# Patient Record
Sex: MALE | Race: WHITE | NOT HISPANIC OR LATINO | Employment: FULL TIME | URBAN - METROPOLITAN AREA
[De-identification: names, ages, dates, MRNs, and addresses within clinical notes are randomized per-mention and may not be internally consistent; named-entity substitution may affect disease eponyms.]

---

## 2017-03-28 ENCOUNTER — TRANSFERRED RECORDS (OUTPATIENT)
Dept: HEALTH INFORMATION MANAGEMENT | Facility: CLINIC | Age: 52
End: 2017-03-28

## 2017-07-25 ENCOUNTER — RECORDS - HEALTHEAST (OUTPATIENT)
Dept: LAB | Facility: CLINIC | Age: 52
End: 2017-07-25

## 2017-07-25 LAB
CHOLEST SERPL-MCNC: 131 MG/DL
FASTING STATUS PATIENT QL REPORTED: ABNORMAL
HDLC SERPL-MCNC: 31 MG/DL
LDLC SERPL CALC-MCNC: 53 MG/DL
TRIGL SERPL-MCNC: 233 MG/DL

## 2018-03-27 ENCOUNTER — RECORDS - HEALTHEAST (OUTPATIENT)
Dept: LAB | Facility: CLINIC | Age: 53
End: 2018-03-27

## 2018-03-27 ENCOUNTER — TRANSFERRED RECORDS (OUTPATIENT)
Dept: HEALTH INFORMATION MANAGEMENT | Facility: CLINIC | Age: 53
End: 2018-03-27

## 2018-03-27 LAB
ANION GAP SERPL CALCULATED.3IONS-SCNC: 13 MMOL/L (ref 5–18)
BUN SERPL-MCNC: 11 MG/DL (ref 8–22)
CALCIUM SERPL-MCNC: 9.4 MG/DL (ref 8.5–10.5)
CHLORIDE BLD-SCNC: 100 MMOL/L (ref 98–107)
CO2 SERPL-SCNC: 24 MMOL/L (ref 22–31)
CREAT SERPL-MCNC: 0.85 MG/DL (ref 0.7–1.3)
GFR SERPL CREATININE-BSD FRML MDRD: >60 ML/MIN/1.73M2
GLUCOSE BLD-MCNC: 309 MG/DL (ref 70–125)
POTASSIUM BLD-SCNC: 4.7 MMOL/L (ref 3.5–5)
SODIUM SERPL-SCNC: 137 MMOL/L (ref 136–145)

## 2018-07-17 ENCOUNTER — RECORDS - HEALTHEAST (OUTPATIENT)
Dept: LAB | Facility: CLINIC | Age: 53
End: 2018-07-17

## 2018-07-17 ENCOUNTER — AMBULATORY - HEALTHEAST (OUTPATIENT)
Dept: ADMINISTRATIVE | Facility: CLINIC | Age: 53
End: 2018-07-17

## 2018-07-17 DIAGNOSIS — E66.9 OBESITY: ICD-10-CM

## 2018-07-17 LAB
CHOLEST SERPL-MCNC: 153 MG/DL
FASTING STATUS PATIENT QL REPORTED: ABNORMAL
HDLC SERPL-MCNC: 39 MG/DL
LDLC SERPL CALC-MCNC: 85 MG/DL
TRIGL SERPL-MCNC: 146 MG/DL

## 2018-10-08 ENCOUNTER — TRANSFERRED RECORDS (OUTPATIENT)
Dept: HEALTH INFORMATION MANAGEMENT | Facility: CLINIC | Age: 53
End: 2018-10-08

## 2018-10-16 ENCOUNTER — TRANSFERRED RECORDS (OUTPATIENT)
Dept: HEALTH INFORMATION MANAGEMENT | Facility: CLINIC | Age: 53
End: 2018-10-16

## 2018-10-26 ENCOUNTER — TRANSFERRED RECORDS (OUTPATIENT)
Dept: HEALTH INFORMATION MANAGEMENT | Facility: CLINIC | Age: 53
End: 2018-10-26

## 2018-11-19 ENCOUNTER — TRANSFERRED RECORDS (OUTPATIENT)
Dept: HEALTH INFORMATION MANAGEMENT | Facility: CLINIC | Age: 53
End: 2018-11-19

## 2018-12-10 ENCOUNTER — TRANSFERRED RECORDS (OUTPATIENT)
Dept: HEALTH INFORMATION MANAGEMENT | Facility: CLINIC | Age: 53
End: 2018-12-10

## 2019-01-11 ENCOUNTER — TRANSFERRED RECORDS (OUTPATIENT)
Dept: HEALTH INFORMATION MANAGEMENT | Facility: CLINIC | Age: 54
End: 2019-01-11

## 2019-02-14 ENCOUNTER — TRANSFERRED RECORDS (OUTPATIENT)
Dept: HEALTH INFORMATION MANAGEMENT | Facility: CLINIC | Age: 54
End: 2019-02-14

## 2019-03-25 ENCOUNTER — TRANSFERRED RECORDS (OUTPATIENT)
Dept: HEALTH INFORMATION MANAGEMENT | Facility: CLINIC | Age: 54
End: 2019-03-25

## 2019-03-27 ENCOUNTER — TELEPHONE (OUTPATIENT)
Dept: OTOLARYNGOLOGY | Facility: CLINIC | Age: 54
End: 2019-03-27

## 2019-03-27 NOTE — TELEPHONE ENCOUNTER
[facility] Call [facility]    Phone Message    May a detailed message be left on voicemail: yes    Reason for Call: Other: pt calling to [facility] for oral/jaw cancer. Please call pt. for scheduling.  Call made to siddharth SENA But left a message.    Action Taken: Message routed to:  [facility] ([facility] ENT

## 2019-03-29 ENCOUNTER — TELEPHONE (OUTPATIENT)
Dept: OPHTHALMOLOGY | Facility: CLINIC | Age: 54
End: 2019-03-29

## 2019-03-29 DIAGNOSIS — K13.79 MASS OF ORAL CAVITY: Primary | ICD-10-CM

## 2019-03-29 NOTE — TELEPHONE ENCOUNTER
This referral is for a possible case of squamous cell carcinoma. Referral was faxed to the ENT clinic.

## 2019-04-01 ENCOUNTER — OFFICE VISIT (OUTPATIENT)
Dept: OTOLARYNGOLOGY | Facility: CLINIC | Age: 54
End: 2019-04-01
Payer: COMMERCIAL

## 2019-04-01 ENCOUNTER — PRE VISIT (OUTPATIENT)
Dept: OTOLARYNGOLOGY | Facility: CLINIC | Age: 54
End: 2019-04-01

## 2019-04-01 ENCOUNTER — HOSPITAL ENCOUNTER (OUTPATIENT)
Dept: CT IMAGING | Facility: CLINIC | Age: 54
Discharge: HOME | End: 2019-04-01
Attending: OTOLARYNGOLOGY | Admitting: OTOLARYNGOLOGY
Payer: COMMERCIAL

## 2019-04-01 VITALS — BODY MASS INDEX: 49.44 KG/M2 | HEIGHT: 67 IN | WEIGHT: 315 LBS

## 2019-04-01 DIAGNOSIS — K13.79 MASS OF ORAL CAVITY: ICD-10-CM

## 2019-04-01 DIAGNOSIS — R59.1 LYMPHADENOPATHY: ICD-10-CM

## 2019-04-01 DIAGNOSIS — K13.79 ORAL PAIN: Primary | ICD-10-CM

## 2019-04-01 PROCEDURE — 70491 CT SOFT TISSUE NECK W/DYE: CPT

## 2019-04-01 PROCEDURE — 25000128 H RX IP 250 OP 636: Performed by: STUDENT IN AN ORGANIZED HEALTH CARE EDUCATION/TRAINING PROGRAM

## 2019-04-01 RX ORDER — IOPAMIDOL 755 MG/ML
100 INJECTION, SOLUTION INTRAVASCULAR ONCE
Status: COMPLETED | OUTPATIENT
Start: 2019-04-01 | End: 2019-04-01

## 2019-04-01 RX ORDER — OXYCODONE HYDROCHLORIDE 5 MG/1
5 TABLET ORAL EVERY 6 HOURS PRN
Qty: 30 TABLET | Refills: 0 | Status: SHIPPED | OUTPATIENT
Start: 2019-04-01 | End: 2019-04-24

## 2019-04-01 RX ORDER — GLIPIZIDE 10 MG/1
20 TABLET, FILM COATED, EXTENDED RELEASE ORAL EVERY MORNING
Refills: 0 | Status: ON HOLD | COMMUNITY
Start: 2019-03-30 | End: 2019-05-16

## 2019-04-01 RX ORDER — ATORVASTATIN CALCIUM 20 MG/1
20 TABLET, FILM COATED ORAL EVERY MORNING
Refills: 11 | Status: ON HOLD | COMMUNITY
Start: 2019-03-30 | End: 2019-05-15

## 2019-04-01 RX ORDER — LISINOPRIL 10 MG/1
10 TABLET ORAL EVERY MORNING
Refills: 11 | Status: ON HOLD | COMMUNITY
Start: 2019-03-30 | End: 2019-05-15

## 2019-04-01 RX ADMIN — IOPAMIDOL 100 ML: 755 INJECTION, SOLUTION INTRAVENOUS at 10:41

## 2019-04-01 SDOH — HEALTH STABILITY: MENTAL HEALTH: HOW OFTEN DO YOU HAVE A DRINK CONTAINING ALCOHOL?: NEVER

## 2019-04-01 ASSESSMENT — PATIENT HEALTH QUESTIONNAIRE - PHQ9
SUM OF ALL RESPONSES TO PHQ QUESTIONS 1-9: 13
SUM OF ALL RESPONSES TO PHQ QUESTIONS 1-9: 13
10. IF YOU CHECKED OFF ANY PROBLEMS, HOW DIFFICULT HAVE THESE PROBLEMS MADE IT FOR YOU TO DO YOUR WORK, TAKE CARE OF THINGS AT HOME, OR GET ALONG WITH OTHER PEOPLE: NOT DIFFICULT AT ALL

## 2019-04-01 ASSESSMENT — PAIN SCALES - GENERAL: PAINLEVEL: EXTREME PAIN (8)

## 2019-04-01 ASSESSMENT — MIFFLIN-ST. JEOR: SCORE: 2245.6

## 2019-04-01 NOTE — PROGRESS NOTES
Otolaryngology Clinic Note  April 1, 2019    Dear Juwan Medellin MD  [facility]  [place] [address]    Thank you for referring Devonte Tucker to [facility] for evaluation of left oral lesion.    HPI: Devonte Tucker is a 53 year old male with PMH of DM2, HTN, who presented to otolaryngology clinic today for evaluation of left oral lesion. He initially developed while mucosal lesion at teeth #12 and #13, s/p several excisions in 2014 by oral surgeon Dr. Carbajal in 2014. 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 He had persistent pain at the area and was treated with multiple course of antibiotics and eventually teeth #12 removal due to mobility on 10/16/2018. He continue to have pain at the surgical site and was thought to have persistent infection vs osteomyelitis. He underwent incision and drainage, mucosa and bone biopsy of the same site on 12/10/18. The biopsy was reviewed both in [facility] oral pathology lab and [facility], both showed epithelial dysplasia, cannot exclude invasive SCC. He was then referred to [facility] for [facility]. Dr. Medellin recommended a left infrastructure maxillectomy with obturator reconstruction. He is here today for a second opinion. Currently he reports pain at the left anterior maxilla, centered at teeth #11, worse with eating, radiates to the left ear and left neck. He's been taking ibuprofen and tylenol frequently for his pain. He denies fever, chill, facial numbness, neck mass, hearing change, nasal congestion, sore throat, dysphagia, cough, SOB.    ROS: 12 point review of systems is negative unless noted in HPI.    Past Medical History:   Diagnosis Date     Diabetes (H)      Hypertension        No past surgical history on file.    Social History     Socioeconomic History     Marital status: Single     Spouse name: Not on file     Number of children: Not on file      Years of education: Not on file     Highest education level: Not on file   Occupational History     Not on file   Social Needs     Financial resource strain: Not on file     Food insecurity:     Worry: Not on file     Inability: Not on file     Transportation needs:     Medical: Not on file     Non-medical: Not on file   Tobacco Use     Smoking status: Former Smoker     Smokeless tobacco: Never Used   Substance and Sexual Activity     Alcohol use: Not on file     Drug use: Not on file     Sexual activity: Not on file   Lifestyle     Physical activity:     Days per week: Not on file     Minutes per session: Not on file     Stress: Not on file   Relationships     Social connections:     Talks on phone: Not on file     Gets together: Not on file     Attends Rastafarian service: Not on file     Active member of club or organization: Not on file     Attends meetings of clubs or organizations: Not on file     Relationship status: Not on file     Intimate partner violence:     Fear of current or ex partner: Not on file     Emotionally abused: Not on file     Physically abused: Not on file     Forced sexual activity: Not on file   Other Topics Concern     Not on file   Social History Narrative     Not on file       No family history on file.    Current Outpatient Medications   Medication Sig Dispense Refill     atorvastatin (LIPITOR) 20 MG tablet   11     glipiZIDE (GLUCOTROL XL) 10 MG 24 hr tablet   0     lisinopril (PRINIVIL/ZESTRIL) 10 MG tablet   11     metFORMIN (GLUCOPHAGE) 500 MG tablet Take 500 mg by mouth         No Known Allergies    PHYSICAL EXAM:  Constitutional: Sitting comfortably, no acute distress  Craniofacial: Normocephalic, atraumatic, normal facial features  Neurologic: Alert. Cranial nerves 2-12 intact  Eyes: PERLL, EOMI, no nystagmus  Ears: Auricles normal. External auditory canals patent without significant cerumen impaction. TMs intact.  Nose: External nose fairly symmetric. Anterior rhinoscopy reveals  normal mucosa and turbinates. No significant septum deviation. No mass, polyps or purulence.  Oral: Lips normal. Tongue, FOM and buccal mucosa normal. There is an area of leukoplakia at the left maxillary gingiva involving tooth #10 to #13, both at the buccal side and the lingual side, no obvious soft tissue mass. Tonsils 2+ bilaterally.   Neck: Supple. Normal laryngeal and tracheal landmarks. No palpable cervical LAD.  Skin: No lacerations, scars, or lesions  Pulmonary: Non-labored breathing in room air. No stridor. Voice strong    IMAGES:  CT neck  Impression:     Left maxillary alveolar lesion at the left canine level overall  measuring about 1.4 x 1.0 cm presumably due to underlying malignancy.     Left submandibular 1.7 cm lymph node is suspicious for metastatic  digna involvement.    ASSESSMENT AND PLAN  Devonte Tucker is a 53 year old male with PMH of DM2, HTN, who presented to otolaryngology clinic today for evaluation of lesion at the left anterior maxillary alveolus. The lesion has been treated since 2014 with multiple excisions and antibiotics. The most recent biopsy on 12/10/18 showed epithelial dysplasia, cannot rule out invasive SCC. His neck CT showed bony erosion at the level of teeth #11, as well as left submandibular lymph node. We recommend an ultrasound guided FNA of the left submandibular lymph node. If it turns to be malignancy, then we will recommend infrastructure maxillectomy and neck dissection. If the FNA turns to be negative for malignancy, we will proceed with re-biopsy of the lesion with possible dental extraction in the OR. We will refer him to dental clinic to get dental impression in case he needs an obturator in the future. We will contact him once the left neck FNA result is available    Silas Tran  Otolaryngology Resident - PGY5     CC  Juwan Medellin MD  [facility]  [address] [place] [id number]    Sumit LONDONO, saw this patient with the  resident/fellow and agree with the resident's findings and plan of care as documented in the resident's/fellow's note.

## 2019-04-01 NOTE — PATIENT INSTRUCTIONS
1. We will arrange an appointment with Dr. Villalobos as well as an ultrasound guided biopsy. We will call you with this information.   2. Please call the ENT clinic with any questions,concerns, new or worsening symptoms.    -Clinic number is [phone]   - Britt[facility]'s direct line (Dr. Atwood's nurse) [phone]

## 2019-04-01 NOTE — TELEPHONE ENCOUNTER
FUTURE VISIT INFORMATION      FUTURE VISIT INFORMATION:    Date: 4/1/19    Time: 11:45AM    Location: [facility] [place] [facility]  REFERRAL INFORMATION:    Referring provider:  Dr Juwan Medellin    Referring providers clinic:  [facility]    Reason for visit/diagnosis  possible SCC    RECORDS REQUESTED FROM:       Clinic name Comments Records Status Imaging Status   [facility] recs scanned in - hard copies with provider Scanned in [facility] oncology recs  Care Everywhere    Oral pathology [facility] 12/10/18 palate biopsy   *slides were also consulted at [facility], report in Epic are for the same slides  Report: scanned in EPIC                        4/1/19 10AM called [facility], slides were sent back to [facility] oral pathology. Sending a request to oral pathology with a consult form for slides to be dropped off with Surgical path - [facility]

## 2019-04-01 NOTE — LETTER
4/1/2019     RE: Devonte Tucker  [address] [place] [address]     Dear Colleague,    Thank you for referring your patient, Devonte Tucker, to the [facility] at [facility]. Please see a copy of my visit note below.  Otolaryngology Clinic Note  April 1, 2019    Dear Juwan Medellin MD  [facility]  [address] [place] [id number]    Thank you for referring Devonte Tucker to [facility] Otolaryngology [facility] for evaluation of left oral lesion.    HPI: Devonte Tucker is a 53 year old male with PMH of DM2, HTN, who presented to otolaryngology clinic today for evaluation of left oral lesion. He initially developed while mucosal lesion at teeth #12 and #13, s/p several excisions in 2014 by oral surgeon Dr. Carbajal in 2014. 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 He had persistent pain at the area and was treated with multiple course of antibiotics and eventually teeth #12 removal due to mobility on 10/16/2018. He continue to have pain at the surgical site and was thought to have persistent infection vs osteomyelitis. He underwent incision and drainage, mucosa and bone biopsy of the same site on 12/10/18. The biopsy was reviewed both in [facility] oral pathology lab and [facility], both showed epithelial dysplasia, cannot exclude invasive SCC. He was then referred to [facility]ell[facility] for [facility]. Dr. Medellin recommended a left infrastructure maxillectomy with obturator reconstruction. He is here today for a second opinion. Currently he reports pain at the left anterior maxilla, centered at teeth #11, worse with eating, radiates to the left ear and left neck. He's been taking ibuprofen and tylenol frequently for his pain. He denies fever, chill, facial numbness, neck mass, hearing change, nasal congestion, sore throat, dysphagia, cough, SOB.    ROS: 12 point review of systems is  negative unless noted in HPI.    Past Medical History:   Diagnosis Date     Diabetes (H)      Hypertension        No past surgical history on file.    Social History     Socioeconomic History     Marital status: Single     Spouse name: Not on file     Number of children: Not on file     Years of education: Not on file     Highest education level: Not on file   Occupational History     Not on file   Social Needs     Financial resource strain: Not on file     Food insecurity:     Worry: Not on file     Inability: Not on file     Transportation needs:     Medical: Not on file     Non-medical: Not on file   Tobacco Use     Smoking status: Former Smoker     Smokeless tobacco: Never Used   Substance and Sexual Activity     Alcohol use: Not on file     Drug use: Not on file     Sexual activity: Not on file   Lifestyle     Physical activity:     Days per week: Not on file     Minutes per session: Not on file     Stress: Not on file   Relationships     Social connections:     Talks on phone: Not on file     Gets together: Not on file     Attends Confucianism service: Not on file     Active member of club or organization: Not on file     Attends meetings of clubs or organizations: Not on file     Relationship status: Not on file     Intimate partner violence:     Fear of current or ex partner: Not on file     Emotionally abused: Not on file     Physically abused: Not on file     Forced sexual activity: Not on file   Other Topics Concern     Not on file   Social History Narrative     Not on file       No family history on file.    Current Outpatient Medications   Medication Sig Dispense Refill     atorvastatin (LIPITOR) 20 MG tablet   11     glipiZIDE (GLUCOTROL XL) 10 MG 24 hr tablet   0     lisinopril (PRINIVIL/ZESTRIL) 10 MG tablet   11     metFORMIN (GLUCOPHAGE) 500 MG tablet Take 500 mg by mouth         No Known Allergies    PHYSICAL EXAM:  Constitutional: Sitting comfortably, no acute distress  Craniofacial: Normocephalic,  atraumatic, normal facial features  Neurologic: Alert. Cranial nerves 2-12 intact  Eyes: PERLL, EOMI, no nystagmus  Ears: Auricles normal. External auditory canals patent without significant cerumen impaction. TMs intact.  Nose: External nose fairly symmetric. Anterior rhinoscopy reveals normal mucosa and turbinates. No significant septum deviation. No mass, polyps or purulence.  Oral: Lips normal. Tongue, FOM and buccal mucosa normal. There is an area of leukoplakia at the left maxillary gingiva involving tooth #10 to #13, both at the buccal side and the lingual side, no obvious soft tissue mass. Tonsils 2+ bilaterally.   Neck: Supple. Normal laryngeal and tracheal landmarks. No palpable cervical LAD.  Skin: No lacerations, scars, or lesions  Pulmonary: Non-labored breathing in room air. No stridor. Voice strong    IMAGES:  CT neck  Impression:     Left maxillary alveolar lesion at the left canine level overall  measuring about 1.4 x 1.0 cm presumably due to underlying malignancy.     Left submandibular 1.7 cm lymph node is suspicious for metastatic  digna involvement.    ASSESSMENT AND PLAN  Devonte Tucker is a 53 year old male with PMH of DM2, HTN, who presented to otolaryngology clinic today for evaluation of lesion at the left anterior maxillary alveolus. The lesion has been treated since 2014 with multiple excisions and antibiotics. The most recent biopsy on 12/10/18 showed epithelial dysplasia, cannot rule out invasive SCC. His neck CT showed bony erosion at the level of teeth #11, as well as left submandibular lymph node. We recommend an ultrasound guided FNA of the left submandibular lymph node. If it turns to be malignancy, then we will recommend infrastructure maxillectomy and neck dissection. If the FNA turns to be negative for malignancy, we will proceed with re-biopsy of the lesion with possible dental extraction in the OR. We will refer him to dental clinic to get dental impression in case he needs  an obturator in the future. We will contact him once the left neck FNA result is available    Silas Tran  Otolaryngology Resident - PGY5    Again, thank you for allowing me to participate in the care of your patient.      Sincerely,    Sumit Atwood MD    CC  Juwan Medellin MD  [facility]  [address] [place] [phone]

## 2019-04-01 NOTE — NURSING NOTE
Chief Complaint   Patient presents with     Consult     possible squamous cell carcinoma      Jeovanny Colon, EMT

## 2019-04-02 ENCOUNTER — DOCUMENTATION ONLY (OUTPATIENT)
Dept: RADIOLOGY | Facility: CLINIC | Age: 54
End: 2019-04-02

## 2019-04-02 ASSESSMENT — PATIENT HEALTH QUESTIONNAIRE - PHQ9: SUM OF ALL RESPONSES TO PHQ QUESTIONS 1-9: 13

## 2019-04-02 NOTE — PROGRESS NOTES
Patient to be placed on  Neuro Radiology schedule for a US  guided  biopsy of left level IB neck node     Discussed with Dr. Bassam Olsen IR [facility]  [phone] Call pager  [phone] pager

## 2019-04-03 ENCOUNTER — PATIENT OUTREACH (OUTPATIENT)
Dept: OTOLARYNGOLOGY | Facility: CLINIC | Age: 54
End: 2019-04-03

## 2019-04-03 LAB — COPATH REPORT: NORMAL

## 2019-04-03 PROCEDURE — 00000346 ZZHCL STATISTIC REVIEW OUTSIDE SLIDES TC 88321: Performed by: OTOLARYNGOLOGY

## 2019-04-03 NOTE — PROGRESS NOTES
Called patient to inform him that we were able to get him scheduled for an IR guided biopsy of the left level IB neck node. He is scheduled for tomorrow 4/4 at 8:00am for 9:30am procedure. Spoke with IR nurse and will have patient hold any Aspirin/ibuporfen today. He is to hold his Metformin tomorrow am. No food after 3:30am and can have clear liquids up to 7:30am tomorrow. Also advised patient that he will need . Patient verbalized understanding of the above information. He was encouraged to call with further questions or concerns.     Brittny Meredith, RN, BSN

## 2019-04-04 ENCOUNTER — HOSPITAL ENCOUNTER (OUTPATIENT)
Facility: CLINIC | Age: 54
Discharge: HOME | End: 2019-04-04
Attending: OTOLARYNGOLOGY | Admitting: OTOLARYNGOLOGY
Payer: COMMERCIAL

## 2019-04-04 ENCOUNTER — APPOINTMENT (OUTPATIENT)
Dept: MEDSURG UNIT | Facility: CLINIC | Age: 54
End: 2019-04-04
Attending: OTOLARYNGOLOGY
Payer: COMMERCIAL

## 2019-04-04 ENCOUNTER — APPOINTMENT (OUTPATIENT)
Dept: INTERVENTIONAL RADIOLOGY/VASCULAR | Facility: CLINIC | Age: 54
End: 2019-04-04
Attending: OTOLARYNGOLOGY
Payer: COMMERCIAL

## 2019-04-04 VITALS
OXYGEN SATURATION: 96 % | HEART RATE: 84 BPM | SYSTOLIC BLOOD PRESSURE: 157 MMHG | DIASTOLIC BLOOD PRESSURE: 87 MMHG | RESPIRATION RATE: 16 BRPM | TEMPERATURE: 98.1 F

## 2019-04-04 DIAGNOSIS — R59.1 LYMPHADENOPATHY: ICD-10-CM

## 2019-04-04 LAB
BASOPHILS # BLD AUTO: 0 10E9/L (ref 0–0.2)
BASOPHILS NFR BLD AUTO: 0.2 %
COPATH REPORT: NORMAL
DIFFERENTIAL METHOD BLD: ABNORMAL
EOSINOPHIL # BLD AUTO: 0.1 10E9/L (ref 0–0.7)
EOSINOPHIL NFR BLD AUTO: 1.8 %
ERYTHROCYTE [DISTWIDTH] IN BLOOD BY AUTOMATED COUNT: 12.6 % (ref 10–15)
HCT VFR BLD AUTO: 40.6 % (ref 40–53)
HGB BLD-MCNC: 13.1 G/DL (ref 13.3–17.7)
IMM GRANULOCYTES # BLD: 0 10E9/L (ref 0–0.4)
IMM GRANULOCYTES NFR BLD: 0.4 %
INR PPP: 1.1 (ref 0.86–1.14)
LYMPHOCYTES # BLD AUTO: 0.9 10E9/L (ref 0.8–5.3)
LYMPHOCYTES NFR BLD AUTO: 16.2 %
MCH RBC QN AUTO: 30.5 PG (ref 26.5–33)
MCHC RBC AUTO-ENTMCNC: 32.3 G/DL (ref 31.5–36.5)
MCV RBC AUTO: 95 FL (ref 78–100)
MONOCYTES # BLD AUTO: 0.3 10E9/L (ref 0–1.3)
MONOCYTES NFR BLD AUTO: 5.2 %
NEUTROPHILS # BLD AUTO: 4.2 10E9/L (ref 1.6–8.3)
NEUTROPHILS NFR BLD AUTO: 76.2 %
NRBC # BLD AUTO: 0 10*3/UL
NRBC BLD AUTO-RTO: 0 /100
PLATELET # BLD AUTO: 266 10E9/L (ref 150–450)
RBC # BLD AUTO: 4.29 10E12/L (ref 4.4–5.9)
WBC # BLD AUTO: 5.5 10E9/L (ref 4–11)

## 2019-04-04 PROCEDURE — 25000128 H RX IP 250 OP 636: Performed by: RADIOLOGY

## 2019-04-04 PROCEDURE — 76942 ECHO GUIDE FOR BIOPSY: CPT

## 2019-04-04 PROCEDURE — 99152 MOD SED SAME PHYS/QHP 5/>YRS: CPT

## 2019-04-04 PROCEDURE — 85025 COMPLETE CBC W/AUTO DIFF WBC: CPT | Performed by: STUDENT IN AN ORGANIZED HEALTH CARE EDUCATION/TRAINING PROGRAM

## 2019-04-04 PROCEDURE — 85610 PROTHROMBIN TIME: CPT | Performed by: STUDENT IN AN ORGANIZED HEALTH CARE EDUCATION/TRAINING PROGRAM

## 2019-04-04 PROCEDURE — 99153 MOD SED SAME PHYS/QHP EA: CPT

## 2019-04-04 PROCEDURE — 88172 CYTP DX EVAL FNA 1ST EA SITE: CPT | Performed by: RADIOLOGY

## 2019-04-04 PROCEDURE — 88173 CYTOPATH EVAL FNA REPORT: CPT | Performed by: RADIOLOGY

## 2019-04-04 PROCEDURE — 27210732 ZZH ACCESSORY CR1

## 2019-04-04 PROCEDURE — 40001004 ZZHCL STATISTIC FLOW INT 9-15 ABY TC 88188: Performed by: RADIOLOGY

## 2019-04-04 PROCEDURE — 88184 FLOWCYTOMETRY/ TC 1 MARKER: CPT | Performed by: RADIOLOGY

## 2019-04-04 PROCEDURE — 27210909 ZZH NEEDLE CR5

## 2019-04-04 PROCEDURE — 88185 FLOWCYTOMETRY/TC ADD-ON: CPT | Performed by: RADIOLOGY

## 2019-04-04 PROCEDURE — 88305 TISSUE EXAM BY PATHOLOGIST: CPT | Performed by: OTOLARYNGOLOGY

## 2019-04-04 PROCEDURE — 25800030 ZZH RX IP 258 OP 636: Performed by: STUDENT IN AN ORGANIZED HEALTH CARE EDUCATION/TRAINING PROGRAM

## 2019-04-04 PROCEDURE — 40000166 ZZH STATISTIC PP CARE STAGE 1

## 2019-04-04 PROCEDURE — 88333 PATH CONSLTJ SURG CYTO XM 1: CPT | Performed by: OTOLARYNGOLOGY

## 2019-04-04 PROCEDURE — 27210903 ZZH KIT CR5

## 2019-04-04 PROCEDURE — 25000125 ZZHC RX 250: Performed by: RADIOLOGY

## 2019-04-04 RX ORDER — SODIUM CHLORIDE 9 MG/ML
INJECTION, SOLUTION INTRAVENOUS CONTINUOUS
Status: DISCONTINUED | OUTPATIENT
Start: 2019-04-04 | End: 2019-04-04 | Stop reason: HOSPADM

## 2019-04-04 RX ORDER — ASPIRIN 81 MG/1
81 TABLET ORAL DAILY
COMMUNITY
End: 2019-04-24

## 2019-04-04 RX ORDER — NICOTINE POLACRILEX 4 MG
15-30 LOZENGE BUCCAL
Status: DISCONTINUED | OUTPATIENT
Start: 2019-04-04 | End: 2019-04-04 | Stop reason: HOSPADM

## 2019-04-04 RX ORDER — MULTIPLE VITAMINS W/ MINERALS TAB 9MG-400MCG
1 TAB ORAL EVERY MORNING
Status: ON HOLD | COMMUNITY
End: 2019-05-15

## 2019-04-04 RX ORDER — FENTANYL CITRATE 50 UG/ML
25-50 INJECTION, SOLUTION INTRAMUSCULAR; INTRAVENOUS EVERY 5 MIN PRN
Status: DISCONTINUED | OUTPATIENT
Start: 2019-04-04 | End: 2019-04-04 | Stop reason: HOSPADM

## 2019-04-04 RX ORDER — LORATADINE 10 MG/1
10 TABLET ORAL EVERY MORNING
Status: ON HOLD | COMMUNITY
End: 2019-05-15

## 2019-04-04 RX ORDER — DEXTROSE MONOHYDRATE 25 G/50ML
25-50 INJECTION, SOLUTION INTRAVENOUS
Status: DISCONTINUED | OUTPATIENT
Start: 2019-04-04 | End: 2019-04-04 | Stop reason: HOSPADM

## 2019-04-04 RX ORDER — NALOXONE HYDROCHLORIDE 0.4 MG/ML
.1-.4 INJECTION, SOLUTION INTRAMUSCULAR; INTRAVENOUS; SUBCUTANEOUS
Status: DISCONTINUED | OUTPATIENT
Start: 2019-04-04 | End: 2019-04-04 | Stop reason: HOSPADM

## 2019-04-04 RX ORDER — FLUMAZENIL 0.1 MG/ML
0.2 INJECTION, SOLUTION INTRAVENOUS
Status: DISCONTINUED | OUTPATIENT
Start: 2019-04-04 | End: 2019-04-04 | Stop reason: HOSPADM

## 2019-04-04 RX ORDER — LIDOCAINE 40 MG/G
CREAM TOPICAL
Status: DISCONTINUED | OUTPATIENT
Start: 2019-04-04 | End: 2019-04-04 | Stop reason: HOSPADM

## 2019-04-04 RX ADMIN — FENTANYL CITRATE 100 MCG: 50 INJECTION INTRAMUSCULAR; INTRAVENOUS at 11:48

## 2019-04-04 RX ADMIN — MIDAZOLAM 2 MG: 1 INJECTION INTRAMUSCULAR; INTRAVENOUS at 11:48

## 2019-04-04 RX ADMIN — SODIUM CHLORIDE: 9 INJECTION, SOLUTION INTRAVENOUS at 09:04

## 2019-04-04 RX ADMIN — LIDOCAINE HYDROCHLORIDE 5 ML: 10 INJECTION, SOLUTION INFILTRATION; PERINEURAL at 11:53

## 2019-04-04 NOTE — IP AVS SNAPSHOT
MRN:[id number]                      After Visit Summary   4/4/2019    Devonte Tucker    MRN: [id number]           Visit Information        Department      4/4/2019  7:48 AM [address] [facility] [place]          Review of your medicines      UNREVIEWED medicines. Ask your doctor about these medicines       Dose / Directions   aspirin 81 MG EC tablet      Dose:  81 mg  Take 81 mg by mouth daily  Refills:  0     atorvastatin 20 MG tablet  Commonly known as:  LIPITOR      Dose:  20 mg  Take 20 mg by mouth daily  Refills:  11     glipiZIDE 10 MG 24 hr tablet  Commonly known as:  GLUCOTROL XL      Dose:  10 mg  Take 10 mg by mouth daily  Refills:  0     lisinopril 10 MG tablet  Commonly known as:  PRINIVIL/ZESTRIL      Dose:  10 mg  Take 10 mg by mouth daily  Refills:  11     loratadine 10 MG tablet  Commonly known as:  CLARITIN      Dose:  10 mg  Take 10 mg by mouth as needed for allergies  Refills:  0     metFORMIN 500 MG tablet  Commonly known as:  GLUCOPHAGE      Dose:  500 mg  Take 500 mg by mouth daily (with breakfast)  Refills:  0     Multi-vitamin tablet      Dose:  1 tablet  Take 1 tablet by mouth daily  Refills:  0     oxyCODONE 5 MG tablet  Commonly known as:  ROXICODONE  Used for:  Oral pain      Dose:  5 mg  Take 1 tablet (5 mg) by mouth every 6 hours as needed for severe pain  Quantity:  30 tablet  Refills:  0              Protect others around you: Learn how to safely use, store and throw away your medicines at www.disposemymeds.org.       Follow-ups after your visit       Care Instructions       Further instructions from your care team       [facility]    Interventional Radiology  Patient Instructions Following Lymph Node Biopsy    AFTER YOU GO HOME  ? If you were given sedation DO NOT drive or operate machinery at home or at work for at least 24 hours  ? DO relax and take it easy for 48 hours, no strenuous activity for 24 hours  ? DO drink plenty of fluids  ? DO resume  your regular diet, unless otherwise instructed by your Primary Physician  ? Keep the dressing dry and in place for 24 hours.  ? DO NOT SMOKE FOR AT LEAST 24 HOURS, if you have been given any medications that were to help you relax or sedate you during your procedure  ? DO NOT drink alcoholic beverages the day of your procedure  ? DO NOT do any strenuous exercise or lifting (> 10 lbs) for at least 3 days following your procedure  ? DO NOT take a bath or shower for at least 12 hours following your procedure  ? Remove dressing after shower the next day. Replace with Band aid for 2 days.  Never leave a wet dressing in place.  ? DO NOT make any important or legal decisions for 24 hours following your procedure  ? There should be minimum drainage from the biopsy site    CALL THE PHYSICIAN IF:  ? You start bleeding from the procedure site.  If you do start to bleed from that site, hold pressure on the site for a minimum of 10 minutes.  Your physician will tell you if you need to return to the hospital  ? You develop nausea or vomiting  ? You have excessive swelling, redness, or tenderness at the site  ? You have drainage that looks like it is infected.  ? You experience severe pain  ? You develop hives or a rash or unexplained itching  ? You develop shortness of breath  ? You develop a temperature of 101 degrees F or greater    ADDITIONAL INSTRUCTIONS: None    [facility] INTERVENTIONAL RADIOLOGY DEPARTMENT  Procedure Physician:         Dr. Cruz         Date of procedure: April 4, 2019  Telephone Numbers: [phone] Monday-Friday 8:00 am to 4:30 pm  [phone] After 4:30 pm Monday-Friday, Weekends & Holidays.   Ask for the Neuro- Radiologist on call.  Someone is on call 24 hrs/day  [facility] toll free number: [phone] Monday-Friday 8:00 am to 4:30 pm  [facility] Emergency Dept: [phone]          Additional Information About Your Visit       [employer] Information    [employer] lets you send messages to your doctor, view your  "test results, renew your prescriptions, schedule appointments and more. To sign up, go to [email or web address] . Click on \"Log in\" on the left side of the screen, which will take you to the Welcome page. Then click on \"Sign up Now\" on the right side of the page.     You will be asked to enter the access code listed below, as well as some personal information. Please follow the directions to create your username and password.     Your access code is: [email or web address]  Expires: 2019  6:30 AM     Your access code will  in 60 days. If you need help or a new code, please call your [facility] clinic or [phone].       Care EveryWhere ID    This is your Care EveryWhere ID. This could be used by other organizations to access your [facility] medical records  [id number]       Your Vitals Were     Blood Pressure   146/82    Pulse   79    Temperature   98.1  F (36.7  C) (Oral)    Respirations   16    Pulse Oximetry   97%          Primary Care Provider Fax #    Physician No Ref-Primary [phone]      Equal Access to Services    [facility] : Hadii adams Alexander, waaxda benito, qaybta vin hernandez, darien rowell . So [facility] [phone].    ATENCIÓN: Si habla español, tiene a alexander disposición servicios gratuitos de asistencia lingüística. Javier al [phone].    We comply with applicable federal civil rights laws and [place] laws. We do not discriminate on the basis of race, color, national origin, age, disability, sex, sexual orientation, or gender identity.           Thank you!    Thank you for choosing [facility] for your care. Our goal is always to provide you with excellent care. Hearing back from our patients is one way we can continue to improve our services. Please take a few minutes to complete the written survey that you may receive in the mail after you visit with us. Thank you!            Medication List      ASK your doctor about these medications       "    Morning Afternoon Evening Bedtime As Needed    aspirin 81 MG EC tablet  INSTRUCTIONS:  Take 81 mg by mouth daily                     atorvastatin 20 MG tablet  Also known as:  LIPITOR  INSTRUCTIONS:  Take 20 mg by mouth daily                     glipiZIDE 10 MG 24 hr tablet  Also known as:  GLUCOTROL XL  INSTRUCTIONS:  Take 10 mg by mouth daily                     lisinopril 10 MG tablet  Also known as:  PRINIVIL/ZESTRIL  INSTRUCTIONS:  Take 10 mg by mouth daily                     loratadine 10 MG tablet  Also known as:  CLARITIN  INSTRUCTIONS:  Take 10 mg by mouth as needed for allergies                     metFORMIN 500 MG tablet  Also known as:  GLUCOPHAGE  INSTRUCTIONS:  Take 500 mg by mouth daily (with breakfast)                     Multi-vitamin tablet  INSTRUCTIONS:  Take 1 tablet by mouth daily                     oxyCODONE 5 MG tablet  Also known as:  ROXICODONE  INSTRUCTIONS:  Take 1 tablet (5 mg) by mouth every 6 hours as needed for severe pain

## 2019-04-04 NOTE — PROGRESS NOTES
Patient Name: Devonte Tucker  Medical Record Number: [id number]  Today's Date: 4/4/2019    Procedure: Lymph Node Biopsy  Proceduralist: Dr. Cruz    Sedation start time: 1120  Sedation end time: 1210  Sedation medications administered: 2 mg Versed, 100 mcg Fentanyl   Total sedation time: 50 min     Procedure start time: 1120  Puncture time: 1125  Procedure end time: 1210    Report given to: 2A RN    Other Notes: Pt arrived to IR room 6 from . Consent reviewed. Pt denies any questions or concerns regarding procedure. Pt positioned supin and monitored per protocol. Pt tolerated procedure without any noted complications. Pt transferred back to .

## 2019-04-04 NOTE — PROGRESS NOTES
0905 Pt on 2a prepped and ready for lymph node biopsy. PIV placed and labs sent. H&P current. Pt's friend Tony will pick pt up after procedure. PT ready for consent.

## 2019-04-04 NOTE — PROCEDURES
[facility], [place]     Neuroradiology      Pre-Procedure Sedation Assessment :      4/4/2019 10:33 AM    Expected Level: Moderate Sedation    Indication: Sedation is required for the following type of Procedure: Biopsy    Sedation and procedural consent: Risks, benefits and alternatives were discussed with Patient    PO Intake: Appropriately NPO for procedure    ASA Class: Class 1 - HEALTHY PATIENT    Mallampati: Grade 1:  Soft palate, uvula, tonsillar pillars, and posterior pharyngeal wall visible    Lungs: Lungs Clear with good breath sounds bilaterally    Heart: Normal heart sounds and rate    History and physical reviewed and no updates needed. I have reviewed the lab findings, diagnostic data, medications, and the plan for sedation. I have determined this patient to be an appropriate candidate for the planned sedation and procedure and have reassessed the patient IMMEDIATELY PRIOR to sedation and procedure.    Connor Cruz

## 2019-04-04 NOTE — DISCHARGE INSTRUCTIONS
[facility]    Interventional Radiology  Patient Instructions Following Lymph Node Biopsy    AFTER YOU GO HOME  ? If you were given sedation DO NOT drive or operate machinery at home or at work for at least 24 hours  ? DO relax and take it easy for 48 hours, no strenuous activity for 24 hours  ? DO drink plenty of fluids  ? DO resume your regular diet, unless otherwise instructed by your Primary Physician  ? Keep the dressing dry and in place for 24 hours.  ? DO NOT SMOKE FOR AT LEAST 24 HOURS, if you have been given any medications that were to help you relax or sedate you during your procedure  ? DO NOT drink alcoholic beverages the day of your procedure  ? DO NOT do any strenuous exercise or lifting (> 10 lbs) for at least 3 days following your procedure  ? DO NOT take a bath or shower for at least 12 hours following your procedure  ? Remove dressing after shower the next day. Replace with Band aid for 2 days.  Never leave a wet dressing in place.  ? DO NOT make any important or legal decisions for 24 hours following your procedure  ? There should be minimum drainage from the biopsy site    CALL THE PHYSICIAN IF:  ? You start bleeding from the procedure site.  If you do start to bleed from that site, hold pressure on the site for a minimum of 10 minutes.  Your physician will tell you if you need to return to the hospital  ? You develop nausea or vomiting  ? You have excessive swelling, redness, or tenderness at the site  ? You have drainage that looks like it is infected.  ? You experience severe pain  ? You develop hives or a rash or unexplained itching  ? You develop shortness of breath  ? You develop a temperature of 101 degrees F or greater    ADDITIONAL INSTRUCTIONS: None    [facility] INTERVENTIONAL RADIOLOGY DEPARTMENT  Procedure Physician:         Dr. Cruz         Date of procedure: April 4, 2019  Telephone Numbers: [phone] Monday-Friday 8:00 am to 4:30 pm  [phone] After 4:30 pm  Monday-Friday, Weekends & Holidays.   Ask for the Neuro- Radiologist on call.  Someone is on call 24 hrs/day  [facility] toll free number: [phone] Monday-Friday 8:00 am to 4:30 pm  [facility] Emergency Dept: [phone]

## 2019-04-04 NOTE — IP AVS SNAPSHOT
[address] [address]                                    After Visit Summary   4/4/2019    Devonte Tucker    MRN: [id number]           After Visit Summary Signature Page    I have received my discharge instructions, and my questions have been answered. I have discussed any challenges I see with this plan with the nurse or doctor.    ..........................................................................................................................................  Patient/Patient Representative Signature      ..........................................................................................................................................  Patient Representative Print Name and Relationship to Patient    ..................................................               ................................................  Date                                   Time    ..........................................................................................................................................  Reviewed by Signature/Title    ...................................................              ..............................................  Date                                               Time          22EPIC Rev 08/18

## 2019-04-04 NOTE — PROGRESS NOTES
1220 Pt arrived on 2a post lymph node biopsy. VSS RA. Dressing c/d/i. Pt exp mild pain at site, ice pack applied. Pt wanting to rest at this time. 1245 Pt eating and drinking. 1315 Pt tolerating oral intake. Discharge instructions reviewed, copy given to pt. Pt tolerated ambulation. PIV dc'd.

## 2019-04-05 LAB
COPATH REPORT: NORMAL
COPATH REPORT: NORMAL

## 2019-04-09 ENCOUNTER — DOCUMENTATION ONLY (OUTPATIENT)
Dept: CARE COORDINATION | Facility: CLINIC | Age: 54
End: 2019-04-09

## 2019-04-12 ENCOUNTER — HOSPITAL ENCOUNTER (OUTPATIENT)
Dept: PET IMAGING | Facility: CLINIC | Age: 54
Setting detail: NUCLEAR MEDICINE
Discharge: HOME | End: 2019-04-12
Attending: OTOLARYNGOLOGY | Admitting: OTOLARYNGOLOGY
Payer: COMMERCIAL

## 2019-04-12 ENCOUNTER — HOSPITAL ENCOUNTER (OUTPATIENT)
Dept: PET IMAGING | Facility: CLINIC | Age: 54
Discharge: HOME | End: 2019-04-12
Attending: OTOLARYNGOLOGY | Admitting: OTOLARYNGOLOGY
Payer: COMMERCIAL

## 2019-04-12 DIAGNOSIS — C03.0 SQUAMOUS CELL CARCINOMA OF MAXILLARY ALVEOLAR RIDGE (H): ICD-10-CM

## 2019-04-12 LAB
CREAT BLD-MCNC: 0.7 MG/DL (ref 0.66–1.25)
GFR SERPL CREATININE-BSD FRML MDRD: >90 ML/MIN/{1.73_M2}
GLUCOSE BLDC GLUCOMTR-MCNC: 115 MG/DL (ref 70–99)

## 2019-04-12 PROCEDURE — 71260 CT THORAX DX C+: CPT

## 2019-04-12 PROCEDURE — 82962 GLUCOSE BLOOD TEST: CPT

## 2019-04-12 PROCEDURE — 25000128 H RX IP 250 OP 636: Performed by: OTOLARYNGOLOGY

## 2019-04-12 PROCEDURE — 34300033 ZZH RX 343: Performed by: OTOLARYNGOLOGY

## 2019-04-12 PROCEDURE — 82565 ASSAY OF CREATININE: CPT

## 2019-04-12 PROCEDURE — 70491 CT SOFT TISSUE NECK W/DYE: CPT

## 2019-04-12 PROCEDURE — A9552 F18 FDG: HCPCS | Performed by: OTOLARYNGOLOGY

## 2019-04-12 PROCEDURE — 78816 PET IMAGE W/CT FULL BODY: CPT | Mod: PI

## 2019-04-12 RX ORDER — IOPAMIDOL 755 MG/ML
135 INJECTION, SOLUTION INTRAVASCULAR ONCE
Status: COMPLETED | OUTPATIENT
Start: 2019-04-12 | End: 2019-04-12

## 2019-04-12 RX ADMIN — FLUDEOXYGLUCOSE F-18 17.93 MCI.: 500 INJECTION, SOLUTION INTRAVENOUS at 14:46

## 2019-04-12 RX ADMIN — IOPAMIDOL 135 ML: 755 INJECTION, SOLUTION INTRAVENOUS at 14:47

## 2019-04-15 ENCOUNTER — OFFICE VISIT (OUTPATIENT)
Dept: OTOLARYNGOLOGY | Facility: CLINIC | Age: 54
End: 2019-04-15
Payer: COMMERCIAL

## 2019-04-15 ENCOUNTER — ALLIED HEALTH/NURSE VISIT (OUTPATIENT)
Dept: SPEECH THERAPY | Facility: CLINIC | Age: 54
End: 2019-04-15

## 2019-04-15 VITALS — BODY MASS INDEX: 49.44 KG/M2 | HEIGHT: 67 IN | WEIGHT: 315 LBS

## 2019-04-15 DIAGNOSIS — C05.9 SQUAMOUS CELL CARCINOMA OF PALATE (H): Primary | ICD-10-CM

## 2019-04-15 RX ORDER — TRAMADOL HYDROCHLORIDE 50 MG/1
50 TABLET ORAL EVERY 6 HOURS PRN
Qty: 30 TABLET | Refills: 0 | Status: ON HOLD | OUTPATIENT
Start: 2019-04-15 | End: 2019-05-15

## 2019-04-15 ASSESSMENT — PAIN SCALES - GENERAL: PAINLEVEL: SEVERE PAIN (7)

## 2019-04-15 ASSESSMENT — MIFFLIN-ST. JEOR: SCORE: 2241.07

## 2019-04-15 NOTE — PROGRESS NOTES
April 15, 2019         Juwan Medellin MD   [facility]   [address] [place] [address]      Dear Dr. Medellin:      Mr. Tucker returns.  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  His canine tooth has become loose.  He did have a lymph node that was suspicious in the left neck.  We needle biopsied this, and it was negative.  The plan therefore is for an infrastructure maxillectomy on the left side with an obturator versus free tissue flap.  Today's visit was principally for counseling and coordination of care.      PHYSICAL EXAMINATION:  My limited examination again shows tenderness in the anterior palate anterior to the incisive foramen just behind the incisors.  His canine tooth is loose but also very tender on palpation.  I believe all his molars on the left side are close to tumor as well.  Neck exam does not reveal any lymphadenopathy.      IMAGING:  The patient has a PET scan which again does show uptake at the level of the anterior palate.      IMPRESSION:  T4 carcinoma of the left palate.      PLAN:   1.  We will coordinate the surgery with Dr. Treviño and myself presumptively for a left infrastructure maxillectomy and free tissue transfer.  I did do an Adeel's test on the right arm that shows it is suitable for transfer.   2.  I discussed the risks of surgery which include but are not limited to bleeding, infection, return trips to the operating room, and possible flap loss.  He understands and wishes to proceed.  The patient will visit with Pooja Ty today as well.      Sincerely,      Sumit Atwood M.D.        Otolaryngology-Head & Neck Surgery    [phone]

## 2019-04-15 NOTE — PROGRESS NOTES
Teaching Flowsheet - ENT   Relevant Diagnosis: scc palate  Teaching Topic: left infrastructure maxillectomy, left neck exploration and right forearm free flap  Person(s) involved in teaching: patient       Motivation Level:  Asks Questions:   Yes  Eager to Learn:   Yes  Cooperative:   Yes  Receptive (willing/able to accept information):   Yes  Comments: Reviewed pre-op H and P,  NPO prior to  surgery,  pre-op scrub (given Hibiclens)  Reviewed post-op  cares , activity and pain.     Patient demonstrates understanding of the following:  Reason for the appointment, diagnosis and treatment plan:   Yes  Knowledge of proper use of medications and conditions for which they are ordered (with special attention to potential side effects or drug interactions):  stop aspirin products 1 week before surgery Yes  Which situations necessitate calling provider and whom to contact:   Yes  Nutritional needs and diet plan:   Yes  Pain management techniques:   Yes  Patient instructed on hand hygiene:  Yes  How and/when to access community resources:   Yes     Infection Prevention:  Patient   demonstrates understanding of the following:  Surgical procedure site care taught Yes  Signs and symptoms of infection taught Yes  Wound care taught Yes  Instructional Materials Used/Given: pre- op booklet,verbal  Instruction.    Brittny Meredith, RN, BSN

## 2019-04-15 NOTE — LETTER
4/15/2019       RE: Devonte Tucker  [address] [place] [address]     Dear Colleague,    Thank you for referring your patient, Devonte Tucker, to the [facility] EAR NOSE AND THROAT at [facility]. Please see a copy of my visit note below.    April 15, 2019         Juwan Medellin MD   [facility]   [address] [place], [place] [id number]      Dear Dr. Medellin:      Mr. Tucker returns.  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  His canine tooth has become loose.  He did have a lymph node that was suspicious in the left neck.  We needle biopsied this, and it was negative.  The plan therefore is for an infrastructure maxillectomy on the left side with an obturator versus free tissue flap.  Today's visit was principally for counseling and coordination of care.      PHYSICAL EXAMINATION:  My limited examination again shows tenderness in the anterior palate anterior to the incisive foramen just behind the incisors.  His canine tooth is loose but also very tender on palpation.  I believe all his molars on the left side are close to tumor as well.  Neck exam does not reveal any lymphadenopathy.      IMAGING:  The patient has a PET scan which again does show uptake at the level of the anterior palate.      IMPRESSION:  T4 carcinoma of the left palate.      PLAN:   1.  We will coordinate the surgery with Dr. Treviño and myself presumptively for a left infrastructure maxillectomy and free tissue transfer.  I did do an Adeel's test on the right arm that shows it is suitable for transfer.   2.  I discussed the risks of surgery which include but are not limited to bleeding, infection, return trips to the operating room, and possible flap loss.  He understands and wishes to proceed.  The patient will visit with Pooja Ty today as well.      Sincerely,      Sumit Atwood M.D.         Otolaryngology-Head & Neck Surgery    [phone]         Teaching Flowsheet - ENT   Relevant Diagnosis: scc palate  Teaching Topic: left infrastructure maxillectomy, left neck exploration and right forearm free flap  Person(s) involved in teaching: patient       Motivation Level:  Asks Questions:   Yes  Eager to Learn:   Yes  Cooperative:   Yes  Receptive (willing/able to accept information):   Yes  Comments: Reviewed pre-op H and P,  NPO prior to  surgery,  pre-op scrub (given Hibiclens)  Reviewed post-op  cares , activity and pain.     Patient demonstrates understanding of the following:  Reason for the appointment, diagnosis and treatment plan:   Yes  Knowledge of proper use of medications and conditions for which they are ordered (with special attention to potential side effects or drug interactions):  stop aspirin products 1 week before surgery Yes  Which situations necessitate calling provider and whom to contact:   Yes  Nutritional needs and diet plan:   Yes  Pain management techniques:   Yes  Patient instructed on hand hygiene:  Yes  How and/when to access community resources:   Yes     Infection Prevention:  Patient   demonstrates understanding of the following:  Surgical procedure site care taught Yes  Signs and symptoms of infection taught Yes  Wound care taught Yes  Instructional Materials Used/Given: pre- op booklet,verbal  Instruction.    Brittny Meredith, RN, BSN      Again, thank you for allowing me to participate in the care of your patient.      Sincerely,    Sumit Atwood MD

## 2019-04-15 NOTE — NURSING NOTE
Chief Complaint   Patient presents with     RECHECK     discussion of surgery     Jeovanny Colon, EMT

## 2019-04-15 NOTE — PROGRESS NOTES
Patient seen for allied health care provider visit.  Introduced self and SLP role post surgery.  Provided information on changes in swallowing and speaking post surgery.  He will likely undergo partial maxillectomy with reconstruction.  He reports a lot of pain currently when eating due to the tumor.  He is hopeful this pain will go away afterward.  We discussed feeding tube and swallowing plan of care after surgery.  He had multiple very good questions which were answered within scope of practice.  Time spent with patient 12 minutes.

## 2019-04-15 NOTE — PATIENT INSTRUCTIONS
1. Patient is scheduled for surgery on 5/9/19  2. You are scheduled for PAC appointment on 4/24 at 8:15am.   3. Patient to avoid blood thinning medications 1 week prior to surgery (Ibuprofen, Aleve, Aspirin, etc.)   4. Patient to review contents within the surgical packet & use the antiseptic scrub as directed.   5. Patient to call the ENT clinic with further questions or concerns: [phone]

## 2019-04-17 ENCOUNTER — PRE VISIT (OUTPATIENT)
Dept: SURGERY | Facility: CLINIC | Age: 54
End: 2019-04-17

## 2019-04-17 NOTE — TELEPHONE ENCOUNTER
FUTURE VISIT INFORMATION      SURGERY INFORMATION:    Date: 5/9/19    Location: [facility] OR    Surgeon:  Sumit Lai    Anesthesia Type:  General    RECORDS REQUESTED FROM:       Primary Care Provider: Vivi Jones- request for records/ testing faxed 4/17- records received and sent to scanning 4/19

## 2019-04-17 NOTE — TELEPHONE ENCOUNTER
FUTURE VISIT INFORMATION      FUTURE VISIT INFORMATION:    Date: 4/24/19    Time: 7AM    Location: [facility]  REFERRAL INFORMATION:    Referring provider:  Dr. Sumit Atwood    Referring providers clinic:  [facility]     Reason for visit/diagnosis  SCC Maxillary alveolus     RECORDS REQUESTED FROM:         Clinic name Comments Records Status Imaging Status   [facility] 3/25/19 notes with Dr Juwan Medellin  Scanned in [facility] oncology recs  Care Everywhere     Oral pathology [facility] 12/10/18 palate biopsy   *slides were also consulted at [place], report in Epic are for the same slides  Report: scanned in [facility] ENT 4/1/19, 4/15/19  notes with Dr Atwood  4/5/19 TB notes  [facility]      [facility]  4/1/19 CT Neck   4/4/19 IR Lymph node Bx  4/12/19 PET CT [facility] PACS

## 2019-04-18 NOTE — PROGRESS NOTES
"April 18, 2019        Dear Dr. Atwood:    I had the pleasure of meeting Devonte Tucker in consultation today at the [facility] at your request.     History of Present Illness:   Mr. Tucker is a 53-year-old gentleman who has had history of precancerous lesions in his tongue and in the upper gingival for many years.  He has been seen by his dentist in [place] since 2014.   He initially developed mucosal lesions at teeth #12 and #13, s/p several excisions (2014), path consistent with verrucous leukoplakia. He has done multiple biopsies and what he calls \"burning operations\" to get rid of some of the skin.  Most recently, his dentist had to strip his entire gingiva and re-graft it.  He tells me that the pathology from that was only precancerous.      More recently, the growth has continued and he was eventually referred to [facility] Medellin [facility] for [facility] evaluation.   A biopsy on 04/03/2019 showed small foci of invasive squamous cell carcinoma and that it is high-grade squamous dysplasia.  He recommended a left infrastructure maxillectomy with obturator reconstruction.  He came here for a second opinion on Dr. Medellin's recommendation.      He is now having quite a bit of pain in his left upper jaw.  His bicuspid is loose.  He is very worried that it will come out.  There has not been any bleeding.  However, he is unable to eat anything solid.  He has been on a liquid diet.  He recently bought some protein to use since he thinks he is losing weight.  Indeed, his weight at his last visit was 318 pounds and he is down to 301 pounds now.      He is not having any bleeding.  There has been no change in his voice.  He has no difficulty swallowing liquids.  He has not noticed any lumps or bumps.     MEDICATIONS:     Current Outpatient Medications   Medication Sig Dispense Refill     atorvastatin (LIPITOR) 20 MG tablet Take 20 mg by mouth every morning   11     " glipiZIDE (GLUCOTROL XL) 10 MG 24 hr tablet Take 20 mg by mouth every morning   0     lisinopril (PRINIVIL/ZESTRIL) 10 MG tablet Take 10 mg by mouth every morning   11     loratadine (CLARITIN) 10 MG tablet Take 10 mg by mouth every morning        metFORMIN (GLUCOPHAGE) 500 MG tablet Take 2,000 mg by mouth daily (with breakfast)        multivitamin w/minerals (MULTI-VITAMIN) tablet Take 1 tablet by mouth every morning        oxyCODONE (ROXICODONE) 5 MG tablet Take 1-2 tablets (5-10 mg) by mouth every 6 hours as needed for pain 30 tablet 0     senna-docusate (SENOKOT-S/PERICOLACE) 8.6-50 MG tablet Take 2 tablets by mouth 2 times daily as needed for constipation (Patient taking differently: Take 2 tablets by mouth 2 times daily as needed for constipation Just received this RX today) 30 tablet 0     traMADol (ULTRAM) 50 MG tablet Take 1 tablet (50 mg) by mouth every 6 hours as needed for severe pain 30 tablet 0     acetaminophen (TYLENOL) 500 MG tablet Take 500-1,000 mg by mouth every 6 hours as needed for mild pain 500 mg in the morning and 1000 mg at bedtime         ALLERGIES:  No Known Allergies    HABITS/SOCIAL HISTORY:    Mr. Tucker works as a  [employer].  He describes it as an [employer] organization.  He is unmarried and does not have children.  His closest relatives are in [place], including a sister, Jovanna, who should probably be his point of contact.  She has not yet been in.      He is a never smoker and a never drinker.      PAST MEDICAL HISTORY:   Past Medical History:   Diagnosis Date     Diabetes (H)      Hypertension      Obesity         FAMILY HISTORY:    Family History   Problem Relation Age of Onset     Cancer Mother         ovarian     Cancer Brother      Cardiovascular Brother         Stent     Kidney Cancer Sister         s/p nephrectomy        REVIEW OF SYSTEMS:    A 10 point Review of Systems was performed and pertinent positives are noted in the HPI; remaining  positives are:  Patient Supplied Answers to Review of Systems   ENT ROS 4/22/2019   Constitutional Appetite change   Neurology -   Ears, Nose, Throat Ear pain   Gastrointestinal/Genitourinary Constipation   Musculoskeletal -   Allergy/Immunology -   Endocrine -       PHYSICAL EXAMINATION:    Constitutional:  The patient was unaccompanied, well-groomed, and in no acute distress.  He is a large man.   Skin: Normal:  warm and pink without rash   Neurologic: Alert and oriented x 3.  CN's III-XII within normal limits.  Voice normal.    Psychiatric: The patient's affect was calm, cooperative, and appropriate.     Communication:  Normal; communicates verbally, normal voice quality.   Respiratory: Breathing comfortably without stridor or exertion of accessory muscles.    Head/Face:  Normocephalic and atraumatic.  No lesions or scars. No sinus tenderness.  The left jaw is tender to palpation and I could not palpate deeply.  Salivary glands -  Normal size, no tenderness, swelling, or palpable masses   Eyes: Pupils were equal and reactive.  Extraocular movement intact.     Ears: Pinnae and tragus non-tender.  EAC's and TM's were clear.      Nose: Sinuses were non-tender.  Anterior rhinoscopy shows a deviated septum to the right. absence of purulence or polyps.     Oral Cavity: The tongue has good movement and the patient does not have severe trismus.  There is obvious tumor growing around the gingiva of the left upper maxilla.  The gingiva around the lateral incisor on the left bicuspid and the premolars are all abnormal.  Medially, it appears that the last two teeth are clear, but on the buccal surface, there is tumor around the second to last molar.   There is tenderness just to the left of the midline palate where there is some tissue that appeared to be abnormal, but does not feel firm.  The remainder of the oral cavity is clear..     Oropharynx: Normal mucosa, palate symmetric with normal elevation. No abnormal lymph  tissue in the oropharynx.      Hypopharynx: Deferred in favor of the fiberoptic examination.   Larynx: Deferred in favor of the fiberoptic examination.   Neck: Obese with normal laryngeal and tracheal landmarks.  The parotid beds were without masses.  No palpable thyroid.  Normal range of motion   Lymphatic: There is no palpable lymphadenopathy in the neck.      Fiberoptic Endoscopy:    Consent for fiberoptic laryngoscopy was obtained, and we confirmed correctness of procedure and identity of patient.  Fiberoptic laryngoscopy was indicated due to the need for careful surveillance.  The nose was topically decongested and anesthetized.  The fiberoptic laryngoscope was passed under endoscopic vision.  The turbinates were normal.  The inferior and middle meati were clear bilaterally without purulence, masses, or polyps.  The nasopharynx was clear.  The Eustachian tubes were clear.  The soft palate appeared normal with good mobility.  The epiglottis was sharp and the visualized portion of the vallecula was clear.  The larynx was clear with mobile cords.  The arytenoids were clear and there was no pooling in the hypopharynx.  See the photographs taken of the oral cavity.      Palate view      Anterior view      Lateral         IMPRESSION AND PLAN:  Mr. 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  He will need an infrastructure maxillectomy.  I have talked to him about taking the cuts through the midline.  I think the left incisors need to be removed and I think all of the posterior teeth need to be removed.  We will take margins at the time of surgery in the midline palate.  If they are negative, we can avoid crossover to the contralateral side.      We talked quite a bit about the surgery.  He had already met Dr. Tolentino.  We talked about the attempt to avoid a tracheotomy, but the need for a  breathing tube.  We talked about a nasogastric feeding tube as well.  I will perform a level I dissection in order to find vessels but since there are no other nodes, I am not sure that we need to do a whole lot of other dissection of the neck for the time being.  I have asked him to visit with his dentist to take impressions of his teeth in the event that the flap does not succeed.  However, I told him that Dr. Atwood has an excellent rate of success with flaps.  He is left handed and therefore we will use the right arm.      I gave him a chance to ask questions.  He will let his sister know to call us if they need to.  He is visiting with Tressa now and Pooja has already met him as well.  Finally, I let him know that he can take a little bit more oxycodone than he has been doing.  I asked him to take it with Tylenol as well and we will give him some Colace.  I have encouraged him to drink some protein shakes and to do as much walking as possible before his surgery in two weeks.  He appreciated the visit and it was a pleasure to meet him.     Thank you very much for the opportunity to participate in the care of your patient.      Jett Treviño M.D.  Otolaryngology/Head & Neck Surgery  [phone]            Sumit Atwood MD  Otolaryngology/Head & Neck Surgery  [place] [id number]    Rich Medellin MD  [facility]  [address]

## 2019-04-24 ENCOUNTER — OFFICE VISIT (OUTPATIENT)
Dept: SURGERY | Facility: CLINIC | Age: 54
End: 2019-04-24
Payer: COMMERCIAL

## 2019-04-24 ENCOUNTER — PRE VISIT (OUTPATIENT)
Dept: OTOLARYNGOLOGY | Facility: CLINIC | Age: 54
End: 2019-04-24

## 2019-04-24 ENCOUNTER — OFFICE VISIT (OUTPATIENT)
Dept: OTOLARYNGOLOGY | Facility: CLINIC | Age: 54
End: 2019-04-24
Payer: COMMERCIAL

## 2019-04-24 ENCOUNTER — ANESTHESIA EVENT (OUTPATIENT)
Dept: SURGERY | Facility: CLINIC | Age: 54
End: 2019-04-24

## 2019-04-24 VITALS
SYSTOLIC BLOOD PRESSURE: 144 MMHG | DIASTOLIC BLOOD PRESSURE: 93 MMHG | BODY MASS INDEX: 47.24 KG/M2 | WEIGHT: 301 LBS | HEIGHT: 67 IN | OXYGEN SATURATION: 97 % | HEART RATE: 107 BPM

## 2019-04-24 VITALS
SYSTOLIC BLOOD PRESSURE: 143 MMHG | HEART RATE: 107 BPM | RESPIRATION RATE: 18 BRPM | WEIGHT: 301 LBS | OXYGEN SATURATION: 95 % | HEIGHT: 67 IN | TEMPERATURE: 98.1 F | DIASTOLIC BLOOD PRESSURE: 84 MMHG | BODY MASS INDEX: 47.24 KG/M2

## 2019-04-24 DIAGNOSIS — Z01.818 PRE-OP EXAMINATION: Primary | ICD-10-CM

## 2019-04-24 DIAGNOSIS — K13.79 MASS OF ORAL CAVITY: ICD-10-CM

## 2019-04-24 DIAGNOSIS — C05.9 SQUAMOUS CELL CARCINOMA OF PALATE (H): Primary | ICD-10-CM

## 2019-04-24 DIAGNOSIS — C05.9 SQUAMOUS CELL CARCINOMA OF PALATE (H): ICD-10-CM

## 2019-04-24 LAB
ANION GAP SERPL CALCULATED.3IONS-SCNC: 7 MMOL/L (ref 3–14)
BUN SERPL-MCNC: 10 MG/DL (ref 7–30)
CALCIUM SERPL-MCNC: 9.9 MG/DL (ref 8.5–10.1)
CHLORIDE SERPL-SCNC: 102 MMOL/L (ref 94–109)
CO2 SERPL-SCNC: 26 MMOL/L (ref 20–32)
CREAT SERPL-MCNC: 0.81 MG/DL (ref 0.66–1.25)
GFR SERPL CREATININE-BSD FRML MDRD: >90 ML/MIN/{1.73_M2}
GLUCOSE SERPL-MCNC: 153 MG/DL (ref 70–99)
HBA1C MFR BLD: 7.4 % (ref 0–5.6)
NT-PROBNP SERPL-MCNC: 10 PG/ML (ref 0–125)
POTASSIUM SERPL-SCNC: 4 MMOL/L (ref 3.4–5.3)
SODIUM SERPL-SCNC: 135 MMOL/L (ref 133–144)

## 2019-04-24 RX ORDER — ACETAMINOPHEN 500 MG
500-1000 TABLET ORAL EVERY 6 HOURS PRN
Status: ON HOLD | COMMUNITY
End: 2019-05-15

## 2019-04-24 RX ORDER — AMOXICILLIN 250 MG
2 CAPSULE ORAL 2 TIMES DAILY PRN
Qty: 30 TABLET | Refills: 0 | Status: ON HOLD | OUTPATIENT
Start: 2019-04-24 | End: 2019-05-15

## 2019-04-24 RX ORDER — OXYCODONE HYDROCHLORIDE 5 MG/1
5-10 TABLET ORAL EVERY 6 HOURS PRN
Qty: 30 TABLET | Refills: 0 | Status: ON HOLD | OUTPATIENT
Start: 2019-04-24 | End: 2019-05-15

## 2019-04-24 ASSESSMENT — MIFFLIN-ST. JEOR
SCORE: 2163.96
SCORE: 2163.96

## 2019-04-24 ASSESSMENT — PAIN SCALES - GENERAL: PAINLEVEL: SEVERE PAIN (7)

## 2019-04-24 NOTE — ANESTHESIA PREPROCEDURE EVALUATION
Anesthesia Pre-Procedure Evaluation    Patient: Devonte Tucker   MRN:     [id number] Gender:   male   Age:    53 year old :      1965        Preoperative Diagnosis: * No surgery found *        Past Medical History:   Diagnosis Date     Diabetes (H)      Hypertension       Past Surgical History:   Procedure Laterality Date     IR LYMPH NODE BIOPSY  2019          Anesthesia Evaluation     . Pt has had prior anesthetic. Type: General    No history of anesthetic complications          ROS/MED HX    ENT/Pulmonary:     (+)allergic rhinitis, , . .    Neurologic:  - neg neurologic ROS     Cardiovascular:     (+) hypertension----. : . . . :. . Previous cardiac testing date:results:date: results:ECG reviewed date:3/27/18 results:SR date: results:          METS/Exercise Tolerance: Comment: Walk for a couple of blocks and walk for 15 minutes before stopping.  3 - Able to walk 1-2 blocks without stopping   Hematologic:  - neg hematologic  ROS      (-) history of blood clots and History of Transfusion   Musculoskeletal:   (+)  other musculoskeletal- low back - sees chiropractor       GI/Hepatic:  - neg GI/hepatic ROS       Renal/Genitourinary:  - ROS Renal section negative       Endo: Comment: Patient reports he thinks that his A1c 7.0-7.2 6 months ago.     (+) type II DM Last HgA1c: 7.4 date: 19 Not using insulin - not using insulin pump Obesity, .      Psychiatric:  - neg psychiatric ROS       Infectious Disease:  - neg infectious disease ROS       Malignancy:   (+) Malignancy History of Other  Other CA SCC of palpate Active status post         Other:    (+) no other significant disability                        PHYSICAL EXAM:   Mental Status/Neuro: A/A/O   Airway: Facies: Thick Neck  Mallampati: II  Mouth/Opening: Limited  TM distance: > 6 cm  Neck ROM: Limited   Respiratory: Auscultation: CTAB     Resp. Rate: Normal     Resp. Effort: Normal      CV: Rhythm: Regular  Heart: Normal Sounds  Pulses: Normal    Comments:      Dental:  Dental Comments: Loose left canine tooth                 Ref. Range 4/24/2019 09:46   Sodium Latest Ref Range: 133 - 144 mmol/L 135   Potassium Latest Ref Range: 3.4 - 5.3 mmol/L 4.0   Chloride Latest Ref Range: 94 - 109 mmol/L 102   Carbon Dioxide Latest Ref Range: 20 - 32 mmol/L 26   Urea Nitrogen Latest Ref Range: 7 - 30 mg/dL 10   Creatinine Latest Ref Range: 0.66 - 1.25 mg/dL 0.81   GFR Estimate Latest Ref Range: >60 mL/min/1.73_m2 >90   GFR Estimate If Black Latest Ref Range: >60 mL/min/1.73_m2 >90   Calcium Latest Ref Range: 8.5 - 10.1 mg/dL 9.9   Anion Gap Latest Ref Range: 3 - 14 mmol/L 7   Hemoglobin A1C Latest Ref Range: 0 - 5.6 % 7.4 (H)   N-Terminal Pro Bnp Latest Ref Range: 0 - 125 pg/mL 10   Glucose Latest Ref Range: 70 - 99 mg/dL 153 (H)   Results for ODALIS KOVACS (MRN [id number]) as of 4/24/2019 10:26   Ref. Range 4/4/2019 08:45   WBC Latest Ref Range: 4.0 - 11.0 10e9/L 5.5   Hemoglobin Latest Ref Range: 13.3 - 17.7 g/dL 13.1 (L)   Hematocrit Latest Ref Range: 40.0 - 53.0 % 40.6   Platelet Count Latest Ref Range: 150 - 450 10e9/L 266   RBC Count Latest Ref Range: 4.4 - 5.9 10e12/L 4.29 (L)   MCV Latest Ref Range: 78 - 100 fl 95   MCH Latest Ref Range: 26.5 - 33.0 pg 30.5   MCHC Latest Ref Range: 31.5 - 36.5 g/dL 32.3   RDW Latest Ref Range: 10.0 - 15.0 % 12.6   Diff Method Unknown Automated Method   % Neutrophils Latest Units: % 76.2   % Lymphocytes Latest Units: % 16.2   % Monocytes Latest Units: % 5.2   % Eosinophils Latest Units: % 1.8   % Basophils Latest Units: % 0.2   % Immature Granulocytes Latest Units: % 0.4   Nucleated RBCs Latest Ref Range: 0 /100 0   Absolute Neutrophil Latest Ref Range: 1.6 - 8.3 10e9/L 4.2   Absolute Lymphocytes Latest Ref Range: 0.8 - 5.3 10e9/L 0.9   Absolute Monocytes Latest Ref Range: 0.0 - 1.3 10e9/L 0.3   Absolute Eosinophils Latest Ref Range: 0.0 - 0.7 10e9/L 0.1   Absolute Basophils Latest Ref Range: 0.0 - 0.2 10e9/L 0.0   Abs  "Immature Granulocytes Latest Ref Range: 0 - 0.4 10e9/L 0.0   Absolute Nucleated RBC Unknown 0.0   INR Latest Ref Range: 0.86 - 1.14  1.10     Preop Vitals  BP Readings from Last 3 Encounters:   04/24/19 (!) 144/93   04/04/19 157/87    Pulse Readings from Last 3 Encounters:   04/24/19 107   04/04/19 84      Resp Readings from Last 3 Encounters:   04/04/19 16    SpO2 Readings from Last 3 Encounters:   04/24/19 97%   04/04/19 96%      Temp Readings from Last 1 Encounters:   04/04/19 98.1  F (36.7  C) (Oral)    Ht Readings from Last 1 Encounters:   04/24/19 1.702 m (5' 7\")      Wt Readings from Last 1 Encounters:   04/24/19 136.5 kg (301 lb)    Estimated body mass index is 47.14 kg/m  as calculated from the following:    Height as of an earlier encounter on 4/24/19: 1.702 m (5' 7\").    Weight as of an earlier encounter on 4/24/19: 136.5 kg (301 lb).     LDA:            MISTYG FV AN PLAN NO PONV RULE         PAC Discussion and Assessment    ASA Classification: 3  Case is suitable for: [place]  Anesthetic techniques and relevant risks discussed: GA  Invasive monitoring and risk discussed:   Types:   Possibility and Risk of blood transfusion discussed:   NPO instructions given:   Additional anesthetic preparation and risks discussed:   Needs early admission to pre-op area:   Other:     PAC Resident/NP Anesthesia Assessment:  Devonte is a 53 year old man who is scheduled for left infrastructure maxillectomy, left neck exploration, right forearm free flap, STSG, NG tube placement on 5/9/19 by Dr. Treviño and Dr. Atwood in treatment of squamous cell carcinoma of the palate.  PAC referral for risk assessment and optimization for anesthesia with comorbid conditions of hypertension, allergic rhinitis, diabetes type 2, obesity:    Pre-operative considerations:  1.  Cardiac:  Functional status- METS 3.  Intermediate risk surgery with 0.4% (RCRI #) risk of major adverse cardiac event.   ~ HTN - Hold lisinopril DOS. Continue Lipitor. " Will check BNP today as only previously had EKG on 3/27/18 with sinus rhythm. Denies WIGGINS but does have some lower extremity edema.     2.  Pulm:  Airway feasible.  TREV risk: Intermediate: 4/8: male, age, BMI, HTN. Previous PFTs 3/28/17 FEV1 2.53 and FEV1/FVC 81%  ~ Allergic rhinitis - continue claritin.     3. ENT: Squamous cell carcinoma of the palate - procedure as above. Seen by Dr. Treviño today who did direct laryngoscopy. Please refer to her note.     4. Endo:  ~ Type 2 DM - Hold metformin, glipized DOS. Check A1c today.   ~ Obesity, BMI 47. Consideration for safe lifting techniques. May need consideration for Karine.     5. GI:  Risk of PONV score = 2.  If > 2, anti-emetic intervention recommended.    6. Pain: Pain associated with cancer. Continue tylenol, oxycodone and ultram. Morphine Eq= 30-60 mg    VTE risk: 3%    Patient is optimized and is acceptable candidate for the proposed procedure.  No further diagnostic evaluation is needed.     Patient also evaluated by Dr. Godoy. See recommendations below.     For further details of assessment, testing, and physical exam please see H and P completed on same date.    Flor Velasquez PA-C        Mid-Level Provider/Resident: Flor Velasquez PA-C  Date: 4/24/19  Time:     Attending Anesthesiologist Anesthesia Assessment:        Anesthesiologist:   Date:   Time:   Pass/Fail:   Disposition:     PAC Pharmacist Assessment:        Pharmacist:   Date:   Time:        Flor Velasquez PA-C

## 2019-04-24 NOTE — NURSING NOTE
Chief Complaint   Patient presents with     Consult     [facility] patient surgery on 5/9     Jeovanny Colon, EMT

## 2019-04-24 NOTE — H&P
Pre-Operative H & P     CC:  Preoperative exam to assess for increased cardiopulmonary risk while undergoing surgery and anesthesia.    Date of Encounter: 4/24/2019  Primary Care Physician:  No Ref-Primary, Physician     Reason for visit: pre operative examination, squamous cell carcinoma of the palate    PIPER Tucker is a 53 year old male who presents for pre-operative H & P in preparation for left infrastructure maxillectomy, left neck exploration, right forearm free flap, STSG, NG tube placement with Dr. Valdes and Dr. Atwood on 5/9/19 at [facility].     This patient is a 53 year old man who has a history of a mucosal lesion and had several excisions in 2014. The pathology showed verrucous leukoplakia. He continued to have pain in the area and was treated with multiple coursed of antibiotics as he was thought to osteomyelitis . Despite the treatments he continued to have pain and underwent an incision and drainage and mucosa and bone biopsy of the same site. This showed epithelial dysplasia but cannot exclde invasive squamous cell carcinoma. He met with Dr. Atwood on 4/1/19 for a second opinion after seeing Dr. Medellin. He underwent a IR guided biopsy of his lymph node which was negative for malignancy. His case was dicussed at the tumor conference. He followed up again with Dr. Atwood on 4/15/19 and saw Dr. Treviño today and is now scheduled for the procedure as above.     History is obtained from the patient and chart review    Past Medical History  Past Medical History:   Diagnosis Date     Diabetes (H)      Hypertension        Past Surgical History  Past Surgical History:   Procedure Laterality Date     IR LYMPH NODE BIOPSY  4/4/2019       Hx of Blood transfusions/reactions: none     Hx of abnormal bleeding or anti-platelet use: none    Menstrual history: No LMP for male patient.:     Steroid use in the last year: none    Personal or FH with  difficulty with Anesthesia:  none    Prior to Admission Medications  Current Outpatient Medications   Medication Sig Dispense Refill     acetaminophen (TYLENOL) 500 MG tablet Take 500-1,000 mg by mouth every 6 hours as needed for mild pain 500 mg in the morning and 1000 mg at bedtime       atorvastatin (LIPITOR) 20 MG tablet Take 20 mg by mouth every morning   11     glipiZIDE (GLUCOTROL XL) 10 MG 24 hr tablet Take 20 mg by mouth every morning   0     lisinopril (PRINIVIL/ZESTRIL) 10 MG tablet Take 10 mg by mouth every morning   11     loratadine (CLARITIN) 10 MG tablet Take 10 mg by mouth every morning        metFORMIN (GLUCOPHAGE) 500 MG tablet Take 2,000 mg by mouth daily (with breakfast)        multivitamin w/minerals (MULTI-VITAMIN) tablet Take 1 tablet by mouth every morning        oxyCODONE (ROXICODONE) 5 MG tablet Take 1-2 tablets (5-10 mg) by mouth every 6 hours as needed for pain 30 tablet 0     traMADol (ULTRAM) 50 MG tablet Take 1 tablet (50 mg) by mouth every 6 hours as needed for severe pain 30 tablet 0     senna-docusate (SENOKOT-S/PERICOLACE) 8.6-50 MG tablet Take 2 tablets by mouth 2 times daily as needed for constipation (Patient taking differently: Take 2 tablets by mouth 2 times daily as needed for constipation Just received this RX today) 30 tablet 0       Allergies  No Known Allergies    Social History  Social History     Socioeconomic History     Marital status: Single     Spouse name: Not on file     Number of children: Not on file     Years of education: Not on file     Highest education level: Not on file   Occupational History     Not on file   Social Needs     Financial resource strain: Not on file     Food insecurity:     Worry: Not on file     Inability: Not on file     Transportation needs:     Medical: Not on file     Non-medical: Not on file   Tobacco Use     Smoking status: Never Smoker     Smokeless tobacco: Never Used   Substance and Sexual Activity     Alcohol use: No      "Frequency: Never     Drug use: No     Sexual activity: Not on file   Lifestyle     Physical activity:     Days per week: Not on file     Minutes per session: Not on file     Stress: Not on file   Relationships     Social connections:     Talks on phone: Not on file     Gets together: Not on file     Attends Congregational service: Not on file     Active member of club or organization: Not on file     Attends meetings of clubs or organizations: Not on file     Relationship status: Not on file     Intimate partner violence:     Fear of current or ex partner: Not on file     Emotionally abused: Not on file     Physically abused: Not on file     Forced sexual activity: Not on file   Other Topics Concern     Not on file   Social History Narrative     Not on file       Family History  Family History   Problem Relation Age of Onset     Cancer Mother      Cancer Brother        Review of Systems      ROS/MED HX    ENT/Pulmonary:     (+)allergic rhinitis, , . .    Neurologic:  - neg neurologic ROS     Cardiovascular:     (+) hypertension----. : . . . :. . Previous cardiac testing date:results:date: results:ECG reviewed date:3/27/18 results:SR date: results:          METS/Exercise Tolerance:     Hematologic:  - neg hematologic  ROS       Musculoskeletal:  - neg musculoskeletal ROS       GI/Hepatic:  - neg GI/hepatic ROS       Renal/Genitourinary:  - ROS Renal section negative       Endo:     (+) type II DM Obesity, .      Psychiatric:  - neg psychiatric ROS       Infectious Disease:  - neg infectious disease ROS       Malignancy:      - no malignancy   Other:    (+) no other significant disability              The complete review of systems is negative other than noted in the HPI or here.   Temp: 98.1  F (36.7  C) Temp src: Oral BP: 143/84 Pulse: 107   Resp: 18 SpO2: 95 %         301 lbs 0 oz  5' 7\"   Body mass index is 47.14 kg/m .       Physical Exam  Constitutional: Awake, alert, cooperative, no apparent distress, and appears " stated age.  Eyes: Pupils equal, round and reactive to light, extra ocular muscles intact, sclera clear, conjunctiva normal.  HENT: Normocephalic, thick neck, oral pharynx with moist mucus membranes, good dentition. No goiter appreciated.   Respiratory: Clear to auscultation bilaterally, no crackles or wheezing.  Cardiovascular: Regular rate and rhythm, normal S1 and S2, and no murmur noted.  Carotids +2, no bruits. Pitting edema +1 in LE. Palpable pulses to radial  DP and PT arteries.   GI: Normal bowel sounds, soft, non-distended, non-tender, no masses palpated, no hepatosplenomegaly.  Surgical scars:   Lymph/Hematologic: No cervical lymphadenopathy and no supraclavicular lymphadenopathy.  Genitourinary:  defer  Skin: Warm and dry.  No rashes at anticipated surgical site.   Musculoskeletal: Limited ROM of neck secondary to body habitus. There is no redness, warmth, or swelling of the joints. Gross motor strength is normal.    Neurologic: Awake, alert, oriented to name, place and time. Cranial nerves II-XII are grossly intact. Gait is normal.   Neuropsychiatric: Calm, cooperative. Normal affect.     Labs: (personally reviewed)  Results for ODALIS KOVACS (MRN [id number]) as of 4/24/2019 10:26   Ref. Range 4/24/2019 09:46   Sodium Latest Ref Range: 133 - 144 mmol/L 135   Potassium Latest Ref Range: 3.4 - 5.3 mmol/L 4.0   Chloride Latest Ref Range: 94 - 109 mmol/L 102   Carbon Dioxide Latest Ref Range: 20 - 32 mmol/L 26   Urea Nitrogen Latest Ref Range: 7 - 30 mg/dL 10   Creatinine Latest Ref Range: 0.66 - 1.25 mg/dL 0.81   GFR Estimate Latest Ref Range: >60 mL/min/1.73_m2 >90   GFR Estimate If Black Latest Ref Range: >60 mL/min/1.73_m2 >90   Calcium Latest Ref Range: 8.5 - 10.1 mg/dL 9.9   Anion Gap Latest Ref Range: 3 - 14 mmol/L 7   Hemoglobin A1C Latest Ref Range: 0 - 5.6 % 7.4 (H)   N-Terminal Pro Bnp Latest Ref Range: 0 - 125 pg/mL 10   Glucose Latest Ref Range: 70 - 99 mg/dL 153 (H)   Results for CAMPEAU,  ODALIS KIMBALL (MRN [id number]) as of 4/24/2019 10:26   Ref. Range 4/4/2019 08:45   WBC Latest Ref Range: 4.0 - 11.0 10e9/L 5.5   Hemoglobin Latest Ref Range: 13.3 - 17.7 g/dL 13.1 (L)   Hematocrit Latest Ref Range: 40.0 - 53.0 % 40.6   Platelet Count Latest Ref Range: 150 - 450 10e9/L 266   RBC Count Latest Ref Range: 4.4 - 5.9 10e12/L 4.29 (L)   MCV Latest Ref Range: 78 - 100 fl 95   MCH Latest Ref Range: 26.5 - 33.0 pg 30.5   MCHC Latest Ref Range: 31.5 - 36.5 g/dL 32.3   RDW Latest Ref Range: 10.0 - 15.0 % 12.6   Diff Method Unknown Automated Method   % Neutrophils Latest Units: % 76.2   % Lymphocytes Latest Units: % 16.2   % Monocytes Latest Units: % 5.2   % Eosinophils Latest Units: % 1.8   % Basophils Latest Units: % 0.2   % Immature Granulocytes Latest Units: % 0.4   Nucleated RBCs Latest Ref Range: 0 /100 0   Absolute Neutrophil Latest Ref Range: 1.6 - 8.3 10e9/L 4.2   Absolute Lymphocytes Latest Ref Range: 0.8 - 5.3 10e9/L 0.9   Absolute Monocytes Latest Ref Range: 0.0 - 1.3 10e9/L 0.3   Absolute Eosinophils Latest Ref Range: 0.0 - 0.7 10e9/L 0.1   Absolute Basophils Latest Ref Range: 0.0 - 0.2 10e9/L 0.0   Abs Immature Granulocytes Latest Ref Range: 0 - 0.4 10e9/L 0.0   Absolute Nucleated RBC Unknown 0.0   INR Latest Ref Range: 0.86 - 1.14  1.10       EKG:  3/27/18  Sinus rhythm    PET CT fusion examination 4/12/2019 3:46 PM  1. Neck CT with contrast  2. PET study of the neck  3. 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 Left maxillary paramedian mucosal lesion demonstrates intense  uptake, compatible with the patient's primary tumor. Erosion of the  involved bone.  2. Enlarged left submandibular lymph node demonstrates intense uptake,  compatible with metastatic disease. An additional nonenlarged right  submandibular lymph node demonstrates mild hypermetabolic activity,  favored to represent reactive lymph  node. Recommend attention on  follow-up.  3. Please refer to the whole body PET CT performed as a separate  report, for the findings of the remainder of the body.    CT SOFT TISSUE NECK W CONTRAST 4/1/2019 11:03 AM  Impression:     Left maxillary alveolar lesion at the left canine level overall  measuring about 1.4 x 1.0 cm presumably due to underlying malignancy.     Left submandibular 1.7 cm lymph node is suspicious for metastatic  digna involvement.  The patient's records and results personally reviewed by this provider.     Outside records reviewed from: care everywhere    ASSESSMENT and PLAN  Devonte is a 53 year old man who is scheduled for left infrastructure maxillectomy, left neck exploration, right forearm free flap, STSG, NG tube placement on 5/9/19 by Dr. Treviño and Dr. Atwood in treatment of squamous cell carcinoma of the palate.  PAC referral for risk assessment and optimization for anesthesia with comorbid conditions of hypertension, allergic rhinitis, diabetes type 2, obesity:    Pre-operative considerations:  1.  Cardiac:  Functional status- METS 3.  Intermediate risk surgery with 0.4% (RCRI #) risk of major adverse cardiac event.   ~ HTN - Hold lisinopril DOS. Continue Lipitor. Will check BNP today as only previously only had EKG on 3/27/18 with sinus rhythm. Denies WIGGINS but does have some lower extremity edema.     2.  Pulm:  Airway feasible.  TREV risk: Intermediate: 4/8: male, age, BMI, HTN. Previous PFTs 3/28/17 FEV1 2.53 and FEV1/FVC 81%  ~ Allergic rhinitis - continue claritin.     3. ENT: Squamous cell carcinoma of the palate - procedure as above. Seen by Dr. Treviño today who did direct laryngoscopy. Please refer to her note.     4. Endo:  ~ Type 2 DM - Hold metformin, glipized DOS. Check A1c today.   ~ Obesity, BMI 47. Consideration for safe lifting techniques. May need consideration for Karine.     5. GI:  Risk of PONV score = 2.  If > 2, anti-emetic intervention recommended.    6. Pain: Pain  associated with cancer. Continue tylenol, oxycodone and ultram. Morphine Eq= 30-60 mg    VTE risk: 3%    Patient was discussed with Dr Garcia.    The patient is optimized for their procedure. AVS with information on surgery time/arrival time, meds and NPO status given by nursing staff.        Flor Velasquez PA-C  Preoperative Assessment Center  [facility]  Phone: [phone]  Fax: [phone]

## 2019-04-24 NOTE — PROGRESS NOTES
Teaching Flowsheet - ENT   Relevant Diagnosis: Oral mass   Teaching Topic:Person(s) involved in teaching: pre-op teaching; Tressa        Motivation Level:  Asks Questions:   Yes  Eager to Learn:   Yes  Cooperative:   Yes  Receptive (willing/able to accept information):   Yes  Comments: Reviewed pre-op H and P,  NPO prior to  surgery,  pre-op scrub (given Hibiclens)  Reviewed post-op  cares , activity and pain.     Patient demonstrates understanding of the following:  Reason for the appointment, diagnosis and treatment plan:   Yes  Knowledge of proper use of medications and conditions for which they are ordered (with special attention to potential side effects or drug interactions):  stop aspirin products 1 week before surgery Yes  Which situations necessitate calling provider and whom to contact:   Yes  Nutritional needs and diet plan:   Yes  Pain management techniques:   Yes  Patient instructed on hand hygiene:  Yes  How and/when to access community resources:   Yes     Infection Prevention:  Patient   demonstrates understanding of the following:  Surgical procedure site care taught Yes  Signs and symptoms of infection taught Yes  Wound care taught Yes  Instructional Materials Used/Given: pre- op booklet,verbal  Instruction.      Natice Schwab, RN BSN

## 2019-04-24 NOTE — LETTER
Date:April 29, 2019      Patient was self referred, no letter generated. Do not send.        [facility] Health Information

## 2019-04-24 NOTE — PATIENT INSTRUCTIONS
Preparing for Your Surgery      Name:  Devonte Tucker   MRN:  [id number]   :  1965   Today's Date:  2019     Arriving for surgery:  Surgery date:  2019  Arrival time:  5:30 am  Please come to:    [facility] [address]  [address] [place]  [address]    - ? parking is available in front of the hospital    -    Please proceed to [address] on the 3rd floor. [phone]?  - ?If you are in need of directions, wheelchair or escort please stop at the Information Desk in the lobby.  Inform the information person that you are here for surgery; a wheelchair and escort to [address] will be provided.?     What can I eat or drink?  -  You may have solid food or milk products until 8 hours prior to your surgery. (19 at 11 pm)  -  You may have water, apple juice or 7up/Sprite until 2 hours prior to your surgery.(until 5:30 am arrival time)    Which medicines can I take?        Stop Aspirin, vitamins and supplements one week prior to surgery.      Hold Ibuprofen for 24 hours and/or Naproxen for 48 hours prior to surgery.     -  Do NOT take these medications in the morning, the day of surgery:     Metformin      Glipizide (Glucotrol)     Lisinopril     -  Please take these medications the day of surgery:     Atorvastatin     Loratadine      Oxycodone if needed    How do I prepare myself?  -  Take two showers: one the night before surgery; and one the morning of surgery.         Use Scrubcare or Hibiclens to wash from neck down.  You may use your own shampoo and conditioner. No other hair products.   -  Do NOT use lotion, powder, deodorant, or antiperspirant the day of your surgery.  -  Do NOT wear any makeup, fingernail polish or jewelry.  - Do not bring your own medications to the hospital, except for inhalers and eye drops.  -  Bring your ID and insurance card.    Questions or Concerns:  -If you have questions or concerns regarding the day of surgery, please  call   The Pre Admission Nursing Office at [phone].           If you have any questions or concerns about your surgery after your appointment, please call   - Option 1: ENT Clinic: [phone]  - Option 2: [facility] (Dr. Treviño's Nurse): [phone]                 AFTER YOUR SURGERY  Breathing exercises   Breathing exercises help you recover faster. Take deep breaths and let the air out slowly. This will:     Help you wake up after surgery.    Help prevent complications like pneumonia.  Preventing complications will help you go home sooner.   We may give you a breathing device (incentive spirometer) to encourage you to breathe deeply.   Nausea and vomiting   You may feel sick to your stomach after surgery; if so, let your nurse know.    Pain control:  After surgery, you may have pain. Our goal is to help you manage your pain. Pain medicine will help you feel comfortable enough to do activities that will help you heal.  These activities may include breathing exercises, walking and physical therapy.   To help your health care team treat your pain we will ask: 1) If you have pain  2) where it is located 3) describe your pain in your words  Methods of pain control include medications given by mouth, vein or by nerve block for some surgeries.  We may give you a pain control pump that will:  1) Deliver the medicine through a tube placed in your vein  2) Control the amount of medicine you receive  3) Allow you to push a button to deliver a dose of pain medicine  Sequential Compression Device (SCD) or Pneumo Boots:  You may need to wear SCD S on your legs or feet. These are wraps connected to a machine that pumps in air and releases it. The repeated pumping helps prevent blood clots from forming.

## 2019-04-24 NOTE — PATIENT INSTRUCTIONS
1. You were seen in the ENT Clinic today by Dr. Treviño.  If you have any questions or concerns after your appointment, please call   - Option 1: ENT Clinic: [phone]  - Option 2: [facility] (Dr. Treviño's Nurse): [phone]    2. Plan for scheduled surgery 5/9     3. We are sending you home with a stool softener; take this medications 2 times daily as needed. If this medication does not work you can try Miralax which is over the counter. Drink plenty of water and take frequent short walks.     Natice Schwab, RN  [facility]- Otolaryngology  [phone]

## 2019-05-08 ENCOUNTER — ANESTHESIA EVENT (OUTPATIENT)
Dept: SURGERY | Facility: CLINIC | Age: 54
DRG: 463 | End: 2019-05-08
Payer: COMMERCIAL

## 2019-05-09 ENCOUNTER — APPOINTMENT (OUTPATIENT)
Dept: GENERAL RADIOLOGY | Facility: CLINIC | Age: 54
DRG: 463 | End: 2019-05-09
Attending: OTOLARYNGOLOGY
Payer: COMMERCIAL

## 2019-05-09 ENCOUNTER — ANESTHESIA (OUTPATIENT)
Dept: SURGERY | Facility: CLINIC | Age: 54
DRG: 463 | End: 2019-05-09
Payer: COMMERCIAL

## 2019-05-09 ENCOUNTER — HOSPITAL ENCOUNTER (INPATIENT)
Facility: CLINIC | Age: 54
LOS: 7 days | Discharge: HOME HEALTH | DRG: 463 | End: 2019-05-16
Attending: OTOLARYNGOLOGY | Admitting: OTOLARYNGOLOGY
Payer: COMMERCIAL

## 2019-05-09 DIAGNOSIS — C03.0 SQUAMOUS CELL CARCINOMA OF MAXILLARY ALVEOLAR RIDGE (H): Primary | ICD-10-CM

## 2019-05-09 PROBLEM — C31.0 MAXILLARY SINUS CANCER (H): Status: ACTIVE | Noted: 2019-05-09

## 2019-05-09 LAB
ABO + RH BLD: NORMAL
ABO + RH BLD: NORMAL
BASE DEFICIT BLDA-SCNC: 0.7 MMOL/L
BASE DEFICIT BLDA-SCNC: 1.5 MMOL/L
BASE DEFICIT BLDA-SCNC: 3.1 MMOL/L
BLD GP AB SCN SERPL QL: NORMAL
BLD PROD TYP BPU: NORMAL
BLOOD BANK CMNT PATIENT-IMP: NORMAL
BLOOD BANK CMNT PATIENT-IMP: NORMAL
CA-I BLD-MCNC: 4.8 MG/DL (ref 4.4–5.2)
CA-I BLD-MCNC: 5 MG/DL (ref 4.4–5.2)
CA-I BLD-MCNC: 5.1 MG/DL (ref 4.4–5.2)
GLUCOSE BLD-MCNC: 133 MG/DL (ref 70–99)
GLUCOSE BLD-MCNC: 136 MG/DL (ref 70–99)
GLUCOSE BLD-MCNC: 181 MG/DL (ref 70–99)
GLUCOSE BLDC GLUCOMTR-MCNC: 180 MG/DL (ref 70–99)
GLUCOSE BLDC GLUCOMTR-MCNC: 217 MG/DL (ref 70–99)
HCO3 BLD-SCNC: 22 MMOL/L (ref 21–28)
HCO3 BLD-SCNC: 23 MMOL/L (ref 21–28)
HCO3 BLD-SCNC: 24 MMOL/L (ref 21–28)
HGB BLD-MCNC: 10.6 G/DL (ref 13.3–17.7)
HGB BLD-MCNC: 10.7 G/DL (ref 13.3–17.7)
HGB BLD-MCNC: 12.2 G/DL (ref 13.3–17.7)
MRSA DNA SPEC QL NAA+PROBE: NEGATIVE
NUM BPU REQUESTED: 2
O2/TOTAL GAS SETTING VFR VENT: 40 %
O2/TOTAL GAS SETTING VFR VENT: 40 %
O2/TOTAL GAS SETTING VFR VENT: 50 %
PCO2 BLD: 39 MM HG (ref 35–45)
PCO2 BLD: 40 MM HG (ref 35–45)
PCO2 BLD: 40 MM HG (ref 35–45)
PH BLD: 7.36 PH (ref 7.35–7.45)
PH BLD: 7.38 PH (ref 7.35–7.45)
PH BLD: 7.39 PH (ref 7.35–7.45)
PLATELET # BLD AUTO: 222 10E9/L (ref 150–450)
PO2 BLD: 146 MM HG (ref 80–105)
PO2 BLD: 171 MM HG (ref 80–105)
PO2 BLD: 176 MM HG (ref 80–105)
POTASSIUM BLD-SCNC: 3.7 MMOL/L (ref 3.4–5.3)
POTASSIUM BLD-SCNC: 4 MMOL/L (ref 3.4–5.3)
POTASSIUM BLD-SCNC: 4.2 MMOL/L (ref 3.4–5.3)
SODIUM BLD-SCNC: 136 MMOL/L (ref 133–144)
SODIUM BLD-SCNC: 137 MMOL/L (ref 133–144)
SODIUM BLD-SCNC: 137 MMOL/L (ref 133–144)
SPECIMEN EXP DATE BLD: NORMAL
SPECIMEN SOURCE: NORMAL

## 2019-05-09 PROCEDURE — 25000128 H RX IP 250 OP 636: Performed by: STUDENT IN AN ORGANIZED HEALTH CARE EDUCATION/TRAINING PROGRAM

## 2019-05-09 PROCEDURE — 25800030 ZZH RX IP 258 OP 636: Performed by: OTOLARYNGOLOGY

## 2019-05-09 PROCEDURE — 27810169 ZZH OR IMPLANT GENERAL: Performed by: OTOLARYNGOLOGY

## 2019-05-09 PROCEDURE — 25800030 ZZH RX IP 258 OP 636: Performed by: STUDENT IN AN ORGANIZED HEALTH CARE EDUCATION/TRAINING PROGRAM

## 2019-05-09 PROCEDURE — 88309 TISSUE EXAM BY PATHOLOGIST: CPT | Performed by: OTOLARYNGOLOGY

## 2019-05-09 PROCEDURE — 87641 MR-STAPH DNA AMP PROBE: CPT | Performed by: STUDENT IN AN ORGANIZED HEALTH CARE EDUCATION/TRAINING PROGRAM

## 2019-05-09 PROCEDURE — 07B20ZZ EXCISION OF LEFT NECK LYMPHATIC, OPEN APPROACH: ICD-10-PCS | Performed by: OTOLARYNGOLOGY

## 2019-05-09 PROCEDURE — 88307 TISSUE EXAM BY PATHOLOGIST: CPT | Performed by: OTOLARYNGOLOGY

## 2019-05-09 PROCEDURE — 82947 ASSAY GLUCOSE BLOOD QUANT: CPT | Performed by: ANESTHESIOLOGY

## 2019-05-09 PROCEDURE — 94002 VENT MGMT INPAT INIT DAY: CPT

## 2019-05-09 PROCEDURE — 87640 STAPH A DNA AMP PROBE: CPT | Performed by: STUDENT IN AN ORGANIZED HEALTH CARE EDUCATION/TRAINING PROGRAM

## 2019-05-09 PROCEDURE — 88331 PATH CONSLTJ SURG 1 BLK 1SPC: CPT | Performed by: OTOLARYNGOLOGY

## 2019-05-09 PROCEDURE — 88305 TISSUE EXAM BY PATHOLOGIST: CPT | Performed by: OTOLARYNGOLOGY

## 2019-05-09 PROCEDURE — 36000070 ZZH SURGERY LEVEL 5 EA 15 ADDTL MIN - UMMC: Performed by: OTOLARYNGOLOGY

## 2019-05-09 PROCEDURE — 25000132 ZZH RX MED GY IP 250 OP 250 PS 637: Performed by: OTOLARYNGOLOGY

## 2019-05-09 PROCEDURE — 37000009 ZZH ANESTHESIA TECHNICAL FEE, EACH ADDTL 15 MIN: Performed by: OTOLARYNGOLOGY

## 2019-05-09 PROCEDURE — 40000170 ZZH STATISTIC PRE-PROCEDURE ASSESSMENT II: Performed by: OTOLARYNGOLOGY

## 2019-05-09 PROCEDURE — 27211024 ZZHC OR SUPPLY OTHER OPNP: Performed by: OTOLARYNGOLOGY

## 2019-05-09 PROCEDURE — 25000128 H RX IP 250 OP 636

## 2019-05-09 PROCEDURE — 25800030 ZZH RX IP 258 OP 636: Performed by: ANESTHESIOLOGY

## 2019-05-09 PROCEDURE — 25000128 H RX IP 250 OP 636: Performed by: NURSE ANESTHETIST, CERTIFIED REGISTERED

## 2019-05-09 PROCEDURE — 40000275 ZZH STATISTIC RCP TIME EA 10 MIN

## 2019-05-09 PROCEDURE — 84132 ASSAY OF SERUM POTASSIUM: CPT

## 2019-05-09 PROCEDURE — 25000128 H RX IP 250 OP 636: Performed by: OTOLARYNGOLOGY

## 2019-05-09 PROCEDURE — 20000004 ZZH R&B ICU UMMC

## 2019-05-09 PROCEDURE — 84295 ASSAY OF SERUM SODIUM: CPT | Performed by: ANESTHESIOLOGY

## 2019-05-09 PROCEDURE — 82947 ASSAY GLUCOSE BLOOD QUANT: CPT

## 2019-05-09 PROCEDURE — 40000014 ZZH STATISTIC ARTERIAL MONITORING DAILY

## 2019-05-09 PROCEDURE — 25000125 ZZHC RX 250: Performed by: OTOLARYNGOLOGY

## 2019-05-09 PROCEDURE — 25000125 ZZHC RX 250: Performed by: NURSE ANESTHETIST, CERTIFIED REGISTERED

## 2019-05-09 PROCEDURE — 84132 ASSAY OF SERUM POTASSIUM: CPT | Performed by: ANESTHESIOLOGY

## 2019-05-09 PROCEDURE — 27210794 ZZH OR GENERAL SUPPLY STERILE: Performed by: OTOLARYNGOLOGY

## 2019-05-09 PROCEDURE — 0JR507Z REPLACEMENT OF LEFT NECK SUBCUTANEOUS TISSUE AND FASCIA WITH AUTOLOGOUS TISSUE SUBSTITUTE, OPEN APPROACH: ICD-10-PCS | Performed by: OTOLARYNGOLOGY

## 2019-05-09 PROCEDURE — 85049 AUTOMATED PLATELET COUNT: CPT | Performed by: OTOLARYNGOLOGY

## 2019-05-09 PROCEDURE — 84132 ASSAY OF SERUM POTASSIUM: CPT | Performed by: OTOLARYNGOLOGY

## 2019-05-09 PROCEDURE — 82330 ASSAY OF CALCIUM: CPT | Performed by: ANESTHESIOLOGY

## 2019-05-09 PROCEDURE — 82330 ASSAY OF CALCIUM: CPT

## 2019-05-09 PROCEDURE — 0NBR0ZZ EXCISION OF MAXILLA, OPEN APPROACH: ICD-10-PCS | Performed by: OTOLARYNGOLOGY

## 2019-05-09 PROCEDURE — 82803 BLOOD GASES ANY COMBINATION: CPT

## 2019-05-09 PROCEDURE — 88311 DECALCIFY TISSUE: CPT | Performed by: OTOLARYNGOLOGY

## 2019-05-09 PROCEDURE — 0HRDX74 REPLACEMENT OF RIGHT LOWER ARM SKIN WITH AUTOLOGOUS TISSUE SUBSTITUTE, PARTIAL THICKNESS, EXTERNAL APPROACH: ICD-10-PCS | Performed by: OTOLARYNGOLOGY

## 2019-05-09 PROCEDURE — 82803 BLOOD GASES ANY COMBINATION: CPT | Performed by: ANESTHESIOLOGY

## 2019-05-09 PROCEDURE — 40000986 XR CHEST PORT 1 VW

## 2019-05-09 PROCEDURE — P9041 ALBUMIN (HUMAN),5%, 50ML: HCPCS | Performed by: NURSE ANESTHETIST, CERTIFIED REGISTERED

## 2019-05-09 PROCEDURE — 25000125 ZZHC RX 250: Performed by: STUDENT IN AN ORGANIZED HEALTH CARE EDUCATION/TRAINING PROGRAM

## 2019-05-09 PROCEDURE — 84295 ASSAY OF SERUM SODIUM: CPT | Performed by: OTOLARYNGOLOGY

## 2019-05-09 PROCEDURE — 25800030 ZZH RX IP 258 OP 636: Performed by: NURSE ANESTHETIST, CERTIFIED REGISTERED

## 2019-05-09 PROCEDURE — 25000566 ZZH SEVOFLURANE, EA 15 MIN: Performed by: OTOLARYNGOLOGY

## 2019-05-09 PROCEDURE — 82803 BLOOD GASES ANY COMBINATION: CPT | Performed by: OTOLARYNGOLOGY

## 2019-05-09 PROCEDURE — 37000008 ZZH ANESTHESIA TECHNICAL FEE, 1ST 30 MIN: Performed by: OTOLARYNGOLOGY

## 2019-05-09 PROCEDURE — 0HBHXZZ EXCISION OF RIGHT UPPER LEG SKIN, EXTERNAL APPROACH: ICD-10-PCS | Performed by: OTOLARYNGOLOGY

## 2019-05-09 PROCEDURE — 36000068 ZZH SURGERY LEVEL 5 1ST 30 MIN - UMMC: Performed by: OTOLARYNGOLOGY

## 2019-05-09 PROCEDURE — 82330 ASSAY OF CALCIUM: CPT | Performed by: OTOLARYNGOLOGY

## 2019-05-09 PROCEDURE — 84295 ASSAY OF SERUM SODIUM: CPT

## 2019-05-09 PROCEDURE — 25000131 ZZH RX MED GY IP 250 OP 636 PS 637: Performed by: STUDENT IN AN ORGANIZED HEALTH CARE EDUCATION/TRAINING PROGRAM

## 2019-05-09 PROCEDURE — 99233 SBSQ HOSP IP/OBS HIGH 50: CPT | Mod: GC | Performed by: ANESTHESIOLOGY

## 2019-05-09 PROCEDURE — 82947 ASSAY GLUCOSE BLOOD QUANT: CPT | Performed by: OTOLARYNGOLOGY

## 2019-05-09 PROCEDURE — 00000146 ZZHCL STATISTIC GLUCOSE BY METER IP

## 2019-05-09 PROCEDURE — 0DH67UZ INSERTION OF FEEDING DEVICE INTO STOMACH, VIA NATURAL OR ARTIFICIAL OPENING: ICD-10-PCS | Performed by: OTOLARYNGOLOGY

## 2019-05-09 DEVICE — IMP DEVICE ANASTOMOTIC 3.5MM COUPLER PURPLE GEM2755: Type: IMPLANTABLE DEVICE | Site: NECK | Status: FUNCTIONAL

## 2019-05-09 DEVICE — IMP PROBE DOPPLER FLOW STD CUFF DP-SDP001: Type: IMPLANTABLE DEVICE | Site: NECK | Status: FUNCTIONAL

## 2019-05-09 RX ORDER — NALOXONE HYDROCHLORIDE 0.4 MG/ML
.1-.4 INJECTION, SOLUTION INTRAMUSCULAR; INTRAVENOUS; SUBCUTANEOUS
Status: DISCONTINUED | OUTPATIENT
Start: 2019-05-09 | End: 2019-05-09

## 2019-05-09 RX ORDER — HEPARIN SODIUM 5000 [USP'U]/.5ML
5000 INJECTION, SOLUTION INTRAVENOUS; SUBCUTANEOUS DAILY
Status: DISCONTINUED | OUTPATIENT
Start: 2019-05-10 | End: 2019-05-09

## 2019-05-09 RX ORDER — EPINEPHRINE NASAL SOLUTION 1 MG/ML
SOLUTION NASAL PRN
Status: DISCONTINUED | OUTPATIENT
Start: 2019-05-09 | End: 2019-05-09 | Stop reason: HOSPADM

## 2019-05-09 RX ORDER — FENTANYL CITRATE 50 UG/ML
25-50 INJECTION, SOLUTION INTRAMUSCULAR; INTRAVENOUS EVERY 5 MIN PRN
Status: DISCONTINUED | OUTPATIENT
Start: 2019-05-09 | End: 2019-05-09

## 2019-05-09 RX ORDER — ONDANSETRON 4 MG/1
4 TABLET, ORALLY DISINTEGRATING ORAL EVERY 30 MIN PRN
Status: DISCONTINUED | OUTPATIENT
Start: 2019-05-09 | End: 2019-05-09

## 2019-05-09 RX ORDER — HEPARIN SODIUM 5000 [USP'U]/.5ML
5000 INJECTION, SOLUTION INTRAVENOUS; SUBCUTANEOUS EVERY 8 HOURS
Status: DISCONTINUED | OUTPATIENT
Start: 2019-05-10 | End: 2019-05-16 | Stop reason: HOSPADM

## 2019-05-09 RX ORDER — HYDROMORPHONE HYDROCHLORIDE 1 MG/ML
.3-.5 INJECTION, SOLUTION INTRAMUSCULAR; INTRAVENOUS; SUBCUTANEOUS
Status: DISCONTINUED | OUTPATIENT
Start: 2019-05-09 | End: 2019-05-14

## 2019-05-09 RX ORDER — MINERAL OIL/HYDROPHIL PETROLAT
OINTMENT (GRAM) TOPICAL 2 TIMES DAILY PRN
Status: DISCONTINUED | OUTPATIENT
Start: 2019-05-09 | End: 2019-05-09

## 2019-05-09 RX ORDER — BISACODYL 5 MG
5 TABLET, DELAYED RELEASE (ENTERIC COATED) ORAL DAILY PRN
Status: DISCONTINUED | OUTPATIENT
Start: 2019-05-09 | End: 2019-05-16 | Stop reason: HOSPADM

## 2019-05-09 RX ORDER — CHLORHEXIDINE GLUCONATE ORAL RINSE 1.2 MG/ML
15 SOLUTION DENTAL EVERY 8 HOURS
Status: DISCONTINUED | OUTPATIENT
Start: 2019-05-10 | End: 2019-05-16 | Stop reason: HOSPADM

## 2019-05-09 RX ORDER — ASPIRIN 300 MG/1
300 SUPPOSITORY RECTAL DAILY
Status: DISCONTINUED | OUTPATIENT
Start: 2019-05-09 | End: 2019-05-09 | Stop reason: HOSPADM

## 2019-05-09 RX ORDER — AMPICILLIN AND SULBACTAM 2; 1 G/1; G/1
3 INJECTION, POWDER, FOR SOLUTION INTRAMUSCULAR; INTRAVENOUS EVERY 6 HOURS
Status: COMPLETED | OUTPATIENT
Start: 2019-05-09 | End: 2019-05-11

## 2019-05-09 RX ORDER — BISACODYL 5 MG
15 TABLET, DELAYED RELEASE (ENTERIC COATED) ORAL DAILY PRN
Status: DISCONTINUED | OUTPATIENT
Start: 2019-05-09 | End: 2019-05-16 | Stop reason: HOSPADM

## 2019-05-09 RX ORDER — PROPOFOL 10 MG/ML
INJECTION, EMULSION INTRAVENOUS
Status: DISCONTINUED
Start: 2019-05-09 | End: 2019-05-10 | Stop reason: HOSPADM

## 2019-05-09 RX ORDER — CALCIUM CHLORIDE 100 MG/ML
INJECTION INTRAVENOUS; INTRAVENTRICULAR PRN
Status: DISCONTINUED | OUTPATIENT
Start: 2019-05-09 | End: 2019-05-09

## 2019-05-09 RX ORDER — ONDANSETRON 2 MG/ML
INJECTION INTRAMUSCULAR; INTRAVENOUS PRN
Status: DISCONTINUED | OUTPATIENT
Start: 2019-05-09 | End: 2019-05-09

## 2019-05-09 RX ORDER — ONDANSETRON 4 MG/1
4 TABLET, ORALLY DISINTEGRATING ORAL EVERY 6 HOURS PRN
Status: DISCONTINUED | OUTPATIENT
Start: 2019-05-09 | End: 2019-05-15

## 2019-05-09 RX ORDER — LIDOCAINE 40 MG/G
CREAM TOPICAL
Status: DISCONTINUED | OUTPATIENT
Start: 2019-05-09 | End: 2019-05-09 | Stop reason: HOSPADM

## 2019-05-09 RX ORDER — DEXTROSE MONOHYDRATE 25 G/50ML
25-50 INJECTION, SOLUTION INTRAVENOUS
Status: DISCONTINUED | OUTPATIENT
Start: 2019-05-09 | End: 2019-05-16 | Stop reason: HOSPADM

## 2019-05-09 RX ORDER — SODIUM CHLORIDE, SODIUM LACTATE, POTASSIUM CHLORIDE, CALCIUM CHLORIDE 600; 310; 30; 20 MG/100ML; MG/100ML; MG/100ML; MG/100ML
INJECTION, SOLUTION INTRAVENOUS CONTINUOUS
Status: DISCONTINUED | OUTPATIENT
Start: 2019-05-09 | End: 2019-05-09

## 2019-05-09 RX ORDER — CHLORHEXIDINE GLUCONATE ORAL RINSE 1.2 MG/ML
15 SOLUTION DENTAL 4 TIMES DAILY
Status: DISCONTINUED | OUTPATIENT
Start: 2019-05-10 | End: 2019-05-09

## 2019-05-09 RX ORDER — ASPIRIN 325 MG
325 TABLET ORAL DAILY
Status: DISCONTINUED | OUTPATIENT
Start: 2019-05-10 | End: 2019-05-16 | Stop reason: HOSPADM

## 2019-05-09 RX ORDER — PROPOFOL 10 MG/ML
5-75 INJECTION, EMULSION INTRAVENOUS CONTINUOUS
Status: DISCONTINUED | OUTPATIENT
Start: 2019-05-09 | End: 2019-05-11

## 2019-05-09 RX ORDER — AMPICILLIN AND SULBACTAM 1; .5 G/1; G/1
1.5 INJECTION, POWDER, FOR SOLUTION INTRAMUSCULAR; INTRAVENOUS SEE ADMIN INSTRUCTIONS
Status: DISCONTINUED | OUTPATIENT
Start: 2019-05-09 | End: 2019-05-09 | Stop reason: HOSPADM

## 2019-05-09 RX ORDER — AMPICILLIN AND SULBACTAM 2; 1 G/1; G/1
3 INJECTION, POWDER, FOR SOLUTION INTRAMUSCULAR; INTRAVENOUS
Status: COMPLETED | OUTPATIENT
Start: 2019-05-09 | End: 2019-05-09

## 2019-05-09 RX ORDER — ALBUMIN, HUMAN INJ 5% 5 %
SOLUTION INTRAVENOUS CONTINUOUS PRN
Status: DISCONTINUED | OUTPATIENT
Start: 2019-05-09 | End: 2019-05-09

## 2019-05-09 RX ORDER — SODIUM CHLORIDE, SODIUM LACTATE, POTASSIUM CHLORIDE, CALCIUM CHLORIDE 600; 310; 30; 20 MG/100ML; MG/100ML; MG/100ML; MG/100ML
INJECTION, SOLUTION INTRAVENOUS CONTINUOUS
Status: DISCONTINUED | OUTPATIENT
Start: 2019-05-09 | End: 2019-05-09 | Stop reason: HOSPADM

## 2019-05-09 RX ORDER — NALOXONE HYDROCHLORIDE 0.4 MG/ML
.1-.4 INJECTION, SOLUTION INTRAMUSCULAR; INTRAVENOUS; SUBCUTANEOUS
Status: DISCONTINUED | OUTPATIENT
Start: 2019-05-09 | End: 2019-05-16 | Stop reason: HOSPADM

## 2019-05-09 RX ORDER — AMPICILLIN AND SULBACTAM 1; .5 G/1; G/1
1.5 INJECTION, POWDER, FOR SOLUTION INTRAMUSCULAR; INTRAVENOUS
Status: DISCONTINUED | OUTPATIENT
Start: 2019-05-09 | End: 2019-05-09 | Stop reason: DRUGHIGH

## 2019-05-09 RX ORDER — MINERAL OIL
OIL (ML) MISCELLANEOUS PRN
Status: DISCONTINUED | OUTPATIENT
Start: 2019-05-09 | End: 2019-05-09 | Stop reason: HOSPADM

## 2019-05-09 RX ORDER — MINERAL OIL/HYDROPHIL PETROLAT
OINTMENT (GRAM) TOPICAL EVERY 8 HOURS
Status: DISCONTINUED | OUTPATIENT
Start: 2019-05-10 | End: 2019-05-16 | Stop reason: HOSPADM

## 2019-05-09 RX ORDER — OXYCODONE HYDROCHLORIDE 5 MG/1
5-10 TABLET ORAL
Status: DISCONTINUED | OUTPATIENT
Start: 2019-05-09 | End: 2019-05-11

## 2019-05-09 RX ORDER — PROPOFOL 10 MG/ML
INJECTION, EMULSION INTRAVENOUS
Status: COMPLETED
Start: 2019-05-09 | End: 2019-05-09

## 2019-05-09 RX ORDER — PROPOFOL 10 MG/ML
INJECTION, EMULSION INTRAVENOUS PRN
Status: DISCONTINUED | OUTPATIENT
Start: 2019-05-09 | End: 2019-05-09

## 2019-05-09 RX ORDER — LIDOCAINE HYDROCHLORIDE AND EPINEPHRINE 10; 10 MG/ML; UG/ML
INJECTION, SOLUTION INFILTRATION; PERINEURAL PRN
Status: DISCONTINUED | OUTPATIENT
Start: 2019-05-09 | End: 2019-05-09 | Stop reason: HOSPADM

## 2019-05-09 RX ORDER — GINSENG 100 MG
CAPSULE ORAL EVERY 8 HOURS
Status: COMPLETED | OUTPATIENT
Start: 2019-05-09 | End: 2019-05-10

## 2019-05-09 RX ORDER — ONDANSETRON 2 MG/ML
4 INJECTION INTRAMUSCULAR; INTRAVENOUS EVERY 6 HOURS PRN
Status: DISCONTINUED | OUTPATIENT
Start: 2019-05-09 | End: 2019-05-15

## 2019-05-09 RX ORDER — FENTANYL CITRATE 50 UG/ML
INJECTION, SOLUTION INTRAMUSCULAR; INTRAVENOUS PRN
Status: DISCONTINUED | OUTPATIENT
Start: 2019-05-09 | End: 2019-05-09

## 2019-05-09 RX ORDER — HYDROMORPHONE HYDROCHLORIDE 1 MG/ML
.3-.5 INJECTION, SOLUTION INTRAMUSCULAR; INTRAVENOUS; SUBCUTANEOUS EVERY 5 MIN PRN
Status: DISCONTINUED | OUTPATIENT
Start: 2019-05-09 | End: 2019-05-09

## 2019-05-09 RX ORDER — NICOTINE POLACRILEX 4 MG
15-30 LOZENGE BUCCAL
Status: DISCONTINUED | OUTPATIENT
Start: 2019-05-09 | End: 2019-05-16 | Stop reason: HOSPADM

## 2019-05-09 RX ORDER — BISACODYL 5 MG
10 TABLET, DELAYED RELEASE (ENTERIC COATED) ORAL DAILY PRN
Status: DISCONTINUED | OUTPATIENT
Start: 2019-05-09 | End: 2019-05-16 | Stop reason: HOSPADM

## 2019-05-09 RX ORDER — ATORVASTATIN CALCIUM 20 MG/1
20 TABLET, FILM COATED ORAL EVERY MORNING
Status: DISCONTINUED | OUTPATIENT
Start: 2019-05-10 | End: 2019-05-16 | Stop reason: HOSPADM

## 2019-05-09 RX ORDER — ONDANSETRON 2 MG/ML
4 INJECTION INTRAMUSCULAR; INTRAVENOUS EVERY 30 MIN PRN
Status: DISCONTINUED | OUTPATIENT
Start: 2019-05-09 | End: 2019-05-09

## 2019-05-09 RX ORDER — SODIUM CHLORIDE 9 MG/ML
INJECTION, SOLUTION INTRAVENOUS CONTINUOUS
Status: DISCONTINUED | OUTPATIENT
Start: 2019-05-09 | End: 2019-05-14

## 2019-05-09 RX ORDER — DEXAMETHASONE SODIUM PHOSPHATE 10 MG/ML
INJECTION, SOLUTION INTRAMUSCULAR; INTRAVENOUS PRN
Status: DISCONTINUED | OUTPATIENT
Start: 2019-05-09 | End: 2019-05-09

## 2019-05-09 RX ORDER — NALOXONE HYDROCHLORIDE 0.4 MG/ML
.1-.4 INJECTION, SOLUTION INTRAMUSCULAR; INTRAVENOUS; SUBCUTANEOUS
Status: DISCONTINUED | OUTPATIENT
Start: 2019-05-09 | End: 2019-05-09 | Stop reason: HOSPADM

## 2019-05-09 RX ORDER — LIDOCAINE HYDROCHLORIDE 20 MG/ML
INJECTION, SOLUTION INFILTRATION; PERINEURAL PRN
Status: DISCONTINUED | OUTPATIENT
Start: 2019-05-09 | End: 2019-05-09

## 2019-05-09 RX ORDER — SODIUM CHLORIDE, SODIUM LACTATE, POTASSIUM CHLORIDE, CALCIUM CHLORIDE 600; 310; 30; 20 MG/100ML; MG/100ML; MG/100ML; MG/100ML
INJECTION, SOLUTION INTRAVENOUS CONTINUOUS PRN
Status: DISCONTINUED | OUTPATIENT
Start: 2019-05-09 | End: 2019-05-09

## 2019-05-09 RX ADMIN — LIDOCAINE HYDROCHLORIDE 100 MG: 20 INJECTION, SOLUTION INFILTRATION; PERINEURAL at 07:29

## 2019-05-09 RX ADMIN — WHITE PETROLATUM MINERAL OIL 1 INCH: 150; 830 OINTMENT OPHTHALMIC at 07:36

## 2019-05-09 RX ADMIN — PROPOFOL 200 MG: 10 INJECTION, EMULSION INTRAVENOUS at 07:29

## 2019-05-09 RX ADMIN — AMPICILLIN SODIUM AND SULBACTAM SODIUM 1.5 G: 1; .5 INJECTION, POWDER, FOR SOLUTION INTRAMUSCULAR; INTRAVENOUS at 11:46

## 2019-05-09 RX ADMIN — SODIUM CHLORIDE, POTASSIUM CHLORIDE, SODIUM LACTATE AND CALCIUM CHLORIDE: 600; 310; 30; 20 INJECTION, SOLUTION INTRAVENOUS at 07:21

## 2019-05-09 RX ADMIN — FENTANYL CITRATE 50 MCG: 50 INJECTION, SOLUTION INTRAMUSCULAR; INTRAVENOUS at 07:29

## 2019-05-09 RX ADMIN — ROCURONIUM BROMIDE 10 MG: 10 INJECTION INTRAVENOUS at 10:21

## 2019-05-09 RX ADMIN — ROCURONIUM BROMIDE 30 MG: 10 INJECTION INTRAVENOUS at 10:17

## 2019-05-09 RX ADMIN — PROPOFOL 75 MCG/KG/MIN: 10 INJECTION, EMULSION INTRAVENOUS at 19:03

## 2019-05-09 RX ADMIN — FENTANYL CITRATE 50 MCG: 50 INJECTION, SOLUTION INTRAMUSCULAR; INTRAVENOUS at 13:13

## 2019-05-09 RX ADMIN — FENTANYL CITRATE 50 MCG: 50 INJECTION, SOLUTION INTRAMUSCULAR; INTRAVENOUS at 16:07

## 2019-05-09 RX ADMIN — MIDAZOLAM 2 MG: 1 INJECTION INTRAMUSCULAR; INTRAVENOUS at 07:14

## 2019-05-09 RX ADMIN — ROCURONIUM BROMIDE 10 MG: 10 INJECTION INTRAVENOUS at 10:59

## 2019-05-09 RX ADMIN — FENTANYL CITRATE 50 MCG: 50 INJECTION, SOLUTION INTRAMUSCULAR; INTRAVENOUS at 13:22

## 2019-05-09 RX ADMIN — FENTANYL CITRATE 50 MCG: 50 INJECTION, SOLUTION INTRAMUSCULAR; INTRAVENOUS at 07:28

## 2019-05-09 RX ADMIN — SODIUM CHLORIDE: 9 INJECTION, SOLUTION INTRAVENOUS at 18:48

## 2019-05-09 RX ADMIN — ROCURONIUM BROMIDE 20 MG: 10 INJECTION INTRAVENOUS at 15:38

## 2019-05-09 RX ADMIN — ROCURONIUM BROMIDE 20 MG: 10 INJECTION INTRAVENOUS at 16:56

## 2019-05-09 RX ADMIN — ROCURONIUM BROMIDE 20 MG: 10 INJECTION INTRAVENOUS at 12:40

## 2019-05-09 RX ADMIN — INSULIN ASPART 2 UNITS: 100 INJECTION, SOLUTION INTRAVENOUS; SUBCUTANEOUS at 19:58

## 2019-05-09 RX ADMIN — SODIUM CHLORIDE, POTASSIUM CHLORIDE, SODIUM LACTATE AND CALCIUM CHLORIDE: 600; 310; 30; 20 INJECTION, SOLUTION INTRAVENOUS at 12:12

## 2019-05-09 RX ADMIN — REMIFENTANIL HYDROCHLORIDE 0.1 MCG/KG/MIN: 1 INJECTION, POWDER, LYOPHILIZED, FOR SOLUTION INTRAVENOUS at 07:46

## 2019-05-09 RX ADMIN — AMPICILLIN SODIUM AND SULBACTAM SODIUM 1.5 G: 1; .5 INJECTION, POWDER, FOR SOLUTION INTRAMUSCULAR; INTRAVENOUS at 09:52

## 2019-05-09 RX ADMIN — ROCURONIUM BROMIDE 5 MG: 10 INJECTION INTRAVENOUS at 07:29

## 2019-05-09 RX ADMIN — ROCURONIUM BROMIDE 20 MG: 10 INJECTION INTRAVENOUS at 14:43

## 2019-05-09 RX ADMIN — Medication 160 MG: at 07:29

## 2019-05-09 RX ADMIN — DEXAMETHASONE SODIUM PHOSPHATE 10 MG: 10 INJECTION, SOLUTION INTRAMUSCULAR; INTRAVENOUS at 13:20

## 2019-05-09 RX ADMIN — ALBUMIN HUMAN: 0.05 INJECTION, SOLUTION INTRAVENOUS at 12:50

## 2019-05-09 RX ADMIN — AMPICILLIN SODIUM AND SULBACTAM SODIUM 1.5 G: 1; .5 INJECTION, POWDER, FOR SOLUTION INTRAMUSCULAR; INTRAVENOUS at 13:41

## 2019-05-09 RX ADMIN — AMPICILLIN SODIUM AND SULBACTAM SODIUM 3 G: 2; 1 INJECTION, POWDER, FOR SOLUTION INTRAMUSCULAR; INTRAVENOUS at 21:25

## 2019-05-09 RX ADMIN — ROCURONIUM BROMIDE 20 MG: 10 INJECTION INTRAVENOUS at 13:10

## 2019-05-09 RX ADMIN — SODIUM CHLORIDE, POTASSIUM CHLORIDE, SODIUM LACTATE AND CALCIUM CHLORIDE: 600; 310; 30; 20 INJECTION, SOLUTION INTRAVENOUS at 15:18

## 2019-05-09 RX ADMIN — ALBUMIN HUMAN: 0.05 INJECTION, SOLUTION INTRAVENOUS at 11:15

## 2019-05-09 RX ADMIN — PROPOFOL 100 MG: 10 INJECTION, EMULSION INTRAVENOUS at 07:41

## 2019-05-09 RX ADMIN — ROCURONIUM BROMIDE 30 MG: 10 INJECTION INTRAVENOUS at 15:51

## 2019-05-09 RX ADMIN — BACITRACIN: 500 OINTMENT TOPICAL at 20:01

## 2019-05-09 RX ADMIN — FENTANYL CITRATE 50 MCG: 50 INJECTION, SOLUTION INTRAMUSCULAR; INTRAVENOUS at 14:10

## 2019-05-09 RX ADMIN — FENTANYL CITRATE 50 MCG: 50 INJECTION, SOLUTION INTRAMUSCULAR; INTRAVENOUS at 10:36

## 2019-05-09 RX ADMIN — ROCURONIUM BROMIDE 10 MG: 10 INJECTION INTRAVENOUS at 12:05

## 2019-05-09 RX ADMIN — AMPICILLIN SODIUM AND SULBACTAM SODIUM 3 G: 2; 1 INJECTION, POWDER, FOR SOLUTION INTRAMUSCULAR; INTRAVENOUS at 07:47

## 2019-05-09 RX ADMIN — PROPOFOL 70 MCG/KG/MIN: 10 INJECTION, EMULSION INTRAVENOUS at 22:32

## 2019-05-09 RX ADMIN — ROCURONIUM BROMIDE 20 MG: 10 INJECTION INTRAVENOUS at 15:30

## 2019-05-09 RX ADMIN — FENTANYL CITRATE 25 MCG/HR: 50 INJECTION INTRAVENOUS at 18:15

## 2019-05-09 RX ADMIN — ROCURONIUM BROMIDE 20 MG: 10 INJECTION INTRAVENOUS at 13:46

## 2019-05-09 RX ADMIN — HYDROMORPHONE HYDROCHLORIDE 0.5 MG: 1 INJECTION, SOLUTION INTRAMUSCULAR; INTRAVENOUS; SUBCUTANEOUS at 13:37

## 2019-05-09 RX ADMIN — ONDANSETRON 4 MG: 2 INJECTION INTRAMUSCULAR; INTRAVENOUS at 16:07

## 2019-05-09 RX ADMIN — SODIUM CHLORIDE, POTASSIUM CHLORIDE, SODIUM LACTATE AND CALCIUM CHLORIDE: 600; 310; 30; 20 INJECTION, SOLUTION INTRAVENOUS at 07:59

## 2019-05-09 RX ADMIN — ROCURONIUM BROMIDE 30 MG: 10 INJECTION INTRAVENOUS at 16:41

## 2019-05-09 RX ADMIN — CALCIUM CHLORIDE 1 G: 100 INJECTION, SOLUTION INTRAVENOUS at 11:21

## 2019-05-09 RX ADMIN — ROCURONIUM BROMIDE 20 MG: 10 INJECTION INTRAVENOUS at 16:10

## 2019-05-09 RX ADMIN — PROPOFOL 70 MCG/KG/MIN: 10 INJECTION, EMULSION INTRAVENOUS at 21:04

## 2019-05-09 RX ADMIN — HYDROMORPHONE HYDROCHLORIDE 0.5 MG: 1 INJECTION, SOLUTION INTRAMUSCULAR; INTRAVENOUS; SUBCUTANEOUS at 10:39

## 2019-05-09 RX ADMIN — ROCURONIUM BROMIDE 10 MG: 10 INJECTION INTRAVENOUS at 11:29

## 2019-05-09 RX ADMIN — FENTANYL CITRATE 50 MCG: 50 INJECTION, SOLUTION INTRAMUSCULAR; INTRAVENOUS at 10:30

## 2019-05-09 RX ADMIN — AMPICILLIN SODIUM AND SULBACTAM SODIUM 1.5 G: 1; .5 INJECTION, POWDER, FOR SOLUTION INTRAMUSCULAR; INTRAVENOUS at 15:26

## 2019-05-09 ASSESSMENT — ACTIVITIES OF DAILY LIVING (ADL): ADLS_ACUITY_SCORE: 14

## 2019-05-09 ASSESSMENT — MIFFLIN-ST. JEOR
SCORE: 2142.63
SCORE: 2168.63

## 2019-05-09 NOTE — H&P
SURGICAL ICU ADMISSION NOTE  5/9/2019    PRIMARY TEAM: ENT  PRIMARY PHYSICIAN: Dr. Treviño    REASON FOR CRITICAL CARE ADMISSION: Post operative monitoring of tissue flap  ADMITTING PHYSICIAN: Dr. Guerrero    ASSESSMENT: Devonte Tucker is a 52yo M with hx significant for HTN, DM, obesity and concern for invasive SCC now POD0 s/p left infrastructure maxillectomy with free tissue flap. Intraoperatively pt did not require pressors or further support of blood pressure; EBL estimated at 200cc. Pt admitted to SICU for monitoring of tissue flap integrity.     PLAN:   Neuro/ pain/ sedation:  #Intubated and Sedated s/p Maxillary free flap  - Monitor neurological status; notify the MD for any acute changes in exam  - Pain:Tylenol and Fentanyl gtt, PTA tramadol  - Sedation: propofol    ENT:  S/p Maxillectomy with free flap in setting of invasive SCC  - Intraflap doppler with adequate signal  - Continue Q1h flap check  - HOB at 30 degress  - No pressors for hypotension, fluid boluses prn. Will contact ENT if blood pressure begins to drift down  - left neck with labeled arterial and venous doppler sites, to assess Q1h     Pulmonary care:   - Intubated and sedated, will pressure support in AM with goal of extubation.  - Vent: CMV 16/550/5/40  - Pulmonary cares     Cardiovascular:    #Hx of HTN  - Monitor hemodynamic status  - MAP>65  - Refrain from pressor use in attempt to preserve flap integrity  - Fluid boluses for hypotension     GI care:   - NPO, feeding tube in place. Will advance per ENT in AM  - PPI     Fluids/ Electrolytes/ Nutrition:   - D5 1/2 NS at 100cc/hr for IV fluid hydration  - No indication for parenteral nutrition    Renal/ Fluid Balance:    - Urine output is adequate so far  - Will continue to monitor intake and output     Endocrine:    #Hx of DM  - Sliding scale insulin  - Hold PTA antihyperglycemics     ID/ Antibiotics:  # C diff  - Received perioperative ancef   - No current indication for further  antibiotics     Heme:     - Hemoglobin stable      MSK:  - No pressure to free flap  - PT/OT when able     Prophylaxis:    - Mechanical prophylaxis for DVT   - SQ Heparing  - PPI     Lines/ tubes/ drains:  - Carisa Vega ETT, DAVEY     Disposition:  - Surgical ICU     Patient seen, findings and plan discussed with surgical ICU staff Dr. Cesar Reyes  [facility] Anesthesiology    - - - - - - - - - - - - - - - - - - - - - - - - - - - - - - - - - - - - - - - - - - - - - - - - - - - - - - - - - - - - - - - - - - - - - - - -     HISTORY PRESENTING ILLNESS:  Devonte Tucker is a 52yo M with hx significant for HTN, DM, obesity and possible invasive SCC. Pt initially presented with continual pain and was treated with a course of abx due to concern for possible osteomyelitis s/p incision and drainage and biopsy of bone. Pt ultimately underwent IR guided biopsy of lymph, assessing for possible metastatic disease, however results were negative. In setting of the aforementioned, he is scheduled to undergo left infrastructure maxillectomy with free tissue flap today with Dr. Treviño. Pt admitted to SICU for hemodynamic monitoring and management of flap integrity.     PAST MEDICAL HISTORY:   Past Medical History:   Diagnosis Date     Diabetes (H)      Hypertension      Obesity        SURGICAL HISTORY:   Past Surgical History:   Procedure Laterality Date     ------------OTHER-------------      Mucosa and bone biopsy     EXTRACTION(S) DENTAL      x2 under general anesthesia     IR LYMPH NODE BIOPSY  4/4/2019       SOCIAL HISTORY:   Social History     Socioeconomic History     Marital status: Single     Spouse name: None     Number of children: None     Years of education: None     Highest education level: None   Occupational History     None   Social Needs     Financial resource strain: None     Food insecurity:     Worry: None     Inability: None     Transportation needs:     Medical: None     Non-medical: None   Tobacco Use      Smoking status: Never Smoker     Smokeless tobacco: Never Used   Substance and Sexual Activity     Alcohol use: No     Frequency: Never     Drug use: No     Sexual activity: None   Lifestyle     Physical activity:     Days per week: None     Minutes per session: None     Stress: None   Relationships     Social connections:     Talks on phone: None     Gets together: None     Attends Mandaeism service: None     Active member of club or organization: None     Attends meetings of clubs or organizations: None     Relationship status: None     Intimate partner violence:     Fear of current or ex partner: None     Emotionally abused: None     Physically abused: None     Forced sexual activity: None   Other Topics Concern     None   Social History Narrative     None       FAMILY HISTORY: No bleeding/clotting disorders nor problems with anesthesia.     ALLERGIES:    No Known Allergies    MEDICATIONS:    No current facility-administered medications on file prior to encounter.   Current Outpatient Medications on File Prior to Encounter:  atorvastatin (LIPITOR) 20 MG tablet Take 20 mg by mouth every morning    glipiZIDE (GLUCOTROL XL) 10 MG 24 hr tablet Take 20 mg by mouth every morning    lisinopril (PRINIVIL/ZESTRIL) 10 MG tablet Take 10 mg by mouth every morning    loratadine (CLARITIN) 10 MG tablet Take 10 mg by mouth every morning    metFORMIN (GLUCOPHAGE) 500 MG tablet Take 2,000 mg by mouth daily (with breakfast)    multivitamin w/minerals (MULTI-VITAMIN) tablet Take 1 tablet by mouth every morning    traMADol (ULTRAM) 50 MG tablet Take 1 tablet (50 mg) by mouth every 6 hours as needed for severe pain       PHYSICAL EXAMINATION:  Temp:  [98.3  F (36.8  C)] 98.3  F (36.8  C)  Pulse:  [107] 107  Resp:  [18] 18  BP: (141)/(88) 141/88    General: intubated and sedated  Neuro: sedated  Resp: Breathing non-labored on vent  CV: RRR  Abdomen: Soft, Non-distended, Non-tender  Incisions: intaoral free flat with doppler, right arm  dressed with no obvious strike through. Flap from right thigh dressed. C/D/I  Extremities: warm and well perfused    LABS: Reviewed.   Arterial Blood Gases   No lab results found in last 7 days.  Complete Blood Count   No lab results found in last 7 days.  Basic Metabolic Panel  No lab results found in last 7 days.  Liver Function Tests  No lab results found in last 7 days.  Pancreatic Enzymes  No lab results found in last 7 days.  Coagulation Profile  No lab results found in last 7 days.    IMAGING:  No results found for this or any previous visit (from the past 24 hour(s)).

## 2019-05-09 NOTE — BRIEF OP NOTE
[facility] [place]    Brief Operative Note    Pre-operative diagnosis: Maxillary Alveolus Cancer  Post-operative diagnosis * No post-op diagnosis entered *  Procedure: Procedure(s):  Left Infrastructure Maxillectomy,  Left Radial Forearm Free Flap (soft tissue)  Left Neck Exploration  Graft skin split thickness from right thigh, nasogastric feeding tube placement  Surgeon: Surgeon(s) and Role:  Panel 1:     * Jett Treviño MD - Primary     * Silas Tran - Resident - Assisting     * Chung Dejesus MD - Resident - Assisting  Panel 2:     * Sumit Atwood MD - Primary     * Kristina Gordillo MD - Resident - Assisting  Anesthesia: General   Estimated blood loss: 200 ml  Drains: Crispin-Mancini  Specimens:   ID Type Source Tests Collected by Time Destination   A : left submandibular nodes Tissue Other SURGICAL PATHOLOGY EXAM Jett Treviño MD 5/9/2019 10:06 AM    B : left submandibular triangle Tissue Other SURGICAL PATHOLOGY EXAM Jett Treviño MD 5/9/2019 10:07 AM    C : left maxilla Tissue Maxilla SURGICAL PATHOLOGY EXAM Jett Treviño MD 5/9/2019 11:09 AM    D : additional nasal floor bony margin Tissue Other SURGICAL PATHOLOGY EXAM Jett Treviño MD 5/9/2019 11:12 AM    E : medial palate re-resection Tissue Other SURGICAL PATHOLOGY EXAM Jett Treviño MD 5/9/2019 11:32 AM    F : Anterior palate margin Tissue Mouth SURGICAL PATHOLOGY EXAM Jett Treviño MD 5/9/2019 11:47 AM    G : posterior palate margin Tissue Mouth SURGICAL PATHOLOGY EXAM Jett Treviño MD 5/9/2019 11:48 AM    H : Anterior gingiva margin Tissue Mouth SURGICAL PATHOLOGY EXAM Jett Treviño MD 5/9/2019 11:49 AM    I : Anterior  buccal mucosal margin Tissue Mouth SURGICAL PATHOLOGY EXAM Jett Treviño MD 5/9/2019 11:50 AM    J : Posterior buccal mucosal margin Tissue Mouth SURGICAL PATHOLOGY EXAM Jett Treviño MD 5/9/2019 11:51 AM    K : Nasal Floor margin Tissue Nose SURGICAL PATHOLOGY EXAM Jett Treviño MD 5/9/2019 11:52 AM       Findings:   tumor along the left maxillary alveolus, erosion through the anterior wall of the left maxillary sinus; tumor did not cross midline; Right radial forearm free flap used for reconstruction.  Complications: None.  Implants:    Implant Name Type Inv. Item Serial No.  Lot No. LRB No. Used   IMP DEVICE ANASTOMOTIC 3.5MM  PURPLE [id number] Other IMP DEVICE ANASTOMOTIC 3.5MM  PURPLE [id number]  [employer] [id number] Left 1   IMP PROBE DOPPLER FLOW STD CUFF [id number] Other IMP PROBE DOPPLER FLOW STD CUFF [id number]  [facility] [employer] [id number] Left 1

## 2019-05-09 NOTE — ANESTHESIA CARE TRANSFER NOTE
Patient: Devonte Tucker    Procedure(s):  Left Infrastructure Maxillectomy,  Left Radial Forearm Free Flap (soft tissue)  Left Neck Exploration  Graft skin split thickness from right thigh, nasogastric feeding tube placement    Diagnosis: Maxillary Alveolus Cancer  Diagnosis Additional Information: No value filed.    Anesthesia Type:   No value filed.     Note:  Airway :ETT  Patient transferred to:ICU  ICU Handoff: Call for PAUSE to initiate/utilize ICU HANDOFF, Identified Patient, Identified Responsible Provider, Reviewed the Pertinent Medical History, Discussed Surgical Course, Reviewed Intra-OP Anesthesia Management and Issues during Anesthesia, Set Expectations for Post Procedure Period and Allowed Opportunity for Questions and Acknowledgement of Understanding      Vitals: (Last set prior to Anesthesia Care Transfer)    CRNA VITALS  5/9/2019 1700 - 5/9/2019 1731      5/9/2019             ART BP:  131/86    Ht Rate:  103    SpO2:  100 %    EKG:  Sinus tachycardia                Electronically Signed By: DAT Mcgovern CRNA  May 9, 2019  5:31 PM

## 2019-05-09 NOTE — LETTER
Transition Communication Hand-off for Care Transitions to Next Level of Care Provider    Name: Devonte Tucker  : 1965  MRN #: [id number]  Primary Care Provider: Vivi Casas     Primary Clinic: [facility] [address] [facility] [address]     Reason for Hospitalization:  Maxillary Alveolus Cancer  Maxillary sinus cancer (H)  Admit Date/Time: 2019  5:31 AM  Discharge Date: 19  Payor Source: Payor: PREFERREDONE / Plan: PREFERREDONE OTHER PPO / Product Type: PPO /              Reason for Communication Hand-off Referral:     Discharge Plan:     Social Work Services Discharge Note     Patient Name:  Devonte Tucker     Anticipated Discharge Date:  19     Discharge Disposition:   [facility] ([phone])     Following MD:  Facility assignment.     Pre-Admission Screening (PAS) online form has been completed.  The Level of Care (LOC) is:  Determined  Confirmation Code is:  [id number].  Patient/caregiver informed of referral to [facility] for Pre-Admission Screening for skilled nursing facility (SNF) placement and to expect a phone call post discharge from SNF.     Additional Services/Equipment Arranged:  [facility] has confirmed readiness for discharge. Admissions (Pattie) at [facility] has confirmed acceptance for admission and receipt of prior auth from insurance.  has arranged for [email or web address] (james [phone]) to provide w/c transport at 3:30pm.     Patient / Family response to discharge plan:  Pt and sisters (Jovanna and Rosangela) voice understanding of the discharge plan and agreement with the discharge plan.     Persons notified of above discharge plan:  Pt, sisters, 6A nursing and Deborah VALDERRAMA     Staff Discharge Instructions:  Please fax discharge orders and signed hard scripts for any controlled substances.  Please print a packet and send with patient.      CTS Handoff completed:  YES     Medicare Notice of Rights provided to  the patient/family:  NO, as per Admissions Facesheet, pt is not on Medicare.     SHIVA Aldana  Social Work, 6A  Phone:  [phone]  Pager:  [phone]  5/16/2019

## 2019-05-09 NOTE — OP NOTE
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 Atwood (case dictated under separate cover)      Complications:   None   Specimens:   Level Level I, left maxillectomy main specimen additional nasal floor bony margins for permanent,, multiple frozen section margins    Findings:    1.  The PET avid nodes in the left neck were inflammatory only without evidence of cancer.    2.  Erosive tumor of the left maxillary alveolar ridge with extension to the floor of nose bone, and into the deep maxilla.  Margins are clean grossly.  Bone margins clear by gross.  Mucosal and deep soft tissue margins were clear on frozen.      Indications:  Mr. Tucker is a 53 year old male with a long history of oral preneoplastic disease, who has now developed an invasive cancer of the left alveolar ridge with invasion of the maxilla by CT.  He is here for resection and reconstruction by Dr Atwood.    Procedure:  After consent, the patient was brought to the operating room and placed in the supine position.  Following induction, the patient was intubated orotracheally.  Monitoring lines were placed as appropriate.  He was turned 180 degrees in preparation for surgery.  We prepped the head and face, right arm, right thigh, and upper chest for surgery.  The anesthesiologist completed placing the lines.    A left neck incision was injected with 1% lidocaine with 1:100,000 epinephrine solution.  After a standard sterile prep, the incision was made from the mastoid to just past the midline in a linear fashion along a neck crease.  Subplatysmal flaps were developed superiorly to the mandible, and  inferiorly to the mid neck.  The external jugular vein was not identified.  The marginal mandibular nerve was very low in the neck and was  protected by elevating the submandibular fascia superiorly with a Miranda-Andres maneuver.  There was a substantial amount of adipose tissue which required additional dissection time.  The facial vein were identified and taken high in the neck as a recipient vessel for the free flap.  This was preserved.   The submandibular contents were mobilized from the mandible, and then from the anterior belly of the digastric, which was followed to the midline.  Retraction of the mylohyoid muscle exposed the lingual nerve which was  from the specimen at the level of the submandibular ganglion.  Brooke's duct was divided and ligated.  The hypoglossal nerve was identified and left in place.  Posteriorly, we found the large PET-avid node and in fact there were two prominent nodes.  Both were sent for frozen section, and they turned out to be negative for carcinoma.  We identified the facial artery which was kept intact as it was separate from the gland.  Having mobilized the Level IB contents completely now, we sent the specimen after dividing soft tissue from the posterior belly of the digastric muscle.      We turned attention to the left maxillectomy.  Using lip retractors and bite blocks throughout the procedure, we gained adequate exposure to see the tumor on the left upper gingiva.  The tumor was extensive, involving the gingiva of teeth #10 to 16.    The tumor was both lateral and medial to the gingiva.  We ultimately made incisions in the midline anteriorly, between teeth #8 and #9, extending into the midline the anterior hard palate, curving towards the left hard palate more posteriorly.  The posterior cut was behind the maxillary tuberosity.  On the buccal gingiva, our cuts were made 1 cm from the gingival tumor, from midline to the maxillary tuberosity.       We began by  making a wide incisions measuring at least 1 cm around the mucosal disease.  This was done without difficulty.  I then transitioned anteriorly on to the face of the maxillary sinus and on to face of the premaxilla.  We exposed the piriform aperture, and elevated the nasal mucosa off of the nasal floor.  I was able to use elevators to raise the periosteum over the inferior maxilla down to the level of the teeth while leaving the attached gingiva in place to avoid disturbing the tumor.  The left medial #9 incisor was mobilized from the dental ligaments to expose the bone.  There was no evidence of invasion into this tooth root.  The tumor had stopped just at the lateral border of tooth #10. We elevated the mucoperiosteum throughout, and made sure that healthy bone was exposed.       We now made make the bony cuts.  Using the oscillating saw, we made incisions in the anterior maxilla in the midline, extending into the piriform aperture.  Laterally, we cut along the premaxilla and then the anterior maxilla laterally, including the inferior floor of the left maxillary sinus.  Over the hard palate, we made the medial aspect cuts on the maxilla.  Laterally, it became a little bit more difficult to reach given the limited exposure without dividing the upper lip.  I used osteotomes and curved osteotomes to make these cuts.  The greater palatine artery was preserved during this dissection. The inferior maxilla was removed en bloc.  We lost approximately 150 mL of blood during the maxillectomy.       At a side table, we inspected the mass and noted that there were excellent mucosal margins around the entire tumor.  We felt that the closest bony margins were along the anterior aspect of the nasal floor.  We therefore returned to remove additional bone in the medial and anterior nasal floor margins.  An additional centimeter of bone was taken and sent as a separate margin.  The soft tissue margins appeared to be quite good, and  circumferential mucosal margins were clear by inspection and ultimately by frozen section.  On the specimen, there was a little bit of irregularity on the hard palate margin, approximately 2 cm from the incisors.  We shaved this area off the specimen with the pathologist, and they confirmed that there was no evidence of tumor in this area.  We had already taken an additional soft tissue anyway while we are waiting for the pathologist to complete their analysis, and this tissue was sent that was permanent specimen     We used a liam to remove the hard bony edges.  The sharp edges of the hard palate as well as the maxilla were now rounded.  We achieved hemostasis and irrigated the wound.   We measured the defect, which was 6.5 x 3.5 cm, and the defect was shown to Dr. Atwood in preparation for free tissue transfer.  We now placed the nasogastric feeding tube and secured to the columella.  Rai began harvest of the left radial forearm free flap, and the remainder of the operation including closure will be dictated by his team.

## 2019-05-09 NOTE — ANESTHESIA PREPROCEDURE EVALUATION
Anesthesia Pre-Procedure Evaluation    Patient: Devonte Tucker   MRN:     [id number] Gender:   male   Age:    53 year old :      1965        Preoperative Diagnosis: Maxillary Alveolus Cancer   Procedure(s):  Left Infrastructure Maxillectomy, Nasogastric Feeding Tube Insertion  Left Neck Exploration  Left Radial Forearm Free Flap (soft tissue)     Past Medical History:   Diagnosis Date     Diabetes (H)      Hypertension      Obesity       Past Surgical History:   Procedure Laterality Date     ------------OTHER-------------      Mucosa and bone biopsy     EXTRACTION(S) DENTAL      x2 under general anesthesia     IR LYMPH NODE BIOPSY  2019          Anesthesia Evaluation     .             ROS/MED HX    ENT/Pulmonary:       Neurologic:       Cardiovascular:     (+) hypertension----. : . . . :. .       METS/Exercise Tolerance:     Hematologic:         Musculoskeletal:         GI/Hepatic:         Renal/Genitourinary:         Endo:     (+) type II DM Obesity, .      Psychiatric:         Infectious Disease:         Malignancy:         Other:                     [facility] AN PHYSICAL EXAM    Lab Results   Component Value Date    WBC 5.5 2019    HGB 13.1 (L) 2019    HCT 40.6 2019     2019     2019    POTASSIUM 4.0 2019    CHLORIDE 102 2019    CO2 26 2019    BUN 10 2019    CR 0.81 2019     (H) 2019    AMNOJ 9.9 2019    INR 1.10 2019       Preop Vitals  BP Readings from Last 3 Encounters:   19 143/84   19 (!) 144/93   19 157/87    Pulse Readings from Last 3 Encounters:   19 107   19 107   19 84      Resp Readings from Last 3 Encounters:   19 18   19 16    SpO2 Readings from Last 3 Encounters:   19 95%   19 97%   19 96%      Temp Readings from Last 1 Encounters:   19 36.7  C (98.1  F) (Oral)    Ht Readings from Last 1 Encounters:   19 1.702 m (5'  "7\")      Wt Readings from Last 1 Encounters:   04/24/19 136.5 kg (301 lb)    Estimated body mass index is 47.14 kg/m  as calculated from the following:    Height as of 4/24/19: 1.702 m (5' 7\").    Weight as of 4/24/19: 136.5 kg (301 lb).     LDA:            Assessment:   ASA SCORE: 3    NPO Status: > 6 hours since completed Solid Foods   Documentation: H&P complete; Preop Testing complete; Consents complete   Proceeding: Proceed without further delay  Tobacco Use:  NO Active use of Tobacco/UNKNOWN Tobacco use status     Plan:   Anes. Type:  General   Pre-Induction: Midazolam IV; Acetaminophen PO   Induction:  IV (Standard)   Airway: Oral ETT   Access/Monitoring: PIV   Maintenance: Balanced   Emergence: Procedure Site   Logistics: Same Day Surgery     Postop Pain/Sedation Strategy:  Standard-Options: Opioids PRN     PONV Management:  Adult Risk Factors:, Non-Smoker, Postop Opioids  Prevention: Ondansetron     CONSENT: Direct conversation   Plan and risks discussed with: Patient   Blood Products: Consented (ALL Blood Products)                     Anesthesia Attending    HPI: Devonte Tucker is a 53 year old male who  has a past medical history of Diabetes (H), Hypertension, and Obesity.  has a past surgical history that includes IR Lymph Node Biopsy (4/4/2019); Extraction(s) dental; and ------------other-------------. with a Maxillary Alveolus Cancer scheduled for Procedure(s):  Left Infrastructure Maxillectomy, Nasogastric Feeding Tube Insertion  Left Neck Exploration  Left Radial Forearm Free Flap (soft tissue).      PMHx/PSHx/ROS:  Past Medical History:   Diagnosis Date     Diabetes (H)      Hypertension      Obesity        Past Surgical History:   Procedure Laterality Date     ------------OTHER-------------      Mucosa and bone biopsy     EXTRACTION(S) DENTAL      x2 under general anesthesia     IR LYMPH NODE BIOPSY  4/4/2019       Soc Hx:   Social History     Tobacco Use     Smoking status: Never Smoker     " Smokeless tobacco: Never Used   Substance Use Topics     Alcohol use: No     Frequency: Never         Allergies: No Known Allergies    Meds:   No medications prior to admission.       Current Outpatient Medications   Medication Sig Dispense Refill     acetaminophen (TYLENOL) 500 MG tablet Take 500-1,000 mg by mouth every 6 hours as needed for mild pain 500 mg in the morning and 1000 mg at bedtime       atorvastatin (LIPITOR) 20 MG tablet Take 20 mg by mouth every morning   11     glipiZIDE (GLUCOTROL XL) 10 MG 24 hr tablet Take 20 mg by mouth every morning   0     lisinopril (PRINIVIL/ZESTRIL) 10 MG tablet Take 10 mg by mouth every morning   11     loratadine (CLARITIN) 10 MG tablet Take 10 mg by mouth every morning        metFORMIN (GLUCOPHAGE) 500 MG tablet Take 2,000 mg by mouth daily (with breakfast)        multivitamin w/minerals (MULTI-VITAMIN) tablet Take 1 tablet by mouth every morning        senna-docusate (SENOKOT-S/PERICOLACE) 8.6-50 MG tablet Take 2 tablets by mouth 2 times daily as needed for constipation (Patient taking differently: Take 2 tablets by mouth 2 times daily as needed for constipation Just received this RX today) 30 tablet 0     traMADol (ULTRAM) 50 MG tablet Take 1 tablet (50 mg) by mouth every 6 hours as needed for severe pain 30 tablet 0         Labs:  BMP:  Recent Labs   Lab Test 04/24/19  0946      POTASSIUM 4.0   CHLORIDE 102   CO2 26   BUN 10   CR 0.81   *   MANOJ 9.9     CBC:   Recent Labs   Lab Test 04/04/19  0845   WBC 5.5   RBC 4.29*   HGB 13.1*   HCT 40.6   MCV 95   MCH 30.5   MCHC 32.3   RDW 12.6        Coags:  Recent Labs   Lab Test 04/04/19  0845   INR 1.10     FVC, FEV1 both 80% pred, FEV1/%    EKG: NSR< nl    53 year old male with alveolar/maxilar ca, who  has a past medical history of Diabetes (H), Hypertension, and Obesity. to OR for Procedure(s):  Left Infrastructure Maxillectomy, Nasogastric Feeding Tube Insertion  Left Neck Exploration  Left  Radial Forearm Free Flap (soft tissue)    Plan for GA, standard monitors, large bore IVs,arterial line, PONV prophylaxis.  Antibiotics per primary service.    Plan discussed with the anesthesia and surgery teams.  Anesthetic risks discussed with the patient, all questions answered.  Consent in chart.    Meenakshi Patel MD

## 2019-05-09 NOTE — ANESTHESIA POSTPROCEDURE EVALUATION
Anesthesia POST Procedure Evaluation    Patient: Devonte Tucker   MRN:     [id number] Gender:   male   Age:    53 year old :      1965        Preoperative Diagnosis: Maxillary Alveolus Cancer   Procedure(s):  Left Infrastructure Maxillectomy,  Left Radial Forearm Free Flap (soft tissue)  Left Neck Exploration  Graft skin split thickness from right thigh, nasogastric feeding tube placement   Postop Comments: No value filed.       Anesthesia Type:  General  No value filed.    Reportable Event: NO     PAIN: Uncomplicated   Sign Out status: Comfortable, Well controlled pain     PONV: No PONV   Sign Out status:  No Nausea or Vomiting     Neuro/Psych: Uneventful perioperative course   Sign Out Status: Preoperative baseline; Age appropriate mentation     Airway/Resp.: Uneventful perioperative course   Sign Out Status: Airway Device present     Airway Device: ETT     CV: Uneventful perioperative course   Sign Out status: Appropriate BP and perfusion indices; Appropriate HR/Rhythm     Disposition:   Sign Out in:  ICU  Disposition:  ICU  Recovery Course: Recovery in ICU  Follow-Up: Not required     Comments/Narrative:  Patient transported directly to the ICU sedated and intubated.  Vitals stable. Report given to ICU           Last Anesthesia Record Vitals:  CRNA VITALS  2019 1700 - 2019 1800      2019             ART BP:  131/86    Ht Rate:  103    SpO2:  100 %    EKG:  Sinus tachycardia          Last PACU Vitals:  Vitals Value Taken Time   BP     Temp 37  C (98.6  F) 2019  6:00 PM   Pulse     Resp     SpO2 100 % 2019  6:01 PM   Temp src     NIBP     Pulse     SpO2     Resp     Temp     Ht Rate     Temp 2     Vitals shown include unvalidated device data.      Electronically Signed By: Lou Calvert MD, May 9, 2019, 6:02 PM

## 2019-05-09 NOTE — ANESTHESIA PROCEDURE NOTES
Arterial Line Procedure Note  Staff:     Anesthesiologist:  Meenakshi Patel MD    Resident/CRNA:  Kristina Marie APRN CRNA    Arterial line performed by resident/CRNA in presence of a teaching physician    Location: In OR After Induction  Procedure Start/Stop Times:     patient identified, IV checked, site marked, risks and benefits discussed, informed consent, monitors and equipment checked, pre-op evaluation and at physician/surgeon's request      Correct Patient: Yes      Correct Position: Yes      Correct Site: Yes      Correct Procedure: Yes      Correct Laterality:  Yes    Site Marked:  Yes  Line Placement:     Procedure:  Arterial Line    Insertion Site:  Radial    Insertion laterality:  Left    Skin Prep: Chloraprep      Patient Prep: patient draped, mask, sterile gloves, hat and hand hygiene      Local skin infiltration:  None    Ultrasound Guided?: No      Catheter size:  20 gauge, Quick cath    Dressing:  Tegaderm    Complications:  None obvious    Arterial waveform: Yes      IBP within 10% of NIBP: Yes

## 2019-05-10 ENCOUNTER — APPOINTMENT (OUTPATIENT)
Dept: GENERAL RADIOLOGY | Facility: CLINIC | Age: 54
DRG: 463 | End: 2019-05-10
Attending: OTOLARYNGOLOGY
Payer: COMMERCIAL

## 2019-05-10 LAB
ALBUMIN SERPL-MCNC: 3.2 G/DL (ref 3.4–5)
ANION GAP SERPL CALCULATED.3IONS-SCNC: 8 MMOL/L (ref 3–14)
BUN SERPL-MCNC: 10 MG/DL (ref 7–30)
CALCIUM SERPL-MCNC: 8.7 MG/DL (ref 8.5–10.1)
CHLORIDE SERPL-SCNC: 103 MMOL/L (ref 94–109)
CO2 SERPL-SCNC: 25 MMOL/L (ref 20–32)
CREAT SERPL-MCNC: 0.76 MG/DL (ref 0.66–1.25)
ERYTHROCYTE [DISTWIDTH] IN BLOOD BY AUTOMATED COUNT: 12 % (ref 10–15)
GFR SERPL CREATININE-BSD FRML MDRD: >90 ML/MIN/{1.73_M2}
GLUCOSE BLDC GLUCOMTR-MCNC: 168 MG/DL (ref 70–99)
GLUCOSE BLDC GLUCOMTR-MCNC: 202 MG/DL (ref 70–99)
GLUCOSE BLDC GLUCOMTR-MCNC: 209 MG/DL (ref 70–99)
GLUCOSE BLDC GLUCOMTR-MCNC: 217 MG/DL (ref 70–99)
GLUCOSE BLDC GLUCOMTR-MCNC: 225 MG/DL (ref 70–99)
GLUCOSE BLDC GLUCOMTR-MCNC: 245 MG/DL (ref 70–99)
GLUCOSE SERPL-MCNC: 218 MG/DL (ref 70–99)
HCT VFR BLD AUTO: 29 % (ref 40–53)
HGB BLD-MCNC: 9.6 G/DL (ref 13.3–17.7)
MAGNESIUM SERPL-MCNC: 1.5 MG/DL (ref 1.6–2.3)
MCH RBC QN AUTO: 30.5 PG (ref 26.5–33)
MCHC RBC AUTO-ENTMCNC: 33.1 G/DL (ref 31.5–36.5)
MCV RBC AUTO: 92 FL (ref 78–100)
PHOSPHATE SERPL-MCNC: 3.8 MG/DL (ref 2.5–4.5)
PLATELET # BLD AUTO: 232 10E9/L (ref 150–450)
POTASSIUM SERPL-SCNC: 3.9 MMOL/L (ref 3.4–5.3)
PREALB SERPL IA-MCNC: 18 MG/DL (ref 15–45)
RBC # BLD AUTO: 3.15 10E12/L (ref 4.4–5.9)
SODIUM SERPL-SCNC: 136 MMOL/L (ref 133–144)
TSH SERPL DL<=0.005 MIU/L-ACNC: 0.32 MU/L (ref 0.4–4)
WBC # BLD AUTO: 12.6 10E9/L (ref 4–11)

## 2019-05-10 PROCEDURE — 25000128 H RX IP 250 OP 636: Performed by: STUDENT IN AN ORGANIZED HEALTH CARE EDUCATION/TRAINING PROGRAM

## 2019-05-10 PROCEDURE — 84132 ASSAY OF SERUM POTASSIUM: CPT | Performed by: STUDENT IN AN ORGANIZED HEALTH CARE EDUCATION/TRAINING PROGRAM

## 2019-05-10 PROCEDURE — 25000125 ZZHC RX 250

## 2019-05-10 PROCEDURE — 84443 ASSAY THYROID STIM HORMONE: CPT | Performed by: OTOLARYNGOLOGY

## 2019-05-10 PROCEDURE — 85027 COMPLETE CBC AUTOMATED: CPT | Performed by: OTOLARYNGOLOGY

## 2019-05-10 PROCEDURE — 40000556 ZZH STATISTIC PERIPHERAL IV START W US GUIDANCE

## 2019-05-10 PROCEDURE — 00000146 ZZHCL STATISTIC GLUCOSE BY METER IP

## 2019-05-10 PROCEDURE — 71045 X-RAY EXAM CHEST 1 VIEW: CPT

## 2019-05-10 PROCEDURE — 83735 ASSAY OF MAGNESIUM: CPT | Performed by: OTOLARYNGOLOGY

## 2019-05-10 PROCEDURE — 80069 RENAL FUNCTION PANEL: CPT | Performed by: OTOLARYNGOLOGY

## 2019-05-10 PROCEDURE — 40000985 XR ABDOMEN PORT 1 VW

## 2019-05-10 PROCEDURE — 27210437 ZZH NUTRITION PRODUCT SEMIELEM INTERMED LITER

## 2019-05-10 PROCEDURE — 25000132 ZZH RX MED GY IP 250 OP 250 PS 637: Performed by: OTOLARYNGOLOGY

## 2019-05-10 PROCEDURE — 40000275 ZZH STATISTIC RCP TIME EA 10 MIN

## 2019-05-10 PROCEDURE — 25000132 ZZH RX MED GY IP 250 OP 250 PS 637: Performed by: STUDENT IN AN ORGANIZED HEALTH CARE EDUCATION/TRAINING PROGRAM

## 2019-05-10 PROCEDURE — 99233 SBSQ HOSP IP/OBS HIGH 50: CPT | Mod: GC | Performed by: ANESTHESIOLOGY

## 2019-05-10 PROCEDURE — 94003 VENT MGMT INPAT SUBQ DAY: CPT

## 2019-05-10 PROCEDURE — 20000004 ZZH R&B ICU UMMC

## 2019-05-10 PROCEDURE — 84134 ASSAY OF PREALBUMIN: CPT | Performed by: OTOLARYNGOLOGY

## 2019-05-10 PROCEDURE — 40000014 ZZH STATISTIC ARTERIAL MONITORING DAILY

## 2019-05-10 RX ORDER — POTASSIUM CL/LIDO/0.9 % NACL 10MEQ/0.1L
10 INTRAVENOUS SOLUTION, PIGGYBACK (ML) INTRAVENOUS
Status: DISCONTINUED | OUTPATIENT
Start: 2019-05-10 | End: 2019-05-16 | Stop reason: HOSPADM

## 2019-05-10 RX ORDER — DEXMEDETOMIDINE HYDROCHLORIDE 4 UG/ML
0.2-0.7 INJECTION, SOLUTION INTRAVENOUS CONTINUOUS
Status: DISCONTINUED | OUTPATIENT
Start: 2019-05-10 | End: 2019-05-11

## 2019-05-10 RX ORDER — POTASSIUM CHLORIDE 1.5 G/1.58G
20-40 POWDER, FOR SOLUTION ORAL
Status: DISCONTINUED | OUTPATIENT
Start: 2019-05-10 | End: 2019-05-16 | Stop reason: HOSPADM

## 2019-05-10 RX ORDER — POTASSIUM CHLORIDE 750 MG/1
20-40 TABLET, EXTENDED RELEASE ORAL
Status: DISCONTINUED | OUTPATIENT
Start: 2019-05-10 | End: 2019-05-16 | Stop reason: HOSPADM

## 2019-05-10 RX ORDER — MAGNESIUM SULFATE HEPTAHYDRATE 40 MG/ML
4 INJECTION, SOLUTION INTRAVENOUS EVERY 4 HOURS PRN
Status: DISCONTINUED | OUTPATIENT
Start: 2019-05-10 | End: 2019-05-16 | Stop reason: HOSPADM

## 2019-05-10 RX ORDER — POTASSIUM CHLORIDE 29.8 MG/ML
20 INJECTION INTRAVENOUS
Status: DISCONTINUED | OUTPATIENT
Start: 2019-05-10 | End: 2019-05-16 | Stop reason: HOSPADM

## 2019-05-10 RX ORDER — POTASSIUM CHLORIDE 7.45 MG/ML
10 INJECTION INTRAVENOUS
Status: DISCONTINUED | OUTPATIENT
Start: 2019-05-10 | End: 2019-05-16 | Stop reason: HOSPADM

## 2019-05-10 RX ADMIN — MAGNESIUM SULFATE HEPTAHYDRATE 4 G: 40 INJECTION, SOLUTION INTRAVENOUS at 06:18

## 2019-05-10 RX ADMIN — Medication 10 MG: at 22:21

## 2019-05-10 RX ADMIN — POTASSIUM CHLORIDE 20 MEQ: 1.5 POWDER, FOR SOLUTION ORAL at 06:18

## 2019-05-10 RX ADMIN — DEXMEDETOMIDINE HYDROCHLORIDE 0.2 MCG/KG/HR: 4 INJECTION, SOLUTION INTRAVENOUS at 10:43

## 2019-05-10 RX ADMIN — ASPIRIN 325 MG ORAL TABLET 325 MG: 325 PILL ORAL at 07:55

## 2019-05-10 RX ADMIN — INSULIN ASPART 1 UNITS: 100 INJECTION, SOLUTION INTRAVENOUS; SUBCUTANEOUS at 04:00

## 2019-05-10 RX ADMIN — Medication 0.5 MG: at 18:31

## 2019-05-10 RX ADMIN — CHLORHEXIDINE GLUCONATE 0.12% ORAL RINSE 15 ML: 1.2 LIQUID ORAL at 00:08

## 2019-05-10 RX ADMIN — BACITRACIN: 500 OINTMENT TOPICAL at 03:59

## 2019-05-10 RX ADMIN — AMPICILLIN SODIUM AND SULBACTAM SODIUM 3 G: 2; 1 INJECTION, POWDER, FOR SOLUTION INTRAMUSCULAR; INTRAVENOUS at 04:03

## 2019-05-10 RX ADMIN — PROPOFOL 60 MCG/KG/MIN: 10 INJECTION, EMULSION INTRAVENOUS at 04:19

## 2019-05-10 RX ADMIN — AMPICILLIN SODIUM AND SULBACTAM SODIUM 3 G: 2; 1 INJECTION, POWDER, FOR SOLUTION INTRAMUSCULAR; INTRAVENOUS at 16:22

## 2019-05-10 RX ADMIN — Medication 10 MG: at 13:29

## 2019-05-10 RX ADMIN — Medication 5000 UNITS: at 16:22

## 2019-05-10 RX ADMIN — Medication 5000 UNITS: at 07:55

## 2019-05-10 RX ADMIN — Medication 0.5 MG: at 20:28

## 2019-05-10 RX ADMIN — MULTIVITAMIN 15 ML: LIQUID ORAL at 10:21

## 2019-05-10 RX ADMIN — PROPOFOL 50 MCG/KG/MIN: 10 INJECTION, EMULSION INTRAVENOUS at 08:39

## 2019-05-10 RX ADMIN — INSULIN ASPART 1 UNITS: 100 INJECTION, SOLUTION INTRAVENOUS; SUBCUTANEOUS at 07:56

## 2019-05-10 RX ADMIN — BACITRACIN: 500 OINTMENT TOPICAL at 20:00

## 2019-05-10 RX ADMIN — Medication 0.5 MG: at 14:32

## 2019-05-10 RX ADMIN — BACITRACIN: 500 OINTMENT TOPICAL at 12:00

## 2019-05-10 RX ADMIN — AMPICILLIN SODIUM AND SULBACTAM SODIUM 3 G: 2; 1 INJECTION, POWDER, FOR SOLUTION INTRAMUSCULAR; INTRAVENOUS at 22:21

## 2019-05-10 RX ADMIN — PROPOFOL 55 MCG/KG/MIN: 10 INJECTION, EMULSION INTRAVENOUS at 06:18

## 2019-05-10 RX ADMIN — Medication 0.5 MG: at 16:16

## 2019-05-10 RX ADMIN — PROPOFOL 65 MCG/KG/MIN: 10 INJECTION, EMULSION INTRAVENOUS at 00:24

## 2019-05-10 RX ADMIN — ATORVASTATIN CALCIUM 20 MG: 20 TABLET, FILM COATED ORAL at 07:55

## 2019-05-10 RX ADMIN — CHLORHEXIDINE GLUCONATE 0.12% ORAL RINSE 15 ML: 1.2 LIQUID ORAL at 07:55

## 2019-05-10 RX ADMIN — Medication 10 MG: at 06:18

## 2019-05-10 RX ADMIN — AMPICILLIN SODIUM AND SULBACTAM SODIUM 3 G: 2; 1 INJECTION, POWDER, FOR SOLUTION INTRAMUSCULAR; INTRAVENOUS at 10:21

## 2019-05-10 RX ADMIN — OXYCODONE HYDROCHLORIDE 10 MG: 5 TABLET ORAL at 17:07

## 2019-05-10 RX ADMIN — OXYCODONE HYDROCHLORIDE 10 MG: 5 TABLET ORAL at 13:18

## 2019-05-10 RX ADMIN — CHLORHEXIDINE GLUCONATE 0.12% ORAL RINSE 15 ML: 1.2 LIQUID ORAL at 16:23

## 2019-05-10 RX ADMIN — Medication 0.5 MG: at 22:21

## 2019-05-10 RX ADMIN — INSULIN ASPART 2 UNITS: 100 INJECTION, SOLUTION INTRAVENOUS; SUBCUTANEOUS at 00:15

## 2019-05-10 ASSESSMENT — MIFFLIN-ST. JEOR: SCORE: 2168.63

## 2019-05-10 ASSESSMENT — ACTIVITIES OF DAILY LIVING (ADL)
ADLS_ACUITY_SCORE: 20
ADLS_ACUITY_SCORE: 16
ADLS_ACUITY_SCORE: 15
ADLS_ACUITY_SCORE: 16

## 2019-05-10 NOTE — PROGRESS NOTES
CLINICAL NUTRITION SERVICES - ASSESSMENT NOTE     Nutrition Prescription    RECOMMENDATIONS FOR MDs/PROVIDERS TO ORDER:  Free water flush adjustments per MD discretion pending sodium and fluid status    Malnutrition Status:    Severe malnutrition in the context of acute illness    Recommendations already ordered by Registered Dietitian (RD):  1. Start TF via NGT:  -Peptamen VHP @ 10 mL/hr.  -If pt tolerates, adv TF by 10 mL q8h to goal 70 ml/hr (1680 ml/day) to provide 1680 kcals (20 kcal/kg/day), 156 g PRO (1.9 g/kg/day), 1411 ml free H2O, 131 g CHO and 7 g Fiber daily.  -Order multivitamin/mineral (15 ml/day via FT) to help ensure micronutrient needs being met with suspected hypermetabolic demands and potential interruptions to TF infusions.  -30 mL water flush q4h for tube patency  -Assess gastric residuals and HOB >30 degrees while feeding stomach    Future/Additional Recommendations:  1. TF start, adv, lytes    2. Once tolerating continuous goal TF for at least 8 hours and approp to transition to Melvin Bolus regimen, recommend do so as follows: begin first bolus with 0.5 cans (125 ml) and if tolerates after approx 4 hrs without GI complaints and/or residuals < 500 ml (if able to accurately return with smaller bore FT), rec adv each subsequent bolus by 0.5 cans (125 ml) every ~4 hrs until reach goal regimen of 2 cans (500 ml) TID + 1 can (250 ml) at fourth bolus = 7 cans daily (1750 ml/day) = 1750 kcals (21 kcal/kg), 161g PRO (1.9 g/kg), 1470 ml H2O, 133 g CHO and 7 g Fiber daily.  *Change H2O flushes to 50 ml H2O before and after each bolus (addtl 300 ml H2O) to meet est fld needs.        REASON FOR ASSESSMENT  Devonte Tucker is a/an 53 year old male assessed by the dietitian for Provider Order - Registered Dietitian to Assess and Order TF per Medical Nutrition Therapy Protocol    NUTRITION HISTORY  Per pt's family members, pt was eating poorly for the past couple months r/t discomfort. Would try to drink  "shakes and liquids mostly.    CURRENT NUTRITION ORDERS  Diet: NPO  Intake/Tolerance: N/A    LABS  Labs reviewed    MEDICATIONS  Propofol    ANTHROPOMETRICS  Height: 170.2 cm (5' 7\")  Most Recent Weight: 137 kg (302 lb 0.5 oz)    IBW: 67.3 kg  BMI: 47; Obesity Grade III BMI >40  Weight History:   Wt Readings from Last 10 Encounters:   05/10/19 137 kg (302 lb 0.5 oz)   04/24/19 136.5 kg (301 lb)   04/24/19 136.5 kg (301 lb)   04/15/19 144.2 kg (318 lb)   04/01/19 144.7 kg (319 lb)   10.3 kg, 7% weight loss over the past month  Dosing Weight: 84 kg (adjusted based on lowest admit wt of 134.4 kg, IBW = 67.3 kg)    ASSESSED NUTRITION NEEDS  Estimated Energy Needs: 9948-7987 kcals/day (20 - 25 kcals/kg)  Justification: Obese and Post-op  Estimated Protein Needs: 126-168 grams protein/day (1.5 - 2 grams of pro/kg)  Justification: Increased needs, Obesity guidelines and Post-op  Estimated Fluid Needs: (1 mL/kcal)   Justification: Per provider pending fluid status    PHYSICAL FINDINGS  See malnutrition section below.  No abnormal nutrition-related physical findings observed.     MALNUTRITION  % Intake: </=75% for >/= 1 month (severe)  % Weight Loss: > 5% in 1 month (severe)  Subcutaneous Fat Loss: None observed  Muscle Loss: None observed  Fluid Accumulation/Edema: None noted  Malnutrition Diagnosis: Severe malnutrition in the context of acute illness    NUTRITION DIAGNOSIS  Inadequate protein-energy intake related to s/p ENT surgery, SCC as evidenced by NPO with TFs yet to start and poor PO PTA per family    INTERVENTIONS  Implementation  Nutrition education for nutrition relationship to health/disease, tentative nutrition plan, RD role to pt's family members  Collaboration and Referral of Nutrition care - Discussed plan for FEN/GI on rounds with Providers   Enteral Nutrition - Initiate  Feeding tube flush  Multivitamin/mineral supplement therapy     Goals  Total avg nutritional intake to meet a minimum of 20 kcal/kg and " 1.5 g PRO/kg daily (per dosing wt 84 kg).     Monitoring/Evaluation  Progress toward goals will be monitored and evaluated per protocol.    Polly Jones RD, LD  SICU RD Pgr: [phone]

## 2019-05-10 NOTE — PROGRESS NOTES
"ENT In-Person Free Flap Check  May 10, 2019; 02:30      Subjective:  Patient has had no issues with flap. Flap checks have been wnl and MAPs have been appropriate per RN. He has been afebrile.    Objective:  /88 (BP Location: Right arm)   Pulse 107   Temp 99.8  F (37.7  C) (Axillary)   Resp 16   Ht 1.702 m (5' 7\")   Wt 137 kg (302 lb 0.5 oz)   SpO2 100%   BMI 47.30 kg/m    - Orotracheally intubated, sedated  - Intra oral flap soft, warm, and pale. Incisions are well-approximated without dehiscence. Appears viable without evidence of congestion or hematoma.  - Strong arterial signal on implanted doppler; strong arterial and venous signals on handheld doppler of left neck  - Neck incision well-approximated, soft, and flat. YARIEL drain holding suction with serosanguinous output.  - Right forearm dressing intact, wound vac without leak, good cap refill on fingers. YARIEL drain holding suction with  serosanguinous output.  - Right thigh split thickness skin graft site dressing intact, no leakage around Tegaderm.    Assessment & Plan: Stable flap  - Continue ENT flap checks q4h- next check on morning rounds  - Please do not hesitate to contact ENT with questions or concerns       Christos Dupree MD  Otolaryngology- Head and Neck Surgery  Please contact ENT with questions by dialing * [phone] and entering job code [phone] when prompted.  "

## 2019-05-10 NOTE — PROGRESS NOTES
"ENT Free Flap Check  May 9, 2019      S: No issues per nursing with the flap.    O: /88 (BP Location: Right arm)   Pulse 107   Temp 99  F (37.2  C) (Axillary)   Resp 16   Ht 1.702 m (5' 7\")   Wt 134.4 kg (296 lb 4.8 oz)   SpO2 98%   BMI 46.41 kg/m    - Orotracheally intubated, sedated  - Intra oral flap soft, warm, and pale. Appears viable without evidence of congestion, no hematoma visualized, strong doppler sounds from implantable doppler and handheld doppler. Incisions are c/d/i. YARIEL drain holding suction with serosanguinous output.  - Right forearm dressing intact, wound vac without leak, good cap refill on fingers. YARIEL drain holding suction with  serosanguinous output.  - Right thigh split thickness skin graft site dressing intact, no leakage around Tegaderm.    A/P: stable flap  - continue current plan of care  - please do not hesitate to contact ENT with questions or concerns     Silas Tran  Otolaryngology Resident - [id number]  [phone]  Please page ENT with questions by dialing * [phone] and entering job code [phone] when prompted    "

## 2019-05-10 NOTE — SIGNIFICANT EVENT
"SPIRITUAL HEALTH SERVICES  SPIRITUAL ASSESSMENT Progress Note  [facility] 4A     REFERRAL SOURCE: Epic    Pt was accompanied by 3 sisters and his brother-in-law. Pt's sisters:    Said the medical team is currently weaning the pt off of sedation.    Stressed numerous times how important darwin is to the pt.    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    Are discussing among themselves a strategy for accompanying their brother while he is in the hospital--they are from [place].    Asked for daily  visits. I told them that although I will be away next week I would have another  stop by for regular visits, but not daily visits.        PATIENT ANOINTED by Father Juwan Dykes 5/10/2019      PLAN: Will ask unit  to follow.     Devon Pizarro   Pager [phone]    "

## 2019-05-10 NOTE — PLAN OF CARE
OT: Pt intubated, sedated, not appropriate to wean sedation per discussion w/ RN, OT will check back as appropriate in PM or reschedule for 5/11/19

## 2019-05-10 NOTE — PROGRESS NOTES
Admitted/transferred from: PACU  Reason for admission/transfer: Maxillectomy  Patient status upon admission/transfer: Sedated/intubated  Interventions: Propofol and Fentanyl adjusted, flap site checked and monitored overnight, placed on bariatric low air loss mattress  Plan: Patient to remain intubated and sedated until am when weaning and possible extubation will take place  2 RN skin assessment: completed by Claire Holland and Mara Odonnell  Result of skin assessment and interventions/actions: Aside from bruising and surgical sites there are no wounds or skin issues  Height, weight, drug calc weight: done  Patient belongings: Delivered from PACU, 2 bags in closet  MDRO education (if applicable): N/A

## 2019-05-10 NOTE — PROGRESS NOTES
"Otolaryngology Progress Note  May 10, 2019    S: No acute events overnight. Flap checks stable per ENT and RN overnight. MAPs > 60.     O: /88 (BP Location: Right arm)   Pulse 107   Temp 99  F (37.2  C) (Axillary)   Resp 16   Ht 1.702 m (5' 7\")   Wt 137 kg (302 lb 0.5 oz)   SpO2 99%   BMI 47.30 kg/m     General: Sedated   HEENT: Orotracheally intubated, ETT sutured to teeth on the right. Oral flap soft, warm, pale with strong implantable doppler signal. No signs of flap congestion or dehiscenence. Neck incisions clean, dry, intact. Neck soft and flat without evidence of hematoma or fluid collection. Strong arterial and venous doppler signals obtained over flap pedicle markings on the left neck. YARIEL drains x 1 in place and holding suction with serosanguinous drainage in bulbs.    Pulmonary: Mechanically ventilate, +cuff leak   Extremities: right UE with dressing, wound vac and splint in place. Fingers warm, good cap refill. YARIEL drain in right arm holding bulb suction with serosanguinous drainage.  Right thigh STSG donor site with tegaderm/calcium alginate dressing in place.       Intake/Output Summary (Last 24 hours) at 5/10/2019 0941  Last data filed at 5/10/2019 0800  Gross per 24 hour   Intake 5320.44 ml   Output 1945 ml   Net 3375.44 ml     YARIEL drain output(s): (last 24 hours)/(last shift)  1 right arm: 5 / 8 / 5  2 medial neck: 22 / 12 / 8    LABS:  ROUTINE IP LABS (Last four results)  BMP  Recent Labs   Lab 05/10/19  0346 05/09/19  1514 05/09/19  1248 05/09/19  1051    137 137 136   POTASSIUM 3.9 4.2 3.7 4.0   CHLORIDE 103  --   --   --    MANOJ 8.7  --   --   --    CO2 25  --   --   --    BUN 10  --   --   --    CR 0.76  --   --   --    * 181* 133* 136*     CBC  Recent Labs   Lab 05/10/19  0346 05/09/19  2002 05/09/19  1514 05/09/19  1248 05/09/19  1051   WBC 12.6*  --   --   --   --    RBC 3.15*  --   --   --   --    HGB 9.6*  --  10.6* 10.7* 12.2*   HCT 29.0*  --   --   --   --    MCV 92 "  --   --   --   --    MCH 30.5  --   --   --   --    MCHC 33.1  --   --   --   --    RDW 12.0  --   --   --   --     222  --   --   --      INRNo lab results found in last 7 days.    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 He is POD#1 s/p left infrastructure maxillectomy, left radial forearm free flap, left neck exploration, STSG from left thigh to left forearm, NG feeding tube placement.     Neuro:  - Pain/sedation per SICU. Wean sedation off today    HEENT:  - Nothing around the neck (no gown ties, trach ties, lines, ect.)  - RN flap checks Q1H x 48h, Q2H x 24h, then Q4H  - ENT flap checks Q4H  - Clean incisions with 0.9% sodium chloride and apply bacitracin Q8H x 24h, then transition to Aquaphor  - Monitor and record YARIEL drain output Qshift  - Peridex oral rinses 4 times daily  - Saline oral rinses Q8H and PRN. Gentle suction with red meek catheter only (no yankeur), do not cut red meek  - Right UE: wound vac to remain in place until POD5, first dressing change and vac removal per ENT, then daily thereafter.   - Right thigh STSG site: calcium alginate/tegaderm, reinforce or replace as needed for drainage. Once no longer draining may leave open to air    Respiratory:  - Mechanically ventilated, wean to extubate today     CV/heme:  - NO vasopressors  - hemodynamically stable, hgb 9.6  -  mg and SQ heparin for flap  - PTA atorvastatin    FEN/GI:  - NPO.  - Nutrition consult. May start tube feeding via nasogastric feeding tube with evidence of bowel sounds  - Bowel regimen: Dulcolax PRN     /Fluids/Lytes:  - Hampton in place with adequate UOP. Discontinue hampton today once alert  - mIVF NS @ 50 mL/h   - electrolyte replacement protocol    Endo  - ISS    ID:  - Perioperative antibiotics (Unasyn) x 48h  post-op    PPX:  - SQ heparin 5,000 units Q8H  - SCDs    Dispo: SICU x 72 hours for flap monitoring    -- Patient and above plan discussed with Dr. Atwood and Dr. Treviño.     Deborah Mancilla PA-C  Otolaryngology-Head & Neck Surgery  Please contact ENT with questions by dialing * [phone] and entering job code [phone] when prompted.

## 2019-05-10 NOTE — PLAN OF CARE
4AB-PT: CXL: PT consult received and appreciated. Pt awaiting extubation this a.m. Not tolerating much this pm dt pain/fatigue. CXL PT today. Will continue to follow and initiate as appropriate.

## 2019-05-10 NOTE — PROGRESS NOTES
"ENT In-Person Free Flap Check  May 10, 2019; 00:00      Subjective:  Patient has had no issues with flap. Flap checks have been wnl and MAPs have been appropriate per RN.    Objective:  /88 (BP Location: Right arm)   Pulse 107   Temp 99.8  F (37.7  C) (Axillary)   Resp 16   Ht 1.702 m (5' 7\")   Wt 137 kg (302 lb 0.5 oz)   SpO2 100%   BMI 47.30 kg/m    - Orotracheally intubated, sedated  - Intra oral flap soft, warm, and pale. Incisions are well-approximated without dehiscence. Appears viable without evidence of congestion or hematoma.  - Strong arterial signal on implanted doppler; strong arterial and venous signals on handheld doppler of left neck  - Neck incision well-approximated, soft, and flat. YARIEL drain holding suction with serosanguinous output.  - Right forearm dressing intact, wound vac without leak, good cap refill on fingers. YARIEL drain holding suction with  serosanguinous output.  - Right thigh split thickness skin graft site dressing intact, no leakage around Tegaderm.    Assessment & Plan: Stable flap  - Continue ENT flap checks q4h  - Please do not hesitate to contact ENT with questions or concerns     Christos Dupree MD  Otolaryngology- Head and Neck Surgery  Please contact ENT with questions by dialing * [phone] and entering job code [phone] when prompted.  "

## 2019-05-10 NOTE — PLAN OF CARE
Devonte Tucker is a 52yo M with hx significant for HTN, DM, obesity and concern for invasive SCC now POD#1 s/p left infrastructure maxillectomy with free tissue flap.    During my shift pt was extubated, Propofol/Fentanyl/Precedex were discontinued see MAR for more details. Pt remains in stable condition.    Neuro: A&Ox4, PERRLA, Sensation equal bilaterally, MSK: 5/5 besides RUE (surgical dressing)  Cv: MAP goal >65, SR/ST, cap refill <3, warm extremities w/o discoloration, No ectopy  Resp: Pt extubated. On 2L NC. Clear sounds in upper lobes, diminished in lowers. SpO2: %.   GI/: Bs+, BM-, Flattus-, Feeding: 10mL, Colindres (100-250cc/hr)  Skin: Bruising to extremities.  Right arm flap site wrapped in surgical dressing.  Right leg surgical site clean, dry, and intact.  Flap inside mouth is pale, warm, and soft.  Continuous Doppler strong.  Arterial and venous doppler to left side of neck strong.    Drips: 50 mL NS  Sedation: None       Problem: Depressed Mood (Adult/Pediatric)  Goal: *STG: Participates in treatment plan  Outcome: Progressing Towards Goal  Patient mood at present is calm. Denies SI/HI. Interacting appropriately with staff/peers. Medication complaint. Goal: *STG: Verbalizes anger, guilt, and other feelings in a constructive manor  Outcome: Progressing Towards Goal  No aggression  Noted this shift. Goal: Interventions  Outcome: Progressing Towards Goal  Medication management. Continues on Q15 minute safety rounds. Participates in group therapy.

## 2019-05-10 NOTE — PLAN OF CARE
Neuro: Sedated overnight with Propofol and Fentanyl running.  Propofol weaned as able - brought down to 50 and patient woke up and was agitated, trying to reach for tube.  Re sedated and kept comfortable overnight, but moved all extremities during episode of agitation.  Pupils equal and reactive.  CV: SR.  MAP goal >65 and patient stayed ranged 60s-70s throughout shift, occasionally going below 65 but not sustaining.  TMAX 99.8.    Pulm: CMV settings with PEEP 5 and FiO2 weaned to 30%.  Fine crackles auscultated in upper lobes.  Scant secretions through ETT and minimal red thin secretions suctioned orally with red meek.    GI: NPO.  Imaging obtained of feeding tube and ok to use this am.  : Colindres catheter patent and draining with adequate urine output.  Skin: Bruising to extremities.  Right arm flap site wrapped in surgical dressing.  Right leg surgical site clean, dry, and intact.  Flap inside mouth is pale, warm, and soft.  Continuous Doppler strong.  Arterial and venous doppler to left side of neck strong.  IV/Gtt: Propofol @ 60, Fentanyl @ 50, and TKO for antibiotics and electrolyte replacement.    ENT resident at bedside to complete flap checks overnight at approximately 9pm, 12am, 2am, and 6am rounds.

## 2019-05-10 NOTE — PROGRESS NOTES
Patient extubated to 4 LPM Nc at 13:00 and tolerated well. Cuff leak test done before extubation. Has  cuff leak. Suctioned minimal amount of secretions. BBS clear. No stridor. Vitals RR 16, HR 80, /64 and SpO2 100%. Will continue to follow. .

## 2019-05-10 NOTE — PROGRESS NOTES
SURGICAL ICU PROGRESS NOTE  May 10, 2019      ASSESSMENT: Devonte Tucker is a 54yo M with hx significant for HTN, DM, obesity and concern for invasive SCC now POD#1 s/p left infrastructure maxillectomy with free tissue flap. Intraoperatively pt did not require pressors or further support of blood pressure; EBL estimated at 200cc. Currently HDS, planning for extubation today. Continue to monitor flap for additional 48hours     MAJOR CHANGES TODAY:  - PST this morning, goal of extubation  - Address TF with ENT  - Continue flap checks Q1h per nursing and Q4H ENT checks  - Hold off on PPI if extubating today  - High dose sliding scale insulin    PLAN:   Neuro/ pain/ sedation:  #Intubated and Sedated s/p Maxillary free flap  - Monitor neurological status; notify the MD for any acute changes in exam  - Pain:Tylenol and Fentanyl gtt, oxycodone and dilaudid prn  - Sedation: propofol, transition to precedex this AM for extubation given agitation     ENT:  S/p Maxillectomy with free flap in setting of invasive SCC  - Intraflap doppler with adequate signal  - Continue Q4h flap check per ENT, Q1h per nursing  - HOB at 30 degress  - No pressors for hypotension, fluid boluses prn. Will contact ENT if blood pressure begins to drift down  - left neck with labeled arterial and venous doppler sites, to assess Q1h per nursing     Pulmonary care:   - Intubated and sedated, will pressure support this AM with goal of extubation.  - Vent: CMV 16/550/5/40  - Pulmonary cares     Cardiovascular:    #Hx of HTN  - Monitor hemodynamic status  - MAP>65  - Refrain from pressor use in attempt to preserve flap integrity  - Fluid boluses for hypotension  -  and heparin TID, PTA atorvostatin  - Holding PTA Lisinopril      GI care:   - NPO, feeding tube in place. Will advance per ENT in AM  - PPI     Fluids/ Electrolytes/ Nutrition:   - NS @ 50cc/hr   - No indication for parenteral nutrition    Renal/ Fluid Balance:    - Urine output is  adequate so far  - Will continue to monitor intake and output     Endocrine:    #Hx of DM  - Sliding scale insulin - high dose  - Hold PTA antihyperglycemics     ID/ Antibiotics:  # C diff  - Received perioperative Unasyn  - To continue Unasyn for additional 48 hours     Heme:     - Hemoglobin stable, 9.6 this AM     MSK:  - No pressure to free flap  - PT/OT when able     Prophylaxis:    - Mechanical prophylaxis for DVT   - SQ Heparin  - PPI     Lines/ tubes/ drains:  - PIVs, [place], ETT, NG     Disposition:  - Surgical ICU for 72hours for flap monitoring     Patient seen, findings and plan discussed with surgical ICU staff Dr. Cesar Reyes  [facility]      ====================================    SUBJECTIVE:   NAEO per nursing staff. Pt sedated and intubated this AM. No current concerns    OBJECTIVE:   1. VITAL SIGNS:   Temp:  [98.6  F (37  C)-99.8  F (37.7  C)] 99.1  F (37.3  C)  Heart Rate:  [90-98] 90  Resp:  [16] 16  MAP:  [62 mmHg-84 mmHg] 64 mmHg  Arterial Line BP: ()/(49-65) 91/50  FiO2 (%):  [30 %-40 %] 30 %  SpO2:  [98 %-100 %] 98 %  Ventilation Mode: CMV/AC  (Continuous Mandatory Ventilation/ Assist Control)  FiO2 (%): 30 %  Rate Set (breaths/minute): 16 breaths/min  Tidal Volume Set (mL): 550 mL  PEEP (cm H2O): 5 cmH2O  Oxygen Concentration (%): 30 %  Resp: 16      2. INTAKE/ OUTPUT:   I/O last 3 completed shifts:  In: 5043.64 [I.V.:4473.64; NG/GT:70]  Out: 1607 [Urine:1360; Drains:47; Blood:200]    3. PHYSICAL EXAMINATION:   General: intubated and sedated  Neuro: Sedated  HEENT: intubated - tube sutured to teeth. Oral flap pale with doppler signal audible. No evidence of flap dehiscence. Dopplerable signals on left neck. YARIEL drain in place, serosanguinous output  Resp: Breathing non-labored on vent, cuff leak positive  CV: RRR. No M/G/R appreciated  Abdomen: Soft, Non-distended, Non-tender, rotund   Incisions: C/D/I. No evidence of dehiscence   Extremities: warm and well  perfused    4. INVESTIGATIONS:   Arterial Blood Gases   Recent Labs   Lab 05/09/19  1514 05/09/19  1248 05/09/19  1051   PH 7.36 7.38 7.39   PCO2 39 40 40   PO2 146* 176* 171*   HCO3 22 23 24     Complete Blood Count   Recent Labs   Lab 05/10/19  0346 05/09/19 2002 05/09/19  1514 05/09/19  1248 05/09/19  1051   WBC 12.6*  --   --   --   --    HGB 9.6*  --  10.6* 10.7* 12.2*    222  --   --   --      Basic Metabolic Panel  Recent Labs   Lab 05/10/19  0346 05/09/19  1514 05/09/19  1248 05/09/19  1051    137 137 136   POTASSIUM 3.9 4.2 3.7 4.0   CHLORIDE 103  --   --   --    CO2 25  --   --   --    BUN 10  --   --   --    CR 0.76  --   --   --    * 181* 133* 136*     Liver Function Tests  Recent Labs   Lab 05/10/19  0346   ALBUMIN 3.2*     Pancreatic Enzymes  No lab results found in last 7 days.  Coagulation Profile  No lab results found in last 7 days.      5. RADIOLOGY:   Recent Results (from the past 24 hour(s))   XR Chest Port 1 View    Narrative    Exam: Chest x-ray, 1 view, 5/9/2019.    COMPARISON: PET/CT 4/12/2019.    HISTORY: Intubation.    FINDINGS: AP view of the chest was obtained. Endotracheal tube with  its tip projecting 4.3 cm above the russ. Enteric tube with its tip  projecting over the stomach. Postoperative changes at the left neck.  No pneumothorax. Trace bilateral pleural effusions with overlying  atelectasis versus consolidation. Low lung volumes. Mild pulmonary  vascular congestion. Cardiac mediastinal silhouette within normal  limits. Aortic knob with calcifications.      Impression    IMPRESSION:  1. Endotracheal tube with its tip projecting 4.3 cm above the russ.  2. Low lung volumes. Mild pulmonary vascular congestion.  3. Trace bilateral pleural effusions with overlying atelectasis versus  consolidation.    NURA BELTRÁN MD   XR Abdomen Port 1 View    Narrative    Examination:  XR ABDOMEN PORT 1 VW 5/10/2019 12:40 AM     Comparison: 4/12/2019    History: check  feeding tube placement    Technique: Supine radiograph of the abdomen    Findings:   Feeding tube tip projects over the expected location of stomach. The  visualized air filled small intestine and colon are not distended.  There is no free air.  No pneumatosis. No portal venous gas. Small  pleural effusions with overlying atelectasis.      Impression    Impression:  1.  Feeding tube tip projects over the expected location stomach  fundus.  2.  Non obstructed bowel gas pattern    I have personally reviewed the examination and initial interpretation  and I agree with the findings.    LAURA GRIFFIN MD       =========================================

## 2019-05-11 ENCOUNTER — APPOINTMENT (OUTPATIENT)
Dept: OCCUPATIONAL THERAPY | Facility: CLINIC | Age: 54
DRG: 463 | End: 2019-05-11
Attending: OTOLARYNGOLOGY
Payer: COMMERCIAL

## 2019-05-11 LAB
ANION GAP SERPL CALCULATED.3IONS-SCNC: 8 MMOL/L (ref 3–14)
BUN SERPL-MCNC: 14 MG/DL (ref 7–30)
CALCIUM SERPL-MCNC: 8.1 MG/DL (ref 8.5–10.1)
CHLORIDE SERPL-SCNC: 109 MMOL/L (ref 94–109)
CO2 SERPL-SCNC: 24 MMOL/L (ref 20–32)
CREAT SERPL-MCNC: 0.68 MG/DL (ref 0.66–1.25)
ERYTHROCYTE [DISTWIDTH] IN BLOOD BY AUTOMATED COUNT: 12.2 % (ref 10–15)
GFR SERPL CREATININE-BSD FRML MDRD: >90 ML/MIN/{1.73_M2}
GLUCOSE BLDC GLUCOMTR-MCNC: 141 MG/DL (ref 70–99)
GLUCOSE BLDC GLUCOMTR-MCNC: 192 MG/DL (ref 70–99)
GLUCOSE BLDC GLUCOMTR-MCNC: 210 MG/DL (ref 70–99)
GLUCOSE BLDC GLUCOMTR-MCNC: 216 MG/DL (ref 70–99)
GLUCOSE SERPL-MCNC: 219 MG/DL (ref 70–99)
HCT VFR BLD AUTO: 29.9 % (ref 40–53)
HGB BLD-MCNC: 9.5 G/DL (ref 13.3–17.7)
MAGNESIUM SERPL-MCNC: 2 MG/DL (ref 1.6–2.3)
MCH RBC QN AUTO: 30.3 PG (ref 26.5–33)
MCHC RBC AUTO-ENTMCNC: 31.8 G/DL (ref 31.5–36.5)
MCV RBC AUTO: 95 FL (ref 78–100)
PHOSPHATE SERPL-MCNC: 3 MG/DL (ref 2.5–4.5)
PLATELET # BLD AUTO: 231 10E9/L (ref 150–450)
POTASSIUM SERPL-SCNC: 4 MMOL/L (ref 3.4–5.3)
RBC # BLD AUTO: 3.14 10E12/L (ref 4.4–5.9)
SODIUM SERPL-SCNC: 141 MMOL/L (ref 133–144)
WBC # BLD AUTO: 12.3 10E9/L (ref 4–11)

## 2019-05-11 PROCEDURE — 25000132 ZZH RX MED GY IP 250 OP 250 PS 637

## 2019-05-11 PROCEDURE — 85027 COMPLETE CBC AUTOMATED: CPT | Performed by: OTOLARYNGOLOGY

## 2019-05-11 PROCEDURE — 25000132 ZZH RX MED GY IP 250 OP 250 PS 637: Performed by: STUDENT IN AN ORGANIZED HEALTH CARE EDUCATION/TRAINING PROGRAM

## 2019-05-11 PROCEDURE — 27210437 ZZH NUTRITION PRODUCT SEMIELEM INTERMED LITER

## 2019-05-11 PROCEDURE — 84100 ASSAY OF PHOSPHORUS: CPT | Performed by: OTOLARYNGOLOGY

## 2019-05-11 PROCEDURE — 97165 OT EVAL LOW COMPLEX 30 MIN: CPT | Mod: GO | Performed by: OCCUPATIONAL THERAPIST

## 2019-05-11 PROCEDURE — 25000132 ZZH RX MED GY IP 250 OP 250 PS 637: Performed by: OTOLARYNGOLOGY

## 2019-05-11 PROCEDURE — 40000014 ZZH STATISTIC ARTERIAL MONITORING DAILY

## 2019-05-11 PROCEDURE — 97535 SELF CARE MNGMENT TRAINING: CPT | Mod: GO | Performed by: OCCUPATIONAL THERAPIST

## 2019-05-11 PROCEDURE — 00000146 ZZHCL STATISTIC GLUCOSE BY METER IP

## 2019-05-11 PROCEDURE — 25800030 ZZH RX IP 258 OP 636: Performed by: STUDENT IN AN ORGANIZED HEALTH CARE EDUCATION/TRAINING PROGRAM

## 2019-05-11 PROCEDURE — 25000128 H RX IP 250 OP 636: Performed by: STUDENT IN AN ORGANIZED HEALTH CARE EDUCATION/TRAINING PROGRAM

## 2019-05-11 PROCEDURE — 80048 BASIC METABOLIC PNL TOTAL CA: CPT | Performed by: OTOLARYNGOLOGY

## 2019-05-11 PROCEDURE — 25000125 ZZHC RX 250: Performed by: STUDENT IN AN ORGANIZED HEALTH CARE EDUCATION/TRAINING PROGRAM

## 2019-05-11 PROCEDURE — 20000004 ZZH R&B ICU UMMC

## 2019-05-11 PROCEDURE — 83735 ASSAY OF MAGNESIUM: CPT | Performed by: OTOLARYNGOLOGY

## 2019-05-11 RX ORDER — AMOXICILLIN 250 MG
2 CAPSULE ORAL 2 TIMES DAILY
Status: DISCONTINUED | OUTPATIENT
Start: 2019-05-11 | End: 2019-05-16 | Stop reason: HOSPADM

## 2019-05-11 RX ORDER — POLYETHYLENE GLYCOL 3350 17 G/17G
17 POWDER, FOR SOLUTION ORAL DAILY
Status: DISCONTINUED | OUTPATIENT
Start: 2019-05-11 | End: 2019-05-16 | Stop reason: HOSPADM

## 2019-05-11 RX ORDER — POLYETHYLENE GLYCOL 3350 17 G/17G
17 POWDER, FOR SOLUTION ORAL DAILY
Status: DISCONTINUED | OUTPATIENT
Start: 2019-05-11 | End: 2019-05-11

## 2019-05-11 RX ORDER — AMOXICILLIN 250 MG
2 CAPSULE ORAL 2 TIMES DAILY
Status: DISCONTINUED | OUTPATIENT
Start: 2019-05-11 | End: 2019-05-11

## 2019-05-11 RX ORDER — LISINOPRIL 10 MG/1
10 TABLET ORAL EVERY MORNING
Status: DISCONTINUED | OUTPATIENT
Start: 2019-05-11 | End: 2019-05-16 | Stop reason: HOSPADM

## 2019-05-11 RX ORDER — OXYCODONE HYDROCHLORIDE 5 MG/1
5-10 TABLET ORAL EVERY 4 HOURS
Status: DISCONTINUED | OUTPATIENT
Start: 2019-05-11 | End: 2019-05-13 | Stop reason: DRUGHIGH

## 2019-05-11 RX ORDER — GABAPENTIN 250 MG/5ML
300 SOLUTION ORAL EVERY 8 HOURS
Status: DISCONTINUED | OUTPATIENT
Start: 2019-05-11 | End: 2019-05-16 | Stop reason: HOSPADM

## 2019-05-11 RX ADMIN — Medication 0.5 MG: at 00:37

## 2019-05-11 RX ADMIN — Medication 5000 UNITS: at 23:45

## 2019-05-11 RX ADMIN — AMPICILLIN SODIUM AND SULBACTAM SODIUM 3 G: 2; 1 INJECTION, POWDER, FOR SOLUTION INTRAMUSCULAR; INTRAVENOUS at 03:09

## 2019-05-11 RX ADMIN — CHLORHEXIDINE GLUCONATE 0.12% ORAL RINSE 15 ML: 1.2 LIQUID ORAL at 07:50

## 2019-05-11 RX ADMIN — OXYCODONE HYDROCHLORIDE 10 MG: 5 TABLET ORAL at 12:19

## 2019-05-11 RX ADMIN — OXYCODONE HYDROCHLORIDE 10 MG: 5 TABLET ORAL at 07:49

## 2019-05-11 RX ADMIN — OXYCODONE HYDROCHLORIDE 5 MG: 5 TABLET ORAL at 00:37

## 2019-05-11 RX ADMIN — POTASSIUM CHLORIDE 20 MEQ: 1.5 POWDER, FOR SOLUTION ORAL at 04:19

## 2019-05-11 RX ADMIN — AMPICILLIN SODIUM AND SULBACTAM SODIUM 3 G: 2; 1 INJECTION, POWDER, FOR SOLUTION INTRAMUSCULAR; INTRAVENOUS at 16:05

## 2019-05-11 RX ADMIN — Medication 0.5 MG: at 03:09

## 2019-05-11 RX ADMIN — CHLORHEXIDINE GLUCONATE 0.12% ORAL RINSE 15 ML: 1.2 LIQUID ORAL at 16:05

## 2019-05-11 RX ADMIN — ACETAMINOPHEN 650 MG: 160 SOLUTION ORAL at 14:01

## 2019-05-11 RX ADMIN — WHITE PETROLATUM: 1.75 OINTMENT TOPICAL at 12:19

## 2019-05-11 RX ADMIN — ASPIRIN 325 MG ORAL TABLET 325 MG: 325 PILL ORAL at 07:49

## 2019-05-11 RX ADMIN — OXYCODONE HYDROCHLORIDE 10 MG: 5 TABLET ORAL at 23:45

## 2019-05-11 RX ADMIN — POLYETHYLENE GLYCOL 3350 17 G: 17 POWDER, FOR SOLUTION ORAL at 12:19

## 2019-05-11 RX ADMIN — ACETAMINOPHEN 650 MG: 160 SOLUTION ORAL at 22:44

## 2019-05-11 RX ADMIN — AMPICILLIN SODIUM AND SULBACTAM SODIUM 3 G: 2; 1 INJECTION, POWDER, FOR SOLUTION INTRAMUSCULAR; INTRAVENOUS at 10:08

## 2019-05-11 RX ADMIN — CHLORHEXIDINE GLUCONATE 0.12% ORAL RINSE 15 ML: 1.2 LIQUID ORAL at 23:45

## 2019-05-11 RX ADMIN — OXYCODONE HYDROCHLORIDE 10 MG: 5 TABLET ORAL at 19:45

## 2019-05-11 RX ADMIN — GABAPENTIN 300 MG: 250 SUSPENSION ORAL at 16:05

## 2019-05-11 RX ADMIN — SENNOSIDES AND DOCUSATE SODIUM 2 TABLET: 8.6; 5 TABLET ORAL at 19:45

## 2019-05-11 RX ADMIN — CHLORHEXIDINE GLUCONATE 0.12% ORAL RINSE 15 ML: 1.2 LIQUID ORAL at 00:37

## 2019-05-11 RX ADMIN — Medication 5000 UNITS: at 00:37

## 2019-05-11 RX ADMIN — GABAPENTIN 300 MG: 250 SUSPENSION ORAL at 09:44

## 2019-05-11 RX ADMIN — ACETAMINOPHEN 650 MG: 160 SOLUTION ORAL at 18:08

## 2019-05-11 RX ADMIN — SODIUM CHLORIDE: 9 INJECTION, SOLUTION INTRAVENOUS at 21:06

## 2019-05-11 RX ADMIN — WHITE PETROLATUM: 1.75 OINTMENT TOPICAL at 19:45

## 2019-05-11 RX ADMIN — OXYCODONE HYDROCHLORIDE 10 MG: 5 TABLET ORAL at 04:30

## 2019-05-11 RX ADMIN — Medication 5000 UNITS: at 07:49

## 2019-05-11 RX ADMIN — MULTIVITAMIN 15 ML: LIQUID ORAL at 07:49

## 2019-05-11 RX ADMIN — OXYCODONE HYDROCHLORIDE 10 MG: 5 TABLET ORAL at 16:04

## 2019-05-11 RX ADMIN — Medication 2 G: at 04:19

## 2019-05-11 RX ADMIN — ACETAMINOPHEN 650 MG: 160 SOLUTION ORAL at 09:43

## 2019-05-11 RX ADMIN — Medication 5000 UNITS: at 16:04

## 2019-05-11 RX ADMIN — LISINOPRIL 10 MG: 5 TABLET ORAL at 09:43

## 2019-05-11 RX ADMIN — SENNOSIDES AND DOCUSATE SODIUM 2 TABLET: 8.6; 5 TABLET ORAL at 12:19

## 2019-05-11 RX ADMIN — ATORVASTATIN CALCIUM 20 MG: 20 TABLET, FILM COATED ORAL at 07:49

## 2019-05-11 ASSESSMENT — PAIN DESCRIPTION - DESCRIPTORS: DESCRIPTORS: ACHING;BURNING

## 2019-05-11 ASSESSMENT — ACTIVITIES OF DAILY LIVING (ADL)
ADLS_ACUITY_SCORE: 17
ADLS_ACUITY_SCORE: 17
PREVIOUS_RESPONSIBILITIES: MEAL PREP;HOUSEKEEPING;LAUNDRY;SHOPPING;MEDICATION MANAGEMENT;FINANCES;DRIVING;WORK
ADLS_ACUITY_SCORE: 17
ADLS_ACUITY_SCORE: 17
ADLS_ACUITY_SCORE: 19
ADLS_ACUITY_SCORE: 17

## 2019-05-11 NOTE — PLAN OF CARE
Neuro: intact. Inadequate pain control throughout the night in R arm. Flap checks q1h. Flap on tongue is pale (baseline), warm, and soft. Arterial and venous pulses present. PERRL.   CV: NSR 80s-90s. SBP goal < 160, no intervention needed this shift. Afebrile.  Resp: 2L NC overnight, sats 96%+. LS clear but diminished. Pt self-suctions red-streaked oral secretions with red meek.   GI/: hampton in place with adequate UOP, see flowsheets. BS hyperactive, though no BM this shift. TF @ 10ml/hr d/t pt c/o of abdominal discomfort and 'sour stomach'. BGs elevated, sliding scale insulin given as ordered.   Skin: Flap site WNL. YARIEL in L neck with small bloody output. Neck incision approximated, slightly reddened, but otherwise WNL. RUE in soft cast d/t donor site. YARIEL in RUE with small bloody output. Wound vac on RUE in place, no output this shift. Graft site on R thigh WNL. Pt refusing normal repositioning during the night d/t pain in RUE despite RN education.   Gtts: NS @ 50ml/hr.    Continue to monitor and with plan of care. Notify SICU/ENT of changes in pt condition.

## 2019-05-11 NOTE — PROGRESS NOTES
Me Gifty is POD#2.  He is awake and alert. He is complaining of pain in the neck and in the right arm. However, his outlook is reasonably positive.  Extubated yesterday without problems.    Afebrile.  WBC 12.3  The wound is clean; very tender but he winces with slightest tough.  Splinting H&N area.  There doesn t appear to be any hematoma or bruising. The tongue protrudes in the midline. There is some weakness left lower lip. The shoulders move well. YARIEL drain seem to be functioning well. Doppler post strong. The flap in the mouth continues to be very pale, but without congestion. The distal fingertips are healthy.    IMP: He has not yet gotten out of bed.  Today s task will be to get him ambulating, which he says will feel good.  Continue increase of tube feeding; if pain persists may need to consider ultrasound of neck since obesity may obscure small hematoma, but right now flap is non-congested.

## 2019-05-11 NOTE — PROGRESS NOTES
SURGICAL ICU PROGRESS NOTE  May 10, 2019      ASSESSMENT: Devonte Tucker is a 52yo M with hx significant for HTN, DM, obesity and concern for invasive SCC now POD#1 s/p left infrastructure maxillectomy with free tissue flap. Intraoperatively pt did not require pressors or further support of blood pressure; EBL estimated at 200cc. Currently HDS, planning for extubation today. Continue to monitor flap for additional 24 hours.     MAJOR CHANGES TODAY:  discontinue hampton, discontinue a-line  Increase activity  Increase bowel regimen  Advance TF as tolerated    PLAN:   Neuro/ pain/ sedation:  #Intubated and Sedated s/p Maxillary free flap  - Monitor neurological status; notify the MD for any acute changes in exam  - Pain: oxycodone, tylenol, dilaudid prn       ENT:  S/p Maxillectomy with free flap in setting of invasive SCC  - Intraflap doppler with adequate signal  - Continue Q4h flap check per ENT, Q1h per nursing  - HOB at 30 degress  - No pressors for hypotension, fluid boluses prn. Will contact ENT if blood pressure begins to drift down  - left neck with labeled arterial and venous doppler sites, to assess Q1h per nursing     Pulmonary care:   - NC 2LPM     Cardiovascular:    #Hx of HTN  - Monitor hemodynamic status  - MAP>65  - Refrain from pressor use in attempt to preserve flap integrity  - Fluid boluses for hypotension  -  and heparin TID,   - Restart PTA atorvostatin  - restarted PTA Lisinopril      GI care:   - NPO, feeding tube in place.   - PPI  - TF (10cc this am, will continue to advance)     Fluids/ Electrolytes/ Nutrition:   - NS @ 50cc/hr     Renal/ Fluid Balance:    - Urine output is adequate so far  - Will continue to monitor intake and output     Endocrine:    #Hx of DM  - Sliding scale insulin - high dose  - Hold PTA antihyperglycemics     ID/ Antibiotics:  # C diff  - Received perioperative Unasyn  - To continue Unasyn for additional 48 hours (5/11 end date)     Heme:     - Hemoglobin  stable, 9.5 this AM     MSK:  - No pressure to free flap  - PT/OT when able     Prophylaxis:    - Mechanical prophylaxis for DVT   - SQ Heparin  - PPI  - miralax, senna, biscodyl    Lines/ tubes/ drains:  - PIVs, [place], ETT, NG     Disposition:  - Surgical ICU for 72hours for flap monitoring     Patient seen, findings and plan discussed with surgical ICU staff Dr. Cesar Willett MD      ====================================    SUBJECTIVE:   NAEO per nursing staff. Pt pain controlled, reports discomfort with TFs, flatulence+.      OBJECTIVE:   1. VITAL SIGNS:   Temp:  [96.8  F (36  C)-99  F (37.2  C)] 98.6  F (37  C)  Heart Rate:  [81-99] 87  Resp:  [14-18] 16  BP: (110)/(60) 110/60  MAP:  [74 mmHg-95 mmHg] 95 mmHg  Arterial Line BP: (105-139)/(58-73) 135/72  FiO2 (%):  [30 %] 30 %  SpO2:  [88 %-99 %] 97 %  Ventilation Mode: CPAP/PS  (Continuous positive airway pressure with Pressure Support)  FiO2 (%): 30 %  Rate Set (breaths/minute): 16 breaths/min  Tidal Volume Set (mL): 550 mL  PEEP (cm H2O): 5 cmH2O  Pressure Support (cm H2O): 7 cmH2O  Oxygen Concentration (%): 30 %  Resp: 16      2. INTAKE/ OUTPUT:   I/O last 3 completed shifts:  In: 2236.26 [I.V.:1646.26; NG/GT:400]  Out: 1756 [Urine:1675; Drains:81]    3. PHYSICAL EXAMINATION:   General: NAD  Neuro: A&O  HEENT: Oral flap intact with doppler signal audible. No evidence of flap dehiscence. Dopplerable signals on left neck. YARIEL drain in place, serosanguinous output  Resp: breathing nonlabored on NC.  CV: RRR. No M/G/R appreciated  Abdomen: Soft, Non-distended, Non-tender, rotund   Incisions: C/D/I. No evidence of dehiscence   Extremities: warm and well perfused    4. INVESTIGATIONS:   Arterial Blood Gases   Recent Labs   Lab 05/09/19  1514 05/09/19  1248 05/09/19  1051   PH 7.36 7.38 7.39   PCO2 39 40 40   PO2 146* 176* 171*   HCO3 22 23 24     Complete Blood Count   Recent Labs   Lab 05/11/19  0315 05/10/19  0346 05/09/19  2002 05/09/19  1514  05/09/19  1248   WBC 12.3* 12.6*  --   --   --    HGB 9.5* 9.6*  --  10.6* 10.7*    232 222  --   --      Basic Metabolic Panel  Recent Labs   Lab 05/11/19  0315 05/10/19  0346 05/09/19  1514 05/09/19  1248    136 137 137   POTASSIUM 4.0 3.9 4.2 3.7   CHLORIDE 109 103  --   --    CO2 24 25  --   --    BUN 14 10  --   --    CR 0.68 0.76  --   --    * 218* 181* 133*     Liver Function Tests  Recent Labs   Lab 05/10/19  0346   ALBUMIN 3.2*     Pancreatic Enzymes  No lab results found in last 7 days.  Coagulation Profile  No lab results found in last 7 days.      5. RADIOLOGY:   No results found for this or any previous visit (from the past 24 hour(s)).    =========================================

## 2019-05-11 NOTE — PROGRESS NOTES
05/11/19 1000   Quick Adds   Type of Visit Initial Occupational Therapy Evaluation   Living Environment   Lives With alone   Living Arrangements   ([facility])   Home Accessibility stairs within home   Number of Stairs, Within Home, Primary   (12)   Transportation Anticipated car, drives self   [place] [facility]; pt bed/bath located on upper level, no bathroom on main level, has both tub and walk in showers available for use   Self-Care   Usual Activity Tolerance good   Current Activity Tolerance fair   Regular Exercise No   Equipment Currently Used at Home none   Activity/Exercise/Self-Care Comment Pt was previously independent with all mobility and self cares.  pt works in management (partially active work/partially desk work).  Pt reports no baseline mobility concerns   Functional Level   Ambulation 0-->independent   Transferring 0-->independent   Toileting 0-->independent   Bathing 0-->independent   Dressing 0-->independent   Eating 0-->independent   Communication 0-->understands/communicates without difficulty   Cognition 0 - no cognition issues reported   Fall history within last six months no   Which of the above functional risks had a recent onset or change? ambulation       Present no   Language english   General Information   Onset of Illness/Injury or Date of Surgery - Date 05/09/19   Referring Physician Carlos Willett MD   Patient/Family Goals Statement Return to home and work   Additional Occupational Profile Info/Pertinent History of Current Problem Devonte Tucker is a 52yo M with hx significant for HTN, DM, obesity and concern for invasive SCC now s/p left infrastructure maxillectomy with R radial forearm free tissue flap.   Precautions/Limitations fall precautions   Weight-Bearing Status - RUE nonweight-bearing   General Observations Pt is pleasant and agreeable; enriqueta x 2, wound vac to R forearm, berta CARRANZA   General Info Comments Activity:  up with assist   Cognitive Status Examination   Orientation orientation to person, place and time   Level of Consciousness alert   Follows Commands (Cognition) WNL   Memory intact   Attention No deficits were identified   Cognitive Comment no acute cognitive concerns noted at this time;  Pt alert, makes needs known, appropriately conversational   Visual Perception   Visual Perception Wears glasses   Visual Perception Comments no acute visual concerns noted; pt wears glasses at baseline   Sensory Examination   Sensory Comments pt denies acute sensory changes   Pain Assessment   Patient Currently in Pain   (yes; R forearm pain; greatest with movement)   Range of Motion (ROM)   ROM Comment LUE WNL; Pt able to wiggle R fingers, limited tolerance for R elbow flexion (0-45 degrees); shoulder flexion/abduction tolerated to approx 90 degrees (limited by forearm pain)   Strength   Strength Comments not formally assessed 2/2 post surgical precautions;  pt demonstrates good functional strength in BLE and LUE   Mobility   Bed Mobility Bed mobility skill: Supine to sit   Bed Mobility Skill: Supine to Sit   Level of [place]: Supine/Sit minimum assist (75% patients effort)  (HOB elevated)   Physical Assist/Nonphysical Assist: Supine/Sit 2 persons   Transfer Skill: Bed to Chair/Chair to Bed   Level of [place]: Bed to Chair contact guard   Physical Assist/Nonphysical Assist: Bed to Chair 1 person + 1 person to manage equipment   Assistive Device - Transfer Skill Bed to Chair Chair to Bed Rehab Eval   (1 hand hold assist)   Transfer Skill: Sit to Stand   Level of [place]: Sit/Stand contact guard   Physical Assist/Nonphysical Assist: Sit/Stand 1 person + 1 person to manage equipment   Assistive Device for Transfer: Sit/Stand   (1 hand hold assist)   Balance   Balance Comments no LOB with static standing or bedside transfer with 1 UE hand hold assist   Lower Body Dressing   Level of [place]: Dress Lower Body maximum  assist (25% patients effort)   Grooming   Level of [place]: Grooming moderate assist (50% patients effort)   Instrumental Activities of Daily Living (IADL)   Previous Responsibilities meal prep;housekeeping;laundry;shopping;medication management;finances;driving;work   IADL Comments Pt reports he will have friends/family who will be able to assist with IADLs intermittently   Activities of Daily Living Analysis   Impairments Contributing to Impaired Activities of Daily Living pain;post surgical precautions;ROM decreased;strength decreased   General Therapy Interventions   Planned Therapy Interventions ADL retraining;IADL retraining;bed mobility training;ROM;strengthening;transfer training;home program guidelines   Clinical Impression   Criteria for Skilled Therapeutic Interventions Met yes, treatment indicated   OT Diagnosis decreased activity tolerance and independence with ADLs   Influenced by the following impairments pain, post surgical precautions, decreased RUE ROM, anxiety/fear, acute medical needs/multiple lines   Assessment of Occupational Performance 5 or more Performance Deficits   Identified Performance Deficits bed mobility, transfers, toileting, dressing, bathing, mobility, home management, work   Clinical Decision Making (Complexity) Low complexity   Therapy Frequency daily   Predicted Duration of Therapy Intervention (days/wks) 5/17/19   Anticipated Discharge Disposition Home with Assist   Risks and Benefits of Treatment have been explained. Yes   Patient, Family & other staff in agreement with plan of care Yes   Total Evaluation Time   Total Evaluation Time (Minutes) 5

## 2019-05-11 NOTE — PROGRESS NOTES
"Otolaryngology Progress Note  May 11, 2019    S: No acute events overnight. Had abdominal discomfort this AM therefore TF decreased to 10 ml/hr. Complains burning pain at the right forearm both the surgical site and the dorsal site.    O: /60   Pulse 107   Temp 97  F (36.1  C) (Axillary)   Resp 16   Ht 1.702 m (5' 7\")   Wt 137 kg (302 lb 0.5 oz)   SpO2 98%   BMI 47.30 kg/m     General: Sedated   HEENT: Oral flap pale, soft, warm, pale with strong implantable doppler signal. No signs of flap congestion or dehiscenence. Neck incisions clean, dry, intact. Neck soft and flat without evidence of hematoma or fluid collection. Strong arterial and venous hand held doppler signals obtained over flap pedicle markings on the left neck. Strong implantable doppler signal. YARIEL drains x 1 in place and holding suction with serosanguinous drainage in bulbs.    Pulmonary: On 2 L oxygen   Extremities: right UE with dressing, wound vac and splint in place. Fingers warm, good cap refill. ACE wrap loosened and helped patient's burning pain of the right arm.  YARIEL drain in right arm holding bulb suction with serosanguinous drainage.  Right thigh STSG donor site with tegaderm/calcium alginate dressing in place.       Intake/Output Summary (Last 24 hours) at 5/11/2019 0749  Last data filed at 5/11/2019 0600  Gross per 24 hour   Intake 2183.26 ml   Output 1756 ml   Net 427.26 ml       YARIEL drain output(s): (last 24 hours)/(last shift)  1 right arm: 8 / 10 / 5  2 medial neck: 12 / 16 / 9    LABS:  ROUTINE IP LABS (Last four results)  BMP  Recent Labs   Lab 05/11/19  0315 05/10/19  0346 05/09/19  1514 05/09/19  1248    136 137 137   POTASSIUM 4.0 3.9 4.2 3.7   CHLORIDE 109 103  --   --    MANOJ 8.1* 8.7  --   --    CO2 24 25  --   --    BUN 14 10  --   --    CR 0.68 0.76  --   --    * 218* 181* 133*     CBC  Recent Labs   Lab 05/11/19  0315 05/10/19  0346 05/09/19  2002 05/09/19  1514 05/09/19  1248   WBC 12.3* 12.6*  --   " --   --    RBC 3.14* 3.15*  --   --   --    HGB 9.5* 9.6*  --  10.6* 10.7*   HCT 29.9* 29.0*  --   --   --    MCV 95 92  --   --   --    MCH 30.3 30.5  --   --   --    MCHC 31.8 33.1  --   --   --    RDW 12.2 12.0  --   --   --     232 222  --   --      INRNo lab results found in last 7 days.    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 He is POD#2 s/p left infrastructure maxillectomy, left radial forearm free flap, left neck exploration, STSG from left thigh to left forearm, NG feeding tube placement.     Neuro:  - Pain/sedation per SICU, transition to enteric pain meds    HEENT:  - Nothing around the neck (no gown ties, trach ties, lines, ect.)  - RN flap checks Q1H x 48h, Q2H x 24h, then Q4H  - ENT flap checks Q4H  - Clean incisions with 0.9% sodium chloride and apply bacitracin Q8H x 24h, then transition to Aquaphor  - Monitor and record YARIEL drain output Qshift  - Peridex oral rinses 4 times daily  - Saline oral rinses Q8H and PRN. Gentle suction with red meek catheter only (no yankeur), do not cut red meek  - Right UE: wound vac to remain in place until POD5, first dressing change and vac removal per ENT, then daily thereafter.   - Right thigh STSG site: calcium alginate/tegaderm, reinforce or replace as needed for drainage. Once no longer draining may leave open to air    Respiratory:  - Wean off supplemental oxygen as tolerated    CV/heme:  - NO vasopressors  - hemodynamically stable, hgb 9.5  -  mg and SQ heparin for flap  - Resumed PTA atorvastatin, lisinopril    FEN/GI:  - NPO.  - Appreciate nutrition recs. Currently on Peptamen at 10 mL/hr, goal 70 mL/hr  - PRN Zofran for nausea  - Bowel regimen: Dulcolax PRN     /Fluids/Lytes:  - Hampton in place with adequate UOP. Discontinue hampton today  -  mIVF NS @ 50 mL/h   - electrolyte replacement protocol    Endo  - h/o DM2, holding home metformin  - ISS    ID:  - Perioperative antibiotics (Unasyn) x 48h post-op    PPX:  - SQ heparin 5,000 units Q8H  - SCDs    Dispo:   - PT/OT evaluation  - SICU x 72 hours for flap monitoring, transfer to floor tomorrow    -- Patient and above plan discussed with Dr. Atwood and Dr. Treviño.     Silas Tran  Otolaryngology Resident - [id number]  [phone]  Please page ENT with questions by dialing * [phone] and entering job code [phone] when prompted

## 2019-05-11 NOTE — PLAN OF CARE
OT 4AB: Evaluation complete and treatment initiated.  Discharge Planner OT   Patient plan for discharge: Home with family assist  Current status: pt is alert and appropriate in conversation.  Min A x 2 supine to EOB,  CGA x 2 for lines and 1 hand hold assist sit to stand and taking steps for transfer at bedside.  Mod A for upper body g/h.  Increased pain with R forearm with activity otherwise pt tolerating activity well  Barriers to return to prior living situation: pain, post surgical precautions, acute medical needs,  decreased functional use of RUE, decreased activity tolerance  Recommendations for discharge: home with assist  Rationale for recommendations: Anticipate pt will continue to progress well with participation in therapy and be able to discharge to home at time of discharge.  Pt reports having family/friends who can assist at home prn.         Entered by: Demetrice Linder 05/11/2019 10:45 AM

## 2019-05-11 NOTE — PROGRESS NOTES
"ENT In-Person Free Flap Check  May 11, 2019; 14:00      Subjective:  Patient has had no acute events. He has been afebrile, flap checks have been wnl, and MAPs have been appropriate. He is tolerating increased tube feeds without abdominal fullness, nausea or vomiting. He is glad to hear that he treated staff well while he was intubated.    Objective:  /69   Pulse 107   Temp 98.5  F (36.9  C) (Axillary)   Resp 18   Ht 1.702 m (5' 7\")   Wt 137 kg (302 lb 0.5 oz)   SpO2 95%   BMI 47.30 kg/m    - General: patient is resting comfortably in chair and not in acute distress  - ENT: oral flap is pale pink, soft, and warm without evidence of congestion; the oral incisions are well-approximated without sign of dehiscence; strong arterial signal on implantable doppler and strong arterial and venous signals on handheld doppler  - Neck: neck incision is well-approximated, soft, and flat; YARIEL drain x1 with scant serosanguinous drainage  - MSK: RUE wrapped in ACE wrap with YARIEL drain x1 and wound vac in place; digits warm, sensate, with good motor output and with good capillary refill; RLE STSG donor site with scant drainage with Tegaderm in place    Assessment & Plan: Stable flap.  - Continue ENT flap checks q6h- next flap check ~ 20:00  - Please do not hesitate to contact ENT with questions or concerns       Christos Dupree MD  Otolaryngology- Head and Neck Surgery  Please contact ENT with questions by dialing * [phone] and entering job code [phone] when prompted.  "

## 2019-05-11 NOTE — PROGRESS NOTES
"ENT In-Person Free Flap Check  May 10, 2019; 2130      Subjective:  Patient has had no issues with flap. Flap checks have been wnl and MAPs have been appropriate per RN. He has been afebrile.    Objective:  /60   Pulse 107   Temp 98.8  F (37.1  C) (Axillary)   Resp 16   Ht 1.702 m (5' 7\")   Wt 137 kg (302 lb 0.5 oz)   SpO2 (!) 88%   BMI 47.30 kg/m    - Intra oral flap soft, warm, and pale. Incisions are well-approximated without dehiscence. Appears viable without evidence of congestion or hematoma.  - Strong arterial signal on implanted doppler; strong arterial and venous signals on handheld doppler of left neck  - Neck incision well-approximated, soft, and flat. YARIEL drain holding suction with serosanguinous output.  - Right forearm dressing intact, wound vac without leak, good cap refill on fingers. YARIEL drain holding suction with  serosanguinous output.  - Right thigh split thickness skin graft site dressing intact, no leakage around Tegaderm.    Assessment & Plan: Stable flap  - Continue ENT flap checks q6h  - Please do not hesitate to contact ENT with questions or concerns       Yuan García MD  Otolaryngology- Head and Neck Surgery  Please contact ENT with questions by dialing * [phone] and entering job code [phone] when prompted.  "

## 2019-05-11 NOTE — PLAN OF CARE
4AB-PT: Hold: PT consult received and appreciated. Per chart review and interdisciplinary communication, anticipate single discipline needs. OT to follow for activity tolerance and strength progression as well as ADLs. PT to hold. Will continue communication and initiate as needed.

## 2019-05-11 NOTE — PROGRESS NOTES
"ENT In-Person Free Flap Check  May 11, 2019; 0300      Subjective:  Patient has had no issues with flap. Flap checks have been wnl and MAPs have been appropriate per RN. He has been afebrile.    Objective:  /60   Pulse 107   Temp 96.8  F (36  C) (Axillary)   Resp 15   Ht 1.702 m (5' 7\")   Wt 137 kg (302 lb 0.5 oz)   SpO2 98%   BMI 47.30 kg/m    - Intra oral flap soft, warm, and pale. Incisions are well-approximated without dehiscence. Appears viable without evidence of congestion or hematoma.  - Strong arterial signal on implanted doppler; strong arterial and venous signals on handheld doppler of left neck  - Neck incision well-approximated, soft, and flat. YARIEL drain holding suction with serosanguinous output.  - Right forearm dressing intact, wound vac without leak, good cap refill on fingers. YARIEL drain holding suction with  serosanguinous output.  - Right thigh split thickness skin graft site dressing intact, no leakage around Tegaderm.    Assessment & Plan: Stable flap  - Continue ENT flap checks q6h  - Please do not hesitate to contact ENT with questions or concerns       Yuan García MD  Otolaryngology- Head and Neck Surgery  Please contact ENT with questions by dialing * [phone] and entering job code [phone] when prompted.  "

## 2019-05-11 NOTE — PLAN OF CARE
D/A/I. POD1/2 ENT flap. Flap checks q2hr at 8pm. Flap on tongue is pale (baseline), warm, and soft. Dopplar good. PERRL.   CV: a-line removed, BP stable.  Resp: RA   GI/: Bernadine brown. DTV. +BS. though no BM this shift. TF advanced to 20 mL/hr.  Skin: Flap site WNL. YARIEL's x2 with small output. Neck incision approximated. Wound care provided per orders. Graft site on R thigh WNL.   Day events: pulled kevin hampton, up to chair for 6 hours.    Continue to monitor and with plan of care. Notify SICU/ENT of changes in pt condition.

## 2019-05-12 ENCOUNTER — APPOINTMENT (OUTPATIENT)
Dept: OCCUPATIONAL THERAPY | Facility: CLINIC | Age: 54
DRG: 463 | End: 2019-05-12
Attending: OTOLARYNGOLOGY
Payer: COMMERCIAL

## 2019-05-12 LAB
ABO + RH BLD: NORMAL
ABO + RH BLD: NORMAL
ANION GAP SERPL CALCULATED.3IONS-SCNC: 7 MMOL/L (ref 3–14)
BLD GP AB SCN SERPL QL: NORMAL
BLOOD BANK CMNT PATIENT-IMP: NORMAL
BUN SERPL-MCNC: 22 MG/DL (ref 7–30)
CALCIUM SERPL-MCNC: 8.5 MG/DL (ref 8.5–10.1)
CHLORIDE SERPL-SCNC: 111 MMOL/L (ref 94–109)
CO2 SERPL-SCNC: 26 MMOL/L (ref 20–32)
CREAT SERPL-MCNC: 0.7 MG/DL (ref 0.66–1.25)
ERYTHROCYTE [DISTWIDTH] IN BLOOD BY AUTOMATED COUNT: 12.3 % (ref 10–15)
GFR SERPL CREATININE-BSD FRML MDRD: >90 ML/MIN/{1.73_M2}
GLUCOSE BLDC GLUCOMTR-MCNC: 176 MG/DL (ref 70–99)
GLUCOSE BLDC GLUCOMTR-MCNC: 183 MG/DL (ref 70–99)
GLUCOSE BLDC GLUCOMTR-MCNC: 193 MG/DL (ref 70–99)
GLUCOSE BLDC GLUCOMTR-MCNC: 197 MG/DL (ref 70–99)
GLUCOSE SERPL-MCNC: 155 MG/DL (ref 70–99)
HCT VFR BLD AUTO: 30.8 % (ref 40–53)
HGB BLD-MCNC: 9.4 G/DL (ref 13.3–17.7)
MAGNESIUM SERPL-MCNC: 2.1 MG/DL (ref 1.6–2.3)
MCH RBC QN AUTO: 30.5 PG (ref 26.5–33)
MCHC RBC AUTO-ENTMCNC: 30.5 G/DL (ref 31.5–36.5)
MCV RBC AUTO: 100 FL (ref 78–100)
PHOSPHATE SERPL-MCNC: 2.4 MG/DL (ref 2.5–4.5)
PLATELET # BLD AUTO: 255 10E9/L (ref 150–450)
POTASSIUM SERPL-SCNC: 3.6 MMOL/L (ref 3.4–5.3)
RBC # BLD AUTO: 3.08 10E12/L (ref 4.4–5.9)
SODIUM SERPL-SCNC: 144 MMOL/L (ref 133–144)
SPECIMEN EXP DATE BLD: NORMAL
WBC # BLD AUTO: 9.1 10E9/L (ref 4–11)

## 2019-05-12 PROCEDURE — 97530 THERAPEUTIC ACTIVITIES: CPT | Mod: GO | Performed by: OCCUPATIONAL THERAPIST

## 2019-05-12 PROCEDURE — 25000132 ZZH RX MED GY IP 250 OP 250 PS 637: Performed by: STUDENT IN AN ORGANIZED HEALTH CARE EDUCATION/TRAINING PROGRAM

## 2019-05-12 PROCEDURE — 27210437 ZZH NUTRITION PRODUCT SEMIELEM INTERMED LITER

## 2019-05-12 PROCEDURE — 84100 ASSAY OF PHOSPHORUS: CPT | Performed by: OTOLARYNGOLOGY

## 2019-05-12 PROCEDURE — 83735 ASSAY OF MAGNESIUM: CPT | Performed by: OTOLARYNGOLOGY

## 2019-05-12 PROCEDURE — 25000125 ZZHC RX 250: Performed by: STUDENT IN AN ORGANIZED HEALTH CARE EDUCATION/TRAINING PROGRAM

## 2019-05-12 PROCEDURE — 86901 BLOOD TYPING SEROLOGIC RH(D): CPT | Performed by: STUDENT IN AN ORGANIZED HEALTH CARE EDUCATION/TRAINING PROGRAM

## 2019-05-12 PROCEDURE — 80048 BASIC METABOLIC PNL TOTAL CA: CPT | Performed by: OTOLARYNGOLOGY

## 2019-05-12 PROCEDURE — 86850 RBC ANTIBODY SCREEN: CPT | Performed by: STUDENT IN AN ORGANIZED HEALTH CARE EDUCATION/TRAINING PROGRAM

## 2019-05-12 PROCEDURE — 25800030 ZZH RX IP 258 OP 636: Performed by: STUDENT IN AN ORGANIZED HEALTH CARE EDUCATION/TRAINING PROGRAM

## 2019-05-12 PROCEDURE — 25000128 H RX IP 250 OP 636: Performed by: STUDENT IN AN ORGANIZED HEALTH CARE EDUCATION/TRAINING PROGRAM

## 2019-05-12 PROCEDURE — 97110 THERAPEUTIC EXERCISES: CPT | Mod: GO | Performed by: OCCUPATIONAL THERAPIST

## 2019-05-12 PROCEDURE — 85027 COMPLETE CBC AUTOMATED: CPT | Performed by: OTOLARYNGOLOGY

## 2019-05-12 PROCEDURE — 86900 BLOOD TYPING SEROLOGIC ABO: CPT | Performed by: STUDENT IN AN ORGANIZED HEALTH CARE EDUCATION/TRAINING PROGRAM

## 2019-05-12 PROCEDURE — 25000132 ZZH RX MED GY IP 250 OP 250 PS 637

## 2019-05-12 PROCEDURE — 25000132 ZZH RX MED GY IP 250 OP 250 PS 637: Performed by: OTOLARYNGOLOGY

## 2019-05-12 PROCEDURE — 25000132 ZZH RX MED GY IP 250 OP 250 PS 637: Performed by: PHYSICIAN ASSISTANT

## 2019-05-12 PROCEDURE — 00000146 ZZHCL STATISTIC GLUCOSE BY METER IP

## 2019-05-12 PROCEDURE — 12000001 ZZH R&B MED SURG/OB UMMC

## 2019-05-12 PROCEDURE — 36415 COLL VENOUS BLD VENIPUNCTURE: CPT | Performed by: OTOLARYNGOLOGY

## 2019-05-12 RX ADMIN — Medication 5000 UNITS: at 23:16

## 2019-05-12 RX ADMIN — ACETAMINOPHEN 650 MG: 160 SOLUTION ORAL at 09:56

## 2019-05-12 RX ADMIN — MULTIVITAMIN 15 ML: LIQUID ORAL at 07:58

## 2019-05-12 RX ADMIN — ATORVASTATIN CALCIUM 20 MG: 20 TABLET, FILM COATED ORAL at 07:58

## 2019-05-12 RX ADMIN — OXYCODONE HYDROCHLORIDE 10 MG: 5 TABLET ORAL at 15:38

## 2019-05-12 RX ADMIN — ACETAMINOPHEN 650 MG: 160 SOLUTION ORAL at 23:16

## 2019-05-12 RX ADMIN — CHLORHEXIDINE GLUCONATE 0.12% ORAL RINSE 15 ML: 1.2 LIQUID ORAL at 23:17

## 2019-05-12 RX ADMIN — CHLORHEXIDINE GLUCONATE 0.12% ORAL RINSE 15 ML: 1.2 LIQUID ORAL at 15:40

## 2019-05-12 RX ADMIN — GABAPENTIN 300 MG: 250 SUSPENSION ORAL at 17:23

## 2019-05-12 RX ADMIN — GABAPENTIN 300 MG: 250 SUSPENSION ORAL at 02:00

## 2019-05-12 RX ADMIN — ACETAMINOPHEN 650 MG: 160 SOLUTION ORAL at 19:53

## 2019-05-12 RX ADMIN — OXYCODONE HYDROCHLORIDE 10 MG: 5 TABLET ORAL at 07:58

## 2019-05-12 RX ADMIN — POTASSIUM PHOSPHATE, MONOBASIC AND POTASSIUM PHOSPHATE, DIBASIC 15 MMOL: 224; 236 INJECTION, SOLUTION INTRAVENOUS at 07:58

## 2019-05-12 RX ADMIN — GABAPENTIN 300 MG: 250 SUSPENSION ORAL at 09:56

## 2019-05-12 RX ADMIN — WHITE PETROLATUM: 1.75 OINTMENT TOPICAL at 03:33

## 2019-05-12 RX ADMIN — ACETAMINOPHEN 650 MG: 160 SOLUTION ORAL at 02:21

## 2019-05-12 RX ADMIN — WHITE PETROLATUM: 1.75 OINTMENT TOPICAL at 19:54

## 2019-05-12 RX ADMIN — ASPIRIN 325 MG ORAL TABLET 325 MG: 325 PILL ORAL at 07:58

## 2019-05-12 RX ADMIN — Medication 0.5 MG: at 13:15

## 2019-05-12 RX ADMIN — POTASSIUM CHLORIDE 20 MEQ: 1.5 POWDER, FOR SOLUTION ORAL at 05:58

## 2019-05-12 RX ADMIN — OXYCODONE HYDROCHLORIDE 10 MG: 5 TABLET ORAL at 23:16

## 2019-05-12 RX ADMIN — Medication 5000 UNITS: at 15:39

## 2019-05-12 RX ADMIN — WHITE PETROLATUM: 1.75 OINTMENT TOPICAL at 12:44

## 2019-05-12 RX ADMIN — OXYCODONE HYDROCHLORIDE 10 MG: 5 TABLET ORAL at 19:53

## 2019-05-12 RX ADMIN — Medication 1 MG: at 09:56

## 2019-05-12 RX ADMIN — OXYCODONE HYDROCHLORIDE 5 MG: 5 TABLET ORAL at 03:32

## 2019-05-12 RX ADMIN — ACETAMINOPHEN 650 MG: 160 SOLUTION ORAL at 05:58

## 2019-05-12 RX ADMIN — LISINOPRIL 10 MG: 5 TABLET ORAL at 07:57

## 2019-05-12 RX ADMIN — SENNOSIDES AND DOCUSATE SODIUM 2 TABLET: 8.6; 5 TABLET ORAL at 19:53

## 2019-05-12 RX ADMIN — OXYCODONE HYDROCHLORIDE 10 MG: 5 TABLET ORAL at 12:35

## 2019-05-12 RX ADMIN — POLYETHYLENE GLYCOL 3350 17 G: 17 POWDER, FOR SOLUTION ORAL at 07:57

## 2019-05-12 RX ADMIN — SENNOSIDES AND DOCUSATE SODIUM 2 TABLET: 8.6; 5 TABLET ORAL at 07:57

## 2019-05-12 RX ADMIN — Medication 5000 UNITS: at 08:50

## 2019-05-12 RX ADMIN — ACETAMINOPHEN 650 MG: 160 SOLUTION ORAL at 15:38

## 2019-05-12 RX ADMIN — CHLORHEXIDINE GLUCONATE 0.12% ORAL RINSE 15 ML: 1.2 LIQUID ORAL at 07:59

## 2019-05-12 ASSESSMENT — ACTIVITIES OF DAILY LIVING (ADL)
ADLS_ACUITY_SCORE: 15

## 2019-05-12 ASSESSMENT — PAIN DESCRIPTION - DESCRIPTORS
DESCRIPTORS: BURNING;ACHING

## 2019-05-12 NOTE — PLAN OF CARE
D/A/I. Pt a/o x4. MCKEE, up ambulating in hallway today with RN and therapy. Scheduled oxy and tylenol for pain, prn dilaudid given x1. NSR, CV stable. Tolerating room air. Flap is warm, soft, nontender with good implantable dopplar pulse and external dopplared pulses. TF increased to 50 at 1200, due to advance around 8pm. Voided x1 this afternoon.   P. Transfer orders put in, q 4hr flap checks, 4 hour vitals. Transfer to 6A when bed available.

## 2019-05-12 NOTE — PROGRESS NOTES
"Otolaryngology Progress Note  May 11, 2019    S: No acute events overnight. Afebrile and vitally stable. No issues with overnight flap checks. TFs advanced to 40 ml/hr tolerating well. Colindres removed this AM     O: /76   Pulse 89   Temp 98.9  F (37.2  C) (Axillary)   Resp 12   Ht 1.702 m (5' 7\")   Wt 137 kg (302 lb 0.5 oz)   SpO2 (!) 86%   BMI 47.30 kg/m     General: Sedated   HEENT: Oral flap pale, soft, warm, pale with strong implantable doppler signal. No signs of flap congestion or dehiscenence. Neck incisions clean, dry, intact. Neck soft and flat without evidence of hematoma or fluid collection. Strong arterial and venous hand held doppler signals obtained over flap pedicle markings on the left neck. Strong implantable doppler signal. YARIEL drains x 1 in place and holding suction with serosanguinous drainage in bulbs.    Pulmonary: Non-labored breathing on room air    Extremities: right UE with dressing, wound vac and splint in place. Fingers warm, good cap refill.  YARIEL drain x1 in right arm holding bulb suction with serosanguinous drainage.  Right thigh STSG donor site with tegaderm/calcium alginate dressing in place.       Intake/Output Summary (Last 24 hours) at 5/11/2019 0749  Last data filed at 5/11/2019 0600  Gross per 24 hour   Intake 2183.26 ml   Output 1756 ml   Net 427.26 ml       YARIEL drain output(s): (last 24 hours)/(last shift)  1 right arm: 2/0/4  2 medial neck: 15/0/21    LABS:  ROUTINE IP LABS (Last four results)  BMP  Recent Labs   Lab 05/12/19  0436 05/11/19  0315 05/10/19  0346 05/09/19  1514    141 136 137   POTASSIUM 3.6 4.0 3.9 4.2   CHLORIDE 111* 109 103  --    MANOJ 8.5 8.1* 8.7  --    CO2 26 24 25  --    BUN 22 14 10  --    CR 0.70 0.68 0.76  --    * 219* 218* 181*     CBC  Recent Labs   Lab 05/12/19  0436 05/11/19  0315 05/10/19  0346 05/09/19  2002 05/09/19  1514   WBC 9.1 12.3* 12.6*  --   --    RBC 3.08* 3.14* 3.15*  --   --    HGB 9.4* 9.5* 9.6*  --  10.6*   HCT " 30.8* 29.9* 29.0*  --   --     95 92  --   --    MCH 30.5 30.3 30.5  --   --    MCHC 30.5* 31.8 33.1  --   --    RDW 12.3 12.2 12.0  --   --     231 232 222  --      INRNo lab results found in last 7 days.    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 He is POD#3 s/p left infrastructure maxillectomy, left radial forearm free flap, left neck exploration, STSG from left thigh to left forearm, NG feeding tube placement.     Neuro:  - Pain/sedation per SICU, transition to enteric pain meds    HEENT:  - Nothing around the neck (no gown ties, trach ties, lines, ect.)  - RN flap checks Q1H x 48h, Q2H x 24h, then Q4H  - ENT flap checks Q6H  - Clean incisions with 0.9% sodium chloride and apply bacitracin Q8H x 24h, then transition to Aquaphor  - Monitor and record YARIEL drain output Qshift  - Peridex oral rinses 4 times daily  - Saline oral rinses Q8H and PRN. Gentle suction with red meek catheter only (no yankeur), do not cut red meek  - Right UE: wound vac to remain in place until POD5, first dressing change and vac removal per ENT, then daily thereafter.   - Right thigh STSG site: calcium alginate/tegaderm, reinforce or replace as needed for drainage. Once no longer draining may leave open to air    Respiratory:  - Supplemental O2 PRN to maintain sats >90     CV/heme:  - NO vasopressors  - hemodynamically stable, hgb 9.4  -  mg and SQ heparin for flap  - Resumed PTA atorvastatin, lisinopril    FEN/GI:  - NPO.  - Appreciate nutrition recs. Currently on Peptamen at 40 mL/hr, goal 70 mL/hr  - PRN Zofran for nausea  - Bowel regimen: Dulcolax PRN     /Fluids/Lytes:  - Colindres removed. Minimal urine on bladder scan - monitor for spontaneous void   - mIVF NS @ 50 mL/h   - electrolyte replacement  protocol    Endo  - h/o DM2, holding home metformin  - ISS    ID:  - Perioperative antibiotics (Unasyn) x 48h post-op - completed     PPX:  - SQ heparin 5,000 units Q8H  - SCDs    Dispo:   - PT/OT evaluation  - Transfer to  today around 1700     -- Patient and above plan discussed with Dr. Atwood and Dr. Treviño.     Paul Zhou  Otolaryngology Resident - [id number]  [phone]  Please page ENT with questions by dialing * [phone] and entering job code [phone] when prompted

## 2019-05-12 NOTE — OP NOTE
Procedure Date: 05/09/2019      ATTENDING SURGEON:  Sumit Atwood MD      OTHER SURGEON:  Jett Treviño MD      ASSISTANTS:  Kristina Gordillo MD, and Silas Tran MD.      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      PROCEDURES:   1.  Right-sided radial forearm free tissue transfer.   2.  Placement of skin graft from the right thigh to the right arm.   3.  Placing a nasogastric feeding tube.   4.  Placement of a VAC dressing.     5.  Arm splinting of the right arm.      ANESTHESIA:  General.      OPERATIVE INDICATIONS:  Mr. Tucker is a patient who presented with a lesion on his palate and loosening of his teeth.  This was biopsied and found to be squamous cell carcinoma, predominantly in a  submucosal presentation.  Nevertheless, he had obvious bone erosion identified on a CT scan and was brought to the OR for the above-named procedure.  I was consulted for reconstruction.      OPERATIVE FINDINGS:   1.  The left facial artery was anastomosed to the left radial artery using 9-0 nylon suture and the single communicating cephalic vein was anastomosed to a branch of the anterior jugular vein using a 3.5 mm .   2.  Ischemia time was approximately 2 hours.   3.  Tourniquet time on harvest was about 60 minutes.      DESCRIPTION OF PROCEDURE:  After Dr. Treviño had completed the resection, I measured the defect at about 6 x 4 cm and identified the right arm and the radial artery and centered a 4 x 6 template over this area.  We then raised the tourniquet to 250 mmHg and made our incisions onto the palmaris tendon and the brachioradialis tendon.  We preserved all branches with the superficial radial nerve.  We then made our proximal skin incision up into the forearm toward the antecubital fossa and raised flaps medially and laterally.  We then continued to keep the cephalic vein intact and traced the pedicle  while clipping all perforating branches to the FCR tendon and the BR tendon.  The pedicle was then ligated distally and the flap was lifted off the flexor digitorum superficialis.  All perforating branches were clipped and we traced the pedicle all the way towards the antecubital fossa and did harvest the communicating branch in the superficial and deep systems.  This allowed us to ligate the VCs proximal to this point and isolate the flap on the radial artery and the cephalic vein only.  The tourniquet was let down at about 60 minutes.  The flap was allowed to reperfuse and then the radial artery and cephalic vein were clipped.  Flap was passed off.  The area was irrigated and closed using 3-0 Vicryl and 4-0 nylon in the proximal forearm.  Skin graft was taken from the right thigh with the dermatome set at 1/50,000 of an inch.  That was dressed with alginate and Tegaderm.  Skin graft was sewn in using 4-0 chromic and pie crusted.  A VAC dressing was placed, along with splinting of the arm.  The flap was then inset to reconstruct the left hemipalate all the way to the midline and separate the maxillary sinus from the oral cavity.  This was done using a series of 3-0 vicryl sutures.  The pedicle was then brought into the neck.  Prior to doing so, we did create a tunnel between the buccal fat and the left neck incision that Dr. Treviño had made.  This was dilated using Hegar dilators and the pedicle was then passed through.  The microscope was brought in.  The vessels were cleaned and microvascular anastomosis was performed between the radial artery and the facial artery using 9-0 nylon suture and a 3.5 mm  was used to anastomose the cephalic vein to the anterior jugular vein.  Clamps were removed and flap perfusion was excellent, as was flow.  A uMentioned Doppler was applied.  The neck was closed after placing a 7 flat drain using 3-0 Vicryl and 4-0 nylon.  We did place a nasogastric feeding tube and sutured it to the  septum using a 2-0 silk stitch.  The patient was then taken to the ICU in stable condition.  There were no immediate complications.      BLOOD LOSS:  About 100 mL for my portion of the procedure.         LEIDY ORDAZ MD             D: 2019   T: 2019   MT: GENESIS      Name:     ODALIS KOVACS   MRN:      [id number]        Account:        [id number]   :      1965           Procedure Date: 2019      Document: [id number]

## 2019-05-12 NOTE — PROGRESS NOTES
"ENT Free Flap Check  May 12, 2019      Subjective:  No acute events. Up to chair. No concerns from bedside RN.    Objective:  /72 (BP Location: Left arm)   Pulse 97   Temp 98.5  F (36.9  C) (Axillary)   Resp 16   Ht 1.702 m (5' 7\")   Wt 137 kg (302 lb 0.5 oz)   SpO2 95%   BMI 47.30 kg/m    - General: patient is resting comfortably in chair and not in acute distress  - ENT: oral flap is pale, soft, and warm without evidence of congestion; the oral incisions are well-approximated without sign of dehiscence; strong arterial signal on implantable doppler and strong arterial and venous signals on handheld doppler; mildly increased swelling of left cheek  - Neck: neck incision is well-approximated, soft, and flat; YARIEL drain x1 with scant serosanguinous drainage  - MSK: RUE wrapped in ACE wrap with YARIEL drain x1 and wound vac in place; digits warm, sensate, with good motor output    Assessment & Plan: Stable flap.  - Continue ENT flap checks Q8H  - Please do not hesitate to contact ENT with questions or concerns       Shayna Rao MD  Otolaryngology- Head and Neck Surgery PGY4  "

## 2019-05-12 NOTE — PROGRESS NOTES
"ENT Virtual Free Flap Check  May 12, 2019; 00:45      Subjective:  Patient has had no acute events. He has been afebrile, flap checks have been wnl, and MAPs have been appropriate. No concerns from bedside RN.    Objective:  BP (!) 88/69   Pulse 85   Temp 98.9  F (37.2  C) (Axillary)   Resp 12   Ht 1.702 m (5' 7\")   Wt 137 kg (302 lb 0.5 oz)   SpO2 96%   BMI 47.30 kg/m    - General: patient is resting comfortably in chair and not in acute distress  - ENT: oral flap is pale pink, soft, and warm without evidence of congestion; the oral incisions are well-approximated without sign of dehiscence; strong arterial signal on implantable doppler and strong arterial and venous signals on handheld doppler; mildly increased swelling of left cheek  - Neck: neck incision is well-approximated, soft, and flat; YARIEL drain x1 with scant serosanguinous drainage  - MSK: RUE wrapped in ACE wrap with YARIEL drain x1 and wound vac in place; digits warm, sensate, with good motor output    Assessment & Plan: Stable flap.  - Continue ENT flap checks q6h- next flap check on AM rounds  - Please do not hesitate to contact ENT with questions or concerns       Christos Dupree MD  Otolaryngology- Head and Neck Surgery  Please contact ENT with questions by dialing * [phone] and entering job code [phone] when prompted.  "

## 2019-05-12 NOTE — PROGRESS NOTES
SURGICAL ICU PROGRESS NOTE  May 10, 2019      ASSESSMENT: Devonte Tucker is a 52yo M with hx significant for HTN, DM, obesity and concern for invasive SCC now POD#1 s/p left infrastructure maxillectomy with free tissue flap. Intraoperatively pt did not require pressors or further support of blood pressure; EBL estimated at 200cc. Currently HDS, flap checks have been unremarkable thus far. Planning for floor transfer today.      MAJOR CHANGES TODAY:  - Floor transfer today at 1700  - F/u ENT  - Start flomax, straight cath as needed  - TKO fluids    PLAN:   Neuro/ pain/ sedation:  #Intubated and Sedated s/p Maxillary free flap  - Monitor neurological status; notify the MD for any acute changes in exam  - Pain: oxycodone, tylenol, gabapentin, dilaudid prn    ENT:  S/p Maxillectomy with free flap in setting of invasive SCC  - Intraflap doppler with adequate signal  - Continue Q6h flap check per ENT, Q1h per nursing until floor transfer  - HOB at 30 degress  - No pressors for hypotension, fluid boluses prn. Will contact ENT if blood pressure begins to drift down  - left neck with labeled arterial and venous doppler sites, to assess Q1h per nursing     Pulmonary care:   - Supplemental O2 to maintain sats >92%  - Doing well on RA     Cardiovascular:    #Hx of HTN  - Monitor hemodynamic status  - MAP>65  - Refrain from pressor use in attempt to preserve flap integrity  - Fluid boluses for hypotension  -  and heparin TID,   - Restart PTA atorvostatin  - PTA Lisinopril 10mg     GI care:   - NPO, feeding tube in place  - PPI  - TF (40cc this am, will continue to advance)     Fluids/ Electrolytes/ Nutrition:   - NS to TKO today  - Electrolyte replacement protocol    Renal/ Fluid Balance:    - Urine output is adequate so far  - Flomax started today. Pt states trouble with voiding, though reports issue PTA  - Will continue to monitor intake and output     Endocrine:    #Hx of DM  - Sliding scale insulin - high dose  -  Hold PTA antihyperglycemics     ID/ Antibiotics:  # C diff  - Received perioperative Unasyn  - Unasyn stopped today     Heme:     - Hemoglobin stable, 9.4 this AM     MSK:  - No pressure to free flap  - PT/OT following     Prophylaxis:    - Mechanical prophylaxis for DVT   - SQ Heparin  - PPI  - miralax, senna, biscodyl    Lines/ tubes/ drains:  - PIVs, NG     Disposition:  - Floor transfer today     Patient seen, findings and plan discussed with surgical ICU staff Dr. Cesar Reyes MD      ====================================    SUBJECTIVE:   NAEO per nursing staff. Pt pain controlled per patient, reports issue with spontaneous voids    OBJECTIVE:   1. VITAL SIGNS:   Temp:  [97.8  F (36.6  C)-98.9  F (37.2  C)] 98.9  F (37.2  C)  Pulse:  [] 80  Heart Rate:  [] 75  Resp:  [12-18] 12  BP: ()/(62-80) 113/75  MAP:  [81 mmHg-95 mmHg] 89 mmHg  Arterial Line BP: (116-135)/(62-72) 127/70  SpO2:  [90 %-98 %] 98 %  Resp: 12      2. INTAKE/ OUTPUT:   I/O last 3 completed shifts:  In: 1767 [I.V.:1217; NG/GT:210]  Out: 1078 [Urine:1020; Drains:58]    3. PHYSICAL EXAMINATION:   General: NAD  Neuro: A&O to person, place and time  HEENT: Oral flap intact with doppler signal audible. No evidence of flap dehiscence. Dopplerable signals on left neck. YARIEL drain in place, serosanguinous output  Resp: breathing nonlabored on NC.  CV: RRR. No M/G/R appreciated  Abdomen: Soft, Non-distended, Non-tender, rotund   Incisions: C/D/I. No evidence of dehiscence   Extremities: warm and well perfused    4. INVESTIGATIONS:   Arterial Blood Gases   Recent Labs   Lab 05/09/19  1514 05/09/19  1248 05/09/19  1051   PH 7.36 7.38 7.39   PCO2 39 40 40   PO2 146* 176* 171*   HCO3 22 23 24     Complete Blood Count   Recent Labs   Lab 05/12/19  0436 05/11/19  0315 05/10/19  0346 05/09/19 2002 05/09/19  1514   WBC 9.1 12.3* 12.6*  --   --    HGB 9.4* 9.5* 9.6*  --  10.6*    231 232 222  --      Basic Metabolic Panel  Recent  Labs   Lab 05/12/19  0436 05/11/19  0315 05/10/19  0346 05/09/19  1514    141 136 137   POTASSIUM 3.6 4.0 3.9 4.2   CHLORIDE 111* 109 103  --    CO2 26 24 25  --    BUN 22 14 10  --    CR 0.70 0.68 0.76  --    * 219* 218* 181*     Liver Function Tests  Recent Labs   Lab 05/10/19  0346   ALBUMIN 3.2*     Pancreatic Enzymes  No lab results found in last 7 days.  Coagulation Profile  No lab results found in last 7 days.      5. RADIOLOGY:   No results found for this or any previous visit (from the past 24 hour(s)).    =========================================

## 2019-05-12 NOTE — PROGRESS NOTES
"ENT In-Person Free Flap Check  May 11, 2019; 19:00      Subjective:  Patient has had no acute events. He has been afebrile, flap checks have been wnl, and MAPs have been appropriate. RN reports increased swelling of left cheek/neck.    Objective:  /79   Pulse 93   Temp 98.6  F (37  C) (Axillary)   Resp 18   Ht 1.702 m (5' 7\")   Wt 137 kg (302 lb 0.5 oz)   SpO2 93%   BMI 47.30 kg/m    - General: patient is resting comfortably in chair and not in acute distress  - ENT: oral flap is pale pink, soft, and warm without evidence of congestion; the oral incisions are well-approximated without sign of dehiscence; strong arterial signal on implantable doppler and strong arterial and venous signals on handheld doppler; mildly increased swelling of left cheek  - Neck: neck incision is well-approximated, soft, and flat; YARIEL drain x1 with scant serosanguinous drainage  - MSK: RUE wrapped in ACE wrap with YARIEL drain x1 and wound vac in place; digits warm, sensate, with good motor output and with good capillary refill    Assessment & Plan: Stable flap.  - Continue ENT flap checks q6h- next flap check virtual at ~ 01:00  - Please do not hesitate to contact ENT with questions or concerns       Christos Dupree MD  Otolaryngology- Head and Neck Surgery  Please contact ENT with questions by dialing [phone] and entering job code [phone] when prompted.  "

## 2019-05-12 NOTE — PLAN OF CARE
Neuro: intact. Adequate pain control throughout the night. Flap checks q2h. Flap on tongue is pale (baseline), warm, and soft. Arterial and venous pulses present. PERRL.   CV: NSR 80s-90s. SBP goal < 160, no intervention needed this shift. Afebrile.  Resp: RA overnight. Pt occasionally desats to high 80s while sleeping but always recovers without supplemental oxygen adequately. LS clear but diminished. Pt self-suctions red-streaked oral secretions with red meek.   GI/: hampton removed earlier this evening, still DTV. Bladder scan shows minimal urine. BS+, though no BM this shift. TF @ 40ml/hr with standard FWF, increase by 10ml/hr q8h.  Skin: Flap site pale but consistent with skin color, soft, and warm. Pulses strong via implantable and portable doppler. YARIEL in L neck with small bloody output. Neck incision approximated, slightly reddened, but otherwise WNL. RUE in soft cast d/t donor site. YARIEL in RUE with small bloody output. Wound vac on RUE in place, no output this shift. Graft site on R thigh WNL.   Gtts: NS @ 50ml/hr.     Continue to monitor and with plan of care. Notify SICU/ENT of changes in pt condition.

## 2019-05-12 NOTE — PLAN OF CARE
4AB-PT: Defer: PT consult received and appreciated. Per chart review, interdisciplinary communication and observation pt with no IP PT needs at this time. Ambulating hallway with SBA. OT following for ADLs and activity tolerance. PT to defer. Will complete orders, please re-consult if pt has change in status.

## 2019-05-12 NOTE — PLAN OF CARE
Discharge Planner OT   Patient plan for discharge: home  Current status:  Pt ambulation 300ft w/ 3 standing rest breaks and CGA throughout, pt pushing IV pole first 100ft then ambulating w/o AD the additional 200ft. Pt's pre-vitals: /77 mid point /81 and post /65, Pt's VSS throughout w/ O2 in high 90s on RA, and HR sinus rhythm 100s-low 120s throughout.   Barriers to return to prior living situation: medical status  Recommendations for discharge: Home w/ A   Rationale for recommendations: A for heavy lifting 2/2 precautions       Entered by: Cris Quiroga 05/12/2019 10:28 AM

## 2019-05-13 ENCOUNTER — APPOINTMENT (OUTPATIENT)
Dept: OCCUPATIONAL THERAPY | Facility: CLINIC | Age: 54
DRG: 463 | End: 2019-05-13
Attending: OTOLARYNGOLOGY
Payer: COMMERCIAL

## 2019-05-13 LAB
ANION GAP SERPL CALCULATED.3IONS-SCNC: 9 MMOL/L (ref 3–14)
BLD PROD TYP BPU: NORMAL
BLD PROD TYP BPU: NORMAL
BLD UNIT ID BPU: 0
BLD UNIT ID BPU: 0
BLOOD PRODUCT CODE: NORMAL
BLOOD PRODUCT CODE: NORMAL
BPU ID: NORMAL
BPU ID: NORMAL
BUN SERPL-MCNC: 19 MG/DL (ref 7–30)
CALCIUM SERPL-MCNC: 8.7 MG/DL (ref 8.5–10.1)
CHLORIDE SERPL-SCNC: 110 MMOL/L (ref 94–109)
CO2 SERPL-SCNC: 24 MMOL/L (ref 20–32)
CREAT SERPL-MCNC: 0.66 MG/DL (ref 0.66–1.25)
ERYTHROCYTE [DISTWIDTH] IN BLOOD BY AUTOMATED COUNT: 12.1 % (ref 10–15)
GFR SERPL CREATININE-BSD FRML MDRD: >90 ML/MIN/{1.73_M2}
GLUCOSE BLDC GLUCOMTR-MCNC: 225 MG/DL (ref 70–99)
GLUCOSE BLDC GLUCOMTR-MCNC: 258 MG/DL (ref 70–99)
GLUCOSE BLDC GLUCOMTR-MCNC: 270 MG/DL (ref 70–99)
GLUCOSE BLDC GLUCOMTR-MCNC: 282 MG/DL (ref 70–99)
GLUCOSE SERPL-MCNC: 225 MG/DL (ref 70–99)
HCT VFR BLD AUTO: 33.7 % (ref 40–53)
HGB BLD-MCNC: 10.2 G/DL (ref 13.3–17.7)
MAGNESIUM SERPL-MCNC: 1.8 MG/DL (ref 1.6–2.3)
MCH RBC QN AUTO: 30 PG (ref 26.5–33)
MCHC RBC AUTO-ENTMCNC: 30.3 G/DL (ref 31.5–36.5)
MCV RBC AUTO: 99 FL (ref 78–100)
PHOSPHATE SERPL-MCNC: 3.5 MG/DL (ref 2.5–4.5)
PLATELET # BLD AUTO: 269 10E9/L (ref 150–450)
POTASSIUM SERPL-SCNC: 3.7 MMOL/L (ref 3.4–5.3)
RBC # BLD AUTO: 3.4 10E12/L (ref 4.4–5.9)
SODIUM SERPL-SCNC: 143 MMOL/L (ref 133–144)
TRANSFUSION STATUS PATIENT QL: NORMAL
WBC # BLD AUTO: 7.6 10E9/L (ref 4–11)

## 2019-05-13 PROCEDURE — 84100 ASSAY OF PHOSPHORUS: CPT | Performed by: OTOLARYNGOLOGY

## 2019-05-13 PROCEDURE — 27210437 ZZH NUTRITION PRODUCT SEMIELEM INTERMED LITER

## 2019-05-13 PROCEDURE — 25000132 ZZH RX MED GY IP 250 OP 250 PS 637: Performed by: STUDENT IN AN ORGANIZED HEALTH CARE EDUCATION/TRAINING PROGRAM

## 2019-05-13 PROCEDURE — 83735 ASSAY OF MAGNESIUM: CPT | Performed by: OTOLARYNGOLOGY

## 2019-05-13 PROCEDURE — 25000125 ZZHC RX 250: Performed by: STUDENT IN AN ORGANIZED HEALTH CARE EDUCATION/TRAINING PROGRAM

## 2019-05-13 PROCEDURE — 80048 BASIC METABOLIC PNL TOTAL CA: CPT | Performed by: OTOLARYNGOLOGY

## 2019-05-13 PROCEDURE — 25000128 H RX IP 250 OP 636: Performed by: STUDENT IN AN ORGANIZED HEALTH CARE EDUCATION/TRAINING PROGRAM

## 2019-05-13 PROCEDURE — 97535 SELF CARE MNGMENT TRAINING: CPT | Mod: GO | Performed by: OCCUPATIONAL THERAPIST

## 2019-05-13 PROCEDURE — 27210429 ZZH NUTRITION PRODUCT INTERMEDIATE LITER

## 2019-05-13 PROCEDURE — 12000001 ZZH R&B MED SURG/OB UMMC

## 2019-05-13 PROCEDURE — 97530 THERAPEUTIC ACTIVITIES: CPT | Mod: GO | Performed by: OCCUPATIONAL THERAPIST

## 2019-05-13 PROCEDURE — 25000132 ZZH RX MED GY IP 250 OP 250 PS 637

## 2019-05-13 PROCEDURE — 36415 COLL VENOUS BLD VENIPUNCTURE: CPT | Performed by: OTOLARYNGOLOGY

## 2019-05-13 PROCEDURE — 25000132 ZZH RX MED GY IP 250 OP 250 PS 637: Performed by: OTOLARYNGOLOGY

## 2019-05-13 PROCEDURE — 25000132 ZZH RX MED GY IP 250 OP 250 PS 637: Performed by: PHYSICIAN ASSISTANT

## 2019-05-13 PROCEDURE — 85027 COMPLETE CBC AUTOMATED: CPT | Performed by: OTOLARYNGOLOGY

## 2019-05-13 PROCEDURE — 00000146 ZZHCL STATISTIC GLUCOSE BY METER IP

## 2019-05-13 RX ORDER — AMINO ACIDS/PROTEIN HYDROLYS 11G-40/45
1 LIQUID IN PACKET (ML) ORAL 4 TIMES DAILY
Status: DISCONTINUED | OUTPATIENT
Start: 2019-05-13 | End: 2019-05-16 | Stop reason: HOSPADM

## 2019-05-13 RX ORDER — OXYCODONE HCL 5 MG/5 ML
5-10 SOLUTION, ORAL ORAL EVERY 4 HOURS
Status: DISCONTINUED | OUTPATIENT
Start: 2019-05-14 | End: 2019-05-14

## 2019-05-13 RX ADMIN — Medication 5000 UNITS: at 16:37

## 2019-05-13 RX ADMIN — OXYCODONE HYDROCHLORIDE 10 MG: 5 TABLET ORAL at 20:37

## 2019-05-13 RX ADMIN — Medication 5000 UNITS: at 08:01

## 2019-05-13 RX ADMIN — Medication 2 G: at 06:24

## 2019-05-13 RX ADMIN — Medication 1 PACKET: at 12:28

## 2019-05-13 RX ADMIN — SENNOSIDES AND DOCUSATE SODIUM 2 TABLET: 8.6; 5 TABLET ORAL at 08:03

## 2019-05-13 RX ADMIN — ATORVASTATIN CALCIUM 20 MG: 20 TABLET, FILM COATED ORAL at 08:04

## 2019-05-13 RX ADMIN — MULTIVITAMIN 15 ML: LIQUID ORAL at 08:03

## 2019-05-13 RX ADMIN — POTASSIUM CHLORIDE 20 MEQ: 1.5 POWDER, FOR SOLUTION ORAL at 06:24

## 2019-05-13 RX ADMIN — WHITE PETROLATUM: 1.75 OINTMENT TOPICAL at 12:28

## 2019-05-13 RX ADMIN — OXYCODONE HYDROCHLORIDE 10 MG: 5 TABLET ORAL at 04:12

## 2019-05-13 RX ADMIN — WHITE PETROLATUM: 1.75 OINTMENT TOPICAL at 20:38

## 2019-05-13 RX ADMIN — ACETAMINOPHEN 650 MG: 160 SOLUTION ORAL at 20:37

## 2019-05-13 RX ADMIN — OXYCODONE HYDROCHLORIDE 10 MG: 5 TABLET ORAL at 08:03

## 2019-05-13 RX ADMIN — CHLORHEXIDINE GLUCONATE 0.12% ORAL RINSE 15 ML: 1.2 LIQUID ORAL at 17:42

## 2019-05-13 RX ADMIN — Medication 1 PACKET: at 17:43

## 2019-05-13 RX ADMIN — Medication 1 MG: at 08:04

## 2019-05-13 RX ADMIN — ACETAMINOPHEN 650 MG: 160 SOLUTION ORAL at 04:12

## 2019-05-13 RX ADMIN — Medication 1 PACKET: at 20:38

## 2019-05-13 RX ADMIN — ACETAMINOPHEN 650 MG: 160 SOLUTION ORAL at 08:03

## 2019-05-13 RX ADMIN — GABAPENTIN 300 MG: 250 SUSPENSION ORAL at 09:53

## 2019-05-13 RX ADMIN — OXYCODONE HYDROCHLORIDE 10 MG: 5 TABLET ORAL at 16:37

## 2019-05-13 RX ADMIN — POLYETHYLENE GLYCOL 3350 17 G: 17 POWDER, FOR SOLUTION ORAL at 08:04

## 2019-05-13 RX ADMIN — ACETAMINOPHEN 650 MG: 160 SOLUTION ORAL at 12:25

## 2019-05-13 RX ADMIN — GABAPENTIN 300 MG: 250 SUSPENSION ORAL at 17:43

## 2019-05-13 RX ADMIN — CHLORHEXIDINE GLUCONATE 0.12% ORAL RINSE 15 ML: 1.2 LIQUID ORAL at 08:05

## 2019-05-13 RX ADMIN — WHITE PETROLATUM: 1.75 OINTMENT TOPICAL at 04:12

## 2019-05-13 RX ADMIN — LISINOPRIL 10 MG: 5 TABLET ORAL at 08:04

## 2019-05-13 RX ADMIN — ACETAMINOPHEN 650 MG: 160 SOLUTION ORAL at 16:37

## 2019-05-13 RX ADMIN — ASPIRIN 325 MG ORAL TABLET 325 MG: 325 PILL ORAL at 08:04

## 2019-05-13 RX ADMIN — OXYCODONE HYDROCHLORIDE 10 MG: 5 TABLET ORAL at 12:26

## 2019-05-13 RX ADMIN — GABAPENTIN 300 MG: 250 SUSPENSION ORAL at 00:24

## 2019-05-13 RX ADMIN — CHLORHEXIDINE GLUCONATE 0.12% ORAL RINSE 15 ML: 1.2 LIQUID ORAL at 23:56

## 2019-05-13 ASSESSMENT — ACTIVITIES OF DAILY LIVING (ADL)
ADLS_ACUITY_SCORE: 15

## 2019-05-13 NOTE — PLAN OF CARE
Neuro: intact. Adequate pain control throughout the night. Flap checks q4h. Flap on tongue is pale (baseline), warm, and soft. Arterial and venous pulses present. PERRL.   CV: WNL, afebrile.  Resp: RA, LS clear. Pt self-suctions oral secretions with red meek.   GI/: Hesitancy with voiding. BS+, though no BM this shift. TF @  goal of 70ml/hr with standard FWF.  Skin: Flap site pale but consistent with skin color, soft, and warm. Pulses strong via implantable and portable doppler. YARIEL in L neck with small bloody output. Neck incision approximated, slightly reddened, but otherwise WNL. RUE in soft cast d/t donor site. YARIEL in RUE with small bloody output. Wound vac on RUE in place, no output this shift. Graft site on R thigh WNL. Some increased swelling of L cheek and flap noted as well as difficulty enunciating words - ENT paged, awaiting response at time of writing.      Continue to monitor and with plan of care. Notify ENT of changes in pt condition. Transfer to floor when bed available.

## 2019-05-13 NOTE — PROGRESS NOTES
POD#4.  Tmax Temp (24hrs), Av.7  F (37.1  C), Min:98.5  F (36.9  C), Max:99  F (37.2  C), VSS although diastolics remain a bit high.  WBC 7.6.  One YARIEL removed.    His wound looks good and flap stable.  However he has significant facial edema on the left that is minimally tender.  Left lower lip seems weak. No fluctuance or pitting edema.  Wound a little tender.  No erythema.    Issues:  1. Pt up and walking yesterday but needs more activity.  HOB up will decrease edema.      2. Flap stable and wounds healing OK.    3. Pt is frustrated and remembers our pre-op discussino about being down at this point in recovery.  But pain is hard for him, and he doesn't 'feel respected' by the team that visits in the morning, sicne they are all working on him at one time, with only one person talking.  I think he is sleep deprived as well.  Will talk with team and I think he will feel better with more sleep on 6A.

## 2019-05-13 NOTE — PROGRESS NOTES
NUTRITION SERVICES - BRIEF NOTE    Spoke with ENT provider regarding plan for transitioning to bolus feeds for pt to go home on feeds. Will switch pt to maintenance formula and if pt tolerates for at least 8 hours will start transitioning to bolus TFs.    Implementations  1. Modify TF order to the following:  -Isosource 1.5 @ goal 50 ml/hr (1200 ml/day) to provide 1800 kcals (21+ kcal/kg/day), 82 g PRO (1+ g/kg/day), 912 ml free H2O, 211 g CHO and 18 g Fiber daily.  -1 packet Prosource QID to provide an additional 160 kcal and 44 g PRO to increase total provisions to 1960 kcal (23 kcal/kg) and 126 g PRO (1.5 g/kg)    2. Once tolerating continuous goal TF and approp to transition to Bennett Bolus regimen, recommend do so as follows: begin first bolus with 0.5 cans (125 ml) and if tolerates after approx 4 hrs without GI complaints and/or residuals < 500 ml (if able to accurately return with smaller bore FT), rec adv each subsequent bolus by 0.5 cans (125 ml) every ~4 hrs until reach goal regimen of 2 cans (500 ml) BID + 1 can (250 ml) at third bolus = 5 cans daily (1250 ml/day) = 1875 kcals (22+ kcal/kg), 85g PRO (1+ g/kg), 950 ml H2O, 220 g CHO and 19 g Fiber daily.  -Continue Prosource packets QID to provide an additional 160 kcal and 44 g PRO to increase total provisions to 2035 kcal (24 kcal/kg) and 129 g PRO (1.5 g/kg)  *Change H2O flushes to 180 ml H2O before and after each bolus (addtl 1080 ml H2O) to meet est fld needs.     Monitoring  Nutrition will continue to follow and monitor per POC (see 5/10 RD note)    Polly Jones, RD, LD  SICU RD Pgr: [phone]

## 2019-05-13 NOTE — PROGRESS NOTES
Patient discussed with Nurse for any ICU needs. Patient stable overnight and primary team has seen patient this am.     Carlos Willett, PGY1  SICU

## 2019-05-13 NOTE — PLAN OF CARE
Status: POD#4 s/p left infrastructure maxillectomy, right radial forearm free flap, left neck exploration, STSG from right thigh to right forearm, NG feeding tube placement (per MD note).   Vitals: Tmax 99.9 axillary, otherwise stable   Neuros: A&O x4. Asymmetry to L face; RUE weakness d/t splint.     IV: PIV SL  Resp: LS clear  Diet: NPO; NG running TF at goal of 50 mL/hr; tolerating well. Possible bolus TF tomorrow AM.   Bowel status: BM x1 this shift.   : Voiding spontaneously  Skin: STSG to R leg; CDI. R arm splinted from free flap with woundvac and YARIEL x1 with serosanguinous drainage; CMS + to RUE. Neck sutured and MALKA; aquaphor applied; CDI. Oral flap warm, pale-pink and soft. Continuous Doppler and spot checks to L neck positive (Q4H).   Pain: Pt c/o RUE pain; controlled with scheduled tylenol and oxy  Activity: Up with SBA and GB  Social: Sister at bedside; helpful with cares, educated on TF cares this evening and participated.   Plan: TF PLC tomorrow at 1300; sister will be present. Continue to monitor and follow current POC.

## 2019-05-13 NOTE — PLAN OF CARE
D/I: Pt alert and oriented x4. Pt remains hemodynamically stable. Mouth flap pale, warm and soft with + doppler arterial and venous. Oxycodone and Tylenol given down NGT q 4 hrs with good relief of neck and R arm pain. Minimal output from neck and R arm JPs.Pt up to bathroom and walked in halls with SBA. No BM, but passing flatus. Family here and updated. PLC appt scheduled for 1 pm on 5/14/19. Report given to MEGHAN Amaral on 6A.  A:Pt stable current cares.  P: Continue POC. See Epic flow sheets for VS and further assessments. Transfer to  via wheelchair.

## 2019-05-13 NOTE — PROGRESS NOTES
"Otolaryngology Progress Note  May 13, 2019    S: No acute events overnight. Nursing noticed increased swelling around the flap this morning, however, stable per ENT exam. Patient reports feeling sore and \"beat up\", otherwise doing well.     O: /82 (BP Location: Left leg)   Pulse 97   Temp 99  F (37.2  C) (Axillary)   Resp 14   Ht 1.702 m (5' 7\")   Wt 137 kg (302 lb 0.5 oz)   SpO2 96%   BMI 47.30 kg/m     General: Alert and oriented, NAD   HEENT: Oral flap pale, soft, warm, pale with strong implantable doppler signal. No signs of flap congestion or dehiscenence. Flap is stable, no additional swelling noted. Neck incisions clean, dry, intact. Neck soft and flat without evidence of hematoma or fluid collection. Strong arterial and venous hand held doppler signals obtained over flap pedicle markings on the left neck. Strong implantable doppler signal. YARIEL drains x 1 in place and holding suction with serosanguinous drainage in bulbs.    Pulmonary: Non-labored breathing on room air    Extremities: right UE with dressing, wound vac and splint in place. Fingers warm, good cap refill.  YARIEL drain x1 in right arm holding bulb suction with serosanguinous drainage.  Right thigh STSG donor site with tegaderm/calcium alginate dressing in place.     Intake/Output Summary (Last 24 hours) at 5/13/2019 0726  Last data filed at 5/13/2019 0400  Gross per 24 hour   Intake 1690 ml   Output 426 ml   Net 1264 ml     YARIEL drain output(s): (last 24 hours)/(last shift)  1 right arm: 4 / 3 / 4  2 medial neck: 21 / 10 / 9    LABS:  ROUTINE IP LABS (Last four results)  BMP  Recent Labs   Lab 05/13/19  0441 05/12/19  0436 05/11/19  0315 05/10/19  0346    144 141 136   POTASSIUM 3.7 3.6 4.0 3.9   CHLORIDE 110* 111* 109 103   MANOJ 8.7 8.5 8.1* 8.7   CO2 24 26 24 25   BUN 19 22 14 10   CR 0.66 0.70 0.68 0.76   * 155* 219* 218*     CBC  Recent Labs   Lab 05/13/19  0441 05/12/19  0436 05/11/19  0315 05/10/19  0346   WBC 7.6 9.1 " 12.3* 12.6*   RBC 3.40* 3.08* 3.14* 3.15*   HGB 10.2* 9.4* 9.5* 9.6*   HCT 33.7* 30.8* 29.9* 29.0*   MCV 99 100 95 92   MCH 30.0 30.5 30.3 30.5   MCHC 30.3* 30.5* 31.8 33.1   RDW 12.1 12.3 12.2 12.0    255 231 232     INRNo lab results found in last 7 days.  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 He is POD#4 s/p left infrastructure maxillectomy, left radial forearm free flap, left neck exploration, STSG from left thigh to left forearm, NG feeding tube placement.     Neuro:  - Pain control: scheduled Tylenol, oxycodone PRN, IV dilaudid for breakthrough pain only    HEENT:  - Nothing around the neck (no gown ties, trach ties, lines, ect.)  - RN flap checks Q2H while in ICU, Q4H once on the floor  - ENT flap checks Q8H  - Clean incisions with 0.9% sodium chloride and apply Aquaphor Q8H  - Monitor and record YARIEL drain output Qshift  - Peridex oral rinses 4 times daily  - Saline oral rinses Q8H and PRN. Gentle suction with red meek catheter only (no yankeur), do not cut red meek  - Right UE: wound vac to remain in place until POD5, first dressing change and vac removal per ENT, then daily thereafter.   - Right thigh STSG site: calcium alginate/tegaderm, reinforce or replace as needed for drainage. Once no longer draining may leave open to air    Respiratory:  - Supplemental O2 PRN to maintain sats >90     CV/heme:  - NO vasopressors  - hemodynamically stable, hgb 10.2  -  mg and SQ heparin for flap  - Resumed PTA atorvastatin, lisinopril    FEN/GI:  - NPO.  - Appreciate nutrition recs. Currently on Peptamen Intense VHP at goal 70 mL/hr. Will need to transition to isosource 1.5 today due to plans to discharge home on TFs. Will also transition to bolus TFs.   - PRN Zofran for nausea  - Bowel  regimen: Dulcolax PRN     /Fluids/Lytes:  - Voiding spontaneously  - electrolyte replacement protocol    Endo  - h/o DM2, holding home metformin  - ISS    ID:  - Perioperative antibiotics (Unasyn) x 48h post-op - completed     PPX:  - SQ heparin 5,000 units Q8H  - SCDs    Dispo:   - PT/OT evaluation  - Transfer to  today  - Anticipate discharge to home in 2-3 days pending tolerating TFs at goal, wound vac removed, independent with cares, pain controlled on oral medications only    -- Patient and above plan discussed with Dr. Atwood and Dr. Treviño.     NYDIA TimmonsC  Otolaryngology-Head & Neck Surgery  Please contact ENT by dialing [phone] and entering job code [phone].

## 2019-05-13 NOTE — PLAN OF CARE
Discharge Planner OT   Patient plan for discharge: home  Current status: Pt ambulating w/ steady gait in room and in santos ind, toilet transfers, marilee cares w/in precautions, pt's VSS throughout.   Barriers to return to prior living situation: medical status  Recommendations for discharge: Home w/ A  Rationale for recommendations: A for heavy lifting prn       Entered by: Cris Quiroga 05/13/2019 2:35 PM

## 2019-05-14 ENCOUNTER — APPOINTMENT (OUTPATIENT)
Dept: OCCUPATIONAL THERAPY | Facility: CLINIC | Age: 54
DRG: 463 | End: 2019-05-14
Attending: OTOLARYNGOLOGY
Payer: COMMERCIAL

## 2019-05-14 LAB
ANION GAP SERPL CALCULATED.3IONS-SCNC: 10 MMOL/L (ref 3–14)
BUN SERPL-MCNC: 20 MG/DL (ref 7–30)
CALCIUM SERPL-MCNC: 8.9 MG/DL (ref 8.5–10.1)
CHLORIDE SERPL-SCNC: 111 MMOL/L (ref 94–109)
CO2 SERPL-SCNC: 22 MMOL/L (ref 20–32)
CREAT SERPL-MCNC: 0.64 MG/DL (ref 0.66–1.25)
ERYTHROCYTE [DISTWIDTH] IN BLOOD BY AUTOMATED COUNT: 12.2 % (ref 10–15)
GFR SERPL CREATININE-BSD FRML MDRD: >90 ML/MIN/{1.73_M2}
GLUCOSE BLDC GLUCOMTR-MCNC: 174 MG/DL (ref 70–99)
GLUCOSE BLDC GLUCOMTR-MCNC: 248 MG/DL (ref 70–99)
GLUCOSE BLDC GLUCOMTR-MCNC: 248 MG/DL (ref 70–99)
GLUCOSE BLDC GLUCOMTR-MCNC: 272 MG/DL (ref 70–99)
GLUCOSE SERPL-MCNC: 289 MG/DL (ref 70–99)
HCT VFR BLD AUTO: 34.1 % (ref 40–53)
HGB BLD-MCNC: 10.7 G/DL (ref 13.3–17.7)
MAGNESIUM SERPL-MCNC: 2.1 MG/DL (ref 1.6–2.3)
MCH RBC QN AUTO: 30.2 PG (ref 26.5–33)
MCHC RBC AUTO-ENTMCNC: 31.4 G/DL (ref 31.5–36.5)
MCV RBC AUTO: 96 FL (ref 78–100)
PHOSPHATE SERPL-MCNC: 3.6 MG/DL (ref 2.5–4.5)
PLATELET # BLD AUTO: 297 10E9/L (ref 150–450)
POTASSIUM SERPL-SCNC: 3.8 MMOL/L (ref 3.4–5.3)
RBC # BLD AUTO: 3.54 10E12/L (ref 4.4–5.9)
SODIUM SERPL-SCNC: 143 MMOL/L (ref 133–144)
WBC # BLD AUTO: 6.8 10E9/L (ref 4–11)

## 2019-05-14 PROCEDURE — 25000132 ZZH RX MED GY IP 250 OP 250 PS 637: Performed by: OTOLARYNGOLOGY

## 2019-05-14 PROCEDURE — 25000132 ZZH RX MED GY IP 250 OP 250 PS 637: Performed by: PHYSICIAN ASSISTANT

## 2019-05-14 PROCEDURE — 00000146 ZZHCL STATISTIC GLUCOSE BY METER IP

## 2019-05-14 PROCEDURE — 25000132 ZZH RX MED GY IP 250 OP 250 PS 637

## 2019-05-14 PROCEDURE — 97535 SELF CARE MNGMENT TRAINING: CPT | Mod: GO

## 2019-05-14 PROCEDURE — 83735 ASSAY OF MAGNESIUM: CPT | Performed by: OTOLARYNGOLOGY

## 2019-05-14 PROCEDURE — 25000132 ZZH RX MED GY IP 250 OP 250 PS 637: Performed by: STUDENT IN AN ORGANIZED HEALTH CARE EDUCATION/TRAINING PROGRAM

## 2019-05-14 PROCEDURE — 84100 ASSAY OF PHOSPHORUS: CPT | Performed by: OTOLARYNGOLOGY

## 2019-05-14 PROCEDURE — 25000128 H RX IP 250 OP 636: Performed by: STUDENT IN AN ORGANIZED HEALTH CARE EDUCATION/TRAINING PROGRAM

## 2019-05-14 PROCEDURE — 97530 THERAPEUTIC ACTIVITIES: CPT | Mod: GO

## 2019-05-14 PROCEDURE — 80048 BASIC METABOLIC PNL TOTAL CA: CPT | Performed by: OTOLARYNGOLOGY

## 2019-05-14 PROCEDURE — 85027 COMPLETE CBC AUTOMATED: CPT | Performed by: OTOLARYNGOLOGY

## 2019-05-14 PROCEDURE — 36415 COLL VENOUS BLD VENIPUNCTURE: CPT | Performed by: OTOLARYNGOLOGY

## 2019-05-14 PROCEDURE — 12000001 ZZH R&B MED SURG/OB UMMC

## 2019-05-14 RX ORDER — PROCHLORPERAZINE 25 MG
25 SUPPOSITORY, RECTAL RECTAL EVERY 12 HOURS PRN
Status: DISCONTINUED | OUTPATIENT
Start: 2019-05-14 | End: 2019-05-16 | Stop reason: HOSPADM

## 2019-05-14 RX ORDER — ONDANSETRON HYDROCHLORIDE 4 MG/5ML
4 SOLUTION ORAL EVERY 6 HOURS PRN
Status: DISCONTINUED | OUTPATIENT
Start: 2019-05-14 | End: 2019-05-16 | Stop reason: HOSPADM

## 2019-05-14 RX ORDER — CALCIUM CARBONATE 500 MG/1
500 TABLET, CHEWABLE ORAL DAILY PRN
Status: DISCONTINUED | OUTPATIENT
Start: 2019-05-14 | End: 2019-05-16 | Stop reason: HOSPADM

## 2019-05-14 RX ORDER — OXYCODONE HCL 5 MG/5 ML
5-10 SOLUTION, ORAL ORAL EVERY 4 HOURS PRN
Status: DISCONTINUED | OUTPATIENT
Start: 2019-05-14 | End: 2019-05-16 | Stop reason: HOSPADM

## 2019-05-14 RX ORDER — PROCHLORPERAZINE MALEATE 10 MG
10 TABLET ORAL EVERY 6 HOURS PRN
Status: DISCONTINUED | OUTPATIENT
Start: 2019-05-14 | End: 2019-05-16 | Stop reason: HOSPADM

## 2019-05-14 RX ADMIN — Medication 5000 UNITS: at 16:26

## 2019-05-14 RX ADMIN — Medication 5000 UNITS: at 08:21

## 2019-05-14 RX ADMIN — ONDANSETRON 4 MG: 2 INJECTION INTRAMUSCULAR; INTRAVENOUS at 16:51

## 2019-05-14 RX ADMIN — Medication 5000 UNITS: at 00:01

## 2019-05-14 RX ADMIN — SENNOSIDES AND DOCUSATE SODIUM 2 TABLET: 8.6; 5 TABLET ORAL at 08:21

## 2019-05-14 RX ADMIN — ACETAMINOPHEN 650 MG: 160 SOLUTION ORAL at 08:20

## 2019-05-14 RX ADMIN — OXYCODONE HYDROCHLORIDE 5 MG: 5 SOLUTION ORAL at 00:01

## 2019-05-14 RX ADMIN — Medication 1 MG: at 08:21

## 2019-05-14 RX ADMIN — ACETAMINOPHEN 650 MG: 160 SOLUTION ORAL at 04:21

## 2019-05-14 RX ADMIN — ATORVASTATIN CALCIUM 20 MG: 20 TABLET, FILM COATED ORAL at 08:20

## 2019-05-14 RX ADMIN — ACETAMINOPHEN 650 MG: 160 SOLUTION ORAL at 12:27

## 2019-05-14 RX ADMIN — Medication 1 PACKET: at 16:26

## 2019-05-14 RX ADMIN — WHITE PETROLATUM: 1.75 OINTMENT TOPICAL at 21:42

## 2019-05-14 RX ADMIN — OXYCODONE HYDROCHLORIDE 5 MG: 5 SOLUTION ORAL at 04:21

## 2019-05-14 RX ADMIN — OXYCODONE HYDROCHLORIDE 5 MG: 5 SOLUTION ORAL at 08:19

## 2019-05-14 RX ADMIN — LISINOPRIL 10 MG: 5 TABLET ORAL at 08:20

## 2019-05-14 RX ADMIN — ACETAMINOPHEN 650 MG: 160 SOLUTION ORAL at 16:26

## 2019-05-14 RX ADMIN — GABAPENTIN 300 MG: 250 SUSPENSION ORAL at 18:16

## 2019-05-14 RX ADMIN — Medication 1 PACKET: at 12:27

## 2019-05-14 RX ADMIN — CHLORHEXIDINE GLUCONATE 0.12% ORAL RINSE 15 ML: 1.2 LIQUID ORAL at 16:33

## 2019-05-14 RX ADMIN — Medication 1 PACKET: at 08:22

## 2019-05-14 RX ADMIN — ACETAMINOPHEN 650 MG: 160 SOLUTION ORAL at 22:20

## 2019-05-14 RX ADMIN — GABAPENTIN 300 MG: 250 SUSPENSION ORAL at 08:20

## 2019-05-14 RX ADMIN — GABAPENTIN 300 MG: 250 SUSPENSION ORAL at 00:01

## 2019-05-14 RX ADMIN — MULTIVITAMIN 15 ML: LIQUID ORAL at 08:20

## 2019-05-14 RX ADMIN — WHITE PETROLATUM: 1.75 OINTMENT TOPICAL at 13:02

## 2019-05-14 RX ADMIN — OXYCODONE HYDROCHLORIDE 5 MG: 5 SOLUTION ORAL at 12:27

## 2019-05-14 RX ADMIN — ACETAMINOPHEN 650 MG: 160 SOLUTION ORAL at 00:01

## 2019-05-14 RX ADMIN — WHITE PETROLATUM: 1.75 OINTMENT TOPICAL at 04:22

## 2019-05-14 RX ADMIN — PROCHLORPERAZINE EDISYLATE 10 MG: 5 INJECTION INTRAMUSCULAR; INTRAVENOUS at 17:20

## 2019-05-14 RX ADMIN — ASPIRIN 325 MG ORAL TABLET 325 MG: 325 PILL ORAL at 08:20

## 2019-05-14 RX ADMIN — POLYETHYLENE GLYCOL 3350 17 G: 17 POWDER, FOR SOLUTION ORAL at 16:33

## 2019-05-14 RX ADMIN — Medication 1 PACKET: at 22:15

## 2019-05-14 RX ADMIN — CHLORHEXIDINE GLUCONATE 0.12% ORAL RINSE 15 ML: 1.2 LIQUID ORAL at 08:20

## 2019-05-14 ASSESSMENT — ACTIVITIES OF DAILY LIVING (ADL)
ADLS_ACUITY_SCORE: 15

## 2019-05-14 NOTE — CONSULTS
Devonte and his 2 sisters came to the [facility] for tube feeding education. His sisters live 1 and 3 hours from Devonte so cannot be there daily to assist him as they have to work. Devonte did try and do all the cares, meds and feedings using a gravity but pain and limited movement of his right arm severely limited his ability to perform the cares and do the feedings. If his pain and movement improve he knows how to do cares and gravity feeds and would be able to do them at home.

## 2019-05-14 NOTE — PROGRESS NOTES
SW spoke with OT and OT is now recommending TCU placement. [facility] will follow up.     SHIVA Aldana  Social Work, 6A  Phone:  [phone]  Pager:  [phone]  5/14/2019

## 2019-05-14 NOTE — PROGRESS NOTES
"Otolaryngology Progress Note  May 14, 2019    S: No acute events overnight. Flap stable per RN and ENT checks. Tolerating TFs at continuous goal, no nausea. Patient expresses concern about discharging soon and overwhelmed by all of his care. He has not looked at any of his wounds since surgery. He lives alone and is not yet sure who will be able to assist him after discharge.     O: /68 (BP Location: Left arm)   Pulse 92   Temp 97.5  F (36.4  C) (Axillary)   Resp 16   Ht 1.702 m (5' 7\")   Wt 137 kg (302 lb 0.5 oz)   SpO2 97%   BMI 47.30 kg/m     General: Alert and oriented, NAD   HEENT: Oral flap pale, soft, warm, pale with strong implantable doppler signal. No signs of flap congestion or dehiscenence. Flap is stable, no additional swelling noted. Neck incisions clean, dry, intact. Neck soft and flat without evidence of hematoma or fluid collection. Strong arterial and venous hand held doppler signals obtained over flap pedicle markings on the left neck. Strong implantable doppler signal. YARIEL drains x 1 in place and holding suction with serosanguinous drainage in bulbs.    Pulmonary: Non-labored breathing on room air    Extremities: right UE with dressing, wound vac and splint in place, wound vac removed, dressing changed. Skin graft site with 100% take, linear incision c/d/i. Fingers warm, good cap refill.  YARIEL drain x1 in right arm holding bulb suction with serosanguinous drainage.  Right thigh STSG donor site with tegaderm/calcium alginate dressing in place.       Intake/Output Summary (Last 24 hours) at 5/14/2019 0842  Last data filed at 5/14/2019 0659  Gross per 24 hour   Intake 1235 ml   Output 1126 ml   Net 109 ml       YARIEL drain output(s): (last 24 hours)/(last shift)  1 right arm: 5 / 1 / 3  2 medial neck: 7 / 4 / 6    LABS:  ROUTINE IP LABS (Last four results)  BMP  Recent Labs   Lab 05/14/19  0527 05/13/19  0441 05/12/19  0436 05/11/19  0315    143 144 141   POTASSIUM 3.8 3.7 3.6 4.0 "   CHLORIDE 111* 110* 111* 109   MANOJ 8.9 8.7 8.5 8.1*   CO2 22 24 26 24   BUN 20 19 22 14   CR 0.64* 0.66 0.70 0.68   * 225* 155* 219*     CBC  Recent Labs   Lab 05/14/19  0527 05/13/19  0441 05/12/19  0436 05/11/19  0315   WBC 6.8 7.6 9.1 12.3*   RBC 3.54* 3.40* 3.08* 3.14*   HGB 10.7* 10.2* 9.4* 9.5*   HCT 34.1* 33.7* 30.8* 29.9*   MCV 96 99 100 95   MCH 30.2 30.0 30.5 30.3   MCHC 31.4* 30.3* 30.5* 31.8   RDW 12.2 12.1 12.3 12.2    269 255 231     INRNo lab results found in last 7 days.  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 He is POD#5 s/p left infrastructure maxillectomy, left radial forearm free flap, left neck exploration, STSG from left thigh to left forearm, NG feeding tube placement.     Neuro:  - Pain control: scheduled Tylenol, oxycodone PRN, gabapentin 300mg Q8H    HEENT:  - Nothing around the neck (no gown ties, trach ties, lines, ect.)  - RN flap checks Q2H while in ICU, Q4H once on the floor  - ENT flap checks Q8H  - Clean incisions with 0.9% sodium chloride and apply Aquaphor Q8H  - Monitor and record YARIEL drain output Qshift  - Peridex oral rinses 4 times daily  - Saline oral rinses Q8H and PRN. Gentle suction with red meek catheter only (no yankeur), do not cut red meek  - Right UE: daily dressing changes   - Right thigh STSG site: calcium alginate/tegaderm, reinforce or replace as needed for drainage. Once no longer draining may leave open to air    Respiratory:  - Supplemental O2 PRN to maintain sats >90     CV/heme:  - NO vasopressors  - hemodynamically stable, hgb 10.7  -  mg and SQ heparin for flap  - Resumed PTA atorvastatin, lisinopril    FEN/GI:  - NPO.  - Severe malnutrition in the context of acute illness: Appreciate nutrition recs. Currently on [facility]  1.5 at continuous goal. Will also transition to bolus TFs today.   - PRN Zofran for nausea  - Bowel regimen: Dulcolax PRN     /Fluids/Lytes:  - Voiding spontaneously  - electrolyte replacement protocol  - Cardura (substitute for PTA Flomax)    Endo  - h/o DM2, holding home metformin  - ISS    ID:  - Perioperative antibiotics (Unasyn) x 48h post-op - completed     PPX:  - SQ heparin 5,000 units Q8H  - SCDs    Dispo:   - OT evaluation - recommending home w/ assist. Patient does not have assist, lives alone   - [facility] for TF teaching today  - Anticipate discharge to home in 1-2 days pending tolerating TFs at goal, independent with cares, pain controlled on oral medications only    -- Patient and above plan discussed with Dr. Atwood and Dr. Treviño.     NYDIA TimmonsC  Otolaryngology-Head & Neck Surgery  Please contact ENT by dialing [phone] and entering job code [phone].

## 2019-05-14 NOTE — PLAN OF CARE
OT6A:   Discharge Planner OT   Patient plan for discharge: rehab vs home  Current status: Extended time spent discussion home set up with pt, pt with many concerns 2/2 precautions and movement limitations. Pt endorsing he no longer is able to have 24/7 A in home as he previously thought. Completed seated ADLs with set up assistance, Maximus to get L side. Sit<>stand x3 throughout session, x3 minor LOB able to recover minAx2 CGA x1. Pt ambulated ~15'x2 CGA UE support on IV pole. Seated in bedside chair upon TH exit with all needs met.   Barriers to return to prior living situation: medical status, pain, dizziness, balance, decreased strength and activity tolerance, precautions   Recommendations for discharge: TCU   Rationale for recommendations: pending pt progress and level of assistance available in home pt may progress to return home- However currently would need TCU 2/2 fatigues quickly (SOB following ambulation to bathroom and back) and has x2 flights of stairs to access home. Pt currently endorsing he does not feel safe in home and is not able to complete ADLs without assistance. Lives alone and is not able to have 24/7 A available as previously anticipated        Entered by: Josephine Miner 05/14/2019 11:11 AM

## 2019-05-14 NOTE — PLAN OF CARE
"/84 (BP Location: Left arm)   Pulse 92   Temp 96.2  F (35.7  C) (Axillary)   Resp 16   Ht 1.702 m (5' 7\")   Wt 137 kg (302 lb 0.5 oz)   SpO2 97%   BMI 47.30 kg/m    Status: POD #5 s/p left infrastructure maxillectomy, right radial forearm free flap, left neck exploration, STSG from right thigh to right forearm, NG feeding tube placement (per MD note).   VS: VSS on RA  Neuro: A&O x4. Asymmetry to L face; RUE weakness d/t splint.  Respiratory: LS Clear  GI: NPO; NG running TF at goal of 50 mL/hr; tolerating well. Possible bolus TF this AM. No BM overnight  : Voiding spontaneously  IV:  PIV SL  Skin: STSG to R leg reinforced x1 as it was falling off. R arm splinted from free flap with scant serosanguinous drainage; CMS + to RUE. Neck sutured and MALKA; aquaphor applied; CDI. Oral flap warm, pale-pink and soft. Continuous Doppler and spot checks to L neck positive (Q4H).   Pain: Some mouth pain, controlled with scheduled tylenol/oxy  Activity: Up with SBA and GB  Drains: wound vac and YARIEL x1  See flow sheets for further details and assessments.  Social: Sister plans to be present for PLC  Plan of Care: TF PLC today at 1300, continue to monitor, notify MD of significant changes.    "

## 2019-05-14 NOTE — PLAN OF CARE
Status: POD #4 s/p left infrastructure maxillectomy, right radial forearm free flap, left neck exploration, STSG from right thigh to right forearm, NG feeding tube placement (per MD note).   Vitals: Tmax 99.9 axillary, otherwise stable   Neuros: A&O x4. Asymmetry to L face; RUE weakness d/t splint.     IV: PIV SL  Resp: LS clear  Diet: NPO; NG running TF at goal of 50 mL/hr; tolerating well. Possible bolus TF tomorrow AM.   Bowel status: Loose x1 this shift- HS bowel meds held.  : Voiding spontaneously  Skin: STSG to R leg reinforced x1 as it was falling off. R arm splinted from free flap with wound vac and YARIEL x1 with scant serosanguinous drainage; CMS + to RUE. Neck sutured and MALKA; aquaphor applied; CDI. Oral flap warm, pale-pink and soft. Continuous Doppler and spot checks to L neck positive (Q4H).   Pain: Pt c/o mouth pain; controlled with scheduled tylenol and oxy  Activity: Up with SBA and GB  Social: No visitors this shift.  Plan: TF PLC tomorrow at 1300; sister will be present. Continue to monitor and follow current POC.

## 2019-05-14 NOTE — PLAN OF CARE
Status: POD#5 s/p left infrastructure maxillectomy, right radial forearm free flap, left neck exploration, STSG from right thigh to right forearm, NG feeding tube placement (per MD note).   Vitals: stable on room air. Dizzy/nausea when standing this evening; BP normal and other vitals   WDL, MD notified  Neuros: A&O x4. Asymmetry to L face; RUE weakness 4/5 d/t splint.     IV: PIV SL  Resp: LS clear  Diet: NPO; NG with bolus TF started today. 1/2 can given at 1300. Patient's sister performed TF cares with assistance; continue to encourage cares.    Bowel status: No BM this shift; + flatus. Nausea this evening zofran and compazine given with relief. Pt willing to attempt 1 can bolus later this evening.    : Voiding spontaneously  Skin: STSG to R leg; CDI. R arm splinted from free flap; CMS + to RUE. Neck sutured and MALKA; aquaphor applied; CDI. Oral flap warm, pale-pink and soft. Continuous Doppler and spot checks to L neck positive (Q4H). YARIEL's removed and hemovac.  Pain: Pt c/o RUE pain; controlled with tylenol and PRN oxy  Activity: Up with SBA and GB; patient walked halls x1, needed to rest multiple times while in santos and sat half way through walk.   Social: Sister at bedside; helpful with cares, educated on TF cares and performing.   Plan: TF PLC completed today. Continue to monitor and follow current POC.

## 2019-05-14 NOTE — PROGRESS NOTES
POD#5.  Mr Tucker has been transferred to the floor. He s feeling a little bit better this morning, but still having difficulty getting out of bed. He tried to walk yesterday, but became lightheaded.  Wounds healthy; less edeam today.    He did have a bowel movement.  He describes sleeping much better since coming to 6A.  He is a little worried about going home and taking care of himself and will try and find somebody to stay with him for a day or two. I told him we  I   we can always transfer him to a transitional care unit.    He understands that he was take care of his NG tube before he can go. He will transition to bolus feeds today. He will get out of bed and do more learning and self-care today.

## 2019-05-14 NOTE — PROGRESS NOTES
Care Coordinator Progress Note    Admission Date/Time:  5/9/2019  Attending MD:  Dr Jett Treviño    Data  Pt transferred from ICU to 6A yesterday. Chart reviewed, discussed with interdisciplinary team. In 6A Discharge Rounds JANNIE Timmons, reported pt has an NG tube for feedings, goal is 50cc/hr; right arm dressing changes; STSG; wound vac removed; 2 YARIEL drains. Will need tube feeding & arm dressing supplies for discharge. Deborah said pt cried when she was speaking with him; he is anxious. PLC today for tube feeding teach.     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     Past history includes:  Diabetes, hypertension, obesity    Concerns with insurance coverage for discharge needs: None. Pt's insurance is [employer].   Current Living Situation: Patient lives alone.  Support System: Supportive. Pt has 2 sisters, they live in [place] and work. They will not be able to assist pt at home after discharge.      Barriers to Discharge: lack of support system/caregiver and lives alone; inability to do own tube feeding administration.     Coordination of Care and Referrals  Received call from Yaquelin Sifuentes, RN Care Manager with [employer].   Phone: [phone], extension [phone]   Fax: [phone]  E-mail: [email or web address]    Yaquelin reported she can help with authorizing home care & DME. She will e-mail me the list of approved home care & DME providers. I will check with pt on his preferences. She requested I fax the tube feeding & dressing supply orders to her and she will process auth.   ________________________________________    This afternoon OT reported they are recommending TCU. Pt had multiple loss of balance during the session. Pt  "reported feeling winded and fatigued. He won't have any assist at home.     This afternoon I introduced myself to pt and his 2 sisters. Pt alert, sitting up in chair. ENT had just removed 2 drains and did a doppler check. Explained I help with discharge planning. Pt reports he lives alone. His sisters live in [place], 1 sister lives in [place] and the other lives in the other lives 3 hours away. They both work and won't be able to help pt at home after discharge. His apartment has 2 flights of stairs, no elevator. Pt had PLC today for tube feeding teaching. Pt said he wasn't able to administer tube feeding because of limited right hand movement. Right forearm and hand are wrapped.   Informed pt I am asking him a lot of questions to help assess what his needs are for discharge. He will need to be medically stable for discharge. Informed him OT is recommending a rehab stay. Has to have a skilled need for a rehab stay. Explained to discharge home pt will need to be able to ambulate, pain controlled, able to do his tube feeding & dressing changes, voiding.   OT reported he lost his balance and was short of breath. Pt said he was \"winded\", hard to catch his breath when walking. No shortness of breath noted when I was meeting with him. Pt denies any problems like this prior to admission, said he was able to do the stairs OK at home. Instructed pt to report to nurse if he becomes more short of breath. Pt said he doesn't feel ready for discharge yet. During our conversation pt was anxious at times; a little defensive, he felt we might compare his progress to other pts. Tried to reassure him.      Informed ENT of OT recommendation for TCU, pt's loss of balance; feeling winded with activity. Indigo, Dietician, called me and also spoke with Dr Zhou about pt's elevated blood glucose.      Assessment  Pt not able to do own tube feeding administration & RUE dressing changes. Will not have daily support at home for cares. OT " recommending TCU. Pt 5 days post-op having loss of balance and feeling short of breath with activity. Previously independent.      Plan  Anticipated Discharge Date:  When medically stable  Anticipated Discharge Plan:   TCU  --Glucose management  --Monitor respiratory status    (Did not send fax to [employer] [facility] for home care & DME auth since TCU is now recommended)      Yaquelin Urena RN Care Coordinator  Unit 6A, [facility]

## 2019-05-15 LAB
ANION GAP SERPL CALCULATED.3IONS-SCNC: 5 MMOL/L (ref 3–14)
BUN SERPL-MCNC: 23 MG/DL (ref 7–30)
CALCIUM SERPL-MCNC: 9 MG/DL (ref 8.5–10.1)
CHLORIDE SERPL-SCNC: 110 MMOL/L (ref 94–109)
CO2 SERPL-SCNC: 26 MMOL/L (ref 20–32)
CREAT SERPL-MCNC: 0.54 MG/DL (ref 0.66–1.25)
ERYTHROCYTE [DISTWIDTH] IN BLOOD BY AUTOMATED COUNT: 12.3 % (ref 10–15)
GFR SERPL CREATININE-BSD FRML MDRD: >90 ML/MIN/{1.73_M2}
GLUCOSE BLDC GLUCOMTR-MCNC: 212 MG/DL (ref 70–99)
GLUCOSE BLDC GLUCOMTR-MCNC: 236 MG/DL (ref 70–99)
GLUCOSE BLDC GLUCOMTR-MCNC: 265 MG/DL (ref 70–99)
GLUCOSE BLDC GLUCOMTR-MCNC: 312 MG/DL (ref 70–99)
GLUCOSE BLDC GLUCOMTR-MCNC: 352 MG/DL (ref 70–99)
GLUCOSE SERPL-MCNC: 228 MG/DL (ref 70–99)
HCT VFR BLD AUTO: 34.1 % (ref 40–53)
HGB BLD-MCNC: 10.7 G/DL (ref 13.3–17.7)
MAGNESIUM SERPL-MCNC: 2.1 MG/DL (ref 1.6–2.3)
MCH RBC QN AUTO: 30.2 PG (ref 26.5–33)
MCHC RBC AUTO-ENTMCNC: 31.4 G/DL (ref 31.5–36.5)
MCV RBC AUTO: 96 FL (ref 78–100)
PHOSPHATE SERPL-MCNC: 3.5 MG/DL (ref 2.5–4.5)
PLATELET # BLD AUTO: 304 10E9/L (ref 150–450)
POTASSIUM SERPL-SCNC: 4.1 MMOL/L (ref 3.4–5.3)
RBC # BLD AUTO: 3.54 10E12/L (ref 4.4–5.9)
SODIUM SERPL-SCNC: 141 MMOL/L (ref 133–144)
WBC # BLD AUTO: 7.8 10E9/L (ref 4–11)

## 2019-05-15 PROCEDURE — 80048 BASIC METABOLIC PNL TOTAL CA: CPT | Performed by: OTOLARYNGOLOGY

## 2019-05-15 PROCEDURE — 00000146 ZZHCL STATISTIC GLUCOSE BY METER IP

## 2019-05-15 PROCEDURE — 25000132 ZZH RX MED GY IP 250 OP 250 PS 637

## 2019-05-15 PROCEDURE — 25000132 ZZH RX MED GY IP 250 OP 250 PS 637: Performed by: STUDENT IN AN ORGANIZED HEALTH CARE EDUCATION/TRAINING PROGRAM

## 2019-05-15 PROCEDURE — 25000132 ZZH RX MED GY IP 250 OP 250 PS 637: Performed by: OTOLARYNGOLOGY

## 2019-05-15 PROCEDURE — 12000001 ZZH R&B MED SURG/OB UMMC

## 2019-05-15 PROCEDURE — 25000132 ZZH RX MED GY IP 250 OP 250 PS 637: Performed by: PHYSICIAN ASSISTANT

## 2019-05-15 PROCEDURE — 25000131 ZZH RX MED GY IP 250 OP 636 PS 637: Performed by: CLINICAL NURSE SPECIALIST

## 2019-05-15 PROCEDURE — 25000128 H RX IP 250 OP 636: Performed by: STUDENT IN AN ORGANIZED HEALTH CARE EDUCATION/TRAINING PROGRAM

## 2019-05-15 PROCEDURE — 83735 ASSAY OF MAGNESIUM: CPT | Performed by: OTOLARYNGOLOGY

## 2019-05-15 PROCEDURE — 85027 COMPLETE CBC AUTOMATED: CPT | Performed by: OTOLARYNGOLOGY

## 2019-05-15 PROCEDURE — 25000132 ZZH RX MED GY IP 250 OP 250 PS 637: Performed by: CLINICAL NURSE SPECIALIST

## 2019-05-15 PROCEDURE — 84100 ASSAY OF PHOSPHORUS: CPT | Performed by: OTOLARYNGOLOGY

## 2019-05-15 RX ORDER — GABAPENTIN 250 MG/5ML
300 SOLUTION ORAL EVERY 8 HOURS
Status: ON HOLD | DISCHARGE
Start: 2019-05-15 | End: 2019-05-30

## 2019-05-15 RX ORDER — AMINO ACIDS/PROTEIN HYDROLYS 11G-40/45
1 LIQUID IN PACKET (ML) ORAL 4 TIMES DAILY
Status: ON HOLD | DISCHARGE
Start: 2019-05-15 | End: 2019-05-30

## 2019-05-15 RX ORDER — POLYETHYLENE GLYCOL 3350 17 G/17G
17 POWDER, FOR SOLUTION ORAL DAILY
Status: ON HOLD | DISCHARGE
Start: 2019-05-16 | End: 2019-05-30

## 2019-05-15 RX ORDER — METFORMIN HYDROCHLORIDE 500 MG/5ML
500 SOLUTION ORAL
Status: DISCONTINUED | OUTPATIENT
Start: 2019-05-15 | End: 2019-05-16 | Stop reason: HOSPADM

## 2019-05-15 RX ORDER — CHLORHEXIDINE GLUCONATE ORAL RINSE 1.2 MG/ML
15 SOLUTION DENTAL EVERY 8 HOURS
Status: ON HOLD | DISCHARGE
Start: 2019-05-15 | End: 2019-05-30

## 2019-05-15 RX ORDER — ASPIRIN 325 MG
325 TABLET ORAL DAILY
Status: ON HOLD | DISCHARGE
Start: 2019-05-16 | End: 2019-05-30

## 2019-05-15 RX ORDER — MINERAL OIL/HYDROPHIL PETROLAT
OINTMENT (GRAM) TOPICAL EVERY 8 HOURS
Status: ON HOLD | DISCHARGE
Start: 2019-05-15 | End: 2019-05-30

## 2019-05-15 RX ORDER — AMOXICILLIN 250 MG
2 CAPSULE ORAL 2 TIMES DAILY
Status: ON HOLD | DISCHARGE
Start: 2019-05-15 | End: 2019-05-30

## 2019-05-15 RX ORDER — LISINOPRIL 10 MG/1
10 TABLET ORAL EVERY MORNING
DISCHARGE
Start: 2019-05-16 | End: 2021-03-15

## 2019-05-15 RX ORDER — ATORVASTATIN CALCIUM 20 MG/1
20 TABLET, FILM COATED ORAL EVERY MORNING
DISCHARGE
Start: 2019-05-16 | End: 2021-03-15

## 2019-05-15 RX ORDER — OXYCODONE HCL 5 MG/5 ML
5-10 SOLUTION, ORAL ORAL EVERY 4 HOURS PRN
Qty: 400 ML | Refills: 0 | Status: ON HOLD | DISCHARGE
Start: 2019-05-15 | End: 2019-05-30

## 2019-05-15 RX ADMIN — Medication 1 PACKET: at 19:14

## 2019-05-15 RX ADMIN — ACETAMINOPHEN 650 MG: 160 SOLUTION ORAL at 12:00

## 2019-05-15 RX ADMIN — MULTIVITAMIN 15 ML: LIQUID ORAL at 07:51

## 2019-05-15 RX ADMIN — GABAPENTIN 300 MG: 250 SUSPENSION ORAL at 00:21

## 2019-05-15 RX ADMIN — CHLORHEXIDINE GLUCONATE 0.12% ORAL RINSE 15 ML: 1.2 LIQUID ORAL at 00:22

## 2019-05-15 RX ADMIN — ASPIRIN 325 MG ORAL TABLET 325 MG: 325 PILL ORAL at 08:02

## 2019-05-15 RX ADMIN — ACETAMINOPHEN 650 MG: 160 SOLUTION ORAL at 07:50

## 2019-05-15 RX ADMIN — Medication 5000 UNITS: at 00:21

## 2019-05-15 RX ADMIN — LISINOPRIL 10 MG: 5 TABLET ORAL at 08:15

## 2019-05-15 RX ADMIN — POLYETHYLENE GLYCOL 3350 17 G: 17 POWDER, FOR SOLUTION ORAL at 08:16

## 2019-05-15 RX ADMIN — INSULIN GLARGINE 20 UNITS: 100 INJECTION, SOLUTION SUBCUTANEOUS at 11:59

## 2019-05-15 RX ADMIN — Medication 1 PACKET: at 08:22

## 2019-05-15 RX ADMIN — GABAPENTIN 300 MG: 250 SUSPENSION ORAL at 08:15

## 2019-05-15 RX ADMIN — SENNOSIDES AND DOCUSATE SODIUM 2 TABLET: 8.6; 5 TABLET ORAL at 08:03

## 2019-05-15 RX ADMIN — ACETAMINOPHEN 650 MG: 160 SOLUTION ORAL at 03:04

## 2019-05-15 RX ADMIN — Medication 5000 UNITS: at 08:21

## 2019-05-15 RX ADMIN — WHITE PETROLATUM: 1.75 OINTMENT TOPICAL at 11:51

## 2019-05-15 RX ADMIN — OXYCODONE HYDROCHLORIDE 10 MG: 5 SOLUTION ORAL at 22:12

## 2019-05-15 RX ADMIN — CHLORHEXIDINE GLUCONATE 0.12% ORAL RINSE 15 ML: 1.2 LIQUID ORAL at 15:41

## 2019-05-15 RX ADMIN — WHITE PETROLATUM: 1.75 OINTMENT TOPICAL at 19:13

## 2019-05-15 RX ADMIN — ACETAMINOPHEN 650 MG: 160 SOLUTION ORAL at 15:41

## 2019-05-15 RX ADMIN — WHITE PETROLATUM: 1.75 OINTMENT TOPICAL at 03:07

## 2019-05-15 RX ADMIN — Medication 1 PACKET: at 12:00

## 2019-05-15 RX ADMIN — METFORMIN HYDROCHLORIDE 500 MG: 500 SOLUTION ORAL at 13:23

## 2019-05-15 RX ADMIN — ATORVASTATIN CALCIUM 20 MG: 20 TABLET, FILM COATED ORAL at 08:03

## 2019-05-15 RX ADMIN — Medication 1 PACKET: at 15:52

## 2019-05-15 RX ADMIN — ACETAMINOPHEN 650 MG: 160 SOLUTION ORAL at 19:13

## 2019-05-15 RX ADMIN — Medication 5000 UNITS: at 15:41

## 2019-05-15 RX ADMIN — Medication 1 MG: at 07:47

## 2019-05-15 RX ADMIN — GABAPENTIN 300 MG: 250 SUSPENSION ORAL at 17:46

## 2019-05-15 RX ADMIN — CHLORHEXIDINE GLUCONATE 0.12% ORAL RINSE 15 ML: 1.2 LIQUID ORAL at 07:50

## 2019-05-15 ASSESSMENT — ACTIVITIES OF DAILY LIVING (ADL)
ADLS_ACUITY_SCORE: 15
ADLS_ACUITY_SCORE: 16
ADLS_ACUITY_SCORE: 15

## 2019-05-15 ASSESSMENT — MIFFLIN-ST. JEOR: SCORE: 2127.67

## 2019-05-15 NOTE — PROGRESS NOTES
"SPIRITUAL HEALTH SERVICES  SPIRITUAL ASSESSMENT Progress Note  [facility] Unit [facility]     PRIMARY FOCUS:     Goals of care    Symptom/pain management    Emotional/spiritual/Temple distress    Support for coping    REFERRAL SOURCE: Follow-up from Fr. Echeverria 5/10 visit    ILLNESS CIRCUMSTANCES:   Reviewed documentation. Reflective conversation shared with Devonte which integrated elements of illness and family narratives.     Context of Serious Illness/Symptom(s) - Devonte didn't describe the nature of his illness in detail but he focused on the ways in which all that he is experiencing as a result of his illness is \"not normal\" and \"unfamiliar.\" He endorses distress at having to contemplate a \"new normal.\"    Resources for Support  o Family: several siblings, mostly in [place]  o Friends  o Kiya / kiya community: [employer] in [place]. His parish  visited him yesterday.    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 He is not accustomed to being on the receiving end of care and it's unfamiliar and uncomfortable for him.    Anabaptism Distress - none endorsed    Social/Cultural/Economic Distress - Devonte is having to rely on his family in a new way and it causes him some concern but at the same time he is grateful. He is not looking forward to having to be away from home for a long time and is fearful that the TCU where he anticipates going next may not be a place where the care givers are compassionate as they are here because \"you hear stories about some of these places.\"    SPIRITUAL/Buddhist COPING:     Yazdanism/Kiya -  shazia Whitney is deeply " "faithful. His Jew darwin is life-long and central to his coping. He described his love of the sacraments. He described how important it is to have chaplains visits because he needs \"another human being in the flesh to remind me that God is with me.\" We reflected on how consonant this need is with the theology of Incarnation which is so central to the Cheondoism darwin.  o Devonte also reflected on his discomfort with moments when he senses his darwin faltering because he can't \"feel\" God with him. He ackonwledged that it is not easy to see God in the unfamiliarity of the hospital and its environs. We spoke about his being in good company, going back to the michaelstsoumya Alvarenga (Bryant 20:25):  Unless I see the daniela of the nails in his hands, and put my finger in the daniela of the nails and my hand in his side, I will not believe.     Spiritual Practice(s)   o Personal prayer, corporate Taoism / mass, sacraments of Communion, Reconciliation, Anointing.  o I read a passage from Juan (Nicholas' prayer in the [facility]) because Devonte mentioned that this model of Nicholas' seeking \"God's will above his own\" is a model of inspiration for him. I offered a prayer, naming Devonte's desire for strength to \"continue participating in God's plan.\" We ended praying the Our Father together.    Emotional/Relational/Existential Connections    o Devonte's personal spirituality has deep roots in the notion of the community of the darwin and the reality that \"we aren't able to do this alone.\" He asked for prayers for the community that is supporting him and in this way \"participating in God's plan,\" that may know of God's love for them and Devonte's gratitude.    GOALS OF CARE:    Goals of Care - Devonte desires to participate fully in his healing and has accepted that in order for him to return home, he first needs to go to a TCU to \"get stronger\" and to re-learn some basic acts of self-care, how to do things without the full " "function of his right arm and \"to learn how to talk properly again.\"    Meaning/Sense-Making - Devonte is relying on his spiritual framework to make sense of his illness and exhibits a deep capacity for theological reflection.    PLAN: I will ask tomorrow's on-call  to try and follow-up with Devonte. He indicated having asked members of his care team to call a  for the last several days and \"they said they didn't know how,\" so I will re-orient the staff on 6A re how to engage  support on a patient's behalf.    Emmanuel Arroyo  Chaplain Resident  Pager [phone]    "

## 2019-05-15 NOTE — CONSULTS
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 His past medical history is also significant for type 2 diabetes, obesity, hypertension.  Glucose was elevated to mid 200s during dexamethasone treatment, then improved briefly to mid 100s before advancing tube feeds caused more hyperglycemia.  On continuous Isosource 1.5 at 50 ml/h, glucose reached high 200s.  Yesterday, TF stopped and transitioned to bolus feed.  Glucose reached roopa 174 mg/dL at HS last night, essentially off feeds for 11 hours.  After full can bolus at HS, for which aspart carb coverage was omitted, glucose nicole again to 200s (low).  Patient expressed feeling responsible for his hyperglycemia.  Also has had anxiety over meeting post-op goals on time.  Plan is for him to discharge to TCU.  We discussed how insulin works and that it is to be considered a \"right now\" plan.  And if it will be needed at home, will make certain pt is comfortable with self-administration of insulin.  Pt tolerated half can bolus poorly at first try yesterday, but last night a full can was much better (had anti-emetic and was relaxed/sleeping).  He is making progress with ambulation but has been dizzy.  Also has some blurring of vision. Complains of major dry mouth.  Bladder and bowel are working okay.        Recent Labs   Lab 05/15/19  0756 05/15/19  0630 05/15/19  0313 05/14/19  2206 05/14/19  1702 05/14/19  1208 05/14/19  0759 05/14/19  0527  05/13/19  0441  " 05/12/19  0436  05/11/19  0315  05/10/19  0346   GLC  --  228*  --   --   --   --   --  289*  --  225*  --  155*  --  219*  --  218*   *  --  236* 174* 248* 248* 272*  --    < >  --    < >  --    < >  --    < >  --     < > = values in this interval not displayed.         Diabetes Type: type 2  Diabetes Duration: 10 years  Usual Diabetes Regimen:   4x /day BG monitoring frequency (this frequency is unusual for pt on only oral agents, but it keeps pt on track with his food intake)  Slimgenics diet before he got sick.  Lately, relied more on protein drinks since couldn't properly chew.  Glipizide XL 20 mg qAM  Metformin 2 grams every morning  Ability to [place] Prescribed Regimen: good  Diabetes Control:   Lab Results   Component Value Date    A1C 7.4 04/24/2019     Diabetes Complications: possibly peripheral neuropathy- describes night-time numbness in feet  History of DKA: no  Able to Detect Hypoglycemia: yes, but has not occurred for a long time  Usual Diabetes Care Provider: PCP, Vivi Castanon at [facility] in [place]  Factors Impacting Glucose Control: stress, enteral feeds, withholding home anti-hyperglycemics      Review of Systems  10 point ROS completed with pertinent positives and negatives noted in the HPI    Past medical, family and social histories are reviewed and updated.    Past Medical History  Past Medical History:   Diagnosis Date     Diabetes (H)      Hypertension      Obesity        Family History  Family History   Problem Relation Age of Onset     Cancer Mother         ovarian     Cancer Brother      Cardiovascular Brother         Stent     Kidney Cancer Sister         s/p nephrectomy        Social History  Social History     Socioeconomic History     Marital status: Single     Spouse name: None     Number of children: None     Years of education: None     Highest education level: None   Occupational History     None   Social Needs     Financial resource strain: None     Food  insecurity:     Worry: None     Inability: None     Transportation needs:     Medical: None     Non-medical: None   Tobacco Use     Smoking status: Never Smoker     Smokeless tobacco: Never Used   Substance and Sexual Activity     Alcohol use: No     Frequency: Never     Drug use: No     Sexual activity: None   Lifestyle     Physical activity:     Days per week: None     Minutes per session: None     Stress: None   Relationships     Social connections:     Talks on phone: None     Gets together: None     Attends Denominational service: None     Active member of club or organization: None     Attends meetings of clubs or organizations: None     Relationship status: None     Intimate partner violence:     Fear of current or ex partner: None     Emotionally abused: None     Physically abused: None     Forced sexual activity: None   Other Topics Concern     None   Social History Narrative     None   Sister Jovanna and Rukhsana are caregivers.  Pt works as manager for [employer]      Physical Exam  Temp: 95.2  F (35.1  C) Temp src: Axillary BP: 114/71 Pulse: 87 Heart Rate: 95 Resp: 16 SpO2: 97 % O2 Device: None (Room air)      General:  pleasant obese man resting in chair, in no distress.   HEENT: Right nare NG feeding tube, swollen left face, PER and anicteric, non-injected, oral mucous membranes dry, speech slightly slurred but easily understandable  Neck: incision c.d/i  Lungs: unlabored respiration, no cough  ABD: central obesity  Skin: warm and dry, right forearm bandaged  MSK:  fluid movement of all extremities  Lymp: bilatl pedal edema   Mental status:  alert, oriented x3, communicating clearly  Psych: mildly anxious, even mood    Laboratory  Recent Labs   Lab Test 05/15/19  0630 05/14/19  0527    143   POTASSIUM 4.1 3.8   CHLORIDE 110* 111*   CO2 26 22   ANIONGAP 5 10   * 289*   BUN 23 20   CR 0.54* 0.64*   MANOJ 9.0 8.9     CBC RESULTS:   Recent Labs   Lab Test 05/15/19  0630   WBC 7.8   RBC  3.54*   HGB 10.7*   HCT 34.1*   MCV 96   MCH 30.2   MCHC 31.4*   RDW 12.3          Liver Function Studies -   Recent Labs   Lab Test 05/10/19  0346   ALBUMIN 3.2*       Active Medications  Current Facility-Administered Medications   Medication     acetaminophen (TYLENOL) solution 650 mg     aspirin (ASA) tablet 325 mg     atorvastatin (LIPITOR) tablet 20 mg     bisacodyl (DULCOLAX) EC tablet 5 mg    Or     bisacodyl (DULCOLAX) EC tablet 10 mg    Or     bisacodyl (DULCOLAX) EC tablet 15 mg     calcium carbonate (TUMS) chewable tablet 500 mg     chlorhexidine (PERIDEX) 0.12 % solution 15 mL     dextrose 10 % 1,000 mL infusion     glucose gel 15-30 g    Or     dextrose 50 % injection 25-50 mL    Or     glucagon injection 1 mg     doxazosin (CARDURA) suspension 1 mg     gabapentin (NEURONTIN) solution 300 mg     heparin sodium injection 5,000 Units     insulin aspart (NovoLOG) inj (RAPID ACTING)     insulin aspart (NovoLOG) inj (RAPID ACTING)     insulin aspart (NovoLOG) inj (RAPID ACTING)     insulin aspart (NovoLOG) inj (RAPID ACTING)     insulin glargine (LANTUS PEN) injection 20 Units     lisinopril (PRINIVIL/ZESTRIL) tablet 10 mg     magnesium sulfate 2 g in NS intermittent infusion (PharMEDium or FV Cmpd)     magnesium sulfate 4 g in 100 mL sterile water (premade)     metFORMIN (GLUCOPHAGE) solution 500 mg     mineral oil-hydrophilic petrolatum (AQUAPHOR)     multivitamins w/minerals (CERTAVITE) liquid 15 mL     naloxone (NARCAN) injection 0.1-0.4 mg     ondansetron (ZOFRAN-ODT) ODT tab 4 mg    Or     ondansetron (ZOFRAN) injection 4 mg     ondansetron (ZOFRAN) solution 4 mg     oxyCODONE (ROXICODONE) solution 5-10 mg     polyethylene glycol (MIRALAX/GLYCOLAX) Packet 17 g     potassium chloride (KLOR-CON) Packet 20-40 mEq     potassium chloride 10 mEq in 100 mL intermittent infusion with 10 mg lidocaine     potassium chloride 10 mEq in 100 mL sterile water intermittent infusion (premix)     potassium  "chloride 20 mEq in 50 mL intermittent infusion     potassium chloride ER (K-DUR/KLOR-CON M) CR tablet 20-40 mEq     potassium phosphate 10 mmol in D5W 250 mL intermittent infusion     potassium phosphate 15 mmol in D5W 250 mL intermittent infusion     potassium phosphate 20 mmol in D5W 250 mL intermittent infusion     potassium phosphate 20 mmol in D5W 500 mL intermittent infusion     potassium phosphate 25 mmol in D5W 500 mL intermittent infusion     prochlorperazine (COMPAZINE) injection 10 mg    Or     prochlorperazine (COMPAZINE) tablet 10 mg    Or     prochlorperazine (COMPAZINE) Suppository 25 mg     protein modular (ProSource No Carb) 1 packet     senna-docusate (SENOKOT-S/PERICOLACE) 8.6-50 MG per tablet 2 tablet     Current Outpatient Medications   Medication Sig Dispense Refill     order for DME Equipment being ordered: Nasogastric bolus tube feeding supplies  Formula: Isosource 1.5, 5 cans per day  Gravity feeding bags  60 mL syringes    Treatment Diagnosis: squamous cell carcinoma of the oral cavity 14 days 1     order for DME Equipment being ordered: Wound care supplies, 1 each daily x 21 days  Xeroform occlusive gauze 5\" x 9\"  Telfa non-adherent pad 8\" x 3\"  Kerlix bandage roll 4-1/2\" x 4-1/8 yd  ACE wrap, 4 inch (5 total)    Diagnosis: squamous cell carcinoma of the oral cavity 1 Month 0       Current Diet  Orders Placed This Encounter      NPO for Medical/Clinical Reasons Except for: No Exceptions        Assessment  Devonte Tucker is a 53 year old male with type 2 diabetes, obesity, hypertension, and 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    Plan  -start aspart 1 unit per 10 " grams carbohydrate for tube feed boluses (ordered last night but not given until this morning)  -aspart high correction qAC, HS  -start glargine 20 untis q24h  -start metformin suspension 500 mg q lunch  -monitor glucose qAC, HS 0200  We will follow.  Likely continue with at least bolus insulin into TCU stay, but there's some potential for managing tube feed hyperglycemia with oral agent at home.  Will need education at TCU if if appears will need insulin at home.    Brenda DAUGHERTY [facility] [phone]    Diabetes Management Team job code: [id number]  I spent a total of 80 minutes bedside and on the inpatient unit managing the glycemic care of Devonte Tucker. Over 50% of my time on the unit was spent counseling the patient  and/or coordinating care regarding acute glucose management.  See note for details.

## 2019-05-15 NOTE — CONSULTS
Diabetes consult brief note-  Pt with hx type 2 diabetes managed with metformin, A1C 7.4%.  Hyperglycemia in setting of enteral nutrition support (steroids completed several days ago) and withheld metformin.  Changed from continuous to bolus feeds today, so expect glucose to improve overnight.  For bolus feeds, start aspart 1 unit per 10 grams carbohydrate.    Full consult tomorrow.  Brenda Navarro APRN -[phone]  Diabetes Management Team job code: [id number]

## 2019-05-15 NOTE — PLAN OF CARE
"Status: POD #6 s/p left infrastructure maxillectomy, right radial forearm free flap, left neck exploration, STSG from right thigh to right forearm, NG feeding tube placement (per MD note).   VS: VSS on RA, can be dizzy on standing but denies this shift.  Neuro: A&O x4. Asymmetry to L face; RUE weakness d/t splint.  Respiratory: LS Clear, SOB with activity  GI: BM today, NPO; NG feeding switched to bolus yesterday. Patient tolerated two \"cans\" of TF this morning. Will begin third prior to 1530. Goal is 5  : Voiding spontaneously  IV: PIV removed, MD aware and removed PIV order  Skin: STSG to R leg reinforced x1 as it was falling off. R arm splinted from free flap with scant serosanguinous drainage; CMS + to RUE. Neck sutured and MALKA; aquaphor applied; CDI. Oral flap warm, pale-pink and soft. Continuous Doppler and spot checks to L neck positive (Q4H).   Pain: Some mouth pain, controlled with scheduled tylenol  Activity: Up with SBA and GB, walked through santos x2 no dizziness reported.  Plan of Care: Awaiting TCU placement. Continue to monitor, notify MD of significant changes.  "

## 2019-05-15 NOTE — PLAN OF CARE
"/67 (BP Location: Left arm)   Pulse 87   Temp 97.3  F (36.3  C) (Axillary)   Resp 16   Ht 1.702 m (5' 7\")   Wt 137 kg (302 lb 0.5 oz)   SpO2 97%   BMI 47.30 kg/m    Status: POD #6 s/p left infrastructure maxillectomy, right radial forearm free flap, left neck exploration, STSG from right thigh to right forearm, NG feeding tube placement (per MD note).   VS: VSS on RA, can be dizzy on standing  Neuro: A&O x4. Asymmetry to L face; RUE weakness d/t splint.  Respiratory: LS Clear  GI: NPO; NG feeding switched to bolus yesterday. Per yesterday RN, give bolus slowly as he was nauseated earlier today with 1/2 can. Patient's sister performed TF cares with assistance; continue to encourage cares.    : Voiding spontaneously  IV: PIV SL  Skin: STSG to R leg reinforced x1 as it was falling off. R arm splinted from free flap with scant serosanguinous drainage; CMS + to RUE. Neck sutured and MALKA; aquaphor applied; CDI. Oral flap warm, pale-pink and soft. Continuous Doppler and spot checks to L neck positive (Q4H).   Pain: Some mouth pain, controlled with scheduled tylenol  Activity: Up with SBA and GB, sometimes dizzy from position change  Plan of Care: Awaiting TCU placement. Continue to monitor, notify MD of significant changes.  "

## 2019-05-15 NOTE — PLAN OF CARE
Status: POD#5 s/p left infrastructure maxillectomy, right radial forearm free flap, left neck exploration, STSG from right thigh to right forearm, NG feeding tube placement (per MD note).   Vitals: stable on room air. Dizzy/nausea when standing earlier this evening but no episodes since. BP normal and other vitals   WDL.  Neuros: A&O x4. Asymmetry to L face/swollen; RUE weakness 4/5 d/t splint.     IV: PIV SL  Resp: LS clear  Diet: NPO; NG with bolus TF started today. 1/2 can given at 1300. 1 full can given late at 2245 as pt was sleeping earlier. Per previous RN, give bolus slowly as he was nauseated earlier today with 1/2 can. Patient's sister performed TF cares with assistance; continue to encourage cares.    Bowel status: No BM this shift; + flatus. Pt willing to attempt 1 can bolus later this evening.    : Voiding spontaneously  Skin: STSG to R leg; CDI. R arm splinted from free flap; CMS + to RUE. Neck sutured and MALKA; aquaphor applied; CDI. Oral flap warm, pale-pink and soft. Continuous Doppler and spot checks to L neck positive (Q4H). YARIEL's and wound vac removed by MD this am.  Pain: Pt c/o left mouth pain, Tylenol given x1.  Activity: Up with SBA and GB; patient walked halls x1, needed to rest multiple times while in santos and sat half way through walk.   Social: No visitors from [phone] this greta. Pt tearful this greta when speaking about first wife and 2 children that had been killed in a snowmobile accident (unknown by this writer how long ago this was). Support provided.  Plan: TF PLC completed today. Continue to monitor and follow current POC.

## 2019-05-15 NOTE — PROGRESS NOTES
Social Work: Assessment with Discharge Plan    Patient Name:  Devonte Tucker  :  1965  Age:  53 year old  MRN:  [id number]  Risk/Complexity Score:  Filed Complexity Screen Score: 4  Completed assessment with:  Patient and sisters, Jovanna and Rosangela    Presenting Information   Reason for Referral:  Assessment and Discharge planning  Date of Intake:  May 15, 2019  Referral Source:  OT  Decision Maker:  Patient  Alternate Decision Maker:  Siblings  Health Care Directive:    Living Situation:  Pt lives alone in a upper level [facility].  There are no steps outside to enter, however, once inside, pt must climb 15 steps with handrail to the upper level.  Pt has a washer and dryer in his townhouse.  Pt's townhouse has 2 bathrooms (one with a tub/shower combo and the other with a walk in shower).    Previous Functional Status:  Prior to hospitalization, pt was indep with all mobility (no assistive device, no falls in the past year), adl's, IADL's, with medication administration and with financial mgnt.  Pt drives.  Pt does not own any DME.  Pt was not utilizing any community resources  Patient and family understanding of hospitalization:  Pt states that he has surgery because he had cancer in his jaw/palate  Cultural/Language/Spiritual Considerations:  Presybeterian  Adjustment to Illness:  Appropriate for situation    Physical Health  Reason for Admission:    1. 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  Pt's parents are . Pt's support comes from friends, co-workers and siblings.  Siblings include:   - Liza ([place], [place])  - Jovanna ([place], [place])  - Rosangela ([place], [place])  - Rukhsana ([place], [place])  - Shilpa ([place])  -  Elvin ([place], [place])  Family of origin is available for support:  As stated above  Other support available:  As stated above  Gaps in support system:  None identified  Patient is caregiver to:  None     Provider Information   Primary Care Physician:  Vivi Casas   [phone]   Clinic:  [facility] [address] [facility] *      :  RN Case Manager through [facility] (pt's insurance), Yaquelin Bateman [phone], [phone].    Financial   Income Source:  Pt works full time, days.  Pt states that he has sick and vacation time and is currently using FMLA  Financial Concerns:  None indicated  Insurance:    Payor/Plan Subscriber Name Rel Member # Group #   PRE[employer] - PREFER* ODALIS KOVACS [facility] [id number]       [id number]       Discharge Plan   Patient and family discharge goal:  Short term rehab placement followed by return to home  Provided education on discharge plan:  YES  Patient agreeable to discharge plan:  YES  A list of Medicare Certified Facilities was provided to the patient and/or family to encourage patient choice. Patient's choices for facility are:  [facility], [place], [facility], [facility], [facility].  Will NH provide Skilled rehabilitation or complex medical:  YES  General information regarding anticipated insurance coverage and possible out of pocket cost was discussed. Patient and patient's family are aware patient may incur the cost of transportation to the facility, pending insurance payment: YES  Barriers to discharge:  None at this time    Discharge Recommendations   Anticipated Disposition:  Short term rehab placement      Additional comments     FIFI spoke with Deborah KAUFMAN P.A. Who indicates that pt is medically ready for discharge.  Per Deborah, pt's drains and wound vac are out, pt will discharge with a NG, incision cares and a dressing change to his forearm.    FIFI phoned [facility] [phone] and spoke with Kenny in Benefits to inquire about in network SNF's and benefits for a short term restorative SNF stay.  Kenny indicates that there are 85 in network SNF's within a 20 mile distance from pt's home zip code.  Kenny provided the first 19 which include: [facility], [facility], [facility], [facility], [facility], [facility], [facility], [place], [facility], [facility], [facility], [place], [facility], [facility] and [facility].  Kenny directed SW to call [facility] for benefits. SW phoned [facility] ([phone]) and spoke with Jaylyn, call reference number:  [id number].  Per Jaylyn, pt has met his $300.00 deductible and $1200.00 out of pocket max, thus, coverage is 100%, pt is limited to 120 days per tana. Year and precert is required and can be obtained by calling [phone].    Pt is discharging with a NG in. The following SNF's do not accept pt's with NG's:  [facility], [facility], [facility], [facility], [facility], [facility], [facility], [facility], [facility], [facility] and [facility].  [facility] does not accept MA.    FIFI is also aware that [facility] is in-network with [facility].      SW met with pt and identified preferences as stated above.  Pt selected [place] as first choice. SW referred to Admissions (Pattie).  Pattie asked the following questions which FIFI discussed with Dr Paul Zhou (ENT):  - will pt have chemo/radiation, per Dr. Zhou if these treatments were indicated they would not take place until after SNF stay  - how long will pt have the tube feeding, per Dr. Zhou, pt will have the tube feeding for an estimated 1 more week, pt will have a follow up appt in the [facility] on  5/20/19 and determination will be made as to whether or not pt's diet can be advanced  - how long will pt be non weight bearing on the right arm.  Per Dr. Zhou, pt is not non weight bearing on the right arm  - Per Pattie, [facility] is not able to do flap checks q 4 hours, per Dr Zhou, pt does not require add'l flap checks and flap checks will not be ordered on the discharge orders.  FIFI relayed Dr. Zhou's responses to Admissions (Pattie) at [facility].      Pattie indicates that they can accept pt for admit and she will seek insurance auth. FIFI updated pt and sister's .    FIFI left a message for Yaquelin Bateman RN CC at [facility] informing her of the discharge plan.    SHIVA Aldana  Social Work, 6A  Phone:  [phone]  Pager:  [phone]  5/15/2019

## 2019-05-15 NOTE — PLAN OF CARE
PT 6A:  Acknowledge PT consult today.  Pt was with other providers on x2 attempts today.  Currently SW is pursuing rehab placement.  PT will reassess 5/16

## 2019-05-16 ENCOUNTER — HOSPITAL ENCOUNTER (INPATIENT)
Facility: SKILLED NURSING FACILITY | Age: 54
LOS: 14 days | Discharge: HOME | DRG: 949 | End: 2019-05-30
Attending: HOSPITALIST | Admitting: HOSPITALIST
Payer: COMMERCIAL

## 2019-05-16 VITALS
SYSTOLIC BLOOD PRESSURE: 100 MMHG | WEIGHT: 293 LBS | RESPIRATION RATE: 16 BRPM | BODY MASS INDEX: 45.99 KG/M2 | HEART RATE: 92 BPM | DIASTOLIC BLOOD PRESSURE: 64 MMHG | HEIGHT: 67 IN | OXYGEN SATURATION: 95 % | TEMPERATURE: 98.3 F

## 2019-05-16 DIAGNOSIS — E11.8 TYPE 2 DIABETES MELLITUS WITH COMPLICATION, UNSPECIFIED WHETHER LONG TERM INSULIN USE: ICD-10-CM

## 2019-05-16 DIAGNOSIS — C03.9 PRIMARY CANCER OF ALVEOLAR RIDGE MUCOSA (H): ICD-10-CM

## 2019-05-16 DIAGNOSIS — I10 HYPERTENSION, UNSPECIFIED TYPE: Primary | ICD-10-CM

## 2019-05-16 DIAGNOSIS — C31.0 MAXILLARY SINUS CANCER (H): ICD-10-CM

## 2019-05-16 DIAGNOSIS — K59.00 CONSTIPATION, UNSPECIFIED CONSTIPATION TYPE: ICD-10-CM

## 2019-05-16 DIAGNOSIS — C03.0 SQUAMOUS CELL CARCINOMA OF MAXILLARY ALVEOLAR RIDGE (H): ICD-10-CM

## 2019-05-16 PROBLEM — R53.1 WEAKNESS: Status: ACTIVE | Noted: 2019-05-16

## 2019-05-16 LAB
ANION GAP SERPL CALCULATED.3IONS-SCNC: 5 MMOL/L (ref 3–14)
BUN SERPL-MCNC: 27 MG/DL (ref 7–30)
CALCIUM SERPL-MCNC: 9.2 MG/DL (ref 8.5–10.1)
CHLORIDE SERPL-SCNC: 109 MMOL/L (ref 94–109)
CO2 SERPL-SCNC: 27 MMOL/L (ref 20–32)
COPATH REPORT: NORMAL
CREAT SERPL-MCNC: 0.72 MG/DL (ref 0.66–1.25)
ERYTHROCYTE [DISTWIDTH] IN BLOOD BY AUTOMATED COUNT: 12.7 % (ref 10–15)
GFR SERPL CREATININE-BSD FRML MDRD: >90 ML/MIN/{1.73_M2}
GLUCOSE BLDC GLUCOMTR-MCNC: 165 MG/DL (ref 70–99)
GLUCOSE BLDC GLUCOMTR-MCNC: 174 MG/DL (ref 70–99)
GLUCOSE BLDC GLUCOMTR-MCNC: 211 MG/DL (ref 70–99)
GLUCOSE BLDC GLUCOMTR-MCNC: 220 MG/DL (ref 70–99)
GLUCOSE BLDC GLUCOMTR-MCNC: 358 MG/DL (ref 70–99)
GLUCOSE SERPL-MCNC: 179 MG/DL (ref 70–99)
HCT VFR BLD AUTO: 33.3 % (ref 40–53)
HGB BLD-MCNC: 10.4 G/DL (ref 13.3–17.7)
MAGNESIUM SERPL-MCNC: 2.2 MG/DL (ref 1.6–2.3)
MCH RBC QN AUTO: 29.9 PG (ref 26.5–33)
MCHC RBC AUTO-ENTMCNC: 31.2 G/DL (ref 31.5–36.5)
MCV RBC AUTO: 96 FL (ref 78–100)
PHOSPHATE SERPL-MCNC: 3.5 MG/DL (ref 2.5–4.5)
PLATELET # BLD AUTO: 315 10E9/L (ref 150–450)
POTASSIUM SERPL-SCNC: 4 MMOL/L (ref 3.4–5.3)
RBC # BLD AUTO: 3.48 10E12/L (ref 4.4–5.9)
SODIUM SERPL-SCNC: 141 MMOL/L (ref 133–144)
WBC # BLD AUTO: 7.7 10E9/L (ref 4–11)

## 2019-05-16 PROCEDURE — 00000146 ZZHCL STATISTIC GLUCOSE BY METER IP

## 2019-05-16 PROCEDURE — 25000132 ZZH RX MED GY IP 250 OP 250 PS 637: Performed by: PHYSICIAN ASSISTANT

## 2019-05-16 PROCEDURE — 25000131 ZZH RX MED GY IP 250 OP 636 PS 637: Performed by: OTOLARYNGOLOGY

## 2019-05-16 PROCEDURE — 25000132 ZZH RX MED GY IP 250 OP 250 PS 637: Performed by: STUDENT IN AN ORGANIZED HEALTH CARE EDUCATION/TRAINING PROGRAM

## 2019-05-16 PROCEDURE — 25000132 ZZH RX MED GY IP 250 OP 250 PS 637: Performed by: OTOLARYNGOLOGY

## 2019-05-16 PROCEDURE — 25000132 ZZH RX MED GY IP 250 OP 250 PS 637: Performed by: HOSPITALIST

## 2019-05-16 PROCEDURE — 25000128 H RX IP 250 OP 636: Performed by: STUDENT IN AN ORGANIZED HEALTH CARE EDUCATION/TRAINING PROGRAM

## 2019-05-16 PROCEDURE — 12000022 ZZH R&B SNF

## 2019-05-16 PROCEDURE — 80048 BASIC METABOLIC PNL TOTAL CA: CPT | Performed by: STUDENT IN AN ORGANIZED HEALTH CARE EDUCATION/TRAINING PROGRAM

## 2019-05-16 PROCEDURE — 85027 COMPLETE CBC AUTOMATED: CPT | Performed by: STUDENT IN AN ORGANIZED HEALTH CARE EDUCATION/TRAINING PROGRAM

## 2019-05-16 PROCEDURE — 84100 ASSAY OF PHOSPHORUS: CPT | Performed by: STUDENT IN AN ORGANIZED HEALTH CARE EDUCATION/TRAINING PROGRAM

## 2019-05-16 PROCEDURE — 83735 ASSAY OF MAGNESIUM: CPT | Performed by: STUDENT IN AN ORGANIZED HEALTH CARE EDUCATION/TRAINING PROGRAM

## 2019-05-16 PROCEDURE — 25000128 H RX IP 250 OP 636: Performed by: HOSPITALIST

## 2019-05-16 PROCEDURE — 36415 COLL VENOUS BLD VENIPUNCTURE: CPT | Performed by: STUDENT IN AN ORGANIZED HEALTH CARE EDUCATION/TRAINING PROGRAM

## 2019-05-16 PROCEDURE — 25000131 ZZH RX MED GY IP 250 OP 636 PS 637: Performed by: HOSPITALIST

## 2019-05-16 RX ORDER — ONDANSETRON 2 MG/ML
4 INJECTION INTRAMUSCULAR; INTRAVENOUS EVERY 6 HOURS PRN
Status: DISCONTINUED | OUTPATIENT
Start: 2019-05-16 | End: 2019-05-30 | Stop reason: HOSPADM

## 2019-05-16 RX ORDER — METFORMIN HYDROCHLORIDE 500 MG/5ML
500 SOLUTION ORAL
Status: DISCONTINUED | OUTPATIENT
Start: 2019-05-17 | End: 2019-05-17

## 2019-05-16 RX ORDER — AMOXICILLIN 250 MG
2 CAPSULE ORAL 2 TIMES DAILY
Status: DISCONTINUED | OUTPATIENT
Start: 2019-05-16 | End: 2019-05-30

## 2019-05-16 RX ORDER — METFORMIN HYDROCHLORIDE 500 MG/5ML
500 SOLUTION ORAL
Status: ON HOLD | DISCHARGE
Start: 2019-05-16 | End: 2019-05-30

## 2019-05-16 RX ORDER — DEXTROSE MONOHYDRATE 25 G/50ML
25-50 INJECTION, SOLUTION INTRAVENOUS
Status: DISCONTINUED | OUTPATIENT
Start: 2019-05-16 | End: 2019-05-30 | Stop reason: HOSPADM

## 2019-05-16 RX ORDER — GABAPENTIN 250 MG/5ML
300 SOLUTION ORAL EVERY 8 HOURS
Status: DISCONTINUED | OUTPATIENT
Start: 2019-05-16 | End: 2019-05-17

## 2019-05-16 RX ORDER — ASPIRIN 325 MG
325 TABLET ORAL DAILY
Status: DISCONTINUED | OUTPATIENT
Start: 2019-05-17 | End: 2019-05-30

## 2019-05-16 RX ORDER — ONDANSETRON 4 MG/1
4 TABLET, ORALLY DISINTEGRATING ORAL EVERY 6 HOURS PRN
Status: DISCONTINUED | OUTPATIENT
Start: 2019-05-16 | End: 2019-05-30 | Stop reason: HOSPADM

## 2019-05-16 RX ORDER — HEPARIN SODIUM 5000 [USP'U]/.5ML
5000 INJECTION, SOLUTION INTRAVENOUS; SUBCUTANEOUS EVERY 8 HOURS
Status: DISCONTINUED | OUTPATIENT
Start: 2019-05-16 | End: 2019-05-17

## 2019-05-16 RX ORDER — CHLORHEXIDINE GLUCONATE ORAL RINSE 1.2 MG/ML
15 SOLUTION DENTAL EVERY 8 HOURS
Status: DISCONTINUED | OUTPATIENT
Start: 2019-05-16 | End: 2019-05-30 | Stop reason: HOSPADM

## 2019-05-16 RX ORDER — MINERAL OIL/HYDROPHIL PETROLAT
OINTMENT (GRAM) TOPICAL EVERY 8 HOURS
Status: DISCONTINUED | OUTPATIENT
Start: 2019-05-16 | End: 2019-05-30 | Stop reason: HOSPADM

## 2019-05-16 RX ORDER — OXYCODONE HCL 5 MG/5 ML
5-10 SOLUTION, ORAL ORAL EVERY 4 HOURS PRN
Status: DISCONTINUED | OUTPATIENT
Start: 2019-05-16 | End: 2019-05-30

## 2019-05-16 RX ORDER — ATORVASTATIN CALCIUM 20 MG/1
20 TABLET, FILM COATED ORAL EVERY MORNING
Status: DISCONTINUED | OUTPATIENT
Start: 2019-05-17 | End: 2019-05-30

## 2019-05-16 RX ORDER — ALUMINA, MAGNESIA, AND SIMETHICONE 2400; 2400; 240 MG/30ML; MG/30ML; MG/30ML
15 SUSPENSION ORAL EVERY 4 HOURS PRN
Status: DISCONTINUED | OUTPATIENT
Start: 2019-05-16 | End: 2019-05-17

## 2019-05-16 RX ORDER — NALOXONE HYDROCHLORIDE 0.4 MG/ML
.1-.4 INJECTION, SOLUTION INTRAMUSCULAR; INTRAVENOUS; SUBCUTANEOUS
Status: DISCONTINUED | OUTPATIENT
Start: 2019-05-16 | End: 2019-05-30 | Stop reason: HOSPADM

## 2019-05-16 RX ORDER — NICOTINE POLACRILEX 4 MG
15-30 LOZENGE BUCCAL
Status: DISCONTINUED | OUTPATIENT
Start: 2019-05-16 | End: 2019-05-30 | Stop reason: HOSPADM

## 2019-05-16 RX ORDER — AMINO ACIDS/PROTEIN HYDROLYS 11G-40/45
1 LIQUID IN PACKET (ML) ORAL 4 TIMES DAILY
Status: DISCONTINUED | OUTPATIENT
Start: 2019-05-16 | End: 2019-05-28

## 2019-05-16 RX ORDER — ACETAMINOPHEN 650 MG/1
650 SUPPOSITORY RECTAL EVERY 4 HOURS PRN
Status: DISCONTINUED | OUTPATIENT
Start: 2019-05-16 | End: 2019-05-30 | Stop reason: HOSPADM

## 2019-05-16 RX ORDER — POLYETHYLENE GLYCOL 3350 17 G/17G
17 POWDER, FOR SOLUTION ORAL DAILY
Status: DISCONTINUED | OUTPATIENT
Start: 2019-05-16 | End: 2019-05-30

## 2019-05-16 RX ORDER — ACETAMINOPHEN 325 MG/1
650 TABLET ORAL EVERY 4 HOURS PRN
Status: DISCONTINUED | OUTPATIENT
Start: 2019-05-16 | End: 2019-05-17

## 2019-05-16 RX ORDER — LISINOPRIL 10 MG/1
10 TABLET ORAL EVERY MORNING
Status: DISCONTINUED | OUTPATIENT
Start: 2019-05-17 | End: 2019-05-30

## 2019-05-16 RX ADMIN — Medication 5000 UNITS: at 00:53

## 2019-05-16 RX ADMIN — WHITE PETROLATUM: 1.75 OINTMENT TOPICAL at 21:39

## 2019-05-16 RX ADMIN — WHITE PETROLATUM: 1.75 OINTMENT TOPICAL at 11:32

## 2019-05-16 RX ADMIN — METFORMIN HYDROCHLORIDE 500 MG: 500 SOLUTION ORAL at 11:40

## 2019-05-16 RX ADMIN — ACETAMINOPHEN 650 MG: 325 SOLUTION ORAL at 21:37

## 2019-05-16 RX ADMIN — Medication 1 PACKET: at 21:37

## 2019-05-16 RX ADMIN — WHITE PETROLATUM: 1.75 OINTMENT TOPICAL at 04:40

## 2019-05-16 RX ADMIN — CHLORHEXIDINE GLUCONATE 0.12% ORAL RINSE 15 ML: 1.2 LIQUID ORAL at 19:29

## 2019-05-16 RX ADMIN — Medication 1 PACKET: at 19:28

## 2019-05-16 RX ADMIN — Medication 1 MG: at 08:19

## 2019-05-16 RX ADMIN — LISINOPRIL 10 MG: 5 TABLET ORAL at 08:22

## 2019-05-16 RX ADMIN — INSULIN GLARGINE 20 UNITS: 100 INJECTION, SOLUTION SUBCUTANEOUS at 10:16

## 2019-05-16 RX ADMIN — ASPIRIN 325 MG ORAL TABLET 325 MG: 325 PILL ORAL at 08:21

## 2019-05-16 RX ADMIN — GABAPENTIN 300 MG: 250 SUSPENSION ORAL at 19:27

## 2019-05-16 RX ADMIN — Medication 5000 UNITS: at 08:30

## 2019-05-16 RX ADMIN — OXYCODONE HYDROCHLORIDE 10 MG: 5 SOLUTION ORAL at 22:31

## 2019-05-16 RX ADMIN — ACETAMINOPHEN 650 MG: 160 SOLUTION ORAL at 04:33

## 2019-05-16 RX ADMIN — MULTIVITAMIN 15 ML: LIQUID ORAL at 08:30

## 2019-05-16 RX ADMIN — ACETAMINOPHEN 650 MG: 160 SOLUTION ORAL at 00:49

## 2019-05-16 RX ADMIN — Medication 1 PACKET: at 11:44

## 2019-05-16 RX ADMIN — ACETAMINOPHEN 650 MG: 160 SOLUTION ORAL at 11:40

## 2019-05-16 RX ADMIN — ACETAMINOPHEN 650 MG: 160 SOLUTION ORAL at 08:20

## 2019-05-16 RX ADMIN — ONDANSETRON HYDROCHLORIDE 4 MG: 4 SOLUTION ORAL at 06:51

## 2019-05-16 RX ADMIN — CHLORHEXIDINE GLUCONATE 0.12% ORAL RINSE 15 ML: 1.2 LIQUID ORAL at 08:25

## 2019-05-16 RX ADMIN — CHLORHEXIDINE GLUCONATE 0.12% ORAL RINSE 15 ML: 1.2 LIQUID ORAL at 00:53

## 2019-05-16 RX ADMIN — ACETAMINOPHEN 650 MG: 325 SOLUTION ORAL at 17:37

## 2019-05-16 RX ADMIN — Medication 1 PACKET: at 08:30

## 2019-05-16 RX ADMIN — GABAPENTIN 300 MG: 250 SUSPENSION ORAL at 08:30

## 2019-05-16 RX ADMIN — HEPARIN SODIUM 5000 UNITS: 5000 INJECTION, SOLUTION INTRAVENOUS; SUBCUTANEOUS at 19:26

## 2019-05-16 RX ADMIN — ATORVASTATIN CALCIUM 20 MG: 20 TABLET, FILM COATED ORAL at 08:23

## 2019-05-16 RX ADMIN — SENNOSIDES AND DOCUSATE SODIUM 2 TABLET: 8.6; 5 TABLET ORAL at 21:38

## 2019-05-16 RX ADMIN — INSULIN ASPART 1 UNITS: 100 INJECTION, SOLUTION INTRAVENOUS; SUBCUTANEOUS at 21:40

## 2019-05-16 RX ADMIN — GABAPENTIN 300 MG: 250 SUSPENSION ORAL at 00:53

## 2019-05-16 RX ADMIN — ONDANSETRON 4 MG: 4 TABLET, ORALLY DISINTEGRATING ORAL at 19:48

## 2019-05-16 ASSESSMENT — PAIN DESCRIPTION - DESCRIPTORS
DESCRIPTORS: ACHING
DESCRIPTORS: ACHING

## 2019-05-16 ASSESSMENT — ACTIVITIES OF DAILY LIVING (ADL)
ADLS_ACUITY_SCORE: 15

## 2019-05-16 NOTE — CONSULTS
Inpatient Diabetes Note  Received consult, no need for new consult. Have been following on [place], will continue to follow while in [facility].  Lisa Farias, SABIHA  Pager [phone]

## 2019-05-16 NOTE — PROGRESS NOTES
Social Work Services Discharge Note      Patient Name:  Devonte Tucker     Anticipated Discharge Date:  5/16/19    Discharge Disposition:   [facility] ([phone])    Following MD:  Facility assignment.     Pre-Admission Screening (PAS) online form has been completed.  The Level of Care (LOC) is:  Determined  Confirmation Code is:  [id number].  Patient/caregiver informed of referral to [facility] for Pre-Admission Screening for skilled nursing facility (SNF) placement and to expect a phone call post discharge from SNF.     Additional Services/Equipment Arranged:  [facility] has confirmed readiness for discharge. Admissions (Pattie) at [facility] has confirmed acceptance for admission and receipt of prior auth from insurance.  has arranged for [facility] (james [phone]) to provide w/c transport at 3:30pm.     Patient / Family response to discharge plan:  Pt and sisters (Jovanna and Rosangela) voice understanding of the discharge plan and agreement with the discharge plan.     Persons notified of above discharge plan:  Pt, sisters, 6A nursing and Deborah VALDERRAMA    Staff Discharge Instructions:  Please fax discharge orders and signed hard scripts for any controlled substances.  Please print a packet and send with patient.     CTS Handoff completed:  YES    Medicare Notice of Rights provided to the patient/family:  NO, as per Admissions Facesheet, pt is not on Medicare.    SHIVA Aldana  Social Work, 6A  Phone:  [phone]  Pager:  [phone]  5/16/2019

## 2019-05-16 NOTE — LETTER
"  ATTN: [employer] KAUSHAL Tucker  1965  AUTH# [id number]    Please see attached PT/OT daily documentation with session minutes.  Medical/Clinical updates to be faxed separately.    Please call or email with any additional questions or requests.     Sincerely,  Lynne Crespo MSN, RN  MDS Coordinator - [facility]  PH: [phone]  FAX: [phone]  EMAIL: [email or web address]    PT DAILY DOCUMENTATION     05/17/19 1049   Signing Clinician's Name / Credentials   Signing clinician's name / credentials Willow Mantilla, PT   Quick Adds   Rehab Discipline PT   Therapeutic Exercise   Treatment Detail PT Pt performed ankle pumps , circles x 30  for warm up in sitting.   Pt educated to elevate R hand as able (edema ) and to perform gentle finger movements more frequently (every 30 min at minumum) to get swelling down. PT verbalizing understanding.    Additional Documentation   Rehab Comments alerted RN that pt with LE edema, could use spandigrip, she will follow up. LE elevated on pillow, RUE elevated, supported.    PT Plan PT: short dist amb with cane and have helper bring wc follow (had lightheaed episode on 5/17). , watch for dizziness, LE strengthening, stairs as able.    Total Session Time   Total Session Time (minutes) 55 minutes  (eval 30, theract 15,, gait 10, tehrex 5)   ARC or TCU Only   What unit is patient on? TCU   Bed Mobility: Sit to lying   Staff Performance One person alberto (one person physical assist)   Describe Performance PT: A for set up, Maximus for LEs, slow.  A for getting pillows comfortable and for pillows for RUE, BLEs.    Transfers: Sit to Stand   Staff Performance One person assist (one person physical assist)   Equipment Used Straight cane;Quad cane   Describe Performance PT; Maximus sit to stand, stand to sit.    Ambulation   Walks in room:Patient Performance Partial/moderate assist \"includes weight bearing assist of trunk or limbs\"   Walks in room: Staff Performance One person " assist (one person physical assist)   Mobility device used straight cane;quad cane   Describe Performance PT; Pt amb with A of 1 (and helper with feeding tube pole).  Pt amb initially with qc , but slow, and pt with difficulty sequencing, changed to sec, more fluent gait, slow pace, standing rest breaks due to WIGGINS  about every 40 ft x 160 ft total, but lightheaded at end, needed chair brought to him to sit due to lightheaded.  BP checked in sitting buut OK, not able to check ins tanidng.  Pt with slow gait, increased lateral sway and wide yudy. cga to Maximus of 1 and another helper with pole.   NWB RUE.    Walk 10 Feet   Describe Performance PT; Pt amb with A of 1 (and helper with feeding tube pole).  Pt amb initially with qc , but slow, and pt with difficulty sequencing, changed to sec, more fluent gait, slow pace, standing rest breaks due to WIGGINS  about every 40 ft x 160 ft total, but lightheaded at end, needed chair brought to him to sit due to lightheaded.  BP checked in sitting buut OK, not able to check ins tanidng.  Pt with slow gait, increased lateral sway and wide yudy. cga to Maximus of 1 and another helper with pole.    Walk 10 Feet on uneven surfaces   Describe Performance NT , fatigue, lightheaded.    Walk 50 Feet with Two Turns   Describe Performance PT; Pt amb with A of 1 (and helper with feeding tube pole).  Pt amb initially with qc , but slow, and pt with difficulty sequencing, changed to sec, more fluent gait, slow pace, standing rest breaks due to WIGGINS  about every 40 ft x 160 ft total, but lightheaded at end, needed chair brought to him to sit due to lightheaded.  BP checked in sitting buut OK, not able to check ins tanidng.  Pt with slow gait, increased lateral sway and wide yudy. cga to Maximus of 1 and another helper with pole.    Walk 150 Feet   Describe Performance PT; Pt amb with A of 1 (and helper with feeding tube pole).  Pt amb initially with qc , but slow, and pt with difficulty sequencing, changed  to sec, more fluent gait, slow pace, standing rest breaks due to WIGGINS  about every 40 ft x 160 ft total, but lightheaded at end, needed chair brought to him to sit due to lightheaded.  BP checked in sitting buut OK, not able to check ins tanidng.  Pt with slow gait, increased lateral sway and wide yudy. cga to Maximus of 1 and another helper with pole.    Wheel 150 Feet   Reason Not Done Activity not applicable   1 Step (curb)   Reason Not Done Medical condition   4 Steps   Reason Not Done Medical condition   12 Steps   Reason Not Done Medical condition   Car Transfer   Reason Not Done Medical condition   Picking up Object   Reason Not Done Medical condition      05/18/19 1005   Signing Clinician's Name / Credentials   Signing clinician's name / credentials Jasbir Hope DPT   Quick Adds   Rehab Discipline PT   Therapeutic Exercise   Minutes of Treatment 15   Symptoms Noted During/After Treatment fatigue   Treatment Detail PT:  Pt performed seated LE strengthening including x12 of toe taps, heel taps, marching, LAQ, resisted HC and resistance ab/adduction.  Pt issued HEP for seated LE strengthening for Sunday and evenings.   Additional Documentation   PT Plan PT:  LE strength, gait with SEC, stairs, Nustep, STS w/ no UE support   Total Session Time   Total Session Time (minutes) 55 minutes  (30 gait, 15 TP, 10 TA)   Transfers: Sit to Stand   Patient Performance Steadying Assist   Staff Performance One person assist (one person physical assist)   Describe Performance PT:  Pt completed x 3 sit<>stand from elevated height with no UE support and CGA.   Ambulation   Walks in room:Patient Performance Steadying assist   Walks in room: Staff Performance One person assist (one person physical assist)   Walk in santos: Patient Performance Steadying assist   Walks in santos: Staff Performance One person assist (one person physical assist)   Mobility device used straight cane   Describe Performance PT:  Pt was able to amb 85', 100' with  "SEC and CGA.  Pt had moderate instability, but no overt LOB.  Pt limited due to SOB, but improved second bout with shrter reciprocal steps.  Pt had heavy focus on transitiong form step-to pattern to reciprocal during gait.  Pt had much better sequencing with SEC.   Walk 10 Feet   Patient Performance Steadying Assist   Walk 10 Feet on uneven surfaces   Patient Performance Steadying Assist   Walk 50 Feet with Two Turns   Patient Performance Steadying Assist   Walk 150 Feet   Patient Performance Steadying Assist   1 Step (curb)   Patient Performance Steadying Assist   Describe Performance PT:  PT was educated on sequencing with feet and SEC for curb step.  Pt was able to complete with either LE uing SEC and CGA with no ovet LOB.   4 Steps   Patient Performance Steadying Assist   Describe Performance PT:  Pt was able to navigate 5 6\" steps using step-to pattern with either LE and LUE on railing.  Pt had CGA, but no LOb throughout bout of stairs.      05/20/19 2530   Signing Clinician's Name / Credentials   Signing clinician's name / credentials Maddy Daniels DPT   Quick Adds   Rehab Discipline PT   Therapeutic Activity   Minutes of Treatment 25 minutes   Treatment Detail PT: See GG below, collaborated and discussed pt's equipment needs, expected discharge status, and progression through therapy in response to pt's questions. Pt reports feeling that balance has significantly declined.    Therapeutic Exercise   Minutes of Treatment 30   Treatment Detail PT: See GG below. Pt performed NuStep bike for 10 continuous minutes with B LE and L UE only with R UE propped on pillows for comfort. WL: 1, OMNI: 4/10. Avg SPM: 44, Avg METs: 1.4. No increased complaints of pain following activity. Pt states \"I really enjoyed that.\"   Additional Documentation   PT Plan PT: GIBBS test, dynamic balance, Endurance training with NuStep/Gait with SEC   Total Session Time   Total Session Time (minutes) 55 minutes  ((25 TA, 30TE))   ARC or TCU " "Only   What unit is patient on? TCU   Transfers: Sit to Stand   Patient Performance Supervision or verbal cues   Staff Performance Set up help only   Equipment Used Straight cane   Describe Performance PT: Sit <> Stand from chair to SEC with SBA.    Ambulation   Walks in room:Patient Performance Supervision or verbal cues   Walks in room: Staff Performance Set up help only   Walk in santos: Patient Performance Supervision or verbal cues   Walks in santos: Staff Performance Set up help only   Mobility device used straight cane   Describe Performance PT: Pt ambulated 87 feet x 1, 65 ft x 1, 258 ft x 1  with SEC, SBA. Pt demos mild lateral instability and fatigues quickly with ambulation requiring seated rest breaks. Discussed energy conservation techniques and reaffirmed pt's correct use of cane pattern.   Walk 10 Feet   Patient Performance Supervision or verbal cues   Describe Performance PT: See ambulation   Walk 50 Feet with Two Turns   Patient Performance Supervision or verbal cues   Describe Performance PT: See ambulation   Locomotion   Move on unit: Patient Performance Supervision or verbal cues   Move on unit: Staff Performance Set up help only   Move off unit: Patient Performance Supervision or verbal cues   Move off unit: Staff Performance Set up help only   Mobility device used straight cane   Describe Performance PT: See ambulation   Wheel 50 Feet   Reason Not Done Activity not applicable   Wheel 150 Feet   Reason Not Done Activity not applicable   Upper Body Dressing   Patient Performance Partial/moderate assist \"includes weight bearing assist of trunk or limbs\"   Staff Performance One person assist (one person physical assist)   Describe Performance PT: Min A for pt to remove gown and don shirt, assist with NG tube management and R sided assist      05/21/19 1100   Signing Clinician's Name / Credentials   Signing clinician's name / credentials Maddy Daniels DPT   Quick Adds   Rehab Discipline PT "   Therapeutic Exercise   Minutes of Treatment 40   Treatment Detail PT: Pt instructed in supine mat exercises for low back discomfort. Hookline stretching, B hookline lateral knee rocking, PROM B unilateral knee to chest, and sheet assisted AROM knee to chest stretch - all 30 sec x 2. Held in knee to hookline position, PROM ER/IR to reduce mm spasm to comfort. Pt reports significant reduction of sxs with initiation of exercises. Collaborated with ways to decrease pressure on low back in bed, including strategies to ensure proper placement in bed. Pt ambulated 227 ft, 215ft, 152 ft with SEC, SUP in hallways. Pt is limited by fatigue  and mild lateral sway with no obvious LOB. Pt performed NuStep bike for 10 continuous minutes to improve endurance to activity. Performed with B LE and L UE only. WL: 3, OMNI: 4/10. Avg SPM: 44, AVG METs: 1.9   Physical Performance Test or Measurement   Minutes of Treatment 20   Treatment Detail PT: Pt performed the GIBBS balance assesmsnet and scored a 55/56. Please see Note for additional details   Additional Documentation   Rehab Comments GIBBS assesed this date: 55/56   PT Plan PT: Dynamic balance, lumbar stretching, endurance training   Total Session Time   Total Session Time (minutes) 60 minutes  ((20 Performance test, 40 TE))   ARC or TCU Only   What unit is patient on? TCU   Bed Mobility: Sit to lying   Patient Performance Supervision or verbal cues   Staff Performance Set up help only   Describe Performance PT: Sit <> SUpine on mat table with incresed time to perform, set up assist needed. Pt has difficulty with trunk elevation to sitting but is able to do unassisted.   Bed Mobility: Lying to sitting on the side of bed   Patient Performance Supervision or verbal cues   Staff Performance Set up help only   Describe Performance PT: Sit <> SUpine on mat table with incresed time to perform, set up assist needed. Pt has difficulty with trunk elevation to sitting but is able to do  unassisted.   Transfers: Sit to Stand   Patient Performance Independent   Staff Performance No set up or physical help (No set up or physical help from staff)   Equipment Used Straight cane   Describe Performance PT: IND with STS with SEC from chair   Ambulation   Walks in room:Patient Performance Independent   Walks in room: Staff Performance No set up or physical help ( No set up or physical help from staff)   Walk in santos: Patient Performance Supervision or verbal cues   Walks in santos: Staff Performance Set up help only   Mobility device used straight cane   Describe Performance PT: see ther ex above   Walk 10 Feet   Patient Performance Independent   Describe Performance PT: see ther ex above   Walk 50 Feet with Two Turns   Patient Performance Supervision or verbal cues   Describe Performance PT: see ther ex above   Walk 150 Feet   Patient Performance Supervision or verbal cues   Describe Performance PT: see ther ex above   Locomotion   Move on unit: Patient Performance Supervision or verbal cues   Move on unit: Staff Performance Set up help only   Move off unit: Patient Performance Supervision or verbal cues   Move off unit: Staff Performance Set up help only   Mobility device used straight cane   Describe Performance PT: see ther ex above     OT DAILY DOCUMENTATION     05/17/19 0945   Signing Clinician's Name / Credentials   Signing clinician's name / credentials riky hernandez OTRL   Quick Adds   Rehab Discipline OT   ADL Training   Minutes of Treatment 30   Symptoms Noted During/After Treatment fatigue   Treatment ADL training within precautions;Tasks graded to facilitate independence   Treatment Detail OT patient with a lot of nausea during OT assessment. limited physical participation due to nausea. POC OT goals discussed and in agreement. made OT list for family to bring clothing and ADL items needed for OT training. discussed possible AE needed for mobility reviewed al current restriction and precautionl     Patient Response Able to follow techniques as instructed   Additional Documentation   Rehab Comments OT R UE NWB    OT Plan OT BADL routine dressing trianing toileting trianing    Total Session Time   Total Session Time (minutes) 60 minutes  (OT 30 eval 30 ADL)   ARC or TCU Only   What unit is patient on? TCU      05/18/19 1430   Signing Clinician's Name / Credentials   Signing clinician's name / credentials DUDLEY Benítez/L   Quick Adds   Rehab Discipline OT   ADL Training   Minutes of Treatment 63   Symptoms Noted During/After Treatment fatigue;increased pain  (increased pain in RUE)   Treatment Instruction in compensatory methods;ADL training within precautions;Adaptive equipment training;Energy conservation/work simplification;Environment structured;Tasks graded to facilitate independence;Training in one handed ADL techniques   Treatment Detail MALKA: dressing, pursed-lip breathing techniques; pt educated on pursed lip breathing and demod using it throughout task   Patient Response Able to follow techniques as instructed;Increased independence;Increased tolerance for ADL activity   Patient Response Comments Pt reported increased pain in RUE following dressing. Instructed pt to eleveate RUE.    Additional Documentation   Rehab Comments OT: Additional time needed to complete dressing.    OT Plan OT: Cont indep with dressing using AE and one handed techqniues, self cares, transfers   Total Session Time   Total Session Time (minutes) 63 minutes  (63 adl)   ARC or TCU Only   What unit is patient on? TCU   Transfers: Sit to Stand   Patient Performance Steadying Assist   Staff Performance Set up help only   Describe Performance MALKA: CGA sit <> stand x3 with mod use of LUE to push off from EOB/chair.   Ambulation   Walks in room:Patient Performance Steadying assist   Walks in room: Staff Performance No set up or physical help ( No set up or physical help from staff)   Mobility device used straight cane   Describe  "Performance MALKA: CGA from chair to EOB with straight cane.   Upper Body Dressing   Patient Performance Partial/moderate assist \"includes weight bearing assist of trunk or limbs\"   Staff Performance One person assist (one person physical assist)   Describe Performance MALKA: Min A UB dressing seated EOB with education and training on donning shirt with one hand. Pt able to follow technique with occasional reminder verbal cues. Pt required min A to manage shirt over head with tubing.   Lower Body Dressing (Pants/Undergarments)   Patient Performance Supervision or verbal cues   Staff Performance Set up help only   Describe Performance MALKA: SBA LB dressing seated EOB. Provided education and demo of reacher to ease donning pants. Pt able to follow technique using reacher with LUE and occasional verbal cues to verify correct technique.    Lower Body Dressing (Putting On/Taking-Off Footwear)   Patient Performance Partial/moderate assist \"includes weight bearing assist of trunk or limbs\"   Staff Performance One person assist (one person physical assist)   Describe Performance MALKA: Min A with set up and tying shoe laces. Pt educated and demo on use of sock aid to don socks. Pt required increase time to don socks due to one handed. Pt able to don sock onto sock aid using RUE as stabilizer and LUE to pull sock over aid. Pt able to use long handled shoe horn to don tennis shoes.      05/20/19 0837   Signing Clinician's Name / Credentials   Signing clinician's name / credentials DUDLEY Benítez/L   Quick Adds   Rehab Discipline OT   ADL Training   Minutes of Treatment 59   Symptoms Noted During/After Treatment fatigue;shortness of breath   Treatment Instruction in compensatory methods;ADL training within precautions;Energy conservation/work simplification;Environment structured;Tasks graded to facilitate independence;Training in one handed ADL techniques   Treatment Detail MALKA: Pt seen for ADL session. Dressing, " "grooming/hygiene at sink. See GG below.   Patient Response Able to follow techniques as instructed;Demonstrates carryover from previous session;Increased independence;Increased tolerance for ADL activity;Incorporates energy conservation into ADL   Patient Response Comments Pt demod pursed-lip breathing technique throughout session.   Additional Documentation   Rehab Comments OT: Pt demod good carryover from previous session of one handed techniques and AE use.   OT Plan OT: toileting, bed mobility, cont indep with dressing, meal prep, laundry task. Consider elastic shoelaces.    Total Session Time   Total Session Time (minutes) 59 minutes  (59 min adl)   ARC or TCU Only   What unit is patient on? TCU   Transfers: Sit to Stand   Patient Performance Supervision or verbal cues   Staff Performance Set up help only   Equipment Used Straight cane   Describe Performance MALKA: SBA sit <> stand x5. Pt completed while following precautions of no use of RUE.    Ambulation   Walks in room:Patient Performance Steadying assist   Walks in room: Staff Performance One person assist (one person physical assist)   Mobility device used straight cane   Describe Performance MALKA: CGA for safety amb short distances in room. No LOB noted.   Upper Body Dressing   Patient Performance Partial/moderate assist \"includes weight bearing assist of trunk or limbs\"   Staff Performance One person assist (one person physical assist)   Describe Performance MALKA: Pt educated on doffing UB dressing with one handed technique. Pt verbalized understanding. Min A to don gown. Pt required verbal cues for technique and required min A to manage gown around the back.   Lower Body Dressing (Pants/Undergarments)   Patient Performance Steadying Assist   Staff Performance Set up help only   Describe Performance MALKA: CGA and verbal cues for LB dressing to don boxers and pants while seated EOB. Pt demod carryover from previous session of one handed technique using " reacher. Pt demod weight shifting and good use of reacher to ease performance. CGA while standing to pull up pants.  Pt unable to manage ties on gown and pants d/t no use of RUE.   Lower Body Dressing (Putting On/Taking-Off Footwear)   Patient Performance Supervision or verbal cues   Staff Performance Set up help only   Describe Performance MALKA: SBA to don socks and shoes. Pt required verbal cue for correct way of donning sock onto sock aid and to adjust sock aid strings in hand while pulling up. Pt might benefit from elastic shoelaces to ease indep with donning shoes d/t limited use of RUE.   Hygiene/Grooming   Patient Performance Supervision or verbal cues   Staff Performance Set up help only   Describe Performance MALKA: SBA during grooming/hygiene task while standing at sink.       05/21/19 1110   Signing Clinician's Name / Credentials   Signing clinician's name / credentials DUDLEY Benítez/L   Quick Adds   Rehab Discipline OT   ADL Training   Minutes of Treatment 49   Symptoms Noted During/After Treatment shortness of breath   Treatment Energy conservation/work simplification;Environment structured;Tasks graded to facilitate independence;Training in one handed ADL techniques   Treatment Detail MALKA: Pt seen for ADL session to increase tolerance and indep with self cares. Pt demod good carryover from previous session of one handed techniques. Pt completed retrieval of clothing with SBA and cane. Pt provided set up on L side for ease. See GG code below.   Patient Response Demonstrates carryover from previous session;Increased independence;Increased tolerance for ADL activity;Incorporates energy conservation into ADL   Additional Documentation   OT Plan OT: laundry task, light meal prep, self cares, dynavision for LUE reaching.   Total Session Time   Total Session Time (minutes) 49 minutes  (49 adl)   ARC or TCU Only   What unit is patient on? TCU   Transfers: Sit to Stand   Patient Performance Independent    Staff Performance No set up or physical help (No set up or physical help from staff)   Equipment Used Straight cane   Describe Performance MALKA: Indep sit <> stand with SEC.   Ambulation   Walks in room:Patient Performance Independent   Walks in room: Staff Performance No set up or physical help ( No set up or physical help from staff)   Mobility device used straight cane   Describe Performance MALKA: Indep amb short distances in room with SEC.   Upper Body Dressing   Patient Performance Supervision or verbal cues   Staff Performance Set up help only   Describe Performance MALKA: SBA while seated EOB. Pt required 1 vc for set up for one handed technique to don shirt. Pt able to complete independently following cue.   Lower Body Dressing (Pants/Undergarments)   Patient Performance Supervision or verbal cues   Staff Performance Set up help only   Describe Performance AMLKA: SBA to don briefs and shorts while seated EOB. Educated pt on only using AE if need be for ease. Pt requested to try donning pants w/o AE today. Pt able to complete LB dressing w/o AE while seated EOB.   Lower Body Dressing (Putting On/Taking-Off Footwear)   Patient Performance Supervision or verbal cues   Staff Performance Set up help only   Describe Performance MALKA: SBA to don shoes and socks while seated EOB. Pt demod good use of sock aid with increased ease from last session. SBA don shoes with long handled shoe horn. Min A to adjust shoelaces and tongue.   Hygiene/Grooming   Patient Performance Supervision or verbal cues   Staff Performance Set up help only   Describe Performance MALKA: SBA grooming/UB bathing while seated in chair at sink. Pt able to maintain precaution of no use of RUE during task. Pt demod ability to use LUE to wash L side of UB with slight difficulty but able to complete on own.        05/22/19 1000   Signing Clinician's Name / Credentials   Signing clinician's name / credentials DUDLEY Noel   Quick Adds   Rehab  "Discipline OT   ADL Training   Minutes of Treatment 60   Symptoms Noted During/After Treatment other (see comments)  (nausea)   Treatment Adaptive equipment training;Energy conservation/work simplification   Treatment Detail OT: pt reporting having a \"rough night\" but still willing to participate in therapies -  morning ADL routine, see below for details. discussion w/pt on home set up for showering - provided w/hand out for extended tub bench - provided w/home measurments sheet which sister will  and fill out later   Patient Response Demonstrates carryover from previous session;Increased independence   Additional Documentation   OT Plan OT: laundry task, light meal prep, self cares, dynavision for LUE reaching.   Total Session Time   Total Session Time (minutes) 60 minutes  (60 adl)   [facility] or TCU Only   What unit is patient on? TCU   Transfers: Sit to Stand   Patient Performance Independent   Staff Performance No set up or physical help (No set up or physical help from staff)   Equipment Used Straight cane   Describe Performance OT: IND w/ STS from various surfaces                 "

## 2019-05-16 NOTE — PROGRESS NOTES
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                          Plan:  -glargine 20 units every 24 hours  -metformin suspension 500 mg at lunch  - increased Novolog to 1 unit per 6 grams of CH) or ( 7 units of Novolog for 1 can and 14 units of Novolog for 2 cans of TF  -Novolog high intensity sliding scale before TF and at HS  -will continue to follow     Outpatient diabetes follow up: TBD  Plan discussed with patient, bedside RN, primary team and pharmacy.    Plan for [facility]           Interval History:   The last 24 hours progress and nursing notes reviewed.  Started on bolus TF yesterday  Per bedside RN, was feeling full this morning, therefore only gave one can of TF to make sure he would tolerate  A, glucose 179/174, gave correction insulin and 5 units for Tf ( should have been 6 unties )   Tolerated TF and gave second can ( tested glucose first and found to be 358)  Will increase the amount of insulin to be given for TF    If no side effects tomorrow, will increase metformin      PTA:  Glipizide XL 20 mg am  Metformin 2 grams every morning    Recent Labs   Lab 05/16/19  0816 05/16/19  0603 05/15/19  2200 05/15/19  1658 05/15/19  1159 05/15/19  0756 05/15/19  0630 05/15/19  0313  05/14/19  0527  05/13/19  0441  05/12/19  0436  05/11/19  0315   GLC  --  179*  --   --   --   --  228*  --   --  289*  --  225*  --  155*  --  219*   *  --  265* 352* 312* 212*  --  236*   < >  --    < >  --    < >  --    < >  --     <  "> = values in this interval not displayed.               Review of Systems:   See interval hx          Medications:     Orders Placed This Encounter      NPO for Medical/Clinical Reasons Except for: No Exceptions       Physical Exam:  Gen: Alert, resting in chair, NAD   HEENT:  mucous membranes are moist  Resp: non-labored  Ext: moving all extremeis  Neuro:oriented x3, communicating clearly  /68   Pulse 92   Temp 96.7  F (35.9  C) (Axillary)   Resp 16   Ht 1.702 m (5' 7\")   Wt 132.9 kg (293 lb)   SpO2 98%   BMI 45.89 kg/m             Data:     Lab Results   Component Value Date    A1C 7.4 04/24/2019              CBC RESULTS:   Recent Labs   Lab Test 05/16/19  0603   WBC 7.7   RBC 3.48*   HGB 10.4*   HCT 33.3*   MCV 96   MCH 29.9   MCHC 31.2*   RDW 12.7        Recent Labs   Lab Test 05/16/19  0603 05/15/19  0630    141   POTASSIUM 4.0 4.1   CHLORIDE 109 110*   CO2 27 26   ANIONGAP 5 5   * 228*   BUN 27 23   CR 0.72 0.54*   MANOJ 9.2 9.0     Liver Function Studies -   Recent Labs   Lab Test 05/10/19  0346   ALBUMIN 3.2*     Lab Results   Component Value Date    INR 1.10 04/04/2019         Lisa Farias -[id number]  Diabetes Management job code [id number]            "

## 2019-05-16 NOTE — DISCHARGE SUMMARY
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 It was recommended that he undergo operative intervention and the patient consented to the above procedure after detailed explanation of the risks and benefits of said procedure.    Hospital Course: The patient was admitted to the hospital and underwent the above mentioned procedure. He tolerated the procedure without any intra- or marilee-operative complications. Please see the operative report for full details of the procedure. The patient was admitted for post-operative monitoring. His postoperative course was complicated by elevated blood glucose levels post-operatively. The endocrinology team was consulted for assistance with glucose management and the patient was started on an insulin regimen that was titrated. PT and OT recommend discharge to a TCU for ongoing rehab after discharge.  At discharge, the patient's pain was well controlled, the patient was voiding on his own, and was ambulating and tolerating a bolus tube feeding diet.     Discharge Exam:  Vitals:    05/15/19 1520 05/15/19 2301 05/16/19 0445 05/16/19 0822   BP: 112/72 124/74 113/72 116/68   BP Location: Left arm Left arm Left arm    Pulse:   92    Resp: 16 16 16    Temp: 98.6  F (37  C) 97.6  F (36.4  C) 96.7  F (35.9  C)    TempSrc: Axillary Axillary Axillary    SpO2: 96% 95% 98%    Weight:        Height:           General: Alert and oriented, NAD  HEENT: Oral flap pale, soft, warm, pale with strong implantable doppler signal. No signs of flap congestion or dehiscenence. Flap is stable, no additional swelling noted. Neck incisions clean, dry, intact. Neck soft and flat without evidence of hematoma or fluid collection. Acceptable arterial hand held doppler signal obtained over flap pedicle markings on the left neck. Strong implantable doppler signal.   Pulmonary: Non-labored breathing on room air   Extremities: Dressing in place, good cap refill and sensation to finger tips. Right thigh STSG donor site with tegaderm dressing in place.      Devonte Tucker   Home Medication Instructions BAN:[id number]    Printed on:05/16/19 8898   Medication Information                      acetaminophen (TYLENOL) 32 mg/mL liquid  20.3 mLs (650 mg) by Per Feeding Tube route every 4 hours             aspirin (ASA) 325 MG tablet  1 tablet (325 mg) by Per NG tube route daily             atorvastatin (LIPITOR) 20 MG tablet  1 tablet (20 mg) by Per Feeding Tube route every morning             chlorhexidine (PERIDEX) 0.12 % solution  Swish and spit 15 mLs in mouth every 8 hours             doxazosin (CARDURA) 0.6 mg/mL SUSP suspension  1.67 mLs (1 mg) by Per Feeding Tube route daily             gabapentin (NEURONTIN) 250 MG/5ML solution  6 mLs (300 mg) by Per Feeding Tube route every 8 hours             insulin aspart (NOVOLOG PEN) 100 UNIT/ML pen  Inject 1-10 Units Subcutaneous 3 times daily (before meals)             insulin aspart (NOVOLOG PEN) 100 UNIT/ML pen  Inject 1-7 Units Subcutaneous At Bedtime             insulin aspart (NOVOLOG PEN) 100 UNIT/ML pen  1 unit per 8 grams of carbohydrate with snacks/supplements. Do not give if pre-prandial glucose is less than 60 mg/dL.             insulin aspart (NOVOLOG PEN) 100 UNIT/ML pen  Inject 7-14 Units Subcutaneous 3 times daily (with meals) Give 7 units per 1 can of  "Isosource 1.5 (250 ml), give 14 units for 2 cans             insulin glargine (LANTUS SOLOSTAR PEN) 100 UNIT/ML pen  Inject 20 Units Subcutaneous every 24 hours             lisinopril (PRINIVIL/ZESTRIL) 10 MG tablet  1 tablet (10 mg) by Per Feeding Tube route every morning             metFORMIN (GLUCOPHAGE) 500 MG/5ML SOLN solution  Take 5 mLs (500 mg) by mouth daily (with lunch)             mineral oil-hydrophilic petrolatum (AQUAPHOR) external ointment  Apply topically every 8 hours             multivitamins w/minerals (CERTAVITE) liquid  15 mLs by Per Feeding Tube route daily             order for DME  Equipment being ordered: Nasogastric bolus tube feeding supplies  Formula: Isosource 1.5, 5 cans per day  Gravity feeding bags  60 mL syringes    Treatment Diagnosis: squamous cell carcinoma of the oral cavity             order for DME  Equipment being ordered: Wound care supplies, 1 each daily x 21 days  Xeroform occlusive gauze 5\" x 9\"  Telfa non-adherent pad 8\" x 3\"  Kerlix bandage roll 4-1/2\" x 4-1/8 yd  ACE wrap, 4 inch (5 total)    Diagnosis: squamous cell carcinoma of the oral cavity             oxyCODONE (ROXICODONE) 5 MG/5ML solution  5-10 mLs (5-10 mg) by Per NG tube route every 4 hours as needed for moderate to severe pain             polyethylene glycol (MIRALAX/GLYCOLAX) packet  17 g by Per Feeding Tube route daily             protein modular (PROSOURCE NO CARB)  1 packet by Per Feeding Tube route 4 times daily             senna-docusate (SENOKOT-S/PERICOLACE) 8.6-50 MG tablet  2 tablets by Per Feeding Tube route 2 times daily                   Discharge Procedure Orders   General info for SNF   Order Comments: Length of Stay Estimate: Short Term Care: Estimated # of Days <30  Condition at Discharge: Improving  Level of care:skilled   Rehabilitation Potential: Good  Admission H&P remains valid and up-to-date: Yes  Recent Chemotherapy: N/A  Use Nursing Home Standing Orders: Yes     Reason for your " hospital stay   Order Comments: Post-operative care     Glucose monitor nursing POCT   Order Comments: Before meals and at bedtime     Intake and output   Order Comments: Every shift     Wound care (specify)   Order Comments: Site:   Neck  Instructions:  Keep incisions clean and dry. Apply Aquaphor ointment to incisions three times daily to keep moist. You may shower, do not soak, scrub, or submerge incisions under water.    Daily dressing changes to right forearm: Remove old dressing. Clean incisions with saline. Apply Aquaphor ointment to linear incision and incision edges around skin graft site. Cover skin graft with xeroform gauze. Cover linear incision with Telfa non-stick dressing. Wrap forearm with Kerlix gauze roll and cover with ACE wrap.     Skin graft donor site care: You have a dressing over your leg where the skin graft was taken from. Keep dressing in place and change as needed for drainage under the dressing. Place a calcium alginate pad over the area and cover with clear tegaderm dressing. Once the site is no longer draining you may leave it open to air and apply Aquaphor or a mild unscented lotion to the area to keep it moist.     Follow Up and recommended labs and tests   Order Comments: Follow up in ENT clinic with Dr. Atwood on Monday, 5/20/2019 at 11:15AM. Please call the clinic with questions/concerns: [phone].    Otolaryngology/ENT Clinic:  [facility]  [address] [place] [id number]     Activity - Ambulate in hallway   Order Comments: Every shift     Order Specific Question Answer Comments   Is discharge order? Yes      Activity - Up with nursing assistance     Order Specific Question Answer Comments   Is discharge order? Yes      Full Code     Order Specific Question Answer Comments   Code status determined by: Discussion with patient/legal decision maker      Physical Therapy Adult Consult   Order Comments: Evaluate  and treat as clinically indicated.    Reason:  Imbalance, improve strength, independence     Occupational Therapy Adult Consult   Order Comments: Evaluate and treat as clinically indicated.    Reason:  Improve strength, balance, independence     Adult Formula Bolus Feeding   Order Comments: Specify: Nothing to eat or drink by mouth. Bolus tube feeding via nasogastric feeding tube. Formula: Isosource 1.5. Give 2 cans (500ml) twice daily and 1 can (250ml) for third feeding = 5 cans/day. Separate feedings by 3-4 hours. Flush tube with 180 mL water before and after each bolus feeding. Flush tube with 30 ml water before and after medications.       Dispo: To  TCU in good condition. All of the patient's questions/concerns have been addressed at this time.     Deborah Mancilla PA-C  Otolaryngology-Head & Neck Surgery  Please contact ENT by dialing [phone] and entering job code [phone].

## 2019-05-16 NOTE — PLAN OF CARE
Pt POD#6 s/p left infrastructure maxillectomy, left radial forearm free flap, left neck exploration, STSG from left thigh to left forearm, NG feeding tube placement, vss, neuros include: a/o x4, RUE 4/5, w/weak hand grasp,all other extremities 5/5 w/generalized weakness. PIV SL, NPO w/bolus TF, pt not able to complete remaining cans this evening d/t heartburn, NG placement WNL. R STSG site has some serosanguinous drainage, RUE dressing CDI. L neck doppler sites x2 but only one can be heard, ENT notified, continuous doppler positive. Pt voiding spont, had x2 BM tonight, up SBA w/GB d/t tubings and drains. PRN and scheduled pain meds provided w/good relief, family @ bedside in the early evening and supportive of pt's cares and needs. Plan to discharge to [facility] once placement is available, continue to monitor per MD orders.

## 2019-05-16 NOTE — PROGRESS NOTES
Social Work Services Progress Note    Hospital Day: 8  Date of Initial Social Work Evaluation:  5/15/19  Collaborated with:  Admissions at [facility] ([facility])    Data:  Pt  ready for discharge on 5/14/19.  Rehab recommendation changed from home to TCU.  Pt has been approved for admit to [facility], insurance auth is pending.     Intervention:  SW phoned Admissions at [facility] ([facility]) who indicates that insurance auth is still pending.      Assessment:   Pt is agreeable to rehab placement.    Plan:    Anticipated Disposition: TCU placement at [facility]    Barriers to d/c plan:  Insurance auth is pending    Follow Up:  SW will continue to follow.    SHIVA Aldana  Social Work, 6A  Phone:  [phone]  Pager:  [phone]  5/16/2019

## 2019-05-16 NOTE — PROGRESS NOTES
POD#7.  Afebrile.  WBC is 7.7.  Wounds clean, and his spirits have picked up.  No evid of wound hematoma, dehiscence, or infection.  All YARIEL's out.    We will plan to transfer him to the TCU today and he is ready mentally as well.  Will work on regaining strength there.  He will f/u with Dr Flanagan next week, and me the following.

## 2019-05-16 NOTE — PLAN OF CARE
"Patient is a 53 year old male  admitted to room wheelchair via 410.  Patient is alert and oriented X 3. See Epic for VS and assessment.  Patient is able to transfer as standby assist with walker. Patient was settled into their room, shown call light, tv, mealtimes etc. Oriented to unit. Will continue monitoring pain level and VS. Notifying MD with any concerns.  Follow MD orders for cares and medications.    Level of Schooling:College  Ethnicity:    Marital Status:  Dentures: No  Hearing Aid: No  Smoker:  No  Glasses: Yes  Occupation: [employer]  Falls 0-1 mo: 0 2-6 mo: 0  Stairs prior function: Independent  Prior device use:Independent  Advanced Care Directive Referral to Social Work?No    Skin assessment: Skin intact with no concerns noted. Applied barrier cream to buttocks for protection. Bruising on BLE. Neck incision is clean and dry; applied Aquaphor, per orders. Right forearm dressing was completed around 1400 by surgeon per pt and declined reassessment indicating, \"This was just changed, you can look at it tomorrow.\" Currently, left forearm dressing is CDI. Skin graft donor site was changed 05/16/19 at 1400, CDI. Line to left upper chest is CDI.     Pt is alert and oriented x4. Assist of one with gait belt and walker. No BM this shift. Continent of bowel and bladder. Administered PRN Zofran for nausea. Administered Isosource 1.1 once can instead of two via NG 1730, per pt request due to nausea. Administered one can 2130, per orders with insulin coverage. Administered PRN oxycodone x1 for mouth pain.     Upon admission, pt was tearful and sad due to current circumstances and being in a new environment. Offered therapeutic listening and order  services. Continue with POC.     Temp: 98.6  F (37  C) Temp src: Oral BP: 111/72 Pulse: 86   Resp: 18 SpO2: 98 % O2 Device: None (Room air)      "

## 2019-05-16 NOTE — PLAN OF CARE
AVSS. Pain controlled with scheduled tylenol. Pt did report some mild nausea/ heartburn. Zofran given x 1. RUE dressing C/D/I. Mouth flap and sutures WDL. Doppler sites on neck positive x 2, continuous doppler positive as well. Neuros intact, except 4/5 weakness on RUE. Pt standby assist with gaitbelt and walker up to bathroom. NG clamped. Will get TF today. Needs potassium replaced. Continue to monitor.    Problem: Adult Inpatient Plan of Care  Goal: Plan of Care Review  5/16/2019 0749 by Mirta Fried, RN  Outcome: Improving     Problem: Adult Inpatient Plan of Care  Goal: Optimal Comfort and Wellbeing  5/16/2019 0749 by Mirta Fried, RN  Outcome: Improving     Problem: Adult Inpatient Plan of Care  Goal: Readiness for Transition of Care  5/16/2019 0749 by Mirta Fried, RN  Outcome: Improving

## 2019-05-16 NOTE — PLAN OF CARE
Occupational Therapy Discharge Summary    Reason for therapy discharge:    Discharged to transitional care facility.    Progress towards therapy goal(s). See goals on Care Plan in [facility] electronic health record for goal details.  Goals partially met.  Barriers to achieving goals:   discharge from facility.    Therapy recommendation(s):    Continued therapy is recommended.  Rationale/Recommendations:  To progress ADL/IADL independence and safety, and facilitate increased endurance.

## 2019-05-16 NOTE — LETTER
To: Yaquelin Sifuentes  Re: Devonte Tucker   63  Auth# [id number]       [phone]   Signing Clinician's Name / Credentials   Signing clinician's name / credentials Rukhsana Ya PT   Quick Adds   Rehab Discipline PT   Therapeutic Exercise   Treatment Detail PT: performed Nustep for conditioning on level 3 using LE and L UE (R UE supported on table beside him); 10 min; avg 50 steps/min; OMNI 5; rick 35; SaO2 99%; . Pt felt leg fatigue, a little winded. Reviewed diagphragmatic breathing. Discussion around deconditioning from inactivity with hospitalization; Instructed in sit<>stands from armchair in room x5--work up to 10x. Pt reports back feeling better--has done the stretches on own--discussed knee to chest in chair.   Additional Documentation   Rehab Comments PT: pt did well without AD around obstacles. 170 ft. SBA/IND. Pt monitors fatigue level--takes standing rests as needed . States back is better as is more active   PT Plan PT: gait outdoors without AD as weather permits; lumbar stretches if needed; dynamic balance drills; Nustep-increase to level 4   Total Session Time   Total Session Time (minutes) 53 minutes  (25 TE; 28 gait)   Transfers: Sit to Stand   Patient Performance Independent   Staff Performance No set up or physical help (No set up or physical help from staff)   Describe Performance PT: IND sit<>stand   Ambulation   Walks in room:Patient Performance Independent   Walks in room: Staff Performance No set up or physical help ( No set up or physical help from staff)   Walk in santos: Patient Performance Supervision or verbal cues   Walks in santos: Staff Performance No set up or physical help ( No set up or physical help from staff)   Mobility device used straight cane   Describe Performance PT: amb without  ft x 2 SBA/IND--no LOB around chairs, stepping over cane on floor, turns. Widened stance but no overt LOB. Pt demo'd IND with cane for 170 ft--gave OK to amb with sisters when  visiting   Walk 10 Feet   Patient Performance Independent   Describe Performance PT: IND in room   Walk 10 Feet on uneven surfaces   Patient Performance Supervision or verbal cues   Describe Performance PT: amb without  ft x 2 SBA/IND--no LOB around chairs, stepping over cane on floor, turns. Widened stance but no overt LOB. Pt demo'd IND with cane for 170 ft--gave OK to amb with sisters when visiting   Walk 50 Feet with Two Turns   Patient Performance Supervision or verbal cues   Describe Performance PT: amb without  ft x 2 SBA/IND--no LOB around chairs, stepping over cane on floor, turns. Widened stance but no overt LOB. Pt demo'd IND with cane for 170 ft--gave OK to amb with sisters when visiting   Walk 150 Feet   Patient Performance Supervision or verbal cues   Describe Performance PT: amb without  ft x 2 SBA/IND--no LOB around chairs, stepping over cane on floor, turns. Widened stance but no overt LOB. Pt demo'd IND with cane for 170 ft--gave OK to amb with sisters when visiting   Locomotion   Move on unit: Patient Performance Supervision or verbal cues   Move on unit: Staff Performance No set up or physical help ( No set up or physical help from staff)   Move off unit: Patient Performance Supervision or verbal cues   Move off unit: Reason Not Done Not attempted due to environmental limitations (e.g., lack of equipment,weather constraints)   Mobility device used straight cane   Describe Performance PT: amb without  ft x 2 SBA/IND--no LOB around chairs, stepping over cane on floor, turns. Widened stance but no overt LOB. Pt demo'd IND with cane for 170 ft--gave OK to amb with sisters when visiting   4 Steps   Patient Performance Supervision or verbal cues   Describe Performance PT: up/down 3 x 5 stairs with L rail; single step pattern lead with R up/L down; SBA; slow but steady;    12 Steps   Patient Performance Supervision or verbal cues   Describe Performance PT: up/down 3 x 5 stairs  with L rail; single step pattern lead with R up/L down; SBA; slow but steady;       05/23/19 0934   Signing Clinician's Name / Credentials   Signing clinician's name / credentials Kristina Westbrook PTA   Quick Adds   Rehab Discipline PT   PT Assistant Visit Number 1   Therapeutic Exercise   Symptoms Noted During/After Treatment fatigue   Treatment Detail PTA: Pt performed standing ex for strengthening and endurance unsupport including heel/toe raises (light hand hold when rocking back onto heels), marching, mini squats, hip abd/add, hip ext, and knee lifts x10 reps B. Brief rest taken at end d/t fatigue. Pt amb for strengthening and endurance without AD 4th floor<>tunnel managing crowded hallways well and incline/declines safely. No overt LOB noted. Pt required 2 brief standing rest breaks d/t mild fatigue.   Additional Documentation   Rehab Comments PTA: PT POC date changed to 5/28 as pt is close to meeting PT goals. Pt aware and in agreement. -15 min at start of PT session d/t am meds.   PT Plan PTA: gait outdoors without AD as weather permits; lumbar stretches if needed; dynamic balance drills; Nustep-increase to level 4   Total Session Time   Total Session Time (minutes) 45 minutes  (ther ex 35', gait 10')   Transfers: Sit to Stand   Patient Performance Independent   Staff Performance No set up or physical help (No set up or physical help from staff)   Ambulation   Walks in room:Patient Performance Independent   Walks in room: Staff Performance No set up or physical help ( No set up or physical help from staff)   Walk in santos: Patient Performance Independent   Walks in santos: Staff Performance No set up or physical help ( No set up or physical help from staff)   Describe Performance PTA: see ther ex   Walk 10 Feet   Patient Performance Independent   Describe Performance PTA: see ther ex   Walk 10 Feet on uneven surfaces   Patient Performance Independent   Describe Performance PTA: see ther ex   Walk 50 Feet with  Two Turns   Patient Performance Independent   Describe Performance PTA: see ther ex   Walk 150 Feet   Patient Performance Independent   Describe Performance PTA: see ther ex   Locomotion   Move on unit: Patient Performance Independent   Move on unit: Staff Performance No set up or physical help ( No set up or physical help from staff)   Move off unit: Patient Performance Independent   Move off unit: Staff Performance No set up or physical help ( No set up or physical help from staff)   Describe Performance PTA: see ther ex   4 Steps   Patient Performance Supervision or verbal cues   Describe Performance PTA: Up/down 22 stairs x1 using rail on L ascending, R descending, step to pattern, retro step to descend d/t vertigo looking down.    12 Steps   Patient Performance Supervision or verbal cues   Describe Performance PTA: Up/down 22 stairs x1 using rail on L ascending, R descending, step to pattern, retro step to descend d/t vertigo looking down.    Picking up Object   Patient Performance Independent   Describe Performance PTA: Picked up shoes from floor without AD IND. in sitting.      05/23/19 1100   Signing Clinician's Name / Credentials   Signing clinician's name / credentials riky hernandez OTRL   Quick Adds   Rehab Discipline OT   Therapeutic Activity   Minutes of Treatment 15 minutes   Symptoms Noted During/After Treatment Fatigue   Treatment Detail OT: Patient ed and training for 2 hand exercises without resistance to be doing throughout the day . patient to not stretch any tissue abouve a 5/10 . all demonstration of each exercise correct post OT demonstration and training. patient frequency recommnedation to be doing daily no less than 1x  no cap on how many times will use pain scale of 5/10. patient continues to demonstrate appropriate balance of rest and activity without need of cue.    ADL Training   Minutes of Treatment 45   Symptoms Noted During/After Treatment fatigue   Treatment ADL training within  precautions;Adaptive equipment training;Energy conservation/work simplification;Tasks graded to facilitate independence;Facilitation to incorporate affected extremity;Training in one handed ADL techniques;Progression to higher complexity skills   Treatment Detail OT bed mobility training simulated to home set up in height and position. ed and traiing concerning items such as pillow set up or wedge pillow. will continue to discuss. patient able to demonstrate correct technique post OT training. bed mobility goal met. footwear don with use of suggested AE completed today. reacher and long shoe horn patient will pusrchase on [employer]. if wedge pillow needed for bed will also purchase on line. OT footwear don gaol met. POC and LL OT goals discussed high priority tx items planned for next 2 OT tX session. patient in agreement.    Patient Response Able to follow techniques as instructed;Demonstrates carryover from previous session;Increased independence;Increased tolerance for ADL activity;Independent following precautions;Incorporates energy conservation into ADL   Additional Documentation   OT Plan kitchen, dynaviison, walk in shower transfer, hand HEP    Total Session Time   Total Session Time (minutes) 60 minutes  (OT 45 min ADL 15 min therapeutic activity)   [facility] or TCU Only   What unit is patient on? TCU   Transfers: Chair/Bed transfers   Patient Performance Independent   Staff Performance No set up or physical help (No set up or physical help from staff)   Describe Performance OT no AE used  OT  simulated bed set up and height at home. OT ed and training for pillow placement to ease transfer. patient able to demonstrate 2x post OT training OT bed mobility goal met    Ambulation   Walks in room:Patient Performance Independent   Walks in room: Staff Performance No set up or physical help ( No set up or physical help from staff)   Walk 10 Feet   Patient Performance Independent   Describe Performance OT in room ambulation  no AE used   Picking up Object   Patient Performance Independent   Describe Performance OT used reacher.    Lower Body Dressing (Putting On/Taking-Off Footwear)   Patient Performance Independent   Staff Performance No set up or physical help (No set up or physical help from staff)   Describe Performance OT patient able to gather and don all footwear items safely.       05/24/19 1026   Signing Clinician's Name / Credentials   Signing clinician's name / credentials Kristina Westbrook PTA   Quick Adds   Rehab Discipline PT   PT Assistant Visit Number 2   Therapeutic Exercise   Symptoms Noted During/After Treatment fatigue   Treatment Detail PTA: Pt performed nustep activity using B LEs and LUE (RUE on table with 2 pillows) level 4 x 15 min with 40 avg steps per min. OMNI 5. Assist with set-up and dismount. Pt tolerated well overall. Rest break taken at end d/t fatigue. Pt performed piriformis stretch in standing using 2nd stair holding ~10 sec x3 reps. Pt performed supine lumbar stretching including lumbar twists and single knee to chest holding each ~10 sec x3 reps B.   Additional Documentation   PT Plan PTA: gait outdoors without AD as weather permits; gait balance drills   Total Session Time   Total Session Time (minutes) 40 minutes  (ther ex 30', gait 10')   Bed Mobility: Turning side to side/Roll Left and Right   Patient Performance Independent   Staff Performance No setup or physical help (No setup or physical help from staff)   Bed Mobility: Sit to lying   Patient Performance Independent   Staff Performance No setup or physical help (No setup or physical help from staff)   Bed Mobility: Lying to sitting on the side of bed   Patient Performance Independent   Staff Performance No set up or physical help (No set up or physical help from staff)   Transfers: Sit to Stand   Patient Performance Independent   Staff Performance No set up or physical help (No set up or physical help from staff)   Ambulation   Walks in  room:Patient Performance Independent   Walks in room: Staff Performance No set up or physical help ( No set up or physical help from staff)   Walk in santos: Patient Performance Independent   Walks in santos: Staff Performance No set up or physical help ( No set up or physical help from staff)   Mobility device used rolling walker   Describe Performance PTA: IND amb on unit.   Walk 10 Feet   Patient Performance Independent   Describe Performance PTA: IND amb on unit.   Walk 10 Feet on uneven surfaces   Patient Performance Independent   Describe Performance PTA: IND amb on unit.   Walk 50 Feet with Two Turns   Patient Performance Independent   Describe Performance PTA: IND amb on unit.   Walk 150 Feet   Patient Performance Independent   Describe Performance PTA: IND amb on unit.   Locomotion   Move on unit: Patient Performance Independent   Move on unit: Staff Performance No set up or physical help ( No set up or physical help from staff)   Move off unit: Patient Performance Independent   Move off unit: Staff Performance No set up or physical help ( No set up or physical help from staff)   Describe Performance PTA: IND amb on unit.      05/24/19 1300   Signing Clinician's Name / Credentials   Signing clinician's name / credentials riky hernandez OTRL   Quick Adds   Rehab Discipline OT   ADL Training   Minutes of Treatment 45   Symptoms Noted During/After Treatment fatigue   Treatment Instruction in compensatory methods;ADL training within precautions;Energy conservation/work simplification;Tasks graded to facilitate independence;Facilitation to incorporate affected extremity;Training in one handed ADL techniques   Treatment Detail OT kitchen assessment completed with emphasis on safe mobility, reach gather trasnport of items within current restriction, small realistic meal prep and clean up . patient able to perform all task within appropriate restriction and safe mobility parameter. adequate effeciency and energy  strategy identified. OT kitchen goal met.    Patient Response Able to follow techniques as instructed;Increased independence;Increased tolerance for ADL activity;Independent following precautions;Incorporates energy conservation into ADL   Additional Documentation   Rehab Comments OT DC planned for 5/28/19   OT Plan OT review tx strategies for next 2 tx visits to meet all OT goals.    Total Session Time   Total Session Time (minutes) 45 minutes  (OT 45 min ADL)   [facility] or T[facility] Only   What unit is patient on? TCU   Transfers: Sit to Stand   Patient Performance Independent   Staff Performance No set up or physical help (No set up or physical help from staff)   Ambulation   Walks in room:Patient Performance Independent   Walks in room: Staff Performance No set up or physical help ( No set up or physical help from staff)   Walk 10 Feet   Patient Performance Independent   Describe Performance OT no AE   Walk 10 Feet on uneven surfaces   Describe Performance OT no AE   Walk 50 Feet with Two Turns   Patient Performance Independent   Describe Performance OT no AE   Walk 150 Feet   Patient Performance Supervision or verbal cues   Describe Performance OT no  ft x2 light superivsion only       05/24/19 1800   Signing Clinician's Name / Credentials   Signing clinician's name / credentials riky hernandez OTRL   Quick Adds   Rehab Discipline OT   ADL Training   Minutes of Treatment 15   Symptoms Noted During/After Treatment fatigue   Treatment Instruction in compensatory methods   Treatment Detail OT discussion regarding tx strategy for next 2 tx days. patient in agreement.    Patient Response Comments OT patient in agreement with OT for sat and tues tx strategies to meet all OT goals .   Additional Documentation   OT Plan OT please assess patient independence for UE LE bathing within current restriction   Total Session Time   Total Session Time (minutes) 15 minutes  (OT 15 min ADL)   [facility] or TCU Only   What unit is patient on?  TCU      05/25/19 1000   Signing Clinician's Name / Credentials   Signing clinician's name / credentials DUDLEY Benítez/L   Quick Adds   Rehab Discipline OT   ADL Training   Minutes of Treatment 53   Symptoms Noted During/After Treatment none   Treatment Instruction in compensatory methods;ADL training within precautions;Adaptive equipment training;Environment structured;Tasks graded to facilitate independence;Training in one handed ADL techniques   Treatment Detail MALKA: Pt seen for UB/LB bathing assessment and shower transfer to increase indep with self cares. Pt's two sisters present during session. Pt has walk in shower with 6 in lip and tub shower. Instrcuted pt in tub transfer and walk in shower transfer with shower chair. Pt demod safe transfer while maintaining RUE precautions. Provided pt with handout for shower chair with removable arms that pt will purchase online. Pt demod ability to complete UB and LB bathing with mod I while seated.   Patient Response Able to follow techniques as instructed;Increased independence;Increased tolerance for ADL activity   Additional Documentation   OT Plan OT: dynavision   Total Session Time   Total Session Time (minutes) 53 minutes  (53 adl)   [facility] or TCU Only   What unit is patient on? TCU   Transfers: Sit to Stand   Patient Performance Independent   Staff Performance No set up or physical help (No set up or physical help from staff)   Ambulation   Walks in room:Patient Performance Independent   Walks in room: Staff Performance No set up or physical help ( No set up or physical help from staff)   Walk in santos: Patient Performance Independent   Walks in santos: Staff Performance No set up or physical help ( No set up or physical help from staff)   Describe Performance MALKA: Indep amb 100' x2 with no use of cane.    Bathing   Patient Performance Supervision or verbal cues   Bathing Transfer Assist No   Staff Performance Set up help only   Describe Performance MALKA: see ADL  training      05/25/19 1576   Signing Clinician's Name / Credentials   Signing clinician's name / credentials Kristina Westbrook, YARELIS   Quick Adds   Rehab Discipline PT   PT Assistant Visit Number 3   Therapeutic Exercise   Symptoms Noted During/After Treatment fatigue   Treatment Detail PTA: Pt amb around [address] building managing incline/declines, uneven grass and sidewalks without AD SBA/IND. Seated rest taken x2 d/t fatigue. Pt tolerated well overall.   Additional Documentation   PT Plan PTA: Car tx, stairs, curb step, review handouts; d/c from PT 5/28   Total Session Time   Total Session Time (minutes) 40 minutes  (ther ex 30', gait 10')   Transfers: Sit to Stand   Patient Performance Independent   Staff Performance No set up or physical help (No set up or physical help from staff)   Ambulation   Walks in room:Patient Performance Independent   Walks in room: Staff Performance No set up or physical help ( No set up or physical help from staff)   Walk in santos: Patient Performance Independent   Walks in santos: Staff Performance No set up or physical help ( No set up or physical help from staff)   Describe Performance PTA: See ther ex   Walk 10 Feet   Patient Performance Independent   Describe Performance PTA: See ther ex   Walk 10 Feet on uneven surfaces   Patient Performance Independent   Describe Performance PTA: See ther ex   Walk 50 Feet with Two Turns   Patient Performance Independent   Describe Performance PTA: See ther ex   Walk 150 Feet   Patient Performance Independent   Describe Performance PTA: See ther ex   Locomotion   Move on unit: Patient Performance Independent   Move on unit: Staff Performance No set up or physical help ( No set up or physical help from staff)   Move off unit: Patient Performance Independent   Move off unit: Staff Performance No set up or physical help ( No set up or physical help from staff)   Describe Performance PTA: See ther ex   Wheel 50 Feet   Reason Not Done Activity not  applicable   Wheel 150 Feet   Reason Not Done Activity not applicable   1 Step (curb)   Patient Performance Supervision or verbal cues   Describe Performance PTA: up/down curb outdoors without AD SBA x2 trials.   4 Steps   Patient Performance Supervision or verbal cues   Describe Performance PTA: Up/down 22 stairs x1 using single rail on L, step to pattern, retro step decending for 1st set, fwd for 2nd set. SBA   12 Steps   Patient Performance Supervision or verbal cues   Describe Performance PTA: Up/down 22 stairs x1 using single rail on L, step to pattern, retro step decending for 1st set, fwd for 2nd set. SBA

## 2019-05-16 NOTE — PLAN OF CARE
AVSS. Pain controlled with scheduled tylenol. C/o mild nausea/ heartburn. Zofran given x 1 ,tums x1. RUE dressing C/D/I. Mouth flap and sutures WDL-soft pale pink. Doppler sites on neck positive x 2, continuous doppler positive as well. Neuros intact, except 4/5 weakness on RUE. Pt standby assist with gaitbelt and walker in santos. NG clamped. Between TF boluses. ( pt has had some nausea TF given slow (1 can @ a time- this caused BG approx 1010 to be elevated endocrine here and updated they will follow @ TCU I will check BG by 1500 and cover then pt will need 2 cans on arrival then 1 more b4 bed (total 5 cans) L)STSG drsg and RFFF changed this afternoon.L') neck sutures intact. VDSP, loose BM's x2 no bowel meds given.

## 2019-05-17 LAB
ANION GAP SERPL CALCULATED.3IONS-SCNC: 6 MMOL/L (ref 3–14)
BUN SERPL-MCNC: 24 MG/DL (ref 7–30)
CALCIUM SERPL-MCNC: 8.6 MG/DL (ref 8.5–10.1)
CHLORIDE SERPL-SCNC: 109 MMOL/L (ref 94–109)
CO2 SERPL-SCNC: 26 MMOL/L (ref 20–32)
CREAT SERPL-MCNC: 0.6 MG/DL (ref 0.66–1.25)
ERYTHROCYTE [DISTWIDTH] IN BLOOD BY AUTOMATED COUNT: 12.7 % (ref 10–15)
GFR SERPL CREATININE-BSD FRML MDRD: >90 ML/MIN/{1.73_M2}
GLUCOSE BLDC GLUCOMTR-MCNC: 142 MG/DL (ref 70–99)
GLUCOSE BLDC GLUCOMTR-MCNC: 164 MG/DL (ref 70–99)
GLUCOSE BLDC GLUCOMTR-MCNC: 172 MG/DL (ref 70–99)
GLUCOSE BLDC GLUCOMTR-MCNC: 177 MG/DL (ref 70–99)
GLUCOSE BLDC GLUCOMTR-MCNC: 216 MG/DL (ref 70–99)
GLUCOSE BLDC GLUCOMTR-MCNC: 278 MG/DL (ref 70–99)
GLUCOSE SERPL-MCNC: 197 MG/DL (ref 70–99)
HCT VFR BLD AUTO: 34.1 % (ref 40–53)
HGB BLD-MCNC: 10.9 G/DL (ref 13.3–17.7)
MCH RBC QN AUTO: 30.5 PG (ref 26.5–33)
MCHC RBC AUTO-ENTMCNC: 32 G/DL (ref 31.5–36.5)
MCV RBC AUTO: 96 FL (ref 78–100)
PLATELET # BLD AUTO: 309 10E9/L (ref 150–450)
POTASSIUM SERPL-SCNC: 4.3 MMOL/L (ref 3.4–5.3)
RBC # BLD AUTO: 3.57 10E12/L (ref 4.4–5.9)
SODIUM SERPL-SCNC: 141 MMOL/L (ref 133–144)
WBC # BLD AUTO: 7.6 10E9/L (ref 4–11)

## 2019-05-17 PROCEDURE — 25000132 ZZH RX MED GY IP 250 OP 250 PS 637: Performed by: HOSPITALIST

## 2019-05-17 PROCEDURE — 80048 BASIC METABOLIC PNL TOTAL CA: CPT | Performed by: HOSPITALIST

## 2019-05-17 PROCEDURE — 36415 COLL VENOUS BLD VENIPUNCTURE: CPT | Performed by: HOSPITALIST

## 2019-05-17 PROCEDURE — 99207 ZZC CDG-HISTORY COMP: MEETS EXP. PROBLEM FOCUSED - DOWN CODED LACK OF HPI: CPT | Performed by: HOSPITALIST

## 2019-05-17 PROCEDURE — 00000146 ZZHCL STATISTIC GLUCOSE BY METER IP

## 2019-05-17 PROCEDURE — 97162 PT EVAL MOD COMPLEX 30 MIN: CPT | Mod: GP | Performed by: PHYSICAL THERAPIST

## 2019-05-17 PROCEDURE — 25000131 ZZH RX MED GY IP 250 OP 636 PS 637: Performed by: HOSPITALIST

## 2019-05-17 PROCEDURE — 97167 OT EVAL HIGH COMPLEX 60 MIN: CPT | Mod: GO | Performed by: OCCUPATIONAL THERAPIST

## 2019-05-17 PROCEDURE — 25000131 ZZH RX MED GY IP 250 OP 636 PS 637: Performed by: PHYSICIAN ASSISTANT

## 2019-05-17 PROCEDURE — 25000125 ZZHC RX 250: Performed by: HOSPITALIST

## 2019-05-17 PROCEDURE — 85027 COMPLETE CBC AUTOMATED: CPT | Performed by: HOSPITALIST

## 2019-05-17 PROCEDURE — 97530 THERAPEUTIC ACTIVITIES: CPT | Mod: GP | Performed by: PHYSICAL THERAPIST

## 2019-05-17 PROCEDURE — 97535 SELF CARE MNGMENT TRAINING: CPT | Mod: GO | Performed by: OCCUPATIONAL THERAPIST

## 2019-05-17 PROCEDURE — 97116 GAIT TRAINING THERAPY: CPT | Mod: GP | Performed by: PHYSICAL THERAPIST

## 2019-05-17 PROCEDURE — 25000125 ZZHC RX 250

## 2019-05-17 PROCEDURE — 25000128 H RX IP 250 OP 636: Performed by: HOSPITALIST

## 2019-05-17 PROCEDURE — 99309 SBSQ NF CARE MODERATE MDM 30: CPT | Performed by: HOSPITALIST

## 2019-05-17 PROCEDURE — 12000022 ZZH R&B SNF

## 2019-05-17 RX ORDER — MAGNESIUM HYDROXIDE 1200 MG/15ML
LIQUID ORAL
Status: COMPLETED
Start: 2019-05-17 | End: 2019-05-17

## 2019-05-17 RX ORDER — METFORMIN HYDROCHLORIDE 500 MG/5ML
500 SOLUTION ORAL
Status: DISCONTINUED | OUTPATIENT
Start: 2019-05-17 | End: 2019-05-19

## 2019-05-17 RX ORDER — GABAPENTIN 250 MG/5ML
300 SOLUTION ORAL EVERY 8 HOURS
Status: DISCONTINUED | OUTPATIENT
Start: 2019-05-17 | End: 2019-05-29

## 2019-05-17 RX ORDER — HEPARIN SODIUM 5000 [USP'U]/.5ML
5000 INJECTION, SOLUTION INTRAVENOUS; SUBCUTANEOUS EVERY 8 HOURS
Status: DISCONTINUED | OUTPATIENT
Start: 2019-05-17 | End: 2019-05-22

## 2019-05-17 RX ORDER — ACETAMINOPHEN 325 MG/1
650 TABLET ORAL EVERY 4 HOURS PRN
Status: DISCONTINUED | OUTPATIENT
Start: 2019-05-17 | End: 2019-05-30

## 2019-05-17 RX ORDER — ALUMINA, MAGNESIA, AND SIMETHICONE 2400; 2400; 240 MG/30ML; MG/30ML; MG/30ML
15 SUSPENSION ORAL EVERY 4 HOURS PRN
Status: DISCONTINUED | OUTPATIENT
Start: 2019-05-17 | End: 2019-05-30

## 2019-05-17 RX ADMIN — SENNOSIDES AND DOCUSATE SODIUM 2 TABLET: 8.6; 5 TABLET ORAL at 08:38

## 2019-05-17 RX ADMIN — OXYCODONE HYDROCHLORIDE 10 MG: 5 SOLUTION ORAL at 16:57

## 2019-05-17 RX ADMIN — GABAPENTIN 300 MG: 250 SUSPENSION ORAL at 18:30

## 2019-05-17 RX ADMIN — METFORMIN HYDROCHLORIDE 500 MG: 500 SOLUTION ORAL at 12:12

## 2019-05-17 RX ADMIN — Medication 1 PACKET: at 18:30

## 2019-05-17 RX ADMIN — GABAPENTIN 300 MG: 250 SUSPENSION ORAL at 01:03

## 2019-05-17 RX ADMIN — Medication 1 MG: at 08:32

## 2019-05-17 RX ADMIN — WHITE PETROLATUM: 1.75 OINTMENT TOPICAL at 10:36

## 2019-05-17 RX ADMIN — Medication 5 UNITS: at 08:57

## 2019-05-17 RX ADMIN — ACETAMINOPHEN 650 MG: 325 SOLUTION ORAL at 21:30

## 2019-05-17 RX ADMIN — ACETAMINOPHEN 650 MG: 325 SOLUTION ORAL at 08:31

## 2019-05-17 RX ADMIN — GABAPENTIN 300 MG: 250 SUSPENSION ORAL at 08:32

## 2019-05-17 RX ADMIN — HEPARIN SODIUM 5000 UNITS: 5000 INJECTION, SOLUTION INTRAVENOUS; SUBCUTANEOUS at 10:36

## 2019-05-17 RX ADMIN — ACETAMINOPHEN 650 MG: 325 SOLUTION ORAL at 18:30

## 2019-05-17 RX ADMIN — POLYETHYLENE GLYCOL 3350 17 G: 17 POWDER, FOR SOLUTION ORAL at 08:38

## 2019-05-17 RX ADMIN — ACETAMINOPHEN 650 MG: 325 SOLUTION ORAL at 05:44

## 2019-05-17 RX ADMIN — Medication 1 PACKET: at 08:33

## 2019-05-17 RX ADMIN — ASPIRIN 325 MG ORAL TABLET 325 MG: 325 PILL ORAL at 08:32

## 2019-05-17 RX ADMIN — ACETAMINOPHEN 650 MG: 325 SOLUTION ORAL at 12:12

## 2019-05-17 RX ADMIN — WHITE PETROLATUM: 1.75 OINTMENT TOPICAL at 01:07

## 2019-05-17 RX ADMIN — ACETAMINOPHEN 650 MG: 325 SOLUTION ORAL at 01:07

## 2019-05-17 RX ADMIN — CHLORHEXIDINE GLUCONATE 0.12% ORAL RINSE 15 ML: 1.2 LIQUID ORAL at 01:07

## 2019-05-17 RX ADMIN — ATORVASTATIN CALCIUM 20 MG: 20 TABLET, FILM COATED ORAL at 08:32

## 2019-05-17 RX ADMIN — Medication 1 PACKET: at 21:30

## 2019-05-17 RX ADMIN — MULTIVITAMIN 15 ML: LIQUID ORAL at 08:32

## 2019-05-17 RX ADMIN — SODIUM CHLORIDE 1000 ML: 900 IRRIGANT IRRIGATION at 20:26

## 2019-05-17 RX ADMIN — LISINOPRIL 10 MG: 10 TABLET ORAL at 08:33

## 2019-05-17 RX ADMIN — INSULIN GLARGINE 20 UNITS: 100 INJECTION, SOLUTION SUBCUTANEOUS at 08:37

## 2019-05-17 RX ADMIN — INSULIN HUMAN 23 UNITS: 100 INJECTION, SUSPENSION SUBCUTANEOUS at 21:26

## 2019-05-17 RX ADMIN — CHLORHEXIDINE GLUCONATE 0.12% ORAL RINSE 15 ML: 1.2 LIQUID ORAL at 08:32

## 2019-05-17 RX ADMIN — CHLORHEXIDINE GLUCONATE 0.12% ORAL RINSE 15 ML: 1.2 LIQUID ORAL at 16:58

## 2019-05-17 RX ADMIN — Medication 1 PACKET: at 12:12

## 2019-05-17 RX ADMIN — HEPARIN SODIUM 5000 UNITS: 5000 INJECTION, SOLUTION INTRAVENOUS; SUBCUTANEOUS at 18:30

## 2019-05-17 RX ADMIN — HEPARIN SODIUM 5000 UNITS: 5000 INJECTION, SOLUTION INTRAVENOUS; SUBCUTANEOUS at 01:08

## 2019-05-17 RX ADMIN — WHITE PETROLATUM: 1.75 OINTMENT TOPICAL at 18:31

## 2019-05-17 RX ADMIN — ALUMINUM HYDROXIDE, MAGNESIUM HYDROXIDE, AND DIMETHICONE 15 ML: 400; 400; 40 SUSPENSION ORAL at 10:36

## 2019-05-17 ASSESSMENT — ACTIVITIES OF DAILY LIVING (ADL): PREVIOUS_RESPONSIBILITIES: MEAL PREP;HOUSEKEEPING;LAUNDRY;SHOPPING;MEDICATION MANAGEMENT;FINANCES;DRIVING;WORK

## 2019-05-17 NOTE — PROGRESS NOTES
CLINICAL NUTRITION SERVICES - ASSESSMENT NOTE     Nutrition Prescription    RECOMMENDATIONS FOR MDs/PROVIDERS TO ORDER:  None at this time    Malnutrition Status:    Non-severe malnutrition in the context of acute illness    Recommendations already ordered by Registered Dietitian (RD):  Change TF to cycled regimen:  Isosource 1.5 via NG-tube @ 75 mL/hr x 12 hours (8 pm - 8 am) if pt tolerates overnight increase on 5/18 to a goal of Isosource 1.5 @ 100 mL/hr x 12 hrs (8 pm- 8 am) providing 1200 mL, 1800 kcal (21 kcal/kg), 82 g protein (1 g/kg), 211 g CHO, 18 g fiber, and 912 mL free water per DW of 84 kg.  + ProSource QID to provide: 160 calories and 44 g protein  Total provisions of 23 kcal/kg and 1.5 g pro/kg  Water Flushes: 130 mL q 4 hrs (TF + Flushes = 1692 mL water; meeting 100% hydration needs).  -Assess gastric residuals and HOB >30 degrees while feeding stomach    Future/Additional Recommendations:  1. Monitor tolerance of TF  2. Monitor for advancement of diet.   Contact endo w/ any TF changes.      REASON FOR ASSESSMENT  Devonte Tucker is a/an 53 year old male assessed by the dietitian for Provider Order - Registered Dietitian to Assess and Order TF per Medical Nutrition Therapy Protocol    NUTRITION HISTORY  Information obtained from pt  Devonte was tolerating his continuous TF well. Since switching to bolus feeds on 5/14 he has had nausea, discomfort, and heartburn. He said that slowing down the rate of infusion seemed to help with this. Said that taking zofran did help with some of the nausea. Pt also reported concern with his spikes in blood sugars.       Information obtained from chart review  Per pt's family members, pt was eating poorly for the past couple months r/t discomfort. Would try to drink shakes and liquids mostly.    Nutrition support hx:   NG tube placed on 5/10 per ENT post surgery.   5/10-5/13: Pt received Peptamen VHP continuously due to also being on propofol at that time  5/13:  "Started on standard formula Isosource 1.5 at a continuous rate  : Bolus feeds started   Per I/O while pt was on EB pt received an average of 1056 kcal per day over the last 6 days meeting ~65% assessed calorie needs.    CURRENT NUTRITION ORDERS  Diet: NPO per ENT  Intake/Tolerance: Pt not tolerating bolus regimen well. Per RN note pt having nausea (zofran administered) and pt requested only 1 carton of Isosource 1.5 be administered instead of 2 yesterday at 1730. Another carton was then administered at 2130. Pt had to stop his bolus feed this morning before the full 2nd carton could be administered due to nausea.     Nutrition Support:   Isosource 1.5 via NG-tube giving 2 cans (500 mL) BID and 1 can (250 mL) for third feed providing 1250 mL, 1875 kcal (22 kcal/kg), 85 g protein (1 g/kg), 220 g CHO, 19 g fiber, and 950 mL free water per DW of 84 kg.  ProSource 1 pkt QID providing: 160 kcal and 44 g protein.   Total regimen providin kcal/kg and 1.5 g pro/kg  Water Flushes: 180 mL before and after each bolus, flush w/ 30 mL water before and after medications (TF + Flushes = 1970 mL water; meeting 100% hydration needs).    Pt currently NPO and room service menu not applicable for pt at this time.     LABS  Labs reviewed  BG -: [id number]- endocrine following     MEDICATIONS  Medications reviewed  Novolog- medium sliding scale  Insulin glargine  Metformin  Certavite  Miralax  Senokot  Zofran PRN    ANTHROPOMETRICS  Height: 5' 7\"  Most Recent Weight: 133.4 kg (294 lb 1.6 oz)    IBW: 67 kg (199%)   BMI: Obesity Grade III BMI >40  Weight History: Wt loss of 3.6 kg (3%) over the past week. Wt loss of 10.8 kg (7%) over the past month.   05/15/19 0758 132.9 kg (293 lb) GR   05/10/19 0000 137 kg (302 lb 0.5 oz) ML   19 2200 137 kg (302 lb 0.5 oz) ML   19 0607 134.4 kg (296 lb 4.8 oz) KL     Wt Readings from Last 10 Encounters:   19 133.4 kg (294 lb 1.6 oz)   05/15/19 132.9 kg (293 lb)   19 " 136.5 kg (301 lb)   04/24/19 136.5 kg (301 lb)   04/15/19 144.2 kg (318 lb)   04/01/19 144.7 kg (319 lb)     Dosing Weight: 84 kg (Based on admit wt and IBW)    ASSESSED NUTRITION NEEDS  Estimated Energy Needs: 1463-8842 kcals/day (20 - 25 kcals/kg)  Justification: Maintenance  Estimated Protein Needs: 100-125 grams protein/day (1.2 - 1.5 grams of pro/kg)  Justification: Obesity guidelines and Post-op  Estimated Fluid Needs: (1 mL/kcal)   Justification: Maintenance and Per provider pending fluid status    PHYSICAL FINDINGS  See malnutrition section below.    MALNUTRITION  % Intake: < 75% for > 7 days (non-severe)- likely not meeting needs due to TF being held on request of pt and other interruptions   % Weight Loss: > 5% in 1 month (severe)  Subcutaneous Fat Loss: None observed  Muscle Loss: None observed  Fluid Accumulation/Edema: None noted  Malnutrition Diagnosis: Non-severe malnutrition in the context of acute illness    NUTRITION DIAGNOSIS  Inadequate nutrient intake (energy and protein) related to pt currently reliant on enteral nutrition w/ complaints of nausea and discomfort as evidenced by pt has requested for TF to stop before getting full amounts of bolus and receiving an average of 65% assessed calorie needs per day over the last 6 days.     INTERVENTIONS  Implementation  Nutrition Education: Discussed current TF regimen and the possibility of cycling   Collaboration with other providers- discussed enteral nutrition changes during rounds and with endocrine   Enteral Nutrition - modify see above    Goals  Total avg nutritional intake to meet a minimum of 20 kcal/kg and 1.2 g PRO/kg daily (per dosing wt 84 kg).     Monitoring/Evaluation  Progress toward goals will be monitored and evaluated per protocol.    Rosa Leon, Dietetic Intern

## 2019-05-17 NOTE — PLAN OF CARE
Pt is alert and oriented x4. NPO. Continent of bowel and bladder. Assisted to bathroom x1; completed pericares and applied barrier cream to buttocks for protection.  and 177. Administered scheduled Tylenol 650 mg for mouth pain and arm pain with relief noted as pt is sleeping between cares with no pain verbalized. NG is patent. PCD on BLE. Doppler on left upper chest is CDI. Neck incision is CDI; applied scheduled Aquaphor. Dressing to right forearm and right hip are CDI. Elevated right forearm and applied two ice packs. Continue w/ POC.

## 2019-05-17 NOTE — PROGRESS NOTES
05/17/19 1050   Quick Adds   Type of Visit Initial PT Evaluation   Living Environment   Lives With alone   Living Arrangements condominium   Home Accessibility stairs to enter home   Transportation Anticipated car, drives self;family or friend will provide   Living Environment Comment lives in condo, 15 stairs in, drives, I PTA>    Self-Care   Usual Activity Tolerance excellent   Current Activity Tolerance fair   Activity/Exercise Type biking;walking   Exercise Amount/Frequency daily   Equipment Currently Used at Home none   Activity/Exercise/Self-Care Comment PT; pt manages a retreat center.    In [facility].    Functional Level Prior   Ambulation 0-->independent   Transferring 0-->independent   Toileting 0-->independent   Bathing 0-->independent   Communication 0-->understands/communicates without difficulty   Swallowing 0-->swallows foods/liquids without difficulty   Cognition 0 - no cognition issues reported   Fall history within last six months no   Which of the above functional risks had a recent onset or change? ambulation   Prior Functional Level Comment Pt I with adls, mobility. no ad.    General Information   Pertinent History of Current Problem (include personal factors and/or comorbidities that impact the POC) Devonte Durbin   Precautions/Limitations fall precautions;swallowing precautions;other (see comments)   Weight-Bearing Status - RUE nonweight-bearing   Heart Disease Risk Factors Overweight;Stress;Medical history   General Info Comments healing graft sites R thigh, RUE, on NG tube. NPO   Cognitive Status Examination   Orientation orientation to person, place and time   Level of Consciousness alert   Follows Commands and Answers Questions 100% of the time   Memory intact   Pain Assessment   Patient Currently in Pain Yes, see Vital Sign flowsheet  (L side of jaw, R arm. )   Integumentary/Edema   Integumentary/Edema other (describe)   Integumentary/Edema Comments PT; L face/jaw ,  "R arm/hand, fingers, B feet, lower legs.    Posture    Posture Not impaired   Range of Motion (ROM)   ROM Quick Adds No deficits were identified   MMT: Hip, Rehab Eval   Hip Flexion - Left Side (5/5) normal,left   Hip ADduction - Left Side (4/5) good, left   Hip Flexion - Right Side (4/5) good, right   Hip ABduction - Right Side (4/5) good, right   MMT: Knee, Rehab Eval   Knee Flexion - Left Side (5/5) normal,left   Knee Extension - Left Side (5/5) normal,left   Knee Flexion - Right Side (5/5) normal,right   Knee Extension - Right Side (5/5) normal,right   MMT: Ankle, Rehab Eval   Ankle Dorsiflexion - Left Side 5/5) normal,left   Ankle Plantarflexion - Left Side 5/5) normal,left   Ankle Dorsiflexion - Right Side 5/5) normal,left   Ankle Plantarflexion - Right Side 5/5) normal,left   Bed Mobility   Bed Mobility Comments PT: Maximus into bed for LEs, slow, NWB RUE.    Transfer Skills   Transfer Comments PT: Maximus for stand <-> sit , cane or pole, for balance and lift from bedside chair.     Gait   Gait Comments PT; Pt amb with A of 1 (and helper with feeding tube pole).  Pt amb initially with qc , but slow, and pt with difficulty sequencing, changed to sec, more fluent gait, slow pace, standing rest breaks due to WIGGINS  about every 40 ft x 160 ft total, but lightheaded at end, needed chair brought to him to sit due to lightheaded.  BP checked in sitting buut OK, not able to check ins tanidng.  Pt with slow gait, increased lateral sway and wide tank. cga to Maximus of 1 and another helper with pole.   NWB RUE.   stairs NT due to lightheaded, fatigue.    Balance   Balance other (describe)   Balance Comments PT: Pt needing support initially upon standing, cga to Maximus wihtout AD, wider TANK, needs cane and Maximus with ambulation.     Sensory Examination   Sensory Perception Comments Pt reports some \"falling asleep \" of R hand due to sweeling.    Coordination   Coordination no deficits were identified   Muscle Tone   Muscle Tone no " deficits were identified   Modality Interventions   Planned Modality Interventions Comments ice RUE.    General Therapy Interventions   Planned Therapy Interventions ADL retraining;balance training;bed mobility training;gait training;neuromuscular re-education;ROM;strengthening;stretching;transfer training;risk factor education;home program guidelines;progressive activity/exercise   Clinical Impression   Criteria for Skilled Therapeutic Intervention yes, treatment indicated   PT Diagnosis PT: Pt with impaired mobility, ADLs due to weakness,cancer resection and grafts    Influenced by the following impairments PT:pt with pain in L side of face, jaw, R UE , edema R UE and B LEs, decreased activity tolerance with lightheaded episode with amb today, functional weakness of LE, krzysztof RLE with graft site, decreased standing balance.   Pt now with NWB RUE.   Pt with healing graft sites RUE and RLE (bandaged currently)   Functional limitations due to impairments PT: Pt with diffiuclty with transfers, bed mobility, gait.  Pt unable to perform home or work duties.    Clinical Presentation Evolving/Changing   Clinical Presentation Rationale Pt with 3 or more factors to complicate discharge.    Clinical Decision Making (Complexity) Moderate complexity   Therapy Frequency` other (see comments)   Predicted Duration of Therapy Intervention (days/wks) 14 days    Anticipated Equipment Needs at Discharge standard cane   Anticipated Discharge Disposition Home;Home with Outpatient Therapy   Risk & Benefits of therapy have been explained Yes   Patient, Family & other staff in agreement with plan of care Yes   Clinical Impression Comments 6 days a week, 60 min each of those days.   Home with either home care vs OP PT depending on progress and pt's medical needs.    Therapy Certification   Start of care date 05/17/19   Certification date from 05/17/19   Certification date to 06/03/19   Medical Diagnosis Maxillary sinus cancer , Weakness    Certification I certify the need for these services furnished under this plan of treatment and while under my care.  (Physician co-signature of this document indicates review and certification of the therapy plan).    Total Evaluation Time   Total Evaluation Time (Minutes) 30

## 2019-05-17 NOTE — PROGRESS NOTES
05/17/19 1000   Quick Adds   Quick Adds Certification   Type of Visit Initial Occupational Therapy Evaluation   Living Environment   Lives With alone   Living Arrangements condominium   Home Accessibility stairs to enter home   Number of Stairs, Main Entrance other (see comments)   Stair Railings, Main Entrance   (15 steps to 2end floor unit)   Transportation Anticipated car, drives self;family or friend will provide   Living Environment Comment lives on second floor condo 15 steps, drives independent baseline prior to hospital   Self-Care   Usual Activity Tolerance excellent   Current Activity Tolerance fair   Regular Exercise Yes   Activity/Exercise Type biking;walking   Exercise Amount/Frequency daily   Equipment Currently Used at Home none   Activity/Exercise/Self-Care Comment OT independent in work driving and self cares    Functional Level   Ambulation 0-->independent   Transferring 0-->independent   Toileting 0-->independent   Bathing 0-->independent   Dressing 0-->independent   Eating 0-->independent   Communication 0-->understands/communicates without difficulty   Swallowing 0-->swallows foods/liquids without difficulty   Cognition 0 - no cognition issues reported   Fall history within last six months no   General Information   Onset of Illness/Injury or Date of Surgery - Date 05/09/19   Referring Physician Marleen   Patient/Family Goals Statement OT get stronger more endurance return to baseline function   Precautions/Limitations fall precautions;other (see comments)   Weight-Bearing Status - LUE full weight-bearing   Weight-Bearing Status - RUE nonweight-bearing   Weight-Bearing Status - LLE full weight-bearing   Weight-Bearing Status - RLE full weight-bearing   Heart Disease Risk Factors Diabetes;High blood pressure;Overweight;Stress;Gender;Age   General Observations OT patient emotional duering OT eval   Cognitive Status Examination   Orientation orientation to person, place and time   Level of  Consciousness alert   Follows Commands (Cognition) WNL   Memory intact   Attention No deficits were identified   Organization/Problem Solving No deficits were identified   Executive Function No deficits were identified   Visual Perception   Visual Perception Wears glasses   Sensory Examination   Sensory Comments WFL   Pain Assessment   Patient Currently in Pain Yes, see Vital Sign flowsheet   Integumentary/Edema   Integumentary/Edema Comments UE LE edema noted   Posture   Posture Comments WFL influenced by R UE NWB   Range of Motion (ROM)   ROM Comment WFL   Strength   Strength Comments OT WFL L UE grossly 3+/5 limited endurance no formal assessment on R UE due to restriction   Hand Strength   Hand Strength Comments L WFL NWB R    Muscle Tone Assessment   Muscle Tone Comments OT UE LE edema   Coordination   Coordination Comments OT L UE WFL  R UE not WFL   Mobility   Bed Mobility Comments tall bed at home   Transfer Skills   Transfer Comments OT no AE used at baseline   Transfer Skill: Bed to Chair/Chair to Bed   Level of [place]: Bed to Chair moderate assist (50% patients effort)   Physical Assist/Nonphysical Assist: Bed to Chair set-up required;supervision;verbal cues;1 person assist   Weight-Bearing Restrictions nonweight-bearing   Assistive Device - Transfer Skill Bed to Chair Chair to Bed Rehab Eval straight cane   Transfer Skill: Sit to Stand   Level of [place]: Sit/Stand moderate assist (50% patients effort)   Physical Assist/Nonphysical Assist: Sit/Stand set-up required;supervision;verbal cues;1 person assist   Transfer Skill: Sit to Stand nonweight-bearing   Assistive Device for Transfer: Sit/Stand straight cane   Transfer Skill: Toilet Transfer   Level of [place]: Toilet moderate assist (50% patients effort)   Physical Assist/Nonphysical Assist: Toilet set-up required;supervision;verbal cues;1 person assist   Weight-Bearing Restrictions: Toilet nonweight-bearing   Assistive Device straight  cane;other (see comments)   Toilet Transfer Skill Comments OT taller toilet height single pt cane   Balance   Balance Comments poor will conitnue to monitor   Bathing   Level of [place] dependent (less than 25% patients effort)   Physical Assist/Nonphysical Assist set-up required;supervision;verbal cues;1 person assist   Assistive Device other (see comments)   Upper Body Dressing   Level of [place]: Dress Upper Body dependent (less than 25% patients effort)   Physical Assist/Nonphysical Assist: Dress Upper Body set-up required;supervision;verbal cues;1 person assist   Lower Body Dressing   Level of [place]: Dress Lower Body dependent (less than 25% patients effort)   Physical Assist/Nonphysical Assist: Dress Lower Body set-up required;supervision;verbal cues;1 person assist   Toileting   Level of [place]: Toilet dependent (less than 25% patients effort)   Physical Assist/Nonphysical Assist: Toilet set-up required;supervision;verbal cues;1 person assist   Instrumental Activities of Daily Living (IADL)   Previous Responsibilities meal prep;housekeeping;laundry;shopping;medication management;finances;driving;work   Activities of Daily Living Analysis   Impairments Contributing to Impaired Activities of Daily Living balance impaired;coordination impaired;fear and anxiety;flexibility decreased;muscle tone abnormal;pain;post surgical precautions;strength decreased   General Therapy Interventions   Planned Therapy Interventions ADL retraining;IADL retraining;balance training;bed mobility training;fine motor coordination training;strengthening;transfer training;visual perception;home program guidelines;progressive activity/exercise;risk factor education   Clinical Impression   Criteria for Skilled Therapeutic Interventions Met yes, treatment indicated   OT Diagnosis deconditioning   Influenced by the following impairments current restriction, swelling, nausea, pain, anxiety   Assessment of Occupational  Performance 5 or more Performance Deficits   Identified Performance Deficits OT patient below baseline in BADL iADL FX mobility strength endurance FM balance   Clinical Decision Making (Complexity) High complexity   Therapy Frequency daily   Predicted Duration of Therapy Intervention (days/wks) OT goals to be met by 6/1/19   Anticipated Discharge Disposition Home with Assist;Home with Home Therapy   Risks and Benefits of Treatment have been explained. Yes   Patient, Family & other staff in agreement with plan of care Yes   Therapy Certification   Start of Care Date 05/17/19   Certification date from 05/17/19   Certification date to 06/17/19   Medical Diagnosis maxillary alveolar cancer   Certification I certify the need for these services furnished under this plan of treatment and while under my care.  (Physician co-signature of this document indicates review and certification of the therapy plan).   Total Evaluation Time   Total Evaluation Time (Minutes) 30

## 2019-05-17 NOTE — PROGRESS NOTES
"SPIRITUAL HEALTH SERVICES  SPIRITUAL ASSESSMENT Progress Note  [facility] ([facility]) TCU  ON-CALL VISIT    REFERRAL SOURCE: Epic consult order for emotional support    DISTRESS:     Emotional/Existential/Relational Distress - Devonte said that he has been struggling with the changes his health and illness have created in his life. He says \"I don't recognize myself, how I look, how I feel.\" He spoke of how he knows there is a future on the other side of this but that he often feels sad and \"alone\" in the midst of it.He was tearful intermittently during my visit and talked about crying more yesterday when he was feeling \"scared and isolated\" after transitioning to this unit.    SPIRITUAL/Mandaeism COPING:     Taoist/Kiya - Anglican.     Spiritual Practice(s) - Prayer, which we shared. Discussed the concept of Centering Prayer or quiet prayer that listens for God's voice. He is also excited to [facility]. Reading favorite Bible passages is also comforting to him; he named psalm 23, psalm 139, and the Gospel of [place] as particular sources of comfort.    Resources for Support - He said he is michael to have good support from family and friends, and that he \"looks for the face of God in the people around me, including the people here taking care of me.\" Encouraged him in this spiritual practice.     Meaning/Sense-Making - Devonte sees this time as a time \"when God is showing me something different about myself. Maybe I am seeing a different side of myself now, a person who has to receive from others instead of give.\"     GOALS OF CARE: Pt would appreciate a visit from unit  next week sometime. He finds  visits extremely helpful and did not receive the visits he requested when on the [place]. (See Emmanuel's note from 5/15.) He would also like a visit from  , and does not remember Father Juwan's visit from 5/10.     PLAN: Will " notify unit  and   of pt's request for visits next week. [facility] remains available for any further needs or requests.     GABRIELE DaltonDiv  Associate   Pager [phone]    * [facility] remains available 24/7 for emergent requests/referrals, either by having the switchboard page the on-call  or by entering an ASAP/STAT consult in Epic (this will also page the on-call ).*

## 2019-05-17 NOTE — PROGRESS NOTES
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    BG still running high after boluses last evening, but a little improved with higher insulin to carb ratio.  Pt is not tolerating 2 cans at a time.  Will switch to cycled feeds tonight.                        Plan:  -glargine 20 units every 24 hours (8am)- continue  -NPH 23 units qPM- please give at start of cycled TFs, hold if TFs do not run.  -metformin suspension 500 mg at lunch- continue, will increase once certain he is tolerating cycled TFs.  - aspart for bolus feeds discontinued.  -aspart for correction: changed to high intensity with cycled TFs at 2100, 0100, 0500, continue medium intensity during date at 0900, 1300, 1700.  -will continue to follow     Outpatient diabetes follow up: TBD  Plan discussed with patient, bedside RN, primary team and dietitian.             Interval History:   The last 24 hours progress and nursing notes reviewed.  Devonte continued to have nausea with bolus feeds yesterday (1 can each time) but it was improved when boluses were spaced out further.  Today he got 2 cans with morning bolus and he reports that the nausea was much worse.  They had to stop bolus feed before finished, give extra anti-emetics, then restart (so glucoses likely going down then spiking up).  He thinks he could tolerate 1 can at a time- but this would mean 5 boluses per day- and we would need these spaced out by at least 4 hours.  At this point it seems that it would be easier  to just cycle TFs overnight.  Discussed with LASHA and Dr. Hawley, they agree, bolus feeds stopping now, changing to cycled feeds tonight: Isosource 1.5 at 75ml/h x 12h 8am-8pm, then increasing to 100ml/h x 12h tomorrow night if tolerates tonight.      PTA:  Glipizide XL 20 mg am  Metformin 2 grams every morning    Recent Labs   Lab 05/17/19  0844 05/17/19  0838 05/17/19  0604 05/17/19  0209 05/16/19  2136 05/16/19  1713 05/16/19  1453  05/16/19  0603  05/15/19  0630  05/14/19  0527  05/13/19  0441  05/12/19  0436   GLC  --  197*  --   --   --   --   --   --  179*  --  228*  --  289*  --  225*  --  155*   *  --  177* 216* 211* 165* 220*   < >  --    < >  --    < >  --    < >  --    < >  --     < > = values in this interval not displayed.               Review of Systems:   See interval hx          Medications:     None       Physical Exam:  Gen: Alert, resting in bed, NAD   HEENT:  mucous membranes are moist, FT in place.  Resp: unlabored  Ext: moving all extremities   Neuro:oriented x3, communicating clearly  /61 (BP Location: Left arm)   Pulse 93   Temp 96.5  F (35.8  C) (Axillary)   Resp 18   Wt 133.4 kg (294 lb 1.6 oz)   SpO2 97%   BMI 46.06 kg/m             Data:     Lab Results   Component Value Date    A1C 7.4 04/24/2019            Recent Labs   Lab Test 05/17/19  0838 05/16/19  0603    141   POTASSIUM 4.3 4.0   CHLORIDE 109 109   CO2 26 27   ANIONGAP 6 5   * 179*   BUN 24 27   CR 0.60* 0.72   MANOJ 8.6 9.2     CBC RESULTS:   Recent Labs   Lab Test 05/17/19  0838   WBC 7.6   RBC 3.57*   HGB 10.9*   HCT 34.1*   MCV 96   MCH 30.5   MCHC 32.0   RDW 12.7        I spent a total of >25 minutes bedside and on the inpatient unit managing the glycemic care of Devonte KIMBALL Caitlin. Over 50% of my time on the unit was spent counseling the patient and/or coordinating care regarding glucose management in the context of DM2, bolused TFs switching to cycled TFs.  See note for details.    Susana  Mau BOWER [phone]  Diabetes Management Team Job Code [id number]

## 2019-05-17 NOTE — H&P
[facility] Care ()    History and Physical  Hospitalist       Date of Admission:  5/16/2019  Date of Service (when I saw the patient): 05/17/19    Assessment & Plan      1. Invasive SCC of left Alveolar Ridge SP Left infrastructure maxillectomy, left radial forearm free flap, left neck exploration, STSG from left thigh to left forearm, NG feeding tube placement  -dressing instruction written as per the discharge summary. Thigh and left hand graft site will be monitored.   2. DM: BG were high. TF now being changed to night dosing. Endocrine will change diabetes management accordingly  3. HT: BP is good on lisinopril and cardura  4. CHO: Ct lipitor  5. Severe malnutrition: ON Tube feeding. Getting nauseated with two cans. Now will get nocturnal tube feeding.     Immunization:     Pneumo-poly  12/11/2007  1 of 2   Full    No    Pneumo-poly  10/15/2013  2 of 2  Pneumovax 23  Full    No    Td/Tdap  06/05/1997  1 of 4  Decavac 7+ yrs     Yes    Td/Tdap  10/04/2007  2 of 4  Adacel 11+ yrs  Full    No    Td/Tdap  07/25/2017  3 of 4   Full    No        DVT Prophylaxis: Heparin SQ  Code Status: Full Code    Disposition: based on PT, OT    Arcelia Hawley MD    Primary Care Physician   Vivi Casas    Chief Complaint     History is obtained from the patient    History of Present Illness      Devonte Tucker is a 53 year old male with history of oral preneoplastic disease who developed an invasive cancer of the left alveolar ridge with invasion of the maxilla on imaging.     The patient was admitted to the hospital and underwent left infrastructure maxillectomy, left radial forearm free flap, left neck exploration, STSG from left thigh to left forearm, NG feeding tube placement.     He tolerated the procedure okay.  His postoperative course was complicated by elevated blood glucose levels post-operatively. The endocrinology team was consulted for assistance with glucose management and the patient  was started on an insulin regimen. He has been weak. He is discharged to TCU for rehab nees and PT, OT, tube feeding.     He is doing well. No new issues reported.       Past Medical History      I have reviewed this patient's medical history and updated it with pertinent information if needed.     Past Medical History:   Diagnosis Date     Diabetes (H)      Hypertension      Obesity        Past Surgical History      I have reviewed this patient's surgical history and updated it with pertinent information if needed.    Past Surgical History:   Procedure Laterality Date     ------------OTHER-------------      Mucosa and bone biopsy     EXPLORE NECK Left 2019    Procedure: Left Neck Exploration;  Surgeon: Jett Treviño MD;  Location: [facility] OR     EXTRACTION(S) DENTAL      x2 under general anesthesia     GRAFT FREE VASCULARIZED (LOCATION) Right 2019    Procedure: Left Radial Forearm Free Flap (soft tissue);  Surgeon: Sumit Atwood MD;  Location: [facility] OR     GRAFT SKIN SPLIT THICKNESS FROM EXTREMITY Right 2019    Procedure: Graft skin split thickness from right thigh, nasogastric feeding tube placement;  Surgeon: Sumit Atwood MD;  Location: [facility] OR     IR LYMPH NODE BIOPSY  2019     OSTEOTOMY MAXILLA Left 2019    Procedure: Left Infrastructure Maxillectomy,;  Surgeon: Jett Treviño MD;  Location: [facility] OR     Prior to Admission Medications        Prior to Admission Medications   Prescriptions Last Dose Informant Patient Reported? Taking?   acetaminophen (TYLENOL) 32 mg/mL liquid   No No   Si.3 mLs (650 mg) by Per Feeding Tube route every 4 hours   aspirin (ASA) 325 MG tablet   No No   Si tablet (325 mg) by Per NG tube route daily   atorvastatin (LIPITOR) 20 MG tablet   No No   Si tablet (20 mg) by Per Feeding Tube route every morning   chlorhexidine (PERIDEX) 0.12 % solution   No No   Sig: Swish and spit 15 mLs in mouth every 8 hours   doxazosin (CARDURA) 0.6 mg/mL SUSP suspension   No  "No   Si.67 mLs (1 mg) by Per Feeding Tube route daily   gabapentin (NEURONTIN) 250 MG/5ML solution   No No   Si mLs (300 mg) by Per Feeding Tube route every 8 hours   insulin aspart (NOVOLOG PEN) 100 UNIT/ML pen   No No   Sig: Inject 1-10 Units Subcutaneous 3 times daily (before meals)   insulin aspart (NOVOLOG PEN) 100 UNIT/ML pen   No No   Sig: Inject 1-7 Units Subcutaneous At Bedtime   insulin aspart (NOVOLOG PEN) 100 UNIT/ML pen   No No   Si unit per 8 grams of carbohydrate with snacks/supplements. Do not give if pre-prandial glucose is less than 60 mg/dL.   insulin aspart (NOVOLOG PEN) 100 UNIT/ML pen   No No   Sig: Inject 7-14 Units Subcutaneous 3 times daily (with meals) Give 7 units per 1 can of Isosource 1.5 (250 ml), give 14 units for 2 cans   insulin glargine (LANTUS SOLOSTAR PEN) 100 UNIT/ML pen   No No   Sig: Inject 20 Units Subcutaneous every 24 hours   lisinopril (PRINIVIL/ZESTRIL) 10 MG tablet   No No   Si tablet (10 mg) by Per Feeding Tube route every morning   metFORMIN (GLUCOPHAGE) 500 MG/5ML SOLN solution   No No   Sig: Take 5 mLs (500 mg) by mouth daily (with lunch)   mineral oil-hydrophilic petrolatum (AQUAPHOR) external ointment   No No   Sig: Apply topically every 8 hours   multivitamins w/minerals (CERTAVITE) liquid   No No   Sig: 15 mLs by Per Feeding Tube route daily   order for DME   No No   Sig: Equipment being ordered: Nasogastric bolus tube feeding supplies  Formula: Isosource 1.5, 5 cans per day  Gravity feeding bags  60 mL syringes    Treatment Diagnosis: squamous cell carcinoma of the oral cavity   order for DME   No No   Sig: Equipment being ordered: Wound care supplies, 1 each daily x 21 days  Xeroform occlusive gauze 5\" x 9\"  Telfa non-adherent pad 8\" x 3\"  Kerlix bandage roll 4-1/2\" x 4-8 yd  ACE wrap, 4 inch (5 total)    Diagnosis: squamous cell carcinoma of the oral cavity   oxyCODONE (ROXICODONE) 5 MG/5ML solution   No No   Si-10 mLs (5-10 mg) by Per NG " tube route every 4 hours as needed for moderate to severe pain   polyethylene glycol (MIRALAX/GLYCOLAX) packet   No No   Si g by Per Feeding Tube route daily   protein modular (PROSOURCE NO CARB)   No No   Si packet by Per Feeding Tube route 4 times daily   senna-docusate (SENOKOT-S/PERICOLACE) 8.6-50 MG tablet   No No   Si tablets by Per Feeding Tube route 2 times daily      Facility-Administered Medications: None       Allergies      No Known Allergies    Social History      I have reviewed this patient's social history and updated it with pertinent information if needed. Devonte Tucker  reports that he has never smoked. He has never used smokeless tobacco. He reports that he does not drink alcohol or use drugs.    Family History      I have reviewed this patient's family history and updated it with pertinent information if needed.   Family History   Problem Relation Age of Onset     Cancer Mother         ovarian     Cancer Brother      Cardiovascular Brother         Stent     Kidney Cancer Sister         s/p nephrectomy        Review of Systems      The 10 point Review of Systems is negative other than noted in the HPI or here.     Physical Exam      Temp: 96.3  F (35.7  C) Temp src: Axillary BP: 105/72 Pulse: 80   Resp: 18 SpO2: 96 % O2 Device: None (Room air)    Vital Signs with Ranges  Temp:  [96.3  F (35.7  C)-98.6  F (37  C)] 96.3  F (35.7  C)  Pulse:  [] 80  Resp:  [18] 18  BP: (105-121)/(61-82) 105/72  SpO2:  [96 %-98 %] 96 %  294 lbs 1.6 oz    Constitutional: Awake, alert, cooperative, no apparent distress.  Eyes: Conjunctiva and pupils examined and normal.  HEENT: Moist mucous membranes, normal dentition.  Respiratory: Clear to auscultation bilaterally, no crackles or wheezing.  Cardiovascular: Regular rate and rhythm, normal S1 and S2, and no murmur noted.  GI: Soft, non-distended, non-tender, normal bowel sounds.  Lymph/Hematologic: No anterior cervical or supraclavicular  adenopathy.  Skin: No rashes, no cyanosis, no edema.  Musculoskeletal: No joint swelling, erythema or tenderness.  Neurologic: Cranial nerves 2-12 intact, normal strength and sensation.  Psychiatric: Alert, oriented to person, place and time, no obvious anxiety or depression.    Data      Data reviewed today:  I personally reviewed no images or EKG's today.  Recent Labs   Lab 05/17/19  0838 05/16/19  0603 05/15/19  0630   WBC 7.6 7.7 7.8   HGB 10.9* 10.4* 10.7*   MCV 96 96 96    315 304    141 141   POTASSIUM 4.3 4.0 4.1   CHLORIDE 109 109 110*   CO2 26 27 26   BUN 24 27 23   CR 0.60* 0.72 0.54*   ANIONGAP 6 5 5   MANOJ 8.6 9.2 9.0   * 179* 228*       No results found for this or any previous visit (from the past 24 hour(s)).

## 2019-05-17 NOTE — PLAN OF CARE
Alert and pleasant. Likes to have all cares explained to him. Tube feeding started this morning and after about an hour, c/o feeling nauseated and Mylanta given with relief. Tube feeding bolus rate reduced so feeding went in slower. TF ran until around noon. Seen by JANNIE Meza, and she told me not to give patient 1200 insulin, as BG would just be high due to late ending of TF. Susana said that she was going to talk to dietician and see if TF could be cycled overnight. Awaiting orders. Very deconditioned and got tired and somewhat lightheaded during therapies, although BP remained stable. I suggested he lie down for awhile after therapy as had been in recliner or therapy since early this morning. L/E with some edema-elevated on pillows. Slight rash posterior thigh, skin intact. Neck incision WNL-aquaphor applied. Right thigh skin graft/donor site healing well-dressing changed. Awaiting supplies to do right forearm dressing change. NG patent. Sister, Jovanna, in visiting.  in for consult.

## 2019-05-18 LAB
GLUCOSE BLDC GLUCOMTR-MCNC: 157 MG/DL (ref 70–99)
GLUCOSE BLDC GLUCOMTR-MCNC: 158 MG/DL (ref 70–99)
GLUCOSE BLDC GLUCOMTR-MCNC: 179 MG/DL (ref 70–99)
GLUCOSE BLDC GLUCOMTR-MCNC: 183 MG/DL (ref 70–99)
GLUCOSE BLDC GLUCOMTR-MCNC: 217 MG/DL (ref 70–99)
GLUCOSE BLDC GLUCOMTR-MCNC: 223 MG/DL (ref 70–99)

## 2019-05-18 PROCEDURE — 97530 THERAPEUTIC ACTIVITIES: CPT | Mod: GP | Performed by: PHYSICAL THERAPIST

## 2019-05-18 PROCEDURE — 97116 GAIT TRAINING THERAPY: CPT | Mod: GP | Performed by: PHYSICAL THERAPIST

## 2019-05-18 PROCEDURE — 97535 SELF CARE MNGMENT TRAINING: CPT | Mod: GO

## 2019-05-18 PROCEDURE — 25000128 H RX IP 250 OP 636: Performed by: HOSPITALIST

## 2019-05-18 PROCEDURE — 12000022 ZZH R&B SNF

## 2019-05-18 PROCEDURE — 25000132 ZZH RX MED GY IP 250 OP 250 PS 637: Performed by: HOSPITALIST

## 2019-05-18 PROCEDURE — 00000146 ZZHCL STATISTIC GLUCOSE BY METER IP

## 2019-05-18 PROCEDURE — 97110 THERAPEUTIC EXERCISES: CPT | Mod: GP | Performed by: PHYSICAL THERAPIST

## 2019-05-18 RX ADMIN — Medication 1 PACKET: at 08:08

## 2019-05-18 RX ADMIN — WHITE PETROLATUM: 1.75 OINTMENT TOPICAL at 05:59

## 2019-05-18 RX ADMIN — HEPARIN SODIUM 5000 UNITS: 5000 INJECTION, SOLUTION INTRAVENOUS; SUBCUTANEOUS at 01:28

## 2019-05-18 RX ADMIN — Medication 1 PACKET: at 12:20

## 2019-05-18 RX ADMIN — LISINOPRIL 10 MG: 10 TABLET ORAL at 08:06

## 2019-05-18 RX ADMIN — GABAPENTIN 300 MG: 250 SUSPENSION ORAL at 17:10

## 2019-05-18 RX ADMIN — SENNOSIDES AND DOCUSATE SODIUM 2 TABLET: 8.6; 5 TABLET ORAL at 21:22

## 2019-05-18 RX ADMIN — WHITE PETROLATUM: 1.75 OINTMENT TOPICAL at 17:16

## 2019-05-18 RX ADMIN — GABAPENTIN 300 MG: 250 SUSPENSION ORAL at 01:28

## 2019-05-18 RX ADMIN — WHITE PETROLATUM: 1.75 OINTMENT TOPICAL at 01:29

## 2019-05-18 RX ADMIN — OXYCODONE HYDROCHLORIDE 10 MG: 5 SOLUTION ORAL at 01:26

## 2019-05-18 RX ADMIN — ACETAMINOPHEN 650 MG: 325 SOLUTION ORAL at 01:26

## 2019-05-18 RX ADMIN — SENNOSIDES AND DOCUSATE SODIUM 2 TABLET: 8.6; 5 TABLET ORAL at 08:06

## 2019-05-18 RX ADMIN — Medication 1 PACKET: at 17:15

## 2019-05-18 RX ADMIN — ACETAMINOPHEN 650 MG: 325 SOLUTION ORAL at 13:08

## 2019-05-18 RX ADMIN — CHLORHEXIDINE GLUCONATE 0.12% ORAL RINSE 15 ML: 1.2 LIQUID ORAL at 17:08

## 2019-05-18 RX ADMIN — ACETAMINOPHEN 650 MG: 325 SOLUTION ORAL at 05:55

## 2019-05-18 RX ADMIN — POLYETHYLENE GLYCOL 3350 17 G: 17 POWDER, FOR SOLUTION ORAL at 08:05

## 2019-05-18 RX ADMIN — ACETAMINOPHEN 650 MG: 325 SOLUTION ORAL at 21:21

## 2019-05-18 RX ADMIN — ASPIRIN 325 MG ORAL TABLET 325 MG: 325 PILL ORAL at 08:05

## 2019-05-18 RX ADMIN — HEPARIN SODIUM 5000 UNITS: 5000 INJECTION, SOLUTION INTRAVENOUS; SUBCUTANEOUS at 18:24

## 2019-05-18 RX ADMIN — GABAPENTIN 300 MG: 250 SUSPENSION ORAL at 08:09

## 2019-05-18 RX ADMIN — HEPARIN SODIUM 5000 UNITS: 5000 INJECTION, SOLUTION INTRAVENOUS; SUBCUTANEOUS at 10:20

## 2019-05-18 RX ADMIN — ALUMINUM HYDROXIDE, MAGNESIUM HYDROXIDE, AND DIMETHICONE 15 ML: 400; 400; 40 SUSPENSION ORAL at 13:08

## 2019-05-18 RX ADMIN — CHLORHEXIDINE GLUCONATE 0.12% ORAL RINSE 15 ML: 1.2 LIQUID ORAL at 08:06

## 2019-05-18 RX ADMIN — INSULIN GLARGINE 20 UNITS: 100 INJECTION, SOLUTION SUBCUTANEOUS at 08:06

## 2019-05-18 RX ADMIN — METFORMIN HYDROCHLORIDE 500 MG: 500 SOLUTION ORAL at 12:20

## 2019-05-18 RX ADMIN — ATORVASTATIN CALCIUM 20 MG: 20 TABLET, FILM COATED ORAL at 08:06

## 2019-05-18 RX ADMIN — Medication 1 MG: at 08:05

## 2019-05-18 RX ADMIN — ACETAMINOPHEN 650 MG: 325 SOLUTION ORAL at 18:24

## 2019-05-18 RX ADMIN — OXYCODONE HYDROCHLORIDE 10 MG: 5 SOLUTION ORAL at 17:09

## 2019-05-18 RX ADMIN — ACETAMINOPHEN 650 MG: 325 SOLUTION ORAL at 10:20

## 2019-05-18 RX ADMIN — MULTIVITAMIN 15 ML: LIQUID ORAL at 08:05

## 2019-05-18 RX ADMIN — Medication 1 PACKET: at 21:31

## 2019-05-18 RX ADMIN — WHITE PETROLATUM: 1.75 OINTMENT TOPICAL at 08:07

## 2019-05-18 RX ADMIN — CHLORHEXIDINE GLUCONATE 0.12% ORAL RINSE 15 ML: 1.2 LIQUID ORAL at 01:26

## 2019-05-18 NOTE — PLAN OF CARE
Pt.A&Ox4 - very pleasant. NPO / cycled TF infusing per order via NGT - pt.currently denies nausea as previously c/o during bolus TFs. BG checks with sliding scale insulin coverage per order. Requested and rec'd Oxycodone sol.10mg @ 0130 for c/o (R) arm and (L) nasal/facial pain. Sleeping on reassessment. Assist of 1 / walker to BR - cont.B&B, reported BM during shift. Assist of 1 with bed mobility.

## 2019-05-18 NOTE — PROGRESS NOTES
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    BG came down to 160s-170s yesterday evening with bolus feeds discontinued.  Overnight BG trending up to high 100s-low 200s with cycled TFs.  TF rate increasing tonight.                        Plan:  -glargine 20 units every 24 hours (8am)- continue  -NPH increased to 40 units qPM (for TFs at 100ml/h x 12h)- please give at start of cycled TFs, hold if TFs do not run.  -metformin suspension 500 mg at lunch- continue, will increase once certain he is tolerating cycled TFs at goal.  -aspart for correction: high intensity with cycled TFs at 2100, 0100, 0500, continue medium intensity during date at 0900, 1300, 1700.  -The following patient care order was placed: For cycled tube feeds running at 100ml/h: if pt does not tolerate at this rate please first try reducing rate to 85-90ml/h.  If still not tolerating decrease to 75ml/h and increase glucose check frequency to every 2 hours.  If glucose is dropping <100 start D10 at 30ml/h (use prn D10 order for this).  If TFs are stopped entirely then D10 needs to run at 100ml/h to prevent hypoglycemia (can titrate to higher rate if glucoses <100).       Outpatient diabetes follow up: TBD  Plan discussed with patient, charge RN, OT, will discuss TF increase with Dr. Hawley.             Interval History:   The last 24 hours progress and nursing notes reviewed.  Brant tolerated the cycled TFs at 75ml/h overnight with no nausea.  He was a little  bothered by the actual feeding tube moving around, but says this is minor compared to the nausea he had with the bolus feeds.  He is ok trying the increase to 100ml/h tonight.  We ran through a plan of what can be done if he develops nausea with feeds at this higher rate, I reviewed with charge RN who suggested I place a patient care order stating this plan (see plan above).  Otherwise Brant had no complaints, he was getting ready for his morning therapy sessions when I visited.    Nutrition: cycled TFs: Isosource 1.5 at 75ml/h x 12h 8am-8pm- increasing to 100ml/h x 12h 8-8 tonight.  NPO.      PTA:  Glipizide XL 20 mg am  Metformin 2 grams every morning    Recent Labs   Lab 05/18/19  0503 05/18/19  0058 05/17/19 2011 05/17/19  1632 05/17/19  1313 05/17/19  0844 05/17/19  0838  05/16/19  0603  05/15/19  0630  05/14/19  0527  05/13/19  0441  05/12/19  0436   GLC  --   --   --   --   --   --  197*  --  179*  --  228*  --  289*  --  225*  --  155*   * 179* 172* 164* 278* 142*  --    < >  --    < >  --    < >  --    < >  --    < >  --     < > = values in this interval not displayed.               Review of Systems:   See interval hx          Medications:     None       Physical Exam:  Gen: Alert, sitting on edge of bed, NAD   HEENT:  mucous membranes are moist, FT in place.  Resp: unlabored  Ext: moving all extremities   Neuro:oriented x3, communicating clearly  /71 (BP Location: Left arm)   Pulse 87   Temp 95.6  F (35.3  C)   Resp 16   Wt 133.4 kg (294 lb 1.6 oz)   SpO2 97%   BMI 46.06 kg/m             Data:     Lab Results   Component Value Date    A1C 7.4 04/24/2019            Recent Labs   Lab Test 05/17/19  0838 05/16/19  0603    141   POTASSIUM 4.3 4.0   CHLORIDE 109 109   CO2 26 27   ANIONGAP 6 5   * 179*   BUN 24 27   CR 0.60* 0.72   MANOJ 8.6 9.2     CBC RESULTS:   Recent Labs   Lab Test 05/17/19  0838   WBC 7.6   RBC 3.57*   HGB 10.9*   HCT 34.1*   MCV 96   MCH 30.5   MCHC 32.0    RDW 12.7          I spent a total of >25 minutes bedside and on the inpatient unit managing the glycemic care of Devonte Tucker. Over 50% of my time on the unit was spent counseling the patient and/or coordinating care regarding glucose management in the context of DM2, cycled TFs with increasing rate.  See note for details.    Susana León PA-C [phone]  Diabetes Management Team Job Code [id number]

## 2019-05-18 NOTE — PHARMACY-TCU REVIEW OF H&P
I have reviewed this patient's TCU admission History & Physical for medication related changes/recommendations identified by the admitting provider.  I am confirming that there are no recommendations requiring changes to medication orders are indicated at this time based on the provider recommendations in the H&P.    Maria A Simmons, PharmD, BCPS

## 2019-05-18 NOTE — PROGRESS NOTES
"   19   Living Arrangements   Lives With alone   Living Arrangements [facility]   Values Beliefs and Spiritual Care   F: Kiya: Do you have a connection with a kiya community, Sabianism, or Episcopal group? yes   The name of the kiya community, Sabianism, or Episcopal group is:   (Islam)   I: Influence: What cultural, spiritual, Voodoo practices or values are important to you as a part of your healthcare/ hospitalization? prayer;scripture reading   Final Resources   Equipment Currently Used at Home none     Social Work: Initial Assessment with Discharge Plan    Patient Name: Devonte \"Brant\" Caitlin  : 1965  Age: 53 year old  Completed assessment with: patient  Admitted to TCU: 19    Presenting Information   Date of SW assessment: 19  Health Care Directive: none  Primary Health Care Agent: next-of-kin (siblings) would be surrogate decision-makers if necessary  Secondary Health Care Agent: n/a  [facility] Sit[facility] 15 steps to 2nd level (2 flights with NO elevator)  Previous Functional Status: independent; working  DME available: none  Patient and family understanding of hospitalization: mouth/jaw/nasal surgery, arm surgery  Cultural/Language/Spiritual Considerations: speaks English; Islam (likes/requests regular visits by  and priests)  Abuse concerns: none  PAS: confirmation [id number]  BIMS and PHQ-9 will be completed by unit sw closer to day 5    Physical Health  Maxillary sinus cancer, left maxillectomy, left radial forearm free flap, left neck exploration, split thickness graft from left thigh to left forearm, nasogastric tube (tube-feeding), deconditioning    Provider Information   Primary Care Physician: Dr. Vivi Casas [phone]    Mental Health:  Diagnosis: none  Current Support/Services: n/a  Previous Services: n/a  Services Needed/Recommended: health psychologist available if interested during " stay    Chemical Dependency:   Diagnosis: n/a  Current Support/Services: n/a  Previous Services: n/a  Services Needed/Recommended: n/a    Support System  Marital Status:   Family support: sister-Jovanna ([place]-taking 1 more week off)  Sister-Rukhsana ([place]), sister-Liza ([place]), sister-Rosangela ([place]), brother-Elvin ([facility]), sister-Shilpa ([place]-may take vacation this summer to stay with patient)  Other support available: some nieces/nephews live in metro area of [place]; close friends/co-workers   Gaps in support system: none    Community Resources  Current in home services: none  Previous services: none    Financial/Employment/Education  Employment Status: events coordinator and retreat manager at [employer]   Income Source: wages  Education: college  Financial Concerns: none  Insurance: [employer]  [facility] [phone] (home care services, DME)    Discharge Plan   Patient and family discharge goal: return home (with family/friend assistance/home care/outpt services) while receiving radiation treatment; after radiation, patient may stay with family in [place] or [place]  Barriers to discharge: none identified  Disposition: to be determined  Transportation Needs: family/friends can provide  Name of Transportation Company and Phone: n/a    Additional comments   D:  See H&P for full medical background.  I:  Met with patient to complete assessment and social work consult.  A:  Patient is doing better, feels more comfortable on TCU than yesterday when everything/everyone was new to him.  Supportive family and friends who are involved and willing to explore care giving options once his needs at discharge are more defined (home infusion, home or outpatient therapy during radiation treatments).   P:  SW will follow and assist as needed.    Writer introduced self and explained role.   provided patient with printout of orders and initial care plan  goals which were given on 5/17 for patient for review.  Writer explained this is an overview and available to answer questions as able.  If further questions, then writer can direct patient to the nurse, provider, therapy staff or interdisciplinary team member as needed.  Patient didn't have any questions at this time.  Copies of orders and care plan placed in chart.  Writer also told patient that the unit  would meet in the coming days to complete other assessments.    SHIVA Arredondo  Weekend   Acute Rehab Unit Phone: [phone]  Transitional Care Unit Phone: [phone]

## 2019-05-18 NOTE — PLAN OF CARE
Alert and oriented x 4. C/o pain gave oxycodone 10mg and it was effective. NPO NG tube intact and patent TF is running at 75ml/hr started at 50cc pt was tolerated after 2hrs changed to 75cc. Changed R arm dressing scant yellowish drainage noted. At bedtime pt did not wanted to take oxycodone and gave tylenol schedule. B/G was 164 dinner time and 172 at bedtime. Ambulated to the bathroom x 1 with SBA. Voided good no BM.

## 2019-05-18 NOTE — PLAN OF CARE
PT had great participation in therapy session.  Pt continues to progress with overall strength and balance with mobility.  Pt continues to amb with SEC and stairs with just LUE due to NWB on RUE.  Pt will continue to progress in order to increase time in outright position.

## 2019-05-18 NOTE — PHARMACY-MEDICATION REGIMEN REVIEW
Pharmacy Medication Regimen Review  Devonte Tucker is a 53 year old male who is currently in the Transitional Care Unit.    Assessment: All medications have an appropriate indications, durations and no unnecessary use was found.    Plan:   Continue current medications/plan.  Attending provider will be sent this note for review.  If there are any emergent issues noted above, pharmacist will contact provider directly by phone.      Pharmacy will periodically review the resident's medication regimen for any PRN medications not administered in > 72 hours and discontinue them. The pharmacist will discuss gradual dose reductions of psychopharmacologic medications with interdisciplinary team on a regular basis.    Please contact pharmacy if the above does not answer specific medication questions/concerns.  Maria A Simmons, Luis ManuelD, BCPS  Background:  A pharmacist has reviewed all medications and pertinent medical history today.  Medications were reviewed for appropriate use and any irregularities found are listed with recommendations.      Current Facility-Administered Medications:      [START ON 5/19/2019] - Skin Test Reading -, , Does not apply, Q21 Days, Arcelia Hawley MD     acetaminophen (TYLENOL) solution 650 mg, 650 mg, Per Feeding Tube, Q4H, Arcelia Hawley MD, 650 mg at 05/18/19 1020     acetaminophen (TYLENOL) Suppository 650 mg, 650 mg, Rectal, Q4H PRN, Arcelia Hawley MD     acetaminophen (TYLENOL) tablet 650 mg, 650 mg, Per Feeding Tube, Q4H PRN, Arcelia Hawley MD     alum & mag hydroxide-simethicone (MYLANTA ES/MAALOX  ES) suspension 15 mL, 15 mL, Per Feeding Tube, Q4H PRN, Arcelia Hawley MD, 15 mL at 05/17/19 1036     aspirin (ASA) tablet 325 mg, 325 mg, Per NG tube, Daily, Arcelia Hawley MD, 325 mg at 05/18/19 0805     atorvastatin (LIPITOR) tablet 20 mg, 20 mg, Per Feeding Tube, QAM, Arcelia Hawley MD, 20 mg at 05/18/19 0806      chlorhexidine (PERIDEX) 0.12 % solution 15 mL, 15 mL, Swish & Spit, Q8H, Arcelia Hawley MD, 15 mL at 05/18/19 0806     dextrose 10 % 1,000 mL infusion, , Intravenous, Continuous PRN, Arcelia Hawley MD     glucose gel 15-30 g, 15-30 g, Oral, Q15 Min PRN **OR** dextrose 50 % injection 25-50 mL, 25-50 mL, Intravenous, Q15 Min PRN **OR** glucagon injection 1 mg, 1 mg, Subcutaneous, Q15 Min PRN, Arcelia Hawley MD     doxazosin (CARDURA) suspension 1 mg, 1 mg, Per Feeding Tube, Daily, Arcelia Hawley MD, 1 mg at 05/18/19 0805     gabapentin (NEURONTIN) solution 300 mg, 300 mg, Per Feeding Tube, Q8H, Arcelia Hawley MD, 300 mg at 05/18/19 0809     heparin sodium injection 5,000 Units, 5,000 Units, Subcutaneous, Q8H, Arcelia Hawley MD, 5,000 Units at 05/18/19 1020     insulin aspart (NovoLOG) inj (RAPID ACTING), 1-12 Units, Subcutaneous, TID, Susana León PA-C, 4 Units at 05/18/19 0513     insulin aspart (NovoLOG) inj (RAPID ACTING), 1-6 Units, Subcutaneous, TID, Susana León PA-C, 2 Units at 05/18/19 0912     insulin glargine (LANTUS PEN) injection 20 Units, 20 Units, Subcutaneous, Q24H, Arcelia Hawley MD, 20 Units at 05/18/19 0806     insulin isophane human (HumuLIN N PEN) injection 40 Units, 40 Units, Subcutaneous, QPM, Susana León PA-C     lisinopril (PRINIVIL/ZESTRIL) tablet 10 mg, 10 mg, Per Feeding Tube, QAM, Arcelia Hawley MD, 10 mg at 05/18/19 0806     medication instructions, , Does not apply, Continuous PRN, Arcelia Hawley MD     metFORMIN (GLUCOPHAGE) solution 500 mg, 500 mg, Per Feeding Tube, Daily with lunch, Arcelia Hawley MD, 500 mg at 05/17/19 1212     mineral oil-hydrophilic petrolatum (AQUAPHOR), , Topical, Q8H, Arcelia Hawley MD     multivitamins w/minerals (CERTAVITE) liquid 15 mL, 15 mL, Per Feeding Tube, Daily, Arcelia Hawley MD, 15 mL at 05/18/19 0805      naloxone (NARCAN) injection 0.1-0.4 mg, 0.1-0.4 mg, Intravenous, Q2 Min PRN, Arcelia Hawley MD     ondansetron (ZOFRAN-ODT) ODT tab 4 mg, 4 mg, Oral, Q6H PRN, 4 mg at 05/16/19 1948 **OR** ondansetron (ZOFRAN) injection 4 mg, 4 mg, Intravenous, Q6H PRN, Arcelia Hawley MD     oxyCODONE (ROXICODONE) solution 5-10 mg, 5-10 mg, Per NG tube, Q4H PRN, Arcelia Hawley MD, 10 mg at 05/18/19 0126     polyethylene glycol (MIRALAX/GLYCOLAX) Packet 17 g, 17 g, Per Feeding Tube, Daily, Arcelia Hawley MD, 17 g at 05/18/19 0805     protein modular (ProSource No Carb) 1 packet, 1 packet, Per Feeding Tube, 4x Daily, Arcelia Hawley MD, 1 packet at 05/18/19 0808     senna-docusate (SENOKOT-S/PERICOLACE) 8.6-50 MG per tablet 2 tablet, 2 tablet, Per Feeding Tube, BID, Arcelia Hawley MD, 2 tablet at 05/18/19 0806     tuberculin injection 5 Units, 5 Units, Intradermal, Q21 Days, Arcelia Hawley MD, 5 Units at 05/17/19 0857  No current outpatient prescriptions on file.   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

## 2019-05-18 NOTE — PLAN OF CARE
"Participating in therapies and doing well. Ambulates in hallway with walker, T-belt, and SBA. Deconditioned, yet feels he is getting stronger. Sister brought in clothes and is working on dressing with OT. New tegaderm applied over doppler site left chest. States the doppler will be removed at his appointment on Monday, 5/20. He is also going to ask the MD at his appointment, if he can shave, as he is concerned especially about shaving on left side of his face where surgery was done in mouth, flap, and incision on left neck. Flap area is pale, warm to touch. White patchy areas on tongue. Sticky note left for provider. C/O \"heartburn,upset stomach\" this afternoon. Mylanta given with relief. Sits in recliner chair between therapies. Very pleasant and thankful for cares given to him.  "

## 2019-05-19 LAB
GLUCOSE BLDC GLUCOMTR-MCNC: 110 MG/DL (ref 70–99)
GLUCOSE BLDC GLUCOMTR-MCNC: 130 MG/DL (ref 70–99)
GLUCOSE BLDC GLUCOMTR-MCNC: 140 MG/DL (ref 70–99)
GLUCOSE BLDC GLUCOMTR-MCNC: 198 MG/DL (ref 70–99)
GLUCOSE BLDC GLUCOMTR-MCNC: 201 MG/DL (ref 70–99)
GLUCOSE BLDC GLUCOMTR-MCNC: 244 MG/DL (ref 70–99)

## 2019-05-19 PROCEDURE — 25000132 ZZH RX MED GY IP 250 OP 250 PS 637: Performed by: PHYSICIAN ASSISTANT

## 2019-05-19 PROCEDURE — 00000146 ZZHCL STATISTIC GLUCOSE BY METER IP

## 2019-05-19 PROCEDURE — 12000022 ZZH R&B SNF

## 2019-05-19 PROCEDURE — 25000132 ZZH RX MED GY IP 250 OP 250 PS 637: Performed by: HOSPITALIST

## 2019-05-19 PROCEDURE — 25000128 H RX IP 250 OP 636: Performed by: HOSPITALIST

## 2019-05-19 RX ORDER — METFORMIN HYDROCHLORIDE 500 MG/5ML
1000 SOLUTION ORAL
Status: DISCONTINUED | OUTPATIENT
Start: 2019-05-19 | End: 2019-05-25

## 2019-05-19 RX ADMIN — HEPARIN SODIUM 5000 UNITS: 5000 INJECTION, SOLUTION INTRAVENOUS; SUBCUTANEOUS at 18:24

## 2019-05-19 RX ADMIN — Medication 1 MG: at 08:03

## 2019-05-19 RX ADMIN — ALUMINUM HYDROXIDE, MAGNESIUM HYDROXIDE, AND DIMETHICONE 15 ML: 400; 400; 40 SUSPENSION ORAL at 01:46

## 2019-05-19 RX ADMIN — OXYCODONE HYDROCHLORIDE 10 MG: 5 SOLUTION ORAL at 21:19

## 2019-05-19 RX ADMIN — POLYETHYLENE GLYCOL 3350 17 G: 17 POWDER, FOR SOLUTION ORAL at 08:01

## 2019-05-19 RX ADMIN — Medication 1 PACKET: at 17:17

## 2019-05-19 RX ADMIN — SENNOSIDES AND DOCUSATE SODIUM 2 TABLET: 8.6; 5 TABLET ORAL at 08:03

## 2019-05-19 RX ADMIN — WHITE PETROLATUM: 1.75 OINTMENT TOPICAL at 01:44

## 2019-05-19 RX ADMIN — CHLORHEXIDINE GLUCONATE 0.12% ORAL RINSE 15 ML: 1.2 LIQUID ORAL at 08:16

## 2019-05-19 RX ADMIN — WHITE PETROLATUM: 1.75 OINTMENT TOPICAL at 19:01

## 2019-05-19 RX ADMIN — WHITE PETROLATUM: 1.75 OINTMENT TOPICAL at 09:33

## 2019-05-19 RX ADMIN — ACETAMINOPHEN 650 MG: 325 SOLUTION ORAL at 01:41

## 2019-05-19 RX ADMIN — OXYCODONE HYDROCHLORIDE 10 MG: 5 SOLUTION ORAL at 17:09

## 2019-05-19 RX ADMIN — SENNOSIDES AND DOCUSATE SODIUM 2 TABLET: 8.6; 5 TABLET ORAL at 21:08

## 2019-05-19 RX ADMIN — INSULIN HUMAN 45 UNITS: 100 INJECTION, SUSPENSION SUBCUTANEOUS at 20:03

## 2019-05-19 RX ADMIN — GABAPENTIN 300 MG: 250 SUSPENSION ORAL at 01:44

## 2019-05-19 RX ADMIN — Medication 1 PACKET: at 08:02

## 2019-05-19 RX ADMIN — CHLORHEXIDINE GLUCONATE 0.12% ORAL RINSE 15 ML: 1.2 LIQUID ORAL at 17:09

## 2019-05-19 RX ADMIN — HEPARIN SODIUM 5000 UNITS: 5000 INJECTION, SOLUTION INTRAVENOUS; SUBCUTANEOUS at 01:44

## 2019-05-19 RX ADMIN — ASPIRIN 325 MG ORAL TABLET 325 MG: 325 PILL ORAL at 08:03

## 2019-05-19 RX ADMIN — OXYCODONE HYDROCHLORIDE 10 MG: 5 SOLUTION ORAL at 01:44

## 2019-05-19 RX ADMIN — ATORVASTATIN CALCIUM 20 MG: 20 TABLET, FILM COATED ORAL at 08:03

## 2019-05-19 RX ADMIN — CHLORHEXIDINE GLUCONATE 0.12% ORAL RINSE 15 ML: 1.2 LIQUID ORAL at 01:44

## 2019-05-19 RX ADMIN — ACETAMINOPHEN 650 MG: 325 SOLUTION ORAL at 12:53

## 2019-05-19 RX ADMIN — GABAPENTIN 300 MG: 250 SUSPENSION ORAL at 17:09

## 2019-05-19 RX ADMIN — ACETAMINOPHEN 650 MG: 325 SOLUTION ORAL at 18:24

## 2019-05-19 RX ADMIN — METFORMIN HYDROCHLORIDE 1000 MG: 500 SOLUTION ORAL at 12:53

## 2019-05-19 RX ADMIN — Medication 1 PACKET: at 12:53

## 2019-05-19 RX ADMIN — ACETAMINOPHEN 650 MG: 325 SOLUTION ORAL at 21:07

## 2019-05-19 RX ADMIN — ACETAMINOPHEN 650 MG: 325 SOLUTION ORAL at 09:32

## 2019-05-19 RX ADMIN — ACETAMINOPHEN 650 MG: 325 SOLUTION ORAL at 05:53

## 2019-05-19 RX ADMIN — MULTIVITAMIN 15 ML: LIQUID ORAL at 08:03

## 2019-05-19 RX ADMIN — INSULIN GLARGINE 20 UNITS: 100 INJECTION, SOLUTION SUBCUTANEOUS at 08:03

## 2019-05-19 RX ADMIN — HEPARIN SODIUM 5000 UNITS: 5000 INJECTION, SOLUTION INTRAVENOUS; SUBCUTANEOUS at 09:33

## 2019-05-19 RX ADMIN — GABAPENTIN 300 MG: 250 SUSPENSION ORAL at 09:33

## 2019-05-19 NOTE — PLAN OF CARE
Walking in hallway with walker and SBA. Doing well. Sisters in visiting today. Appointment with ENT tomorrow. Flap area left side of mouth is pale in color. Sutures intact in incision left side of mouth. White patchy areas on tongue-MD updated about areas. Tolerates TF well. Very pleasant and anxious to rebuild his strength so he can go home soon.

## 2019-05-19 NOTE — PROGRESS NOTES
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    BG ran in the mid to high 100s with TFs off yesterday, overnight BG in high 100s to mid 200s with TFs running at new goal of 100ml/h.  NPH dose was given 1.5h late.                        Plan:  -glargine 20 units every 24 hours (8am)- continue  -NPH increased from 40 to 45 units qPM (for TFs at 100ml/h x 12h)- please give at start of cycled TFs, hold if TFs do not run.  -metformin suspension increased from 500 to 1000 mg at lunch starting today (keeping at noon timing rather than AM b/c of full gut in morning at end of cycled TF).  -aspart for correction: increased from high intensity to 2unit/30 for BG >140 with cycled TFs at 2100, 0100, 0500, continue medium intensity during date at 0900, 1300, 1700.  -Please run prn D10 at nutritional support rate if cycled TFs are interrupted at unscheduled time.    -Franklyn will need DM education prior to discharge if he is still requiring TFs at discharge.     Outpatient diabetes follow up: TBD  Plan discussed with patient, bedside RN.             Interval History:   The last 24 hours progress and nursing notes reviewed.  Brant is feeling well today.  He tolerated the increase in cycled TF rate overnight, just slight heartburn.  NPH was given 1.5h late last night (Brant remembers TFs started at 8pm, NPH given 9:30pm).  BG nicole to high 100s at 0100, then mid 200s by 0500.  We discussed increasing metformin dose today- Brant is fine with this now  that we know he is tolerating TFs at goal.  He has appointment with ENT tomorrow at 11am, and it appears he has a swallow study scheduled during this appointment.  Pt currently does not know how long he will require TFs, he is hoping to get more of a timeline at appointment tomorrow.  We started discussing that he may need insulin at home- he will need DM education prior to discharge if this is the case.    Nutrition: cycled TFs: Isosource 1.5 at 100ml/h x 12h 8am-8pm.  NPO.      PTA:  Glipizide XL 20 mg am  Metformin 2 grams every morning    Recent Labs   Lab 05/19/19  0916 05/19/19  0513 05/19/19  0114 05/18/19  2039 05/18/19  1612 05/18/19  1307  05/17/19  0838  05/16/19  0603  05/15/19  0630  05/14/19  0527  05/13/19  0441   GLC  --   --   --   --   --   --   --  197*  --  179*  --  228*  --  289*  --  225*   * 244* 198* 158* 157* 183*   < >  --    < >  --    < >  --    < >  --    < >  --     < > = values in this interval not displayed.               Review of Systems:   See interval hx          Medications:     None       Physical Exam:  Gen: Alert, sitting up in chair, NAD   HEENT:  mucous membranes are moist, FT in place.  Resp: unlabored  Ext: moving upper extremities   Neuro:oriented x3, communicating clearly  /62 (BP Location: Left arm)   Pulse 85   Temp 96.3  F (35.7  C) (Axillary)   Resp 20   Wt 133.4 kg (294 lb 1.6 oz)   SpO2 96%   BMI 46.06 kg/m             Data:     Lab Results   Component Value Date    A1C 7.4 04/24/2019            Recent Labs   Lab Test 05/17/19  0838 05/16/19  0603    141   POTASSIUM 4.3 4.0   CHLORIDE 109 109   CO2 26 27   ANIONGAP 6 5   * 179*   BUN 24 27   CR 0.60* 0.72   MANOJ 8.6 9.2     CBC RESULTS:   Recent Labs   Lab Test 05/17/19  0838   WBC 7.6   RBC 3.57*   HGB 10.9*   HCT 34.1*   MCV 96   MCH 30.5   MCHC 32.0   RDW 12.7          I spent a total of >25 minutes bedside and on the inpatient unit managing the glycemic care of Devonte KIMBALL  Caitlin. Over 50% of my time on the unit was spent counseling the patient and/or coordinating care regarding glucose management in the context of DM2, cycled TFs, overnight hyperglycemia.  See note for details.    Susana León PA-C [phone]  Diabetes Management Team Job Code [id number]

## 2019-05-19 NOTE — PLAN OF CARE
Pt.appears to be sleeping well between cares. Cycled TF rate increased yesterday to run @ 100mls/hr - 8pm > 8am. Pt.tolerating and has no c/o's nausea / abdominal discomfort. BGs @ 0100 and 0500 = 198 and 244 - covered per ordered parameters. PRN Oxycodone sol.10mg @ 0140 for c/o RUE pain, (forearm) described as constant, throbbing and reports relief with current pain regimen. SBA to BR - holds onto IV pole during the night. PCDs applied BLEs.

## 2019-05-19 NOTE — PROGRESS NOTES
SPIRITUAL HEALTH SERVICES  SPIRITUAL ASSESSMENT Progress Note  [facility] ([facility]) 4TR      REFERRAL SOURCE: On Call     Pt was with nurse who left so we could visit.    I offered the pt supportive listening as he shared about his journey with cancer leading up to his recent surgery and recovery.    I affirmed his struggle and perseverance in his relationship with God as he shared how he worked through his anger/doubts towards God.    He led us in a prayer.    He would love to receive Communion when he is able. I informed him that we have a 12:30pm Mass on Thursdays if he is able to come top that.      PLAN: I will try to visit weekly.    Juwan Dykes M.Div.  [facility] Laoin  Pager [phone]

## 2019-05-19 NOTE — PLAN OF CARE
Pt alert and oriented x4. Able to verbalize and make needs known. Pt's sisters visited this evening.   Dressings changed to right forearm, and right thigh. Neck incision cleaned and left open to air.   Pt has tube feedings running at 100ml/hr, tolerating well, continue to monitor for pt tolerance.   Pt up to bathroom with cane and assist of one, voided well, no BM.   BG checks with sliding scale insulin given.   White patchy areas on tongue, sticky note left for provider by am shift for possible thrush.   Pt is pleasant and denies pain, SOB, and nausea.

## 2019-05-20 ENCOUNTER — OFFICE VISIT (OUTPATIENT)
Dept: OTOLARYNGOLOGY | Facility: CLINIC | Age: 54
End: 2019-05-20
Payer: COMMERCIAL

## 2019-05-20 VITALS
BODY MASS INDEX: 46.65 KG/M2 | WEIGHT: 297.2 LBS | HEART RATE: 89 BPM | HEIGHT: 67 IN | RESPIRATION RATE: 18 BRPM | TEMPERATURE: 98 F | SYSTOLIC BLOOD PRESSURE: 114 MMHG | DIASTOLIC BLOOD PRESSURE: 77 MMHG

## 2019-05-20 DIAGNOSIS — C05.9 SQUAMOUS CELL CARCINOMA OF PALATE (H): Primary | ICD-10-CM

## 2019-05-20 LAB
GLUCOSE BLDC GLUCOMTR-MCNC: 147 MG/DL (ref 70–99)
GLUCOSE BLDC GLUCOMTR-MCNC: 147 MG/DL (ref 70–99)
GLUCOSE BLDC GLUCOMTR-MCNC: 153 MG/DL (ref 70–99)
GLUCOSE BLDC GLUCOMTR-MCNC: 160 MG/DL (ref 70–99)
GLUCOSE BLDC GLUCOMTR-MCNC: 174 MG/DL (ref 70–99)
GLUCOSE BLDC GLUCOMTR-MCNC: 213 MG/DL (ref 70–99)
PLATELET # BLD AUTO: 317 10E9/L (ref 150–450)

## 2019-05-20 PROCEDURE — 85049 AUTOMATED PLATELET COUNT: CPT | Performed by: HOSPITALIST

## 2019-05-20 PROCEDURE — 25000128 H RX IP 250 OP 636: Performed by: HOSPITALIST

## 2019-05-20 PROCEDURE — 97110 THERAPEUTIC EXERCISES: CPT | Mod: GP

## 2019-05-20 PROCEDURE — 36415 COLL VENOUS BLD VENIPUNCTURE: CPT | Performed by: HOSPITALIST

## 2019-05-20 PROCEDURE — 25000132 ZZH RX MED GY IP 250 OP 250 PS 637: Performed by: PHYSICIAN ASSISTANT

## 2019-05-20 PROCEDURE — 00000146 ZZHCL STATISTIC GLUCOSE BY METER IP

## 2019-05-20 PROCEDURE — 97535 SELF CARE MNGMENT TRAINING: CPT | Mod: GO

## 2019-05-20 PROCEDURE — 97530 THERAPEUTIC ACTIVITIES: CPT | Mod: GP

## 2019-05-20 PROCEDURE — 25000131 ZZH RX MED GY IP 250 OP 636 PS 637: Performed by: PHYSICIAN ASSISTANT

## 2019-05-20 PROCEDURE — 25000132 ZZH RX MED GY IP 250 OP 250 PS 637: Performed by: HOSPITALIST

## 2019-05-20 PROCEDURE — 12000022 ZZH R&B SNF

## 2019-05-20 RX ORDER — LORATADINE, PSEUDOEPHEDRINE SULFATE 5; 120 MG/1; MG/1
TABLET, FILM COATED, EXTENDED RELEASE ORAL
Refills: 5 | Status: ON HOLD | COMMUNITY
Start: 2019-04-06 | End: 2019-05-30

## 2019-05-20 RX ORDER — GLIPIZIDE 10 MG/1
TABLET, FILM COATED, EXTENDED RELEASE ORAL
Refills: 0 | Status: ON HOLD | COMMUNITY
Start: 2019-04-29 | End: 2019-05-30

## 2019-05-20 RX ORDER — METFORMIN HCL 500 MG
TABLET, EXTENDED RELEASE 24 HR ORAL
Refills: 0 | Status: ON HOLD | COMMUNITY
Start: 2019-04-29 | End: 2019-05-30

## 2019-05-20 RX ORDER — TRAMADOL HYDROCHLORIDE 50 MG/1
TABLET ORAL
Refills: 0 | Status: ON HOLD | COMMUNITY
Start: 2019-04-15 | End: 2019-05-30

## 2019-05-20 RX ORDER — OXYCODONE HYDROCHLORIDE 5 MG/1
TABLET ORAL
Refills: 0 | Status: ON HOLD | COMMUNITY
Start: 2019-04-24 | End: 2019-05-30

## 2019-05-20 RX ADMIN — Medication 1 MG: at 08:39

## 2019-05-20 RX ADMIN — CHLORHEXIDINE GLUCONATE 0.12% ORAL RINSE 15 ML: 1.2 LIQUID ORAL at 18:09

## 2019-05-20 RX ADMIN — HEPARIN SODIUM 5000 UNITS: 5000 INJECTION, SOLUTION INTRAVENOUS; SUBCUTANEOUS at 18:05

## 2019-05-20 RX ADMIN — Medication 1 PACKET: at 08:47

## 2019-05-20 RX ADMIN — ACETAMINOPHEN 650 MG: 325 SOLUTION ORAL at 05:58

## 2019-05-20 RX ADMIN — ACETAMINOPHEN 650 MG: 325 SOLUTION ORAL at 13:59

## 2019-05-20 RX ADMIN — OXYCODONE HYDROCHLORIDE 10 MG: 5 SOLUTION ORAL at 13:59

## 2019-05-20 RX ADMIN — ACETAMINOPHEN 650 MG: 325 SOLUTION ORAL at 21:45

## 2019-05-20 RX ADMIN — SENNOSIDES AND DOCUSATE SODIUM 2 TABLET: 8.6; 5 TABLET ORAL at 08:38

## 2019-05-20 RX ADMIN — HEPARIN SODIUM 5000 UNITS: 5000 INJECTION, SOLUTION INTRAVENOUS; SUBCUTANEOUS at 01:47

## 2019-05-20 RX ADMIN — INSULIN HUMAN 45 UNITS: 100 INJECTION, SUSPENSION SUBCUTANEOUS at 20:49

## 2019-05-20 RX ADMIN — CHLORHEXIDINE GLUCONATE 0.12% ORAL RINSE 15 ML: 1.2 LIQUID ORAL at 01:47

## 2019-05-20 RX ADMIN — OXYCODONE HYDROCHLORIDE 10 MG: 5 SOLUTION ORAL at 01:47

## 2019-05-20 RX ADMIN — LISINOPRIL 10 MG: 10 TABLET ORAL at 08:39

## 2019-05-20 RX ADMIN — HEPARIN SODIUM 5000 UNITS: 5000 INJECTION, SOLUTION INTRAVENOUS; SUBCUTANEOUS at 08:39

## 2019-05-20 RX ADMIN — ACETAMINOPHEN 650 MG: 325 SOLUTION ORAL at 01:47

## 2019-05-20 RX ADMIN — GABAPENTIN 300 MG: 250 SUSPENSION ORAL at 01:47

## 2019-05-20 RX ADMIN — Medication 1 PACKET: at 21:42

## 2019-05-20 RX ADMIN — Medication 1 PACKET: at 18:10

## 2019-05-20 RX ADMIN — METFORMIN HYDROCHLORIDE 1000 MG: 500 SOLUTION ORAL at 14:00

## 2019-05-20 RX ADMIN — CHLORHEXIDINE GLUCONATE 0.12% ORAL RINSE 15 ML: 1.2 LIQUID ORAL at 08:38

## 2019-05-20 RX ADMIN — OXYCODONE HYDROCHLORIDE 10 MG: 5 SOLUTION ORAL at 21:45

## 2019-05-20 RX ADMIN — GABAPENTIN 300 MG: 250 SUSPENSION ORAL at 18:09

## 2019-05-20 RX ADMIN — WHITE PETROLATUM: 1.75 OINTMENT TOPICAL at 08:41

## 2019-05-20 RX ADMIN — WHITE PETROLATUM: 1.75 OINTMENT TOPICAL at 20:35

## 2019-05-20 RX ADMIN — ASPIRIN 325 MG ORAL TABLET 325 MG: 325 PILL ORAL at 08:39

## 2019-05-20 RX ADMIN — INSULIN GLARGINE 20 UNITS: 100 INJECTION, SOLUTION SUBCUTANEOUS at 08:37

## 2019-05-20 RX ADMIN — ACETAMINOPHEN 650 MG: 325 SOLUTION ORAL at 08:38

## 2019-05-20 RX ADMIN — Medication 1 PACKET: at 14:01

## 2019-05-20 RX ADMIN — MULTIVITAMIN 15 ML: LIQUID ORAL at 08:40

## 2019-05-20 RX ADMIN — GABAPENTIN 300 MG: 250 SUSPENSION ORAL at 08:39

## 2019-05-20 RX ADMIN — ACETAMINOPHEN 650 MG: 325 SOLUTION ORAL at 18:10

## 2019-05-20 RX ADMIN — ATORVASTATIN CALCIUM 20 MG: 20 TABLET, FILM COATED ORAL at 08:39

## 2019-05-20 RX ADMIN — WHITE PETROLATUM: 1.75 OINTMENT TOPICAL at 01:47

## 2019-05-20 ASSESSMENT — MIFFLIN-ST. JEOR: SCORE: 2146.72

## 2019-05-20 ASSESSMENT — PAIN SCALES - GENERAL: PAINLEVEL: MODERATE PAIN (5)

## 2019-05-20 NOTE — PLAN OF CARE
"Pleasant and cooperative with cares. SBA to transfer from bed to chair and vice versa. Declined the need for additional pain medication except Tylenol. NG tube is intact and patent. Remains NPO, all medications were provided via the NGT. No complaints of abdominal fullness or discomfort from the tube feeding, stated \"I like this overnight on better rather than giving it to me for breakfast, lunch and dinner. That made me so full and uncomfortable\". Trace edema noted on bilateral L/E, encouraged to elevate while sitting in the chair. Pt was taken to ENT appointment at 1030, awaiting his return to the unit.   "

## 2019-05-20 NOTE — PROGRESS NOTES
Chart reviewed. Was seen by ENT today in Clinic. He is doing okay. They want to keep him NPO for now. They will follow up with him on 05/23/19. Monitor the flap.

## 2019-05-20 NOTE — PLAN OF CARE
Pt returned from ENT appointment at 1300. Sutures were removed from the left neck incision and the right forearm. Dressing was re-applied on the left forearm but the neck incision is MALKA. Tubi  applied to bilateral L/E.

## 2019-05-20 NOTE — PROGRESS NOTES
HISTORY OF PRESENT ILLNESS:  Mr. Tucker returns.  He is now 11 days out from a left-sided infrastructure maxillectomy and a forearm flap.  At the time, he did have his level IB node sampled, and it was negative.  He did very well in the hospital.      PHYSICAL EXAMINATION:  Today, the forearm flap has healed in nicely.  There is no evidence of any dehiscence.  The neck incision looks good without any infection or collection.  I removed the sutures.  The right arm also looks excellent with 100% take of the skin graft.  Sutures were removed here as well.      IMPRESSION:  Doing well.      PLAN:   1.  The patient will follow up with Pooja Ty later in the week for a swallow evaluation.   2.  She will see Dr. Treviño in about 10 days.   3.  I did inform the patient that his pathology revealed negative margins, but he does need radiation therapy due to some bone invasion.      cc: Jett Treviño MD    [facility] [address]       Vivi Casas MD    [facility]    [address] [place]   [id number]       Juwan Medellin MD    [facility]    [address] [place] [address]   [phone]      SK/ms

## 2019-05-20 NOTE — LETTER
5/20/2019       RE: Devonte Tucker  [address] [place] [address]     Dear Colleague,    Thank you for referring your patient, Devonte Tucker, to the [facility] EAR NOSE AND THROAT at [facility]. Please see a copy of my visit note below.    HISTORY OF PRESENT ILLNESS:  Mr. Tucker returns.  He is now 11 days out from a left-sided infrastructure maxillectomy and a forearm flap.  At the time, he did have his level IB node sampled, and it was negative.  He did very well in the hospital.      PHYSICAL EXAMINATION:  Today, the forearm flap has healed in nicely.  There is no evidence of any dehiscence.  The neck incision looks good without any infection or collection.  I removed the sutures.  The right arm also looks excellent with 100% take of the skin graft.  Sutures were removed here as well.      IMPRESSION:  Doing well.      PLAN:   1.  The patient will follow up with Pooja Ty later in the week for a swallow evaluation.   2.  She will see Dr. Treviño in about 10 days.   3.  I did inform the patient that his pathology revealed negative margins, but he does need radiation therapy due to some bone invasion.       Again, thank you for allowing me to participate in the care of your patient.      Sincerely,    Sumit Atwood MD           cc: Jett Treviño MD    [facility] [phone]       Vivi Casas MD    [facility]    [address] [place]   [address]       Juwan Medellin MD    [facility]    [address] [place] [address]   [phone]      SK/ms

## 2019-05-20 NOTE — PROGRESS NOTES
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    BG low to mid 100s during the day yesterday, with TFs running BG mostly mid to high 100s but spiking up to 213 at end of TF cycle.                        Plan:  -glargine 20 units every 24 hours (8am)- continue  -NPH continue 45 units qPM (for TFs at 100ml/h x 12h)- please give at start of cycled TFs, hold if TFs do not run.  -metformin suspension: 1000 mg at lunch (keeping at noon timing rather than AM b/c of full gut in morning at end of cycled TF).  -aspart for correction: 2unit/30 for BG >140 with cycled TFs at 2100, 0100, 0500, continue medium intensity during date at 0900, 1300, 1700.  -Please run prn D10 at nutritional support rate if cycled TFs are interrupted at unscheduled time.    -Franklyn will need DM education prior to discharge if he is still requiring TFs at discharge.     Outpatient diabetes follow up: TBD  Plan discussed with patient.             Interval History:   The last 24 hours progress and nursing notes reviewed.  Brant had no complaints when I saw him this morning just before he left for his ENT appointment.  He is tolerating his cycled TFs.  He was pleased with his numbers yesterday and overnight- improved from the night prior with increase in metformin and NPH.  He is tolerating metformin at the higher dose of 1000mg.    Nutrition: cycled TFs: Isosource 1.5 at 100ml/h x 12h 8am-8pm.  NPO.      PTA:  Glipizide XL 20 mg am  Metformin 2 grams every  morning    Recent Labs   Lab 05/20/19  0833 05/20/19  0505 05/20/19  0102 05/19/19  2004 05/19/19  1657 05/19/19  1251  05/17/19  0838  05/16/19  0603  05/15/19  0630  05/14/19  0527   GLC  --   --   --   --   --   --   --  197*  --  179*  --  228*  --  289*   * 174* 147* 130* 110* 140*   < >  --    < >  --    < >  --    < >  --     < > = values in this interval not displayed.               Review of Systems:   See interval hx          Medications:     None       Physical Exam:  Gen: Alert, sitting up in wheelchair in santos, getting ready to leave for appointment, NAD   HEENT:  mucous membranes are moist, FT in place.  Resp: unlabored  Neuro:oriented x3, communicating clearly  /64 (BP Location: Left arm)   Pulse 91   Temp 97.2  F (36.2  C) (Axillary)   Resp 18   Wt 133.8 kg (295 lb)   SpO2 98%   BMI 46.20 kg/m             Data:     Lab Results   Component Value Date    A1C 7.4 04/24/2019            Recent Labs   Lab Test 05/17/19  0838 05/16/19  0603    141   POTASSIUM 4.3 4.0   CHLORIDE 109 109   CO2 26 27   ANIONGAP 6 5   * 179*   BUN 24 27   CR 0.60* 0.72   MANOJ 8.6 9.2     CBC RESULTS:   Recent Labs   Lab Test 05/20/19  0634 05/17/19  0838   WBC  --  7.6   RBC  --  3.57*   HGB  --  10.9*   HCT  --  34.1*   MCV  --  96   MCH  --  30.5   MCHC  --  32.0   RDW  --  12.7    309       Susana León PA-C [phone]  Diabetes Management Team Job Code [id number]

## 2019-05-20 NOTE — PLAN OF CARE
PT: Pt was able to ambulate in hallway with cane, SBA up to 258 ft, however pt demonstrates fatigue with minimal activity given baseline status and imbalance with ambulation. Plan to initiate GIBBS and dynamic balance challenges to address. Pt had questions regarding independence in room, instructed that therapies would collaborate tomorrow to determine if appropriate.

## 2019-05-20 NOTE — PLAN OF CARE
Pt.A&Ox4. Very pleasant. Sleeping well between cares. NPO - cycled TF infusing per order via NGT - off @ 0800. BG checks @ 0100 and 0500 with sliding scale insulin coverage per ordered parameters. Assist of 1 with bed mobility / transfers and ambulation to BR holding onto IV pole or uses cane. Medicated with Oxycodone sol.10mg @ 0145 for c/o (R) forearm and nasal pain. States nasal pain from tube being accidentally pulled on previous shift - tube remains sutured in place - anchored with tegaderm on cheek to hopefully prevent dislodging tube or accidental pulling causing pain. PCDs intact BLEs. Pt.has ENT appt.today with pickup via wheelchair @ 1030 - pt.aware.

## 2019-05-20 NOTE — PLAN OF CARE
Pt alert and oriented x4. Visited by  and family members this afternoon. Blood sugars 110, 130. Novolog insulin not needed due to parameters not met.   Dressing to right forearm, neck, and left thigh cleaned and changed.   Tube feeding running at 100ml/hr with 130ml free water/4hr. Tolerating well.   White patchy areas in tongue are present, hoping to be able to discuss with provider tomorrow.   Pleasant, calm, and cooperative throughout shift.

## 2019-05-20 NOTE — PLAN OF CARE
OT: Pt demod good carryover from previous session of one handed techniques and use of AE. Provided pt with size L compression stockings for LE swelling. Instructed pt to doff at night.

## 2019-05-20 NOTE — NURSING NOTE
"Chief Complaint   Patient presents with     Surgical Followup     Post-op Visit     5/9 Fibula Free Flap 11 Days Post Op     Blood pressure 114/77, pulse 89, temperature 98  F (36.7  C), temperature source Axillary, resp. rate 18, height 1.702 m (5' 7\"), weight 134.8 kg (297 lb 3.2 oz).    Daphne Thorpe, ANTONIO    "

## 2019-05-21 LAB
GLUCOSE BLDC GLUCOMTR-MCNC: 122 MG/DL (ref 70–99)
GLUCOSE BLDC GLUCOMTR-MCNC: 123 MG/DL (ref 70–99)
GLUCOSE BLDC GLUCOMTR-MCNC: 145 MG/DL (ref 70–99)
GLUCOSE BLDC GLUCOMTR-MCNC: 147 MG/DL (ref 70–99)
GLUCOSE BLDC GLUCOMTR-MCNC: 162 MG/DL (ref 70–99)
GLUCOSE BLDC GLUCOMTR-MCNC: 206 MG/DL (ref 70–99)

## 2019-05-21 PROCEDURE — 25000132 ZZH RX MED GY IP 250 OP 250 PS 637: Performed by: PHYSICIAN ASSISTANT

## 2019-05-21 PROCEDURE — 12000022 ZZH R&B SNF

## 2019-05-21 PROCEDURE — 25000132 ZZH RX MED GY IP 250 OP 250 PS 637: Performed by: HOSPITALIST

## 2019-05-21 PROCEDURE — 25000128 H RX IP 250 OP 636: Performed by: HOSPITALIST

## 2019-05-21 PROCEDURE — 99309 SBSQ NF CARE MODERATE MDM 30: CPT | Mod: GC | Performed by: INTERNAL MEDICINE

## 2019-05-21 PROCEDURE — 97110 THERAPEUTIC EXERCISES: CPT | Mod: GP

## 2019-05-21 PROCEDURE — 97535 SELF CARE MNGMENT TRAINING: CPT | Mod: GO

## 2019-05-21 PROCEDURE — 97750 PHYSICAL PERFORMANCE TEST: CPT | Mod: GP

## 2019-05-21 PROCEDURE — 99207 ZZC CDG-MDM COMPONENT: MEETS MODERATE - UP CODED: CPT | Performed by: INTERNAL MEDICINE

## 2019-05-21 RX ADMIN — WHITE PETROLATUM: 1.75 OINTMENT TOPICAL at 17:29

## 2019-05-21 RX ADMIN — CHLORHEXIDINE GLUCONATE 0.12% ORAL RINSE 15 ML: 1.2 LIQUID ORAL at 17:28

## 2019-05-21 RX ADMIN — Medication 1 MG: at 08:55

## 2019-05-21 RX ADMIN — Medication 1 PACKET: at 08:59

## 2019-05-21 RX ADMIN — WHITE PETROLATUM: 1.75 OINTMENT TOPICAL at 08:53

## 2019-05-21 RX ADMIN — Medication 1 PACKET: at 13:26

## 2019-05-21 RX ADMIN — INSULIN GLARGINE 20 UNITS: 100 INJECTION, SOLUTION SUBCUTANEOUS at 08:57

## 2019-05-21 RX ADMIN — ACETAMINOPHEN 650 MG: 325 SOLUTION ORAL at 01:14

## 2019-05-21 RX ADMIN — SENNOSIDES AND DOCUSATE SODIUM 2 TABLET: 8.6; 5 TABLET ORAL at 08:54

## 2019-05-21 RX ADMIN — ASPIRIN 325 MG ORAL TABLET 325 MG: 325 PILL ORAL at 08:54

## 2019-05-21 RX ADMIN — SENNOSIDES AND DOCUSATE SODIUM 2 TABLET: 8.6; 5 TABLET ORAL at 20:13

## 2019-05-21 RX ADMIN — HEPARIN SODIUM 5000 UNITS: 5000 INJECTION, SOLUTION INTRAVENOUS; SUBCUTANEOUS at 08:54

## 2019-05-21 RX ADMIN — OXYCODONE HYDROCHLORIDE 10 MG: 5 SOLUTION ORAL at 13:25

## 2019-05-21 RX ADMIN — CHLORHEXIDINE GLUCONATE 0.12% ORAL RINSE 15 ML: 1.2 LIQUID ORAL at 01:14

## 2019-05-21 RX ADMIN — ACETAMINOPHEN 650 MG: 325 SOLUTION ORAL at 18:00

## 2019-05-21 RX ADMIN — ALUMINUM HYDROXIDE, MAGNESIUM HYDROXIDE, AND DIMETHICONE 15 ML: 400; 400; 40 SUSPENSION ORAL at 22:35

## 2019-05-21 RX ADMIN — Medication 1 PACKET: at 20:18

## 2019-05-21 RX ADMIN — INSULIN HUMAN 45 UNITS: 100 INJECTION, SUSPENSION SUBCUTANEOUS at 20:15

## 2019-05-21 RX ADMIN — ATORVASTATIN CALCIUM 20 MG: 20 TABLET, FILM COATED ORAL at 08:55

## 2019-05-21 RX ADMIN — GABAPENTIN 300 MG: 250 SUSPENSION ORAL at 17:28

## 2019-05-21 RX ADMIN — ACETAMINOPHEN 650 MG: 325 SOLUTION ORAL at 05:30

## 2019-05-21 RX ADMIN — WHITE PETROLATUM: 1.75 OINTMENT TOPICAL at 01:15

## 2019-05-21 RX ADMIN — GABAPENTIN 300 MG: 250 SUSPENSION ORAL at 09:02

## 2019-05-21 RX ADMIN — LISINOPRIL 10 MG: 10 TABLET ORAL at 08:55

## 2019-05-21 RX ADMIN — ACETAMINOPHEN 650 MG: 325 SOLUTION ORAL at 08:54

## 2019-05-21 RX ADMIN — GABAPENTIN 300 MG: 250 SUSPENSION ORAL at 01:19

## 2019-05-21 RX ADMIN — CHLORHEXIDINE GLUCONATE 0.12% ORAL RINSE 15 ML: 1.2 LIQUID ORAL at 08:54

## 2019-05-21 RX ADMIN — HEPARIN SODIUM 5000 UNITS: 5000 INJECTION, SOLUTION INTRAVENOUS; SUBCUTANEOUS at 01:14

## 2019-05-21 RX ADMIN — OXYCODONE HYDROCHLORIDE 10 MG: 5 SOLUTION ORAL at 20:14

## 2019-05-21 RX ADMIN — ACETAMINOPHEN 650 MG: 325 SOLUTION ORAL at 13:25

## 2019-05-21 RX ADMIN — MULTIVITAMIN 15 ML: LIQUID ORAL at 08:54

## 2019-05-21 RX ADMIN — METFORMIN HYDROCHLORIDE 1000 MG: 500 SOLUTION ORAL at 13:25

## 2019-05-21 RX ADMIN — Medication 1 PACKET: at 17:29

## 2019-05-21 RX ADMIN — HEPARIN SODIUM 5000 UNITS: 5000 INJECTION, SOLUTION INTRAVENOUS; SUBCUTANEOUS at 17:28

## 2019-05-21 NOTE — PLAN OF CARE
Pt remains NPO, NGT is intact and patent. Tube feeding is cycled and tolerated by the pt. No complaints of abdominal discomfort. Had 1 loose stool in the morning. Perineal area is intact, no redness or open areas noted. Able to a ambulate with a cane and SBA, gait is steady. Medicated with Oxycodone 10 mg x 1. Pt is expected to schedule an appointment with his dentist to obtain a mouth guard before radiation starts. Dressings remain intact on the right arm and the right thigh incisions. Pleasant and cooperative with cares.

## 2019-05-21 NOTE — PROGRESS NOTES
05/21/19 1400   General Information   Patient Profile Review See Profile for full history and prior level of function   Daily Contact with Relatives or Friends Phone call;Visit   Pets   (allergic)   Observations Pt present and alert seated in chair upon arrival.   Community Involvement   Community Involvement Currently employed   Drives Yes   Spiritual Practice Other (comments)  (Yarsanism)   Sees Hospital ? Yes   Hobbies/Interests   Games Other (comments)  (games on ipad)   Word Puzzles Other (see comments)  (puzzles)   Music   Listens to Recorded Music Yes   Brought Own Equipment Yes   Media   Newspapers Daily   Computer Other (comments)  (had iPad here)   TV / Movies TV;Movie list   Reading Has own reading materials   Sports / Physical Activities   Sports Fan Baseball;Football   Impression   Open to Socializing with Others Independent   Barriers to Leisure No barriers / independent   Patient, family and / or staff in agreement with Plan of Care Yes   Treatment Plan   Type of Intervention 1:1 intervention;Structured groups   One to One Therapeutic Intervention    Equipment and Supplies While on Unit Puzzle books   Assessment Assessment completed; Patient has own iPad here and enjoys reading books on it, playing games, watching vidoes, and listening to music on it. Pt enjoys puzzles; provided pt with puzzle. Pt has been in contact with / while on unit.

## 2019-05-21 NOTE — PROGRESS NOTES
05/21/19 1642   Visit Information   Visit Made By Staff    Type of Visit Follow-up;Staff consultation/triage   Visited Patient;Family   Visit Location (if NOT Inpatient)   Transitional Care Unit   Interventions   Plan of Care Review   With patient/family/proxy   Basic Spiritual Interventions    introduction/orientation to Spiritual Health Services;Assessment of spiritual needs/resources   Advanced Assessments/Interventions   Presenting Concerns/Issues Spiritual/Mandaen/emotional support   SPIRITUAL HEALTH SERVICES   Spiritual Assessment Progress Note   [facility] ([facility]) 4R   REFERRAL SOURCE: Fr. Echeverria requested a follow-up visit for Devonte.   On this visit, Devonte was visiting with his two sisters: Rukhsana and Jovanna.  He asked that I return at another time.   SPIRITUALITY/VALUES/Taoism: Anabaptism.   COPING/SPIRITUAL PRACTICES: Anabaptism sacraments, which Devonte received from  (see note).   SUPPORT SYSTEMS: Family-his two sisters were visiting with him today.   PLAN: Will see Devonte on Tuesday, May 28th.    Rev. Tashia Kendrick  Staff   Spiritual Health Services  Pager: [phone]  Office: [phone]  * [facility] remains available 24/7 for emergent requests/referrals, either by having the switchboard page the on-call  or by entering an ASAP/STAT consult in Epic (this will also page the on-call ).*

## 2019-05-21 NOTE — PROGRESS NOTES
Gibbs Balance Scale (BBS) Cutoff Scores: A score of ? 45/56 indicates an increased risk for falls.   Patient Score: 55/56    The BBS is a measure of static and dynamic standing balance that has been validated in community dwelling elderly individuals and individuals who have Parkinson's Disease, MS, and those who are s/p CVA and TBI. The test is administered without an assistive device. Scores from the Gibbs are used to determine the probability of falling based on the patient's previous history of falls and their test performance.     Minimal Detectable Change = 6.5   & Minimal Detectable Change (Parkinson's Disease) = 5 according to Murphy & Thierno 2008    Assessment (rationale for performing, application to patient s function & care plan): Given pt's recent surgical status, pt's balance has been impaired from baseline. GIBBS administered to evaluate functional balance to evaluate readiness for room independence. Based on scores, Pt demonstrates performance such that he has a low risk for falling.  Minutes billed as physical performance test: 20

## 2019-05-21 NOTE — PROGRESS NOTES
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    Assessment:   1)Type II Diabetes Mellitus, well controlled (A1c 7.4%)  2) Enteral Feed Induced Hyperglycemia     Plan:    -continue glargine 20 units daily AM   -continue NPH 45 units at 2000 (at start time of cycled TF, hold if TF do not run)   -continue Metformin 1000mg daily (keeping at noon timing rather than AM b/c of full gut in morning at end of cycled TF).   -aspart for correction: unit/30 for BG >140 with cycled TFs at 2100, 0100, 0500, continue medium intensity during date at 0900, 1300, 1700.   -Please run prn D10 at nutritional support rate if cycled TFs are interrupted at unscheduled time.   -will need DM education prior to discharge if he is still requiring TFs at discharge.     Outpatient diabetes follow up: TBD  Plan discussed with patient. Primary team updated via page.     Interval History and Assessment: interval glucose trend reviewed:  BG remaining stable 140-160's during the day when tube feeds are off. Trended 160>206>147 overnight with tube feeds on.     Discussed trend with Devonte today; much improved with increase in NPH two days ago, and similar pattern of brief hyperglycemia to 200's that is improved upon next glucose check. Will assess for increase in NPH tomorrow if this pattern continues.     Devonte tells me that he has a swallow evaluation planned to Thursday, and may be able to initiate a clear liquid diet at that time. He is looking forward to drinking some  water. He is in good spirits today.     Current nutritional intake and type: None  Cycled TFs: Isosource 1.5 at 100ml/h x 12h 8am-8pm.    PTA Diabetes Regimen:   Glipizide XL 20 mg am  Metformin 2 grams every morning    Discharge Planning: pending progression with PT/OT.           Diabetes History:   Type of Diabetes: Type 2 Diabetes Mellitus, TPN/Enteral Feeding Induced Hyperglycemia  Lab Results   Component Value Date    A1C 7.4 04/24/2019              Review of Systems:   The Review of Systems is negative other than noted in the Interval History.           Medications:     Current Facility-Administered Medications   Medication     - Skin Test Reading -     acetaminophen (TYLENOL) solution 650 mg     acetaminophen (TYLENOL) Suppository 650 mg     acetaminophen (TYLENOL) tablet 650 mg     alum & mag hydroxide-simethicone (MYLANTA ES/MAALOX  ES) suspension 15 mL     aspirin (ASA) tablet 325 mg     atorvastatin (LIPITOR) tablet 20 mg     chlorhexidine (PERIDEX) 0.12 % solution 15 mL     dextrose 10 % 1,000 mL infusion     glucose gel 15-30 g    Or     dextrose 50 % injection 25-50 mL    Or     glucagon injection 1 mg     doxazosin (CARDURA) suspension 1 mg     gabapentin (NEURONTIN) solution 300 mg     heparin sodium injection 5,000 Units     insulin aspart (NovoLOG) inj (RAPID ACTING)     insulin aspart (NovoLOG) inj (RAPID ACTING)     insulin glargine (LANTUS PEN) injection 20 Units     insulin isophane human (HumuLIN N PEN) injection 45 Units     lisinopril (PRINIVIL/ZESTRIL) tablet 10 mg     medication instructions     metFORMIN (GLUCOPHAGE) solution 1,000 mg     mineral oil-hydrophilic petrolatum (AQUAPHOR)     multivitamins w/minerals (CERTAVITE) liquid 15 mL     naloxone (NARCAN) injection 0.1-0.4 mg     ondansetron (ZOFRAN-ODT) ODT tab 4 mg    Or     ondansetron (ZOFRAN) injection 4 mg     oxyCODONE (ROXICODONE) solution 5-10 mg     polyethylene glycol (MIRALAX/GLYCOLAX) Packet 17 g     protein modular  (ProSource No Carb) 1 packet     senna-docusate (SENOKOT-S/PERICOLACE) 8.6-50 MG per tablet 2 tablet     tuberculin injection 5 Units            Physical Exam:    /67 (BP Location: Left arm)   Pulse 74   Temp 96.5  F (35.8  C) (Axillary)   Resp 16   Wt 132.7 kg (292 lb 9.6 oz)   SpO2 97%   BMI 45.83 kg/m    General: pleasant, in no distress. Somewhat dysarthric.   HEENT: normocephalic, atraumatic. Oral mucous membranes moist. NGT with bridle intact   Lungs: unlabored respiration, no cough  ABD: rounded, soft, no lipodystrophy noted  Skin: warm and dry, no obvious lesions  MSK:  moves all extremities  Lymp:  no LE edema   Mental status:  alert, oriented to self, place, time  Psych:  affect, calm and appropriate interaction             Data:     Recent Labs   Lab 05/21/19  0748 05/21/19  0505 05/21/19  0101 05/20/19  2113 05/20/19  1743 05/20/19  1355  05/17/19  0838  05/16/19  0603  05/15/19  0630   GLC  --   --   --   --   --   --   --  197*  --  179*  --  228*   * 147* 206* 160* 153* 147*   < >  --    < >  --    < >  --     < > = values in this interval not displayed.     Lab Results   Component Value Date    WBC 7.6 05/17/2019    WBC 7.7 05/16/2019    WBC 7.8 05/15/2019    HGB 10.9 (L) 05/17/2019    HGB 10.4 (L) 05/16/2019    HGB 10.7 (L) 05/15/2019    HCT 34.1 (L) 05/17/2019    HCT 33.3 (L) 05/16/2019    HCT 34.1 (L) 05/15/2019    MCV 96 05/17/2019    MCV 96 05/16/2019    MCV 96 05/15/2019     05/20/2019     05/17/2019     05/16/2019     Lab Results   Component Value Date     05/17/2019     05/16/2019     05/15/2019    POTASSIUM 4.3 05/17/2019    POTASSIUM 4.0 05/16/2019    POTASSIUM 4.1 05/15/2019    CHLORIDE 109 05/17/2019    CHLORIDE 109 05/16/2019    CHLORIDE 110 (H) 05/15/2019    CO2 26 05/17/2019    CO2 27 05/16/2019    CO2 26 05/15/2019     (H) 05/17/2019     (H) 05/16/2019     (H) 05/15/2019     Lab Results   Component Value  Date    BUN 24 05/17/2019    BUN 27 05/16/2019    BUN 23 05/15/2019     Lab Results   Component Value Date    TSH 0.32 (L) 05/10/2019     No results found for: AST, ALT, GGT, ALKPHOS      Diabetes Management Team job code: [id number]  I spent a total of 25 minutes bedside and on the inpatient unit managing glycemic care. Over 50% of my time on the unit was spent counseling the patient and/or coordinating care regarding acute hyperglycemia management.  See note for details.    Leanna Taylor PA-C  Inpatient Diabetes Management Service  Pager [phone]

## 2019-05-21 NOTE — PROGRESS NOTES
[facility] ([facility])       Date of Admission:  5/16/2019    Assessment & Plan   Devonte Tucker is a 53 year old male with hx HTN, DM2, Invasive SCC of left Alveolar Ridge SP Left infrastructure maxillectomy, left radial forearm free flap, left neck exploration, STSG from left thigh to left forearm on 5/9/19, NG tube placement 5/10/19 admitted on 5/16/2019 for rehab 2/2 weakness.    # Invasive SCC of left Alveolar Ridge SP Left infrastructure maxillectomy, left radial forearm free flap, left neck exploration, STSG from left thigh to left forearm, NG feeding tube placement  -dressing instruction written as per the discharge summary. Thigh and left hand graft site will be monitored.   -N.p.o., swallow evaluation 5/24  -Per patient, plan for radiation therapy in 3 weeks, will need mouthguard.  Paperwork states 3 mm thickness, otherwise no other information.  Patient will call his personal dentist to determine whether she is comfortable making this mouthguard for him, otherwise will place referral for [facility] dental clinic.    # DM  2/2 TFs.  Endocrine following.  BG range over past 24 hours 147-213.  -endo consult, appreciate recs:   -glargine 20 units every 24 hours (8am)- continue   -NPH continue 45 units qPM (for TFs at 100ml/h x 12h)- please give at start of cycled TFs, hold if TFs do not run.   -metformin suspension: 1000 mg at lunch (keeping at noon timing rather than AM b/c of full gut in morning at end of cycled TF).   -aspart for correction: 2unit/30 for BG >140 with cycled TFs at 2100, 0100, 0500, continue medium intensity during date at 0900, 1300, 1700.   -Please run prn D10 at nutritional support rate if cycled TFs are interrupted at unscheduled time.    # HTN:   Normotensive. Continue lisinopril and cardura.    # CHO: Ct lipitor    # Severe malnutrition: ON Tube feeding. No nausea overnight. Continue nocturnal tube feeding.     #Generalized weakness-PT/OT      Diet: Adult Formula Drip  Feeding: Continuous Isosource 1.5; Nasogastric tube; Goal Rate: 100; mL/hr; From: 8:00 PM; 8:00 AM; Medication - Feeding Tube Flush Frequency: At least 15-30 mL water before and after medication administration and with tube clog...    Fluids: none  Lines: R nostril NG   DVT Prophylaxis: Heparin SQ  [place] Catheter: not present  Code Status: Full Code      Disposition Plan   Expected discharge: pending PT/OT progress, recommended to prior living arrangement once safe dispo plan per PT/OT.  Entered: Claudio Rosen MD 05/21/2019, 10:15 AM       The patient's care was discussed with the Attending Physician, Dr. Anthony Sandhu.    Claudio Rosen MD  [facility] Service  [facility] ([facility])    ______________________________________________________________________    Interval History   Nursing notes reviewed.  No acute events overnight.  Patient notes one episode of loose stool overnight (stable since starting TFs), no hematochezia or melena, fever, chills, abdominal pain.  Patient seen in ENT clinic yesterday, stated that patient's incisions healing well.  Scheduled for swallow evaluation on Thursday.  He was told that he needs a 3 mm mouthguard for radiation therapy which he states he is tentatively scheduled to start in about 3 weeks.  He was told to ask his personal dentist if she was comfortable making this mouthguard.  He has noted some pain in his left nostril since yesterday when he swallows.  No blood.  Over the weekend, patient had his NG caught on a door and blankets twice which caused pain.    Data reviewed today: I reviewed all medications, new labs and imaging results over the last 24 hours. I personally reviewed no images or EKG's today.    Physical Exam   Vital Signs: Temp: 96.5  F (35.8  C) Temp src: Axillary BP: 120/67 Pulse: 74   Resp: 16 SpO2: 97 % O2 Device: None (Room air)    Weight: 292 lbs 9.6 oz  General: Pleasant, no acute distress  HEENT: Normocephalic, left maxillary facial swelling  present, NG in right nostril, EOMI, anicteric  Neck: L neck surgical scar CDI  CV: Regular rate and rhythm, no murmurs rubs or gallops  Lung: Clear to auscultation bilaterally, breathing comfortably on room air  Abdomen: Obese, soft, nontender, nondistended, normoactive bowel sounds  Extremities: Right forearm wrapped, trace LE edema    Data   Recent Labs   Lab 05/20/19  0634 05/17/19  0838 05/16/19  0603 05/15/19  0630   WBC  --  7.6 7.7 7.8   HGB  --  10.9* 10.4* 10.7*   MCV  --  96 96 96    309 315 304   NA  --  141 141 141   POTASSIUM  --  4.3 4.0 4.1   CHLORIDE  --  109 109 110*   CO2  --  26 27 26   BUN  --  24 27 23   CR  --  0.60* 0.72 0.54*   ANIONGAP  --  6 5 5   MANOJ  --  8.6 9.2 9.0   GLC  --  197* 179* 228*

## 2019-05-21 NOTE — PLAN OF CARE
"Alert and oriented x4, very pleasant. Patient reported noting new pressure.pain in the left nostril \"when swallowing\". Patient reported that pressure was assesses at the clinic yesterday and stitches are intact. Sticky note left for providers to further assess and advice. Pain managed with scheduled Tylenol. Asleep in between cares. NPO, cycled TF as per orders at 100 ml/h and water flushes at 130 ml q 4 hours. Patient is tolerating the feedings without complaints.  and 147 overnight, covered per sliding scale orders. Continent of bowel and bladder. Continue POC.   "

## 2019-05-21 NOTE — PLAN OF CARE
Alert and oriented, VSS. BG readings were 153, 160. NPO. PRN oxycodone given 1x for pain. Pt reports new pain in nose when swallowing; note left for provider. NG tube infusing tube feed; pt tolerating well. Pt refused residual aspiration before feed. LBM today. CNA flyer gave pt a shower. R forearm dressing and R thigh dressing are clean, dry, intact. Neck incision MALKA. Tubi  socks off at bedtime; feed elevated with pillow.

## 2019-05-21 NOTE — PLAN OF CARE
PT: GIBBS assessment performed this date (see previous note), pt instructed on lumbar exercises to reduce complaints of low back pain with good results. After testing SAI and collaboration with OT, determined pt is appropriate to be IND in room during the day.

## 2019-05-21 NOTE — PLAN OF CARE
OT: Pt demod increased ease with dressing today w/o use of reacher for LB dressing. Pt with increased indep with dressing including gathering clothing and providing own set up.

## 2019-05-22 ENCOUNTER — TELEPHONE (OUTPATIENT)
Dept: OTOLARYNGOLOGY | Facility: CLINIC | Age: 54
End: 2019-05-22

## 2019-05-22 LAB
GLUCOSE BLDC GLUCOMTR-MCNC: 142 MG/DL (ref 70–99)
GLUCOSE BLDC GLUCOMTR-MCNC: 148 MG/DL (ref 70–99)
GLUCOSE BLDC GLUCOMTR-MCNC: 149 MG/DL (ref 70–99)
GLUCOSE BLDC GLUCOMTR-MCNC: 156 MG/DL (ref 70–99)
GLUCOSE BLDC GLUCOMTR-MCNC: 185 MG/DL (ref 70–99)
GLUCOSE BLDC GLUCOMTR-MCNC: 87 MG/DL (ref 70–99)

## 2019-05-22 PROCEDURE — 97110 THERAPEUTIC EXERCISES: CPT | Mod: GP | Performed by: PHYSICAL THERAPIST

## 2019-05-22 PROCEDURE — 97535 SELF CARE MNGMENT TRAINING: CPT | Mod: GO

## 2019-05-22 PROCEDURE — 12000022 ZZH R&B SNF

## 2019-05-22 PROCEDURE — 25000131 ZZH RX MED GY IP 250 OP 636 PS 637: Performed by: HOSPITALIST

## 2019-05-22 PROCEDURE — 25000132 ZZH RX MED GY IP 250 OP 250 PS 637: Performed by: PHYSICIAN ASSISTANT

## 2019-05-22 PROCEDURE — 25000132 ZZH RX MED GY IP 250 OP 250 PS 637: Performed by: HOSPITALIST

## 2019-05-22 PROCEDURE — 97116 GAIT TRAINING THERAPY: CPT | Mod: GP | Performed by: PHYSICAL THERAPIST

## 2019-05-22 PROCEDURE — 25000128 H RX IP 250 OP 636: Performed by: HOSPITALIST

## 2019-05-22 PROCEDURE — 25000128 H RX IP 250 OP 636: Performed by: INTERNAL MEDICINE

## 2019-05-22 RX ORDER — HEPARIN SODIUM 5000 [USP'U]/.5ML
5000 INJECTION, SOLUTION INTRAVENOUS; SUBCUTANEOUS 2 TIMES DAILY
Status: DISCONTINUED | OUTPATIENT
Start: 2019-05-22 | End: 2019-05-30 | Stop reason: HOSPADM

## 2019-05-22 RX ADMIN — SENNOSIDES AND DOCUSATE SODIUM 2 TABLET: 8.6; 5 TABLET ORAL at 20:42

## 2019-05-22 RX ADMIN — METFORMIN HYDROCHLORIDE 1000 MG: 500 SOLUTION ORAL at 14:03

## 2019-05-22 RX ADMIN — WHITE PETROLATUM: 1.75 OINTMENT TOPICAL at 01:13

## 2019-05-22 RX ADMIN — ACETAMINOPHEN 650 MG: 325 SOLUTION ORAL at 17:07

## 2019-05-22 RX ADMIN — Medication 1 PACKET: at 17:08

## 2019-05-22 RX ADMIN — ACETAMINOPHEN 650 MG: 325 SOLUTION ORAL at 14:04

## 2019-05-22 RX ADMIN — HEPARIN SODIUM 5000 UNITS: 5000 INJECTION, SOLUTION INTRAVENOUS; SUBCUTANEOUS at 20:43

## 2019-05-22 RX ADMIN — ONDANSETRON 4 MG: 2 INJECTION INTRAMUSCULAR; INTRAVENOUS at 09:35

## 2019-05-22 RX ADMIN — GABAPENTIN 300 MG: 250 SUSPENSION ORAL at 08:52

## 2019-05-22 RX ADMIN — Medication 1 PACKET: at 20:43

## 2019-05-22 RX ADMIN — ATORVASTATIN CALCIUM 20 MG: 20 TABLET, FILM COATED ORAL at 08:55

## 2019-05-22 RX ADMIN — WHITE PETROLATUM: 1.75 OINTMENT TOPICAL at 17:09

## 2019-05-22 RX ADMIN — CHLORHEXIDINE GLUCONATE 0.12% ORAL RINSE 15 ML: 1.2 LIQUID ORAL at 00:56

## 2019-05-22 RX ADMIN — CHLORHEXIDINE GLUCONATE 0.12% ORAL RINSE 15 ML: 1.2 LIQUID ORAL at 08:55

## 2019-05-22 RX ADMIN — ASPIRIN 325 MG ORAL TABLET 325 MG: 325 PILL ORAL at 08:53

## 2019-05-22 RX ADMIN — INSULIN GLARGINE 20 UNITS: 100 INJECTION, SOLUTION SUBCUTANEOUS at 08:59

## 2019-05-22 RX ADMIN — INSULIN HUMAN 45 UNITS: 100 INJECTION, SUSPENSION SUBCUTANEOUS at 19:53

## 2019-05-22 RX ADMIN — CHLORHEXIDINE GLUCONATE 0.12% ORAL RINSE 15 ML: 1.2 LIQUID ORAL at 17:07

## 2019-05-22 RX ADMIN — WHITE PETROLATUM: 1.75 OINTMENT TOPICAL at 08:59

## 2019-05-22 RX ADMIN — HEPARIN SODIUM 5000 UNITS: 5000 INJECTION, SOLUTION INTRAVENOUS; SUBCUTANEOUS at 08:55

## 2019-05-22 RX ADMIN — SENNOSIDES AND DOCUSATE SODIUM 2 TABLET: 8.6; 5 TABLET ORAL at 08:53

## 2019-05-22 RX ADMIN — MULTIVITAMIN 15 ML: LIQUID ORAL at 08:55

## 2019-05-22 RX ADMIN — ACETAMINOPHEN 650 MG: 325 SOLUTION ORAL at 20:42

## 2019-05-22 RX ADMIN — HEPARIN SODIUM 5000 UNITS: 5000 INJECTION, SOLUTION INTRAVENOUS; SUBCUTANEOUS at 00:57

## 2019-05-22 RX ADMIN — ONDANSETRON 4 MG: 4 TABLET, ORALLY DISINTEGRATING ORAL at 00:55

## 2019-05-22 RX ADMIN — GABAPENTIN 300 MG: 250 SUSPENSION ORAL at 17:07

## 2019-05-22 RX ADMIN — OXYCODONE HYDROCHLORIDE 10 MG: 5 SOLUTION ORAL at 17:08

## 2019-05-22 RX ADMIN — Medication 1 MG: at 08:52

## 2019-05-22 RX ADMIN — Medication 1 PACKET: at 08:56

## 2019-05-22 RX ADMIN — Medication 1 PACKET: at 14:05

## 2019-05-22 RX ADMIN — GABAPENTIN 300 MG: 250 SUSPENSION ORAL at 00:55

## 2019-05-22 RX ADMIN — ACETAMINOPHEN 650 MG: 325 SOLUTION ORAL at 05:23

## 2019-05-22 RX ADMIN — ACETAMINOPHEN 650 MG: 325 SOLUTION ORAL at 00:56

## 2019-05-22 RX ADMIN — ALUMINUM HYDROXIDE, MAGNESIUM HYDROXIDE, AND DIMETHICONE 15 ML: 400; 400; 40 SUSPENSION ORAL at 22:19

## 2019-05-22 RX ADMIN — ACETAMINOPHEN 650 MG: 325 SOLUTION ORAL at 08:53

## 2019-05-22 NOTE — PLAN OF CARE
Pt continues to have nausea through the morning. Unable to provide additional tube feeding as a result of the nausea. Zofran 4 mg was given via IVP with minimal relief until the afternoon. Dr Mendiola was updated about pt's status. NGT remains intact and patent. Order in place from the Endocrine to start D10% when tube feeding is stopped at night due to Lantus  provided in the morning and NPH insulin in the evening. BG at 185 and 148 today. Declined the need for Oxycodone but agreed to take Tylenol as scheduled. Pt is Mod I in the room.

## 2019-05-22 NOTE — TELEPHONE ENCOUNTER
Health Call Center    Phone Message    May a detailed message be left on voicemail: yes    Reason for Call: Other: Paul with [facility] calling with questions on a mouthguard recommended by Dr. Treviño. She states pt was told at his last visit he would need a specialty mouthguard prior to his upcoming surgery. Pt's dentist wanted to know how much tooth and tissue needed to be covered, and what material it needed to be made of. Please contact Paul for f/u when available.    Action Taken: Message routed to:  [facility] ([facility] ENT

## 2019-05-22 NOTE — UTILIZATION REVIEW
Pain Interview    1. Have you had pain or hurting at any time in the last 5 days?  Yes  2. How much of the time have you experienced pain or hurting over the last 5 days? Almost Constantly  3. Over the past 5 days, has pain made it hard for you to sleep at night?  Yes  4. Over the past 5 days, have you limited your day-to-day activities because of pain?No  5. Rate pain intensity: Numeric 8

## 2019-05-22 NOTE — PROGRESS NOTES
Charts reviewed.  Tube feeding briefly held for nausea. Restart when better  Blood sugars noted, endocrine following   Lisinopril held for asymptomatic low BP    /63 (BP Location: Left arm)   Pulse 90   Temp 96.8  F (36  C) (Axillary)   Resp 18   Wt 131.7 kg (290 lb 4.8 oz)   SpO2 96%   BMI 45.47 kg/m    Continue to monitor   Decrease heparin to BID dose and stop after 2-3 days if continues to have good mobility     Dr COLIN Mendiola MD, [facility]  Hospitalist ( Internal medicine)  Pager: [phone]

## 2019-05-22 NOTE — PLAN OF CARE
Patient is alert and oriented  x 4. Able to make needs known. Pain comfortably manageable with PRN oxycodone x 1-effective. Mod I in his room. Up in recliner for most of this shift. NPO with no exception. Cycled TF running per order, Isosource 1.5 running at 100 ml/hr with 130 H2o q 4 hrs via NG Tube, tolerating well. Continent of bowel & bladder.  Denies any cough, chest pain, SOB, lightheadedness & dizzines.  Dressing changed to  R forearm, CDI. Call within reach.

## 2019-05-22 NOTE — PLAN OF CARE
Pt did well without AD around obstacles. 170 ft. SBA/IND. Pt monitors fatigue level--takes standing rests as needed. Pt on track for goals--possibly consider sooner but medical issues will determine discharge.

## 2019-05-22 NOTE — PLAN OF CARE
Pt is alert and oriented. NPO, on TF 8pm to 8am. Pt asked that TF be turned off at around 1030 due to nausea. Zofran given, felt better and TF was turned back on at about 5am. Pt is SBA with cane but uses the IV pole since feeding was going on at night. Mod I in room. Incision to neck is approximated. Continent to bowel and bladder. Slept between cares, able to make his needs known. Will continue with plan of care.

## 2019-05-22 NOTE — PROGRESS NOTES
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    Assessment:   1) Type II Diabetes Mellitus, well controlled (A1c 7.4%)  2) Enteral Feed Induced Hyperglycemia     Plan: please notify inpatient diabetes team if tube feed rate, regimen, timing is to change   -continue glargine 20 units daily AM   -continue NPH 45 units at 2000 (at start time of cycled TF, hold if TF do not run)   -continue Metformin 1000mg daily (keeping at noon timing rather than AM b/c of full gut in morning at end of cycled TF   -aspart for correction: 2 unit/30 for BG >140 with cycled TFs at 2100, 0100, 0500, continue medium intensity during date at 0900, 1300, 1700.   -Please run prn D10 at nutritional support rate if cycled TFs are interrupted at unscheduled time.   -will need DM education prior to discharge if he is still requiring TFs at discharge.     Outpatient diabetes follow up: TBD  Plan discussed with patient. Primary team updated in person.     Interval History and Assessment: interval glucose trend reviewed:  Pt complained of nausea overnight and tube feeds were shut off from 10:30 pm to 5:00 am; no D10% was run per the PRN order. BG did trend down to 80's as a result, with NPH insulin on board. Nursing had planned to continue to the tube feeds past the scheduled time since they were interrupted; I recommended she discuss this with primary team, as NPH will have worn off by now and all CHO in tube feeds running past scheduled time will be uncovered. BG most  recently was already up to 185 with tube feeds running 45 minutes past scheduled time.     Discussed with primary team: they plan to have tube feeds restart during the day today, to complete the portion of the cycle he missed overnight. Plan is to resume the tube feeds cycled, as previously ordered, starting tonight. He is still nauseated, feels like he is going to vomit, but is holding it down. His belly is mildly distended.     Primary team aware that his BG will run high today if TF are to continue running; BG will hopefully respond to correction insulin ordered q4h.     Current nutritional intake and type: None  Cycled TFs: Isosource 1.5 at 100ml/h x 12h 8am-8pm.    PTA Diabetes Regimen:   Glipizide XL 20 mg am  Metformin 2 grams every morning    Discharge Planning: pending progression with PT/OT.           Diabetes History:   Type of Diabetes: Type 2 Diabetes Mellitus, TPN/Enteral Feeding Induced Hyperglycemia  Lab Results   Component Value Date    A1C 7.4 04/24/2019              Review of Systems:   The Review of Systems is negative other than noted in the Interval History.           Medications:     Current Facility-Administered Medications   Medication     - Skin Test Reading -     acetaminophen (TYLENOL) solution 650 mg     acetaminophen (TYLENOL) Suppository 650 mg     acetaminophen (TYLENOL) tablet 650 mg     alum & mag hydroxide-simethicone (MYLANTA ES/MAALOX  ES) suspension 15 mL     aspirin (ASA) tablet 325 mg     atorvastatin (LIPITOR) tablet 20 mg     chlorhexidine (PERIDEX) 0.12 % solution 15 mL     dextrose 10 % 1,000 mL infusion     glucose gel 15-30 g    Or     dextrose 50 % injection 25-50 mL    Or     glucagon injection 1 mg     doxazosin (CARDURA) suspension 1 mg     gabapentin (NEURONTIN) solution 300 mg     heparin sodium injection 5,000 Units     insulin aspart (NovoLOG) inj (RAPID ACTING)     insulin aspart (NovoLOG) inj (RAPID ACTING)     insulin glargine (LANTUS PEN) injection 20 Units      insulin isophane human (HumuLIN N PEN) injection 45 Units     lisinopril (PRINIVIL/ZESTRIL) tablet 10 mg     medication instructions     metFORMIN (GLUCOPHAGE) solution 1,000 mg     mineral oil-hydrophilic petrolatum (AQUAPHOR)     multivitamins w/minerals (CERTAVITE) liquid 15 mL     naloxone (NARCAN) injection 0.1-0.4 mg     ondansetron (ZOFRAN-ODT) ODT tab 4 mg    Or     ondansetron (ZOFRAN) injection 4 mg     oxyCODONE (ROXICODONE) solution 5-10 mg     polyethylene glycol (MIRALAX/GLYCOLAX) Packet 17 g     protein modular (ProSource No Carb) 1 packet     senna-docusate (SENOKOT-S/PERICOLACE) 8.6-50 MG per tablet 2 tablet     tuberculin injection 5 Units            Physical Exam:    /66 (BP Location: Left arm)   Pulse 90   Temp 96.8  F (36  C) (Axillary)   Resp 18   Wt 132.7 kg (292 lb 9.6 oz)   SpO2 96%   BMI 45.83 kg/m    General: pleasant, in no distress. Somewhat dysarthric.   HEENT: normocephalic, atraumatic. Oral mucous membranes moist. NGT with bridle intact   Lungs: unlabored respiration, no cough  ABD: rounded, soft, no lipodystrophy noted  Skin: warm and dry, no obvious lesions  MSK:  moves all extremities  Lymp:  no LE edema   Mental status:  alert, oriented to self, place, time  Psych:  affect, calm and appropriate interaction             Data:     Recent Labs   Lab 05/22/19  0825 05/22/19  0502 05/22/19  0029 05/21/19 2014 05/21/19  1623 05/21/19  1231  05/17/19  0838  05/16/19  0603   GLC  --   --   --   --   --   --   --  197*  --  179*   * 87 156* 123* 145* 122*   < >  --    < >  --     < > = values in this interval not displayed.     Lab Results   Component Value Date    WBC 7.6 05/17/2019    WBC 7.7 05/16/2019    WBC 7.8 05/15/2019    HGB 10.9 (L) 05/17/2019    HGB 10.4 (L) 05/16/2019    HGB 10.7 (L) 05/15/2019    HCT 34.1 (L) 05/17/2019    HCT 33.3 (L) 05/16/2019    HCT 34.1 (L) 05/15/2019    MCV 96 05/17/2019    MCV 96 05/16/2019    MCV 96 05/15/2019      05/20/2019     05/17/2019     05/16/2019     Lab Results   Component Value Date     05/17/2019     05/16/2019     05/15/2019    POTASSIUM 4.3 05/17/2019    POTASSIUM 4.0 05/16/2019    POTASSIUM 4.1 05/15/2019    CHLORIDE 109 05/17/2019    CHLORIDE 109 05/16/2019    CHLORIDE 110 (H) 05/15/2019    CO2 26 05/17/2019    CO2 27 05/16/2019    CO2 26 05/15/2019     (H) 05/17/2019     (H) 05/16/2019     (H) 05/15/2019     Lab Results   Component Value Date    BUN 24 05/17/2019    BUN 27 05/16/2019    BUN 23 05/15/2019     Lab Results   Component Value Date    TSH 0.32 (L) 05/10/2019     No results found for: AST, ALT, GGT, ALKPHOS      Diabetes Management Team job code: [id number]  I spent a total of 25 minutes bedside and on the inpatient unit managing glycemic care. Over 50% of my time on the unit was spent counseling the patient and/or coordinating care regarding acute hyperglycemia management.  See note for details.    Leanna Taylor PA-C  Inpatient Diabetes Management Service  Pager [phone]

## 2019-05-22 NOTE — TELEPHONE ENCOUNTER
Spoke to another nurse as noemi was on break to get some clarification about the mouthguard. I could not see anywhere in  last note any information about a mouthguard and also noemi stated it was needed prior to upcoming surgery and from what I could see pt had surgery on 5/9/19 and im not seeing any upcoming surgeries. Left call back number for noemi to call back

## 2019-05-23 ENCOUNTER — THERAPY VISIT (OUTPATIENT)
Dept: SPEECH THERAPY | Facility: CLINIC | Age: 54
End: 2019-05-23
Payer: COMMERCIAL

## 2019-05-23 DIAGNOSIS — R13.12 OROPHARYNGEAL DYSPHAGIA: ICD-10-CM

## 2019-05-23 DIAGNOSIS — C31.0 MAXILLARY SINUS CANCER (H): Primary | ICD-10-CM

## 2019-05-23 LAB
ANION GAP SERPL CALCULATED.3IONS-SCNC: 7 MMOL/L (ref 3–14)
BUN SERPL-MCNC: 25 MG/DL (ref 7–30)
CALCIUM SERPL-MCNC: 8.6 MG/DL (ref 8.5–10.1)
CHLORIDE SERPL-SCNC: 104 MMOL/L (ref 94–109)
CO2 SERPL-SCNC: 28 MMOL/L (ref 20–32)
CREAT SERPL-MCNC: 0.75 MG/DL (ref 0.66–1.25)
ERYTHROCYTE [DISTWIDTH] IN BLOOD BY AUTOMATED COUNT: 13.2 % (ref 10–15)
GFR SERPL CREATININE-BSD FRML MDRD: >90 ML/MIN/{1.73_M2}
GLUCOSE BLDC GLUCOMTR-MCNC: 131 MG/DL (ref 70–99)
GLUCOSE BLDC GLUCOMTR-MCNC: 134 MG/DL (ref 70–99)
GLUCOSE BLDC GLUCOMTR-MCNC: 141 MG/DL (ref 70–99)
GLUCOSE BLDC GLUCOMTR-MCNC: 168 MG/DL (ref 70–99)
GLUCOSE BLDC GLUCOMTR-MCNC: 170 MG/DL (ref 70–99)
GLUCOSE BLDC GLUCOMTR-MCNC: 175 MG/DL (ref 70–99)
GLUCOSE SERPL-MCNC: 188 MG/DL (ref 70–99)
HCT VFR BLD AUTO: 33 % (ref 40–53)
HGB BLD-MCNC: 10.7 G/DL (ref 13.3–17.7)
MCH RBC QN AUTO: 31 PG (ref 26.5–33)
MCHC RBC AUTO-ENTMCNC: 32.4 G/DL (ref 31.5–36.5)
MCV RBC AUTO: 96 FL (ref 78–100)
PLATELET # BLD AUTO: 363 10E9/L (ref 150–450)
POTASSIUM SERPL-SCNC: 4.2 MMOL/L (ref 3.4–5.3)
RBC # BLD AUTO: 3.45 10E12/L (ref 4.4–5.9)
SODIUM SERPL-SCNC: 139 MMOL/L (ref 133–144)
WBC # BLD AUTO: 6.1 10E9/L (ref 4–11)

## 2019-05-23 PROCEDURE — 36415 COLL VENOUS BLD VENIPUNCTURE: CPT | Performed by: STUDENT IN AN ORGANIZED HEALTH CARE EDUCATION/TRAINING PROGRAM

## 2019-05-23 PROCEDURE — 25000132 ZZH RX MED GY IP 250 OP 250 PS 637: Performed by: PHYSICIAN ASSISTANT

## 2019-05-23 PROCEDURE — 97116 GAIT TRAINING THERAPY: CPT | Mod: GP

## 2019-05-23 PROCEDURE — 25000128 H RX IP 250 OP 636: Performed by: INTERNAL MEDICINE

## 2019-05-23 PROCEDURE — 97530 THERAPEUTIC ACTIVITIES: CPT | Mod: GO | Performed by: OCCUPATIONAL THERAPIST

## 2019-05-23 PROCEDURE — 85027 COMPLETE CBC AUTOMATED: CPT | Performed by: STUDENT IN AN ORGANIZED HEALTH CARE EDUCATION/TRAINING PROGRAM

## 2019-05-23 PROCEDURE — 12000022 ZZH R&B SNF

## 2019-05-23 PROCEDURE — 97110 THERAPEUTIC EXERCISES: CPT | Mod: GP

## 2019-05-23 PROCEDURE — 80048 BASIC METABOLIC PNL TOTAL CA: CPT | Performed by: STUDENT IN AN ORGANIZED HEALTH CARE EDUCATION/TRAINING PROGRAM

## 2019-05-23 PROCEDURE — 25000132 ZZH RX MED GY IP 250 OP 250 PS 637: Performed by: HOSPITALIST

## 2019-05-23 PROCEDURE — 25000128 H RX IP 250 OP 636: Performed by: HOSPITALIST

## 2019-05-23 PROCEDURE — 97535 SELF CARE MNGMENT TRAINING: CPT | Mod: GO | Performed by: OCCUPATIONAL THERAPIST

## 2019-05-23 RX ADMIN — GABAPENTIN 300 MG: 250 SUSPENSION ORAL at 16:28

## 2019-05-23 RX ADMIN — Medication 1 PACKET: at 08:45

## 2019-05-23 RX ADMIN — INSULIN HUMAN 45 UNITS: 100 INJECTION, SUSPENSION SUBCUTANEOUS at 20:10

## 2019-05-23 RX ADMIN — Medication 1 PACKET: at 12:09

## 2019-05-23 RX ADMIN — OXYCODONE HYDROCHLORIDE 10 MG: 5 SOLUTION ORAL at 17:42

## 2019-05-23 RX ADMIN — ACETAMINOPHEN 650 MG: 325 SOLUTION ORAL at 16:28

## 2019-05-23 RX ADMIN — GABAPENTIN 300 MG: 250 SUSPENSION ORAL at 08:37

## 2019-05-23 RX ADMIN — OXYCODONE HYDROCHLORIDE 10 MG: 5 SOLUTION ORAL at 00:59

## 2019-05-23 RX ADMIN — Medication 1 PACKET: at 21:25

## 2019-05-23 RX ADMIN — METFORMIN HYDROCHLORIDE 1000 MG: 500 SOLUTION ORAL at 12:09

## 2019-05-23 RX ADMIN — CHLORHEXIDINE GLUCONATE 0.12% ORAL RINSE 15 ML: 1.2 LIQUID ORAL at 16:28

## 2019-05-23 RX ADMIN — CHLORHEXIDINE GLUCONATE 0.12% ORAL RINSE 15 ML: 1.2 LIQUID ORAL at 00:59

## 2019-05-23 RX ADMIN — CHLORHEXIDINE GLUCONATE 0.12% ORAL RINSE 15 ML: 1.2 LIQUID ORAL at 08:36

## 2019-05-23 RX ADMIN — ACETAMINOPHEN 650 MG: 325 SOLUTION ORAL at 20:10

## 2019-05-23 RX ADMIN — Medication 1 MG: at 08:36

## 2019-05-23 RX ADMIN — HEPARIN SODIUM 5000 UNITS: 5000 INJECTION, SOLUTION INTRAVENOUS; SUBCUTANEOUS at 08:36

## 2019-05-23 RX ADMIN — ACETAMINOPHEN 650 MG: 325 SOLUTION ORAL at 00:58

## 2019-05-23 RX ADMIN — SENNOSIDES AND DOCUSATE SODIUM 2 TABLET: 8.6; 5 TABLET ORAL at 20:11

## 2019-05-23 RX ADMIN — WHITE PETROLATUM: 1.75 OINTMENT TOPICAL at 08:54

## 2019-05-23 RX ADMIN — ATORVASTATIN CALCIUM 20 MG: 20 TABLET, FILM COATED ORAL at 08:36

## 2019-05-23 RX ADMIN — ASPIRIN 325 MG ORAL TABLET 325 MG: 325 PILL ORAL at 08:36

## 2019-05-23 RX ADMIN — GABAPENTIN 300 MG: 250 SUSPENSION ORAL at 00:59

## 2019-05-23 RX ADMIN — POLYETHYLENE GLYCOL 3350 17 G: 17 POWDER, FOR SOLUTION ORAL at 08:37

## 2019-05-23 RX ADMIN — MULTIVITAMIN 15 ML: LIQUID ORAL at 08:36

## 2019-05-23 RX ADMIN — ACETAMINOPHEN 650 MG: 325 SOLUTION ORAL at 05:18

## 2019-05-23 RX ADMIN — LISINOPRIL 10 MG: 10 TABLET ORAL at 08:36

## 2019-05-23 RX ADMIN — SENNOSIDES AND DOCUSATE SODIUM 2 TABLET: 8.6; 5 TABLET ORAL at 08:36

## 2019-05-23 RX ADMIN — HEPARIN SODIUM 5000 UNITS: 5000 INJECTION, SOLUTION INTRAVENOUS; SUBCUTANEOUS at 20:11

## 2019-05-23 RX ADMIN — Medication 1 PACKET: at 16:28

## 2019-05-23 RX ADMIN — INSULIN GLARGINE 20 UNITS: 100 INJECTION, SOLUTION SUBCUTANEOUS at 08:38

## 2019-05-23 RX ADMIN — WHITE PETROLATUM: 1.75 OINTMENT TOPICAL at 00:59

## 2019-05-23 RX ADMIN — WHITE PETROLATUM: 1.75 OINTMENT TOPICAL at 16:31

## 2019-05-23 RX ADMIN — ONDANSETRON 4 MG: 2 INJECTION INTRAMUSCULAR; INTRAVENOUS at 00:01

## 2019-05-23 RX ADMIN — ACETAMINOPHEN 650 MG: 325 SOLUTION ORAL at 08:35

## 2019-05-23 NOTE — PLAN OF CARE
OT : POC and OT goals reviewed and updated. High priority tx strategies discussed and in agreement with patient. OT pleased with patient's consistent progress with meeting OT goals . All AE for home discussed and will be purchased by patient on line.

## 2019-05-23 NOTE — PROGRESS NOTES
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    Assessment:   1) Type II Diabetes Mellitus, well controlled (A1c 7.4%)  2) Enteral Feed Induced Hyperglycemia     Plan: please notify inpatient diabetes team if tube feed rate, regimen, timing is to change   -glargine 20 units daily AM   -NPH 45 units at 2000 (at start time of cycled TF, hold if TF do not run)   -Metformin 1000mg daily at noon (full gut in morning at end of cycled TF)   -aspart  2 units/30 for BG >140  at 2100, 0100, 0500 (cycled TF on)   -aspart medium intensity at  0900, 1300, 1700 (cycled TF off)   -Please run prn D10 at nutritional support rate if cycled TFs are interrupted at unscheduled time.   -will need DM education if he is still requiring insulin at discharge.      Outpatient diabetes follow up: TBD  Plan discussed with patient.     Interval History and Assessment: interval glucose trend reviewed:  BG remaining stable during the day yesterday, as pt reporting persistent nausea and tube feeds were not given during the day as planned per primary team. TF restarted at scheduled time last evening and NPH was given on time. Around midnight, the patient reported return of nausea, and tube feed rate was turned down from 100 to 90cc/hour per his request. Interestingly, his BG remained stable to higher despite running at a lower rate with NPH on board dosed for 100cc/hour TF cycling.     Will keep dosing as is for today; anticipate that if TF are going to change to 90cc/hour  cycled indefinitely (21g CHO fewer per cycle), that his NPH dose may have to be adjusted.     He is to have a swallow evaluation today; if cleared for a liquid diet, will plan to add carbohydrate coverage aspart soon. Per dietician note today, will plan for calorie counts once diet is ordered. If his tube feeds are to continue at reduced rate, she recommended change to IsoSource 1.5 to 90 mL/hr x 13 hrs (206g CHO).    Current nutritional intake and type: None  Cycled TFs: Isosource 1.5 at 100ml/h x 12h 8am-8pm (211g CHO).    PTA Diabetes Regimen:   Glipizide XL 20 mg am  Metformin 2 grams every morning    Discharge Planning: pending progression with PT/OT.           Diabetes History:   Type of Diabetes: Type 2 Diabetes Mellitus, TPN/Enteral Feeding Induced Hyperglycemia  Lab Results   Component Value Date    A1C 7.4 04/24/2019              Review of Systems:   The Review of Systems is negative other than noted in the Interval History.           Medications:     Current Facility-Administered Medications   Medication     - Skin Test Reading -     acetaminophen (TYLENOL) solution 650 mg     acetaminophen (TYLENOL) Suppository 650 mg     acetaminophen (TYLENOL) tablet 650 mg     alum & mag hydroxide-simethicone (MYLANTA ES/MAALOX  ES) suspension 15 mL     aspirin (ASA) tablet 325 mg     atorvastatin (LIPITOR) tablet 20 mg     chlorhexidine (PERIDEX) 0.12 % solution 15 mL     dextrose 10 % 1,000 mL infusion     glucose gel 15-30 g    Or     dextrose 50 % injection 25-50 mL    Or     glucagon injection 1 mg     doxazosin (CARDURA) suspension 1 mg     gabapentin (NEURONTIN) solution 300 mg     heparin sodium injection 5,000 Units     insulin aspart (NovoLOG) inj (RAPID ACTING)     insulin aspart (NovoLOG) inj (RAPID ACTING)     insulin glargine (LANTUS PEN) injection 20 Units     insulin isophane human (HumuLIN N PEN) injection 45 Units     lisinopril (PRINIVIL/ZESTRIL) tablet 10 mg     medication instructions      metFORMIN (GLUCOPHAGE) solution 1,000 mg     mineral oil-hydrophilic petrolatum (AQUAPHOR)     multivitamins w/minerals (CERTAVITE) liquid 15 mL     naloxone (NARCAN) injection 0.1-0.4 mg     ondansetron (ZOFRAN-ODT) ODT tab 4 mg    Or     ondansetron (ZOFRAN) injection 4 mg     oxyCODONE (ROXICODONE) solution 5-10 mg     polyethylene glycol (MIRALAX/GLYCOLAX) Packet 17 g     protein modular (ProSource No Carb) 1 packet     senna-docusate (SENOKOT-S/PERICOLACE) 8.6-50 MG per tablet 2 tablet     tuberculin injection 5 Units            Physical Exam:    /68   Pulse 82   Temp 96.1  F (35.6  C) (Axillary)   Resp 18   Wt 131.7 kg (290 lb 4.8 oz)   SpO2 97%   BMI 45.47 kg/m    General: pleasant, in no distress. Somewhat dysarthric.   HEENT: normocephalic, atraumatic. Oral mucous membranes moist. NGT with bridle intact   Lungs: unlabored respiration, no cough  ABD: rounded, soft, no lipodystrophy noted  Skin: warm and dry, no obvious lesions  MSK:  moves all extremities  Lymp:  no LE edema   Mental status:  alert, oriented to self, place, time  Psych:  affect, calm and appropriate interaction             Data:     Recent Labs   Lab 05/23/19  0828 05/23/19  0641 05/23/19  0504 05/23/19  0050 05/22/19  2054 05/22/19  1719 05/22/19  1400  05/17/19  0838   GLC  --  188*  --   --   --   --   --   --  197*   *  --  175* 134* 149* 142* 148*   < >  --     < > = values in this interval not displayed.     Lab Results   Component Value Date    WBC 6.1 05/23/2019    WBC 7.6 05/17/2019    WBC 7.7 05/16/2019    HGB 10.7 (L) 05/23/2019    HGB 10.9 (L) 05/17/2019    HGB 10.4 (L) 05/16/2019    HCT 33.0 (L) 05/23/2019    HCT 34.1 (L) 05/17/2019    HCT 33.3 (L) 05/16/2019    MCV 96 05/23/2019    MCV 96 05/17/2019    MCV 96 05/16/2019     05/23/2019     05/20/2019     05/17/2019     Lab Results   Component Value Date     05/23/2019     05/17/2019     05/16/2019    POTASSIUM 4.2  05/23/2019    POTASSIUM 4.3 05/17/2019    POTASSIUM 4.0 05/16/2019    CHLORIDE 104 05/23/2019    CHLORIDE 109 05/17/2019    CHLORIDE 109 05/16/2019    CO2 28 05/23/2019    CO2 26 05/17/2019    CO2 27 05/16/2019     (H) 05/23/2019     (H) 05/17/2019     (H) 05/16/2019     Lab Results   Component Value Date    BUN 25 05/23/2019    BUN 24 05/17/2019    BUN 27 05/16/2019     Lab Results   Component Value Date    TSH 0.32 (L) 05/10/2019     No results found for: AST, ALT, GGT, ALKPHOS      Diabetes Management Team job code: [id number]  I spent a total of 25 minutes bedside and on the inpatient unit managing glycemic care. Over 50% of my time on the unit was spent counseling the patient and/or coordinating care regarding acute hyperglycemia management.  See note for details.    Leanna Taylor PA-C  Inpatient Diabetes Management Service  Pager [phone]

## 2019-05-23 NOTE — PLAN OF CARE
Left now (1220) for swallow eval. on [place]. Transport by . Returned ~ 1430. Up in room with SBA. Will start on clear liquids. Pain managed with scheduled Tylenol. Left neck incision C/D/I, Aquaphor applied. Right forearm with dressing intact.

## 2019-05-23 NOTE — PROGRESS NOTES
CLINICAL NUTRITION SERVICES - REASSESSMENT NOTE     Nutrition Prescription    RECOMMENDATIONS FOR MDs/PROVIDERS TO ORDER:  ADAT pending VFSS    Malnutrition Status:    Patient does not meet two of the criteria necessary for diagnosing malnutrition    Recommendations already ordered by Registered Dietitian (RD):  None today    Future/Additional Recommendations:  1. Once diet advances, order Boost Glucose Control (chocolate) per pt preference. Pt may bring in Premier Protein as he enjoys this.   2. Order calorie counts once pt is eating orally. Pending calorie counts, once pt is meeting 75% nutritional needs (1260 kcal and 75 g protein), consider discontinuing TF.   3. If diet does not advance and pt continues to poorly tolerate TF cycle, consider reducing IsoSource 1.5 to 90 mL/hr x 13 hrs to provide 1170 mL, 1755 kcal (21 kcal/kg), 80 g protein (0.9 g/kg), 206 g CHO, 18 g fiber, and 889 mL free water per DW of 84 kg. Notify endo if this is ordered.      EVALUATION OF THE PROGRESS TOWARD GOALS   Diet: NPO  Nutrition Support:   - Isosource 1.5 @ 100 mL/hr x 12 hrs (8 pm- 8 am) providing 1200 mL, 1800 kcal (21 kcal/kg), 82 g protein (1 g/kg), 211 g CHO, 18 g fiber, and 912 mL free water per DW of 84 kg.  + ProSource QID to provide: 160 calories and 44 g protein  Total provisions of 23 kcal/kg and 1.5 g pro/kg  - Water Flushes: 130 mL q 4 hrs (TF + Flushes = 1692 mL water; meeting 100% hydration needs).    Intake: no PO intakes d/t NPO status. Pt has had increased nausea x2 nights with TF resulting in TF being held 5/21 and reduced to 90 mL/hr on 5/22. Previously, he was tolerating TF at goal of 100 mL/hr; unclear why pt has been nauseous x2 days. Average TF infusion over the last 7 days = 1004 mL, 1506 kcal (18 kcal/kg) and 68 g protein (0.8 g/kg), meeting 90% energy and 68% protein needs.     NEW FINDINGS   - TF was transitioned from bolus to cycle feeds on 5/17 d/t intolerance of bolus feeds (nausea). Per chart  review, pt was tolerating TF cycle w/o complaints, however started reporting nausea on 5/21.  - Pt has lost 11 lbs (3.7%) over the last 1 month. This is not nutritionally significant and gradual wt loss is desirable d/t grade III obesity.     MALNUTRITION  % Intake: Decreased intake does not meet criteria  % Weight Loss: Weight loss does not meet criteria  Subcutaneous Fat Loss: None observed  Muscle Loss: None observed  Fluid Accumulation/Edema: None noted  Malnutrition Diagnosis: Patient does not meet two of the above criteria necessary for diagnosing malnutrition    Previous Goals   Total avg nutritional intake to meet a minimum of 20 kcal/kg and 1.2 g PRO/kg daily (per dosing wt 84 kg).  Evaluation: Not met d/t TF intolerance over the last 2 days     Previous Nutrition Diagnosis  Inadequate nutrient intake (energy and protein) related to pt currently reliant on enteral nutrition w/ complaints of nausea and discomfort as evidenced by pt has requested for TF to stop before getting full amounts of bolus and receiving an average of 65% assessed calorie needs per day over the last 6 days.   Evaluation: Improving    CURRENT NUTRITION DIAGNOSIS  Inadequate enteral nutrition infusion related to nausea as evidenced by pt meeting </=75% EN goal x2 days with 7 day average infusion meeting 90% energy and 68% protein needs.    INTERVENTIONS  Implementation  Discussed VFSS this afternoon and potential diet progression. Once he is able to eat more, we will begin calorie counts to better assess adequacy of PO intakes and wean TF as able. Discussed supplement options available once diet advances and pt enjoyed Premier Protein PTA (all flavors) and is agreeable to try Boost. Will order this once diet is advanced.     Goals  Total avg nutritional intake to meet a minimum of 20 kcal/kg and 1.2 g PRO/kg daily (per dosing wt 84 kg).    Monitoring/Evaluation  Progress toward goals will be monitored and evaluated per  protocol.      Teresa Clifton RD, LD  Unit Pager: [phone]

## 2019-05-23 NOTE — PLAN OF CARE
"Pt.A&Ox4. Very pleasant. Requested and rec'd Zofran 4mg IVP @ 2400 for c/o nausea - aromatherapy and repositioning done as well and helpful per pt. Remains NPO with cycled TF currently infusing @ 90mls/hr (order is 100mls) but pt.requested lower rate d/t nausea. Has swallow eval this afternoon. PRN Oxycodone sol.10mg @ 0100 for c/o (R) forearm pain \"throbbing\". Pt.sleeping on reassessment with no further c/o's as of yet. Assist of 1 with bed mobility. Pt.uses red ajit catheter indep.with Peridex rinses. Staff instills approx.5mls @ a time, pt.swishes, then suctions.   "

## 2019-05-23 NOTE — PLAN OF CARE
PTA: PT POC date changed to 5/28 as pt is close to meeting PT goals. Pt aware and in agreement. -15 min at start of PT session d/t am meds.

## 2019-05-23 NOTE — PLAN OF CARE
Patient alert and oriented x4, able to make needs known. Pt independent with ambulation. Continent with bowel and bladder. PRN dose of oxycodone 10mg given at 1715 prior to RUE wound care.  Wound care complete to RUE and neck incision. Tube feedings started @2000 at 100ml/hr with 130 flush/4hours. Pt tolerated well until 2150, rate decreased to 90mL/hr due to nausea. Will continue to monitor.    @ 1719 with 1 unit of Novolog given.  @ 2054 with 2 units of Novolog given.   Will continue to monitor pt.   PRN Mylanta given @2215 for nausea.

## 2019-05-24 ENCOUNTER — OFFICE VISIT (OUTPATIENT)
Dept: RADIATION ONCOLOGY | Facility: CLINIC | Age: 54
End: 2019-05-24
Attending: RADIOLOGY
Payer: COMMERCIAL

## 2019-05-24 VITALS — SYSTOLIC BLOOD PRESSURE: 115 MMHG | DIASTOLIC BLOOD PRESSURE: 74 MMHG | BODY MASS INDEX: 45.73 KG/M2 | WEIGHT: 292 LBS

## 2019-05-24 DIAGNOSIS — C03.9 PRIMARY CANCER OF ALVEOLAR RIDGE MUCOSA (H): ICD-10-CM

## 2019-05-24 LAB
GLUCOSE BLDC GLUCOMTR-MCNC: 142 MG/DL (ref 70–99)
GLUCOSE BLDC GLUCOMTR-MCNC: 148 MG/DL (ref 70–99)
GLUCOSE BLDC GLUCOMTR-MCNC: 150 MG/DL (ref 70–99)
GLUCOSE BLDC GLUCOMTR-MCNC: 158 MG/DL (ref 70–99)
GLUCOSE BLDC GLUCOMTR-MCNC: 158 MG/DL (ref 70–99)
GLUCOSE BLDC GLUCOMTR-MCNC: 169 MG/DL (ref 70–99)

## 2019-05-24 PROCEDURE — G0463 HOSPITAL OUTPT CLINIC VISIT: HCPCS | Performed by: RADIOLOGY

## 2019-05-24 PROCEDURE — 25000132 ZZH RX MED GY IP 250 OP 250 PS 637: Performed by: HOSPITALIST

## 2019-05-24 PROCEDURE — 99207 ZZC CDG-MDM COMPONENT: MEETS MODERATE - UP CODED: CPT | Performed by: INTERNAL MEDICINE

## 2019-05-24 PROCEDURE — 25000128 H RX IP 250 OP 636: Performed by: INTERNAL MEDICINE

## 2019-05-24 PROCEDURE — 97535 SELF CARE MNGMENT TRAINING: CPT | Mod: GO | Performed by: OCCUPATIONAL THERAPIST

## 2019-05-24 PROCEDURE — 99310 SBSQ NF CARE HIGH MDM 45: CPT | Performed by: INTERNAL MEDICINE

## 2019-05-24 PROCEDURE — 25000132 ZZH RX MED GY IP 250 OP 250 PS 637: Performed by: PHYSICIAN ASSISTANT

## 2019-05-24 PROCEDURE — 97116 GAIT TRAINING THERAPY: CPT | Mod: GP

## 2019-05-24 PROCEDURE — 82962 GLUCOSE BLOOD TEST: CPT

## 2019-05-24 PROCEDURE — 97110 THERAPEUTIC EXERCISES: CPT | Mod: GP

## 2019-05-24 PROCEDURE — 12000022 ZZH R&B SNF

## 2019-05-24 RX ADMIN — METFORMIN HYDROCHLORIDE 1000 MG: 500 SOLUTION ORAL at 13:10

## 2019-05-24 RX ADMIN — WHITE PETROLATUM: 1.75 OINTMENT TOPICAL at 09:39

## 2019-05-24 RX ADMIN — OXYCODONE HYDROCHLORIDE 10 MG: 5 SOLUTION ORAL at 13:10

## 2019-05-24 RX ADMIN — ACETAMINOPHEN 650 MG: 325 SOLUTION ORAL at 00:48

## 2019-05-24 RX ADMIN — Medication 1 PACKET: at 20:14

## 2019-05-24 RX ADMIN — Medication 1 PACKET: at 09:38

## 2019-05-24 RX ADMIN — Medication 1 PACKET: at 16:35

## 2019-05-24 RX ADMIN — WHITE PETROLATUM: 1.75 OINTMENT TOPICAL at 16:35

## 2019-05-24 RX ADMIN — MULTIVITAMIN 15 ML: LIQUID ORAL at 09:32

## 2019-05-24 RX ADMIN — Medication 1 PACKET: at 13:14

## 2019-05-24 RX ADMIN — INSULIN HUMAN 45 UNITS: 100 INJECTION, SUSPENSION SUBCUTANEOUS at 20:14

## 2019-05-24 RX ADMIN — GABAPENTIN 300 MG: 250 SUSPENSION ORAL at 09:33

## 2019-05-24 RX ADMIN — CHLORHEXIDINE GLUCONATE 0.12% ORAL RINSE 15 ML: 1.2 LIQUID ORAL at 16:35

## 2019-05-24 RX ADMIN — INSULIN GLARGINE 20 UNITS: 100 INJECTION, SOLUTION SUBCUTANEOUS at 09:35

## 2019-05-24 RX ADMIN — ACETAMINOPHEN 650 MG: 325 SOLUTION ORAL at 09:32

## 2019-05-24 RX ADMIN — OXYCODONE HYDROCHLORIDE 10 MG: 5 SOLUTION ORAL at 22:04

## 2019-05-24 RX ADMIN — HEPARIN SODIUM 5000 UNITS: 5000 INJECTION, SOLUTION INTRAVENOUS; SUBCUTANEOUS at 20:14

## 2019-05-24 RX ADMIN — ATORVASTATIN CALCIUM 20 MG: 20 TABLET, FILM COATED ORAL at 09:33

## 2019-05-24 RX ADMIN — CHLORHEXIDINE GLUCONATE 0.12% ORAL RINSE 15 ML: 1.2 LIQUID ORAL at 00:48

## 2019-05-24 RX ADMIN — WHITE PETROLATUM: 1.75 OINTMENT TOPICAL at 00:49

## 2019-05-24 RX ADMIN — ACETAMINOPHEN 650 MG: 325 SOLUTION ORAL at 16:35

## 2019-05-24 RX ADMIN — CHLORHEXIDINE GLUCONATE 0.12% ORAL RINSE 15 ML: 1.2 LIQUID ORAL at 09:32

## 2019-05-24 RX ADMIN — HEPARIN SODIUM 5000 UNITS: 5000 INJECTION, SOLUTION INTRAVENOUS; SUBCUTANEOUS at 09:32

## 2019-05-24 RX ADMIN — ACETAMINOPHEN 650 MG: 325 SOLUTION ORAL at 05:10

## 2019-05-24 RX ADMIN — ACETAMINOPHEN 650 MG: 325 SOLUTION ORAL at 13:10

## 2019-05-24 RX ADMIN — ACETAMINOPHEN 650 MG: 325 SOLUTION ORAL at 20:14

## 2019-05-24 RX ADMIN — Medication 1 MG: at 09:38

## 2019-05-24 RX ADMIN — GABAPENTIN 300 MG: 250 SUSPENSION ORAL at 16:35

## 2019-05-24 RX ADMIN — SENNOSIDES AND DOCUSATE SODIUM 2 TABLET: 8.6; 5 TABLET ORAL at 20:14

## 2019-05-24 RX ADMIN — SENNOSIDES AND DOCUSATE SODIUM 2 TABLET: 8.6; 5 TABLET ORAL at 09:32

## 2019-05-24 RX ADMIN — GABAPENTIN 300 MG: 250 SUSPENSION ORAL at 00:48

## 2019-05-24 RX ADMIN — ASPIRIN 325 MG ORAL TABLET 325 MG: 325 PILL ORAL at 09:32

## 2019-05-24 ASSESSMENT — ENCOUNTER SYMPTOMS
NECK PAIN: 1
STRIDOR: 0
DIAPHORESIS: 0
VOMITING: 0
SINUS PAIN: 0
PHOTOPHOBIA: 0
CONSTIPATION: 0
HEARTBURN: 0
FOCAL WEAKNESS: 0
DOUBLE VISION: 0
HEMOPTYSIS: 0
INSOMNIA: 0
NERVOUS/ANXIOUS: 1
BRUISES/BLEEDS EASILY: 0
MEMORY LOSS: 0
HEADACHES: 1
EYE REDNESS: 0
DYSURIA: 0
CHILLS: 0
FALLS: 0
NAUSEA: 0
SHORTNESS OF BREATH: 1
FREQUENCY: 0
MYALGIAS: 0
PALPITATIONS: 0
TINGLING: 0
EYE DISCHARGE: 0
ORTHOPNEA: 0
PND: 0
TREMORS: 0
ABDOMINAL PAIN: 0
DIARRHEA: 0
DEPRESSION: 0
CLAUDICATION: 0
BLOOD IN STOOL: 0
BLURRED VISION: 1
SPEECH CHANGE: 0
EYE PAIN: 0
HEMATURIA: 0
HALLUCINATIONS: 0
DIZZINESS: 0
WHEEZING: 0
LOSS OF CONSCIOUSNESS: 0
BACK PAIN: 0
SEIZURES: 0
FEVER: 0
POLYDIPSIA: 0
WEIGHT LOSS: 0
COUGH: 1
FLANK PAIN: 0
SENSORY CHANGE: 0
SORE THROAT: 0
SPUTUM PRODUCTION: 0
WEAKNESS: 0

## 2019-05-24 ASSESSMENT — LIFESTYLE VARIABLES: SUBSTANCE_ABUSE: 0

## 2019-05-24 NOTE — PLAN OF CARE
Pt has diet plans in place. Currently of clear liquid diet and will start Full liquid diet on 5/25-5/26 and then resume DD2 diet on 5/27. Pt is aware of the plans. Tolerated apple juice this morning without any indication of aspiration. Per new order, pt can take crushed medications with pureed consistency on 5/25/19. Remains Mod I in the room without an assistive device. Dressing was changed to the left arm donor site after cleansing with NS, no drainage noted. Right thigh incision was cleansed, Aquaphor ointment applied. Site is CDI and pink, no dressing is required, site is MALKA at this time. No complaints of nausea through the day. Tube feeding was stopped at 0800. Pt indicated that he would only be able to tolerate cycled TF at 90 ml/hr. Has appointment with the Oncology team at 1400 today, pt will be picked up at 1330.

## 2019-05-24 NOTE — LETTER
5/24/2019       RE: Devonte Tucker  [address] [place] [address]     Dear Colleague,    Thank you for referring your patient, Devonte Tucker, to the [facility]. Please see a copy of my visit note below.      HPI    INITIAL PATIENT ASSESSMENT    Diagnosis: Maxillary Sinus Cancer    Prior radiation therapy: None    Prior chemotherapy: None    Prior hormonal therapy:No    Pain Eval:  Current history of pain associated with this visit:   Intensity: 5/10  Current: aching  Location: Right arm and jaw  Treatment: Oxycodone    Psychosocial  Living arrangements: TCU  Fall Risk: independent   referral needs: Not needed    Advanced Directive: No  Implantable Cardiac Device? No      Nurse face-to-face time: Level 5:  over 15 min face to face time  Review of Systems   Constitutional: Positive for malaise/fatigue. Negative for chills, diaphoresis, fever and weight loss.   HENT: Positive for congestion. Negative for ear discharge, ear pain, hearing loss, nosebleeds, sinus pain, sore throat and tinnitus.    Eyes: Positive for blurred vision. Negative for double vision, photophobia, pain, discharge and redness.   Respiratory: Positive for cough and shortness of breath. Negative for hemoptysis, sputum production, wheezing and stridor.    Cardiovascular: Negative for chest pain, palpitations, orthopnea, claudication, leg swelling and PND.   Gastrointestinal: Negative for abdominal pain, blood in stool, constipation, diarrhea, heartburn, melena, nausea and vomiting.   Genitourinary: Negative for dysuria, flank pain, frequency, hematuria and urgency.   Musculoskeletal: Positive for neck pain. Negative for back pain, falls, joint pain and myalgias.   Skin: Negative for itching and rash.   Neurological: Positive for headaches. Negative for dizziness, tingling, tremors, sensory change, speech change, focal weakness, seizures, loss of consciousness and weakness.   Endo/Heme/Allergies:  Negative for environmental allergies and polydipsia. Does not bruise/bleed easily.   Psychiatric/Behavioral: Negative for depression, hallucinations, memory loss, substance abuse and suicidal ideas. The patient is nervous/anxious. The patient does not have insomnia.                  Again, thank you for allowing me to participate in the care of your patient.      Sincerely,    Emerson Verdin MD

## 2019-05-24 NOTE — PROGRESS NOTES
Nutrition Brief Note    Per chart review, pt has been requesting TF rate at 90 mL/hr x 12 hours (goal for 100 mL/hr x 12 hrs). Per discussion with sonja BOWER, will adjust TF orders to reflect this, anticipating he will continue to increase PO intakes now that diet has advanced. Pending intakes, consider increasing TF from 12 hrs to 13 hrs to meet 100% of nutritional needs.     Left supplement options (Boost Breeze and Boost Glucose Control) written on menu that pt may order on a CL/FL diet. Also left DD2 diet menu in room for when his diet advances on Monday.     Interventions:  - Reduce TF slightly as follows:  IsoSource 1.5 via NG-tube @ 90 mL/hr x 12 hrs (8pm-8am) providing 1080 mL, 1620 kcal (19 kcal/kg), 73 g protein (0.9 g/kg), 190 g CHO, 16 g fiber, and 821 mL free water per DW of 84 kg. Of note, this is 21 g CHO less than previous regimen.       Teresa Clifton, LASHA, LD  Unit Pager: [phone]

## 2019-05-24 NOTE — PLAN OF CARE
Alert and oriented x4, very pleasant. Pain comfortably managed with scheduled Tylenol. Asleep off and on during the night. SBA with ambulation. NPO and with Cycled TF, patient tolerating at 90 ml/h. Water flushes at 130 ml q 4 hours. BG overnight 142 and 150, covered per sliding scale orders. Continent of bladder, no BM overnight. Continue POC.

## 2019-05-24 NOTE — PROGRESS NOTES
PHQ9 and BIMS:      Pt scored 14 showing cognitively intact.   Pt scored 05 showing minimal depressive symptoms.     SHIVA Wilkes  [facility]   P: [phone]  Pgr: [phone]

## 2019-05-24 NOTE — LETTER
5/24/2019      RE: Devonte KIMBALL Segundoerik  [address] [place] [address]         HPI    INITIAL PATIENT ASSESSMENT    Diagnosis: Maxillary Sinus Cancer    Prior radiation therapy: None    Prior chemotherapy: None    Prior hormonal therapy:No    Pain Eval:  Current history of pain associated with this visit:   Intensity: 5/10  Current: aching  Location: Right arm and jaw  Treatment: Oxycodone    Psychosocial  Living arrangements: TCU  Fall Risk: independent   referral needs: Not needed    Advanced Directive: No  Implantable Cardiac Device? No      Nurse face-to-face time: Level 5:  over 15 min face to face time  Review of Systems   Constitutional: Positive for malaise/fatigue. Negative for chills, diaphoresis, fever and weight loss.   HENT: Positive for congestion. Negative for ear discharge, ear pain, hearing loss, nosebleeds, sinus pain, sore throat and tinnitus.    Eyes: Positive for blurred vision. Negative for double vision, photophobia, pain, discharge and redness.   Respiratory: Positive for cough and shortness of breath. Negative for hemoptysis, sputum production, wheezing and stridor.    Cardiovascular: Negative for chest pain, palpitations, orthopnea, claudication, leg swelling and PND.   Gastrointestinal: Negative for abdominal pain, blood in stool, constipation, diarrhea, heartburn, melena, nausea and vomiting.   Genitourinary: Negative for dysuria, flank pain, frequency, hematuria and urgency.   Musculoskeletal: Positive for neck pain. Negative for back pain, falls, joint pain and myalgias.   Skin: Negative for itching and rash.   Neurological: Positive for headaches. Negative for dizziness, tingling, tremors, sensory change, speech change, focal weakness, seizures, loss of consciousness and weakness.   Endo/Heme/Allergies: Negative for environmental allergies and polydipsia. Does not bruise/bleed easily.   Psychiatric/Behavioral: Negative for depression, hallucinations, memory  loss, substance abuse and suicidal ideas. The patient is nervous/anxious. The patient does not have insomnia.                     Department of Radiation Oncology  [address] [address]  [phone]       Consultation Note    Name: Devonte Tucker MRN: [id number]   : 1965   Date of Service: 2019  Referring: Dr. 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 Biopsy of the left upper gingiva performed on 4/3/2019 was consistent with a small focus of invasive squamous cell carcinoma with high-grade dysplasia.     A follow-up 2019 PET/CT revealed a defect in the left maxilla around the left maxillary canine with intense FDG uptake (SUVmax 11.53). There were several suspicious lymph nodes including a 1.7 cm left submandibular node (SUVmax 6.7) and a 1.1 cm right submandibular node with an SUVmax 4.0. There was no radiographic evidence of distant metastases.     Biopsy of the enlarged left submandibular node was benign and Mr. Tucker proceeded to undergo a left infrastructure maxillectomy and left level I lymph node dissection by Dr. Atwood on 2019. Surgical pathology (specimen: [id number]) was positive for a 1.3 cm well-differentiated squamous cell carcinoma with a 13 mm DOI. The tumor was noted to focally invade the underlying bone. The surgical margins were negative and there was no evidence of PNI or LVSI. Two left level I lymph nodes were identified with no evidence of metastatic disease within either node. Mr. Tucker then  returned to the OR on 5/12/2019 where he underwent a free flap repair by Dr. Atwood.     Mr. Tucker's case was discussed at the [facility]'s interdisciplinary head and neck tumor board with the consensus recommendation to proceed with adjuvant radiotherapy for improved local disease control given the presence of bone invasion. He is referred to radiation oncology clinic today for a further discussion regarding the use of head and neck radiotherapy in the treatment of his locally advanced oral cavity carcinoma.    Past Medical History:    Obesity    HTN    Diabetes    Past Surgical History:    Left infrastructure maxillectomy    Dental extractions    Chemotherapy History:  None    Radiation History:  None    Pregnant: Not Applicable  Implanted Cardiac Devices: No    Medications:  No current medications     Allergies:  Facility-Administered Medications Ordered in Other Visits   Medication     - Skin Test Reading -     acetaminophen (TYLENOL) solution 650 mg     acetaminophen (TYLENOL) Suppository 650 mg     acetaminophen (TYLENOL) tablet 650 mg     alum & mag hydroxide-simethicone (MYLANTA ES/MAALOX  ES) suspension 15 mL     aspirin (ASA) tablet 325 mg     atorvastatin (LIPITOR) tablet 20 mg     chlorhexidine (PERIDEX) 0.12 % solution 15 mL     dextrose 10 % 1,000 mL infusion     glucose gel 15-30 g    Or     dextrose 50 % injection 25-50 mL    Or     glucagon injection 1 mg     doxazosin (CARDURA) suspension 1 mg     gabapentin (NEURONTIN) solution 300 mg     heparin sodium injection 5,000 Units     insulin aspart (NovoLOG) inj (RAPID ACTING)     insulin aspart (NovoLOG) inj (RAPID ACTING)     insulin aspart (NovoLOG) inj (RAPID ACTING)     insulin aspart (NovoLOG) inj (RAPID ACTING)     insulin glargine (LANTUS PEN) injection 16 Units     insulin isophane human (HumuLIN N PEN) injection 45 Units     lisinopril (PRINIVIL/ZESTRIL) tablet 10 mg     medication instructions     metFORMIN (GLUCOPHAGE)  solution 1,000 mg     metFORMIN (GLUCOPHAGE) solution 1,000 mg     mineral oil-hydrophilic petrolatum (AQUAPHOR)     multivitamins w/minerals (CERTAVITE) liquid 15 mL     naloxone (NARCAN) injection 0.1-0.4 mg     ondansetron (ZOFRAN-ODT) ODT tab 4 mg    Or     ondansetron (ZOFRAN) injection 4 mg     oxyCODONE (ROXICODONE) solution 5-10 mg     polyethylene glycol (MIRALAX/GLYCOLAX) Packet 17 g     protein modular (ProSource No Carb) 1 packet     senna-docusate (SENOKOT-S/PERICOLACE) 8.6-50 MG per tablet 2 tablet     tuberculin injection 5 Units        Social History:  Tobacco: Never  Alcohol: None  Lives in [place], [place]    Family History:    Sister: kidney cancer    Mother: ovarian cancer    Review of Systems   A 10-point review of systems was performed. Pertinent findings are noted in the HPI.    Physical Exam   ECOG Status: 1    Vitals:  Weight: 132.4 kg  B/P: 115/74  Pain: 5/10    Physical Exam   Constitutional: He is oriented to person, place, and time. He appears well-developed and well-nourished.   HENT:   Head: Normocephalic and atraumatic.   Right Ear: External ear normal.   Left Ear: External ear normal.   Nose: No rhinorrhea. No epistaxis.   Feeding tube in place. Moderately dry mucus membranes. Flap is healthy-appearing with no edema or active bleeding. No masses concerning for residual disease.    Eyes: Conjunctivae and EOM are normal.   Neck:   Left neck incision is c/d/i. No palpable cervical adenopathy.   Cardiovascular: Intact distal pulses.   Pulmonary/Chest: Effort normal. No respiratory distress. He has no wheezes.   Neurological: He is alert and oriented to person, place, and time. No cranial nerve deficit.   Skin: Skin is warm.       Imaging/Path/Labs   Imaging: Reviewed    Path: Reviewed    Labs: Reviewed    Assessment    Mr. 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 He is s/p surgical resection and is referred for  adjuvant radiotherapy.    Plan   I had a long discussion with Mr. Tucker and his sister regarding the findings on his surgical pathology and the rationale for the tumor board's recommendation for adjuvant radiotherapy for improved locoregional disease control. I specifically proposed a treatment plan consisting of 60 Gy delivered in 30 daily fractions to the tumor bed and at-risk cervical lymphatics and I reviewed the logistics as well as the anticipated acute and late-term radiation-induced toxicities associated with this regimen. Mr. Tucker asked many pertinent questions which were answered to his stated satisfaction. He is presently 2 weeks out from surgery although is making a speedy post-operative recovery. I will plan to see him back in clinic next week for a CT-simulation for radiotherapy planning purposes with treatment to start approximately 1 week thereafter.     Emerson Verdin MD/PhD    Dept of Radiation Oncology  [facility]

## 2019-05-24 NOTE — PROGRESS NOTES
"[facility], [place]   Internal Medicine Daily Note           Interval History/Events     Hand off taken from Dr. Mendiola  Overnight events reviewed  Reports ongoing issues with some pain on the right fingers  Some pain on the left jaw at the end of day  Reported bruising on the abdomen from shots  Has not tried clear liquid diet  No fever, chills         Review of Systems        4 point ROS including Respiratory, CV, GI and , other than that noted above is negative      Medications   I have reviewed current medications  in the \"current medication\" section of Epic.  Relevant changes include:     Physical Exam   General:       Vital signs:    Blood pressure 103/63, pulse 91, temperature 96  F (35.6  C), temperature source Axillary, resp. rate 18, weight 131.9 kg (290 lb 11.2 oz), SpO2 96 %.  Estimated body mass index is 45.53 kg/m  as calculated from the following:    Height as of 5/20/19: 1.702 m (5' 7\").    Weight as of this encounter: 131.9 kg (290 lb 11.2 oz).      Intake/Output Summary (Last 24 hours) at 5/24/2019 0945  Last data filed at 5/23/2019 2200  Gross per 24 hour   Intake 305 ml   Output --   Net 305 ml      HEENT: No icterus, no pallor  Left cheek and jaw swelling. Suture on the plate is intact.   Incision on the left neck looks c/d/i  Cardiovascular: S1, S2 normal.   Respiratory:  B/L CTA  GI/Abdomen: Soft, NT, BS+  Neurology: Alert, awake, and oriented. No tremors.   Extremities: B/L pre tibia edema.   Skin: Right forearm dressing in place, and right thigh dressing in place.      Laboratory and Imaging Studies     I have reviewed  laboratory and imaging studies in the Epic. Pertinent findings are as below:    BMP  Recent Labs   Lab 05/23/19  0641      POTASSIUM 4.2   CHLORIDE 104   MANOJ 8.6   CO2 28   BUN 25   CR 0.75   *     CBC  Recent Labs   Lab 05/23/19  0641 05/20/19  0634   WBC 6.1  --    RBC 3.45*  --    HGB 10.7*  --    HCT 33.0*  --    MCV 96  --  "   MCH 31.0  --    MCHC 32.4  --    RDW 13.2  --     317     INRNo lab results found in last 7 days.  LFTsNo lab results found in last 7 days.   PANCNo lab results found in last 7 days.        Impression/Plan        53 year old male with hx HTN, DM2, Invasive SCC of left Alveolar Ridge SP Left infrastructure maxillectomy, left radial forearm free flap, left neck exploration, STSG from left thigh to left forearm on 5/9/19, NG tube placement 5/10/19 admitted on 5/16/2019 for rehab 2/2 weakness.     # Invasive SCC of left Alveolar Ridge s/p  Left infrastructure maxillectomy, left radial forearm free flap, left neck exploration, STSG from Right  thigh to Right forearm, NG feeding tube placement  - Evaluated by SLP on 05/23. Advance to clear liquid diet on 05/23. Advance to full luiqid diet on Saturday 05/25/2019. Advance to Dysphagia diet level 2 on 05/27/2019. Okay for pills whole or crushed on 05/25 with puree consistency  -Dressing instruction per ENT  -Per patient, plan for radiation therapy in 3 weeks, will need mouthguard.  Paperwork states 3 mm thickness, otherwise no other information.  Patient will call his personal dentist to determine whether she is comfortable making this mouthguard for him, otherwise will place referral for [facility] dental clinic.  -Follow-up with Dr. Treviño 05/29/2019      # DM  2/2 TFs.  Endocrine following.  BG range over past 24 hours 147-213.  Currently on following regimen:  -Glargine 20 units daily AM  -NPH 45 units at 2000 (at start time of cycled TF, hold if TF do not run)  -Metformin 1000mg daily at noon (full gut in morning at end of cycled TF)  -Insulin Aspart  2 units/30 for BG >140  at 2100, 0100, 0500 (cycled TF on)  -Aspart medium intensity at  0900, 1300, 1700 (cycled TF off)  -Prn D10 at nutritional support rate if cycled TFs are interrupted at unscheduled time.                  # HTN:   Normotensive. On  lisinopril and Doxazosin  - Continue     # Hyperlipidemia: On  Atorvastatin  - Continue      # Severe malnutrition: ON Tube feeding. No nausea overnight.  Evaluated and managed by RD.   - Continue nocturnal tube feeding.     # Generalized weakness: PT/OT        Diet: Adult Formula Drip Feeding: Continuous Isosource 1.5; Nasogastric tube; Goal Rate: 100; mL/hr; From: 8:00 PM; 8:00 AM; Medication - Feeding Tube Flush Frequency: At least 15-30 mL water before and after medication administration and with tube clog.  Clear liquid diet      Fluids: none  Lines: R nostril naso enteral tube   DVT Prophylaxis: Heparin SQ  Col[place]res Catheter: not present  Code Status: Full Code    Pneumonia vaccine: Per immunization record, he has completed Polysaccharide vaccine. Last on 10/15/2013. He will qualify for Conjugate Vaccine    Pt's care was discussed with bedside RN, patient and  during Care Team Rounds.               Dakotah Neal MD  Hospitalist ( Internal medicine)  Pager: [phone]

## 2019-05-24 NOTE — PROGRESS NOTES
HPI    INITIAL PATIENT ASSESSMENT    Diagnosis: Maxillary Sinus Cancer    Prior radiation therapy: None    Prior chemotherapy: None    Prior hormonal therapy:No    Pain Eval:  Current history of pain associated with this visit:   Intensity: 5/10  Current: aching  Location: Right arm and jaw  Treatment: Oxycodone    Psychosocial  Living arrangements: TCU  Fall Risk: independent   referral needs: Not needed    Advanced Directive: No  Implantable Cardiac Device? No      Nurse face-to-face time: Level 5:  over 15 min face to face time  Review of Systems   Constitutional: Positive for malaise/fatigue. Negative for chills, diaphoresis, fever and weight loss.   HENT: Positive for congestion. Negative for ear discharge, ear pain, hearing loss, nosebleeds, sinus pain, sore throat and tinnitus.    Eyes: Positive for blurred vision. Negative for double vision, photophobia, pain, discharge and redness.   Respiratory: Positive for cough and shortness of breath. Negative for hemoptysis, sputum production, wheezing and stridor.    Cardiovascular: Negative for chest pain, palpitations, orthopnea, claudication, leg swelling and PND.   Gastrointestinal: Negative for abdominal pain, blood in stool, constipation, diarrhea, heartburn, melena, nausea and vomiting.   Genitourinary: Negative for dysuria, flank pain, frequency, hematuria and urgency.   Musculoskeletal: Positive for neck pain. Negative for back pain, falls, joint pain and myalgias.   Skin: Negative for itching and rash.   Neurological: Positive for headaches. Negative for dizziness, tingling, tremors, sensory change, speech change, focal weakness, seizures, loss of consciousness and weakness.   Endo/Heme/Allergies: Negative for environmental allergies and polydipsia. Does not bruise/bleed easily.   Psychiatric/Behavioral: Negative for depression, hallucinations, memory loss, substance abuse and suicidal ideas. The patient is nervous/anxious. The patient does  not have insomnia.

## 2019-05-24 NOTE — PROGRESS NOTES
05/23/19 1300   General Information   Type Of Visit Initial   Start Of Care Date 05/24/19   Referring Physician Dr. Sumit Atwood   Orders Evaluate And Treat   Orders Comment Clinical Swallow Eval   Medical Diagnosis Squamous cell carcinoma of the left palate.    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 He is seen today in conjunction with ENT clinic follow up visit for clinical swallowing evaluation. He has been medically NPO with use of NG for enteral support since surgery.    Respiratory Status Room air   Prior Level Of Function Swallowing   Prior Level Of Function Comment NPO since surgery   Patient Role/employment History Employed   Living Environment House/[facility]   Home/community Accessibility Comments (flowsheet Row) Independent prior to surgery. He has been in rehab since his discharge from the hospital.    General Observations Pt pleasant and cooperative. He was somewhat overwhelmed during exam which made him more emotional.    Patient/family Goals To return to eating/drinking.    Clinical Swallow Evaluation   Oral Musculature anomalies present   Structural Abnormalities present  (See H/P)   Dentition other (see comments)  (Teeth missing along flap adequate amount on right side)   Mucosal Quality adequate   Mandibular Strength and Mobility impaired  (Reports some pain with opening)   Oral Labial Strength and Mobility impaired retraction  (left upper lip decrease)   Lingual Strength and Mobility WFL   Buccal Strength and Mobility intact   Laryngeal Function Cough;Swallow;Voicing initiated  "  Clinical Swallow Eval: Thin Liquid Texture Trial   Mode of Presentation, Thin Liquids cup;self-fed   Volume of Liquid or Food Presented 6 oz water and applejuice   Oral Phase of Swallow WFL   Pharyngeal Phase of Swallow impaired;repeated swallows;throat clearing   Diagnostic Statement Intermittent throat clearing noted. Cued pt to take smaller sips which eliminated his throat clearing. He also demonstrated single episode of coughing after sip of liquid, stating \"oh that was too much\".    Clinical Swallow Eval: Puree Solid Texture Trial   Mode of Presentation, Puree spoon;self-fed   Volume of Puree Presented 2 oz sugar free pudding   Oral Phase, Puree WFL   Pharyngeal Phase, Puree intact   Diagnostic Statement No overt clinical s/sx of aspiration/penetration noted. No oral stasis observed.    Swallow Compensations   Swallow Compensations Effortful swallow;Reduce amounts;Pacing   Educational Assessment   Barriers to Learning No barriers   Preferred Learning Style Listening;Reading;Demonstration;Pictures/video   Esophageal Phase of Swallow   Patient reports or presents with symptoms of esophageal dysphagia No   General Therapy Interventions   Planned Therapy Interventions Dysphagia Treatment   Dysphagia treatment Modified diet education;Oropharyngeal exercise training;Compensatory strategies for swallowing;Instruction of safe swallow strategies   Swallow Eval: Clinical Impressions   Skilled Criteria for Therapy Intervention Skilled criteria met.  Treatment indicated.   Functional Assessment Scale (FAS) 5   Dysphagia Outcome Severity Scale (LISA) Level 5 - LISA   Treatment Diagnosis Mild oropharyngeal dysphagia   Diet texture recommendations Clear liquid  (2 days, full liquid 2 days, DD2 2 days)   Recommended Feeding/Eating Techniques alternate between small bites and sips of food/liquid;hard swallow w/ each bite or sip;check mouth frequently for oral residue/pocketing;maintain upright posture during/after eating for " 30 mins;small sips/bites  (Clean oral cavity after po intake.)   Rehab Potential good, to achieve stated therapy goals   Therapy Frequency other (see comments)   Predicted Duration of Therapy Intervention (days/wks) 1x/week x 12 weeks, decreasing once radiation starts to 2x/month for 3 months   Anticipated Discharge Disposition inpatient rehabilitation facility   Risks and Benefits of Treatment have been explained. Yes   Patient, family and/or staff in agreement with Plan of Care Yes   Clinical Impression Comments Emigdio Tucker demonstrates mild oropharyngeal dysphagia as characterized by intermittent throat clearing and single episode of coughing following large sip of thin liquid. He was able to eliminate throat clear and cough with smaller sip size. No difficulties noted on puree consistency. No oral stasis observed on any consistency presented. Decreased upper left lip mobility due in part to pain. Adequate ROM and strength of his tongue demonstrated during session. Emigdio is medically cleared to initiate clear liquid diet today. Recommend advancing to clear liquid diet today. He can then advance to full liquids on Saturday 5/25/19, and to dysphagia diet level 2 with thin liquids on Monday 5/27/19. Sit upright during all po intake. Take small bites/sips and use slow rate. Clean out mouth after po intake to ensure continued oral healing. Follow up with SLP in 1 week for further assessment and removal of NG if he is tolerating po intake and medications orally.    Swallow Goal 1   Goal Identifier DIet   Goal Description 1. Pt will tolerate regular moist solids and thin liquids while adhering to safe swallowing precautions 100% of the time independently.    Target Date 08/21/19   Swallow Goal 2   Goal Identifier Exercise   Goal Description 2. Pt will improve and/or maintain ROM and strength of jaw, pharynx and BOT by completing 10 repetitions of 5/5 exercises 3 times daily with minimal written or verbal cues.     Target Date 08/21/19   Total Session Time   SLP Eval: oral/pharyngeal swallow function, clinical minutes (74039) 30   Total Evaluation Time 30     Thank you for the referral of Devonte Tucker. If you have any questions about this report, please contact me using the information below.      Pooja Ty MS, CCC-SLP  Speech-Language Pathology  [facility] [place]  Departement of Otolaryngology/D&T - 4th floor  Pager: [phone]  Phone: [phone]  Email: [email or web address]

## 2019-05-24 NOTE — PLAN OF CARE
Patient alert and oriented x4, able to make needs known. Continent of bowel and bladder, no concerns. Independent with walking, uses cane/walker.   Pt cleared for clear liquid diet this am, however pt did not want any clear liquids this shift.   Wound care complete to RUE and neck.   PRN oxycodone given @ 1740 for pain, felt relief.    @ 1600 and 168 @ 2100.   Tube feedings currently running at 90mL/hr with flush 130 mL/4hrs. Pt is tolerating rate well at this time.   Pt received shower this evening.

## 2019-05-25 LAB
GLUCOSE BLDC GLUCOMTR-MCNC: 114 MG/DL (ref 70–99)
GLUCOSE BLDC GLUCOMTR-MCNC: 136 MG/DL (ref 70–99)
GLUCOSE BLDC GLUCOMTR-MCNC: 139 MG/DL (ref 70–99)
GLUCOSE BLDC GLUCOMTR-MCNC: 160 MG/DL (ref 70–99)
GLUCOSE BLDC GLUCOMTR-MCNC: 172 MG/DL (ref 70–99)
GLUCOSE BLDC GLUCOMTR-MCNC: 180 MG/DL (ref 70–99)

## 2019-05-25 PROCEDURE — 97110 THERAPEUTIC EXERCISES: CPT | Mod: GP

## 2019-05-25 PROCEDURE — 82962 GLUCOSE BLOOD TEST: CPT

## 2019-05-25 PROCEDURE — 12000022 ZZH R&B SNF

## 2019-05-25 PROCEDURE — 97535 SELF CARE MNGMENT TRAINING: CPT | Mod: GO

## 2019-05-25 PROCEDURE — 25000128 H RX IP 250 OP 636: Performed by: INTERNAL MEDICINE

## 2019-05-25 PROCEDURE — 97116 GAIT TRAINING THERAPY: CPT | Mod: GP

## 2019-05-25 PROCEDURE — 25000132 ZZH RX MED GY IP 250 OP 250 PS 637: Performed by: PHYSICIAN ASSISTANT

## 2019-05-25 PROCEDURE — 27210429 ZZH NUTRITION PRODUCT INTERMEDIATE LITER

## 2019-05-25 PROCEDURE — 25000131 ZZH RX MED GY IP 250 OP 636 PS 637: Performed by: PHYSICIAN ASSISTANT

## 2019-05-25 PROCEDURE — 25000132 ZZH RX MED GY IP 250 OP 250 PS 637: Performed by: HOSPITALIST

## 2019-05-25 RX ORDER — METFORMIN HYDROCHLORIDE 500 MG/5ML
1000 SOLUTION ORAL
Status: DISCONTINUED | OUTPATIENT
Start: 2019-05-25 | End: 2019-05-30

## 2019-05-25 RX ORDER — METFORMIN HYDROCHLORIDE 500 MG/5ML
1000 SOLUTION ORAL
Status: DISCONTINUED | OUTPATIENT
Start: 2019-05-26 | End: 2019-05-30

## 2019-05-25 RX ADMIN — ACETAMINOPHEN 650 MG: 325 SOLUTION ORAL at 05:32

## 2019-05-25 RX ADMIN — CHLORHEXIDINE GLUCONATE 0.12% ORAL RINSE 15 ML: 1.2 LIQUID ORAL at 08:18

## 2019-05-25 RX ADMIN — GABAPENTIN 300 MG: 250 SUSPENSION ORAL at 16:10

## 2019-05-25 RX ADMIN — Medication 1 PACKET: at 13:23

## 2019-05-25 RX ADMIN — ATORVASTATIN CALCIUM 20 MG: 20 TABLET, FILM COATED ORAL at 08:20

## 2019-05-25 RX ADMIN — GABAPENTIN 300 MG: 250 SUSPENSION ORAL at 00:52

## 2019-05-25 RX ADMIN — OXYCODONE HYDROCHLORIDE 10 MG: 5 SOLUTION ORAL at 20:16

## 2019-05-25 RX ADMIN — INSULIN HUMAN 45 UNITS: 100 INJECTION, SUSPENSION SUBCUTANEOUS at 20:18

## 2019-05-25 RX ADMIN — METFORMIN HYDROCHLORIDE 1000 MG: 500 SOLUTION ORAL at 13:22

## 2019-05-25 RX ADMIN — HEPARIN SODIUM 5000 UNITS: 5000 INJECTION, SOLUTION INTRAVENOUS; SUBCUTANEOUS at 08:16

## 2019-05-25 RX ADMIN — ACETAMINOPHEN 650 MG: 325 SOLUTION ORAL at 09:19

## 2019-05-25 RX ADMIN — Medication 1 PACKET: at 08:22

## 2019-05-25 RX ADMIN — Medication 1 PACKET: at 21:19

## 2019-05-25 RX ADMIN — POLYETHYLENE GLYCOL 3350 17 G: 17 POWDER, FOR SOLUTION ORAL at 08:22

## 2019-05-25 RX ADMIN — WHITE PETROLATUM: 1.75 OINTMENT TOPICAL at 17:55

## 2019-05-25 RX ADMIN — CHLORHEXIDINE GLUCONATE 0.12% ORAL RINSE 15 ML: 1.2 LIQUID ORAL at 00:51

## 2019-05-25 RX ADMIN — Medication 1 PACKET: at 17:54

## 2019-05-25 RX ADMIN — WHITE PETROLATUM: 1.75 OINTMENT TOPICAL at 00:52

## 2019-05-25 RX ADMIN — ACETAMINOPHEN 650 MG: 325 SOLUTION ORAL at 00:51

## 2019-05-25 RX ADMIN — MULTIVITAMIN 15 ML: LIQUID ORAL at 08:18

## 2019-05-25 RX ADMIN — ASPIRIN 325 MG ORAL TABLET 325 MG: 325 PILL ORAL at 08:20

## 2019-05-25 RX ADMIN — Medication 1 MG: at 08:18

## 2019-05-25 RX ADMIN — ACETAMINOPHEN 650 MG: 325 SOLUTION ORAL at 20:15

## 2019-05-25 RX ADMIN — WHITE PETROLATUM: 1.75 OINTMENT TOPICAL at 08:22

## 2019-05-25 RX ADMIN — GABAPENTIN 300 MG: 250 SUSPENSION ORAL at 08:18

## 2019-05-25 RX ADMIN — CHLORHEXIDINE GLUCONATE 0.12% ORAL RINSE 15 ML: 1.2 LIQUID ORAL at 16:10

## 2019-05-25 RX ADMIN — INSULIN GLARGINE 20 UNITS: 100 INJECTION, SOLUTION SUBCUTANEOUS at 10:55

## 2019-05-25 RX ADMIN — ACETAMINOPHEN 650 MG: 325 SOLUTION ORAL at 13:22

## 2019-05-25 RX ADMIN — SENNOSIDES AND DOCUSATE SODIUM 2 TABLET: 8.6; 5 TABLET ORAL at 08:20

## 2019-05-25 RX ADMIN — ACETAMINOPHEN 650 MG: 325 SOLUTION ORAL at 16:10

## 2019-05-25 RX ADMIN — HEPARIN SODIUM 5000 UNITS: 5000 INJECTION, SOLUTION INTRAVENOUS; SUBCUTANEOUS at 21:18

## 2019-05-25 RX ADMIN — LISINOPRIL 10 MG: 10 TABLET ORAL at 08:19

## 2019-05-25 RX ADMIN — METFORMIN HYDROCHLORIDE 1000 MG: 500 SOLUTION ORAL at 17:54

## 2019-05-25 NOTE — PROGRESS NOTES
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    BG mostly in the mid 100s with lower rate of cycled TFs and diet advancing.                        Plan:  -glargine 20 units this morning- will drop to 16 units tomorrow with metformin increasing.  -NPH continue 45 units qPM (for TFs at 100ml/h x 12h)- please give at start of cycled TFs, hold if TFs do not run.  -metformin suspension: 1000 mg at lunch today- will add 1000mg with supper and tomorrow we will move dose to 1000mg with bfast, continue 1000mg with supper  -aspart for meals and snacks: 1unit/15g CHO started yesterday.  -aspart for correction: 2unit/30 for BG >140 with cycled TFs at 2100, 0100, 0500, continue medium intensity during date at 0900, 1300, 1700.  -Please run prn D10 at nutritional support rate if cycled TFs are interrupted at unscheduled time.    -At discharge Devonte would like to remain on metformin suspension (rather then pill)- in anticipation of possible swallowing difficulties with radiation starting.     Outpatient diabetes follow up: I will send message to clinic coordinators at [facility] to call pt around Angela 3 to schedule follow up for early July (ph number for clinic also included in AVS.   Plan discussed with patient.             Interval History:   The last 24 hours progress and nursing notes reviewed.  I missed seeing Devonte yesterday b/c he was at ENT appointment when I visited.  He started having nausea with cycled TFs a few days  ago, so rate was reduced to 90ml/h and keeping for 12h (ok to get less overall enteral feeds b/c diet is advancing per RD yesterday).  CLD went well yesterday, today advancing to FLD.  Plan is for FT to be removed Wednesday or Friday this next week.  This is in preparation for radiation, which should start around Angela 10 (he cannot have nasal feeding tube when he gets radiation to mouth/neck).  He was told yesterday that he should expect to develop mouth sores/burns with the radiation, usually these develop around week 3 per pt.  They will monitor his intake closely during this period and if it is not sufficient he will get a PEG tube to restart enteral feeds.    We discussed how his diabetes could be treated with all these upcoming changes and appropriate follow up.  We decided the following as a preliminary plan:  -When FT is removed could try changing regimen to metformin only (Devonte would like to continue on liquid metformin at home in anticipation of sores/swallowing difficulty with mouth radiation), vs metformin plus glipizide (likely start lower dose of glipizide then he was getting at home and increase metformin to full dose- 1000mg BID).  Ideally insulin could be discontinued.  -One he knows his radiation schedule (should know after appt on May 31) he can schedule appointment with endocrinology to align with his radiation schedule and we would aim for beginning of July (~3 weeks into radiation when sores often develop and TFs may need to restart).    Nutrition: cycled TFs: Isosource 1.5 at 90ml/h x 12h 8am-8pm.  Advanced to FLD today, plan for DD2 on Monday.      PTA:  Glipizide XL 20 mg am  Metformin 2 grams every morning    Recent Labs   Lab 05/25/19  0918 05/25/19  0454 05/25/19  0104 05/24/19  2121 05/24/19  1755 05/24/19  1237  05/23/19  0641   GLC  --   --   --   --   --   --   --  188*   * 160* 172* 158* 148* 169*   < >  --     < > = values in this interval not displayed.                Review of Systems:   See interval hx          Medications:     Orders Placed This Encounter      Full Liquid Diet      Dysphagia Diet Level 2 Mech Altered Thin Liquids (water, ice chips, juice, milk gelatin, ice cream, etc)       Physical Exam:  Gen: Alert, up and about room, NAD   HEENT:  mucous membranes are moist, FT in place.  Resp: unlabored  Neuro:oriented x3, communicating clearly  /78 (BP Location: Left arm)   Pulse 90   Temp 96.9  F (36.1  C) (Oral)   Resp 18   Wt 131.9 kg (290 lb 11.2 oz)   SpO2 98%   BMI 45.53 kg/m             Data:     Lab Results   Component Value Date    A1C 7.4 04/24/2019            Recent Labs   Lab Test 05/23/19  0641 05/17/19  0838    141   POTASSIUM 4.2 4.3   CHLORIDE 104 109   CO2 28 26   ANIONGAP 7 6   * 197*   BUN 25 24   CR 0.75 0.60*   MANOJ 8.6 8.6     CBC RESULTS:   Recent Labs   Lab Test 05/23/19  0641   WBC 6.1   RBC 3.45*   HGB 10.7*   HCT 33.0*   MCV 96   MCH 31.0   MCHC 32.4   RDW 13.2        I spent a total of >25 minutes bedside and on the inpatient unit managing the glycemic care of Devonte Tucker. Over 50% of my time on the unit was spent counseling the patient and/or coordinating care regarding planning for diabetes treatment while at TCU with diet advancing and FT likely being removed next week, then planning for outpatient DM treatment and follow up.  See note for details.    Susana León PA-C [phone]  Diabetes Management Team Job Code [id number]

## 2019-05-25 NOTE — PLAN OF CARE
Pt is alert and oriented x4. Denies chest pain and SOB during cares. Started Full liquid diet this AM; ate 100% of meal. Mod I in room. Aquaphor applied to neck incision and right thigh. TF via nasogastric was stopped at 0800. Pt indicated that cycled TF at 90 mL/hr was tolerated well.  and 114. Dressing to right arm donor site was completed this shift with no drainage noted. Meds were not crushed and taken orally per new orders this AM as pt was a little anxious starting the day with full liquid. Verbalized that he would like his meds crushed this afternoon/evening; will informed evening RN. Sisters at bedside. Continue w/ POC.     Temp: 96.9  F (36.1  C) Temp src: Oral BP: 111/78 Pulse: 90   Resp: 18 SpO2: 98 % O2 Device: None (Room air)

## 2019-05-25 NOTE — PLAN OF CARE
Patient A&Ox4. VSS. Returned from Oncology appointment around 1600 accompanied by sister. Ordered low sodium broth and gelatin for dinner and finished 100% of meal. No swallowing issues observed. Patient aware of starting full liquid diet tomorrow and DD2 diet on Monday. NG tube patent. Flushes and medications given without difficulties. Isosource TF started at 90ml/hr at 2030; patient tolerating well with no c/o nausea or discomfort. Dressing to RUE CDI. Aquaphor applied to so incision and R thigh. PRN oxycodone given at HS for RUE pain with moderate relief. Patient independent in room during the day. Continent of bowel/ bladder. LBM this evening. Pleasant and cooperative with cares. Able to make needs known. Continue with POC.

## 2019-05-25 NOTE — PROGRESS NOTES
Department of Radiation Oncology  [address] [address]  [phone]       Consultation Note    Name: Devonte Tucker MRN: [id number]   : 1965   Date of Service: 2019  Referring: Dr. 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 Biopsy of the left upper gingiva performed on 4/3/2019 was consistent with a small focus of invasive squamous cell carcinoma with high-grade dysplasia.     A follow-up 2019 PET/CT revealed a defect in the left maxilla around the left maxillary canine with intense FDG uptake (SUVmax 11.53). There were several suspicious lymph nodes including a 1.7 cm left submandibular node (SUVmax 6.7) and a 1.1 cm right submandibular node with an SUVmax 4.0. There was no radiographic evidence of distant metastases.     Biopsy of the enlarged left submandibular node was benign and Mr. Tucker proceeded to undergo a left infrastructure maxillectomy and left level I lymph node dissection by Dr. Atwood on 2019. Surgical pathology (specimen: [id number]) was positive for a 1.3 cm well-differentiated squamous cell carcinoma with a 13 mm DOI. The tumor was noted to focally invade the underlying bone. The surgical margins were negative and there was no evidence of PNI or LVSI. Two left level I lymph nodes were identified with no evidence of metastatic disease within either node. Mr. Tucker then returned to the OR on 2019 where he underwent a free flap repair by Dr. Atwood.     Mr. Tucker's case was discussed  at the [facility]'s interdisciplinary head and neck tumor board with the consensus recommendation to proceed with adjuvant radiotherapy for improved local disease control given the presence of bone invasion. He is referred to radiation oncology clinic today for a further discussion regarding the use of head and neck radiotherapy in the treatment of his locally advanced oral cavity carcinoma.    Past Medical History:    Obesity    HTN    Diabetes    Past Surgical History:    Left infrastructure maxillectomy    Dental extractions    Chemotherapy History:  None    Radiation History:  None    Pregnant: Not Applicable  Implanted Cardiac Devices: No    Medications:  No current medications     Allergies:  Facility-Administered Medications Ordered in Other Visits   Medication     - Skin Test Reading -     acetaminophen (TYLENOL) solution 650 mg     acetaminophen (TYLENOL) Suppository 650 mg     acetaminophen (TYLENOL) tablet 650 mg     alum & mag hydroxide-simethicone (MYLANTA ES/MAALOX  ES) suspension 15 mL     aspirin (ASA) tablet 325 mg     atorvastatin (LIPITOR) tablet 20 mg     chlorhexidine (PERIDEX) 0.12 % solution 15 mL     dextrose 10 % 1,000 mL infusion     glucose gel 15-30 g    Or     dextrose 50 % injection 25-50 mL    Or     glucagon injection 1 mg     doxazosin (CARDURA) suspension 1 mg     gabapentin (NEURONTIN) solution 300 mg     heparin sodium injection 5,000 Units     insulin aspart (NovoLOG) inj (RAPID ACTING)     insulin aspart (NovoLOG) inj (RAPID ACTING)     insulin aspart (NovoLOG) inj (RAPID ACTING)     insulin aspart (NovoLOG) inj (RAPID ACTING)     insulin glargine (LANTUS PEN) injection 16 Units     insulin isophane human (HumuLIN N PEN) injection 45 Units     lisinopril (PRINIVIL/ZESTRIL) tablet 10 mg     medication instructions     metFORMIN (GLUCOPHAGE) solution 1,000 mg     metFORMIN (GLUCOPHAGE) solution 1,000 mg     mineral oil-hydrophilic petrolatum (AQUAPHOR)      multivitamins w/minerals (CERTAVITE) liquid 15 mL     naloxone (NARCAN) injection 0.1-0.4 mg     ondansetron (ZOFRAN-ODT) ODT tab 4 mg    Or     ondansetron (ZOFRAN) injection 4 mg     oxyCODONE (ROXICODONE) solution 5-10 mg     polyethylene glycol (MIRALAX/GLYCOLAX) Packet 17 g     protein modular (ProSource No Carb) 1 packet     senna-docusate (SENOKOT-S/PERICOLACE) 8.6-50 MG per tablet 2 tablet     tuberculin injection 5 Units        Social History:  Tobacco: Never  Alcohol: None  Lives in [place], [place]    Family History:    Sister: kidney cancer    Mother: ovarian cancer    Review of Systems   A 10-point review of systems was performed. Pertinent findings are noted in the HPI.    Physical Exam   ECOG Status: 1    Vitals:  Weight: 132.4 kg  B/P: 115/74  Pain: 5/10    Physical Exam   Constitutional: He is oriented to person, place, and time. He appears well-developed and well-nourished.   HENT:   Head: Normocephalic and atraumatic.   Right Ear: External ear normal.   Left Ear: External ear normal.   Nose: No rhinorrhea. No epistaxis.   Feeding tube in place. Moderately dry mucus membranes. Flap is healthy-appearing with no edema or active bleeding. No masses concerning for residual disease.    Eyes: Conjunctivae and EOM are normal.   Neck:   Left neck incision is c/d/i. No palpable cervical adenopathy.   Cardiovascular: Intact distal pulses.   Pulmonary/Chest: Effort normal. No respiratory distress. He has no wheezes.   Neurological: He is alert and oriented to person, place, and time. No cranial nerve deficit.   Skin: Skin is warm.       Imaging/Path/Labs   Imaging: Reviewed    Path: Reviewed    Labs: Reviewed    Assessment    Mr. 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 He is s/p surgical resection and is referred for adjuvant radiotherapy.    Plan   I had a long discussion with Mr. Tucker and his sister regarding the findings on his  surgical pathology and the rationale for the tumor board's recommendation for adjuvant radiotherapy for improved locoregional disease control. I specifically proposed a treatment plan consisting of 60 Gy delivered in 30 daily fractions to the tumor bed and at-risk cervical lymphatics and I reviewed the logistics as well as the anticipated acute and late-term radiation-induced toxicities associated with this regimen. Mr. Tucker asked many pertinent questions which were answered to his stated satisfaction. He is presently 2 weeks out from surgery although is making a speedy post-operative recovery. I will plan to see him back in clinic next week for a CT-simulation for radiotherapy planning purposes with treatment to start approximately 1 week thereafter.     Emerson Verdin MD/PhD    Dept of Radiation Oncology  [facility]

## 2019-05-25 NOTE — PLAN OF CARE
OT: Mod I UB/LB bathing simulation and shower transfer with sisters present during session. Pt prefers tub transfer with shower chair. Pt will purchase shower chair online.

## 2019-05-25 NOTE — PLAN OF CARE
Alert and oriented x4. Pain adequately managed with scheduled Tylenol. Tolerating cycled TF as per orders without complaints. SBA with ambulation.  and 160 overnight, covered with Novolog per sliding scale orders. Continent of bladder, no BM overnight. Continue POC.

## 2019-05-26 LAB
GLUCOSE BLDC GLUCOMTR-MCNC: 101 MG/DL (ref 70–99)
GLUCOSE BLDC GLUCOMTR-MCNC: 116 MG/DL (ref 70–99)
GLUCOSE BLDC GLUCOMTR-MCNC: 150 MG/DL (ref 70–99)
GLUCOSE BLDC GLUCOMTR-MCNC: 166 MG/DL (ref 70–99)
GLUCOSE BLDC GLUCOMTR-MCNC: 167 MG/DL (ref 70–99)
GLUCOSE BLDC GLUCOMTR-MCNC: 92 MG/DL (ref 70–99)

## 2019-05-26 PROCEDURE — 25000132 ZZH RX MED GY IP 250 OP 250 PS 637: Performed by: HOSPITALIST

## 2019-05-26 PROCEDURE — 25000131 ZZH RX MED GY IP 250 OP 636 PS 637: Performed by: HOSPITALIST

## 2019-05-26 PROCEDURE — 82962 GLUCOSE BLOOD TEST: CPT

## 2019-05-26 PROCEDURE — 27210429 ZZH NUTRITION PRODUCT INTERMEDIATE LITER

## 2019-05-26 PROCEDURE — 25000131 ZZH RX MED GY IP 250 OP 636 PS 637: Performed by: PHYSICIAN ASSISTANT

## 2019-05-26 PROCEDURE — 12000022 ZZH R&B SNF

## 2019-05-26 PROCEDURE — 25000132 ZZH RX MED GY IP 250 OP 250 PS 637: Performed by: PHYSICIAN ASSISTANT

## 2019-05-26 PROCEDURE — 25000128 H RX IP 250 OP 636: Performed by: INTERNAL MEDICINE

## 2019-05-26 RX ADMIN — ACETAMINOPHEN 650 MG: 325 SOLUTION ORAL at 21:53

## 2019-05-26 RX ADMIN — GABAPENTIN 300 MG: 250 SUSPENSION ORAL at 17:39

## 2019-05-26 RX ADMIN — MULTIVITAMIN 15 ML: LIQUID ORAL at 08:04

## 2019-05-26 RX ADMIN — CHLORHEXIDINE GLUCONATE 0.12% ORAL RINSE 15 ML: 1.2 LIQUID ORAL at 01:11

## 2019-05-26 RX ADMIN — METFORMIN HYDROCHLORIDE 1000 MG: 500 SOLUTION ORAL at 08:05

## 2019-05-26 RX ADMIN — OXYCODONE HYDROCHLORIDE 10 MG: 5 SOLUTION ORAL at 17:39

## 2019-05-26 RX ADMIN — ATORVASTATIN CALCIUM 20 MG: 20 TABLET, FILM COATED ORAL at 08:22

## 2019-05-26 RX ADMIN — OXYCODONE HYDROCHLORIDE 10 MG: 5 SOLUTION ORAL at 08:02

## 2019-05-26 RX ADMIN — OXYCODONE HYDROCHLORIDE 10 MG: 5 SOLUTION ORAL at 13:07

## 2019-05-26 RX ADMIN — ACETAMINOPHEN 650 MG: 325 SOLUTION ORAL at 17:39

## 2019-05-26 RX ADMIN — Medication 1 PACKET: at 08:03

## 2019-05-26 RX ADMIN — WHITE PETROLATUM: 1.75 OINTMENT TOPICAL at 01:12

## 2019-05-26 RX ADMIN — Medication 1 PACKET: at 17:40

## 2019-05-26 RX ADMIN — HEPARIN SODIUM 5000 UNITS: 5000 INJECTION, SOLUTION INTRAVENOUS; SUBCUTANEOUS at 08:30

## 2019-05-26 RX ADMIN — ACETAMINOPHEN 650 MG: 325 SOLUTION ORAL at 01:11

## 2019-05-26 RX ADMIN — ONDANSETRON 4 MG: 4 TABLET, ORALLY DISINTEGRATING ORAL at 21:53

## 2019-05-26 RX ADMIN — GABAPENTIN 300 MG: 250 SUSPENSION ORAL at 01:11

## 2019-05-26 RX ADMIN — CHLORHEXIDINE GLUCONATE 0.12% ORAL RINSE 15 ML: 1.2 LIQUID ORAL at 08:05

## 2019-05-26 RX ADMIN — ACETAMINOPHEN 650 MG: 325 SOLUTION ORAL at 13:07

## 2019-05-26 RX ADMIN — CHLORHEXIDINE GLUCONATE 0.12% ORAL RINSE 15 ML: 1.2 LIQUID ORAL at 17:39

## 2019-05-26 RX ADMIN — METFORMIN HYDROCHLORIDE 1000 MG: 500 SOLUTION ORAL at 17:39

## 2019-05-26 RX ADMIN — Medication 1 PACKET: at 13:07

## 2019-05-26 RX ADMIN — Medication 1 PACKET: at 21:56

## 2019-05-26 RX ADMIN — INSULIN HUMAN 45 UNITS: 100 INJECTION, SUSPENSION SUBCUTANEOUS at 20:12

## 2019-05-26 RX ADMIN — SENNOSIDES AND DOCUSATE SODIUM 2 TABLET: 8.6; 5 TABLET ORAL at 21:54

## 2019-05-26 RX ADMIN — OXYCODONE HYDROCHLORIDE 5 MG: 5 SOLUTION ORAL at 22:05

## 2019-05-26 RX ADMIN — ASPIRIN 325 MG ORAL TABLET 325 MG: 325 PILL ORAL at 08:22

## 2019-05-26 RX ADMIN — GABAPENTIN 300 MG: 250 SUSPENSION ORAL at 08:05

## 2019-05-26 RX ADMIN — WHITE PETROLATUM: 1.75 OINTMENT TOPICAL at 10:47

## 2019-05-26 RX ADMIN — WHITE PETROLATUM: 1.75 OINTMENT TOPICAL at 17:41

## 2019-05-26 RX ADMIN — HEPARIN SODIUM 5000 UNITS: 5000 INJECTION, SOLUTION INTRAVENOUS; SUBCUTANEOUS at 21:54

## 2019-05-26 RX ADMIN — ACETAMINOPHEN 650 MG: 325 SOLUTION ORAL at 08:04

## 2019-05-26 RX ADMIN — ACETAMINOPHEN 650 MG: 325 SOLUTION ORAL at 05:13

## 2019-05-26 NOTE — PLAN OF CARE
"Alert and oriented, VSS. BG readings were 139, 136. Ate a late lunch, so pt did not eat dinner. Noontime insulin not given due to late lunch, so PRN insulin given to cover carbs. PRN Oxycodone given x1 for arm pain, which pt describes as \"screaming.\" Dressing CDI. R leg wound and neck incision MALKA. NG tube with tube feed infusing starting at 2015. Pt is tolerating 90 mL/hr. Pt is independent, ambulated in hallway, room, and to bathroom. LBM tonight, pt reported a loose stool; senna held. FYI pt is now on a full liquid diet and could take pills crushed with applesauce, but no pills given this shift.       "

## 2019-05-26 NOTE — PROGRESS NOTES
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    BG remain low to mid 100s, increased metformin dose starting yesterday.                        Plan:  -glargine decreased from 10 to 16 units daily today.  -NPH continue 45 units qPM (for TFs at 100ml/h x 12h)- please give at start of cycled TFs, hold if TFs do not run.  -metformin suspension: increased to 1000mg BID on 5/25, dosing time changed to with bfast and with supper on 5/26 (pt can get in morning even if no bfast b/c TFs just ending)  -aspart for meals and snacks: 1unit/15g CHO  -aspart for correction: 2unit/30 for BG >140 with cycled TFs at 2100, 0100, 0500, continue medium intensity during date at 0900, 1300, 1700.  -Please run prn D10 at nutritional support rate if cycled TFs are interrupted at unscheduled time.    -At discharge Devonte would like to remain on metformin suspension (rather then pill)- in anticipation of possible swallowing difficulties with radiation starting- need to review test claim results: $100 copay for 30d of suspension. Alternative: crush metformin IR tablets and take with apple sauce.    Outpatient diabetes follow up: Message sent clinic coordinators at [facility] on 5/25 to call pt around Angela 3 to schedule follow up for early July (ph number for clinic also included in AVS for pt).   Plan discussed with patient and bedside RN.             Interval History:   The last 24 hours progress and nursing notes reviewed.  BG stable in the low  to mid 100s, increased metformin dose starting yesterday and Devonte is tolerating so far.  Ran test claim for metformin suspension: $100 copay for 30d supply-- need to review this price with pt, and decide if he wants suspension at discharge vs crushing immediate release tablets.    Devonte is feeling full much of the day- trying to push himself to consume more during the day.  He had questions about foods he can eat on FLD and carb amounts.    Preliminary plan for mid-week and discharge:  -When FT is removed could try changing regimen to metformin only (Devonte would like to continue on liquid metformin at home in anticipation of sores/swallowing difficulty with mouth radiation), vs metformin plus glipizide (likely start lower dose of glipizide then he was getting at home and increase metformin to full dose- 1000mg BID).  Ideally insulin could be discontinued.  -Once he knows his radiation schedule (should know after appt on May 31) he can schedule appointment with endocrinology to align with his radiation schedule and we would aim for beginning of July (~3 weeks into radiation when sores often develop and TFs may need to restart).    Nutrition: cycled TFs: Isosource 1.5 at 90ml/h x 12h 8am-8pm.  Advanced to FLD 5/25, plan for DD2 on Monday, then possible feeding tube removal Wed or Fri.      PTA:  Glipizide XL 20 mg am  Metformin 2 grams every morning    Recent Labs   Lab 05/26/19  0859 05/26/19  0508 05/26/19  0112 05/25/19  2115 05/25/19  1701 05/25/19  1321  05/23/19  0641   GLC  --   --   --   --   --   --   --  188*   * 167* 150* 136* 139* 114*   < >  --     < > = values in this interval not displayed.               Review of Systems:   See interval hx          Medications:     Orders Placed This Encounter      Full Liquid Diet      Dysphagia Diet Level 2 [facility] Altered Thin Liquids (water, ice chips, juice, milk gelatin, ice cream, etc)       Physical Exam:  Gen: Alert, up and about room, NAD   HEENT:   mucous membranes are moist, FT in place.  Resp: unlabored  Neuro:oriented x3, communicating clearly  /61 (BP Location: Left arm)   Pulse 87   Temp 96.9  F (36.1  C) (Axillary)   Resp 16   Wt 131.9 kg (290 lb 11.2 oz)   SpO2 96%   BMI 45.53 kg/m             Data:     Lab Results   Component Value Date    A1C 7.4 04/24/2019            Recent Labs   Lab Test 05/23/19  0641 05/17/19  0838    141   POTASSIUM 4.2 4.3   CHLORIDE 104 109   CO2 28 26   ANIONGAP 7 6   * 197*   BUN 25 24   CR 0.75 0.60*   MANOJ 8.6 8.6     CBC RESULTS:   Recent Labs   Lab Test 05/23/19  0641   WBC 6.1   RBC 3.45*   HGB 10.7*   HCT 33.0*   MCV 96   MCH 31.0   MCHC 32.4   RDW 13.2        I spent a total of >25 minutes bedside and on the inpatient unit managing the glycemic care of Devonte Tucker. Over 50% of my time on the unit was spent counseling the patient and/or coordinating care regarding adjustment of DM regimen as we progress toward feeding tube removal this coming week.  See note for details.    Susana León PA-C [phone]  Diabetes Management Team Job Code [id number]

## 2019-05-26 NOTE — PLAN OF CARE
Patient alert and oriented x 4. Denied pain and discomfort. BG-150 and 167 sliding insulin given as ordered. NGT intact and patent, TF running @ 90 ml/hr infusing via NGT. Patient R neck incision intact, MALKA and aquaphor applied.Patient is pleasant and cooperative with his care. Will continue with current plan of care.

## 2019-05-26 NOTE — PLAN OF CARE
Patient is alert x 4 and he can direct his needs and he uses his call light appropriately. VSS. B/p 104/61 Hypertension medications held per parameters. I also received a parameter for the Cardura this am. Patient medicated x 2 this shift with the Oxycodone for left hand pain and pain in his left face. Medications all down his feeding tube. Patient went outside with his family this am. Continue with plan of cares.

## 2019-05-27 LAB
GLUCOSE BLDC GLUCOMTR-MCNC: 100 MG/DL (ref 70–99)
GLUCOSE BLDC GLUCOMTR-MCNC: 105 MG/DL (ref 70–99)
GLUCOSE BLDC GLUCOMTR-MCNC: 114 MG/DL (ref 70–99)
GLUCOSE BLDC GLUCOMTR-MCNC: 117 MG/DL (ref 70–99)
GLUCOSE BLDC GLUCOMTR-MCNC: 137 MG/DL (ref 70–99)
GLUCOSE BLDC GLUCOMTR-MCNC: 165 MG/DL (ref 70–99)
PLATELET # BLD AUTO: 352 10E9/L (ref 150–450)

## 2019-05-27 PROCEDURE — 12000022 ZZH R&B SNF

## 2019-05-27 PROCEDURE — 85049 AUTOMATED PLATELET COUNT: CPT | Performed by: HOSPITALIST

## 2019-05-27 PROCEDURE — 99207 ZZC CDG-CUT & PASTE-POTENTIAL IMPACT ON LEVEL: CPT | Performed by: INTERNAL MEDICINE

## 2019-05-27 PROCEDURE — 27210429 ZZH NUTRITION PRODUCT INTERMEDIATE LITER

## 2019-05-27 PROCEDURE — 25000132 ZZH RX MED GY IP 250 OP 250 PS 637: Performed by: PHYSICIAN ASSISTANT

## 2019-05-27 PROCEDURE — 82962 GLUCOSE BLOOD TEST: CPT

## 2019-05-27 PROCEDURE — 99308 SBSQ NF CARE LOW MDM 20: CPT | Performed by: INTERNAL MEDICINE

## 2019-05-27 PROCEDURE — 36415 COLL VENOUS BLD VENIPUNCTURE: CPT | Performed by: HOSPITALIST

## 2019-05-27 PROCEDURE — 25000132 ZZH RX MED GY IP 250 OP 250 PS 637: Performed by: HOSPITALIST

## 2019-05-27 PROCEDURE — 25000128 H RX IP 250 OP 636: Performed by: INTERNAL MEDICINE

## 2019-05-27 PROCEDURE — 25000131 ZZH RX MED GY IP 250 OP 636 PS 637: Performed by: HOSPITALIST

## 2019-05-27 RX ADMIN — Medication 1 MG: at 08:32

## 2019-05-27 RX ADMIN — SENNOSIDES AND DOCUSATE SODIUM 2 TABLET: 8.6; 5 TABLET ORAL at 08:35

## 2019-05-27 RX ADMIN — GABAPENTIN 300 MG: 250 SUSPENSION ORAL at 08:33

## 2019-05-27 RX ADMIN — CHLORHEXIDINE GLUCONATE 0.12% ORAL RINSE 15 ML: 1.2 LIQUID ORAL at 00:11

## 2019-05-27 RX ADMIN — INSULIN HUMAN 45 UNITS: 100 INJECTION, SUSPENSION SUBCUTANEOUS at 21:44

## 2019-05-27 RX ADMIN — HEPARIN SODIUM 5000 UNITS: 5000 INJECTION, SOLUTION INTRAVENOUS; SUBCUTANEOUS at 21:46

## 2019-05-27 RX ADMIN — ACETAMINOPHEN 650 MG: 325 SOLUTION ORAL at 05:07

## 2019-05-27 RX ADMIN — HEPARIN SODIUM 5000 UNITS: 5000 INJECTION, SOLUTION INTRAVENOUS; SUBCUTANEOUS at 08:45

## 2019-05-27 RX ADMIN — WHITE PETROLATUM: 1.75 OINTMENT TOPICAL at 18:01

## 2019-05-27 RX ADMIN — Medication 1 PACKET: at 13:15

## 2019-05-27 RX ADMIN — LISINOPRIL 10 MG: 10 TABLET ORAL at 08:35

## 2019-05-27 RX ADMIN — ATORVASTATIN CALCIUM 20 MG: 20 TABLET, FILM COATED ORAL at 08:35

## 2019-05-27 RX ADMIN — WHITE PETROLATUM: 1.75 OINTMENT TOPICAL at 01:27

## 2019-05-27 RX ADMIN — ACETAMINOPHEN 650 MG: 325 TABLET, FILM COATED ORAL at 23:57

## 2019-05-27 RX ADMIN — Medication 1 PACKET: at 08:37

## 2019-05-27 RX ADMIN — GABAPENTIN 300 MG: 250 SUSPENSION ORAL at 00:11

## 2019-05-27 RX ADMIN — ACETAMINOPHEN 650 MG: 325 SOLUTION ORAL at 01:26

## 2019-05-27 RX ADMIN — OXYCODONE HYDROCHLORIDE 10 MG: 5 SOLUTION ORAL at 21:49

## 2019-05-27 RX ADMIN — WHITE PETROLATUM: 1.75 OINTMENT TOPICAL at 08:51

## 2019-05-27 RX ADMIN — OXYCODONE HYDROCHLORIDE 10 MG: 5 SOLUTION ORAL at 17:48

## 2019-05-27 RX ADMIN — Medication 1 PACKET: at 21:53

## 2019-05-27 RX ADMIN — OXYCODONE HYDROCHLORIDE 10 MG: 5 SOLUTION ORAL at 13:15

## 2019-05-27 RX ADMIN — MULTIVITAMIN 15 ML: LIQUID ORAL at 08:34

## 2019-05-27 RX ADMIN — METFORMIN HYDROCHLORIDE 1000 MG: 500 SOLUTION ORAL at 08:33

## 2019-05-27 RX ADMIN — GABAPENTIN 300 MG: 250 SUSPENSION ORAL at 17:50

## 2019-05-27 RX ADMIN — ACETAMINOPHEN 650 MG: 325 SOLUTION ORAL at 19:00

## 2019-05-27 RX ADMIN — ONDANSETRON 4 MG: 4 TABLET, ORALLY DISINTEGRATING ORAL at 13:15

## 2019-05-27 RX ADMIN — Medication 1 PACKET: at 17:51

## 2019-05-27 RX ADMIN — CHLORHEXIDINE GLUCONATE 0.12% ORAL RINSE 15 ML: 1.2 LIQUID ORAL at 08:52

## 2019-05-27 RX ADMIN — ACETAMINOPHEN 650 MG: 325 TABLET, FILM COATED ORAL at 13:16

## 2019-05-27 RX ADMIN — ASPIRIN 325 MG ORAL TABLET 325 MG: 325 PILL ORAL at 08:36

## 2019-05-27 RX ADMIN — ACETAMINOPHEN 650 MG: 325 SOLUTION ORAL at 08:34

## 2019-05-27 RX ADMIN — METFORMIN HYDROCHLORIDE 1000 MG: 500 SOLUTION ORAL at 17:51

## 2019-05-27 RX ADMIN — CHLORHEXIDINE GLUCONATE 0.12% ORAL RINSE 15 ML: 1.2 LIQUID ORAL at 17:49

## 2019-05-27 RX ADMIN — SENNOSIDES AND DOCUSATE SODIUM 2 TABLET: 8.6; 5 TABLET ORAL at 21:49

## 2019-05-27 NOTE — PLAN OF CARE
TF off at 0800 per schedule.   Medicated with oxycodone and Zofran per request.  NG flushed manually with no difficulties.  Two sisters at bed side visiting since morning.   Did not eat breakfast and lunch. On cycled TF. & 100 this shift.  Continue to monitor.

## 2019-05-27 NOTE — PROGRESS NOTES
Inpatient diabetes progress note - Brief update  Date: 05/27/19    The chart was reviewed. BG are stable.   Recommend No change in insulin plan.   Diabetes team will continue to follow.     Liza Guallpa MD  Endocrinology, Diabetes and Metabolism  [facility]

## 2019-05-27 NOTE — PLAN OF CARE
Alert and oriented, VSS. BG readings were 92, 101. Pt had a late lunch but still felt obligated to eat dinner. Tube feed started shortly after 2000 at 90 mL/hr. Tube feed stopped at 2130 as pt was feeling nauseas. L hand PIV is occluded, so PRN oral zofran given crushed via NGT per pt request. Charge RN notified. Will notify next shift RN to re-check BG and reassess pt's tolerance. Wound care done on R arm, R thigh, and neck per orders. Pt requested oxycodone Q4H to stay on top of pain, reports pain is being managed well. Pt reported new swelling and tenderness to L cheek/neck; previous RN left sticky note for provider.

## 2019-05-27 NOTE — PLAN OF CARE
Alert and oriented x4. Pain adequately managed with scheduled Tylenol. Reported no acute concerns. TF restarted at midnight, previously stopped by evening shift due to patient complaints of nausea. Patient has been able to tolerate the TF since the restart, denied nausea.  and 137 overnight, no need for insulin per sliding scale orders. Continent of bladder, LBM 05/26. Right arm ace wrap is c/d/I. Call light in reach. Continue POC.

## 2019-05-28 LAB
GLUCOSE BLDC GLUCOMTR-MCNC: 118 MG/DL (ref 70–99)
GLUCOSE BLDC GLUCOMTR-MCNC: 127 MG/DL (ref 70–99)
GLUCOSE BLDC GLUCOMTR-MCNC: 127 MG/DL (ref 70–99)
GLUCOSE BLDC GLUCOMTR-MCNC: 143 MG/DL (ref 70–99)
GLUCOSE BLDC GLUCOMTR-MCNC: 171 MG/DL (ref 70–99)
GLUCOSE BLDC GLUCOMTR-MCNC: 172 MG/DL (ref 70–99)

## 2019-05-28 PROCEDURE — 97530 THERAPEUTIC ACTIVITIES: CPT | Mod: GP

## 2019-05-28 PROCEDURE — 97110 THERAPEUTIC EXERCISES: CPT | Mod: GO | Performed by: OCCUPATIONAL THERAPIST

## 2019-05-28 PROCEDURE — 25000132 ZZH RX MED GY IP 250 OP 250 PS 637: Performed by: HOSPITALIST

## 2019-05-28 PROCEDURE — 27210429 ZZH NUTRITION PRODUCT INTERMEDIATE LITER

## 2019-05-28 PROCEDURE — 12000022 ZZH R&B SNF

## 2019-05-28 PROCEDURE — 25000132 ZZH RX MED GY IP 250 OP 250 PS 637: Performed by: PHYSICIAN ASSISTANT

## 2019-05-28 PROCEDURE — 25000131 ZZH RX MED GY IP 250 OP 636 PS 637: Performed by: HOSPITALIST

## 2019-05-28 PROCEDURE — 82962 GLUCOSE BLOOD TEST: CPT

## 2019-05-28 PROCEDURE — 25000128 H RX IP 250 OP 636: Performed by: INTERNAL MEDICINE

## 2019-05-28 PROCEDURE — 25000132 ZZH RX MED GY IP 250 OP 250 PS 637: Performed by: INTERNAL MEDICINE

## 2019-05-28 PROCEDURE — 97535 SELF CARE MNGMENT TRAINING: CPT | Mod: GO | Performed by: OCCUPATIONAL THERAPIST

## 2019-05-28 PROCEDURE — 97110 THERAPEUTIC EXERCISES: CPT | Mod: GP

## 2019-05-28 RX ORDER — AMINO ACIDS/PROTEIN HYDROLYS 11G-40/45
1 LIQUID IN PACKET (ML) ORAL 3 TIMES DAILY
Status: DISCONTINUED | OUTPATIENT
Start: 2019-05-28 | End: 2019-05-29

## 2019-05-28 RX ORDER — ACETAMINOPHEN 325 MG/1
650 TABLET ORAL EVERY 4 HOURS
Status: DISCONTINUED | OUTPATIENT
Start: 2019-05-28 | End: 2019-05-30

## 2019-05-28 RX ADMIN — ALUMINUM HYDROXIDE, MAGNESIUM HYDROXIDE, AND DIMETHICONE 15 ML: 400; 400; 40 SUSPENSION ORAL at 21:20

## 2019-05-28 RX ADMIN — SENNOSIDES AND DOCUSATE SODIUM 2 TABLET: 8.6; 5 TABLET ORAL at 09:03

## 2019-05-28 RX ADMIN — GABAPENTIN 300 MG: 250 SUSPENSION ORAL at 09:05

## 2019-05-28 RX ADMIN — OXYCODONE HYDROCHLORIDE 10 MG: 5 SOLUTION ORAL at 01:19

## 2019-05-28 RX ADMIN — OXYCODONE HYDROCHLORIDE 10 MG: 5 SOLUTION ORAL at 05:28

## 2019-05-28 RX ADMIN — ACETAMINOPHEN 650 MG: 325 TABLET, FILM COATED ORAL at 17:06

## 2019-05-28 RX ADMIN — WHITE PETROLATUM: 1.75 OINTMENT TOPICAL at 17:14

## 2019-05-28 RX ADMIN — ACETAMINOPHEN 650 MG: 325 TABLET, FILM COATED ORAL at 13:40

## 2019-05-28 RX ADMIN — METFORMIN HYDROCHLORIDE 1000 MG: 500 SOLUTION ORAL at 17:07

## 2019-05-28 RX ADMIN — CHLORHEXIDINE GLUCONATE 0.12% ORAL RINSE 15 ML: 1.2 LIQUID ORAL at 09:03

## 2019-05-28 RX ADMIN — Medication 1 PACKET: at 20:57

## 2019-05-28 RX ADMIN — HEPARIN SODIUM 5000 UNITS: 5000 INJECTION, SOLUTION INTRAVENOUS; SUBCUTANEOUS at 21:00

## 2019-05-28 RX ADMIN — MULTIVITAMIN 15 ML: LIQUID ORAL at 09:06

## 2019-05-28 RX ADMIN — Medication 1 MG: at 09:06

## 2019-05-28 RX ADMIN — GABAPENTIN 300 MG: 250 SUSPENSION ORAL at 01:19

## 2019-05-28 RX ADMIN — OXYCODONE HYDROCHLORIDE 10 MG: 5 SOLUTION ORAL at 13:53

## 2019-05-28 RX ADMIN — METFORMIN HYDROCHLORIDE 1000 MG: 500 SOLUTION ORAL at 09:06

## 2019-05-28 RX ADMIN — ACETAMINOPHEN 650 MG: 325 TABLET, FILM COATED ORAL at 08:53

## 2019-05-28 RX ADMIN — Medication 1 PACKET: at 09:31

## 2019-05-28 RX ADMIN — OXYCODONE HYDROCHLORIDE 10 MG: 5 SOLUTION ORAL at 18:42

## 2019-05-28 RX ADMIN — CHLORHEXIDINE GLUCONATE 0.12% ORAL RINSE 15 ML: 1.2 LIQUID ORAL at 17:06

## 2019-05-28 RX ADMIN — WHITE PETROLATUM: 1.75 OINTMENT TOPICAL at 01:25

## 2019-05-28 RX ADMIN — OXYCODONE HYDROCHLORIDE 10 MG: 5 SOLUTION ORAL at 09:19

## 2019-05-28 RX ADMIN — ATORVASTATIN CALCIUM 20 MG: 20 TABLET, FILM COATED ORAL at 08:53

## 2019-05-28 RX ADMIN — ASPIRIN 325 MG ORAL TABLET 325 MG: 325 PILL ORAL at 08:53

## 2019-05-28 RX ADMIN — HEPARIN SODIUM 5000 UNITS: 5000 INJECTION, SOLUTION INTRAVENOUS; SUBCUTANEOUS at 09:06

## 2019-05-28 RX ADMIN — ACETAMINOPHEN 650 MG: 325 TABLET, FILM COATED ORAL at 04:05

## 2019-05-28 RX ADMIN — GABAPENTIN 300 MG: 250 SUSPENSION ORAL at 17:07

## 2019-05-28 RX ADMIN — SENNOSIDES AND DOCUSATE SODIUM 2 TABLET: 8.6; 5 TABLET ORAL at 21:02

## 2019-05-28 RX ADMIN — CHLORHEXIDINE GLUCONATE 0.12% ORAL RINSE 15 ML: 1.2 LIQUID ORAL at 01:19

## 2019-05-28 RX ADMIN — Medication 1 PACKET: at 13:41

## 2019-05-28 RX ADMIN — ONDANSETRON 4 MG: 4 TABLET, ORALLY DISINTEGRATING ORAL at 11:49

## 2019-05-28 RX ADMIN — WHITE PETROLATUM: 1.75 OINTMENT TOPICAL at 09:37

## 2019-05-28 RX ADMIN — ACETAMINOPHEN 650 MG: 325 TABLET, FILM COATED ORAL at 21:02

## 2019-05-28 NOTE — PLAN OF CARE
RN: Pt AA&O x3, able to make needs known, sitting up in chair. Pt reported nausea on and off; wanted to delay TF an hour; started at 2130.  and 117. Pt ate 1 Boost for dinner; reluctant to try to eat food DT nausea. Pt encouraged to try meds crushed in applesauce; went well - con't to encourage pt. Pain meds requested when due; given x2. Pt reports PIV in left arm is occluded -  need orders to flush or remove. Right arm dsg changed. Pt knowledgeable of and interested in cares. Con't POC.

## 2019-05-28 NOTE — PROGRESS NOTES
Acupuncture Clinical Internship Intake and Treatment Documentation   [employer] and [place] [facility]    Date:  5/28/2019  Patient Name:  Devonte Tucker   YOB: 1965     Repeat Patient:  no  Has patient had acupoint/acupressure treatment before:  no    Signed consent placed in the medical record:  yes  Patient/Family verbalizes understanding of risks and benefits:  yes  Required information provided to patient:  yes    Diagnosis:  Maxillary sinus cancer   Weakness    Patient condition and treatment:  Pain at left cheek and right forearm medially, nausea  Reason for Intervention Today/Chief Complaint:  Pain relief and nausea relief    Isolation:  No  Type:  None    PRE-SCORE:  moderate    Other Western medical information:  n/a    Medications  No current outpatient medications on file.       Pre-Treatment Assessment  Chief Complaint/ Reason for Intervention Today:  Pain at right forearm medially and left cheek  Chief Complaint Pre-Score:  severe   Describe: local pain at left cheek and right forearm, frequent nausea  Pain Location:  Left cheek, right forearm  Pre Session Pain:  severe  Pre Session Anxiety:  None  Pre Session Nausea:  Moderate    10 Traditional Chinese Medicine Assessment Questions  - Cold/ Heat:  n/a  - Sweat:  n/a  - Headaches/Body aches:  Body aches as described  - Chest/Abdomen:  n/a  - Digestion:  Loose stool due to medication  - Bowel Movement/Urination:  n/a  - Hearing/Vision:  n/a  - Sleep (prior to hospital):  Not great but is worse now  - Energy:  low  - Emotions:  n/a  - Ob Gyn:  n/a  - Miscellaneous:  n/a    Traditional Chinese Medicine Assessment  - TONGUE:  Pale and tender, unrooted and cracked white coating  - PULSE:  n/a  - OBSERVATIONS:  n/a    Traditional Chinese Medicine Diagnosis  - BRANCH:  Local Qi and Blood stagnation  - ROOT:  Qi Deficiency    Traditional Chinese Medicine Treatment  - ACUPUNCTURE: ST34, ST36, ST44, SP4, GB34, [place]  left leg for right arm, ST25, RN9, RN12, DU20, Ear ([place], Cheek, Throat, Arm, ST)   - NEEDLE COUNT: In: 17  Out: 17   Time In: 15min       Post Treatment Assessment  Chief complaint post score:  n/a  Post Session Observation:  n/a  Patient/Family Education:  n/a  Verbal information provided:  no  Written information provided:  no  All questions answered at time of treatment:  yes    Treatment/Procedure(s) performed by:  Jeovanny Mayorga on 5/28/2019 at 4:14 PM    Date: 5/28/2019     *Attestation goes here*DM

## 2019-05-28 NOTE — PLAN OF CARE
"Pt.A&Ox4. Sleeping off and on between cares. Cycled TF infusing - started approx.2130 / so should run until 0930. Pt.reports nausea \"ok\" so far tonight but does need to keep HOB elevated o/w begins to feel nauseated almost immediately. BG checks @ 0100 and 0500 with sliding scale insulin coverage per ordered parameters. Requested and rec'd Oxycodone sol.10mg via NGT @ 0120 for c/o (R) forearm pain. Pt.took scheduled Tylenol tablets po with H2O and without any difficulty. Pt.has area on (L) cheek, extending below mandible that is firm to touch with some swelling. Monitoring area per MD - pt.feels that area is extending in firmness - writer unable to compare as this is first time assessing. Pt.reports site tender to palpitation. Cont.to monitor.  "

## 2019-05-28 NOTE — PROGRESS NOTES
CLINICAL NUTRITION SERVICES - REASSESSMENT NOTE     Nutrition Prescription    RECOMMENDATIONS FOR MDs/PROVIDERS TO ORDER:  ADAT    Malnutrition Status:    Patient does not meet two of the criteria necessary for diagnosing malnutrition    Recommendations already ordered by Registered Dietitian (RD):  1. Calorie counts   2. Decrease TF as diet is advancing and pt is eating more orally:  - IsoSource 1.5 via NG-tube @ 55 mL/hr x 12 hrs (8pm-8am) providing 660 mL, 990 kcal (12 kcal/kg), 45 g protein (0.5 g/kg), 116 g CHO, 10 g fiber, and 502 mL free water per DW of 84 kg. .   - ProSource 1 pkt TID provides an additional 120 kcal and 33 g protein (78 g total, 0.9 g/kg).  This is meeting 66% energy and 78% protein needs  - Water Flushes: 30 mL q before and after cycle (TF + Flushes = 562 mL water; meeting 33% hydration needs as pt is drinking orally).    Future/Additional Recommendations:  1. Pending calorie counts, once pt is meeting 75% nutritional needs (1260 kcal and 75 g protein), consider discontinuing TF.      EVALUATION OF THE PROGRESS TOWARD GOALS   Diet: Dysphagia Diet Level 2 Mech Altered Thin Liquids, Boost GC q lunch  Nutrition Support:   - IsoSource 1.5 via NG-tube @ 90 mL/hr x 12 hrs (8pm-8am) providing 1080 mL, 1620 kcal (19 kcal/kg), 73 g protein (0.9 g/kg), 190 g CHO, 16 g fiber, and 821 mL free water per DW of 84 kg.  - ProSource 1 pkt QID provides an additional 160 kcal and 44 g protein.  - Water Flushes: 130 mL q 4 hrs (TF + Flushes = 1692 mL water; meeting 100% hydration needs).    Intake: no PO intakes noted yesterday. Today, he ate mac and cheese, cottage cheese and fruit for lunch (313 kcal, 20 g protein).  Appears he is tolerating TF better at lower rate, however is still having nausea and requesting TF to be started later.        NEW FINDINGS   - Pt has lost 10 lbs (3%) over the last 1 month.     MALNUTRITION  % Intake: Decreased intake does not meet criteria  % Weight Loss: Weight loss does not  meet criteria  Subcutaneous Fat Loss: None observed  Muscle Loss: None observed  Fluid Accumulation/Edema: None noted  Malnutrition Diagnosis: Patient does not meet two of the above criteria necessary for diagnosing malnutrition    Previous Goals   Total avg nutritional intake to meet a minimum of 20 kcal/kg and 1.2 g PRO/kg daily (per dosing wt 84 kg).  Evaluation: Met    Previous Nutrition Diagnosis  Inadequate enteral nutrition infusion related to nausea as evidenced by pt meeting </=75% EN goal x2 days with 7 day average infusion meeting 90% energy and 68% protein needs.  Evaluation: Resolved    CURRENT NUTRITION DIAGNOSIS  Inadequate oral intake related to swallowing difficulty and poor appetite as evidenced by reliance on EN to meet nutritional needs       INTERVENTIONS  Implementation  Collaboration with other providers - discussed TF change with MD and endo  Enteral Nutrition - Modify volume    Goals  Total avg nutritional intake to meet a minimum of 20 kcal/kg and 1.2 g PRO/kg daily (per dosing wt 84 kg).    Monitoring/Evaluation  Progress toward goals will be monitored and evaluated per protocol.      Teresa Clifton RD, LD  Unit Pager: [phone]

## 2019-05-28 NOTE — PLAN OF CARE
A &O X4, able to communicate needs. Pain in RUE, oxycodone and scheduled tylenol given 2X. Complained of nausea, Zofran given @1149. Attended all therapies. Continent of bowel and bladder. Nail cares completed. UTV RUE wound. Will continue with plan of care.

## 2019-05-28 NOTE — PLAN OF CARE
Occupational Therapy Discharge Summary    Reason for therapy discharge:    All goals and outcomes met, no further needs identified.    Progress towards therapy goal(s). See goals on Care Plan in Epic electronic health record for goal details.  Goals met    Therapy recommendation(s):    Continue home exercise program.Patient has met all goals - is set up with an UEHEP for use of R UE hand movements only . Patient has ordered a shower chair and LHSH for home use. Sisters very supportive and can assist as needed.

## 2019-05-28 NOTE — PROGRESS NOTES
Physical Therapy Discharge Summary    Reason for therapy discharge:    All goals and outcomes met, no further needs identified.    Progress towards therapy goal(s). See goals on Care Plan in Epic electronic health record for goal details.  Goals met    Therapy recommendation(s):    Continue home exercise program. Pt has been equipped with LE HEP, describes strong social support.

## 2019-05-29 ENCOUNTER — THERAPY VISIT (OUTPATIENT)
Dept: SPEECH THERAPY | Facility: CLINIC | Age: 54
End: 2019-05-29
Payer: COMMERCIAL

## 2019-05-29 ENCOUNTER — OFFICE VISIT (OUTPATIENT)
Dept: OTOLARYNGOLOGY | Facility: CLINIC | Age: 54
End: 2019-05-29
Payer: COMMERCIAL

## 2019-05-29 VITALS
TEMPERATURE: 98 F | DIASTOLIC BLOOD PRESSURE: 51 MMHG | WEIGHT: 290 LBS | BODY MASS INDEX: 45.52 KG/M2 | OXYGEN SATURATION: 95 % | HEART RATE: 90 BPM | HEIGHT: 67 IN | SYSTOLIC BLOOD PRESSURE: 99 MMHG

## 2019-05-29 DIAGNOSIS — C05.9 SQUAMOUS CELL CARCINOMA OF PALATE (H): Primary | ICD-10-CM

## 2019-05-29 DIAGNOSIS — C31.0 MAXILLARY SINUS CANCER (H): Primary | ICD-10-CM

## 2019-05-29 DIAGNOSIS — R13.12 OROPHARYNGEAL DYSPHAGIA: ICD-10-CM

## 2019-05-29 LAB
GLUCOSE BLDC GLUCOMTR-MCNC: 100 MG/DL (ref 70–99)
GLUCOSE BLDC GLUCOMTR-MCNC: 115 MG/DL (ref 70–99)
GLUCOSE BLDC GLUCOMTR-MCNC: 119 MG/DL (ref 70–99)
GLUCOSE BLDC GLUCOMTR-MCNC: 126 MG/DL (ref 70–99)
GLUCOSE BLDC GLUCOMTR-MCNC: 129 MG/DL (ref 70–99)
GLUCOSE BLDC GLUCOMTR-MCNC: 140 MG/DL (ref 70–99)

## 2019-05-29 PROCEDURE — 25000132 ZZH RX MED GY IP 250 OP 250 PS 637: Performed by: INTERNAL MEDICINE

## 2019-05-29 PROCEDURE — 12000022 ZZH R&B SNF

## 2019-05-29 PROCEDURE — 25000132 ZZH RX MED GY IP 250 OP 250 PS 637: Performed by: PHYSICIAN ASSISTANT

## 2019-05-29 PROCEDURE — 25000132 ZZH RX MED GY IP 250 OP 250 PS 637: Performed by: HOSPITALIST

## 2019-05-29 PROCEDURE — 82962 GLUCOSE BLOOD TEST: CPT

## 2019-05-29 PROCEDURE — 25000128 H RX IP 250 OP 636: Performed by: INTERNAL MEDICINE

## 2019-05-29 RX ORDER — MULTIPLE VITAMINS W/ MINERALS TAB 9MG-400MCG
1 TAB ORAL DAILY
Status: DISCONTINUED | OUTPATIENT
Start: 2019-05-30 | End: 2019-05-30 | Stop reason: HOSPADM

## 2019-05-29 RX ORDER — GABAPENTIN 300 MG/1
300 CAPSULE ORAL
Status: DISCONTINUED | OUTPATIENT
Start: 2019-05-29 | End: 2019-05-30

## 2019-05-29 RX ORDER — GABAPENTIN 250 MG/5ML
300 SOLUTION ORAL EVERY 8 HOURS
Status: DISCONTINUED | OUTPATIENT
Start: 2019-05-29 | End: 2019-05-29

## 2019-05-29 RX ADMIN — LISINOPRIL 10 MG: 10 TABLET ORAL at 08:55

## 2019-05-29 RX ADMIN — OXYCODONE HYDROCHLORIDE 10 MG: 5 SOLUTION ORAL at 10:56

## 2019-05-29 RX ADMIN — GABAPENTIN 300 MG: 250 SUSPENSION ORAL at 01:07

## 2019-05-29 RX ADMIN — SENNOSIDES AND DOCUSATE SODIUM 2 TABLET: 8.6; 5 TABLET ORAL at 22:16

## 2019-05-29 RX ADMIN — WHITE PETROLATUM: 1.75 OINTMENT TOPICAL at 01:09

## 2019-05-29 RX ADMIN — ATORVASTATIN CALCIUM 20 MG: 20 TABLET, FILM COATED ORAL at 08:55

## 2019-05-29 RX ADMIN — Medication 1 PACKET: at 09:01

## 2019-05-29 RX ADMIN — METFORMIN HYDROCHLORIDE 1000 MG: 500 SOLUTION ORAL at 18:00

## 2019-05-29 RX ADMIN — METFORMIN HYDROCHLORIDE 1000 MG: 500 SOLUTION ORAL at 09:00

## 2019-05-29 RX ADMIN — CHLORHEXIDINE GLUCONATE 0.12% ORAL RINSE 15 ML: 1.2 LIQUID ORAL at 01:08

## 2019-05-29 RX ADMIN — WHITE PETROLATUM: 1.75 OINTMENT TOPICAL at 10:56

## 2019-05-29 RX ADMIN — CHLORHEXIDINE GLUCONATE 0.12% ORAL RINSE 15 ML: 1.2 LIQUID ORAL at 09:00

## 2019-05-29 RX ADMIN — WHITE PETROLATUM: 1.75 OINTMENT TOPICAL at 18:04

## 2019-05-29 RX ADMIN — GABAPENTIN 300 MG: 300 CAPSULE ORAL at 18:00

## 2019-05-29 RX ADMIN — HEPARIN SODIUM 5000 UNITS: 5000 INJECTION, SOLUTION INTRAVENOUS; SUBCUTANEOUS at 22:15

## 2019-05-29 RX ADMIN — Medication 1 MG: at 09:00

## 2019-05-29 RX ADMIN — GABAPENTIN 300 MG: 250 SUSPENSION ORAL at 09:00

## 2019-05-29 RX ADMIN — ACETAMINOPHEN 650 MG: 325 TABLET, FILM COATED ORAL at 05:08

## 2019-05-29 RX ADMIN — ACETAMINOPHEN 650 MG: 325 TABLET, FILM COATED ORAL at 18:00

## 2019-05-29 RX ADMIN — ACETAMINOPHEN 650 MG: 325 TABLET, FILM COATED ORAL at 01:07

## 2019-05-29 RX ADMIN — OXYCODONE HYDROCHLORIDE 10 MG: 5 SOLUTION ORAL at 22:14

## 2019-05-29 RX ADMIN — HEPARIN SODIUM 5000 UNITS: 5000 INJECTION, SOLUTION INTRAVENOUS; SUBCUTANEOUS at 08:59

## 2019-05-29 RX ADMIN — ACETAMINOPHEN 650 MG: 325 TABLET, FILM COATED ORAL at 22:17

## 2019-05-29 RX ADMIN — SENNOSIDES AND DOCUSATE SODIUM 2 TABLET: 8.6; 5 TABLET ORAL at 08:56

## 2019-05-29 RX ADMIN — CHLORHEXIDINE GLUCONATE 0.12% ORAL RINSE 15 ML: 1.2 LIQUID ORAL at 18:03

## 2019-05-29 RX ADMIN — ASPIRIN 325 MG ORAL TABLET 325 MG: 325 PILL ORAL at 08:58

## 2019-05-29 RX ADMIN — ACETAMINOPHEN 650 MG: 325 TABLET, FILM COATED ORAL at 08:55

## 2019-05-29 RX ADMIN — MULTIVITAMIN 15 ML: LIQUID ORAL at 09:00

## 2019-05-29 ASSESSMENT — MIFFLIN-ST. JEOR: SCORE: 2114.06

## 2019-05-29 ASSESSMENT — PAIN SCALES - GENERAL: PAINLEVEL: MODERATE PAIN (4)

## 2019-05-29 NOTE — LETTER
5/29/2019       RE: Devonte Tucker  [address] [place] [address]     Dear Colleague,    Thank you for referring your patient, Devonte Tucker, to the [facility] EAR NOSE AND THROAT at [facility]. Please see a copy of my visit note below.    Otolaryngology Clinic Note  May 29, 2019      HPI: Devonte Tucker is a 53 year old male with T4N0 left maxilla SCC, s/p left infrastructure maxillectomy, right radial forearm free flap reconstruction on 5/9/19. He was discharged to TCU on 5/16/19. He reports well controlled pain at the surgical sites. He reports improved but persistent edema of the left face. He's tolerating a regular diet with adequate intake, however he reports decreased appetite due to some nausea when getting night cycling tube feed. He was first anxious to look at his surgical sites, but now he feels comfortable taking care of his wound. He denies fever, chill, dysphagia, shortness of breath.    ROS: 12 point review of systems is negative unless noted in HPI.    PHYSICAL EXAM:  Constitutional: Sitting comfortably, no acute distress  Craniofacial: Normocephalic, atraumatic, minimal edema of the left face  Neurologic: Alert. Mild weakness of left marginal mandibular branch of facial nerve, otherwise Cranial nerves 2-12 intact  Eyes: PERRL, EOMI, no nystagmus  Ears: Auricles normal.  Nose: NG feeding tube in place via right nose, removed today in clinic  Oral: Lips normal. Oral mucosa normal. Free flap at upper oral cavity, well healed, suture line intact, soft on palpation.  Neck: Supple. Well healed left neck incision  Pulmonary: Non-labored breathing in room air. No stridor. Voice strong  Extremities: Right forearm skin graft site well healed, 100% take. Right thigh skin graft donor site well healed.      ASSESSMENT AND PLAN  Devonte Tucker is a 53 year old male with T4N0 left maxilla SCC, s/p left infrastructure maxillectomy, right radial  forearm free flap reconstruction on 5/9/19. He's healing very well from surgery. We removed his NG feeding tube today since he's taking adequate PO. He will be starting radiation therapy in 1-2 weeks per tumor board recommendation. We stressed the important role of swallowing exercise, especially during radiation therapy. Dr. Atwood's nurse Brittny will be checking on his skin graft next week. Otherwise we will see him back approximately 1 month after completion of radiation therapy.    Silas Tran  Otolaryngology Resident - [id number]  [phone]    ADDENDUM:  I have separately spoken with and examined Mr. Tucker.  His mental outlook has really improved.  We will correspond with the TCU through [facility] to see when he can be discharged.  I have edited Dr. Tran's note to reflect my perspective on the findings, assessment, and plan.    Jett Treviño MD  Otolaryngology/Head & Neck Surgery  [phone]    CC  Sumit Atwood MD  Otolaryngology/Head & Neck Surgery  [facility] 396     Rich Medellin MD  [facility] [address]  [id number]

## 2019-05-29 NOTE — PROGRESS NOTES
CLINICAL NUTRITION SERVICES - BRIEF NOTE    Nutrition Prescription    RECOMMENDATIONS FOR MDs/PROVIDERS TO ORDER:  Recommend liberalizing diet to promote increased PO intake. Per pt ENT recommending regular diet.     Recommendations already ordered by Registered Dietitian (RD):  - Discontinued TF formula orders, free water flushes and Certavite via FT  - Ordered PO MVI with minerals   - Continue Boost GC with meals  - Continue calorie counts    Future/Additional Recommendations:  Monitor calorie count data to assess PO adequacy. Pt now reliant on PO intake to meet 100% of nutritional needs. If pt unable to consistently PO > 75% nutritional needs (1260 kcal and 75 g protein), consider replacing FT.      Nutrition Progress Note - f/u for progress towards previous nutrition POC (see previous 5/29 reassessment for details)    NEW FINDINGS  Pt FT removed today. Per discussion with pt, ENT recommending regular diet. Pt with some reduced appetite and early satiety at meal times.     INTERVENTIONS  1. EN - Discontinue (see above). Pt no longer with enteral access.  2. Nutrition Education - Discussed using high calorie, high protein foods to meet nutritional needs. Provided examples of high calorie/high protein foods. Discussed carb counting and importance of maintaining consistent, moderated carb intake. Encouraged pt to not skip meals. Provided handouts: Tips to Increase Calories in Your Diet, Tips to Increase Protein in Your Diet and Carb Counting Booklet.      Nora Collins RD, LD  Pgr: [phone]

## 2019-05-29 NOTE — PATIENT INSTRUCTIONS
1. You were seen in the ENT Clinic today by Dr. Treviño.  If you have any questions or concerns after your appointment, please call   - Option 1: ENT Clinic: [phone]  - Option 2: Tressa (Dr. Treviño's Nurse): [phone]    2. Plan to return to clinic after radiation is completed.     3. Lymphedema consult has been placed      Natice Schwab, RN  [facility] Otolaryngology  [phone]

## 2019-05-29 NOTE — PROGRESS NOTES
CLINICAL NUTRITION SERVICES - CALORIE COUNTS    PO intake for 5/28 = 759 calories and 37 gm protein. This mets 45% of low-end calorie needs and 37% of low-end protein needs.    Calorie Count Data:  - Lunch: 313 calories and 20 gm protein   50% soft fruit plate, 100% cottage cheese and side of mac and cheese  - Dinner: 446 calories and 17 gm protein   100% of egg noodles with meat sauce and squash    Nora Collins, LASHA, LD  Pgr: [phone]

## 2019-05-29 NOTE — PLAN OF CARE
RN: R forearm  and L mandibular hard swelling and discomfort comfortably managed with scheduled Tylenol.  Hard swelling seen by hospitalist. Will be seeing ENT today ay 1145 with  at.t 1115. and will defer further assessment and inter=vention vwith them/.gsTolerating TF at 55 ml/hr via NDT - will run until 0800. Denies any nausea. BG q 4H- no sliding scale insulin needed this shift. Up ad john. Aquaphor applied to neck incision, part of FA incision and R lateral tht STSG site,

## 2019-05-29 NOTE — NURSING NOTE
"Chief Complaint   Patient presents with     RECHECK     follow up      Blood pressure 99/51, pulse 90, temperature 98  F (36.7  C), height 1.702 m (5' 7\"), weight 131.5 kg (290 lb), SpO2 95 %.    Andres Purcell LPN    "

## 2019-05-29 NOTE — PLAN OF CARE
A&O x4, able to communicate needs. Oxycodone given @ 1056 for pain. Insulin not given in AM -  and pt did not eat. NG tube removed at ENT appointment. Continent of bowel and bladder, LBM 5/29/19. Up ad john. Wound cares done to neck and thigh, UTV forearm wound. Will continue with plan of care.

## 2019-05-29 NOTE — PROGRESS NOTES
SPIRITUAL HEALTH SERVICES  SPIRITUAL ASSESSMENT Progress Note  [facility] ([facility] 4TCU     Pt was getting ready to leave for an appointment. We prayed and I gave him Communion. He is hopeful that his NG tub will be removed today.    PLAN: Spiritual [facility] Services remains available for patient's ongoing support.    Juwan Dykes M.Div.     Pager [phone]

## 2019-05-29 NOTE — PROGRESS NOTES
TCU Care Coordinator Progress Note    Admission date: 05/16/2019    Data: Devonte Tucker is a 54 yo male on TCU for rehab, wound care, and tube feeding following hospitalization for maxilary sinus cancer resection and free flap/skin graft.    Intervention: Met with patient to introduce the role of Care Coordinator and to begin discussion of anticipated discharge planning needs. Emailed [facility] at [facility] and faxed orders to [facility] for wound care supplies need for the R forearm. Requested potential insulin teaching with Diabetes Education RN Mei Katz tomorrow if patient to go home on insulin.    Assessment: Patient states he practiced wound care to the R forearm at clinic today with ENT nurse and he feels he can manage the wound care on his own. He has no home care needs and will not be homebound. Supplies ordered through [facility] as above. R thigh and neck surgical sites are no longer dressed, simply using Aquaphor ointment. He is not a new diabetic but will need insulin teaching if he is to go home on insulin. Mei Katz will check on patient status tomorrow and come teach if needed. The hope is he will not need insulin now that tube feeding is stopped and tube has been pulled. He does have a glucometer at home. He states his siblings have a plan to take turns staying with him when he initially gets home, since he lives alone and may need some assistance. One of his siblings will provide transportation home.    Plan: Likely discharge to home tomorrow 5/30/2019 if he remains medically stable.    Rich Farrell RN, BSN, Patient Care Management Coordinator  [facility]  [address] [place] [address]  [email or web address]  [email or web address]   Desk: [phone] TCU Main [phone] Fax [phone] Pager [phone]

## 2019-05-29 NOTE — PLAN OF CARE
Alert and oriented x 4. Good appetite and fluid intake. Pt is on DD2 diet. B/G was 127 dinner time and 118 at bedtime. Gave insulin per carb count. Ambulated on hallway x 1 and went outside with his family. Changed R forearm dressing no drainage noted. Applied Aquaphor on R forearm neck and R thigh. R thigh skin donor site CDI and MALKA. TF is running at 55ml/hr pt tolerated well. C/o pain gave oxycodone x 1. Unable to flush PIV and removed it around 2100.

## 2019-05-29 NOTE — PROGRESS NOTES
Diabetes Consult Daily  Progress Note          Assessment/Plan:    Devonte Tucker is a 53 year old male with type 2 diabetes, obesity, hypertension, and oral preneoplastic disease progressing to invasive SCC of the left alveolar ridge with invasion of the maxilla, s/p left infrastructure maxillectomy, left radial forearm free flap, left neck exploration, STSG from left thigh to left forearm, NG feeding tube placement, experiencing hyperglycemia related to enteral feeds and withholding of home antihyperglycemics        Type 2 diabetes:     Plan:  -Hold lantus 16 units ( will re-evaluate in the morning)   - discontinue NPH continue 28 units qPM (for TFs at 100ml/h x 12h)  -metformin suspension:  1000 mg bid  -aspart for meals and snacks: 1unit/15g CHO, discontinued  -aspart medium intensity sliding scale before meals and HS  - test glucose before meals, HS and 0200.   - will continue to follow      Tenative Plan for Discharge:  -metformin 1000 mg bid ( please order regular strength tablets                               Outpatient diabetes follow up: TBD  Plan discussed with patient and primary team.           Interval History:   The last 24 hours progress and nursing notes reviewed.  Holding Lantus today and will re-evaluate on Thursday.  Went to appointment, feeding tube has been removed, will discontinue NPH  Is now on a regular diet        Preliminary plan for mid-week and discharge:  -When FT is removed could try changing regimen to metformin  vs metformin plus glipizide (likely start lower dose of glipizide then he was getting at home and increase metformin to full dose- 1000mg BID).    Hopefully will not require basal insulin    Discussed and will go home on metformin and crush the tablets die to the cost of solution.     -Once he knows his radiation schedule (should know after appt on May 31) he can schedule appointment with endocrinology to align with his radiation schedule and we would  aim for beginning of July (~3 weeks into radiation when sores often develop and TFs may need to restart).       PTA:  Glipizide XL 20 mg am  Metformin 2 grams every morning    Recent Labs   Lab 05/29/19  0853 05/29/19  0503 05/29/19  0059 05/28/19  2104 05/28/19  1722 05/28/19  1235  05/23/19  0641   GLC  --   --   --   --   --   --   --  188*   * 100* 115* 118* 127* 127*   < >  --     < > = values in this interval not displayed.               Review of Systems:   See interval hx          Medications:     Orders Placed This Encounter      Regular Diet Adult       Physical Exam:  Gen: Alert, sitting in chair,NAD   HEENT: mucous membranes are moist  Resp: non-labored  Ext: moving all extremteis   Neuro:oriented x3, communicating clearly  /86 (BP Location: Left arm)   Pulse 83   Temp 96.4  F (35.8  C) (Axillary)   Resp 16   Wt 131.9 kg (290 lb 11.2 oz)   SpO2 97%   BMI 45.53 kg/m             Data:     Lab Results   Component Value Date    A1C 7.4 04/24/2019              CBC RESULTS:   Recent Labs   Lab Test 05/27/19  0713 05/23/19  0641   WBC  --  6.1   RBC  --  3.45*   HGB  --  10.7*   HCT  --  33.0*   MCV  --  96   MCH  --  31.0   MCHC  --  32.4   RDW  --  13.2    363     Recent Labs   Lab Test 05/23/19  0641 05/17/19  0838    141   POTASSIUM 4.2 4.3   CHLORIDE 104 109   CO2 28 26   ANIONGAP 7 6   * 197*   BUN 25 24   CR 0.75 0.60*   MANOJ 8.6 8.6     Liver Function Studies -   Recent Labs   Lab Test 05/10/19  0346   ALBUMIN 3.2*     Lab Results   Component Value Date    INR 1.10 04/04/2019             Lisa Farias -[phone]  Diabetes Management job code [id number]

## 2019-05-29 NOTE — DISCHARGE INSTRUCTIONS
[facility] will supply your wound care needs, and have received your wound care orders. If you don't hear from them please call them at [phone].    Daily dressing changes to right forearm: Remove old dressing. Clean incisions with saline. Apply Aquaphor ointment to linear incision and incision edges around skin graft site. Cover skin graft with xeroform gauze. Cover linear incision with Telfa non-stick dressing. Wrap forearm with Kerlix gauze roll and cover with ACE wrap.    DIABETES    If you have questions regarding your diabetes discharge treatment plan within the first week following discharge, call the inpatient diabetes management team.  Susana León PA-C (available Friday through Monday) and Brenda BRUMFIELD (available Monday through Thursday) both at [phone], Jasmin Farias NP (available Monday through Friday) and Leanna Taylor PA-C (available Tuesday through Friday) both at [phone], or the on-call endocrinologist can be paged by the hospital  [phone].    Diabetes follow-up: Call [facility] coordinator: [phone], to schedule your outpatient diabetes appointment if you have not heard from our clinic by June 4.  Recommend you schedule appointment for early July once you know if tube feedings are restarting.

## 2019-05-29 NOTE — PROGRESS NOTES
Otolaryngology Clinic Note  May 29, 2019      HPI: Devonte Tucker is a 53 year old male with T4N0 left maxilla SCC, s/p left infrastructure maxillectomy, right radial forearm free flap reconstruction on 5/9/19. He was discharged to TCU on 5/16/19. He reports well controlled pain at the surgical sites. He reports improved but persistent edema of the left face. He's tolerating a regular diet with adequate intake, however he reports decreased appetite due to some nausea when getting night cycling tube feed. He was first anxious to look at his surgical sites, but now he feels comfortable taking care of his wound. He denies fever, chill, dysphagia, shortness of breath.    ROS: 12 point review of systems is negative unless noted in HPI.    PHYSICAL EXAM:  Constitutional: Sitting comfortably, no acute distress  Craniofacial: Normocephalic, atraumatic, minimal edema of the left face  Neurologic: Alert. Mild weakness of left marginal mandibular branch of facial nerve, otherwise Cranial nerves 2-12 intact  Eyes: PERRL, EOMI, no nystagmus  Ears: Auricles normal.  Nose: NG feeding tube in place via right nose, removed today in clinic  Oral: Lips normal. Oral mucosa normal. Free flap at upper oral cavity, well healed, suture line intact, soft on palpation.  Neck: Supple. Well healed left neck incision  Pulmonary: Non-labored breathing in room air. No stridor. Voice strong  Extremities: Right forearm skin graft site well healed, 100% take. Right thigh skin graft donor site well healed.      ASSESSMENT AND PLAN  Devonte Tucker is a 53 year old male with T4N0 left maxilla SCC, s/p left infrastructure maxillectomy, right radial forearm free flap reconstruction on 5/9/19. He's healing very well from surgery. We removed his NG feeding tube today since he's taking adequate PO. He will be starting radiation therapy in 1-2 weeks per tumor board recommendation. We stressed the important role of swallowing exercise, especially during  radiation therapy. Dr. Atwood's nurse Brittny will be checking on his skin graft next week. Otherwise we will see him back approximately 1 month after completion of radiation therapy.    Silas Tran  Otolaryngology Resident - PGY5  [phone]    ADDENDUM:  I have separately spoken with and examined Mr. Tucker.  His mental outlook has really improved.  We will correspond with the TCU through [facility] to see when he can be discharged.  I have edited Dr. Tran's note to reflect my perspective on the findings, assessment, and plan.    Jett Treviño MD  Otolaryngology/Head & Neck Surgery  [phone]            CC  Sumit Atwood MD  Otolaryngology/Head & Neck Surgery  [facility] [address]     Rich Medellin MD  ENT Specialty[address]  [address]

## 2019-05-30 VITALS
SYSTOLIC BLOOD PRESSURE: 114 MMHG | TEMPERATURE: 97.6 F | DIASTOLIC BLOOD PRESSURE: 66 MMHG | OXYGEN SATURATION: 96 % | BODY MASS INDEX: 45.33 KG/M2 | HEART RATE: 89 BPM | WEIGHT: 289.4 LBS | RESPIRATION RATE: 16 BRPM

## 2019-05-30 LAB
ANION GAP SERPL CALCULATED.3IONS-SCNC: 8 MMOL/L (ref 3–14)
BUN SERPL-MCNC: 23 MG/DL (ref 7–30)
CALCIUM SERPL-MCNC: 8.7 MG/DL (ref 8.5–10.1)
CHLORIDE SERPL-SCNC: 104 MMOL/L (ref 94–109)
CO2 SERPL-SCNC: 24 MMOL/L (ref 20–32)
CREAT SERPL-MCNC: 0.71 MG/DL (ref 0.66–1.25)
ERYTHROCYTE [DISTWIDTH] IN BLOOD BY AUTOMATED COUNT: 12.9 % (ref 10–15)
GFR SERPL CREATININE-BSD FRML MDRD: >90 ML/MIN/{1.73_M2}
GLUCOSE BLDC GLUCOMTR-MCNC: 101 MG/DL (ref 70–99)
GLUCOSE BLDC GLUCOMTR-MCNC: 136 MG/DL (ref 70–99)
GLUCOSE BLDC GLUCOMTR-MCNC: 142 MG/DL (ref 70–99)
GLUCOSE SERPL-MCNC: 107 MG/DL (ref 70–99)
HCT VFR BLD AUTO: 34.5 % (ref 40–53)
HGB BLD-MCNC: 11 G/DL (ref 13.3–17.7)
MCH RBC QN AUTO: 30.3 PG (ref 26.5–33)
MCHC RBC AUTO-ENTMCNC: 31.9 G/DL (ref 31.5–36.5)
MCV RBC AUTO: 95 FL (ref 78–100)
PLATELET # BLD AUTO: 337 10E9/L (ref 150–450)
POTASSIUM SERPL-SCNC: 4.5 MMOL/L (ref 3.4–5.3)
RBC # BLD AUTO: 3.63 10E12/L (ref 4.4–5.9)
SODIUM SERPL-SCNC: 136 MMOL/L (ref 133–144)
WBC # BLD AUTO: 4.6 10E9/L (ref 4–11)

## 2019-05-30 PROCEDURE — 25000132 ZZH RX MED GY IP 250 OP 250 PS 637: Performed by: HOSPITALIST

## 2019-05-30 PROCEDURE — 25000125 ZZHC RX 250

## 2019-05-30 PROCEDURE — 36415 COLL VENOUS BLD VENIPUNCTURE: CPT | Performed by: STUDENT IN AN ORGANIZED HEALTH CARE EDUCATION/TRAINING PROGRAM

## 2019-05-30 PROCEDURE — 99316 NF DSCHRG MGMT 30 MIN+: CPT | Performed by: INTERNAL MEDICINE

## 2019-05-30 PROCEDURE — 80048 BASIC METABOLIC PNL TOTAL CA: CPT | Performed by: STUDENT IN AN ORGANIZED HEALTH CARE EDUCATION/TRAINING PROGRAM

## 2019-05-30 PROCEDURE — 85027 COMPLETE CBC AUTOMATED: CPT | Performed by: STUDENT IN AN ORGANIZED HEALTH CARE EDUCATION/TRAINING PROGRAM

## 2019-05-30 PROCEDURE — 25000132 ZZH RX MED GY IP 250 OP 250 PS 637: Performed by: INTERNAL MEDICINE

## 2019-05-30 PROCEDURE — 90670 PCV13 VACCINE IM: CPT | Performed by: INTERNAL MEDICINE

## 2019-05-30 PROCEDURE — 25000128 H RX IP 250 OP 636: Performed by: INTERNAL MEDICINE

## 2019-05-30 RX ORDER — MULTIPLE VITAMINS W/ MINERALS TAB 9MG-400MCG
1 TAB ORAL DAILY
Qty: 30 TABLET | Refills: 0 | Status: SHIPPED | OUTPATIENT
Start: 2019-05-31

## 2019-05-30 RX ORDER — ACETAMINOPHEN 325 MG/1
650 TABLET ORAL EVERY 4 HOURS
Status: DISCONTINUED | OUTPATIENT
Start: 2019-05-30 | End: 2019-05-30 | Stop reason: HOSPADM

## 2019-05-30 RX ORDER — POLYETHYLENE GLYCOL 3350 17 G/17G
17 POWDER, FOR SOLUTION ORAL DAILY PRN
Qty: 100 PACKET | Refills: 0 | Status: SHIPPED | OUTPATIENT
Start: 2019-05-30 | End: 2020-12-09

## 2019-05-30 RX ORDER — MAGNESIUM HYDROXIDE 1200 MG/15ML
LIQUID ORAL
Status: COMPLETED
Start: 2019-05-30 | End: 2019-05-30

## 2019-05-30 RX ORDER — AMOXICILLIN 250 MG
2 CAPSULE ORAL 2 TIMES DAILY PRN
Qty: 60 TABLET | Refills: 0 | Status: SHIPPED | OUTPATIENT
Start: 2019-05-30 | End: 2019-10-30

## 2019-05-30 RX ORDER — ALUMINA, MAGNESIA, AND SIMETHICONE 2400; 2400; 240 MG/30ML; MG/30ML; MG/30ML
15 SUSPENSION ORAL EVERY 4 HOURS PRN
Status: DISCONTINUED | OUTPATIENT
Start: 2019-05-30 | End: 2019-05-30 | Stop reason: HOSPADM

## 2019-05-30 RX ORDER — MINERAL OIL/HYDROPHIL PETROLAT
OINTMENT (GRAM) TOPICAL EVERY 8 HOURS
Qty: 420 G | Refills: 0 | Status: SHIPPED | OUTPATIENT
Start: 2019-05-30 | End: 2020-03-12

## 2019-05-30 RX ORDER — ACETAMINOPHEN 325 MG/1
650 TABLET ORAL EVERY 4 HOURS PRN
Qty: 100 TABLET | Refills: 0 | Status: SHIPPED | OUTPATIENT
Start: 2019-05-30

## 2019-05-30 RX ORDER — ATORVASTATIN CALCIUM 20 MG/1
20 TABLET, FILM COATED ORAL EVERY MORNING
Status: DISCONTINUED | OUTPATIENT
Start: 2019-05-30 | End: 2019-05-30 | Stop reason: HOSPADM

## 2019-05-30 RX ORDER — POLYETHYLENE GLYCOL 3350 17 G/17G
17 POWDER, FOR SOLUTION ORAL DAILY
Status: DISCONTINUED | OUTPATIENT
Start: 2019-05-30 | End: 2019-05-30 | Stop reason: HOSPADM

## 2019-05-30 RX ORDER — OXYCODONE HYDROCHLORIDE 5 MG/1
5-10 TABLET ORAL EVERY 4 HOURS PRN
Qty: 40 TABLET | Refills: 0 | Status: SHIPPED | OUTPATIENT
Start: 2019-05-30 | End: 2019-06-28

## 2019-05-30 RX ORDER — ASPIRIN 325 MG
325 TABLET ORAL DAILY
Qty: 30 TABLET | Refills: 0 | Status: SHIPPED | OUTPATIENT
Start: 2019-05-31 | End: 2022-10-06

## 2019-05-30 RX ORDER — ACETAMINOPHEN 325 MG/1
650 TABLET ORAL EVERY 4 HOURS PRN
Status: DISCONTINUED | OUTPATIENT
Start: 2019-05-30 | End: 2019-05-30 | Stop reason: HOSPADM

## 2019-05-30 RX ORDER — OXYCODONE HYDROCHLORIDE 5 MG/1
5-10 TABLET ORAL EVERY 4 HOURS PRN
Status: DISCONTINUED | OUTPATIENT
Start: 2019-05-30 | End: 2019-05-30 | Stop reason: HOSPADM

## 2019-05-30 RX ORDER — LISINOPRIL 10 MG/1
10 TABLET ORAL EVERY MORNING
Status: DISCONTINUED | OUTPATIENT
Start: 2019-05-30 | End: 2019-05-30 | Stop reason: HOSPADM

## 2019-05-30 RX ORDER — DOXAZOSIN 1 MG/1
1 TABLET ORAL DAILY
Status: DISCONTINUED | OUTPATIENT
Start: 2019-05-30 | End: 2019-05-30 | Stop reason: HOSPADM

## 2019-05-30 RX ORDER — GABAPENTIN 300 MG/1
300 CAPSULE ORAL 3 TIMES DAILY
Qty: 90 CAPSULE | Refills: 0 | Status: SHIPPED | OUTPATIENT
Start: 2019-05-30 | End: 2019-10-29

## 2019-05-30 RX ORDER — AMOXICILLIN 250 MG
2 CAPSULE ORAL 2 TIMES DAILY
Status: DISCONTINUED | OUTPATIENT
Start: 2019-05-30 | End: 2019-05-30 | Stop reason: HOSPADM

## 2019-05-30 RX ORDER — DOXAZOSIN 1 MG/1
1 TABLET ORAL DAILY
Qty: 30 TABLET | Refills: 0 | Status: SHIPPED | OUTPATIENT
Start: 2019-05-31

## 2019-05-30 RX ORDER — CHLORHEXIDINE GLUCONATE ORAL RINSE 1.2 MG/ML
15 SOLUTION DENTAL EVERY 8 HOURS
Qty: 1893 ML | Refills: 0 | Status: SHIPPED | OUTPATIENT
Start: 2019-05-30 | End: 2019-08-01

## 2019-05-30 RX ORDER — ASPIRIN 325 MG
325 TABLET ORAL DAILY
Status: DISCONTINUED | OUTPATIENT
Start: 2019-05-30 | End: 2019-05-30 | Stop reason: HOSPADM

## 2019-05-30 RX ORDER — GABAPENTIN 300 MG/1
300 CAPSULE ORAL
Status: DISCONTINUED | OUTPATIENT
Start: 2019-05-30 | End: 2019-05-30 | Stop reason: HOSPADM

## 2019-05-30 RX ADMIN — GABAPENTIN 300 MG: 300 CAPSULE ORAL at 08:52

## 2019-05-30 RX ADMIN — WHITE PETROLATUM: 1.75 OINTMENT TOPICAL at 08:53

## 2019-05-30 RX ADMIN — LISINOPRIL 10 MG: 10 TABLET ORAL at 08:52

## 2019-05-30 RX ADMIN — ACETAMINOPHEN 650 MG: 325 TABLET, FILM COATED ORAL at 09:03

## 2019-05-30 RX ADMIN — ASPIRIN 325 MG ORAL TABLET 325 MG: 325 PILL ORAL at 08:51

## 2019-05-30 RX ADMIN — ATORVASTATIN CALCIUM 20 MG: 20 TABLET, FILM COATED ORAL at 08:53

## 2019-05-30 RX ADMIN — METFORMIN HYDROCHLORIDE 1000 MG: 500 TABLET ORAL at 08:52

## 2019-05-30 RX ADMIN — SODIUM CHLORIDE: 900 IRRIGANT IRRIGATION at 14:03

## 2019-05-30 RX ADMIN — ACETAMINOPHEN 650 MG: 325 TABLET, FILM COATED ORAL at 02:16

## 2019-05-30 RX ADMIN — HEPARIN SODIUM 5000 UNITS: 5000 INJECTION, SOLUTION INTRAVENOUS; SUBCUTANEOUS at 08:53

## 2019-05-30 RX ADMIN — GABAPENTIN 300 MG: 300 CAPSULE ORAL at 14:01

## 2019-05-30 RX ADMIN — CHLORHEXIDINE GLUCONATE 0.12% ORAL RINSE 15 ML: 1.2 LIQUID ORAL at 02:17

## 2019-05-30 RX ADMIN — WHITE PETROLATUM: 1.75 OINTMENT TOPICAL at 02:18

## 2019-05-30 RX ADMIN — ACETAMINOPHEN 650 MG: 325 TABLET, FILM COATED ORAL at 14:01

## 2019-05-30 RX ADMIN — PNEUMOCOCCAL 13-VALENT CONJUGATE VACCINE 0.5 ML: 2.2; 2.2; 2.2; 2.2; 2.2; 4.4; 2.2; 2.2; 2.2; 2.2; 2.2; 2.2; 2.2 INJECTION, SUSPENSION INTRAMUSCULAR at 14:06

## 2019-05-30 RX ADMIN — OXYCODONE HYDROCHLORIDE 10 MG: 5 TABLET ORAL at 14:01

## 2019-05-30 RX ADMIN — MULTIPLE VITAMINS W/ MINERALS TAB 1 TABLET: TAB at 08:52

## 2019-05-30 RX ADMIN — ACETAMINOPHEN 650 MG: 325 TABLET, FILM COATED ORAL at 06:08

## 2019-05-30 RX ADMIN — CHLORHEXIDINE GLUCONATE 0.12% ORAL RINSE 15 ML: 1.2 LIQUID ORAL at 08:51

## 2019-05-30 RX ADMIN — SENNOSIDES AND DOCUSATE SODIUM 2 TABLET: 8.6; 5 TABLET ORAL at 08:52

## 2019-05-30 RX ADMIN — DOXAZOSIN MESYLATE 1 MG: 1 TABLET ORAL at 08:52

## 2019-05-30 NOTE — UTILIZATION REVIEW
Pain Interview    1. Have you had pain or hurting at any time in the last 5 days?  Yes  2. How much of the time have you experienced pain or hurting over the last 5 days? Almost Constantly  3. Over the past 5 days, has pain made it hard for you to sleep at night?  Yes  4. Over the past 5 days, have you limited your day-to-day activities because of pain?Yes  5. Rate pain intensity: Numeric 8

## 2019-05-30 NOTE — PLAN OF CARE
Discharge noted:  All discharge medications and instruction were reviewed with pt and sister. Pt verbalized understanding of discharge plan. Pt's nurse gave pt supply for dressing change for few days. Pt will  his discharge medication from [facility] food pharmacy. Hard copy of oxycodone`s prescription given to pt. Pt was instructed he needs to present this hard copy in order to get oxycodone. Pt will be escorted down stairs.

## 2019-05-30 NOTE — DISCHARGE SUMMARY
Discharge Summary    Devonte Tucker MRN# [id number]   YOB: 1965 Age: 53 year old     Date of Admission:  5/16/2019  Date of Discharge:  5/30/2019  Admitting Physician:  Arcelia Hawley MD  Discharge Physician:  Dakotah Neal MD   Discharging Service:  Internal Medicine     Primary Provider: Vivi Casas          Discharge Diagnosis:     Invasive SCC of left Alveolar Ridge s/p  Left infrastructure maxillectomy, left radial forearm free flap, left neck exploration, STSG from Right  thigh to Right forearm, NG feeding tube placement  Malnutrition  DM 2  HTN              Brief History of Illness:     Devonte Tucker is a 53 year old male who was admitted for weakness     For more details, please refer to the admission H&P on 5/16/2019        TCU Course:     53 year old male with hx HTN, DM2, Invasive SCC of left Alveolar Ridge SP Left infrastructure maxillectomy, left radial forearm free flap, left neck exploration, STSG from left thigh to left forearm on 5/9/19, NG tube placement 5/10/19 admitted on 5/16/2019 for rehab 2/2 weakness.  His TCU course was uncomplicated. He has evaluated and treated by PT/OT, SLP, and Registered dietitian. He was seen in the ENT clinic. Last seen in clinic on 05/29    Individual problems are as outlined below:     # Invasive SCC of left Alveolar Ridge s/p  Left infrastructure maxillectomy, left radial forearm free flap, left neck exploration, STSG from Right  thigh to Right forearm, NG feeding tube placement:  He was evaluated by SLP. His diet was advanced to regular diet. His pain was controlled. He was seen in ENT clinic on 05/29.   - Wound cares per ENT  - Oxycodone PRN for pain control  - Follow up with ENT as suggested  - Follow up with Radiation oncology on 05/31       # DM  2/2 TFs.  Endocrine following.  BG range over past 24 hours 147-213.  He was on Insulin Glargine, NPH before cycled TF, Metformin, Insulin for meal coverage,  correctional Insulin. After his tube feeds were removed his blood sugar normalized. He will be discharged only on Metformin 1000 mg BID  - Metformin 1000 mg BID  - Follow up with primary care in one week for ongoing management     # HTN:   Normotensive. On  lisinopril and Doxazosin  - Continue     # Hyperlipidemia: On Atorvastatin  - Continue      # Severe malnutrition: He was given formula feed via Naso enteral tube. NE tube was removed on 05/29 by ENT in preparation for radiation therapy  - Follow up with ENT as outpatient       # Generalized weakness: PT/OT        Diet: Regular diet     Fluids: none  Lines:   DVT Prophylaxis: Heparin SQ  Colindres Catheter: not present  Code Status: Full Code    Pneumonia vaccine: Per immunization record, he has completed Polysaccharide vaccine. Last on 10/15/2013. He will qualify for Conjugate Vaccine        Discharge Medications:     Current Discharge Medication List      START taking these medications    Details   acetaminophen (TYLENOL) 325 MG tablet Take 2 tablets (650 mg) by mouth every 4 hours as needed for mild pain  Qty: 100 tablet, Refills: 0    Associated Diagnoses: Maxillary sinus cancer (H)      doxazosin (CARDURA) 1 MG tablet Take 1 tablet (1 mg) by mouth daily  Qty: 30 tablet, Refills: 0    Associated Diagnoses: Hypertension, unspecified type      gabapentin (NEURONTIN) 300 MG capsule Take 1 capsule (300 mg) by mouth 3 times daily  Qty: 90 capsule, Refills: 0    Associated Diagnoses: 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 (H); Primary cancer of alveolar ridge mucosa (H)      !! metFORMIN (GLUCOPHAGE) 1000 MG tablet Take 1 tablet (1,000 mg) by mouth daily (with breakfast)  Qty: 30 tablet, Refills: 0    Associated Diagnoses: Type 2 diabetes mellitus with complication, unspecified whether long term insulin use (H)      !! metFORMIN (GLUCOPHAGE) 1000 MG tablet Take 1 tablet (1,000 mg) by mouth daily (with dinner)  Qty: 30 tablet, Refills: 0    Associated  Diagnoses: Type 2 diabetes mellitus with complication, unspecified whether long term insulin use (H)      multivitamin w/minerals (THERA-VIT-M) tablet Take 1 tablet by mouth daily  Qty: 30 tablet, Refills: 0    Associated Diagnoses: Type 2 diabetes mellitus with complication, unspecified whether long term insulin use (H)       !! - Potential duplicate medications found. Please discuss with provider.      CONTINUE these medications which have CHANGED    Details   aspirin (ASA) 325 MG tablet Take 1 tablet (325 mg) by mouth daily  Qty: 30 tablet, Refills: 0    Associated Diagnoses: 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 (H)      chlorhexidine (PERIDEX) 0.12 % solution Swish and spit 15 mLs in mouth every 8 hours  Qty: 1893 mL, Refills: 0    Associated Diagnoses: 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 (H)      mineral oil-hydrophilic petrolatum (AQUAPHOR) external ointment Apply topically every 8 hours Apply to neck wound  Qty: 420 g, Refills: 0    Associated Diagnoses: 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 (H); Primary cancer of alveolar ridge mucosa (H)      oxyCODONE (ROXICODONE) 5 MG tablet Take 1-2 tablets (5-10 mg) by mouth every 4 hours as needed for moderate to severe pain  Qty: 40 tablet, Refills: 0    Associated Diagnoses: 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 (H); Primary cancer of alveolar ridge mucosa (H)      polyethylene glycol (MIRALAX/GLYCOLAX) packet Take 17 g by mouth daily as needed for constipation  Qty: 100 packet, Refills: 0    Associated Diagnoses: Constipation, unspecified constipation type      senna-docusate (SENOKOT-S/PERICOLACE) 8.6-50 MG tablet Take 2 tablets by mouth 2 times daily as needed for constipation  Qty: 60 tablet, Refills: 0    Associated Diagnoses: Constipation, unspecified constipation type         CONTINUE these medications which have NOT CHANGED    Details   atorvastatin (LIPITOR) 20 MG tablet 1 tablet (20 mg) by Per  "Feeding Tube route every morning    Associated Diagnoses: 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 (H)      lisinopril (PRINIVIL/ZESTRIL) 10 MG tablet 1 tablet (10 mg) by Per Feeding Tube route every morning    Associated Diagnoses: 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 (H)      !! order for DME Equipment being ordered: Nasogastric bolus tube feeding supplies  Formula: Isosource 1.5, 5 cans per day  Gravity feeding bags  60 mL syringes    Treatment Diagnosis: squamous cell carcinoma of the oral cavity  Qty: 14 days, Refills: 1    Associated Diagnoses: 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 (H)      !! order for DME Equipment being ordered: Wound care supplies, 1 each daily x 21 days  Xeroform occlusive gauze 5\" x 9\"  Telfa non-adherent pad 8\" x 3\"  Kerlix bandage roll 4-1/2\" x 4-1/8 yd  ACE wrap, 4 inch (5 total)    Diagnosis: squamous cell carcinoma of the oral cavity  Qty: 1 Month, Refills: 0    Associated Diagnoses: 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 (H)       !! - Potential duplicate medications found. Please discuss with provider.      STOP taking these medications       acetaminophen (TYLENOL) 32 mg/mL liquid Comments:   Reason for Stopping:         doxazosin (CARDURA) 0.6 mg/mL SUSP suspension Comments:   Reason for Stopping:         gabapentin (NEURONTIN) 250 MG/5ML solution Comments:   Reason for Stopping:         glipiZIDE (GLUCOTROL XL) 10 MG 24 hr tablet Comments:   Reason for Stopping:         insulin aspart (NOVOLOG PEN) 100 UNIT/ML pen Comments:   Reason for Stopping:         insulin aspart (NOVOLOG PEN) 100 UNIT/ML pen Comments:   Reason for Stopping:         insulin aspart (NOVOLOG PEN) 100 UNIT/ML pen Comments:   Reason for Stopping:         insulin aspart (NOVOLOG PEN) 100 UNIT/ML pen Comments:   Reason for Stopping:         insulin glargine (LANTUS SOLOSTAR PEN) 100 UNIT/ML pen Comments:   Reason for Stopping:         LORATADINE-D 12HR 5-120 " MG 12 hr tablet Comments:   Reason for Stopping:         metFORMIN (GLUCOPHAGE) 500 MG/5ML SOLN solution Comments:   Reason for Stopping:         metFORMIN (GLUCOPHAGE-XR) 500 MG 24 hr tablet Comments:   Reason for Stopping:         multivitamins w/minerals (CERTAVITE) liquid Comments:   Reason for Stopping:         oxyCODONE (ROXICODONE) 5 MG/5ML solution Comments:   Reason for Stopping:         protein modular (PROSOURCE NO CARB) Comments:   Reason for Stopping:         traMADol (ULTRAM) 50 MG tablet Comments:   Reason for Stopping:                   Procedures/Consultations:   No procedures performed during this admission  No consultations were requested during this admission           Final Day of Progress before Discharge:     Reports doing well  Denies any nausea, vomiting  Tolerating diet  Pain controlled  Bowel movements are normal  No burning urination    Physical Exam:  Blood pressure 114/66, pulse 89, temperature 97.6  F (36.4  C), temperature source Axillary, resp. rate 16, weight 131.3 kg (289 lb 6.4 oz), SpO2 96 %.      HEENT: No icterus, no pallor  Left cheek and jaw swelling. Suture on the plate is intact.   Left neck swelling. No obvious redness, discharge  Incision on the left neck looks c/d/i  Cardiovascular: S1, S2 normal.   Respiratory:  B/L CTA  GI/Abdomen: Soft, NT, BS+  Neurology: Alert, awake, and oriented. No tremors.   Extremities: B/L pre tibia edema.   Skin: Right forearm dressing in place, and right thigh dressing in place.   Data:  All laboratory data reviewed             Condition on Discharge:   Discharge condition: Stable   Code status on discharge: Full Code                Discharge Disposition:   Discharged to home               Pending Results:   Unresulted Labs Ordered in the Past 30 Days of this Admission     No orders found from 3/17/2019 to 5/17/2019.                  Discharge Instructions and Follow-Up:     Discharge Procedure Orders   Reason for your hospital stay   Order  Comments: Admitted for therapies, nutrition, and wound care     Adult [facility] Follow-up and recommended labs and tests   Order Comments: Follow up with primary care provider, Vivi Casas, within 7 days for hospital follow- up.  The following labs/tests are recommended: CBC, BMP  Follow up with Radiation Oncology on 05/30/2019  Follow up with ENT (Dr. Treviño) as suggested    Appointments on [place] and/or [facility] (with [facility] or [facility] provider or service). Call [phone] if you haven't heard regarding these appointments within 7 days of discharge.     Activity   Order Comments: Your activity upon discharge: activity as tolerated     Order Specific Question Answer Comments   Is discharge order? Yes      Monitor and record   Order Comments: Monitor for  fever, chills, increased pain, swelling,  lightheadedness,  dizziness,  nausea, vomiting, chest pain, shortness of breath.  If these symptoms occurs, please call your primary care or come to ED     Wound care and dressings   Order Comments: Instructions to care for your wound at home  Site:   Neck  Instructions:  Keep incisions clean and dry. Apply Aquaphor ointment to incisions three times daily to keep moist. You may shower, do not soak, scrub, or submerge incisions under water.     Daily dressing changes to right forearm: Remove old dressing. Clean incisions with saline. Apply Aquaphor ointment to linear incision and incision edges around skin graft site. Cover skin graft with xeroform gauze. Cover linear incision with Telfa non-stick dressing. Wrap forearm with Kerlix gauze roll and cover with ACE wrap.      Skin graft donor site care: You have a dressing over your leg where the skin graft was taken from. Keep dressing in place and change as needed for drainage under the dressing. Place a calcium alginate pad over the area and cover with clear tegaderm dressing. Once the site is no longer draining you may leave it open to air and apply Aquaphor  or a mild unscented lotion to the area to keep it moist.     Full Code     Order Specific Question Answer Comments   Code status determined by: Discussion with patient/legal decision maker      Diet   Order Comments: Follow this diet upon discharge: Orders Placed This Encounter      Snacks/Supplements Adult: Boost Glucose Control; With Meals      Calorie Counts      Regular Diet Adult     Order Specific Question Answer Comments   Is discharge order? Yes           Attestation:  Dakotah Neal.    Time spent on patient: 45 minutes total including face to face and coordinating care time reviewing current illness, any medication changes, and the care plan for today.

## 2019-05-30 NOTE — CONSULTS
Diabetes Education  Had received a consult request to see this patient for possible need for insulin therapy education.    Discussed with the inpatient diabetes management team, who has been following this patient during his hospital and TCU stay.  Plan is for patient to not go home on insulin.  He will resume his metformin and monitor glucoses.  He will follow-up in one week with his PCP.    Will sign off as no educational needs at this time.  Patient discharging home today.  Mei Katz MS, APRN, CNS, CDE, CDTC  [phone]

## 2019-05-30 NOTE — PLAN OF CARE
Pt remains independent in the room and in the hallway. Plans in place to discharge home today. Pt was guided on how to perform dressing change on the right arm graft/donor sie. Pt was able to demonstrate dressing change accurately without any problems. Dressing supplies were provided to pt in preparation for discharge. Medicated with Oxycodone 10 mg x 1.

## 2019-05-30 NOTE — PLAN OF CARE
Pt alert & oriented x 4, very pleasant & conversant. Had ENT appointment earlier today, NG tube discontinued at ENT clinic. Started on RD, tolerated well, ate 100%. R forearm dressing changed by the surgeon. CDI. Possible discharge home tomorrow if BGs stable over NOC. 140 & 126. Call in reach

## 2019-05-30 NOTE — PLAN OF CARE
RN: Pt's pain and discomfort comfortably managed with scheduled Tylenol 650 mg Tab this shift. No longer has feeding tube and is taking pills without problem. Pharmacy was asked to change all meds in liquids  to tablet form. Alfredach from Pharmacy working on it. BG= 101 at 2 am. Verbalizing acceptance and fear re: planned radiation therapy after a rest week but remained optimistic and hopeful. Neck incision, R FA donor site and R lat thigh STSG site WNL. Had BM this shift. Appear to be sleeping/resting between cares. Continue with plan of care.

## 2019-05-30 NOTE — PROGRESS NOTES
Diabetes Consult Daily  Progress Note          Assessment/Plan:    Devonte Tucker is a 53 year old male with type 2 diabetes, obesity, hypertension, and oral preneoplastic disease progressing to invasive SCC of the left alveolar ridge with invasion of the maxilla, s/p left infrastructure maxillectomy, left radial forearm free flap, left neck exploration, STSG from left thigh to left forearm, NG feeding tube placement, experiencing hyperglycemia related to enteral feeds and withholding of home antihyperglycemics        Type 2 diabetes:     Plan:  -metformin suspension:  1000 mg bid, changed to tablets  -novolog medium intensity sliding scale 1 unit per 50 > 140 before meals and > 200 HS  - test glucose before meals and at Hs   - will continue to follow         Plan for Discharge:  -metformin 1000 mg bid ( please order regular strength tablets, to be able to crush)  - test glucose 2-3 times per day and stagger the testing   - glucose goal   - if glucose 200 or > pre-meal, to be seen in clinic and start back on glipizide ( would recommend not resuming extended release due to potential of appetite to be variable once starting on radiation and starting dose of 2.5 mg to 5 mg daily.)                          Outpatient diabetes follow up: [facility] in [place] in 2 weeks or sooner if glucose persistently 200 or >  Plan discussed with patient and primary team.           Interval History:   The last 24 hours progress and nursing notes reviewed.  Feeding tube was removed yesterday in clinic, tolerating regular diet  Blood sugars 101-142  Glucose of 101 at ~ 0200   Fasting glucose 107 and pre-meal glucose 142 ( was given 1 unit of novolog)  Discussed testing glucose 2-3 times per day and to stagger the time of testing  Reviewed discharge plan with Mr. Tucker, all questions were answered.  Up independenlty      PTA:  Glipizide XL 20 mg am  Metformin 2 grams every morning       Recent Labs    Lab 05/30/19  0832 05/30/19  0648 05/30/19  0217 05/29/19  2102 05/29/19  1800 05/29/19  1452 05/29/19  0853   GLC  --  107*  --   --   --   --   --    *  --  101* 126* 140* 129* 119*               Review of Systems:   See interval hx          Medications:     Orders Placed This Encounter      Regular Diet Adult      Diet       Physical Exam:  Gen: Alert,  NAD   HEENT: mucous membranes are moist  Resp: non-labored  Ext: moving all extremities  Neuro:oriented x3, communicating clearly  /66 (BP Location: Left arm)   Pulse 89   Temp 97.6  F (36.4  C) (Axillary)   Resp 16   Wt 131.3 kg (289 lb 6.4 oz)   SpO2 96%   BMI 45.33 kg/m             Data:     Lab Results   Component Value Date    A1C 7.4 04/24/2019              CBC RESULTS:   Recent Labs   Lab Test 05/30/19  0648   WBC 4.6   RBC 3.63*   HGB 11.0*   HCT 34.5*   MCV 95   MCH 30.3   MCHC 31.9   RDW 12.9        Recent Labs   Lab Test 05/30/19  0648 05/23/19  0641    139   POTASSIUM 4.5 4.2   CHLORIDE 104 104   CO2 24 28   ANIONGAP 8 7   * 188*   BUN 23 25   CR 0.71 0.75   MANOJ 8.7 8.6     Liver Function Studies -   Recent Labs   Lab Test 05/10/19  0346   ALBUMIN 3.2*     Lab Results   Component Value Date    INR 1.10 04/04/2019         I spent a total of 35 minutes bedside and on the inpatient unit managing the glycemic care of Devonte Tucker. Over 50% of my time on the unit was spent counseling the patient  and/or coordinating care regarding .  See note for details.      Lisa Farias -[phone]  Diabetes Management job code [id number]

## 2019-05-30 NOTE — PROGRESS NOTES
Calorie Counts    5/28: 759 kcal and 37 g protein (2 meals, 0 supplements)   5/29: 188 kcal and 26 g protein (only part of 1 meal documented, however he ordered additional meal as well; likely underreported)       Teresa Clifton RD, LD  Unit Pager: [phone]

## 2019-05-31 ENCOUNTER — ALLIED HEALTH/NURSE VISIT (OUTPATIENT)
Dept: RADIATION ONCOLOGY | Facility: CLINIC | Age: 54
End: 2019-05-31
Attending: RADIOLOGY
Payer: COMMERCIAL

## 2019-05-31 DIAGNOSIS — R13.10 DYSPHAGIA: Primary | ICD-10-CM

## 2019-05-31 DIAGNOSIS — C03.9 PRIMARY CANCER OF ALVEOLAR RIDGE MUCOSA (H): Primary | ICD-10-CM

## 2019-05-31 PROCEDURE — 77334 RADIATION TREATMENT AID(S): CPT | Performed by: RADIOLOGY

## 2019-06-02 NOTE — PROGRESS NOTES
Mr. Tucker returns to radiation oncology clinic today to sign informed consent and undergo a CT-simulation session for radiotherapy planning purposes. The rationale for as well as the potential acute and late-term radiation-induced toxicities associated with head and neck radiotherapy for improved local control of his tumor were discussed at length. Mr. Tucker and his family had many pertinent questions which were answered to their stated satisfaction.     I am planning for a total dose of 60 Gy delivered in 30 daily fractions to the operative bed with elective coverage of the at-risk lymphatics. He will return on 6/10/2019 for initiation of therapy.    Emerson Verdin MD/PhD    Dept of Radiation Oncology  [facility]

## 2019-06-03 NOTE — PROGRESS NOTES
Radiation Therapy Patient EducationPatient  Person involved with teaching: Patient    Patient educational needs for self management of treatment-related side effects assessment completed.  [facility] Patient Ed tab contains Patient Learning Assessment    Education Materials Given  Radiation Therapy for Head and Neck    Educational Topics Discussed  Side effects expected, Pain management, Skin care, Nutrition and weight loss and When to call MD/RN    Response To Teaching  Verbalizes understanding    GYN Only  Vaginal Dilator-given and educated: N/A    Referrals sent: Dental, Speech and Swallowing and Nutrition    Chemotherapy?  No

## 2019-06-04 ENCOUNTER — RECORDS - HEALTHEAST (OUTPATIENT)
Dept: LAB | Facility: CLINIC | Age: 54
End: 2019-06-04

## 2019-06-04 LAB
ANION GAP SERPL CALCULATED.3IONS-SCNC: 10 MMOL/L (ref 5–18)
BUN SERPL-MCNC: 15 MG/DL (ref 8–22)
CALCIUM SERPL-MCNC: 10.1 MG/DL (ref 8.5–10.5)
CHLORIDE BLD-SCNC: 101 MMOL/L (ref 98–107)
CO2 SERPL-SCNC: 25 MMOL/L (ref 22–31)
CREAT SERPL-MCNC: 0.77 MG/DL (ref 0.7–1.3)
GFR SERPL CREATININE-BSD FRML MDRD: >60 ML/MIN/1.73M2
GLUCOSE BLD-MCNC: 97 MG/DL (ref 70–125)
POTASSIUM BLD-SCNC: 4.5 MMOL/L (ref 3.5–5)
SODIUM SERPL-SCNC: 136 MMOL/L (ref 136–145)

## 2019-06-05 ENCOUNTER — HOSPITAL ENCOUNTER (OUTPATIENT)
Dept: PHYSICAL THERAPY | Facility: CLINIC | Age: 54
Setting detail: THERAPIES SERIES
End: 2019-06-05
Attending: OTOLARYNGOLOGY
Payer: COMMERCIAL

## 2019-06-05 ENCOUNTER — APPOINTMENT (OUTPATIENT)
Dept: OTOLARYNGOLOGY | Facility: CLINIC | Age: 54
End: 2019-06-05
Payer: COMMERCIAL

## 2019-06-05 DIAGNOSIS — C05.9 SQUAMOUS CELL CARCINOMA OF PALATE (H): ICD-10-CM

## 2019-06-05 PROCEDURE — 97140 MANUAL THERAPY 1/> REGIONS: CPT | Mod: GP | Performed by: PHYSICAL THERAPIST

## 2019-06-05 PROCEDURE — 97161 PT EVAL LOW COMPLEX 20 MIN: CPT | Mod: GP | Performed by: PHYSICAL THERAPIST

## 2019-06-05 PROCEDURE — 97535 SELF CARE MNGMENT TRAINING: CPT | Mod: GP | Performed by: PHYSICAL THERAPIST

## 2019-06-05 PROCEDURE — 97110 THERAPEUTIC EXERCISES: CPT | Mod: GP | Performed by: PHYSICAL THERAPIST

## 2019-06-05 NOTE — PROGRESS NOTES
06/05/19 1400   Rehab Discipline   Discipline PT   Type of Visit   Type of visit Initial Edema Evaluation   General Information   Start of care 06/05/19   Referring physician Dr. 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 Pt will begin radiation on 6/10/19. NO plans for chemo. Pt changes dressing on R forearm daily, does ROM exercises and elevates it. Pt has decreased ROM and some swelling of fingers on R UE and pain, increased sensation and pain on L jaw and cheek and is concerned that soft swelling recently became more firm.    Surgical / medical history reviewed Yes   Prior level of functional mobility walked without A.D   Community support Family / friend caregiver   Patient role / employment history Disabled  (return to work 7/31/19)   Psychosocial concerns Impaired sleep   Living environment House / [facility]   Living environment comments difficulty with stairs   Current assistive devices Standard cane   General observations Pt has 15 steps to get in, indoors. Inside is all on 1 level.Pt has hard time falling asleep and staying asleep. Pt lives alone. 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 pt's 5 sisters, coworkers and nephews will be helping him with rides and other needs   Fall Risk Screen   Fall screen completed by PT   Have you fallen 2 or more times in the past year? No   Have you fallen and had an injury in the past year? No   Is patient a fall risk? No   Fall screen comments balance is improving, still not 100%   Abuse Screen (yes response referral indicated)   Feels Unsafe at Home or Work/School no   Feels Threatened by Someone no   Does Anyone Try to Keep You From Having Contact with Others or Doing Things Outside Your Home? no   Physical Signs of Abuse Present no   Precautions   Precautions comments Pt is to keep R UE elevated, do no lifting over 1#.    Patient / Family Goals   Patient / family goals statement I would like to know what to do about L facial swelling.    Pain   Patient currently in pain Yes   Pain location L jawline   Pain rating 5-/9/10   Pain description Throbbing;Burning   Pain description comment burns with touch   Pain comments spasms in R shoulder and upper back,especially with sleeping.    Vitals Signs   BMI 45   Vital Signs Comments 131  (kg)   Cognitive Status   Orientation Orientation to person, place and time   Level of consciousness Alert   Follows commands and answers questions 100% of the time   Cognitive status comments memory \"not as strong as before the surgery\" I have to write things down and ask questions   Edema Exam / Assessment   Skin condition Non-pitting   Skin condition comments puffy edema with firm areas on L cheek and jaw and submentally. R fingers puffy   Scar Yes   Location L neck   Mobility incision less than 1 month old, normal mobility for this period of time   Girth Measurements   Girth Measurements Refer to separate girth measurement flowsheet   Range of Motion   ROM comments R elbow extension -35*, flexion : 135*.CROM: flexion 50*, extn 42* , R rotation: 30* L rotation 52* , R SB 15*, L SB 30*   Strength   Strength comments " overall weakness R wrist, elbow, all at least 3/5   Planned Edema Interventions   Planned edema interventions Manual lymph drainage;Fit for compression garment;Exercises;Precautions to prevent infection / exacerbation;Education;Manual therapy;ADL training;Skin care / precautions;Scar mobilization;Myofascial release;Soft tissue mobilization;Home management program development   Clinical Impression   Criteria for skilled therapeutic intervention met Yes   Therapy diagnosis face and neck edema, decreased strength and ROM R UE   Influenced by the following impairments / conditions Stage 2   Functional limitations due to impairments / conditions unable to touch face without pain, difficult to eat, open mouth, use R UE for IADLS   Clinical Presentation Stable/Uncomplicated   Clinical Presentation Rationale Pt has not had prior treatment for above and will likely respond well to treatment   Clinical Decision Making (Complexity) Low complexity   Treatment frequency Other   Treatment duration 12 visits over 3 months   Patient / family and/or staff in agreement with plan of care Yes   Risks and benefits of therapy have been explained Yes   Clinical impression comments Pt had surgery on 5/9/19 and has hypersensitivity on L jaw and cheek to light touch, puffy edema on face with some firm areas and on R elbow/wrist has decreased ROM and strength as well as  finger swelling.   Education Assessment   Preferred learning style Listening;Demonstration;Reading   Barriers to learning No barriers   Goals   Edema Eval Goals 1;2;3   Goal 1   Goal identifier HEP   Goal description In order to improve tolerance for functional mobility, ADLs, and recreational activities outside of physical therapy, patient will demonstrate independence with home program of exercise and lifestyle modification and possible compression.    Target date 09/01/19   Goal 2   Goal identifier circumference   Goal description Pt will have 1 cm decrease or more in 2  Left sided measurements of face in order to eat more easily.   Target date 08/15/19   Goal 3   Goal identifier pain   Goal description Pt will be able to wash his face with firm touch without pain   Target date 09/01/19   Total Evaluation Time   PT Eval, Low Complexity Minutes (25686) 15

## 2019-06-10 ENCOUNTER — HOSPITAL ENCOUNTER (OUTPATIENT)
Dept: NUTRITION | Facility: CLINIC | Age: 54
Discharge: HOME | End: 2019-06-10
Attending: RADIOLOGY | Admitting: RADIOLOGY
Payer: COMMERCIAL

## 2019-06-10 ENCOUNTER — APPOINTMENT (OUTPATIENT)
Dept: RADIATION ONCOLOGY | Facility: CLINIC | Age: 54
End: 2019-06-10
Attending: RADIOLOGY
Payer: COMMERCIAL

## 2019-06-10 VITALS
WEIGHT: 290.1 LBS | BODY MASS INDEX: 45.44 KG/M2 | SYSTOLIC BLOOD PRESSURE: 115 MMHG | HEART RATE: 81 BPM | DIASTOLIC BLOOD PRESSURE: 76 MMHG

## 2019-06-10 DIAGNOSIS — C03.9 PRIMARY CANCER OF ALVEOLAR RIDGE MUCOSA (H): Primary | ICD-10-CM

## 2019-06-10 PROCEDURE — 77386 ZZH IMRT TREATMENT DELIVERY, COMPLEX: CPT | Performed by: RADIOLOGY

## 2019-06-10 PROCEDURE — 77332 RADIATION TREATMENT AID(S): CPT | Performed by: RADIOLOGY

## 2019-06-10 PROCEDURE — 97802 MEDICAL NUTRITION INDIV IN: CPT | Performed by: DIETITIAN, REGISTERED

## 2019-06-10 ASSESSMENT — PAIN SCALES - GENERAL: PAINLEVEL: NO PAIN (0)

## 2019-06-10 NOTE — PROGRESS NOTES
RADIATION ONCOLOGY WEEKLY ON TREATMENT VISIT   Encounter Date: Angela 10, 2019    Patient Name: Devonte Tucker  MRN: [id number]  : 1965     Disease and Stage: pT4a N0 M0 squamous cell carcinoma of the left maxillary alveolar ridge  Treatment Site: Head and neck  Current Dose/Planned Total Dose: 200 / 6000 cGy  Daily Fraction Size: 200 cGy/day, 5 times/week  Concurrent Chemotherapy: No    Treatment Summary:    6/10/2019: Initiation of radiotherapy    ED visits/Hospitalizations:  None    Missed Treatments:  None    Subjective: Mr. Tucker presents to clinic today for his weekly on-treatment visit. He has tolerated his first fraction of adjuvant radiotherapy very well and has no pressing concerns or complaints. He describes very mild occasional right arm pain at the graft donor site. He otherwise has no reported odynophagia, dysphagia, fever/chills, nausea/vomiting, dyspnea, chest pain or abdominal pain.    ROS:   Constitutional  Pain (0-10): 0  Fatigue: Mild    CNS  Headaches: None    ENT  Mucositis: None    Cardiopulmonary  Dyspnea: None    GI  Nausea/vomiting: None    Nutrition  PEG: No  Diet: Normal diet    Integumentary  Dermatitis: None    Objective:   Current weight: 131.6 kg    BP: 115/76   Pulse: 81     Physical Exam   Constitutional: He is oriented to person, place, and time. He appears well-developed and well-nourished.   HENT:   Head: Normocephalic and atraumatic.   Eyes: EOM are normal.   Neck: Normal range of motion.   Mild fibrosis within the left neck dissection surgical bed. No nodularity or palpable cervical adenopathy.   Cardiovascular: Intact distal pulses.   Pulmonary/Chest: Effort normal. No stridor. No respiratory distress.   Neurological: He is alert and oriented to person, place, and time.   Skin: Skin is warm and dry. No erythema.   Psychiatric: He has a normal mood and affect.      Treatment-related toxicities (CTCAE v5.0):  None    Assessment:    Mr. 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 He underwent wide local excision and is receiving adjuvant radiotherapy for improved locoregional disease control. He has tolerated the initiation of adjuvant radiotherapy very well with no significant acute radiation-induced toxicities.    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:    Continue radiotherapy    Pain management:    No symptoms requiring medical management    Fluids/Electrolytes/Nutrition:    Continue diet as tolerated    Dermatitis:    Recommend starting TID Aquaphor to the left face and bilateral neck    Mosaiq chart and setup information reviewed  IGRT images reviewed     Emerson Verdin MD/PhD    Dept of Radiation Oncology  [facility]

## 2019-06-10 NOTE — PROGRESS NOTES
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 His weight has been stable.    He has had to focus on eating slowly and chewing foods well to prevent dysphagia.   He also has been focused on adding moisture to foods to ease swallowing.     He denies barriers to eating at this time. He typically eats 3 meals/day and will snack prn.   He monitors his BG with h/o diabetes. He takes Metformin for BG control.  He notes that he has only been on insulin with recent surgery and EN support.   He notes that his fasting BG have been running high (after fasting all night).  They have been running between 150-170 per pt report.   He inquires about whether or not he should increase his dose of Metformin or change the time he takes it.   RD deferred this question to endocrinology.  He plans to inquire about this on Thursday at his next appointment.       Diet Recall  Breakfast Oatmeal with yogurt and fruit OR 2 eggs with cottage cheese OR protein smoothie (fruit, spinach, pp)   Lunch [place] on light bread (meat cheese), cottage cheese   Dinner Chicken with génesis slaw   Snacks Cottage cheese, fruit   Beverages 80 oz water/day     ANTHROPOMETRICS  Height: 67\"  Weight: 290 lbs/131kg  BMI: 45  Weight Status:  Obesity Grade III BMI >40  IBW:148 lbs  % IBW: 195%  Weight History: down 11 lb x past 1 month  Wt Readings from Last 7 Encounters:   05/30/19 131.3 kg (289 lb 6.4 oz)   05/29/19 131.5 kg (290 lb)   05/24/19 132.5 kg (292 lb)   05/20/19 134.8 kg (297 lb 3.2 oz)   05/15/19 " 132.9 kg (293 lb)   04/24/19 136.5 kg (301 lb)   04/24/19 136.5 kg (301 lb)     Dosing Weight: 183 lb/83kg    Medications/vitamins/minerals/herbals:   Reviewed    Labs:   Labs reviewed    ASSESSED NUTRITION NEEDS:  Estimated Energy Needs: 1731-4123 kcals (30-35 Kcal/Kg)  Justification: increased needs with radiation  Estimated Protein Needs: 100-125 grams protein (1.2-1.5 g pro/Kg)  Justification: increased needs with radiation  Estimated Fluid Needs: 300  mL   Justification: maintenance    MALNUTRITION:  % Weight Loss:  Weight loss does not meet criteria for malnutrition   % Intake:  Decreased intake does not meet criteria for malnutrition   Subcutaneous Fat Loss:  None observed  Muscle Loss:  None observed  Fluid Retention:  None noted    Malnutrition Diagnosis: Patient does not meet two of the above criteria necessary for diagnosing malnutrition but is at risk.     NUTRITION DIAGNOSIS:  Predicted inadequate nutrient intake related to radiation to head/neck region    INTERVENTIONS  Provided written & verbal education:   - Discussed ways to optimize calories and protein intake.  Advised pt to aim for at least 2500kcal and 100g protein via 5-6 small frequent meals. Reviewed soft food choices. Provided pt with list of small frequent meals and soft food choices.  Encouraged pt to continue focusing on adding moisture to foods.  Reviewed ways to make home made shakes/smoothies with diabetes in mind.    - Reviewed common barriers to eating.  As treatment progresses, eating will become more difficult.  -   Provided pt with corresponding education materials/handouts on:  Six Small Meals a Day, High Calorie, High Protein Recipe booklet, Tips to Increase the Calories in Your Diet and Protein Sources.     Goals  1.  Aim for 5-6 small frequent meals  2.  Aim for 2500kcal and 100g protein/day  3. Weight maintenance through cancer treatment/no more that 1-2 lb wt loss weekly    Follow-Up Plans: Pt has RD contact information for  questions.    Pt to follow up with RD in 1-2 weeks at the time of treatment.     MONITORING AND EVALUATION:  -Food intake  -Fluid/beverage intake  -Liquid meal replacement or supplement  -Weight trends    Tisha Ventura RDN, LD

## 2019-06-11 ENCOUNTER — APPOINTMENT (OUTPATIENT)
Dept: RADIATION ONCOLOGY | Facility: CLINIC | Age: 54
End: 2019-06-11
Attending: RADIOLOGY
Payer: COMMERCIAL

## 2019-06-11 PROCEDURE — 77386 ZZH IMRT TREATMENT DELIVERY, COMPLEX: CPT | Performed by: RADIOLOGY

## 2019-06-12 ENCOUNTER — APPOINTMENT (OUTPATIENT)
Dept: RADIATION ONCOLOGY | Facility: CLINIC | Age: 54
End: 2019-06-12
Attending: RADIOLOGY
Payer: COMMERCIAL

## 2019-06-12 ENCOUNTER — APPOINTMENT (OUTPATIENT)
Dept: OTOLARYNGOLOGY | Facility: CLINIC | Age: 54
End: 2019-06-12
Payer: COMMERCIAL

## 2019-06-12 PROCEDURE — 77386 ZZH IMRT TREATMENT DELIVERY, COMPLEX: CPT | Performed by: RADIOLOGY

## 2019-06-13 ENCOUNTER — APPOINTMENT (OUTPATIENT)
Dept: RADIATION ONCOLOGY | Facility: CLINIC | Age: 54
End: 2019-06-13
Attending: RADIOLOGY
Payer: COMMERCIAL

## 2019-06-13 ENCOUNTER — THERAPY VISIT (OUTPATIENT)
Dept: SPEECH THERAPY | Facility: CLINIC | Age: 54
End: 2019-06-13
Payer: COMMERCIAL

## 2019-06-13 DIAGNOSIS — C31.0 MAXILLARY SINUS CANCER (H): Primary | ICD-10-CM

## 2019-06-13 DIAGNOSIS — R13.12 OROPHARYNGEAL DYSPHAGIA: ICD-10-CM

## 2019-06-13 PROCEDURE — 77386 ZZH IMRT TREATMENT DELIVERY, COMPLEX: CPT | Performed by: RADIOLOGY

## 2019-06-14 ENCOUNTER — APPOINTMENT (OUTPATIENT)
Dept: RADIATION ONCOLOGY | Facility: CLINIC | Age: 54
End: 2019-06-14
Attending: RADIOLOGY
Payer: COMMERCIAL

## 2019-06-14 ENCOUNTER — TELEPHONE (OUTPATIENT)
Dept: ENDOCRINOLOGY | Facility: CLINIC | Age: 54
End: 2019-06-14

## 2019-06-14 ENCOUNTER — DOCUMENTATION ONLY (OUTPATIENT)
Dept: RADIATION ONCOLOGY | Facility: CLINIC | Age: 54
End: 2019-06-14

## 2019-06-14 DIAGNOSIS — C03.9 PRIMARY CANCER OF ALVEOLAR RIDGE MUCOSA (H): Primary | ICD-10-CM

## 2019-06-14 DIAGNOSIS — R11.0 NAUSEA: ICD-10-CM

## 2019-06-14 PROCEDURE — 77336 RADIATION PHYSICS CONSULT: CPT | Performed by: RADIOLOGY

## 2019-06-14 PROCEDURE — 77386 ZZH IMRT TREATMENT DELIVERY, COMPLEX: CPT | Performed by: RADIOLOGY

## 2019-06-14 RX ORDER — ONDANSETRON 8 MG/1
8 TABLET, FILM COATED ORAL EVERY 8 HOURS PRN
Qty: 30 TABLET | Refills: 3 | Status: SHIPPED | OUTPATIENT
Start: 2019-06-14 | End: 2020-03-12

## 2019-06-14 NOTE — TELEPHONE ENCOUNTER
----- Message from DAT Benitez CNP sent at 6/13/2019  8:25 AM CDT -----  Regarding: RE: hospital pt   Good morning,  Yes, continue on Metformin 1000 mg bid  Some of his blood sugars look good, seems variable depending on what he is eating  Per the message left on the phone,  Fasting 128,175,161 ( most likely the higher ones due to snack or larger dinner)  Pre-meal: 123, 127, 132 ( acceptable)  Dinner post prandial 147 ok    I think he can wait for the appointment with Leanna to make any additions.  I would recommend if glucose pre-meal consistently > 150, to start glipizide 2.5 mg ( not sure he has this dose) + continue his metformin 1000 mg bid  Thank you,   Jasmin Farias CNP       ----- Message -----  From: Susan Worthy RN  Sent: 6/12/2019   5:21 PM  To: DAT Benitez CNP, #  Subject: hospital pt                                      He is scheduled to see Avis in Endo July  Discharged  5/30/19 on Metformin  1000 mg BID  Since then  6/12/19     6/11/19 AM  175= L 127=dinner 132 ( all pre meal)   6/10/19 = L 123= dinner post right after eating   147  He said it was mentioned that Glipizide may be added.  Should he remain on The metormin doses until seen in July  Or make a change ?

## 2019-06-14 NOTE — TELEPHONE ENCOUNTER
I left a detailed message for Devonte per request on the  Plan. I gave him clinic and after hour numbers to contact if his BS premeal are consistently over 150. Susan Worthy RN on 6/14/2019 at 10:39 AM

## 2019-06-14 NOTE — PROGRESS NOTES
Sitting B/P 104/69  P 96  Standing B/P 107/68  P 113  Pt states he is eating and drinking but having some nausea. Antinausea medication script sent to pt preferred pharmacy. Pt will start medication right away.

## 2019-06-17 ENCOUNTER — APPOINTMENT (OUTPATIENT)
Dept: RADIATION ONCOLOGY | Facility: CLINIC | Age: 54
End: 2019-06-17
Attending: RADIOLOGY
Payer: COMMERCIAL

## 2019-06-17 VITALS
DIASTOLIC BLOOD PRESSURE: 83 MMHG | SYSTOLIC BLOOD PRESSURE: 130 MMHG | BODY MASS INDEX: 44.95 KG/M2 | WEIGHT: 287 LBS | HEART RATE: 100 BPM

## 2019-06-17 DIAGNOSIS — C31.0 MAXILLARY SINUS CANCER (H): Primary | ICD-10-CM

## 2019-06-17 PROCEDURE — 77386 ZZH IMRT TREATMENT DELIVERY, COMPLEX: CPT | Performed by: RADIOLOGY

## 2019-06-17 NOTE — PROGRESS NOTES
RADIATION ONCOLOGY WEEKLY ON TREATMENT VISIT   Encounter Date: 2019    Patient Name: Devonte Tucker  MRN: [id number]  : 1965     Disease and Stage: pT4a N0 M0 squamous cell carcinoma of the left maxillary alveolar ridge  Treatment Site: Left maxilla and bilateral neck  Current Dose/Planned Total Dose: 1200 / 6000 cGy  Daily Fraction Size: 200 cGy/day, 5 times/week  Concurrent Chemotherapy: No    Treatment Summary:    6/10/2019: Initiation of radiotherapy    2019: Mild nausea which is well-controlled with Zofran 8 mg daily    ED visits/Hospitalizations:  None    Missed Treatments:  None    Subjective: Mr. Tucker presents to clinic today for his weekly on-treatment visit. He has noted the onset of mild nausea over the past week and was prescribed as-needed Zofran for symptom management. He is currently taking 8 mg daily and reports good relief on this regimen. His ROS are otherwise positive for mild odynophagia which he rates as a 2/10 in severity and treatment-induced mild dysgeusia. He continues to eat a normal diet without difficulty and his weight has remained stable since initiation of treatment. He has started using twice-daily Aquaphor to the left face and bilateral neck and remains compliant with his fluoride treatments and speech/swallow therapy exercises.    ROS:   Constitutional  Pain (0-10): 2 (mouth), 2 (throat), 2 (skin)  Fatigue: Mild    CNS  Headaches: None    ENT  Mucositis: Mild symptoms    Cardiopulmonary  Dyspnea: None    GI  Nausea/vomiting: Mild nausea without vomiting    Nutrition  PEG: No  Diet: Normal diet    Integumentary  Dermatitis: None    Objective:   Current weight: 130.2 kg  Last week's weight: 131.6 kg  Starting weight: 131.6 kg    BP: 130/83 (sitting), 117/79 (standing)  Pulse: 100 (sitting), 114 (standing)     Physical Exam   Constitutional: He is oriented to person, place, and time. He appears well-developed and well-nourished.   HENT:   Nose: No  rhinorrhea. No epistaxis.   Mildly dry mucous membranes. Free flap is healthy in appearance. No mucositis or oral thrush.   Eyes: EOM are normal.   Cardiovascular: Intact distal pulses.   Pulmonary/Chest: Effort normal. No stridor. No respiratory distress.   Neurological: He is alert and oriented to person, place, and time.   Skin: Skin is warm and dry. No erythema.   Psychiatric: He has a normal mood and affect.      Treatment-related toxicities (CTCAE v5.0):  1. Nausea: Grade 1: Loss of appetite without alteration in eating habits     Assessment:    Mr. 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 He underwent wide local excision and is receiving adjuvant radiotherapy for improved locoregional disease control. He is tolerating radiotherapy well with mild acute treatment-induced toxicities, namely low-grade nausea.    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:    Continue radiotherapy    Pain management:    Recommend starting PRN acetaminophen for management of mild oral cavity/oropharyngeal pain    Fluids/Electrolytes/Nutrition:    Continue diet as tolerated    Dermatitis:    Continue twice daily Aquaphor to the left face and bilateral neck    Mosaiq chart and setup information reviewed  IGRT images reviewed     Emerson Verdin MD/PhD    Dept of Radiation Oncology  [facility]

## 2019-06-18 ENCOUNTER — APPOINTMENT (OUTPATIENT)
Dept: RADIATION ONCOLOGY | Facility: CLINIC | Age: 54
End: 2019-06-18
Attending: RADIOLOGY
Payer: COMMERCIAL

## 2019-06-18 PROCEDURE — 77386 ZZH IMRT TREATMENT DELIVERY, COMPLEX: CPT | Performed by: RADIOLOGY

## 2019-06-19 ENCOUNTER — APPOINTMENT (OUTPATIENT)
Dept: RADIATION ONCOLOGY | Facility: CLINIC | Age: 54
End: 2019-06-19
Attending: RADIOLOGY
Payer: COMMERCIAL

## 2019-06-19 PROCEDURE — 77386 ZZH IMRT TREATMENT DELIVERY, COMPLEX: CPT | Performed by: RADIOLOGY

## 2019-06-20 ENCOUNTER — THERAPY VISIT (OUTPATIENT)
Dept: SPEECH THERAPY | Facility: CLINIC | Age: 54
End: 2019-06-20
Payer: COMMERCIAL

## 2019-06-20 ENCOUNTER — APPOINTMENT (OUTPATIENT)
Dept: RADIATION ONCOLOGY | Facility: CLINIC | Age: 54
End: 2019-06-20
Attending: RADIOLOGY
Payer: COMMERCIAL

## 2019-06-20 ENCOUNTER — ANCILLARY PROCEDURE (OUTPATIENT)
Dept: GENERAL RADIOLOGY | Facility: CLINIC | Age: 54
End: 2019-06-20
Attending: RADIOLOGY
Payer: COMMERCIAL

## 2019-06-20 DIAGNOSIS — R13.12 OROPHARYNGEAL DYSPHAGIA: ICD-10-CM

## 2019-06-20 DIAGNOSIS — C31.0 MAXILLARY SINUS CANCER (H): Primary | ICD-10-CM

## 2019-06-20 PROCEDURE — 77386 ZZH IMRT TREATMENT DELIVERY, COMPLEX: CPT | Performed by: RADIOLOGY

## 2019-06-20 RX ORDER — BARIUM SULFATE 400 MG/ML
20 SUSPENSION ORAL ONCE
Status: COMPLETED | OUTPATIENT
Start: 2019-06-20 | End: 2019-06-20

## 2019-06-20 RX ADMIN — BARIUM SULFATE 20 ML: 400 SUSPENSION ORAL at 07:47

## 2019-06-20 NOTE — PROGRESS NOTES
06/20/19 0800   General Information   Type Of Visit Initial   Start Of Care Date 06/20/19   Referring Physician Dr. 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 He has begun radiation treatment and is starting to feel increased tightness in his jaw and face on the left side. He is here for baseline video swallow study.   Respiratory Status Room air   Prior Level Of Function Swallowing   Prior Level Of Function Comment Moist soft solids and thin liquids   Patient Role/employment History Employed   Living Environment House/[facility]   General Observations Pt highly pleasant and cooperative throughout evaluation   Patient/family Goals Pt would like to improve and/or maintain swallow function during and after radiation treatment    Clinical Swallow Evaluation   Oral Musculature anomalies present   Structural Abnormalities present  (See H/P)   Dentition other (see comments)  (Dentition missing in area of reconstruction, adequate on R)   Mucosal Quality adequate   Mandibular Strength and Mobility impaired  (Starting to feel tightness)   Oral Labial Strength and Mobility impaired retraction  (left upper lip decreased)   Lingual Strength and Mobility WFL   Velar Elevation intact   Laryngeal Function Cough;Swallow;Voicing initiated   VFSS Eval: Radiology   Radiologist Resident   Views Taken left lateral;A/P   Physical Location of Procedure [facility]   VFSS Eval: Thin  Liquid Texture Trial   Mode of Presentation, Thin Liquid cup;self-fed   Order of Presentation 1,2,3,7,8   Preparatory Phase WFL   Oral Phase, Thin Liquid WFL   Pharyngeal Phase, Thin Liquid WFL   Rosenbek's Penetration Aspiration Scale: Thin Liquid Trial Results 2 - contrast enters airway, remains above the vocal cords, no residue remains (penetration)   Diagnostic Statement Flash penetration noted intermittently on thin liquid trials which spontaneously cleared laryngeal vestibule.    VFSS Eval: Nectar Thick Liquid Texture Trial   Mode of Presentation, Nectar cup;self-fed   Order of Presentation 4   Preparatory Phase WFL   Oral Phase, Nectar WFL   Pharyngeal Phase, [place] WFL   Rosenbek's Penetration Aspiration Scale: Nectar-Thick Liquid Trial Results 1 - no aspiration, contrast does not enter airway   Diagnostic Statement No aspiration/penetration noted on nectar thick liquid trial.    VFSS Eval: Puree Solid Texture Trial   Mode of Presentation, Puree spoon;self-fed   Order of Presentation 5   Preparatory Phase WFL   Oral Phase, Puree WFL   Pharyngeal Phase, Puree WFL   Rosenbek's Penetration Aspiration Scale: Puree Food Trial Results 1 - no aspiration, contrast does not enter airway   Diagnostic Statement No aspiration/penetration noted on puree consistency trial.    VFSS Eval: Solid Food Texture Trial   Mode of Presentation, Solid self-fed   Order of Presentation 6   Preparatory Phase WFL   Oral Phase, Solid WFL   Pharyngeal Phase, Solid Residue in valleculae   Rosenbek's Penetration Aspiration Scale: Solid Food Trial Results 1 - no aspiration, contrast does not enter airway   Diagnostic Statement No aspiration/penetration noted on solid consistency trial. Mild residue noted in the valleculae after initial swallow. Pt is able to swallow again and clear material independently. Pt also demonstrates decreased mastication as he is only able to use the right side of his mouth due to lack of dentition on the left.     Swallow Compensations   Swallow Compensations Multiple swallow;Alternate viscosity of consistencies   Educational Assessment   Barriers to Learning No barriers   Esophageal Phase of Swallow   Patient reports or presents with symptoms of esophageal dysphagia No   General Therapy Interventions   Dysphagia treatment Modified diet education;Oropharyngeal exercise training;Compensatory strategies for swallowing;Instruction of safe swallow strategies   Swallow Eval: Clinical Impressions   Skilled Criteria for Therapy Intervention Skilled criteria met.  Treatment indicated.   Functional Assessment Scale (FAS) 6   Dysphagia Outcome Severity Scale (LISA) Level 5 - LISA   Treatment Diagnosis Mild oropharyngeal dysphagia which will likely become severe during radiation treatment.    Diet texture recommendations Regular diet;Thin liquids   Recommended Feeding/Eating Techniques alternate between small bites and sips of food/liquid;check mouth frequently for oral residue/pocketing;hard swallow w/ each bite or sip;maintain upright posture during/after eating for 30 mins;small sips/bites   Rehab Potential good, to achieve stated therapy goals   Therapy Frequency other (see comments)   Predicted Duration of Therapy Intervention (days/wks) 2x/month x 6 months   Anticipated Discharge Disposition home w/ outpatient services   Risks and Benefits of Treatment have been explained. Yes   Patient, family and/or staff in agreement with Plan of Care Yes   Clinical Impression Comments Emigdio Tucker demonstrates mild oropharyngeal dysphagia as characterized by intermittent flash penetration on thin liquid trials, decreased oral manipulation of solid consistencies due to reconstruction and change in dentition. No aspiration noted. Timely swallow which was symmetrical. Residual noted in the valleculae on solid consistency however pt was able to swallow again and clear residual independently. No nasal reflux demonstrated. He does have a  minimally prominent cricopharyngeal muscle which is currently not bolus hindering. Recommend he continue with regular moist solids and thin liquids. Sit upright for po intake. Encouraged small bites/sips and slow rate. Alternate bites/sips as needed with solids. Continue speech pathology services during and after radiation to maximize swallow function and minimize long term radiation fibrosis and future dysphagia.   Swallow Goal 1   Goal Description 1. Pt will tolerate regular moist solids and thin liquids while adhering to safe swallowing precautions 100% of the time independently.    Target Date 09/18/19   Goal Identifier Diet   Swallow Goal 2   Goal Description 2. Pt will improve and/or maintain ROM and strength of jaw, pharynx and BOT by completing 10 repetitions of 5/5 exercises 3 times daily with minimal written or verbal cues.    Target Date 09/18/19   Goal Identifier Exercise   Total Session Time   SLP Eval: VideoFluoroscopic Swallow function Minutes (66789) 15   Total Evaluation Time 15     Thank you for the referral of Devonte Tucker. If you have any questions about this report, please contact me using the information below.      Pooja Ty MS, CCC-SLP  Speech-Language Pathology  [facility] [place]  Departement of Otolaryngology/D&T - 4th floor  Pager: [phone]  Phone: [phone]  Email: [email or web address]

## 2019-06-21 ENCOUNTER — APPOINTMENT (OUTPATIENT)
Dept: RADIATION ONCOLOGY | Facility: CLINIC | Age: 54
End: 2019-06-21
Attending: RADIOLOGY
Payer: COMMERCIAL

## 2019-06-21 PROCEDURE — 77336 RADIATION PHYSICS CONSULT: CPT | Performed by: RADIOLOGY

## 2019-06-21 PROCEDURE — 77386 ZZH IMRT TREATMENT DELIVERY, COMPLEX: CPT | Performed by: RADIOLOGY

## 2019-06-24 ENCOUNTER — APPOINTMENT (OUTPATIENT)
Dept: RADIATION ONCOLOGY | Facility: CLINIC | Age: 54
End: 2019-06-24
Attending: RADIOLOGY
Payer: COMMERCIAL

## 2019-06-24 VITALS
DIASTOLIC BLOOD PRESSURE: 75 MMHG | HEART RATE: 98 BPM | SYSTOLIC BLOOD PRESSURE: 119 MMHG | BODY MASS INDEX: 45.58 KG/M2 | WEIGHT: 291 LBS

## 2019-06-24 DIAGNOSIS — C03.9 PRIMARY CANCER OF ALVEOLAR RIDGE MUCOSA (H): Primary | ICD-10-CM

## 2019-06-24 PROCEDURE — 77386 ZZH IMRT TREATMENT DELIVERY, COMPLEX: CPT | Performed by: RADIOLOGY

## 2019-06-24 RX ORDER — LIDOCAINE HYDROCHLORIDE 20 MG/ML
15 SOLUTION OROPHARYNGEAL EVERY 4 HOURS PRN
Qty: 100 ML | Refills: 3 | Status: SHIPPED | OUTPATIENT
Start: 2019-06-24 | End: 2019-10-30

## 2019-06-24 NOTE — PROGRESS NOTES
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 Tucker presents to clinic today for his weekly on-treatment visit. His ROS are positive for mild treatment-induced mucositis most prominently involving the palate around the flap and left upper lip. He rates his pain as a 4/10 in severity and continues to eat a normal diet without much difficulty. His nausea has otherwise resolved following initiation of PRN antiemetics and his remaining ROS are negative.    ROS:   Constitutional  Pain (0-10): 4 (mouth)  Fatigue: Mild    CNS  Headaches: None    ENT  Mucositis: Mild to moderate symptoms    Cardiopulmonary  Dyspnea: None    GI  Nausea/vomiting: None    Nutrition  PEG: No  Diet: Normal diet    Integumentary  Dermatitis: None    Objective:   Current weight: 132.0 kg  Last week's weight: 130.2 kg  Starting weight: 131.6 kg    BP: 119/75 (sitting), 129/84 (standing)  Pulse: 98 (sitting), 100 (standing)     General: Alert, oriented in no acute distress  HEENT: Mildly dry mucous memories.  Patchy mucositis involving the visualized margins of the hard palate around the flap and the left upper lip. The flap itself is intact and healthy-appearing. No visible oral cavity thrush.  Cardiac: Extremities are warm and well-perfused  Pulmonary: No wheezing or stridor respiratory distress  Skin: No  erythema    Treatment-related toxicities (CTCAE v5.0):  1. Nausea: Grade 1: Loss of appetite without alteration in eating habits  2. Mucositis: Grade 1: Asymptomatic or mild symptoms; intervention not indicated     Assessment:    Mr. 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 He underwent wide local excision and is receiving adjuvant radiotherapy for improved locoregional disease control. He is tolerating radiotherapy well with the anticipated acute radiation-induced oral mucositis.    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:    Continue radiotherapy    Pain management:    Continue as-needed acetaminophen and start viscous lidocaine as needed for mild to moderate pain    Fluids/Electrolytes/Nutrition:    Continue diet as tolerated    Dermatitis:    Continue twice daily Aquaphor to the left face and bilateral neck    Mosaiq chart and setup information reviewed  IGRT images reviewed     Emerson Verdin MD/PhD    Dept of Radiation Oncology  [facility]

## 2019-06-25 ENCOUNTER — APPOINTMENT (OUTPATIENT)
Dept: RADIATION ONCOLOGY | Facility: CLINIC | Age: 54
End: 2019-06-25
Attending: RADIOLOGY
Payer: COMMERCIAL

## 2019-06-25 ENCOUNTER — THERAPY VISIT (OUTPATIENT)
Dept: SPEECH THERAPY | Facility: CLINIC | Age: 54
End: 2019-06-25
Payer: COMMERCIAL

## 2019-06-25 DIAGNOSIS — R13.12 OROPHARYNGEAL DYSPHAGIA: ICD-10-CM

## 2019-06-25 DIAGNOSIS — C31.0 MAXILLARY SINUS CANCER (H): Primary | ICD-10-CM

## 2019-06-25 PROCEDURE — 77386 ZZH IMRT TREATMENT DELIVERY, COMPLEX: CPT | Performed by: RADIOLOGY

## 2019-06-25 ASSESSMENT — ENCOUNTER SYMPTOMS
WEIGHT GAIN: 0
HOARSE VOICE: 0
CONSTIPATION: 0
CHILLS: 0
HEADACHES: 1
NECK PAIN: 1
BLOOD IN STOOL: 0
JOINT SWELLING: 0
MUSCLE CRAMPS: 1
SINUS CONGESTION: 0
MEMORY LOSS: 0
TASTE DISTURBANCE: 1
TREMORS: 0
TINGLING: 1
BLOATING: 0
FATIGUE: 1
RECTAL PAIN: 0
NUMBNESS: 1
POLYDIPSIA: 1
HEARTBURN: 1
JAUNDICE: 0
SINUS PAIN: 0
WEIGHT LOSS: 0
DECREASED APPETITE: 1
DISTURBANCES IN COORDINATION: 0
BOWEL INCONTINENCE: 0
ABDOMINAL PAIN: 0
POLYPHAGIA: 0
STIFFNESS: 0
BACK PAIN: 1
WEAKNESS: 0
INCREASED ENERGY: 1
SMELL DISTURBANCE: 0
ARTHRALGIAS: 0
VOMITING: 0
TROUBLE SWALLOWING: 1
MYALGIAS: 0
SPEECH CHANGE: 0
ALTERED TEMPERATURE REGULATION: 0
NIGHT SWEATS: 0
HALLUCINATIONS: 0
SORE THROAT: 1
SEIZURES: 0
DIZZINESS: 1
NECK MASS: 0
MUSCLE WEAKNESS: 0
LOSS OF CONSCIOUSNESS: 0
PARALYSIS: 0
DIARRHEA: 0
FEVER: 0
NAUSEA: 1

## 2019-06-26 ENCOUNTER — HOSPITAL ENCOUNTER (OUTPATIENT)
Dept: NUTRITION | Facility: CLINIC | Age: 54
End: 2019-06-26
Attending: RADIOLOGY
Payer: COMMERCIAL

## 2019-06-26 ENCOUNTER — APPOINTMENT (OUTPATIENT)
Dept: RADIATION ONCOLOGY | Facility: CLINIC | Age: 54
End: 2019-06-26
Attending: RADIOLOGY
Payer: COMMERCIAL

## 2019-06-26 PROCEDURE — 77386 ZZH IMRT TREATMENT DELIVERY, COMPLEX: CPT | Performed by: RADIOLOGY

## 2019-06-27 ENCOUNTER — APPOINTMENT (OUTPATIENT)
Dept: RADIATION ONCOLOGY | Facility: CLINIC | Age: 54
End: 2019-06-27
Attending: RADIOLOGY
Payer: COMMERCIAL

## 2019-06-27 PROCEDURE — 77386 ZZH IMRT TREATMENT DELIVERY, COMPLEX: CPT | Performed by: RADIOLOGY

## 2019-06-28 ENCOUNTER — OFFICE VISIT (OUTPATIENT)
Dept: RADIATION ONCOLOGY | Facility: CLINIC | Age: 54
End: 2019-06-28
Attending: RADIOLOGY
Payer: COMMERCIAL

## 2019-06-28 ENCOUNTER — HOSPITAL ENCOUNTER (OUTPATIENT)
Dept: PHYSICAL THERAPY | Facility: CLINIC | Age: 54
Setting detail: THERAPIES SERIES
End: 2019-06-28
Attending: PHYSICAL THERAPIST
Payer: COMMERCIAL

## 2019-06-28 VITALS
WEIGHT: 290 LBS | DIASTOLIC BLOOD PRESSURE: 80 MMHG | SYSTOLIC BLOOD PRESSURE: 120 MMHG | HEART RATE: 86 BPM | BODY MASS INDEX: 45.42 KG/M2

## 2019-06-28 DIAGNOSIS — C03.9 PRIMARY CANCER OF ALVEOLAR RIDGE MUCOSA (H): ICD-10-CM

## 2019-06-28 DIAGNOSIS — G89.3 CANCER ASSOCIATED PAIN: Primary | ICD-10-CM

## 2019-06-28 PROCEDURE — 97140 MANUAL THERAPY 1/> REGIONS: CPT | Mod: GP,ZF | Performed by: PHYSICAL THERAPIST

## 2019-06-28 PROCEDURE — 97535 SELF CARE MNGMENT TRAINING: CPT | Mod: GP,ZF | Performed by: PHYSICAL THERAPIST

## 2019-06-28 PROCEDURE — 77336 RADIATION PHYSICS CONSULT: CPT | Performed by: RADIOLOGY

## 2019-06-28 PROCEDURE — 97110 THERAPEUTIC EXERCISES: CPT | Mod: GP,ZF | Performed by: PHYSICAL THERAPIST

## 2019-06-28 PROCEDURE — 77386 ZZH IMRT TREATMENT DELIVERY, COMPLEX: CPT | Performed by: RADIOLOGY

## 2019-06-28 RX ORDER — OXYCODONE HCL 5 MG/5 ML
5 SOLUTION, ORAL ORAL EVERY 6 HOURS PRN
Qty: 500 ML | Refills: 0 | Status: SHIPPED | OUTPATIENT
Start: 2019-06-28 | End: 2019-07-17

## 2019-06-28 NOTE — LETTER
2019       RE: Devonte Tucker  [address] [place] [address]     Dear Colleague,    Thank you for referring your patient, Devonte Tucker, to the RADIATION ONCOLOGY CLINIC. Please see a copy of my visit note below.    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 Tucker presents to clinic today for his weekly on-treatment visit. His ROS are positive for mild treatment-induced mucositis most prominently involving the palate around the flap and left upper lip. He rates his pain as a 4/10 in severity and continues to eat a normal diet without much difficulty.  He has been using lidocaine and tylenol, but feels he needs more for pain.  He has had a couple of bloody noses. His nausea has otherwise resolved following initiation of PRN antiemetics and his remaining ROS are negative.    ROS:   Constitutional  Pain (0-10): 4 (mouth)  Fatigue: Mild    CNS  Headaches: None    ENT  Mucositis: Mild to moderate symptoms    Cardiopulmonary  Dyspnea: None    GI  Nausea/vomiting: None    Nutrition  PEG: No  Diet: Normal diet    Integumentary  Dermatitis: None    Objective:   Weight:  Wt Readings from Last 5 Encounters:   19 131.5 kg (290 lb)   19 132 kg (291 lb)   19 130.2 kg (287 lb)   06/10/19 131.6 kg (290 lb 1.6 oz)   19 131.3 kg (289 lb 6.4  oz)       Starting weight: 131.6 kg    BP: 120/80    General: Alert, oriented in no acute distress  HEENT: Mildly dry mucous memories.  Patchy with areas of confluent mucositis involving the visualized margins of the hard palate around the flap and the left upper lip. The flap itself is intact and healthy-appearing. No visible oral cavity thrush.  Trismus.  Skin: Mild erythema    Treatment-related toxicities (CTCAE v5.0):  1. Nausea: Grade 1: Loss of appetite without alteration in eating habits  2. Mucositis: Grade 1: Asymptomatic or mild symptoms; intervention not indicated     Assessment:    Mr. 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 He underwent wide local excision and is receiving adjuvant radiotherapy for improved locoregional disease control. He is tolerating radiotherapy well with the anticipated acute radiation-induced oral mucositis.    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:    Continue radiotherapy    Pain management:    Continue as-needed acetaminophen and start viscous lidocaine as needed for mild to moderate pain    Add oxycodone 5ml q6 hours for pain (may need to increase dose as mucositis progresses)  Did discuss constipation and he will add Miralax back into his routine.    Fluids/Electrolytes/Nutrition:    Continue diet as tolerated    Dermatitis:    Continue twice daily Aquaphor to the left face and bilateral neck    He has had some bloody noses and he will start to use Aquaphor in nares.    Mosaiq chart and setup information reviewed  IGRT images reviewed     Concepcion Gutierrez NP  Dept of Radiation Oncology  [facility]     Again, thank you for allowing me to participate in the care of your patient.      Sincerely,    DAT Arce CNP

## 2019-06-28 NOTE — PROGRESS NOTES
RADIATION ONCOLOGY SYMPTOM MANAGMENT VISIT   Encounter Date: 2019    Patient Name: Devonte Tucker  MRN: [id number]  : 1965     Disease and Stage: pT4a N0 M0 squamous cell carcinoma of the left maxillary alveolar ridge  Treatment Site: Left maxilla and bilateral neck  Current Dose/Planned Total Dose: 3000 / 6000 cGy  Daily Fraction Size: 200 cGy/day, 5 times/week  Concurrent Chemotherapy: No    Treatment Summary:    6/10/2019: Initiation of radiotherapy    2019: Mild nausea which is well-controlled with Zofran 8 mg daily    2019: Mildly increased treatment-induced mucositis    ED visits/Hospitalizations:  None    Missed Treatments:  None    Subjective: Mr. Tucker presents to clinic today for his weekly on-treatment visit. His ROS are positive for mild treatment-induced mucositis most prominently involving the palate around the flap and left upper lip. He rates his pain as a 4/10 in severity and continues to eat a normal diet without much difficulty.  He has been using lidocaine and tylenol, but feels he needs more for pain.  He has had a couple of bloody noses. His nausea has otherwise resolved following initiation of PRN antiemetics and his remaining ROS are negative.    ROS:   Constitutional  Pain (0-10): 4 (mouth)  Fatigue: Mild    CNS  Headaches: None    ENT  Mucositis: Mild to moderate symptoms    Cardiopulmonary  Dyspnea: None    GI  Nausea/vomiting: None    Nutrition  PEG: No  Diet: Normal diet    Integumentary  Dermatitis: None    Objective:   Weight:  Wt Readings from Last 5 Encounters:   19 131.5 kg (290 lb)   19 132 kg (291 lb)   19 130.2 kg (287 lb)   06/10/19 131.6 kg (290 lb 1.6 oz)   19 131.3 kg (289 lb 6.4 oz)       Starting weight: 131.6 kg    BP: 120/80    General: Alert, oriented in no acute distress  HEENT: Mildly dry mucous memories.  Patchy with areas of confluent mucositis involving the visualized margins of the hard palate around the flap  and the left upper lip. The flap itself is intact and healthy-appearing. No visible oral cavity thrush.  Trismus.  Skin: Mild erythema    Treatment-related toxicities (CTCAE v5.0):  1. Nausea: Grade 1: Loss of appetite without alteration in eating habits  2. Mucositis: Grade 1: Asymptomatic or mild symptoms; intervention not indicated     Assessment:    Mr. 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 He underwent wide local excision and is receiving adjuvant radiotherapy for improved locoregional disease control. He is tolerating radiotherapy well with the anticipated acute radiation-induced oral mucositis.    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:    Continue radiotherapy    Pain management:    Continue as-needed acetaminophen and start viscous lidocaine as needed for mild to moderate pain    Add oxycodone 5ml q6 hours for pain (may need to increase dose as mucositis progresses)  Did discuss constipation and he will add Miralax back into his routine.    Fluids/Electrolytes/Nutrition:    Continue diet as tolerated    Dermatitis:    Continue twice daily Aquaphor to the left face and bilateral neck    He has had some bloody noses and he will start to use Aquaphor in nares.    [employer] chart and setup information reviewed  IGRT images reviewed     Concepcion Gutierrez NP  Dept of Radiation Oncology  [facility]

## 2019-07-01 ENCOUNTER — OFFICE VISIT (OUTPATIENT)
Dept: ENDOCRINOLOGY | Facility: CLINIC | Age: 54
End: 2019-07-01
Payer: COMMERCIAL

## 2019-07-01 ENCOUNTER — OFFICE VISIT (OUTPATIENT)
Dept: RADIATION ONCOLOGY | Facility: CLINIC | Age: 54
End: 2019-07-01
Attending: RADIOLOGY
Payer: COMMERCIAL

## 2019-07-01 VITALS
WEIGHT: 283.3 LBS | HEART RATE: 93 BPM | BODY MASS INDEX: 44.37 KG/M2 | DIASTOLIC BLOOD PRESSURE: 71 MMHG | SYSTOLIC BLOOD PRESSURE: 109 MMHG

## 2019-07-01 DIAGNOSIS — C03.9 PRIMARY CANCER OF ALVEOLAR RIDGE MUCOSA (H): Primary | ICD-10-CM

## 2019-07-01 DIAGNOSIS — E11.9 TYPE 2 DIABETES MELLITUS WITHOUT COMPLICATION, WITHOUT LONG-TERM CURRENT USE OF INSULIN (H): Primary | ICD-10-CM

## 2019-07-01 DIAGNOSIS — E11.9 TYPE 2 DIABETES MELLITUS WITHOUT COMPLICATION, WITHOUT LONG-TERM CURRENT USE OF INSULIN (H): ICD-10-CM

## 2019-07-01 LAB
ANION GAP SERPL CALCULATED.3IONS-SCNC: 9 MMOL/L (ref 3–14)
BUN SERPL-MCNC: 11 MG/DL (ref 7–30)
CALCIUM SERPL-MCNC: 9.6 MG/DL (ref 8.5–10.1)
CHLORIDE SERPL-SCNC: 102 MMOL/L (ref 94–109)
CHOLEST SERPL-MCNC: 105 MG/DL
CO2 SERPL-SCNC: 24 MMOL/L (ref 20–32)
CREAT SERPL-MCNC: 0.68 MG/DL (ref 0.66–1.25)
CREAT UR-MCNC: 119 MG/DL
DEPRECATED CALCIDIOL+CALCIFEROL SERPL-MC: 25 UG/L (ref 20–75)
ERYTHROCYTE [DISTWIDTH] IN BLOOD BY AUTOMATED COUNT: 12.3 % (ref 10–15)
GFR SERPL CREATININE-BSD FRML MDRD: >90 ML/MIN/{1.73_M2}
GLUCOSE SERPL-MCNC: 140 MG/DL (ref 70–99)
HCT VFR BLD AUTO: 39.8 % (ref 40–53)
HDLC SERPL-MCNC: 38 MG/DL
HGB BLD-MCNC: 12.6 G/DL (ref 13.3–17.7)
LDLC SERPL CALC-MCNC: 41 MG/DL
MCH RBC QN AUTO: 29.8 PG (ref 26.5–33)
MCHC RBC AUTO-ENTMCNC: 31.7 G/DL (ref 31.5–36.5)
MCV RBC AUTO: 94 FL (ref 78–100)
MICROALBUMIN UR-MCNC: 19 MG/L
MICROALBUMIN/CREAT UR: 16.22 MG/G CR (ref 0–17)
NONHDLC SERPL-MCNC: 67 MG/DL
PLATELET # BLD AUTO: 282 10E9/L (ref 150–450)
POTASSIUM SERPL-SCNC: 4.1 MMOL/L (ref 3.4–5.3)
RBC # BLD AUTO: 4.23 10E12/L (ref 4.4–5.9)
SODIUM SERPL-SCNC: 135 MMOL/L (ref 133–144)
TRIGL SERPL-MCNC: 128 MG/DL
WBC # BLD AUTO: 6.7 10E9/L (ref 4–11)

## 2019-07-01 PROCEDURE — 80061 LIPID PANEL: CPT | Performed by: RADIOLOGY

## 2019-07-01 PROCEDURE — 82306 VITAMIN D 25 HYDROXY: CPT | Performed by: RADIOLOGY

## 2019-07-01 PROCEDURE — 80048 BASIC METABOLIC PNL TOTAL CA: CPT | Performed by: RADIOLOGY

## 2019-07-01 PROCEDURE — 82043 UR ALBUMIN QUANTITATIVE: CPT | Performed by: PHYSICIAN ASSISTANT

## 2019-07-01 PROCEDURE — 77386 ZZH IMRT TREATMENT DELIVERY, COMPLEX: CPT | Performed by: RADIOLOGY

## 2019-07-01 PROCEDURE — 36415 COLL VENOUS BLD VENIPUNCTURE: CPT | Performed by: RADIOLOGY

## 2019-07-01 PROCEDURE — 85027 COMPLETE CBC AUTOMATED: CPT | Performed by: RADIOLOGY

## 2019-07-01 RX ORDER — MINERAL OIL, PETROLATUM 425; 573 MG/G; MG/G
OINTMENT OPHTHALMIC
Qty: 1 TUBE | Refills: 3 | Status: SHIPPED | OUTPATIENT
Start: 2019-07-01 | End: 2020-03-12

## 2019-07-01 NOTE — PROGRESS NOTES
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 Post operatively, he required NGT placement for enteral feeding, and experienced hyperglycemia related to enteral feeding. Due to altered PO intake while hospitalized, his PTA oral antihyperglycemics were held while hospitalized. He discharged to [facility] from 5/16-5/30/19. He did eventually cycle tube feeds, and was on NPH insulin given at the time of each daily cycle.    Prior to his discharge from [facility], NGT was removed. He was resumed on Metformin 1000mg BID and Novolog moderate intensity sliding scale, and tolerated a regular diet. BG remained well controlled with minimal Novolog requirement. At the time of discharge, he was instructed to continue the Metformin, and to resume glipizide (immediate, not extended release) at a starting dose of 2.5 to 5 mg daily, if glucoses trended >200 pre-meal.     He is receiving weekly radiation therapy. He has developed some mild mucositis with nausea following his radiation treatments, but sx are controlled with medications and he reports no change in his eating patterns related to these symptoms.   He did call our clinic 6/14 with some elevated FBG to 128-175, however pre-meal BG remained reasonable <150.  He was instructed to initiate glipizide at 2.5 mg daily if FBG remained >150.     In discussion today, he endorses continued FBG in 170-180's. He finds this odd since he does not eat anything overnight, and actually does not eat anything past dinner around 5PM. He checks his bedtime BG nightly and they are mostly , occasionally in 80's. His pre-meal glucoses are consistently 130-140's. He is  able to tolerate PO, but mostly in liquid form due to mucositis from chemo. He has been working with a dietician to help with choices to increase calories without increasing his carb content too much. He likes the premier protein shakes, which are relatively low in CHO.    Pt denies headaches, visual changes, vomiting, SOB at rest, chest pain, abd pain, nausea/vomiting, diarrhea, dysuria, hematuria or foot ulcers.     Current Diabetes Regimen:   Metformin IR 1000mg BID    Glucometer Settings pt did not bring meter  Fasting glucose range: 170-180's  Prandial glucose range: 130-14's  Documented hypoglycemia: none       Weight: 283lbs, from 289 lbs on 5/30/19  Physical Activity: less, but still trying to go for walks every day  Nutrition: regular diet, tolerating. Three meals per day, with 1-2 snacks daily (fruit or cottage cheese, typically). Watches his CHO intake.   Dinner at 5PM, no evening or overnight snacking.     Diabetes Care  Retinopathy: no; has eye exam coming up.     Nephropathy: BP well controlled. No hx microalbuminuria. Creatinine 0.71(stable). Taking ACEi/ARB: yes    Neuropathy: possibly, patient reports some nighttime numbness in bilateral feet     Foot Exam: no wounds or ulcers.     Lipids: no recent labs. Taking statin: yes, ASA: yes  No results found for: LDL     ROS: 10 point review of systems completed; pertinent positives and negatives documented in HPI    Allergies  No Known Allergies    Medications  Current Outpatient Medications   Medication Sig Dispense Refill     acetaminophen (TYLENOL) 325 MG tablet Take 2 tablets (650 mg) by mouth every 4 hours as needed for mild pain 100 tablet 0     aspirin (ASA) 325 MG tablet Take 1 tablet (325 mg) by mouth daily 30 tablet 0     atorvastatin (LIPITOR) 20 MG tablet 1 tablet (20 mg) by Per Feeding Tube route every morning       chlorhexidine (PERIDEX) 0.12 % solution Swish and spit 15 mLs in mouth every 8 hours (Patient not taking: Reported on 7/1/2019)  "1893 mL 0     doxazosin (CARDURA) 1 MG tablet Take 1 tablet (1 mg) by mouth daily 30 tablet 0     gabapentin (NEURONTIN) 300 MG capsule Take 1 capsule (300 mg) by mouth 3 times daily 90 capsule 0     lidocaine HCl (XYLOCAINE) 2 % solution Take 15 mLs by mouth every 4 hours as needed for moderate pain swish and spit every 3-8 hours as needed; max 8 doses/24 hour period 100 mL 3     lisinopril (PRINIVIL/ZESTRIL) 10 MG tablet 1 tablet (10 mg) by Per Feeding Tube route every morning       metFORMIN (GLUCOPHAGE) 1000 MG tablet Take 1 tablet (1,000 mg) by mouth daily (with breakfast) 30 tablet 0     metFORMIN (GLUCOPHAGE) 1000 MG tablet Take 1 tablet (1,000 mg) by mouth daily (with dinner) 30 tablet 0     mineral oil-hydrophilic petrolatum (AQUAPHOR) external ointment Apply topically every 8 hours Apply to neck wound 420 g 0     multivitamin w/minerals (THERA-VIT-M) tablet Take 1 tablet by mouth daily 30 tablet 0     ondansetron (ZOFRAN) 8 MG tablet Take 1 tablet (8 mg) by mouth every 8 hours as needed for nausea 30 tablet 3     order for DME Equipment being ordered: Nasogastric bolus tube feeding supplies  Formula: Isosource 1.5, 5 cans per day  Gravity feeding bags  60 mL syringes    Treatment Diagnosis: squamous cell carcinoma of the oral cavity 14 days 1     order for DME Equipment being ordered: Wound care supplies, 1 each daily x 21 days  Xeroform occlusive gauze 5\" x 9\"  Telfa non-adherent pad 8\" x 3\"  Kerlix bandage roll 4-1/2\" x 4-1/8 yd  ACE wrap, 4 inch (5 total)    Diagnosis: squamous cell carcinoma of the oral cavity 1 Month 0     oxyCODONE (ROXICODONE) 5 MG/5ML solution Take 5 mLs (5 mg) by mouth every 6 hours as needed for severe pain 500 mL 0     polyethylene glycol (MIRALAX/GLYCOLAX) packet Take 17 g by mouth daily as needed for constipation 100 packet 0     senna-docusate (SENOKOT-S/PERICOLACE) 8.6-50 MG tablet Take 2 tablets by mouth 2 times daily as needed for constipation 60 tablet 0       Family " History  family history includes Cancer in his brother and mother; Cardiovascular in his brother; Kidney Cancer in his sister.    Social History   reports that he has never smoked. He has never used smokeless tobacco. He reports that he does not drink alcohol or use drugs.     Past Medical History  Past Medical History:   Diagnosis Date     Diabetes (H)      Hypertension      Maxillary sinus cancer (H)      Obesity        Past Surgical History:   Procedure Laterality Date     ------------OTHER-------------      Mucosa and bone biopsy     EXPLORE NECK Left 5/9/2019    Procedure: Left Neck Exploration;  Surgeon: Jett Treviño MD;  Location: [facility] OR     EXTRACTION(S) DENTAL      x2 under general anesthesia     GRAFT FREE VASCULARIZED (LOCATION) Right 5/9/2019    Procedure: Left Radial Forearm Free Flap (soft tissue);  Surgeon: Sumit Atwood MD;  Location: [facility] OR     GRAFT SKIN SPLIT THICKNESS FROM EXTREMITY Right 5/9/2019    Procedure: Graft skin split thickness from right thigh, nasogastric feeding tube placement;  Surgeon: Sumit Atwood MD;  Location: [facility] OR     IR LYMPH NODE BIOPSY  4/4/2019     OSTEOTOMY MAXILLA Left 5/9/2019    Procedure: Left Infrastructure Maxillectomy,;  Surgeon: Jett Treviño MD;  Location: [facility] OR       Physical Exam  /71   Pulse 93   Wt 128.5 kg (283 lb 4.8 oz)   BMI 44.37 kg/m    Body mass index is 44.37 kg/m .    GENERAL : In no apparent distress  SKIN: Normal color, normal temperature, texture. Slight redness to face at site of radiation tx.   EYES: PERRLA.  No proptosis.  MOUTH: Moist, pink; pharynx clear  NECK: No visible masses. No palpable adenopathy, or masses. No goiter.  RESP: normal respiratory effort.  cough   ABDOMEN: soft, nontender to palpation   NEURO: awake, alert, responds appropriately to questions.  Moves all extremities; Gait normal.     EXTREMITIES: No ulcers or open wounds.     RESULTS    Lab Results   Component Value Date    A1C 7.4 04/24/2019        Creatinine   Date Value Ref Range Status   05/30/2019 0.71 0.66 - 1.25 mg/dL Final     GFR Estimate   Date Value Ref Range Status   05/30/2019 >90 >60 mL/min/[1.73_m2] Final     Comment:     Non  GFR Calc  Starting 12/18/2018, serum creatinine based estimated GFR (eGFR) will be   calculated using the Chronic Kidney Disease Epidemiology Collaboration   (CKD-EPI) equation.       Hemoglobin A1C   Date Value Ref Range Status   04/24/2019 7.4 (H) 0 - 5.6 % Final     Comment:     Normal <5.7% Prediabetes 5.7-6.4%  Diabetes 6.5% or higher - adopted from ADA   consensus guidelines.       Potassium   Date Value Ref Range Status   05/30/2019 4.5 3.4 - 5.3 mmol/L Final     TSH   Date Value Ref Range Status   05/10/2019 0.32 (L) 0.40 - 4.00 mU/L Final       TSH   Date Value Ref Range Status   05/10/2019 0.32 (L) 0.40 - 4.00 mU/L Final       Creatinine   Date Value Ref Range Status   05/30/2019 0.71 0.66 - 1.25 mg/dL Final       No results for input(s): CHOL, HDL, LDL, TRIG, CHOLHDLRATIO in the last 59501 hours.    No results found for: [id number], [id number], [id number]      ASSESSMENT/PLAN:    Devonte is having persistent fasting hyperglycemia on full dose metformin IR; in discussion, he does not eat past 5PM, and his bedtime BG are often 's; I wonder if he I experiencing nighttime hypoglycemia with reactive hyperglycemia subsequently (Somogyi effect). Resuming glipizide is not indicated as his prandial glucoses remain at goal.     1. Diabetes type II, suboptimally controlled, without known complications   -most recent A1c 7.4% on 4/2019   -check BG at 0200 for the next few days (looking to document possible overnight hypoglycemia)   -eat a small, low CHO containing snack with protein before bed (pt will try some cottage cheese)   -continue Metformin 1000mg BID   -to call clinic if having a small bedtime snack has no effect on fasting glucoses.     Due for FLP, urine micro albumin, vitamin D;  ordered labs and pt will attend at earliest convenience     Refills today:  None needed per patient.     Follow up:  1. With diabetes provider in 3 months, sooner if above interventions do not help with FBG.     The patient is  enrolled in [employer] services    This patient has met [place] community measures for diabetes control: no (D5: Control blood pressure ,Lower bad cholesterol Maintain blood sugar, Be tobacco-free, Take aspirin as recommended)    This patient is eligible for graduation from [facility] End[facility] clinic: no    I spent 25 minutes with this patient face to face and explained the conditions and plans (more than 50% of time was counseling/coordination of care, diabetes management, follow up plan for worsening hyper and hypoglycemia) . The patient understood and is satisfied with today's visit.     GIORGI Mccoy, PA-C  [facility]   Pager [phone]

## 2019-07-01 NOTE — PATIENT INSTRUCTIONS
1. Have a small snack (like one fernando cracker or cottage cheese) before bed  2. For the next 2-3 nights, check blood sugars between 2 and 3 AM  3. Call the clinic if your morning blood sugars are still above 150.

## 2019-07-01 NOTE — PROGRESS NOTES
RADIATION ONCOLOGY WEEKLY ON TREATMENT VISIT   Encounter Date: 2019    Patient Name: Devonte Tucker  MRN: [id number]  : 1965     Disease and Stage: pT4a N0 M0 squamous cell carcinoma of the left maxillary alveolar ridge  Treatment Site: Left maxilla and bilateral neck  Current Dose/Planned Total Dose: 3200 / 6000 cGy  Daily Fraction Size: 200 cGy/day, 5 times/week  Concurrent Chemotherapy: No    Treatment Summary:    6/10/2019: Initiation of radiotherapy    2019: Mild nausea which is well-controlled with Zofran 8 mg daily    2019: Mildly increased treatment-induced mucositis    2019: Moderate treatment-induced mucositis. Significant weight loss and dehydration.    ED visits/Hospitalizations:  None    Missed Treatments:  None    Subjective: Mr. Tucker presents to clinic today for his weekly on-treatment visit. He describes moderate oral cavity pain which remains well-controlled with PRN acetaminophen (325 mg 3 times daily) and oxycodone 5-10 mg nightly. His weight is down approximately 4 kg over the past week and he is orthostatic on examination today. This is surprising to him as he reports that he has been eating a larger than normal diet (predominantly soft foods) as well as drinking increased amounts of fluids over the past several weeks. He does endorse some orthostatic symptoms with mild dizziness when going from a sitting to standing position but his remaining ROS are otherwise unremarkable with exception of a mildly dry left eye.    ROS:   Constitutional  Pain (0-10): 4 (mouth), 4 (throat), 4 (skin)  Fatigue: Moderate symptoms    CNS  Headaches: None    ENT  Mucositis: Moderate symptoms    Cardiopulmonary  Dyspnea: None    GI  Nausea/vomiting: Mild intermittent nausea without vomiting    Nutrition  PEG: No  Diet: Soft diet    Integumentary  Dermatitis: Mild symptoms    Objective:   Current weight: 128.4 kg  Last week's weight: 132.0 kg  Starting weight: 131.6 kg    BP:  112/72 (sitting), 72/39 (standing)  Pulse: 88 (sitting), 101 (standing)     General: Alert, oriented in no acute distress  HEENT: EOMI. No conjunctival injection bilaterally. Mildly dry mucous membranes. Patchy mucositis involving the margins of the left maxillary free flap. No evidence of oral thrush. Mild erythema and edema of the left upper lip.  Cardiac: Extremities are warm and well-perfused  Pulmonary: No wheezing or stridor respiratory distress  Skin: Mild erythema of the bilateral neck and left infraorbital face    Treatment-related toxicities (CTCAE v5.0):  1. Nausea: Grade 1: Loss of appetite without alteration in eating habits  2. Mucositis: Grade 2: Moderate pain; not interfering with oral intake; modified diet indicated  3. Dermatitis: Grade 1: Faint erythema or dry desquamation     Assessment:    Mr. 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 He underwent wide local excision and is receiving adjuvant radiotherapy for improved locoregional disease control. He presents to clinic today with worsening weight loss and dehydration secondary to decreased p.o. intake.    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:    Continue radiotherapy    Pain management:    Continue PRN acetaminophen and oxycodone as needed for symptoms    Fluids/Electrolytes/Nutrition:    Continue soft diet with caloric intake goals as delineated by the registered dietitian    Recommended increased fluid intake given orthostatic hypotension    Labs (CBC/BMP) today    IV fluid rehydration in clinic tomorrow (7/2/2019)    Follow-up in radiation oncology clinic on Wednesday, 7/3/2019, for vital sign recheck    Dermatitis:    Continue twice daily Aquaphor to the left face and bilateral neck    Dry eyes:    Start PRN saline eyedrops during the day  with Refresh PM eyedrops at night    Mosaiq chart and setup information reviewed  IGRT images reviewed     Emerson Verdin MD/PhD    Dept of Radiation Oncology  [facility]

## 2019-07-01 NOTE — LETTER
7/1/2019       RE: Devonte Tucker  [address] [place] [address]     Dear Colleague,    Thank you for referring your patient, Devonte Tucker, to the [facility] ENDOCRINOLOGY at [facility]. Please see a copy of my visit note below.    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 Post operatively, he required NGT placement for enteral feeding, and experienced hyperglycemia related to enteral feeding. Due to altered PO intake while hospitalized, his PTA oral antihyperglycemics were held while hospitalized. He discharged to [facility] from 5/16-5/30/19. He did eventually cycle tube feeds, and was on NPH insulin given at the time of each daily cycle.    Prior to his discharge from TCU, NGT was removed. He was resumed on Metformin 1000mg BID and Novolog moderate intensity sliding scale, and tolerated a regular diet. BG remained well controlled with minimal Novolog requirement. At the time of discharge, he was instructed to continue the Metformin, and to resume glipizide (immediate, not extended release) at a starting dose of 2.5 to 5 mg daily, if glucoses trended >200 pre-meal.     He is receiving weekly radiation therapy. He has developed some mild mucositis with nausea following his radiation treatments, but sx are controlled with medications and he reports no change in his eating patterns related to these symptoms.   He did call our clinic 6/14 with some elevated FBG to 128-175, however pre-meal BG remained reasonable <150.  He was instructed to initiate glipizide at 2.5 mg daily if FBG remained >150.     In discussion today, he  endorses continued FBG in 170-180's. He finds this odd since he does not eat anything overnight, and actually does not eat anything past dinner around 5PM. He checks his bedtime BG nightly and they are mostly , occasionally in 80's. His pre-meal glucoses are consistently 130-140's. He is able to tolerate PO, but mostly in liquid form due to mucositis from chemo. He has been working with a dietician to help with choices to increase calories without increasing his carb content too much. He likes the premier protein shakes, which are relatively low in CHO.    Pt denies headaches, visual changes, vomiting, SOB at rest, chest pain, abd pain, nausea/vomiting, diarrhea, dysuria, hematuria or foot ulcers.     Current Diabetes Regimen:   Metformin IR 1000mg BID    Glucometer Settings pt did not bring meter  Fasting glucose range: 170-180's  Prandial glucose range: 130-14's  Documented hypoglycemia: none       Weight: 283lbs, from 289 lbs on 5/30/19  Physical Activity: less, but still trying to go for walks every day  Nutrition: regular diet, tolerating. Three meals per day, with 1-2 snacks daily (fruit or cottage cheese, typically). Watches his CHO intake.   Dinner at 5PM, no evening or overnight snacking.     Diabetes Care  Retinopathy: no; has eye exam coming up.     Nephropathy: BP well controlled. No hx microalbuminuria. Creatinine 0.71(stable). Taking ACEi/ARB: yes    Neuropathy: possibly, patient reports some nighttime numbness in bilateral feet     Foot Exam: no wounds or ulcers.     Lipids: no recent labs. Taking statin: yes, ASA: yes  No results found for: LDL     ROS: 10 point review of systems completed; pertinent positives and negatives documented in HPI    Allergies  No Known Allergies    Medications  Current Outpatient Medications   Medication Sig Dispense Refill     acetaminophen (TYLENOL) 325 MG tablet Take 2 tablets (650 mg) by mouth every 4 hours as needed for mild pain 100 tablet 0     aspirin (ASA)  "325 MG tablet Take 1 tablet (325 mg) by mouth daily 30 tablet 0     atorvastatin (LIPITOR) 20 MG tablet 1 tablet (20 mg) by Per Feeding Tube route every morning       chlorhexidine (PERIDEX) 0.12 % solution Swish and spit 15 mLs in mouth every 8 hours (Patient not taking: Reported on 7/1/2019) 1893 mL 0     doxazosin (CARDURA) 1 MG tablet Take 1 tablet (1 mg) by mouth daily 30 tablet 0     gabapentin (NEURONTIN) 300 MG capsule Take 1 capsule (300 mg) by mouth 3 times daily 90 capsule 0     lidocaine HCl (XYLOCAINE) 2 % solution Take 15 mLs by mouth every 4 hours as needed for moderate pain swish and spit every 3-8 hours as needed; max 8 doses/24 hour period 100 mL 3     lisinopril (PRINIVIL/ZESTRIL) 10 MG tablet 1 tablet (10 mg) by Per Feeding Tube route every morning       metFORMIN (GLUCOPHAGE) 1000 MG tablet Take 1 tablet (1,000 mg) by mouth daily (with breakfast) 30 tablet 0     metFORMIN (GLUCOPHAGE) 1000 MG tablet Take 1 tablet (1,000 mg) by mouth daily (with dinner) 30 tablet 0     mineral oil-hydrophilic petrolatum (AQUAPHOR) external ointment Apply topically every 8 hours Apply to neck wound 420 g 0     multivitamin w/minerals (THERA-VIT-M) tablet Take 1 tablet by mouth daily 30 tablet 0     ondansetron (ZOFRAN) 8 MG tablet Take 1 tablet (8 mg) by mouth every 8 hours as needed for nausea 30 tablet 3     order for DME Equipment being ordered: Nasogastric bolus tube feeding supplies  Formula: Isosource 1.5, 5 cans per day  Gravity feeding bags  60 mL syringes    Treatment Diagnosis: squamous cell carcinoma of the oral cavity 14 days 1     order for DME Equipment being ordered: Wound care supplies, 1 each daily x 21 days  Xeroform occlusive gauze 5\" x 9\"  Telfa non-adherent pad 8\" x 3\"  Kerlix bandage roll 4-1/2\" x 4-1/8 yd  ACE wrap, 4 inch (5 total)    Diagnosis: squamous cell carcinoma of the oral cavity 1 Month 0     oxyCODONE (ROXICODONE) 5 MG/5ML solution Take 5 mLs (5 mg) by mouth every 6 hours as " needed for severe pain 500 mL 0     polyethylene glycol (MIRALAX/GLYCOLAX) packet Take 17 g by mouth daily as needed for constipation 100 packet 0     senna-docusate (SENOKOT-S/PERICOLACE) 8.6-50 MG tablet Take 2 tablets by mouth 2 times daily as needed for constipation 60 tablet 0       Family History  family history includes Cancer in his brother and mother; Cardiovascular in his brother; Kidney Cancer in his sister.    Social History   reports that he has never smoked. He has never used smokeless tobacco. He reports that he does not drink alcohol or use drugs.     Past Medical History  Past Medical History:   Diagnosis Date     Diabetes (H)      Hypertension      Maxillary sinus cancer (H)      Obesity        Past Surgical History:   Procedure Laterality Date     ------------OTHER-------------      Mucosa and bone biopsy     EXPLORE NECK Left 5/9/2019    Procedure: Left Neck Exploration;  Surgeon: Jett Treviño MD;  Location: [facility] OR     EXTRACTION(S) DENTAL      x2 under general anesthesia     GRAFT FREE VASCULARIZED (LOCATION) Right 5/9/2019    Procedure: Left Radial Forearm Free Flap (soft tissue);  Surgeon: Sumit Atwood MD;  Location: [facility] OR     GRAFT SKIN SPLIT THICKNESS FROM EXTREMITY Right 5/9/2019    Procedure: Graft skin split thickness from right thigh, nasogastric feeding tube placement;  Surgeon: Sumit Atwood MD;  Location: [facility] OR     IR LYMPH NODE BIOPSY  4/4/2019     OSTEOTOMY MAXILLA Left 5/9/2019    Procedure: Left Infrastructure Maxillectomy,;  Surgeon: Jett Treviño MD;  Location: [facility] OR       Physical Exam  /71   Pulse 93   Wt 128.5 kg (283 lb 4.8 oz)   BMI 44.37 kg/m     Body mass index is 44.37 kg/m .    GENERAL : In no apparent distress  SKIN: Normal color, normal temperature, texture. Slight redness to face at site of radiation tx.   EYES: PERRLA.  No proptosis.  MOUTH: Moist, pink; pharynx clear  NECK: No visible masses. No palpable adenopathy, or masses. No goiter.  RESP:  normal respiratory effort.  cough   ABDOMEN: soft, nontender to palpation   NEURO: awake, alert, responds appropriately to questions.  Moves all extremities; Gait normal.     EXTREMITIES: No ulcers or open wounds.     RESULTS    Lab Results   Component Value Date    A1C 7.4 04/24/2019       Creatinine   Date Value Ref Range Status   05/30/2019 0.71 0.66 - 1.25 mg/dL Final     GFR Estimate   Date Value Ref Range Status   05/30/2019 >90 >60 mL/min/[1.73_m2] Final     Comment:     Non  GFR Calc  Starting 12/18/2018, serum creatinine based estimated GFR (eGFR) will be   calculated using the Chronic Kidney Disease Epidemiology Collaboration   (CKD-EPI) equation.       Hemoglobin A1C   Date Value Ref Range Status   04/24/2019 7.4 (H) 0 - 5.6 % Final     Comment:     Normal <5.7% Prediabetes 5.7-6.4%  Diabetes 6.5% or higher - adopted from ADA   consensus guidelines.       Potassium   Date Value Ref Range Status   05/30/2019 4.5 3.4 - 5.3 mmol/L Final     TSH   Date Value Ref Range Status   05/10/2019 0.32 (L) 0.40 - 4.00 mU/L Final       TSH   Date Value Ref Range Status   05/10/2019 0.32 (L) 0.40 - 4.00 mU/L Final       Creatinine   Date Value Ref Range Status   05/30/2019 0.71 0.66 - 1.25 mg/dL Final       No results for input(s): CHOL, HDL, LDL, TRIG, CHOLHDLRATIO in the last 69364 hours.    No results found for: [id number], [id number], [id number]      ASSESSMENT/PLAN:    Devonte is having persistent fasting hyperglycemia on full dose metformin IR; in discussion, he does not eat past 5PM, and his bedtime BG are often 's; I wonder if he I experiencing nighttime hypoglycemia with reactive hyperglycemia subsequently (Somogyi effect). Resuming glipizide is not indicated as his prandial glucoses remain at goal.     1. Diabetes type II, suboptimally controlled, without known complications   -most recent A1c 7.4% on 4/2019   -check BG at 0200 for the next few days (looking to document possible overnight  hypoglycemia)   -eat a small, low CHO containing snack with protein before bed (pt will try some cottage cheese)   -continue Metformin 1000mg BID   -to call clinic if having a small bedtime snack has no effect on fasting glucoses.     Due for FLP, urine micro albumin, vitamin D; ordered labs and pt will attend at earliest convenience     Refills today:  None needed per patient.     Follow up:  1. With diabetes provider in 3 months, sooner if above interventions do not help with FBG.     The patient is  enrolled in [employer] services    This patient has met [place] community measures for diabetes control: no (D5: Control blood pressure ,Lower bad cholesterol Maintain blood sugar, Be tobacco-free, Take aspirin as recommended)    This patient is eligible for graduation from [facility] Endocrinology clinic: no    I spent 25 minutes with this patient face to face and explained the conditions and plans (more than 50% of time was counseling/coordination of care, diabetes management, follow up plan for worsening hyper and hypoglycemia) . The patient understood and is satisfied with today's visit.     GIORGI Mccoy, PA-C  [facility]   Pager [phone]

## 2019-07-02 ENCOUNTER — APPOINTMENT (OUTPATIENT)
Dept: RADIATION ONCOLOGY | Facility: CLINIC | Age: 54
End: 2019-07-02
Attending: RADIOLOGY
Payer: COMMERCIAL

## 2019-07-02 PROCEDURE — 77386 ZZH IMRT TREATMENT DELIVERY, COMPLEX: CPT | Performed by: RADIOLOGY

## 2019-07-03 ENCOUNTER — APPOINTMENT (OUTPATIENT)
Dept: RADIATION ONCOLOGY | Facility: CLINIC | Age: 54
End: 2019-07-03
Attending: RADIOLOGY
Payer: COMMERCIAL

## 2019-07-03 PROCEDURE — 77386 ZZH IMRT TREATMENT DELIVERY, COMPLEX: CPT | Performed by: RADIOLOGY

## 2019-07-05 ENCOUNTER — APPOINTMENT (OUTPATIENT)
Dept: RADIATION ONCOLOGY | Facility: CLINIC | Age: 54
End: 2019-07-05
Attending: RADIOLOGY
Payer: COMMERCIAL

## 2019-07-05 VITALS
BODY MASS INDEX: 44.32 KG/M2 | HEART RATE: 86 BPM | WEIGHT: 283 LBS | DIASTOLIC BLOOD PRESSURE: 79 MMHG | SYSTOLIC BLOOD PRESSURE: 130 MMHG

## 2019-07-05 PROCEDURE — 77386 ZZH IMRT TREATMENT DELIVERY, COMPLEX: CPT | Performed by: RADIOLOGY

## 2019-07-08 ENCOUNTER — OFFICE VISIT (OUTPATIENT)
Dept: RADIATION ONCOLOGY | Facility: CLINIC | Age: 54
End: 2019-07-08
Attending: RADIOLOGY
Payer: COMMERCIAL

## 2019-07-08 VITALS
DIASTOLIC BLOOD PRESSURE: 77 MMHG | HEART RATE: 86 BPM | SYSTOLIC BLOOD PRESSURE: 119 MMHG | WEIGHT: 289.4 LBS | BODY MASS INDEX: 45.33 KG/M2

## 2019-07-08 DIAGNOSIS — C03.9 PRIMARY CANCER OF ALVEOLAR RIDGE MUCOSA (H): ICD-10-CM

## 2019-07-08 DIAGNOSIS — C31.0 MAXILLARY SINUS CANCER (H): Primary | ICD-10-CM

## 2019-07-08 PROCEDURE — 77336 RADIATION PHYSICS CONSULT: CPT | Performed by: RADIOLOGY

## 2019-07-08 PROCEDURE — 77386 ZZH IMRT TREATMENT DELIVERY, COMPLEX: CPT | Performed by: RADIOLOGY

## 2019-07-08 NOTE — PROGRESS NOTES
RADIATION ONCOLOGY WEEKLY ON TREATMENT VISIT   Encounter Date: 2019    Patient Name: Devonte Tucker  MRN: [id number]  : 1965     Disease and Stage: pT4a N0 M0 squamous cell carcinoma of the left maxillary alveolar ridge  Treatment Site: Left maxilla and bilateral neck  Current Dose/Planned Total Dose: 4000 / 6000 cGy  Daily Fraction Size: 200 cGy/day, 5 times/week  Concurrent Chemotherapy: No    Treatment Summary:    6/10/2019: Initiation of radiotherapy    2019: Mild nausea which is well-controlled with Zofran 8 mg daily    2019: Mildly increased treatment-induced mucositis    2019: Moderate treatment-induced mucositis. Significant weight loss and dehydration.    2019: Stable treatment-induced mucositis. Significantly improved hydration and caloric intake.    ED visits/Hospitalizations:  None    Missed Treatments:    2019: 1-day treatment break between fractions 18-19 secondary to the  holiday.    Subjective: Mr. Tucker presents to clinic today for his weekly on-treatment visit. He is doing markedly better this week after making a conscious effort to increase his caloric and fluid intake. He is able to eat a soft diet without difficulty. His weight is significantly improved and back to his pre-treatment baseline. He continues to have stable, moderate oral cavity pain which is well-controlled with acetaminophen 325 mg 3 times daily and oxycodone 5 mg nightly. He describes mildly increased dermatitis over the left face and is treating this with multiple applications of Aquaphor throughout the day. His remaining ROS are otherwise negative.    ROS:   Constitutional  Pain (0-10): 4 (mouth), 4 (throat), 6 (skin)  Fatigue: Moderate symptoms    CNS  Headaches: None    ENT  Mucositis: Moderate symptoms    Cardiopulmonary  Dyspnea: None    GI  Nausea/vomiting: Mild intermittent nausea without vomiting    Nutrition  PEG: No  Diet: Soft diet    Integumentary  Dermatitis:  Moderate symptoms    Objective:   Current weight: 131.3 kg  Last week's weight: 128.4 kg  Starting weight: 131.6 kg    BP: 119/77 (sitting), 128/86 (standing)  Pulse: 86 (sitting), 100 (standing)     General: Alert, oriented in no acute distress  HEENT: EOMI. No conjunctival injection bilaterally. There is a linear 1.5 cm shallow ulceration involving the left upper lip. Mildly dry mucous membranes with thickened oral secretions. Confluent mucositis involving the left maxillary free flap and surrounding mucosa. No evidence of oral thrush.  Cardiac: Extremities are warm and well-perfused  Pulmonary: No wheezing or stridor respiratory distress  Skin: Moderate erythema of the left face and upper neck. No desquamation.    Treatment-related toxicities (CTCAE v5.0):  1. Nausea: Grade 1: Loss of appetite without alteration in eating habits  2. Mucositis: Grade 2: Moderate pain; not interfering with oral intake; modified diet indicated  3. Dermatitis: Grade 2: Moderate to brisk erythema; patchy moist desquamation, mostly confined to skin folds and creases; moderate erythema     Assessment:    Mr. 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 He underwent wide local excision and is receiving adjuvant radiotherapy for improved locoregional disease control. His weight and hydration status have markedly improved after he has made a concerted effort to increase his caloric and fluid intake over the past week.    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:    Continue radiotherapy    Pain management:    Continue PRN viscous lidocaine, acetaminophen and oxycodone as needed for symptoms    Fluids/Electrolytes/Nutrition:    Continue soft diet with caloric intake goals as delineated by the registered dietitian    Dermatitis:    Continue twice  daily Aquaphor to the left face and bilateral neck    Provided samples of Cool Magic pads for left face dermatitis    Dry eyes:    Continue PRN saline eyedrops throughout the day with Refresh PM eyedrops at night    Mosaiq chart and setup information reviewed  IGRT images reviewed     Emerson Verdin MD/PhD    Dept of Radiation Oncology  [facility]

## 2019-07-09 ENCOUNTER — APPOINTMENT (OUTPATIENT)
Dept: RADIATION ONCOLOGY | Facility: CLINIC | Age: 54
End: 2019-07-09
Attending: RADIOLOGY
Payer: COMMERCIAL

## 2019-07-09 PROCEDURE — 77386 ZZH IMRT TREATMENT DELIVERY, COMPLEX: CPT | Performed by: RADIOLOGY

## 2019-07-10 ENCOUNTER — APPOINTMENT (OUTPATIENT)
Dept: RADIATION ONCOLOGY | Facility: CLINIC | Age: 54
End: 2019-07-10
Attending: RADIOLOGY
Payer: COMMERCIAL

## 2019-07-10 PROCEDURE — 77386 ZZH IMRT TREATMENT DELIVERY, COMPLEX: CPT | Performed by: RADIOLOGY

## 2019-07-11 ENCOUNTER — APPOINTMENT (OUTPATIENT)
Dept: RADIATION ONCOLOGY | Facility: CLINIC | Age: 54
End: 2019-07-11
Attending: RADIOLOGY
Payer: COMMERCIAL

## 2019-07-11 PROCEDURE — 77386 ZZH IMRT TREATMENT DELIVERY, COMPLEX: CPT | Performed by: RADIOLOGY

## 2019-07-12 ENCOUNTER — APPOINTMENT (OUTPATIENT)
Dept: RADIATION ONCOLOGY | Facility: CLINIC | Age: 54
End: 2019-07-12
Attending: RADIOLOGY
Payer: COMMERCIAL

## 2019-07-12 PROCEDURE — 77386 ZZH IMRT TREATMENT DELIVERY, COMPLEX: CPT | Performed by: RADIOLOGY

## 2019-07-15 ENCOUNTER — OFFICE VISIT (OUTPATIENT)
Dept: RADIATION ONCOLOGY | Facility: CLINIC | Age: 54
End: 2019-07-15
Attending: RADIOLOGY
Payer: COMMERCIAL

## 2019-07-15 ENCOUNTER — HOSPITAL ENCOUNTER (OUTPATIENT)
Dept: NUTRITION | Facility: CLINIC | Age: 54
End: 2019-07-15
Attending: RADIOLOGY
Payer: COMMERCIAL

## 2019-07-15 VITALS
WEIGHT: 281 LBS | HEART RATE: 71 BPM | DIASTOLIC BLOOD PRESSURE: 68 MMHG | BODY MASS INDEX: 44.01 KG/M2 | SYSTOLIC BLOOD PRESSURE: 105 MMHG

## 2019-07-15 DIAGNOSIS — C31.0 MAXILLARY SINUS CANCER (H): Primary | ICD-10-CM

## 2019-07-15 PROCEDURE — 77336 RADIATION PHYSICS CONSULT: CPT | Performed by: RADIOLOGY

## 2019-07-15 PROCEDURE — 77386 ZZH IMRT TREATMENT DELIVERY, COMPLEX: CPT | Performed by: RADIOLOGY

## 2019-07-15 NOTE — PROGRESS NOTES
CLINICAL NUTRITION SERVICES - REASSESSMENT NOTE   EVALUATION OF PREVIOUS PLAN OF CARE:   Referring Physician: Velvet  Pt seen today with his sister  Current diet: soft foods  Current appetite/intake: fair  PEG Tube: No  Chemotherapy: No   Radiation: 6th week     Monitoring from previous assessment:   -Food intake - Brant continues to eat soft foods. He tells me that soft foods have become more difficult to eat, thus relying on more liquid shakes. It is becoming more difficult opening his mouth.   -Liquid meal replacement or supplement - he increased from 2-3 Premier protein shakes/day to 4 shakes/day (160kcal, 30g protein).  He is willing to switch to Glucerna or Boost Glucose control in order to get more calories.  He wants to remain on lower sugar shakes.  He is aware of the product, Benecalorie and feels as though it may be time to get this product.   -Weight trends - down 8 lb x past 1 week  Wt Readings from Last 12 Encounters:   07/15/19 127.5 kg (281 lb)   07/08/19 131.3 kg (289 lb 6.4 oz)   07/01/19 128.4 kg (283 lb)   07/01/19 128.5 kg (283 lb 4.8 oz)   06/28/19 131.5 kg (290 lb)   06/24/19 132 kg (291 lb)   06/17/19 130.2 kg (287 lb)   06/10/19 131.6 kg (290 lb 1.6 oz)   05/30/19 131.3 kg (289 lb 6.4 oz)   05/29/19 131.5 kg (290 lb)   05/24/19 132.5 kg (292 lb)   05/20/19 134.8 kg (297 lb 3.2 oz)     Previous Goals:   1.  Aim for 5-6 small frequent meals - goal met  2.  Aim for 2500kcal and 100g protein/day - goal not met  3. Weight maintenance through cancer treatment/no more that 1-2 lb wt loss weekly - goal not met  Evaluation: Not met   Previous Nutrition Diagnosis:   Inadequate oral intake related to odynophagia, mucositis as evidenced by food choices declining, pt reports altered food choices   Evaluation: Declining   NEW FINDINGS:   8 lb wt loss, odynophagia, pain with opening mouth   CURRENT NUTRITION DIAGNOSIS   Inadequate oral intake related to pain, odynophagia as evidenced by 8 lb wt loss x past 1  week.    INTERVENTIONS   Implementation  General/healthful diet and Medical Food Supplement - reviewed calorie, protein needs.  Encouraged increased calorie supplements such as Glucerna, Boost Glucose Control and Benecalorie.  Reviewed calorie content comparisons.   Goals  1.  Take 3-4 high calorie ONS  2.  Aim for 2500kcal and 100g protein/day  3. Weight maintenance through cancer treatment/no more that 1-2 lb wt loss weekly     Follow-Up Plans: Pt has RD contact information for questions.    Pt to follow up with RD in 1 weeks at the time of treatment.      MONITORING AND EVALUATION:  -Food intake  -Liquid meal replacement or supplement  -Weight trends     Tisha Ventura RDN, LD

## 2019-07-15 NOTE — LETTER
7/15/2019       RE: Devonte Tucker  [address] [place] [address]     Dear Colleague,    Thank you for referring your patient, Dveonte Tucker, to the RADIATION ONCOLOGY CLINIC. Please see a copy of my visit note below.    [facility] PHYSICIANS  SPECIALIZING IN BREAKTHROUGHS  Radiation Oncology    On Treatment Visit Note      Devonte Tucker      Date: 2019   MRN: [id number]   : 1965  Diagnosis: Max Sinus Mass    Treatment Summary to Date  Treatment Site: Lt max alveolar ridge Current Dose: 5000/6000 cGy Fractions:       Chemotherapy  Chemo concurrent with radx?: No    Subjective: Mr. Tucker continues to have oral pain which he is managing with pain medication. He spoke with nutrition today to maintain his caloric intake. He will get hydrated later this week in our clinic.     Nursing ROS:   Nutrition Alteration  Diet Type: Patient's Preference  Skin  Skin Reaction: 2 - Moderate to brisk erythema, patchy moist desquamation, mostly confined to skin folds and creases, moderate edema  Skin Note: Aquaphor     ENT and Mouth Exam  Mucositis - Current: 1 - Generalized erythema  Cardiovascular  Respiratory effort: 1 - Normal - without distress  Gastrointestinal  Nausea: 0 - None     Psychosocial  Mood - Anxiety: 0 - Normal  Pain Assessment  0-10 Pain Scale: 4  Pain Note: Tylenol 3 times a day and Oxycodone    Objective:   /68   Pulse 71   Wt 127.5 kg (281 lb)   BMI 44.01 kg/m     Gen: Appears well, NAD  Skin: Mild diffuse erythema over treatment field    Assessment:    Tolerating radiation therapy with appropriate symptomatic management.  All questions and concerns addressed.    Plan:   1. Continue current therapy    Mosaiq chart and setup information reviewed. Imaging reviewed.     Yesy Tracy MD  Pager: [phone]          Again, thank you for allowing me to participate in the care of your patient.      Sincerely,    Yesy Tracy MD

## 2019-07-16 ENCOUNTER — THERAPY VISIT (OUTPATIENT)
Dept: SPEECH THERAPY | Facility: CLINIC | Age: 54
End: 2019-07-16
Payer: COMMERCIAL

## 2019-07-16 ENCOUNTER — APPOINTMENT (OUTPATIENT)
Dept: RADIATION ONCOLOGY | Facility: CLINIC | Age: 54
End: 2019-07-16
Attending: RADIOLOGY
Payer: COMMERCIAL

## 2019-07-16 DIAGNOSIS — C31.0 MAXILLARY SINUS CANCER (H): Primary | ICD-10-CM

## 2019-07-16 DIAGNOSIS — R13.12 OROPHARYNGEAL DYSPHAGIA: ICD-10-CM

## 2019-07-16 PROCEDURE — 77386 ZZH IMRT TREATMENT DELIVERY, COMPLEX: CPT | Performed by: RADIOLOGY

## 2019-07-17 ENCOUNTER — APPOINTMENT (OUTPATIENT)
Dept: RADIATION ONCOLOGY | Facility: CLINIC | Age: 54
End: 2019-07-17
Attending: RADIOLOGY
Payer: COMMERCIAL

## 2019-07-17 DIAGNOSIS — G89.3 CANCER ASSOCIATED PAIN: ICD-10-CM

## 2019-07-17 DIAGNOSIS — C03.9 PRIMARY CANCER OF ALVEOLAR RIDGE MUCOSA (H): ICD-10-CM

## 2019-07-17 PROCEDURE — 77386 ZZH IMRT TREATMENT DELIVERY, COMPLEX: CPT | Performed by: RADIOLOGY

## 2019-07-17 RX ORDER — SODIUM FLUORIDE 5 MG/G
GEL, DENTIFRICE DENTAL AT BEDTIME
Qty: 112 G | Refills: 11 | Status: SHIPPED | OUTPATIENT
Start: 2019-07-17 | End: 2020-11-09

## 2019-07-17 RX ORDER — OXYCODONE HCL 5 MG/5 ML
5 SOLUTION, ORAL ORAL 3 TIMES DAILY PRN
Qty: 500 ML | Refills: 0 | Status: SHIPPED | OUTPATIENT
Start: 2019-07-17 | End: 2020-12-09

## 2019-07-17 NOTE — PROGRESS NOTES
[facility] PHYSICIANS  SPECIALIZING IN BREAKTHROUGHS  Radiation Oncology    On Treatment Visit Note      Devonte Tucker      Date: 2019   MRN: [id number]   : 1965  Diagnosis: Max Sinus Mass    Treatment Summary to Date  Treatment Site: Lt max alveolar ridge Current Dose: 5000/6000 cGy Fractions:       Chemotherapy  Chemo concurrent with radx?: No    Subjective: Mr. Tucker continues to have oral pain which he is managing with pain medication. He spoke with nutrition today to maintain his caloric intake. He will get hydrated later this week in our clinic.     Nursing ROS:   Nutrition Alteration  Diet Type: Patient's Preference  Skin  Skin Reaction: 2 - Moderate to brisk erythema, patchy moist desquamation, mostly confined to skin folds and creases, moderate edema  Skin Note: Aquaphor     ENT and Mouth Exam  Mucositis - Current: 1 - Generalized erythema  Cardiovascular  Respiratory effort: 1 - Normal - without distress  Gastrointestinal  Nausea: 0 - None     Psychosocial  Mood - Anxiety: 0 - Normal  Pain Assessment  0-10 Pain Scale: 4  Pain Note: Tylenol 3 times a day and Oxycodone    Objective:   /68   Pulse 71   Wt 127.5 kg (281 lb)   BMI 44.01 kg/m    Gen: Appears well, NAD  Skin: Mild diffuse erythema over treatment field    Assessment:    Tolerating radiation therapy with appropriate symptomatic management.  All questions and concerns addressed.    Plan:   1. Continue current therapy    Mosaiq chart and setup information reviewed. Imaging reviewed.     Yesy Tracy MD  Pager: [phone]

## 2019-07-18 ENCOUNTER — APPOINTMENT (OUTPATIENT)
Dept: RADIATION ONCOLOGY | Facility: CLINIC | Age: 54
End: 2019-07-18
Attending: RADIOLOGY
Payer: COMMERCIAL

## 2019-07-18 PROCEDURE — 77386 ZZH IMRT TREATMENT DELIVERY, COMPLEX: CPT | Performed by: RADIOLOGY

## 2019-07-19 ENCOUNTER — APPOINTMENT (OUTPATIENT)
Dept: RADIATION ONCOLOGY | Facility: CLINIC | Age: 54
End: 2019-07-19
Attending: RADIOLOGY
Payer: COMMERCIAL

## 2019-07-19 PROCEDURE — 77386 ZZH IMRT TREATMENT DELIVERY, COMPLEX: CPT | Performed by: RADIOLOGY

## 2019-07-22 ENCOUNTER — OFFICE VISIT (OUTPATIENT)
Dept: RADIATION ONCOLOGY | Facility: CLINIC | Age: 54
End: 2019-07-22
Attending: RADIOLOGY
Payer: COMMERCIAL

## 2019-07-22 ENCOUNTER — HOSPITAL ENCOUNTER (OUTPATIENT)
Dept: NUTRITION | Facility: CLINIC | Age: 54
Discharge: HOME | End: 2019-07-22
Attending: DIETITIAN, REGISTERED | Admitting: DIETITIAN, REGISTERED
Payer: COMMERCIAL

## 2019-07-22 VITALS
DIASTOLIC BLOOD PRESSURE: 74 MMHG | SYSTOLIC BLOOD PRESSURE: 114 MMHG | BODY MASS INDEX: 43.1 KG/M2 | WEIGHT: 275.2 LBS | HEART RATE: 100 BPM

## 2019-07-22 DIAGNOSIS — C03.9 PRIMARY CANCER OF ALVEOLAR RIDGE MUCOSA (H): Primary | ICD-10-CM

## 2019-07-22 PROCEDURE — 97803 MED NUTRITION INDIV SUBSEQ: CPT | Performed by: DIETITIAN, REGISTERED

## 2019-07-22 PROCEDURE — 77336 RADIATION PHYSICS CONSULT: CPT | Performed by: RADIOLOGY

## 2019-07-22 PROCEDURE — 77386 ZZH IMRT TREATMENT DELIVERY, COMPLEX: CPT | Performed by: RADIOLOGY

## 2019-07-22 NOTE — PROGRESS NOTES
CLINICAL NUTRITION SERVICES - REASSESSMENT NOTE   EVALUATION OF PREVIOUS PLAN OF CARE:   Referring Physician: Velvet  Time spent with patient: 15 minutes.    Current diet: liquids  Current appetite/intake: poor  PEG Tube: No  Chemotherapy: No   Radiation: completed today     Monitoring from previous assessment:   -Food intake - eating has become more difficult this week.  Brant is now primarily taking liquids only.    He is taking Meal Replacement shakes which are higher in calories the Premier protein shakes he was taking.  He has only been taking 2-3/day.   -Weight trends - down 6 lb x past 1 week, 14 lb x past 2 weeks  Wt Readings from Last 10 Encounters:   07/22/19 124.8 kg (275 lb 3.2 oz)   07/15/19 127.5 kg (281 lb)   07/08/19 131.3 kg (289 lb 6.4 oz)   07/01/19 128.4 kg (283 lb)   07/01/19 128.5 kg (283 lb 4.8 oz)   06/28/19 131.5 kg (290 lb)   06/24/19 132 kg (291 lb)   06/17/19 130.2 kg (287 lb)   06/10/19 131.6 kg (290 lb 1.6 oz)   05/30/19 131.3 kg (289 lb 6.4 oz)     Previous Goals:   1.  Take 3-4 high calorie ONS - goal not met  2.  Aim for 2500kcal and 100g protein/day - goal not met  3. Weight maintenance through cancer treatment/no more that 1-2 lb wt loss weekly -  goal not met  Evaluation: Not met   Previous Nutrition Diagnosis:   Inadequate oral intake related to pain, odynophagia as evidenced by 8 lb wt loss x past 1 week.    Evaluation: No change     CURRENT NUTRITION DIAGNOSIS   Inadequate oral intake related to pain, odynophagia as evidenced by 14 lb wt loss x past 2 weeks.    INTERVENTIONS  Implementation  Medical Food Supplement - reviewed calorie/protein needs and ways to meet his goal with liquid nutrition.  Encouraged at least 2600kcal, 100g protein/day. Provided pt with a sample of ProStat for wound healing from radiation.  Advised him to take 1 oz 2-3 times/day.       Goals  1.  Take 3-4 high calorie ONS  2.  Aim for 2600kcal and 100g protein/day     Follow-Up Plans: Pt has RD contact  information for questions.       Tisha Ventura RDN, LD

## 2019-07-22 NOTE — PROGRESS NOTES
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 Significant weight loss and dehydration.    2019: Stable treatment-induced mucositis. Significantly improved hydration and caloric intake.    7/15/2019: Stable oral cavity pain.    2019: Slightly increased oral cavity pain and treatment-induced dysgeusia. Mild to moderate ongoing weight loss.    ED visits/Hospitalizations:  None    Missed Treatments:    2019: 1-day treatment break between fractions 18-19 secondary to the  holiday.    Subjective: Mr. Tucker presents to clinic today for his weekly on-treatment visit. He describes mildly increased oral cavity pain over the past week which remains well-controlled with PRN oxycodone. His p.o. intake has diminished due to a combination of odynophagia, mucositis and treatment-induced dysgeusia and his weight is approximately down 3 kg over the past week. His remaining ROS are otherwise unchanged from last week.    ROS:   Constitutional  Pain (0-10): 6 (mouth), 7 (throat), 8 (skin)  Fatigue: Moderate symptoms    CNS  Headaches: None    ENT  Mucositis: Moderate symptoms    Cardiopulmonary  Dyspnea: None    GI  Nausea/vomiting: Mild intermittent nausea without vomiting    Nutrition  PEG: No  Diet: Puréed/liquid diet    Integumentary  Dermatitis: Moderate symptoms    Objective:   Current weight: 124.8  kg  Last week's weight: 127.5 kg  Starting weight: 131.6 kg    BP: 114/74 (sitting), 120/80 (standing)  Pulse: 100 (sitting), 88 (standing)     General: Alert, oriented in no acute distress  HEENT: EOMI. No conjunctival injection bilaterally. Moderate mucositis involving the left maxillary free flap and surrounding gingiva. Dry mucous membranes with thickened oral cavity secretions. No evidence of thrush.  Cardiac: Extremities are warm and well-perfused  Pulmonary: No wheezing or stridor respiratory distress  Skin: Moderate erythema of the left face and bilateral neck. Patchy areas of desquamation confined to the skin folds within the right neck and extending beyond the skin folds of the left neck.    Treatment-related toxicities (CTCAE v5.0):  1. Nausea: Grade 1: Loss of appetite without alteration in eating habits  2. Mucositis: Grade 2: Moderate pain; not interfering with oral intake; modified diet indicated  3. Dermatitis: Grade 2: Moderate to brisk erythema; patchy moist desquamation, mostly confined to skin folds and creases; moderate erythema     Assessment:    Mr. 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 He underwent wide local excision and is receiving adjuvant radiotherapy for improved locoregional disease control. He has tolerated treatment reasonably well with continued mild to moderate weight loss due to treatment-induced mucositis, dysgeusia and odynophagia.    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:    Complete radiotherapy today    Follow-up in radiation oncology clinic with NP in 2 weeks and with MD in 6 weeks    Pain management:    Continue PRN viscous lidocaine, acetaminophen and oxycodone as needed for symptoms    Fluids/Electrolytes/Nutrition:    Continue soft/liquid diet with caloric intake  goals as delineated by the registered dietitian    Dermatitis:    Continue BID/TID Aquaphor to the left face and bilateral neck    Dry eyes:    Continue PRN saline eyedrops throughout the day with Refresh PM eyedrops at night    Mosaiq chart and setup information reviewed  IGRT images reviewed     Emerson Verdin MD/PhD    Dept of Radiation Oncology  [facility]

## 2019-07-22 NOTE — LETTER
2019       RE: Devonte Tucker  [address] [place] [address]     Dear Colleague,    Thank you for referring your patient, Devonte Tucker, to the RADIATION ONCOLOGY CLINIC. Please see a copy of my visit note below.    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 Significant weight loss and dehydration.    2019: Stable treatment-induced mucositis. Significantly improved hydration and caloric intake.    7/15/2019: Stable oral cavity pain.    2019: Slightly increased oral cavity pain and treatment-induced dysgeusia. Mild to moderate ongoing weight loss.    ED visits/Hospitalizations:  None    Missed Treatments:    2019: 1-day treatment break between fractions 18-19 secondary to the  holiday.    Subjective: Mr. Tucker presents to clinic today for his weekly on-treatment visit. He describes mildly increased oral cavity pain over the past week which remains well-controlled with PRN oxycodone. His p.o. intake has diminished due to a combination of odynophagia, mucositis and treatment-induced dysgeusia and his weight is approximately down 3 kg over the past week. His remaining ROS are otherwise unchanged from last week.    ROS:   Constitutional  Pain (0-10): 6 (mouth), 7 (throat), 8 (skin)  Fatigue: Moderate symptoms    CNS  Headaches: None    ENT  Mucositis: Moderate  symptoms    Cardiopulmonary  Dyspnea: None    GI  Nausea/vomiting: Mild intermittent nausea without vomiting    Nutrition  PEG: No  Diet: Puréed/liquid diet    Integumentary  Dermatitis: Moderate symptoms    Objective:   Current weight: 124.8 kg  Last week's weight: 127.5 kg  Starting weight: 131.6 kg    BP: 114/74 (sitting), 120/80 (standing)  Pulse: 100 (sitting), 88 (standing)     General: Alert, oriented in no acute distress  HEENT: EOMI. No conjunctival injection bilaterally. Moderate mucositis involving the left maxillary free flap and surrounding gingiva. Dry mucous membranes with thickened oral cavity secretions. No evidence of thrush.  Cardiac: Extremities are warm and well-perfused  Pulmonary: No wheezing or stridor respiratory distress  Skin: Moderate erythema of the left face and bilateral neck. Patchy areas of desquamation confined to the skin folds within the right neck and extending beyond the skin folds of the left neck.    Treatment-related toxicities (CTCAE v5.0):  1. Nausea: Grade 1: Loss of appetite without alteration in eating habits  2. Mucositis: Grade 2: Moderate pain; not interfering with oral intake; modified diet indicated  3. Dermatitis: Grade 2: Moderate to brisk erythema; patchy moist desquamation, mostly confined to skin folds and creases; moderate erythema     Assessment:    Mr. 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 He underwent wide local excision and is receiving adjuvant radiotherapy for improved locoregional disease control. He has tolerated treatment reasonably well with continued mild to moderate weight loss due to treatment-induced mucositis, dysgeusia and odynophagia.    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:    Complete radiotherapy today    Follow-up in radiation  oncology clinic with NP in 2 weeks and with MD in 6 weeks    Pain management:    Continue PRN viscous lidocaine, acetaminophen and oxycodone as needed for symptoms    Fluids/Electrolytes/Nutrition:    Continue soft/liquid diet with caloric intake goals as delineated by the registered dietitian    Dermatitis:    Continue BID/TID Aquaphor to the left face and bilateral neck    Dry eyes:    Continue PRN saline eyedrops throughout the day with Refresh PM eyedrops at night    Mosaiq chart and setup information reviewed  IGRT images reviewed     Emerson Verdin MD/PhD    Dept of Radiation Oncology  [facility]     Again, thank you for allowing me to participate in the care of your patient.      Sincerely,    Emerson Verdin MD

## 2019-07-26 ENCOUNTER — HOSPITAL ENCOUNTER (OUTPATIENT)
Dept: PHYSICAL THERAPY | Facility: CLINIC | Age: 54
Setting detail: THERAPIES SERIES
End: 2019-07-26
Attending: FAMILY MEDICINE
Payer: COMMERCIAL

## 2019-07-26 PROCEDURE — 97110 THERAPEUTIC EXERCISES: CPT | Mod: GP,ZF | Performed by: PHYSICAL THERAPIST

## 2019-07-26 PROCEDURE — 97140 MANUAL THERAPY 1/> REGIONS: CPT | Mod: GP,ZF | Performed by: PHYSICAL THERAPIST

## 2019-07-26 NOTE — PROCEDURES
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     Treatment Summary:  Treatment Site Dose  Modality From  To  Elapsed Days Fx.  Tumor bed   6,000 cGy 06 X   6/10/2019  2019  42  30  Operative bed  5,700 cGy 06 X   6/10/2019  2019  42  30  Elective lymphatics  5,400 cGy 06 X   6/10/2019  2019  42  30     Dose per Fraction:   Tumor bed: 200 cGy  Operative bed: 190 cGy  Elective lymphatics: 180 cGy     COMMENTS:                      Mr. 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 He underwent a left infrastructure maxillectomy and left level I lymph node dissection on 2019 with pathology revealing a 1.3 cm well-differentiated squamous cell carcinoma with 13 mm DOI and invasion into the underlying bone. He received adjuvant radiotherapy alone for improved local disease control.      The tumor bed was treated to a total dose of 6000 cGy delivered in 30 once-daily fractions with 6 MV photons delivered via a 3-arc volumetric modulated arc therapy technique. The operative bed including the dissected left level I and elective lymphatics consisting of the remainder of bilateral levels IB-IV received 5,700 and 5,400 cGy, respectively, using a simultaneous integrated boost.     Mr. Kovacs tolerated treatment reasonably well with the development of grade 2 mucositis and dermatitis by the  completion of therapy which were managed with frequent salt/soda rinses and topical Aquaphor. His pain remained well-controlled on a regimen of as-needed viscous lidocaine, acetaminophen and oxycodone. He lost approximately 7 kg over the course of his therapy predominantly due to decreased PO intake resultant from his radiation-induced mucositis and dysgeusia. He had a planned 1-day break between fractions 18-19 for the 4th of July holiday but otherwise did not require any unplanned breaks in therapy.      Acute Toxicity Profile (CTCAE v5.0)  Nausea: Grade 1  Dermatitis: Grade 2  Mucositis: Grade 2     PAIN MANAGEMENT:   Continue as-needed viscous lidocaine and acetaminophen for mild to moderate symptoms  Continue oxycodone 5 mg TID as needed for moderate to severe pain     FOLLOW UP PLAN:   Follow-up in radiation oncology clinic with NP in 2 weeks and with MD in 6 weeks     Resident Physician:   Staff Physician: Emerson Verdin MD, PhD  Physicist: Isabel Slater, PhD     CC:   MD Jett Rhodes MD                                 Radiation Oncology:  [facility] [address] [place] [address]

## 2019-07-31 ENCOUNTER — HOSPITAL ENCOUNTER (OUTPATIENT)
Dept: PHYSICAL THERAPY | Facility: CLINIC | Age: 54
Setting detail: THERAPIES SERIES
End: 2019-07-31
Attending: FAMILY MEDICINE
Payer: COMMERCIAL

## 2019-07-31 DIAGNOSIS — I89.0 LYMPHEDEMA OF FACE: Primary | ICD-10-CM

## 2019-07-31 DIAGNOSIS — C03.9 PRIMARY CANCER OF ALVEOLAR RIDGE MUCOSA (H): ICD-10-CM

## 2019-07-31 PROCEDURE — 97535 SELF CARE MNGMENT TRAINING: CPT | Mod: GP,ZF | Performed by: PHYSICAL THERAPIST

## 2019-07-31 PROCEDURE — 97140 MANUAL THERAPY 1/> REGIONS: CPT | Mod: GP,ZF | Performed by: PHYSICAL THERAPIST

## 2019-08-01 ENCOUNTER — OFFICE VISIT (OUTPATIENT)
Dept: RADIATION ONCOLOGY | Facility: CLINIC | Age: 54
End: 2019-08-01
Attending: RADIOLOGY
Payer: COMMERCIAL

## 2019-08-01 VITALS — DIASTOLIC BLOOD PRESSURE: 63 MMHG | WEIGHT: 272 LBS | BODY MASS INDEX: 42.6 KG/M2 | SYSTOLIC BLOOD PRESSURE: 103 MMHG

## 2019-08-01 DIAGNOSIS — C03.9 PRIMARY CANCER OF ALVEOLAR RIDGE MUCOSA (H): Primary | ICD-10-CM

## 2019-08-01 PROCEDURE — G0463 HOSPITAL OUTPT CLINIC VISIT: HCPCS | Performed by: NURSE PRACTITIONER

## 2019-08-01 NOTE — LETTER
2019       RE: Devonte Tucker  [address] [place] [address]     Dear Colleague,    Thank you for referring your patient, Devonte Tucker, to the RADIATION ONCOLOGY CLINIC. Please see a copy of my visit note below.    FOLLOW-UP VISIT    Patient Name: Devonte Tucker      : 1965     Age: 53 year old        ______________________________________________________________________________     Chief Complaint   Patient presents with     Cancer     Pt is here for a follow up:Maxillary Sinus Cancer: Left max aveolar ridge 6000 cGy completed 19     /63   Wt 123.4 kg (272 lb)   BMI 42.60 kg/m          Pain  Current history of pain associated with this visit:   Intensity: 4/10  Current: aching  Location: Throat  Treatment: Oxycodone at night    Labs  Other Labs: No    Imaging  None      Dental:   Most Recent Dental Visit: No  Trays: Frequency daily    Speech/Swallowing:   Most Recent evaluation or testing: No  Swallowing Restrictions: soft foods    Trismus/Jaw Exercises: Yes    Nutrition:    Weight:   Wt Readings from Last 3 Encounters:   19 123.4 kg (272 lb)   19 124.8 kg (275 lb 3.2 oz)   07/15/19 127.5 kg (281 lb)         Oral Symptoms:   Xerostomia:1- Symptomatic without significant dietary alteration; unstimulated saliva flow >0.2 ml/min  Dysphagia: 2- symptomatic and altered eating/swallowing  Mucositis Oral Symptoms: 0-None  Mucositis: 0- None  Esophagitis:0- None    Other Appointments:     MD Name: Dr Treviño Appointment Date: 19   MD Name: Appointment Date:   MD Name: Appointment Date:   Other Appointment Notes:     Residual Radiation side effect: Throat still sore, skin healing nicely     Additional Instructions:     Nurse face-to-face time: Level 3:  10 min face to face time          Radiation Oncology Follow-up Visit  2019    Devonte Tucker  MRN: [id number]   : 1965     DISEASE TREATED:   Squamous cell carcinoma of the left  alveolar ridge and maxilla, pT4a N0 M0    RADIATION THERAPY DELIVERED:   6,000 cGy completed 7/22/19    SYSTEMIC TREATMENT:  None    INTERVAL SINCE COMPLETION OF RADIATION THERAPY:   1.5 week    SUBJECTIVE/HPI:  Devonte Tucker is a 53 year old male who is here today for routine 2 week follow up after completing radiation therapy.  He is noticing improvement of all of his acute side effects.  He is back to work because he ran out of PTO. He is eating soft foods and maintaining his weight.  He notes dysgeusia. He is still having pain with swallowing and is taking oxycodone in the evening and before bed.  He is still having nasal congestion and bloody noses.  His skin is improving and he is using aquaphor.    ROS:  Complete review of systems is negative except for symptoms discussed in subjective above.    Current Outpatient Medications   Medication     atorvastatin (LIPITOR) 20 MG tablet     doxazosin (CARDURA) 1 MG tablet     gabapentin (NEURONTIN) 300 MG capsule     lisinopril (PRINIVIL/ZESTRIL) 10 MG tablet     metFORMIN (GLUCOPHAGE) 1000 MG tablet     multivitamin w/minerals (THERA-VIT-M) tablet     oxyCODONE (ROXICODONE) 5 MG/5ML solution     polyethylene glycol (MIRALAX/GLYCOLAX) packet     senna-docusate (SENOKOT-S/PERICOLACE) 8.6-50 MG tablet     sodium fluoride dental gel (PREVIDENT) 1.1 % GEL topical gel     White Petrolatum-Mineral Oil (REFRESH P.M.) OINT     acetaminophen (TYLENOL) 325 MG tablet     aspirin (ASA) 325 MG tablet     lidocaine HCl (XYLOCAINE) 2 % solution     metFORMIN (GLUCOPHAGE) 1000 MG tablet     mineral oil-hydrophilic petrolatum (AQUAPHOR) external ointment     ondansetron (ZOFRAN) 8 MG tablet     order for DME     order for DME     order for DME     No current facility-administered medications for this visit.      Facility-Administered Medications Ordered in Other Visits   Medication     barium sulfate (EZ-DISK) tablet 700 mg        No Known Allergies    Past Medical History:    Diagnosis Date     Diabetes (H)      Hypertension      Maxillary sinus cancer (H)      Obesity          PHYSICAL EXAM:  /63   Wt 123.4 kg (272 lb)   BMI 42.60 kg/m     Gen: Alert, in NAD  Eyes: PERRL, EOMI, sclera anicteric  HENT     Head: NC/AT     Ears: TM's clear, no external lesions.     Oral Cavity/Oropharynx: Dry mucous membranes with thickened secretions.  Mild mucositis remains.  No thrush.  Neck: Moderate  to mild erythema of the bilateral neck.  No desquamation. No lymphedema. No palpable cervical adenopathy.  Pulm: No wheezing, stridor or respiratory distress  CV: Well-perfused, no cyanosis  Musculoskeletal: Normal bulk and tone   Skin: Erythema and desquamation of the neck as described above otherwise normal color and turgor.      LABS AND IMAGING:  Reviewed.    IMPRESSION:   Mr. Tucker is a 53 year old male with a SCC of left alveolar ridge and maxilla  s/p radiation now almost 2 weeks out since completion of treatment and doing well with slow improvement of acute side effects.    PLAN:   1.  Follow up with Dr. Wolfe in 1 month.   Continue close follow up with ENT.  2. Continue to moisturize with aquaphor and when skin is completely healed can change to preferred moisturizer.  Discussed importance of sun avoidance or sun protection.  3. Fatigue should continue to improve.  4. Continue oral cares with salt and soda rinses.  Routine dental follow up at least every 6 months.  Continue to use fluoride trays or fluoride treatments. Continue jaw, neck and swallowing exercises.  5. Treatment related pain should continue to diminish.  Recommended to slowly wean off pain medications when pain decreases.  6. Continue to push fluids and caloric intake to maintain weight.  Try to increase oral intake and decrease calories through PEG tube.     Concepcion Gutierrez NP   Radiation Oncology

## 2019-08-01 NOTE — PROGRESS NOTES
FOLLOW-UP VISIT    Patient Name: Devonte Tucker      : 1965     Age: 53 year old        ______________________________________________________________________________     Chief Complaint   Patient presents with     Cancer     Pt is here for a follow up:Maxillary Sinus Cancer: Left max aveolar ridge 6000 cGy completed 19     /63   Wt 123.4 kg (272 lb)   BMI 42.60 kg/m         Pain  Current history of pain associated with this visit:   Intensity: 4/10  Current: aching  Location: Throat  Treatment: Oxycodone at night    Labs  Other Labs: No    Imaging  None      Dental:   Most Recent Dental Visit: No  Trays: Frequency daily    Speech/Swallowing:   Most Recent evaluation or testing: No  Swallowing Restrictions: soft foods    Trismus/Jaw Exercises: Yes    Nutrition:    Weight:   Wt Readings from Last 3 Encounters:   19 123.4 kg (272 lb)   19 124.8 kg (275 lb 3.2 oz)   07/15/19 127.5 kg (281 lb)         Oral Symptoms:   Xerostomia:1- Symptomatic without significant dietary alteration; unstimulated saliva flow >0.2 ml/min  Dysphagia: 2- symptomatic and altered eating/swallowing  Mucositis Oral Symptoms: 0-None  Mucositis: 0- None  Esophagitis:0- None    Other Appointments:     MD Name: Dr Treviño Appointment Date: 19   MD Name: Appointment Date:   MD Name: Appointment Date:   Other Appointment Notes:     Residual Radiation side effect: Throat still sore, skin healing nicely     Additional Instructions:     Nurse face-to-face time: Level 3:  10 min face to face time

## 2019-08-01 NOTE — PROGRESS NOTES
Radiation Oncology Follow-up Visit  2019    Devonte Tucker  MRN: [id number]   : 1965     DISEASE TREATED:   Squamous cell carcinoma of the left alveolar ridge and maxilla, pT4a N0 M0    RADIATION THERAPY DELIVERED:   6,000 cGy completed 19    SYSTEMIC TREATMENT:  None    INTERVAL SINCE COMPLETION OF RADIATION THERAPY:   1.5 week    SUBJECTIVE/HPI:  Devonte Tucker is a 53 year old male who is here today for routine 2 week follow up after completing radiation therapy.  He is noticing improvement of all of his acute side effects.  He is back to work because he ran out of [employer]. He is eating soft foods and maintaining his weight.  He notes dysgeusia. He is still having pain with swallowing and is taking oxycodone in the evening and before bed.  He is still having nasal congestion and bloody noses.  His skin is improving and he is using aquaphor.    ROS:  Complete review of systems is negative except for symptoms discussed in subjective above.    Current Outpatient Medications   Medication     atorvastatin (LIPITOR) 20 MG tablet     doxazosin (CARDURA) 1 MG tablet     gabapentin (NEURONTIN) 300 MG capsule     lisinopril (PRINIVIL/ZESTRIL) 10 MG tablet     metFORMIN (GLUCOPHAGE) 1000 MG tablet     multivitamin w/minerals (THERA-VIT-M) tablet     oxyCODONE (ROXICODONE) 5 MG/5ML solution     polyethylene glycol (MIRALAX/GLYCOLAX) packet     senna-docusate (SENOKOT-S/PERICOLACE) 8.6-50 MG tablet     sodium fluoride dental gel (PREVIDENT) 1.1 % GEL topical gel     White Petrolatum-Mineral Oil (REFRESH P.M.) OINT     acetaminophen (TYLENOL) 325 MG tablet     aspirin (ASA) 325 MG tablet     lidocaine HCl (XYLOCAINE) 2 % solution     metFORMIN (GLUCOPHAGE) 1000 MG tablet     mineral oil-hydrophilic petrolatum (AQUAPHOR) external ointment     ondansetron (ZOFRAN) 8 MG tablet     order for DME     order for DME     order for DME     No current facility-administered medications for this visit.       Facility-Administered Medications Ordered in Other Visits   Medication     barium sulfate (EZ-DISK) tablet 700 mg        No Known Allergies    Past Medical History:   Diagnosis Date     Diabetes (H)      Hypertension      Maxillary sinus cancer (H)      Obesity          PHYSICAL EXAM:  /63   Wt 123.4 kg (272 lb)   BMI 42.60 kg/m    Gen: Alert, in NAD  Eyes: PERRL, EOMI, sclera anicteric  HENT     Head: NC/AT     Ears: TM's clear, no external lesions.     Oral Cavity/Oropharynx: Dry mucous membranes with thickened secretions.  Mild mucositis remains.  No thrush.  Neck: Moderate to mild erythema of the bilateral neck.  No desquamation. No lymphedema. No palpable cervical adenopathy.  Pulm: No wheezing, stridor or respiratory distress  CV: Well-perfused, no cyanosis  Musculoskeletal: Normal bulk and tone   Skin: Erythema and desquamation of the neck as described above otherwise normal color and turgor.      LABS AND IMAGING:  Reviewed.    IMPRESSION:   Mr. Tucker is a 53 year old male with a SCC of left alveolar ridge and maxilla  s/p radiation now almost 2 weeks out since completion of treatment and doing well with slow improvement of acute side effects.    PLAN:   1.  Follow up with Dr. Wolfe in 1 month.   Continue close follow up with ENT.  2. Continue to moisturize with aquaphor and when skin is completely healed can change to preferred moisturizer.  Discussed importance of sun avoidance or sun protection.  3. Fatigue should continue to improve.  4. Continue oral cares with salt and soda rinses.  Routine dental follow up at least every 6 months.  Continue to use fluoride trays or fluoride treatments. Continue jaw, neck and swallowing exercises.  5. Treatment related pain should continue to diminish.  Recommended to slowly wean off pain medications when pain decreases.  6. Continue to push fluids and caloric intake to maintain weight.  Try to increase oral intake and decrease calories through PEG tube.      Concepcion Gutierrez, NP   Radiation Oncology

## 2019-08-05 ENCOUNTER — HOSPITAL ENCOUNTER (OUTPATIENT)
Dept: PHYSICAL THERAPY | Facility: CLINIC | Age: 54
Setting detail: THERAPIES SERIES
End: 2019-08-05
Attending: FAMILY MEDICINE
Payer: COMMERCIAL

## 2019-08-05 PROCEDURE — 97535 SELF CARE MNGMENT TRAINING: CPT | Mod: GP,ZF | Performed by: PHYSICAL THERAPIST

## 2019-08-05 PROCEDURE — 97110 THERAPEUTIC EXERCISES: CPT | Mod: GP,ZF | Performed by: PHYSICAL THERAPIST

## 2019-08-05 PROCEDURE — 97140 MANUAL THERAPY 1/> REGIONS: CPT | Mod: GP,ZF | Performed by: PHYSICAL THERAPIST

## 2019-08-09 ENCOUNTER — HOSPITAL ENCOUNTER (OUTPATIENT)
Dept: PHYSICAL THERAPY | Facility: CLINIC | Age: 54
Setting detail: THERAPIES SERIES
End: 2019-08-09
Attending: FAMILY MEDICINE
Payer: COMMERCIAL

## 2019-08-09 PROCEDURE — 97535 SELF CARE MNGMENT TRAINING: CPT | Mod: GP,ZF | Performed by: PHYSICAL THERAPIST

## 2019-08-09 PROCEDURE — 97140 MANUAL THERAPY 1/> REGIONS: CPT | Mod: GP,ZF | Performed by: PHYSICAL THERAPIST

## 2019-08-15 ENCOUNTER — HOSPITAL ENCOUNTER (OUTPATIENT)
Dept: PHYSICAL THERAPY | Facility: CLINIC | Age: 54
Setting detail: THERAPIES SERIES
End: 2019-08-15
Attending: FAMILY MEDICINE
Payer: COMMERCIAL

## 2019-08-15 PROCEDURE — 97110 THERAPEUTIC EXERCISES: CPT | Mod: GP,ZF | Performed by: PHYSICAL THERAPIST

## 2019-08-15 PROCEDURE — 97140 MANUAL THERAPY 1/> REGIONS: CPT | Mod: GP,ZF | Performed by: PHYSICAL THERAPIST

## 2019-08-15 PROCEDURE — 97535 SELF CARE MNGMENT TRAINING: CPT | Mod: GP,ZF | Performed by: PHYSICAL THERAPIST

## 2019-08-15 NOTE — PROGRESS NOTES
Outpatient Physical Therapy Progress Note     Patient: Devonte Tucker  : 1965    Beginning/End Dates of Reporting Period:  19 to 8/15/2019    Referring Provider: Dr. Jett Treviño    Therapy Diagnosis: Lymphedema of head and neck     Client Self Report: Pt is using the swell spot at night time, would like to work on the shoulder a bit today.    Objective Measurements:  Objective Measure: Skin  Details: healed, non-pitting  Objective Measure: Pain  Details: L jaw is consistently tender-uncomfortable in the morning, by the end of the day it is a burning pulsing pain.    Objective Measure: measurements  Details: L face stable, see girth chart.     Goals:  Goal Identifier HEP   Goal Description In order to improve tolerance for functional mobility, ADLs, and recreational activities outside of physical therapy, patient will demonstrate independence with home program of exercise and lifestyle modification and possible compression.    Target Date 19   Date Met  08/15/19   Progress:     Goal Identifier circumference   Goal Description Pt will have 1 cm decrease or more in 2 Left sided measurements of face in order to eat more easily.   Target Date 19   Date Met  08/15/19   Progress:     Goal Identifier pain   Goal Description Pt will be able to wash his face with firm touch without pain   Target Date 19   Date Met  08/15/19   Progress:     Goal Identifier Shoulder mobility   Goal Description Pt will be independent with his shoulder exercise program to report decreased numbness with overhead reaching and active flexion to 140 degrees.   Target Date 10/13/19   Date Met      Progress:       Progress Toward Goals:   Progress this reporting period: Pt has met 3/3 edema goals. He is doing well with home management of swelling. Pain has improved but . Pt continues to be dizzy when rising but modifying his activity to prevent fall or injury. He notes increased difficulty with swallow after  radiation and presumable internal swelling.  Will refer for pump trial.    Plan:  Changes to therapy plan of care: Extend care to assess maintenance and work on shoulder mobility for 4-5 visits over 2 months    Discharge:  No

## 2019-08-21 ENCOUNTER — OFFICE VISIT (OUTPATIENT)
Dept: OTOLARYNGOLOGY | Facility: CLINIC | Age: 54
End: 2019-08-21
Payer: COMMERCIAL

## 2019-08-21 ENCOUNTER — THERAPY VISIT (OUTPATIENT)
Dept: SPEECH THERAPY | Facility: CLINIC | Age: 54
End: 2019-08-21
Payer: COMMERCIAL

## 2019-08-21 VITALS
HEART RATE: 99 BPM | DIASTOLIC BLOOD PRESSURE: 75 MMHG | WEIGHT: 258 LBS | HEIGHT: 67 IN | SYSTOLIC BLOOD PRESSURE: 106 MMHG | RESPIRATION RATE: 16 BRPM | BODY MASS INDEX: 40.49 KG/M2

## 2019-08-21 DIAGNOSIS — R19.7 DIARRHEA, UNSPECIFIED TYPE: Primary | ICD-10-CM

## 2019-08-21 DIAGNOSIS — R13.12 OROPHARYNGEAL DYSPHAGIA: Primary | ICD-10-CM

## 2019-08-21 DIAGNOSIS — C05.9 SQUAMOUS CELL CARCINOMA OF PALATE (H): ICD-10-CM

## 2019-08-21 DIAGNOSIS — C31.0 MAXILLARY SINUS CANCER (H): ICD-10-CM

## 2019-08-21 ASSESSMENT — MIFFLIN-ST. JEOR: SCORE: 1968.91

## 2019-08-21 ASSESSMENT — PAIN SCALES - GENERAL: PAINLEVEL: NO PAIN (0)

## 2019-08-21 NOTE — LETTER
8/21/2019       RE: Devonte Tucker  [address]  [place] [address]     Dear Colleague,    Thank you for referring your patient, Devonte Tucker, to the [facility] EAR NOSE AND THROAT at [facility]. Please see a copy of my visit note below.    Aug 21, 2019      PRIOR ONCOLOGIC HISTORY:  Mr. Tucker is status post 05/09/2019 left infrastructure maxillectomy with reconstruction with a right radial forearm free flap by Dr. Sumit Atwood.  He received postoperative radiation therapy and completed this on 07/22/2019.     History of Present Illness:   Mr. Tucker is now three weeks status post completion of adjuvant radiation therapy.  He has done quite well since stopping the radiation therapy.  He says it was tough for him, but he was consistent with his swallowing therapy and the swallowing therapist report that he has progressed very nicely.  He says that he is much less red now and his pain has decreased.      He comes in with a number of questions including his persistent nausea as well as diarrhea.  He also wonders whether he can come off the gabapentin.  He had a number of questions about the radial forearm donor site as well.      He does not feel any lumps or bumps.  He has not had any hemoptysis.  He is breathing easily without difficulty.  His swallowing is progressing very well.      PHYSICAL EXAMINATION:  On examination, Mr. Tucker looks remarkably well.  There is surprisingly little erythema or ulceration of the skin.  The nose has bilateral crusting which I did not remove for fear of bleeding.  The oral cavity and oropharynx look terrific.  There is no evidence of ulceration.  The flap has healed very nicely and is a little nodular posteriorly along the posterior buccal area, but not concerning for recurrent cancer at this time.  The rest of the oral cavity and oropharynx look quite good.  The neck is swollen on the left side, but is without any  evidence of adenopathy.  The left neck incision has healed remarkably well.  The right radial forearm flap donor site has healed nicely, but there is quite a bit of hyperemia and some early slightly thickened scarring.  He has much better range of motion in his right wrist than he did at his last visit.      IMPRESSION AND PLAN:  Mr. Tucker is doing remarkably well three weeks after completing postoperative radiation therapy.  He is working with Pooja Ty and she is going to visit with him as well today.       He describes quite a bit of reflux disease as well as indigestion and nausea.  I wonder whether he would be a good candidate for Reglan.  He just stopped his stool softeners and is reluctant to start a new regimen.  He is fortunately seeing Dr. Verdin next week; we wrote Reglan down for him to share with Dr. Verdin, as there may be contraindications, but we wonder whether decreased gastric emptying could be responsible for his reflux symptoms as well as his nausea.      He has runny diarrhea three times a day and it has not improved.  We will check a C. diff today.  He is on stool softeners and this has not made a difference for him yet.  We would like him to continue with his swallowing therapy as well as lymphedema and his physical therapy.      He will come back to see Dr. Atwood in four to six weeks, alternating with me.  Tressa visited with him today and is going to see if she can find Brittny so that she can answer some of the questions that he has regarding his graft site.  Finally, we will try and begin to wean him from his pain medicines.  He will need to likely undergo a two to three week period of gradual decrease before coming off the gabapentin completely.     Jett Treviño M.D.  Otolaryngology/Head & Neck Surgery  [phone]        Sumit Atwood MD  Otolaryngology/Head & Neck Surgery  [facility] 396    Rich Medellin MD  [facility] [address]   [address]    Emerson Verdin MD   Radiation Oncology, [facility] 891

## 2019-08-21 NOTE — PATIENT INSTRUCTIONS
1. You were seen in the ENT Clinic today by Dr. Treviño  If you have any questions or concerns after your appointment, please call   - Option 1: ENT Clinic: [phone]  - Option 2: Tressa CHRISTIANSON Nurse Coordinator: [phone]    2. Plan to return to clinic in 3 months for PET scan and follow up with Dr. Trevñio  Discuss with Dr. Verdin about Reglan use.  Complete testing for C-diff,  kit from pharmacy     Gabapentin taper schedule take 300mg 2 times a day for 1 week  Then 300mg 1 time a day for 1 week and then can stop.       Thank you for allowing us to be apart of your care!  Jaqui Dave LPN       Patient Education     Clostridium Difficile Infection  Clostridium difficile (C. diff) bacteria can be very harmful. They affect the intestinal tract. They can cause symptoms ranging from mild diarrhea to severe inflammation of the large intestine (colon). C. diff infection is most common during the days and weeks after treatment with antibiotics. Anyone can become infected. But the risk is greatly increased for people in hospitals and for people living in nursing homes or long-term care facilities. This is because antibiotic use is common there. Germs also spread easily in these places.    What causes C. diff infection?  The stomach and intestines have hundreds of kinds of bacteria. Many of these bacteria actually help keep harmful bacteria like C. diff from causing problems. Small amounts of C. diff are normal in the intestine and don t cause problems. When you take an antibiotic, the normal balance of good and bad bacteria may be affected. There may be too few good bacteria and too many harmful bacteria like C diff. In hospitals and nursing homes, C. diff may be spread from an infected person to others. This can happen when staff or visitors touch infected people or objects such as bed rails, stethoscopes, or bedpans and then touch other people or surfaces.  What are the symptoms of C. diff infection?  About half of  people with C. diff infection have no symptoms. Yet they can still pass the infection to others. Others do have symptoms. These include:    Watery diarrhea, which may contain mucus    Pain and cramping    Fever  Some who are infected develop serious problems. Symptoms include:    Belly (abdominal) pain    Abdominal swelling    Nausea and vomiting    Little or no diarrhea  How is C. diff infection diagnosed?  To confirm the infection, a sample of stool is tested for the bacteria or the toxins made by the bacteria.  How is C. diff infection treated?  Your healthcare provider will tell you to stop taking any antibiotics you have been prescribed, based on your healthcare needs. He or she may prescribe different medicines as needed. In certain cases, you may be given an antibiotic directed at the C. diff infection. Talk with your healthcare provider before stopping or starting any medicines.    Fluids are often given by IV (intravenously) through a vein. This helps replace fluids lost through diarrhea.    In rare cases you may need surgery if treatment doesn t cure severe symptoms  To lessen symptoms:    Drink plenty of fluids to replace water lost through diarrhea. Talk with your healthcare provider or nurse about which fluids are best.    Follow your healthcare provider s instructions for when and what to eat.    Unless your healthcare provider tells you to do so, don't take medicines for diarrhea.    Tell your healthcare provider if symptoms return. Even after treatment, C. diff may come back.  Your doctor may give you an additional medicine if your symptoms come back or you are at risk for another C. diff infection. This medicine is called bezlotoxumab. It is not an antibiotic, but it can help keep your C. diff symptoms from returning.  What are the complications of C. diff infection?  Complications include:    Dehydration    Electrolyte imbalances    Low protein in the blood    Severe widening (dilation) of the  large intestine    A hole (perforation) in the bowel    Low blood pressure    Kidney failure    Inflammation or infection all over the body    Death  How is C. diff prevented?  Hospitals and nursing homes take these steps to help prevent C. diff infections:    Limiting use of antibiotics. Giving antibiotics only when needed can help reduce C. diff infections.    Handwashing. Hospital staff should wash their hands before and after treating each person. They should also wash their hands after touching any surface in someone's' room. Soap and water work better than alcohol-based hand .    Protective clothing. Healthcare workers should wear gloves and a gown when entering the room of someone with C. diff infection. They should remove these items before leaving and then wash their hands.    Private rooms. People with C. diff should be in private rooms. Or they may share rooms with others who have the same infection.    Thorough cleaning. Equipment and rooms should be cleaned and disinfected every day.    Education. Everyone should be shown the best ways to avoid infection.  You can do the following to help prevent C. diff:    Take antibiotics only when you really need them. Antibiotics don t help treat illnesses caused by viruses. This includes colds and the flu. Don t ask for antibiotics from your healthcare provider if he or she says they won t work.    When you are given antibiotics, take them as directed. Don t take more or less than the dosage prescribed. Do not take them for shorter or longer than your provider tells you to, even if you feel better.    Wash your hands carefully. Do this after using the bathroom and before eating. Use plenty of soap and warm water. Alcohol-based hand  may not work against C. diff germs.  Everyone can help prevent C. diff:  In a hospital or care facility:    Wash your hands well before and after visiting someone who has C. diff infection. Use soap and water.  Alcohol-based hand  may not work against C. diff.    If the staff asks you to, wear gloves. Take any other steps you are asked to follow to help prevent infection.  At home:    If instructed, wear gloves when caring for a family member with C. diff infection. Throw the gloves away after each use. Then wash your hands well.    Wash the person s clothes, bed linen, and towels separately. Use hot water. Use both detergent and liquid bleach.    Disinfect surfaces in the person s room. This includes the phone, light switches, and remote controls.  Practice good handwashing:    Use warm water and plenty of soap. Rub your hands together well.    Clean your whole hand. Wash under nails, between fingers, and up your wrists.    Wash for at least 15 seconds to 20 seconds.     Rinse. Let the water run down your fingers, not up your wrists.    Dry your hands well. Then use a paper towel to turn off the faucet and open the door.  Date Last Reviewed: 1/1/2017 2000-2018 [employer]. [address] [place] [address], [place] [address]. All rights reserved. This information is not intended as a substitute for professional medical care. Always follow your healthcare professional's instructions.

## 2019-08-21 NOTE — NURSING NOTE
"Chief Complaint   Patient presents with     RECHECK     Squamous cell carcinoma of palate      /75 (BP Location: Right arm, Patient Position: Sitting, Cuff Size: Adult Regular)   Pulse 99   Resp 16   Ht 1.702 m (5' 7\")   Wt 117 kg (258 lb)   BMI 40.41 kg/m      Daphne Thorpe CMA    "

## 2019-08-21 NOTE — PROGRESS NOTES
Aug 21, 2019      PRIOR ONCOLOGIC HISTORY:  Mr. Tucker is status post 05/09/2019 left infrastructure maxillectomy with reconstruction with a right radial forearm free flap by Dr. Sumit Atwood.  He received postoperative radiation therapy and completed this on 07/22/2019.     History of Present Illness:   Mr. Tucker is now three weeks status post completion of adjuvant radiation therapy.  He has done quite well since stopping the radiation therapy.  He says it was tough for him, but he was consistent with his swallowing therapy and the swallowing therapist report that he has progressed very nicely.  He says that he is much less red now and his pain has decreased.      He comes in with a number of questions including his persistent nausea as well as diarrhea.  He also wonders whether he can come off the gabapentin.  He had a number of questions about the radial forearm donor site as well.      He does not feel any lumps or bumps.  He has not had any hemoptysis.  He is breathing easily without difficulty.  His swallowing is progressing very well.      PHYSICAL EXAMINATION:  On examination, Mr. Tucker looks remarkably well.  There is surprisingly little erythema or ulceration of the skin.  The nose has bilateral crusting which I did not remove for fear of bleeding.  The oral cavity and oropharynx look terrific.  There is no evidence of ulceration.  The flap has healed very nicely and is a little nodular posteriorly along the posterior buccal area, but not concerning for recurrent cancer at this time.  The rest of the oral cavity and oropharynx look quite good.  The neck is swollen on the left side, but is without any evidence of adenopathy.  The left neck incision has healed remarkably well.  The right radial forearm flap donor site has healed nicely, but there is quite a bit of hyperemia and some early slightly thickened scarring.  He has much better range of motion in his right wrist than he did at his last  visit.      IMPRESSION AND PLAN:  Mr. Tucker is doing remarkably well three weeks after completing postoperative radiation therapy.  He is working with Pooja Ty and she is going to visit with him as well today.       He describes quite a bit of reflux disease as well as indigestion and nausea.  I wonder whether he would be a good candidate for Reglan.  He just stopped his stool softeners and is reluctant to start a new regimen.  He is fortunately seeing Dr. Verdin next week; we wrote Reglan down for him to share with Dr. Verdin, as there may be contraindications, but we wonder whether decreased gastric emptying could be responsible for his reflux symptoms as well as his nausea.      He has runny diarrhea three times a day and it has not improved.  We will check a C. diff today.  He is on stool softeners and this has not made a difference for him yet.  We would like him to continue with his swallowing therapy as well as lymphedema and his physical therapy.      He will come back to see Dr. Atwood in four to six weeks, alternating with me.  Tressa visited with him today and is going to see if she can find Brittny so that she can answer some of the questions that he has regarding his graft site.  Finally, we will try and begin to wean him from his pain medicines.  He will need to likely undergo a two to three week period of gradual decrease before coming off the gabapentin completely.     Jett Treviño M.D.  Otolaryngology/Head & Neck Surgery  [phone]                Sumit Atwood MD  Otolaryngology/Head & Neck Surgery  [facility] 396    Rich Medellin MD  ENT Specialty[address]  [address]    Emerson Verdin MD   Radiation Oncology, [facility] 421

## 2019-08-28 ENCOUNTER — DOCUMENTATION ONLY (OUTPATIENT)
Dept: OTOLARYNGOLOGY | Facility: CLINIC | Age: 54
End: 2019-08-28

## 2019-08-28 NOTE — PROGRESS NOTES
Received a fax from Yaquelin Sifuentes the  for [employer] a part of [employer]. She was requesting health information and treatment plans to assist in coordination and arrangenment of services, supplies and equipment. Per her request faxed office visit note and med list from 8/21/19 and schedule for future appointments.

## 2019-08-29 ENCOUNTER — HOSPITAL ENCOUNTER (OUTPATIENT)
Dept: PHYSICAL THERAPY | Facility: CLINIC | Age: 54
Setting detail: THERAPIES SERIES
End: 2019-08-29
Attending: FAMILY MEDICINE
Payer: COMMERCIAL

## 2019-08-29 PROCEDURE — 97110 THERAPEUTIC EXERCISES: CPT | Mod: GP,ZF | Performed by: PHYSICAL THERAPIST

## 2019-08-29 PROCEDURE — 97140 MANUAL THERAPY 1/> REGIONS: CPT | Mod: GP,ZF | Performed by: PHYSICAL THERAPIST

## 2019-08-30 ENCOUNTER — OFFICE VISIT (OUTPATIENT)
Dept: RADIATION ONCOLOGY | Facility: CLINIC | Age: 54
End: 2019-08-30
Attending: RADIOLOGY
Payer: COMMERCIAL

## 2019-08-30 VITALS
DIASTOLIC BLOOD PRESSURE: 82 MMHG | SYSTOLIC BLOOD PRESSURE: 121 MMHG | HEART RATE: 103 BPM | WEIGHT: 256.1 LBS | BODY MASS INDEX: 40.11 KG/M2

## 2019-08-30 DIAGNOSIS — E03.9 HYPOTHYROIDISM, UNSPECIFIED TYPE: ICD-10-CM

## 2019-08-30 DIAGNOSIS — K21.9 GASTROESOPHAGEAL REFLUX DISEASE WITHOUT ESOPHAGITIS: ICD-10-CM

## 2019-08-30 DIAGNOSIS — C05.9 SQUAMOUS CELL CARCINOMA OF PALATE (H): Primary | ICD-10-CM

## 2019-08-30 DIAGNOSIS — R42 VERTIGO: ICD-10-CM

## 2019-08-30 DIAGNOSIS — C03.9 PRIMARY CANCER OF ALVEOLAR RIDGE MUCOSA (H): Primary | ICD-10-CM

## 2019-08-30 PROCEDURE — G0463 HOSPITAL OUTPT CLINIC VISIT: HCPCS | Mod: 25 | Performed by: RADIOLOGY

## 2019-08-30 PROCEDURE — 31231 NASAL ENDOSCOPY DX: CPT | Performed by: RADIOLOGY

## 2019-08-30 NOTE — PROGRESS NOTES
"FOLLOW-UP VISIT    Patient Name: Devonte Tucker      : 1965     Age: 53 year old        ______________________________________________________________________________     Chief Complaint   Patient presents with     Cancer     Follow-up     /82   Pulse 103   Wt 116.2 kg (256 lb 1.6 oz)   BMI 40.11 kg/m       Date Radiation Completed: Maxillary Sinus Cancer: Left max aveolar ridge 6000 cGy completed 19     Pain  Current history of pain associated with this visit:   Intensity: 2/10  Current: burning  Location: Mouth  Treatment: Salt and soda rinse.    Labs  Other Labs: No    Imaging  None      Dental:   Most Recent Dental Visit: Pt reports \"recently\"    Speech/Swallowing:   Most Recent evaluation or testing:   Swallowing Restrictions: No difficulties with swallowing      Nutrition:  Oral Intake: Pt preference.  Weight:   Wt Readings from Last 3 Encounters:   19 116.2 kg (256 lb 1.6 oz)   19 117 kg (258 lb)   19 123.4 kg (272 lb)         Oral Symptoms:   Xerostomia:2- Moderate symptoms; oral intake alterations; unstimulated saliva 0.1-0.2 ml/min  Dysphagia: 0-None  Mucositis Oral Symptoms: 0-None  Mucositis: 0- None  Esophagitis:0- None    Residual Radiation side effect: Xerostomia, fatigue        Nurse face-to-face time: Level 4:  15 min face to face time        "

## 2019-08-30 NOTE — PROGRESS NOTES
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 He underwent a left infrastructure maxillectomy and left level I lymph node dissection on 2019 with pathology revealing a 1.3 cm well-differentiated squamous cell carcinoma with 13 mm depth of invasion and involvement of the underlying bone. He received adjuvant radiotherapy alone as described above for improved local disease control.    Mr. Tucker returns to radiation oncology clinic today for a routine post-treatment disease surveillance visit. On examination, he reports that he continues to make a gradual recovery from his acute radiation-induced toxicities. He describes some mild residual pain within the oral cavity located around the location of the left upper lip. He rates this as a 2/10 in severity and is not particularly bothersome to him. He specifically denies any dysphagia or odynophagia and is back to eating a normal diet without difficulty. He has been having diarrhea for the past several weeks and was recommended to undergo C. difficile testing when he was last seen in ENT clinic. He has not been unable to  the test kit and have this completed, however.  His symptoms have mildly improved following his visit in ENT clinic due in part to a reduction in his stool softener regimen with elimination of his MiraLAX and cutting his senna from 1 tablet twice daily to 1 tab daily. His remaining ROS are positive for moderate symptoms of gastric reflux and mild intermittent nausea.    PHYSICAL EXAM:  Weight: 116.2 kg  BP: 121/82  Pulse: 103    General: Seated comfortably in an examination chair in no acute distress  HEENT: NC/AT. EOMI. No external auricular lesions. No rhinorrhea or epistaxis. Mildly dry mucous membranes. No visible oral cavity mucositis. Left palatal free flap is healthy-appearing with no surrounding induration, nodularity or masses.  Neck: No palpable cervical adenopathy.  Pulmonary: No wheezing, stridor or respiratory distress  Skin: Mild erythema involving the left sarahi-face and neck without desquamation or ulceration.    LABS AND IMAGING:  No recent labs or imaging    IMPRESSION:   Mr. 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 He is making a gradual recovery from his acute treatment-related toxicities and has no clinical evidence concerning for recurrent disease on examination today.    PLAN:   1. Follow-up in radiation oncology clinic in mid 10/2019 after 3 month post-treatment restaging PET/CT   2. Recommended discontinuation of all stool softeners given ongoing diarrhea with reintroduction and titration as needed should he develop constipation on his antiemetics  3. Start omeprazole 20 mg daily for GERD  4. Hold off on starting Reglan for now to see if symptoms improve with optimization of stool softener regimen and PPI  5. TSH testing with next follow-up    Emerson Verdin MD/PhD    Department of Radiation Oncology  [facility]

## 2019-08-30 NOTE — LETTER
"2019      RE: Devonte Tucker  [address]  [place] [address]       FOLLOW-UP VISIT    Patient Name: Devonte Tucker      : 1965     Age: 53 year old        ______________________________________________________________________________     Chief Complaint   Patient presents with     Cancer     Follow-up     /82   Pulse 103   Wt 116.2 kg (256 lb 1.6 oz)   BMI 40.11 kg/m        Date Radiation Completed: Maxillary Sinus Cancer: Left max aveolar ridge 6000 cGy completed 19     Pain  Current history of pain associated with this visit:   Intensity: 2/10  Current: burning  Location: Mouth  Treatment: Salt and soda rinse.    Labs  Other Labs: No    Imaging  None      Dental:   Most Recent Dental Visit: Pt reports \"recently\"    Speech/Swallowing:   Most Recent evaluation or testing:   Swallowing Restrictions: No difficulties with swallowing      Nutrition:  Oral Intake: Pt preference.  Weight:   Wt Readings from Last 3 Encounters:   19 116.2 kg (256 lb 1.6 oz)   19 117 kg (258 lb)   19 123.4 kg (272 lb)         Oral Symptoms:   Xerostomia:2- Moderate symptoms; oral intake alterations; unstimulated saliva 0.1-0.2 ml/min  Dysphagia: 0-None  Mucositis Oral Symptoms: 0-None  Mucositis: 0- None  Esophagitis:0- None    Residual Radiation side effect: Xerostomia, fatigue        Nurse face-to-face time: Level 4:  15 min face to face time             Department of Radiation Oncology  [facility]  [address] [place] [address] [phone]       Radiation Oncology Follow-up Visit  2019      Devonte Tucker  MRN: [id number]   : 1965     DISEASE TREATED:   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 He underwent a left infrastructure maxillectomy and left level I lymph node dissection on 5/9/2019 with pathology revealing a 1.3 cm well-differentiated squamous cell carcinoma with 13 mm depth of invasion and involvement of the underlying bone. He received adjuvant radiotherapy alone as described above for improved local disease control.    Mr. Tucker returns to radiation oncology clinic today for a routine post-treatment disease surveillance visit. On examination, he reports that he continues to make a gradual recovery from his acute radiation-induced toxicities. He describes some mild residual pain within the oral cavity located around the location of the left upper lip. He rates this as a 2/10 in severity and is not particularly bothersome to him. He specifically denies any dysphagia or odynophagia and is back to eating a normal diet without difficulty. He has been having diarrhea for the past several weeks and was recommended to undergo C. difficile testing when he was last seen in ENT clinic. He has not been unable to  the test kit and have this completed, however. His symptoms have mildly improved following his visit in ENT clinic due in part to a reduction in his stool softener regimen with elimination of his MiraLAX and cutting his senna from 1 tablet twice daily to 1 tab daily. His remaining ROS are positive for moderate symptoms of gastric reflux and mild intermittent nausea.    PHYSICAL EXAM:  Weight: 116.2 kg  BP: 121/82  Pulse: 103    General: Seated comfortably in an examination chair in no acute distress  HEENT: NC/AT. EOMI. No external auricular lesions. No rhinorrhea or  epistaxis. Mildly dry mucous membranes. No visible oral cavity mucositis. Left palatal free flap is healthy-appearing with no surrounding induration, nodularity or masses.  Neck: No palpable cervical adenopathy.  Pulmonary: No wheezing, stridor or respiratory distress  Skin: Mild erythema involving the left sarahi-face and neck without desquamation or ulceration.    LABS AND IMAGING:  No recent labs or imaging    IMPRESSION:   Mr. 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 He is making a gradual recovery from his acute treatment-related toxicities and has no clinical evidence concerning for recurrent disease on examination today.    PLAN:   1. Follow-up in radiation oncology clinic in mid 10/2019 after 3 month post-treatment restaging PET/CT   2. Recommended discontinuation of all stool softeners given ongoing diarrhea with reintroduction and titration as needed should he develop constipation on his antiemetics  3. Start omeprazole 20 mg daily for GERD  4. Hold off on starting Reglan for now to see if symptoms improve with optimization of stool softener regimen and PPI  5. TSH testing with next follow-up    Emerson Verdin MD/PhD    Department of Radiation Oncology  [facility]      Emerson Verdin MD

## 2019-08-30 NOTE — LETTER
"2019      RE: Devonte Tucker  [address]  [place] [address]       FOLLOW-UP VISIT    Patient Name: Devonte Tucker      : 1965     Age: 53 year old        ______________________________________________________________________________     Chief Complaint   Patient presents with     Cancer     Follow-up     /82   Pulse 103   Wt 116.2 kg (256 lb 1.6 oz)   BMI 40.11 kg/m        Date Radiation Completed: Maxillary Sinus Cancer: Left max aveolar ridge 6000 cGy completed 19     Pain  Current history of pain associated with this visit:   Intensity: 2/10  Current: burning  Location: Mouth  Treatment: Salt and soda rinse.    Labs  Other Labs: No    Imaging  None      Dental:   Most Recent Dental Visit: Pt reports \"recently\"    Speech/Swallowing:   Most Recent evaluation or testing:   Swallowing Restrictions: No difficulties with swallowing      Nutrition:  Oral Intake: Pt preference.  Weight:   Wt Readings from Last 3 Encounters:   19 116.2 kg (256 lb 1.6 oz)   19 117 kg (258 lb)   19 123.4 kg (272 lb)         Oral Symptoms:   Xerostomia:2- Moderate symptoms; oral intake alterations; unstimulated saliva 0.1-0.2 ml/min  Dysphagia: 0-None  Mucositis Oral Symptoms: 0-None  Mucositis: 0- None  Esophagitis:0- None    Residual Radiation side effect: Xerostomia, fatigue        Nurse face-to-face time: Level 4:  15 min face to face time             Department of Radiation Oncology  [facility]  [address] [place] [address] [phone]       Radiation Oncology Follow-up Visit  2019      Devonte Tucker  MRN: [id number]   : 1965     DISEASE TREATED:   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 He underwent a left infrastructure maxillectomy and left level I lymph node dissection on 5/9/2019 with pathology revealing a 1.3 cm well-differentiated squamous cell carcinoma with 13 mm depth of invasion and involvement of the underlying bone. He received adjuvant radiotherapy alone as described above for improved local disease control.    Mr. Tucker returns to radiation oncology clinic today for a routine post-treatment disease surveillance visit. On examination, he reports that he continues to make a gradual recovery from his acute radiation-induced toxicities. He describes some mild residual pain within the oral cavity located around the location of the left upper lip. He rates this as a 2/10 in severity and is not particularly bothersome to him. He specifically denies any dysphagia or odynophagia and is back to eating a normal diet without difficulty. He has been having diarrhea for the past several weeks and was recommended to undergo C. difficile testing when he was last seen in ENT clinic. He has not been unable to  the test kit and have this completed, however. His symptoms have mildly improved following his visit in ENT clinic due in part to a reduction in his stool softener regimen with elimination of his MiraLAX and cutting his senna from 1 tablet twice daily to 1 tab daily. His remaining ROS are positive for moderate symptoms of gastric reflux and mild intermittent nausea.    PHYSICAL EXAM:  Weight: 116.2 kg  BP: 121/82  Pulse: 103    General: Seated comfortably in an examination chair in no acute distress  HEENT: NC/AT. EOMI. No external auricular lesions. No rhinorrhea or  epistaxis. Mildly dry mucous membranes. No visible oral cavity mucositis. Left palatal free flap is healthy-appearing with no surrounding induration, nodularity or masses.  Neck: No palpable cervical adenopathy.  Pulmonary: No wheezing, stridor or respiratory distress  Skin: Mild erythema involving the left sarahi-face and neck without desquamation or ulceration.    LABS AND IMAGING:  No recent labs or imaging    IMPRESSION:   Mr. 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 He is making a gradual recovery from his acute treatment-related toxicities and has no clinical evidence concerning for recurrent disease on examination today.    PLAN:   1. Follow-up in radiation oncology clinic in mid 10/2019 after 3 month post-treatment restaging PET/CT   2. Recommended discontinuation of all stool softeners given ongoing diarrhea with reintroduction and titration as needed should he develop constipation on his antiemetics  3. Start omeprazole 20 mg daily for GERD  4. Hold off on starting Reglan for now to see if symptoms improve with optimization of stool softener regimen and PPI  5. TSH testing with next follow-up    Emerson Verdin MD/PhD    Department of Radiation Oncology  [facility]

## 2019-09-13 ENCOUNTER — HOSPITAL ENCOUNTER (OUTPATIENT)
Dept: PHYSICAL THERAPY | Facility: CLINIC | Age: 54
Setting detail: THERAPIES SERIES
End: 2019-09-13
Attending: FAMILY MEDICINE
Payer: COMMERCIAL

## 2019-09-13 ENCOUNTER — ANCILLARY PROCEDURE (OUTPATIENT)
Dept: GENERAL RADIOLOGY | Facility: CLINIC | Age: 54
End: 2019-09-13
Attending: OTOLARYNGOLOGY
Payer: COMMERCIAL

## 2019-09-13 ENCOUNTER — THERAPY VISIT (OUTPATIENT)
Dept: SPEECH THERAPY | Facility: CLINIC | Age: 54
End: 2019-09-13
Payer: COMMERCIAL

## 2019-09-13 DIAGNOSIS — C05.9 SQUAMOUS CELL CARCINOMA OF PALATE (H): ICD-10-CM

## 2019-09-13 DIAGNOSIS — R13.12 OROPHARYNGEAL DYSPHAGIA: Primary | ICD-10-CM

## 2019-09-13 PROCEDURE — 97110 THERAPEUTIC EXERCISES: CPT | Mod: GP,ZF | Performed by: PHYSICAL THERAPIST

## 2019-09-13 PROCEDURE — 97140 MANUAL THERAPY 1/> REGIONS: CPT | Mod: GP,ZF | Performed by: PHYSICAL THERAPIST

## 2019-09-13 PROCEDURE — 97535 SELF CARE MNGMENT TRAINING: CPT | Mod: GP,ZF | Performed by: PHYSICAL THERAPIST

## 2019-09-13 RX ORDER — BARIUM SULFATE 400 MG/ML
25 SUSPENSION ORAL ONCE
Status: ACTIVE | OUTPATIENT
Start: 2019-09-13

## 2019-09-23 ENCOUNTER — OFFICE VISIT (OUTPATIENT)
Dept: OTOLARYNGOLOGY | Facility: CLINIC | Age: 54
End: 2019-09-23
Payer: COMMERCIAL

## 2019-09-23 ENCOUNTER — THERAPY VISIT (OUTPATIENT)
Dept: SPEECH THERAPY | Facility: CLINIC | Age: 54
End: 2019-09-23
Payer: COMMERCIAL

## 2019-09-23 VITALS
RESPIRATION RATE: 16 BRPM | SYSTOLIC BLOOD PRESSURE: 124 MMHG | DIASTOLIC BLOOD PRESSURE: 85 MMHG | WEIGHT: 248 LBS | HEIGHT: 67 IN | BODY MASS INDEX: 38.92 KG/M2 | HEART RATE: 85 BPM

## 2019-09-23 DIAGNOSIS — C31.0 MAXILLARY SINUS CANCER (H): ICD-10-CM

## 2019-09-23 DIAGNOSIS — R13.12 OROPHARYNGEAL DYSPHAGIA: Primary | ICD-10-CM

## 2019-09-23 DIAGNOSIS — C05.9 SQUAMOUS CELL CARCINOMA OF PALATE (H): Primary | ICD-10-CM

## 2019-09-23 DIAGNOSIS — C05.9 SQUAMOUS CELL CARCINOMA OF PALATE (H): ICD-10-CM

## 2019-09-23 RX ORDER — METFORMIN HCL 500 MG
TABLET, EXTENDED RELEASE 24 HR ORAL
COMMUNITY
Start: 2019-05-25 | End: 2019-10-07 | Stop reason: ALTCHOICE

## 2019-09-23 ASSESSMENT — MIFFLIN-ST. JEOR: SCORE: 1923.55

## 2019-09-23 ASSESSMENT — PAIN SCALES - GENERAL: PAINLEVEL: MODERATE PAIN (5)

## 2019-09-23 NOTE — PROGRESS NOTES
HISTORY OF PRESENT ILLNESS:  Mr. Tucker returns.  He is now about four months out from surgery via a left palatectomy by Dr. Jett Treviño and a right forearm flap that I performed.  He had postoperative radiation due to some bone invasion, which he finished in July.  He is doing well.  He is at work and is feeling fairly well.      PHYSICAL EXAMINATION:  Examination of the oral cavity reveals the flap has healed in nicely.  The remainder of the oral cavity mucosa is entirely normal.  Palpation of the buccal mucosa and the flap itself are normal to palpation.  Examination of the neck reveals some fullness in left level IB.  Otherwise, the incision has healed nicely.      IMPRESSION:  Healing well.      PLAN:  He is going to get a CT scan a couple of weeks, which will hopefully further evaluate this level IB area and that way we can be sure that there is not any metastatic disease in level IB.  He is going to follow up with Dr. Treviño in one to two months.     cc:      Jett Treviño MD   [facility] - Department of Otolaryngology    [address]   [place], [place]  [address]      Vivi Casas MD   [facility]   [address] [place], [place]  [address]      Juwan Medellin MD   [facility]   [address] [place] [address]  [id number]

## 2019-09-23 NOTE — LETTER
9/23/2019       RE: Devonte Tucker  [address]  [place] [address]     Dear Colleague,    Thank you for referring your patient, Devonte Tucker, to the [facility] EAR NOSE AND THROAT at [facility]. Please see a copy of my visit note below.    HISTORY OF PRESENT ILLNESS:  Mr. Tucker returns.  He is now about four months out from surgery via a left palatectomy by Dr. Jett Treviño and a right forearm flap that I performed.  He had postoperative radiation due to some bone invasion, which he finished in July.  He is doing well.  He is at work and is feeling fairly well.      PHYSICAL EXAMINATION:  Examination of the oral cavity reveals the flap has healed in nicely.  The remainder of the oral cavity mucosa is entirely normal.  Palpation of the buccal mucosa and the flap itself are normal to palpation.  Examination of the neck reveals some fullness in left level IB.  Otherwise, the incision has healed nicely.      IMPRESSION:  Healing well.      PLAN:  He is going to get a CT scan a couple of weeks, which will hopefully further evaluate this level IB area and that way we can be sure that there is not any metastatic disease in level IB.  He is going to follow up with Dr. Treviño in one to two months.     cc:      Jett Treviño MD   [facility] - Department of Otolaryngology    [address]   [place] [id number]      Vivi Casas MD   [facility]   [address] [place] [address]      Juwan Medellin MD   [facility]   [address] [place] [address]         Again, thank you for allowing me to participate in the care of your patient.      Sincerely,    Sumit Atwood MD

## 2019-09-23 NOTE — PATIENT INSTRUCTIONS
1. Please follow-up in clinic on 10/16 for your PET scan and 10/30 with Dr. Treviño.   2. Please call the ENT clinic with any questions,concerns, new or worsening symptoms.    -Clinic number is [phone]   - Britt[facility] direct line (Dr. Atwood's nurse) [phone]

## 2019-09-23 NOTE — NURSING NOTE
"Chief Complaint   Patient presents with     RECHECK     Squamous cell carcinoma of palate      /85 (BP Location: Left arm, Patient Position: Sitting, Cuff Size: Adult Large)   Pulse 85   Resp 16   Ht 1.702 m (5' 7\")   Wt 112.5 kg (248 lb)   BMI 38.84 kg/m      Daphne Thorpe CMA    "

## 2019-09-27 ENCOUNTER — THERAPY VISIT (OUTPATIENT)
Dept: PHYSICAL THERAPY | Facility: CLINIC | Age: 54
End: 2019-09-27
Payer: COMMERCIAL

## 2019-09-27 ENCOUNTER — HOSPITAL ENCOUNTER (OUTPATIENT)
Dept: PHYSICAL THERAPY | Facility: CLINIC | Age: 54
Setting detail: THERAPIES SERIES
End: 2019-09-27
Attending: FAMILY MEDICINE
Payer: COMMERCIAL

## 2019-09-27 DIAGNOSIS — C05.9 SQUAMOUS CELL CARCINOMA OF PALATE (H): ICD-10-CM

## 2019-09-27 DIAGNOSIS — R42 VERTIGO: ICD-10-CM

## 2019-09-27 PROCEDURE — 97140 MANUAL THERAPY 1/> REGIONS: CPT | Mod: GP,ZF | Performed by: PHYSICAL THERAPIST

## 2019-09-27 NOTE — PROGRESS NOTES
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 Symptoms reported as head rush and extreme nausea/sometimes vomitting.  Patient also reports dizziness when walking down the grocery aisle- visual stimuli, busy environment and moving head. Symptoms reported as lightheaded. Patient underwent radiation after treatment; finished 7/22/19   Pertinent Visual History  Patient reports difficulty reading and computer work. Denies double and blurry vision   Prior level of function comment Independent with all functional mobility prior to L maxillectomy 5/9/19; has used SEC since completing [facility] rehab for balance.   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 Yes   Is patient a fall risk? Yes   Fall screen comments No falls prior to surgery. Multiple falls (>25) in past month- most were falling down onto knee   Functional Scales   Functional Scales and Outcomes DHI - 68   Pain   Patient currently in pain No   Cognitive Status Examination   Orientation orientation to person, place and time   Level of Consciousness alert   Follows Commands and Answers Questions 100% of the time;able to follow multistep instructions   Personal Safety and Judgment at risk behaviors demonstrated   Memory intact   Cognitive Comment Multiple falls   Integumentary   Integumentary Comments Lymphedema L head/neck   Posture   Posture Forward head position   Bed Mobility   Bed Mobility Comments Modified independent   Transfer Skills   Transfer Comments Modified independent SEC   Gait   Gait Comments Patient ambulates with SEC; reduced janine with rigid posture- very poor dissociation of head/trunk.   Balance Special Tests   Balance Special Tests Modified CTSIB Conditions   Balance Special Tests Modified CTSIB Conditions   Condition 1, seconds 30 Seconds   Condition 2, seconds 30 Seconds   Condition 4, seconds 30 Seconds   Condition 5, seconds 25 Seconds   Modified CTSIB Comments moderate sway condition 5; anterior L LOB   Sensory Examination   Sensory Perception no deficits were identified   Cervicogenic Screen   Neck ROM very limited AROM due to increase in symptoms   Oculomotor Exam   Smooth Pursuit Abnormal   Smooth Pursuit Comment slowed eye movements; nausea immediately   Saccades Abnormal   Saccades Comments hypometric (L > R), nausea immediately   VOR Abnormal   VOR Comments retinal slip at very slow speeds; nausea immediately   VOR Cancellation Comments unable to assess on eval due to symptoms   Rapid Head Thrust Comments unable to assess on eval due to symptoms   Convergence Testing Comments unable to assess on eval due to symptoms   Infrared  Goggle Exam or Frenzel Lense Exam   Vestibular Suppressant in Last 24 Hours? No   Exam completed with Infrared Goggles   Spontaneous Nystagmus Negative   Gaze Evoked Nystagmus Negative   Meka-Hallpike (right) comments unable to assess on eval due to symptoms   Meka-Hallpike (Left) Upbeating   Meka-Hallpike (left) comments immediate latency, duration 45 seconds   [facility] Supine Roll Test (Right) Comments unable to assess on eval due to symptoms   [facility] Supine Roll Test (Left) Comments  unable to assess on eval due to symptoms   BPPV Canal(s) L Posterior   BPPV Type Canalithasis   Planned Therapy Interventions   Planned Therapy Interventions balance training;gait training;joint mobilization;neuromuscular re-education;ROM;stretching;strengthening;manual therapy   Clinical Impression   Criteria for Skilled Therapeutic Interventions Met yes, treatment indicated   PT Diagnosis dizziness   Influenced by the following impairments symptoms, balance, positive findings on oculomotor exam and meka-hallpike, integumentary   Functional limitations due to impairments gait dysfunction- household and community mobility, fall risk, decreased participation in work/leisure activities   Clinical Presentation Evolving/Changing   Clinical Presentation Rationale personal factors, body systems involved, symptoms   Clinical Decision Making (Complexity) Moderate complexity   Therapy Frequency 1 time/week   Predicted Duration of Therapy Intervention (days/wks) up to 90 days   Risk & Benefits of therapy have been explained Yes   Patient, Family & other staff in agreement with plan of care Yes   Clinical Impression Comments Patient is a 53 year old male who presents to OP PT with reports of dizziness which began after L maxillectomy on 5/9/19 (radiation completed 7/22/19); positional and visual sensitivity symptoms reported. Patient demonstrates significant sensitivity to all oculomotor and head movements, as well as, positive findings on the  [place]-Hallpike. Unable to complete full exam due to vertigo and nausea requiring increased time for completion of activities. patient would benefit from skilled pT services to address these impairments/functional limitations in order to improve activity participation. Patient is a good rehab candiate due to motivation and PLOF. Recommend follow up with audiology/ENT due to findings on exam   Education Assessment   Preferred Learning Style Reading;Demonstration;Pictures/video   Barriers to Learning No barriers   GOALS   PT Eval Goals 1;2;3;4   Goal 1   Goal Identifier DHI   Goal Description Patient will score 40 or less on the DHI to improve symptom management and to increase activity participation   Target Date 12/25/19   Goal 2   Goal Identifier Oculomotor exam   Goal Description Patient will demonstrate normal oculomotor exam without symptoms to improve performance with work activities   Target Date 12/25/19   Goal 3   Goal Identifier Balance   Goal Description Patient will stand on foam mat, eyes closed for at least 30 seconds to improve vestibular integration for balance stability and fall risk reduction   Target Date 12/25/19   Goal 4   Goal Identifier HEP   Goal Description Patient will be independent in HEP for maintenance of therapy gains at d/c   Target Date 12/25/19   Total Evaluation Time   PT Haley, Moderate Complexity Minutes ([id number]) 75

## 2019-10-01 ENCOUNTER — TELEPHONE (OUTPATIENT)
Dept: OTOLARYNGOLOGY | Facility: CLINIC | Age: 54
End: 2019-10-01

## 2019-10-01 DIAGNOSIS — R42 VERTIGO: ICD-10-CM

## 2019-10-01 DIAGNOSIS — C05.9 SQUAMOUS CELL CARCINOMA OF PALATE (H): Primary | ICD-10-CM

## 2019-10-01 ASSESSMENT — ENCOUNTER SYMPTOMS
NECK PAIN: 0
SWOLLEN GLANDS: 0
BACK PAIN: 1
ARTHRALGIAS: 0
MUSCLE WEAKNESS: 0
MUSCLE CRAMPS: 1
JOINT SWELLING: 0
MYALGIAS: 0
STIFFNESS: 0
BRUISES/BLEEDS EASILY: 0

## 2019-10-01 NOTE — TELEPHONE ENCOUNTER
[facility] Call Center    Phone Message    May a detailed message be left on voicemail: no    Reason for Call: Symptoms or Concerns     If patient has red-flag symptoms, warm transfer to triage line    Current symptom or concern: Patients care coordinator Yaquelin calling to follow up on fax for health update. Yaquelin states she will send the fax again and to have clinic call her with any questions.    Action Taken: Message routed to:  [facility] ([facility] ENT

## 2019-10-01 NOTE — TELEPHONE ENCOUNTER
Spoke to Yaquelin Sifuentes, patient's  at [facility]. Yaquelin reports she needs a copy of the patient's office note from 9/23/19 and any changes to his medication list. Will fax to: [phone]. Yaquelin is in agreement with this plan.    Rosa León, EMT

## 2019-10-02 ENCOUNTER — DOCUMENTATION ONLY (OUTPATIENT)
Dept: CARE COORDINATION | Facility: CLINIC | Age: 54
End: 2019-10-02

## 2019-10-07 ENCOUNTER — OFFICE VISIT (OUTPATIENT)
Dept: ENDOCRINOLOGY | Facility: CLINIC | Age: 54
End: 2019-10-07
Payer: COMMERCIAL

## 2019-10-07 ENCOUNTER — TELEPHONE (OUTPATIENT)
Dept: AUDIOLOGY | Facility: CLINIC | Age: 54
End: 2019-10-07

## 2019-10-07 VITALS
WEIGHT: 260 LBS | HEIGHT: 67 IN | BODY MASS INDEX: 40.81 KG/M2 | DIASTOLIC BLOOD PRESSURE: 90 MMHG | SYSTOLIC BLOOD PRESSURE: 140 MMHG | HEART RATE: 90 BPM

## 2019-10-07 DIAGNOSIS — E11.8 TYPE 2 DIABETES MELLITUS WITH COMPLICATION (H): ICD-10-CM

## 2019-10-07 DIAGNOSIS — E11.9 TYPE 2 DIABETES MELLITUS WITHOUT COMPLICATION, WITHOUT LONG-TERM CURRENT USE OF INSULIN (H): ICD-10-CM

## 2019-10-07 DIAGNOSIS — E11.9 TYPE 2 DIABETES MELLITUS WITHOUT COMPLICATION, WITHOUT LONG-TERM CURRENT USE OF INSULIN (H): Primary | ICD-10-CM

## 2019-10-07 LAB
DEPRECATED CALCIDIOL+CALCIFEROL SERPL-MC: 26 UG/L (ref 20–75)
HBA1C MFR BLD: 5.4 % (ref 4.3–6)
TSH SERPL DL<=0.005 MIU/L-ACNC: 2.03 MU/L (ref 0.4–4)
VIT B12 SERPL-MCNC: 352 PG/ML (ref 193–986)

## 2019-10-07 RX ORDER — LANCETS
EACH MISCELLANEOUS
Qty: 100 EACH | Refills: 3 | Status: SHIPPED | OUTPATIENT
Start: 2019-10-07 | End: 2020-01-27

## 2019-10-07 ASSESSMENT — PATIENT HEALTH QUESTIONNAIRE - PHQ9: SUM OF ALL RESPONSES TO PHQ QUESTIONS 1-9: 0

## 2019-10-07 ASSESSMENT — PAIN SCALES - GENERAL: PAINLEVEL: NO PAIN (0)

## 2019-10-07 ASSESSMENT — MIFFLIN-ST. JEOR: SCORE: 1977.98

## 2019-10-07 NOTE — PROGRESS NOTES
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 Post operatively, he required NGT placement for enteral feeding, and experienced hyperglycemia related to enteral feeding. He did eventually cycle tube feeds, and was on NPH insulin given at the time of each daily cycle. He is now off TF and working with SLP. He is limited to a lot of shakes/liquid foods at this point.     Upon last visit with me in July, he was noticing higher FBG despite no snacking following an early dinner, and bedtime BG . There was a concern for overnight hypoglycemia precipitating reactive hyperglycemia in the mornings, and was instructed to check some overnight BG and have a small bedtime snack. He reports that this has improved.     He has been following up with oncology providers and is felt to be doing well. He will have interval CT imaging soon to evaluate for metastatic disease.     Devonte reports that he is doing well; his BG have been well controlled; A1c today is down to 5.4%. His BG have been consistently 100-110's all day, usually fasting BG are higher than prandial, but never higher than 140. Lowest he has documented have been in the 90's, and he has not had any concerning sx of hypoglycemia.     He does report sensation of cold in his feet, feels chilled and has had to turn the heat on in his house early. He denies overt fevers, chills, night sweats. He does endorse worsening hoarsness over the past week. He intends to talk with his oncology providers regarding this. He has also noted some higher BG trends this past week, to 130-140's. He does not feel sick, but has been more fatigued.    Working with  SLP and is trying to increase his solid food intake. This is slowly improved. Still cannot eat raw vegetables. Having one protein shake per day, increasing meat intake for protein as tolerated and has been doing exercises.     Pt denies headaches, visual changes, vomiting, SOB at rest, chest pain, abd pain, nausea/vomiting, diarrhea, dysuria, hematuria or foot ulcers.     Current Diabetes Regimen:   Metformin IR 1000mg BID    Glucometer Settings   No meter here today.  Fasting glucose range: 100-110's  Prandial glucose range: 's  Documented hypoglycemia: none less than 90 per patient.    Weight: 260 lbs, from 248 recently  Physical Activity: less, but still trying to go for walks every day  Nutrition: regular diet, tolerating. Three meals per day, with 1-2 snacks daily (fruit or cottage cheese, typically). Watches his CHO intake.   Dinner at 5PM, no evening or overnight snacking.     Diabetes Care  Retinopathy: no; has eye exam coming up in October.     Nephropathy: BP well moderately controlled (states it was checked just after walking in the clinic). No hx microalbuminuria. Creatinine 0.68 (stable). Taking ACEi/ARB: yes    Neuropathy: yes, recent.     Foot Exam: no wounds or ulcers.     Lipids: no recent labs. Taking statin: yes, ASA: yes  LDL Cholesterol Calculated   Date Value Ref Range Status   07/01/2019 41 <100 mg/dL Final     Comment:     Desirable:       <100 mg/dl        ROS: 10 point review of systems completed; pertinent positives and negatives documented in HPI    Allergies  No Known Allergies    Medications  Current Outpatient Medications   Medication Sig Dispense Refill     blood glucose (NO BRAND SPECIFIED) test strip Use to test blood sugar 4 times daily or as directed. To accompany: Blood Glucose Monitor Brands: per insurance. 100 strip 6     metFORMIN (GLUCOPHAGE) 1000 MG tablet Take 1 tablet (1,000 mg) by mouth 2 times daily (with meals) 60 tablet 3     thin (NO BRAND SPECIFIED) lancets Use  to test blood sugar 4 times daily or as directed. To accompany: Blood Glucose Monitor Brands: per insurance. 100 each 3     acetaminophen (TYLENOL) 325 MG tablet Take 2 tablets (650 mg) by mouth every 4 hours as needed for mild pain (Patient not taking: Reported on 8/1/2019) 100 tablet 0     aspirin (ASA) 325 MG tablet Take 1 tablet (325 mg) by mouth daily 30 tablet 0     atorvastatin (LIPITOR) 20 MG tablet 1 tablet (20 mg) by Per Feeding Tube route every morning       doxazosin (CARDURA) 1 MG tablet Take 1 tablet (1 mg) by mouth daily 30 tablet 0     gabapentin (NEURONTIN) 300 MG capsule Take 1 capsule (300 mg) by mouth 3 times daily 90 capsule 0     lidocaine HCl (XYLOCAINE) 2 % solution Take 15 mLs by mouth every 4 hours as needed for moderate pain swish and spit every 3-8 hours as needed; max 8 doses/24 hour period 100 mL 3     lisinopril (PRINIVIL/ZESTRIL) 10 MG tablet 1 tablet (10 mg) by Per Feeding Tube route every morning       metFORMIN (GLUCOPHAGE) 1000 MG tablet Take 1 tablet (1,000 mg) by mouth daily (with dinner) (Patient taking differently: Take 1,000 mg by mouth daily (with dinner) Pt breaks into half and takes morning and night) 30 tablet 0     mineral oil-hydrophilic petrolatum (AQUAPHOR) external ointment Apply topically every 8 hours Apply to neck wound 420 g 0     multivitamin w/minerals (THERA-VIT-M) tablet Take 1 tablet by mouth daily 30 tablet 0     omeprazole (PRILOSEC) 20 MG DR capsule Take 1 capsule (20 mg) by mouth daily 30 capsule 3     ondansetron (ZOFRAN) 8 MG tablet Take 1 tablet (8 mg) by mouth every 8 hours as needed for nausea 30 tablet 3     order for DME Equipment being ordered: Facial compression garment for lymphedema 2 each 1     order for DME Equipment being ordered: Nasogastric bolus tube feeding supplies  Formula: Isosource 1.5, 5 cans per day  Gravity feeding bags  60 mL syringes    Treatment Diagnosis: squamous cell carcinoma of the oral cavity 14 days 1     order for DME  "Equipment being ordered: Wound care supplies, 1 each daily x 21 days  Xeroform occlusive gauze 5\" x 9\"  Telfa non-adherent pad 8\" x 3\"  Kerlix bandage roll 4-1/2\" x 4-1/8 yd  ACE wrap, 4 inch (5 total)    Diagnosis: squamous cell carcinoma of the oral cavity 1 Month 0     oxyCODONE (ROXICODONE) 5 MG/5ML solution Take 5 mLs (5 mg) by mouth 3 times daily as needed for severe pain 500 mL 0     polyethylene glycol (MIRALAX/GLYCOLAX) packet Take 17 g by mouth daily as needed for constipation 100 packet 0     senna-docusate (SENOKOT-S/PERICOLACE) 8.6-50 MG tablet Take 2 tablets by mouth 2 times daily as needed for constipation 60 tablet 0     sodium fluoride dental gel (PREVIDENT) 1.1 % GEL topical gel Apply to affected area At Bedtime 112 g 11     White Petrolatum-Mineral Oil (REFRESH P.M.) OINT Apply to left eye prior to bedtime. 1 Tube 3       Family History  family history includes Cancer in his brother and mother; Cardiovascular in his brother; Kidney Cancer in his sister.    Social History   reports that he has never smoked. He has never used smokeless tobacco. He reports that he does not drink alcohol or use drugs.     Past Medical History  Past Medical History:   Diagnosis Date     Diabetes (H)      Hypertension      Maxillary sinus cancer (H)      Obesity        Past Surgical History:   Procedure Laterality Date     ------------OTHER-------------      Mucosa and bone biopsy     EXPLORE NECK Left 5/9/2019    Procedure: Left Neck Exploration;  Surgeon: Jett Treviño MD;  Location: [facility] OR     EXTRACTION(S) DENTAL      x2 under general anesthesia     GRAFT FREE VASCULARIZED (LOCATION) Right 5/9/2019    Procedure: Left Radial Forearm Free Flap (soft tissue);  Surgeon: Sumit Atwood MD;  Location: [facility] OR     GRAFT SKIN SPLIT THICKNESS FROM EXTREMITY Right 5/9/2019    Procedure: Graft skin split thickness from right thigh, nasogastric feeding tube placement;  Surgeon: Sumit Atwood MD;  Location: [facility] OR     IR " "LYMPH NODE BIOPSY  4/4/2019     OSTEOTOMY MAXILLA Left 5/9/2019    Procedure: Left Infrastructure Maxillectomy,;  Surgeon: Jett Treviño MD;  Location: [facility] OR       Physical Exam  BP (!) 140/90   Pulse 90   Ht 1.702 m (5' 7\")   Wt 117.9 kg (260 lb)   BMI 40.72 kg/m    Body mass index is 40.72 kg/m .    GENERAL : In no apparent distress. Voice hoarse.  SKIN: Normal color, normal temperature, texture. Slight redness to face at site of radiation tx.   EYES: PERRLA.  No proptosis.  MOUTH: Moist, pink; pharynx clear  NECK: No visible masses. No palpable adenopathy, or masses. No goiter.  RESP: normal respiratory effort.  cough   ABDOMEN: soft, nontender to palpation   NEURO: awake, alert, responds appropriately to questions.  Moves all extremities; Gait normal.     EXTREMITIES: No ulcers or open wounds.     RESULTS    Lab Results   Component Value Date    A1C 7.4 04/24/2019       Creatinine   Date Value Ref Range Status   07/01/2019 0.68 0.66 - 1.25 mg/dL Final     GFR Estimate   Date Value Ref Range Status   07/01/2019 >90 >60 mL/min/[1.73_m2] Final     Comment:     Non  GFR Calc  Starting 12/18/2018, serum creatinine based estimated GFR (eGFR) will be   calculated using the Chronic Kidney Disease Epidemiology Collaboration   (CKD-EPI) equation.       Hemoglobin A1C   Date Value Ref Range Status   04/24/2019 7.4 (H) 0 - 5.6 % Final     Comment:     Normal <5.7% Prediabetes 5.7-6.4%  Diabetes 6.5% or higher - adopted from ADA   consensus guidelines.       Potassium   Date Value Ref Range Status   07/01/2019 4.1 3.4 - 5.3 mmol/L Final     TSH   Date Value Ref Range Status   05/10/2019 0.32 (L) 0.40 - 4.00 mU/L Final       TSH   Date Value Ref Range Status   05/10/2019 0.32 (L) 0.40 - 4.00 mU/L Final       Creatinine   Date Value Ref Range Status   07/01/2019 0.68 0.66 - 1.25 mg/dL Final       No results for input(s): CHOL, HDL, LDL, TRIG, CHOLHDLRATIO in the last [id number] hours.    No results found for: " [id number], [id number], [id number]      ASSESSMENT/PLAN:    1. Diabetes type II, now well controlled with weight loss and dietary modification (someqhat unintentional due to his cancer and recent surgery)   -most recent A1c 5.4% today, and is gaining weight (appropriately) with increased solid food PO intake.    -he has not uncovered any hypoglycemia at this point, he will continue to monitor closely, as this would be an indication to reduce his metformin   -continue Metformin 1000mg BID    2. Cold intolerance, fatigue, neuropathy sx in LE, new: over the past couple weeks. Did not receive chemotherapy. No historical diagnosis of diabetic neuropathy. Last Vit D level in July was low normal.    -B12, Vitamin D, TSH and T4 ordered.    3. Hoarsenss, new: has developed and been worsening in the past week. Is associated with some higher trending BG in the past week, no fevers, sore throat, fevers. Will forward to his primary and oncology providers, as this needs to be followed up with history of maxillar squamous cell carcinoma     Refills today:  -refilled test strips, lancets, and Metformin IR today. [facility] related [facility] and [facility].     Follow up:  1. With diabetes provider in 3 months; will consider graduation from our clinic at next visit if remaining stable.     The patient is  enrolled in [employer]    This patient has met [place] community measures for diabetes control: no (D5: Control blood pressure ,Lower bad cholesterol Maintain blood sugar, Be tobacco-free, Take aspirin as recommended)    This patient is eligible for [facility] from [facility]: no    I spent 25 minutes with this patient face to face and explained the conditions and plans (more than 50% of time was counseling/coordination of care, diabetes management, follow up plan for worsening hyper and hypoglycemia) . The patient understood and is satisfied with today's visit.     Leanna  GIORGI Taylor PA-C  [facility]   Pager [phone]

## 2019-10-07 NOTE — LETTER
10/7/2019       RE: Devonte Tucker  [address]  [place] [address]     Dear Colleague,    Thank you for referring your patient, Devonte Tucker, to the [facility] ENDOCRINOLOGY at [facility]. Please see a copy of my visit note below.    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 Post operatively, he required NGT placement for enteral feeding, and experienced hyperglycemia related to enteral feeding. He did eventually cycle tube feeds, and was on NPH insulin given at the time of each daily cycle. He is now off TF and working with SLP. He is limited to a lot of shakes/liquid foods at this point.     Upon last visit with me in July, he was noticing higher FBG despite no snacking following an early dinner, and bedtime BG . There was a concern for overnight hypoglycemia precipitating reactive hyperglycemia in the mornings, and was instructed to check some overnight BG and have a small bedtime snack. He reports that this has improved.     He has been following up with oncology providers and is felt to be doing well. He will have interval CT imaging soon to evaluate for metastatic disease.     Devonte reports that he is doing well; his BG have been well controlled; A1c today is down to 5.4%. His BG have been consistently 100-110's all day, usually fasting BG are higher than prandial, but never higher than 140. Lowest he has documented have been in the 90's, and he has not had any concerning sx of hypoglycemia.     He does report sensation of cold in his feet, feels chilled and has had to turn the heat on in his house early. He denies overt  fevers, chills, night sweats. He does endorse worsening hoarsness over the past week. He intends to talk with his oncology providers regarding this. He has also noted some higher BG trends this past week, to 130-140's. He does not feel sick, but has been more fatigued.    Working with SLP and is trying to increase his solid food intake. This is slowly improved. Still cannot eat raw vegetables. Having one protein shake per day, increasing meat intake for protein as tolerated and has been doing exercises.     Pt denies headaches, visual changes, vomiting, SOB at rest, chest pain, abd pain, nausea/vomiting, diarrhea, dysuria, hematuria or foot ulcers.     Current Diabetes Regimen:   Metformin IR 1000mg BID    Glucometer Settings   No meter here today.  Fasting glucose range: 100-110's  Prandial glucose range: 's  Documented hypoglycemia: none less than 90 per patient.    Weight: 260 lbs, from 248 recently  Physical Activity: less, but still trying to go for walks every day  Nutrition: regular diet, tolerating. Three meals per day, with 1-2 snacks daily (fruit or cottage cheese, typically). Watches his CHO intake.   Dinner at 5PM, no evening or overnight snacking.     Diabetes Care  Retinopathy: no; has eye exam coming up in October.     Nephropathy: BP well moderately controlled (states it was checked just after walking in the clinic). No hx microalbuminuria. Creatinine 0.68 (stable). Taking ACEi/ARB: yes    Neuropathy: yes, recent.     Foot Exam: no wounds or ulcers.     Lipids: no recent labs. Taking statin: yes, ASA: yes  LDL Cholesterol Calculated   Date Value Ref Range Status   07/01/2019 41 <100 mg/dL Final     Comment:     Desirable:       <100 mg/dl        ROS: 10 point review of systems completed; pertinent positives and negatives documented in HPI    Allergies  No Known Allergies    Medications  Current Outpatient Medications   Medication Sig Dispense Refill     blood glucose (NO BRAND SPECIFIED) test  strip Use to test blood sugar 4 times daily or as directed. To accompany: Blood Glucose Monitor Brands: per insurance. 100 strip 6     metFORMIN (GLUCOPHAGE) 1000 MG tablet Take 1 tablet (1,000 mg) by mouth 2 times daily (with meals) 60 tablet 3     thin (NO BRAND SPECIFIED) lancets Use to test blood sugar 4 times daily or as directed. To accompany: Blood Glucose Monitor Brands: per insurance. 100 each 3     acetaminophen (TYLENOL) 325 MG tablet Take 2 tablets (650 mg) by mouth every 4 hours as needed for mild pain (Patient not taking: Reported on 8/1/2019) 100 tablet 0     aspirin (ASA) 325 MG tablet Take 1 tablet (325 mg) by mouth daily 30 tablet 0     atorvastatin (LIPITOR) 20 MG tablet 1 tablet (20 mg) by Per Feeding Tube route every morning       doxazosin (CARDURA) 1 MG tablet Take 1 tablet (1 mg) by mouth daily 30 tablet 0     gabapentin (NEURONTIN) 300 MG capsule Take 1 capsule (300 mg) by mouth 3 times daily 90 capsule 0     lidocaine HCl (XYLOCAINE) 2 % solution Take 15 mLs by mouth every 4 hours as needed for moderate pain swish and spit every 3-8 hours as needed; max 8 doses/24 hour period 100 mL 3     lisinopril (PRINIVIL/ZESTRIL) 10 MG tablet 1 tablet (10 mg) by Per Feeding Tube route every morning       metFORMIN (GLUCOPHAGE) 1000 MG tablet Take 1 tablet (1,000 mg) by mouth daily (with dinner) (Patient taking differently: Take 1,000 mg by mouth daily (with dinner) Pt breaks into half and takes morning and night) 30 tablet 0     mineral oil-hydrophilic petrolatum (AQUAPHOR) external ointment Apply topically every 8 hours Apply to neck wound 420 g 0     multivitamin w/minerals (THERA-VIT-M) tablet Take 1 tablet by mouth daily 30 tablet 0     omeprazole (PRILOSEC) 20 MG DR capsule Take 1 capsule (20 mg) by mouth daily 30 capsule 3     ondansetron (ZOFRAN) 8 MG tablet Take 1 tablet (8 mg) by mouth every 8 hours as needed for nausea 30 tablet 3     order for DME Equipment being ordered: Facial compression  "garment for lymphedema 2 each 1     order for DME Equipment being ordered: Nasogastric bolus tube feeding supplies  Formula: Isosource 1.5, 5 cans per day  Gravity feeding bags  60 mL syringes    Treatment Diagnosis: squamous cell carcinoma of the oral cavity 14 days 1     order for DME Equipment being ordered: Wound care supplies, 1 each daily x 21 days  Xeroform occlusive gauze 5\" x 9\"  Telfa non-adherent pad 8\" x 3\"  Kerlix bandage roll 4-1/2\" x 4-1/8 yd  ACE wrap, 4 inch (5 total)    Diagnosis: squamous cell carcinoma of the oral cavity 1 Month 0     oxyCODONE (ROXICODONE) 5 MG/5ML solution Take 5 mLs (5 mg) by mouth 3 times daily as needed for severe pain 500 mL 0     polyethylene glycol (MIRALAX/GLYCOLAX) packet Take 17 g by mouth daily as needed for constipation 100 packet 0     senna-docusate (SENOKOT-S/PERICOLACE) 8.6-50 MG tablet Take 2 tablets by mouth 2 times daily as needed for constipation 60 tablet 0     sodium fluoride dental gel (PREVIDENT) 1.1 % GEL topical gel Apply to affected area At Bedtime 112 g 11     White Petrolatum-Mineral Oil (REFRESH P.M.) OINT Apply to left eye prior to bedtime. 1 Tube 3       Family History  family history includes Cancer in his brother and mother; Cardiovascular in his brother; Kidney Cancer in his sister.    Social History   reports that he has never smoked. He has never used smokeless tobacco. He reports that he does not drink alcohol or use drugs.     Past Medical History  Past Medical History:   Diagnosis Date     Diabetes (H)      Hypertension      Maxillary sinus cancer (H)      Obesity        Past Surgical History:   Procedure Laterality Date     ------------OTHER-------------      Mucosa and bone biopsy     EXPLORE NECK Left 5/9/2019    Procedure: Left Neck Exploration;  Surgeon: Jett Treviño MD;  Location: [facility] OR     EXTRACTION(S) DENTAL      x2 under general anesthesia     GRAFT FREE VASCULARIZED (LOCATION) Right 5/9/2019    Procedure: Left Radial Forearm " "Free Flap (soft tissue);  Surgeon: Sumit Atwood MD;  Location: [facility] OR     GRAFT SKIN SPLIT THICKNESS FROM EXTREMITY Right 5/9/2019    Procedure: Graft skin split thickness from right thigh, nasogastric feeding tube placement;  Surgeon: Sumit Atwood MD;  Location: [facility] OR     IR LYMPH NODE BIOPSY  4/4/2019     OSTEOTOMY MAXILLA Left 5/9/2019    Procedure: Left Infrastructure Maxillectomy,;  Surgeon: Jett Treviño MD;  Location: [facility] OR       Physical Exam  BP (!) 140/90   Pulse 90   Ht 1.702 m (5' 7\")   Wt 117.9 kg (260 lb)   BMI 40.72 kg/m     Body mass index is 40.72 kg/m .    GENERAL : In no apparent distress. Voice hoarse.  SKIN: Normal color, normal temperature, texture. Slight redness to face at site of radiation tx.   EYES: PERRLA.  No proptosis.  MOUTH: Moist, pink; pharynx clear  NECK: No visible masses. No palpable adenopathy, or masses. No goiter.  RESP: normal respiratory effort.  cough   ABDOMEN: soft, nontender to palpation   NEURO: awake, alert, responds appropriately to questions.  Moves all extremities; Gait normal.     EXTREMITIES: No ulcers or open wounds.     RESULTS    Lab Results   Component Value Date    A1C 7.4 04/24/2019       Creatinine   Date Value Ref Range Status   07/01/2019 0.68 0.66 - 1.25 mg/dL Final     GFR Estimate   Date Value Ref Range Status   07/01/2019 >90 >60 mL/min/[1.73_m2] Final     Comment:     Non  GFR Calc  Starting 12/18/2018, serum creatinine based estimated GFR (eGFR) will be   calculated using the Chronic Kidney Disease Epidemiology Collaboration   (CKD-EPI) equation.       Hemoglobin A1C   Date Value Ref Range Status   04/24/2019 7.4 (H) 0 - 5.6 % Final     Comment:     Normal <5.7% Prediabetes 5.7-6.4%  Diabetes 6.5% or higher - adopted from ADA   consensus guidelines.       Potassium   Date Value Ref Range Status   07/01/2019 4.1 3.4 - 5.3 mmol/L Final     TSH   Date Value Ref Range Status   05/10/2019 0.32 (L) 0.40 - 4.00 mU/L Final "       TSH   Date Value Ref Range Status   05/10/2019 0.32 (L) 0.40 - 4.00 mU/L Final       Creatinine   Date Value Ref Range Status   07/01/2019 0.68 0.66 - 1.25 mg/dL Final       No results for input(s): CHOL, HDL, LDL, TRIG, CHOLHDLRATIO in the last 14468 hours.    No results found for: [id number], [id number], [id number]      ASSESSMENT/PLAN:    1. Diabetes type II, now well controlled with weight loss and dietary modification (someqhat unintentional due to his cancer and recent surgery)   -most recent A1c 5.4% today, and is gaining weight (appropriately) with increased solid food PO intake.    -he has not uncovered any hypoglycemia at this point, he will continue to monitor closely, as this would be an indication to reduce his metformin   -continue Metformin 1000mg BID    2. Cold intolerance, fatigue, neuropathy sx in LE, new: over the past couple weeks. Did not receive chemotherapy. No historical diagnosis of diabetic neuropathy. Last Vit D level in July was low normal.    -B12, Vitamin D, TSH and T4 ordered.    3. Hoarsenss, new: has developed and been worsening in the past week. Is associated with some higher trending BG in the past week, no fevers, sore throat, fevers. Will forward to his primary and oncology providers, as this needs to be followed up with history of maxillar squamous cell carcinoma     Refills today:  -refilled test strips, lancets, and Metformin IR today. [facility] endocrine is happy to fill all diabetes related medications and supplies.     Follow up:  1. With diabetes provider in 3 months; will consider graduation from our clinic at next visit if remaining stable.     The patient is  enrolled in [employer] services    This patient has met [place] community measures for diabetes control: no (D5: Control blood pressure ,Lower bad cholesterol Maintain blood sugar, Be tobacco-free, Take aspirin as recommended)    This patient is eligible for [facility] from [facility]: no    I spent  25 minutes with this patient face to face and explained the conditions and plans (more than 50% of time was counseling/coordination of care, diabetes management, follow up plan for worsening hyper and hypoglycemia) . The patient understood and is satisfied with today's visit.     GIORGI Mccoy PA-C  [facility]   Pager [phone]      Again, thank you for allowing me to participate in the care of your patient.      Sincerely,    Leanna Taylor PA-C

## 2019-10-07 NOTE — LETTER
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 Post operatively, he required NGT placement for enteral feeding, and experienced hyperglycemia related to enteral feeding. He did eventually cycle tube feeds, and was on NPH insulin given at the time of each daily cycle. He is now off TF and working with SLP. He is limited to a lot of shakes/liquid foods at this point.     Upon last visit with me in July, he was noticing higher FBG despite no snacking following an early dinner, and bedtime BG . There was a concern for overnight hypoglycemia precipitating reactive hyperglycemia in the mornings, and was instructed to check some overnight BG and have a small bedtime snack. He reports that this has improved.     He has been following up with oncology providers and is felt to be doing well. He will have interval CT imaging soon to evaluate for metastatic disease.     Devonte reports that he is doing well; his BG have been well controlled; A1c today is down to 5.4%. His BG have been consistently 100-110's all day, usually fasting BG are higher than prandial, but never higher than 140. Lowest he has documented have been in the 90's, and he has not had any concerning sx of hypoglycemia.     He does report sensation of cold in his feet, feels chilled and has had to turn the heat on in his house early. He denies overt fevers, chills, night sweats. He does endorse worsening hoarsness over the past week. He intends to talk with his oncology providers regarding this. He has also noted some higher BG trends this past  week, to 130-140's. He does not feel sick, but has been more fatigued.    Working with SLP and is trying to increase his solid food intake. This is slowly improved. Still cannot eat raw vegetables. Having one protein shake per day, increasing meat intake for protein as tolerated and has been doing exercises.     Pt denies headaches, visual changes, vomiting, SOB at rest, chest pain, abd pain, nausea/vomiting, diarrhea, dysuria, hematuria or foot ulcers.     Current Diabetes Regimen:   Metformin IR 1000mg BID    Glucometer Settings   No meter here today.  Fasting glucose range: 100-110's  Prandial glucose range: 's  Documented hypoglycemia: none less than 90 per patient.    Weight: 260 lbs, from 248 recently  Physical Activity: less, but still trying to go for walks every day  Nutrition: regular diet, tolerating. Three meals per day, with 1-2 snacks daily (fruit or cottage cheese, typically). Watches his CHO intake.   Dinner at 5PM, no evening or overnight snacking.     Diabetes Care  Retinopathy: no; has eye exam coming up in October.     Nephropathy: BP well moderately controlled (states it was checked just after walking in the clinic). No hx microalbuminuria. Creatinine 0.68 (stable). Taking ACEi/ARB: yes    Neuropathy: yes, recent.     Foot Exam: no wounds or ulcers.     Lipids: no recent labs. Taking statin: yes, ASA: yes  LDL Cholesterol Calculated   Date Value Ref Range Status   07/01/2019 41 <100 mg/dL Final     Comment:     Desirable:       <100 mg/dl      ROS: 10 point review of systems completed; pertinent positives and negatives documented in HPI    Allergies  No Known Allergies    Medications  Current Outpatient Medications   Medication Sig Dispense Refill     blood glucose (NO BRAND SPECIFIED) test strip Use to test blood sugar 4 times daily or as directed. To accompany: Blood Glucose Monitor Brands: per insurance. 100 strip 6     metFORMIN (GLUCOPHAGE) 1000 MG tablet Take 1 tablet (1,000 mg) by  mouth 2 times daily (with meals) 60 tablet 3     thin (NO BRAND SPECIFIED) lancets Use to test blood sugar 4 times daily or as directed. To accompany: Blood Glucose Monitor Brands: per insurance. 100 each 3     acetaminophen (TYLENOL) 325 MG tablet Take 2 tablets (650 mg) by mouth every 4 hours as needed for mild pain (Patient not taking: Reported on 8/1/2019) 100 tablet 0     aspirin (ASA) 325 MG tablet Take 1 tablet (325 mg) by mouth daily 30 tablet 0     atorvastatin (LIPITOR) 20 MG tablet 1 tablet (20 mg) by Per Feeding Tube route every morning       doxazosin (CARDURA) 1 MG tablet Take 1 tablet (1 mg) by mouth daily 30 tablet 0     gabapentin (NEURONTIN) 300 MG capsule Take 1 capsule (300 mg) by mouth 3 times daily 90 capsule 0     lidocaine HCl (XYLOCAINE) 2 % solution Take 15 mLs by mouth every 4 hours as needed for moderate pain swish and spit every 3-8 hours as needed; max 8 doses/24 hour period 100 mL 3     lisinopril (PRINIVIL/ZESTRIL) 10 MG tablet 1 tablet (10 mg) by Per Feeding Tube route every morning       metFORMIN (GLUCOPHAGE) 1000 MG tablet Take 1 tablet (1,000 mg) by mouth daily (with dinner) (Patient taking differently: Take 1,000 mg by mouth daily (with dinner) Pt breaks into half and takes morning and night) 30 tablet 0     mineral oil-hydrophilic petrolatum (AQUAPHOR) external ointment Apply topically every 8 hours Apply to neck wound 420 g 0     multivitamin w/minerals (THERA-VIT-M) tablet Take 1 tablet by mouth daily 30 tablet 0     omeprazole (PRILOSEC) 20 MG DR capsule Take 1 capsule (20 mg) by mouth daily 30 capsule 3     ondansetron (ZOFRAN) 8 MG tablet Take 1 tablet (8 mg) by mouth every 8 hours as needed for nausea 30 tablet 3     order for DME Equipment being ordered: Facial compression garment for lymphedema 2 each 1     order for DME Equipment being ordered: Nasogastric bolus tube feeding supplies  Formula: Isosource 1.5, 5 cans per day  Gravity feeding bags  60 mL  "syringes    Treatment Diagnosis: squamous cell carcinoma of the oral cavity 14 days 1     order for DME Equipment being ordered: Wound care supplies, 1 each daily x 21 days  Xeroform occlusive gauze 5\" x 9\"  Telfa non-adherent pad 8\" x 3\"  Kerlix bandage roll 4-1/2\" x 4-1/8 yd  ACE wrap, 4 inch (5 total)    Diagnosis: squamous cell carcinoma of the oral cavity 1 Month 0     oxyCODONE (ROXICODONE) 5 MG/5ML solution Take 5 mLs (5 mg) by mouth 3 times daily as needed for severe pain 500 mL 0     polyethylene glycol (MIRALAX/GLYCOLAX) packet Take 17 g by mouth daily as needed for constipation 100 packet 0     senna-docusate (SENOKOT-S/PERICOLACE) 8.6-50 MG tablet Take 2 tablets by mouth 2 times daily as needed for constipation 60 tablet 0     sodium fluoride dental gel (PREVIDENT) 1.1 % GEL topical gel Apply to affected area At Bedtime 112 g 11     White Petrolatum-Mineral Oil (REFRESH P.M.) OINT Apply to left eye prior to bedtime. 1 Tube 3       Family History  family history includes Cancer in his brother and mother; Cardiovascular in his brother; Kidney Cancer in his sister.    Social History   reports that he has never smoked. He has never used smokeless tobacco. He reports that he does not drink alcohol or use drugs.     Past Medical History  Past Medical History:   Diagnosis Date     Diabetes (H)      Hypertension      Maxillary sinus cancer (H)      Obesity      Past Surgical History:   Procedure Laterality Date     ------------OTHER-------------      Mucosa and bone biopsy     EXPLORE NECK Left 5/9/2019    Procedure: Left Neck Exploration;  Surgeon: Jett Treviño MD;  Location: [facility] OR     EXTRACTION(S) DENTAL      x2 under general anesthesia     GRAFT FREE VASCULARIZED (LOCATION) Right 5/9/2019    Procedure: Left Radial Forearm Free Flap (soft tissue);  Surgeon: Sumit Atwood MD;  Location: [facility] OR     GRAFT SKIN SPLIT THICKNESS FROM EXTREMITY Right 5/9/2019    Procedure: Graft skin split thickness from right " "thigh, nasogastric feeding tube placement;  Surgeon: Sumit Atwood MD;  Location: [facility] OR     IR LYMPH NODE BIOPSY  4/4/2019     OSTEOTOMY MAXILLA Left 5/9/2019    Procedure: Left Infrastructure Maxillectomy,;  Surgeon: Jett Treviño MD;  Location: [facility] OR     Physical Exam  BP (!) 140/90   Pulse 90   Ht 1.702 m (5' 7\")   Wt 117.9 kg (260 lb)   BMI 40.72 kg/m     Body mass index is 40.72 kg/m .    GENERAL : In no apparent distress. Voice hoarse.  SKIN: Normal color, normal temperature, texture. Slight redness to face at site of radiation tx.   EYES: PERRLA.  No proptosis.  MOUTH: Moist, pink; pharynx clear  NECK: No visible masses. No palpable adenopathy, or masses. No goiter.  RESP: normal respiratory effort.  cough   ABDOMEN: soft, nontender to palpation   NEURO: awake, alert, responds appropriately to questions.  Moves all extremities; Gait normal.     EXTREMITIES: No ulcers or open wounds.     RESULTS  Lab Results   Component Value Date    A1C 7.4 04/24/2019     Creatinine   Date Value Ref Range Status   07/01/2019 0.68 0.66 - 1.25 mg/dL Final     GFR Estimate   Date Value Ref Range Status   07/01/2019 >90 >60 mL/min/[1.73_m2] Final     Comment:     Non  GFR Calc  Starting 12/18/2018, serum creatinine based estimated GFR (eGFR) will be   calculated using the Chronic Kidney Disease Epidemiology Collaboration   (CKD-EPI) equation.       Hemoglobin A1C   Date Value Ref Range Status   04/24/2019 7.4 (H) 0 - 5.6 % Final     Comment:     Normal <5.7% Prediabetes 5.7-6.4%  Diabetes 6.5% or higher - adopted from ADA   consensus guidelines.       Potassium   Date Value Ref Range Status   07/01/2019 4.1 3.4 - 5.3 mmol/L Final     TSH   Date Value Ref Range Status   05/10/2019 0.32 (L) 0.40 - 4.00 mU/L Final       TSH   Date Value Ref Range Status   05/10/2019 0.32 (L) 0.40 - 4.00 mU/L Final       Creatinine   Date Value Ref Range Status   07/01/2019 0.68 0.66 - 1.25 mg/dL Final     No results for " input(s): CHOL, HDL, LDL, TRIG, CHOLHDLRATIO in the last 79466 hours.    No results found for: [id number], [id number], [id number]      ASSESSMENT/PLAN:    1. Diabetes type II, now well controlled with weight loss and dietary modification (someqhat unintentional due to his cancer and recent surgery)   -most recent A1c 5.4% today, and is gaining weight (appropriately) with increased solid food PO intake.    -he has not uncovered any hypoglycemia at this point, he will continue to monitor closely, as this would be an indication to reduce his metformin   -continue Metformin 1000mg BID    2. Cold intolerance, fatigue, neuropathy sx in LE, new: over the past couple weeks. Did not receive chemotherapy. No historical diagnosis of diabetic neuropathy. Last Vit D level in July was low normal.    -B12, Vitamin D, TSH and T4 ordered.    3. Hoarsenss, new: has developed and been worsening in the past week. Is associated with some higher trending BG in the past week, no fevers, sore throat, fevers. Will forward to his primary and oncology providers, as this needs to be followed up with history of maxillar squamous cell carcinoma     Refills today:  -refilled test strips, lancets, and Metformin IR today. [facility] is happy to fill all diabetes related medications and supplies.     Follow up:  1. With diabetes provider in 3 months; will consider graduation from our clinic at next visit if remaining stable.     The patient is  enrolled in [employer] services    This patient has met [place] community measures for diabetes control: no (D5: Control blood pressure ,Lower bad cholesterol Maintain blood sugar, Be tobacco-free, Take aspirin as recommended)    This patient is eligible for graduation from [facility] clinic: no    I spent 25 minutes with this patient face to face and explained the conditions and plans (more than 50% of time was counseling/coordination of care, diabetes management, follow up plan for worsening  hyper and hypoglycemia) . The patient understood and is satisfied with today's visit.     GIORGI Mccoy, PAJoseC  [facility]   Pager [phone]

## 2019-10-11 ENCOUNTER — THERAPY VISIT (OUTPATIENT)
Dept: PHYSICAL THERAPY | Facility: CLINIC | Age: 54
End: 2019-10-11
Payer: COMMERCIAL

## 2019-10-11 ENCOUNTER — HOSPITAL ENCOUNTER (OUTPATIENT)
Dept: PHYSICAL THERAPY | Facility: CLINIC | Age: 54
Discharge: HOME | End: 2019-10-11
Attending: PHYSICAL THERAPIST | Admitting: PHYSICAL THERAPIST
Payer: COMMERCIAL

## 2019-10-11 DIAGNOSIS — R42 VERTIGO: Primary | ICD-10-CM

## 2019-10-11 PROCEDURE — 97140 MANUAL THERAPY 1/> REGIONS: CPT | Mod: GP,ZF | Performed by: PHYSICAL THERAPIST

## 2019-10-11 NOTE — PROGRESS NOTES
"Outpatient Physical Therapy Progress Note     Patient: Devonte Tucker  : 1965    Beginning/End Dates of Reporting Period:  19 to 10/11/2019    Referring Provider: Dr. Jett Treviño    Therapy Diagnosis: Lymphedema, decreased shoulder ROM     Client Self Report: Pt reports that he is startign to notice more submental swelling than before.  He has the pump at home, he started using it since his training on Saturday.      Objective Measurements:  Objective Measure: Skin  Details: L face is pink but soft, no firm, pitting areas  Objective Measure: Pain  Details: \"Just that one spot\" on his face. No worse  Objective Measure: measurements  Details: slightly increased today, pt notes that his weight is up    Goals:  Goal Identifier HEP   Goal Description In order to improve tolerance for functional mobility, ADLs, and recreational activities outside of physical therapy, patient will demonstrate independence with home program of exercise and lifestyle modification and possible compression.    Target Date 19   Date Met  08/15/19   Progress:     Goal Identifier circumference   Goal Description Pt will have 1 cm decrease or more in 2 Left sided measurements of face in order to eat more easily.   Target Date 19   Date Met  08/15/19   Progress:     Goal Identifier pain   Goal Description Pt will be able to wash his face with firm touch without pain   Target Date 19   Date Met  08/15/19   Progress:     Goal Identifier Shoulder mobility   Goal Description Pt will be independent with his shoulder exercise program to report decreased numbness with overhead reaching and active flexion to 140 degrees.   Target Date 10/13/19   Date Met  10/11/19   Progress:     Goal Identifier Maintenance   Goal Description Pt will continue with home program to prevent exacerbation of swelling, infection or loss of shoulder/neck ROM   Target Date 19   Date Met      Progress:     Progress Toward Goals:   Progress " this reporting period: Pt has met 4/4 initial goals but noting continued pain in face, increased symptoms with swallow, voice changes, dizziness with fall, pain in upper back, and lymphedema which he is aware is a chronic condition.  Pt is compliant with home exercises and ROM of shoulders has improved greatly. He is using his pump daily and applying compression afterward using over night as much as able. Measurements today were slightly increased, pt does report a weight gain recently when weighed in clinic.        Plan:  Changes to therapy plan of care: New goals set, recheck scheduled. If needs at recheck, would plan for 4-5 visits before end of the year.     Discharge:  No

## 2019-10-15 ENCOUNTER — THERAPY VISIT (OUTPATIENT)
Dept: PHYSICAL THERAPY | Facility: CLINIC | Age: 54
End: 2019-10-15
Payer: COMMERCIAL

## 2019-10-15 DIAGNOSIS — R42 VERTIGO: Primary | ICD-10-CM

## 2019-10-16 ENCOUNTER — HOSPITAL ENCOUNTER (OUTPATIENT)
Dept: PET IMAGING | Facility: CLINIC | Age: 54
End: 2019-10-16
Attending: OTOLARYNGOLOGY
Payer: COMMERCIAL

## 2019-10-16 ENCOUNTER — ALLIED HEALTH/NURSE VISIT (OUTPATIENT)
Dept: RADIATION ONCOLOGY | Facility: CLINIC | Age: 54
End: 2019-10-16
Attending: RADIOLOGY
Payer: COMMERCIAL

## 2019-10-16 ENCOUNTER — HOSPITAL ENCOUNTER (OUTPATIENT)
Dept: PET IMAGING | Facility: CLINIC | Age: 54
Discharge: HOME | End: 2019-10-16
Attending: OTOLARYNGOLOGY | Admitting: OTOLARYNGOLOGY
Payer: COMMERCIAL

## 2019-10-16 DIAGNOSIS — C05.9 SQUAMOUS CELL CARCINOMA OF PALATE (H): ICD-10-CM

## 2019-10-16 DIAGNOSIS — C03.9 PRIMARY CANCER OF ALVEOLAR RIDGE MUCOSA (H): ICD-10-CM

## 2019-10-16 DIAGNOSIS — E03.9 HYPOTHYROIDISM, UNSPECIFIED TYPE: ICD-10-CM

## 2019-10-16 LAB
CREAT BLD-MCNC: 0.7 MG/DL (ref 0.66–1.25)
GFR SERPL CREATININE-BSD FRML MDRD: >90 ML/MIN/{1.73_M2}
TSH SERPL DL<=0.005 MIU/L-ACNC: 2.22 MU/L (ref 0.4–4)

## 2019-10-16 PROCEDURE — 36415 COLL VENOUS BLD VENIPUNCTURE: CPT | Performed by: RADIOLOGY

## 2019-10-16 PROCEDURE — 78816 PET IMAGE W/CT FULL BODY: CPT | Mod: PS

## 2019-10-16 PROCEDURE — 25000128 H RX IP 250 OP 636: Performed by: OTOLARYNGOLOGY

## 2019-10-16 PROCEDURE — 34300033 ZZH RX 343: Performed by: OTOLARYNGOLOGY

## 2019-10-16 PROCEDURE — A9552 F18 FDG: HCPCS | Performed by: OTOLARYNGOLOGY

## 2019-10-16 PROCEDURE — 71260 CT THORAX DX C+: CPT

## 2019-10-16 PROCEDURE — 82565 ASSAY OF CREATININE: CPT

## 2019-10-16 PROCEDURE — 70491 CT SOFT TISSUE NECK W/DYE: CPT

## 2019-10-16 PROCEDURE — 84443 ASSAY THYROID STIM HORMONE: CPT | Performed by: RADIOLOGY

## 2019-10-16 RX ORDER — IOPAMIDOL 755 MG/ML
45-135 INJECTION, SOLUTION INTRAVASCULAR ONCE
Status: COMPLETED | OUTPATIENT
Start: 2019-10-16 | End: 2019-10-16

## 2019-10-16 RX ADMIN — FLUDEOXYGLUCOSE F-18 15.59 MCI.: 500 INJECTION, SOLUTION INTRAVENOUS at 08:15

## 2019-10-16 RX ADMIN — IOPAMIDOL 134 ML: 755 INJECTION, SOLUTION INTRAVENOUS at 09:07

## 2019-10-22 ENCOUNTER — THERAPY VISIT (OUTPATIENT)
Dept: PHYSICAL THERAPY | Facility: CLINIC | Age: 54
End: 2019-10-22
Payer: COMMERCIAL

## 2019-10-22 DIAGNOSIS — R26.89 IMBALANCE: ICD-10-CM

## 2019-10-22 DIAGNOSIS — R42 VERTIGO: Primary | ICD-10-CM

## 2019-10-22 NOTE — DISCHARGE INSTRUCTIONS
"10/22/19   - Stand with chair in front of you to hold onto. Focus on the \"X\" and turn your head side/side x 30-60 seconds and up/down 30-60 seconds. Perform 3-5x/day. You can do this sitting as well for now, if you need to.   "

## 2019-10-28 ENCOUNTER — TELEPHONE (OUTPATIENT)
Dept: OTOLARYNGOLOGY | Facility: CLINIC | Age: 54
End: 2019-10-28

## 2019-10-28 DIAGNOSIS — C03.0 SQUAMOUS CELL CARCINOMA OF MAXILLARY ALVEOLAR RIDGE (H): ICD-10-CM

## 2019-10-28 DIAGNOSIS — C03.9 PRIMARY CANCER OF ALVEOLAR RIDGE MUCOSA (H): ICD-10-CM

## 2019-10-28 NOTE — TELEPHONE ENCOUNTER
[facility] Call Center    Phone Message    May a detailed message be left on voicemail: yes    Reason for Call: Medication Refill Request    Has the patient contacted the pharmacy for the refill? Yes   Name of medication being requested: Gabapentin 300mg  Provider who prescribed the medication: Kamila Frausto  Pharmacy: [id number] in [place]  Date medication is needed: ASAP     Action Taken: Message routed to:  [facility] ([facility]): ent

## 2019-10-29 RX ORDER — GABAPENTIN 300 MG/1
300 CAPSULE ORAL 3 TIMES DAILY
Qty: 90 CAPSULE | Refills: 3 | Status: SHIPPED | OUTPATIENT
Start: 2019-10-29 | End: 2020-04-22

## 2019-10-29 NOTE — PROGRESS NOTES
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 He underwent a left infrastructure maxillectomy and left level I lymph node dissection on 2019 with pathology revealing a 1.3 cm well-differentiated squamous cell carcinoma with 13 mm depth of invasion and involvement of the underlying bone. He received adjuvant radiotherapy alone as described above for improved local disease control.     Mr. Tucker returns to radiation oncology clinic today for a routine post-treatment disease surveillance visit. On exam, Mr. Tucker reports a persistent dry nose, for which he is using aquaphor; xerostomia, for which he finds relief with oral hydration and sugar-free mints; and mild fatigue which does not limit his daily activities. He endorses some difficulty with very dry foods such as breads due to his treatment-induced xerostomia however he continues to eat an otherwise normal diet without difficulty. He endorses improvement of his submental lymphedema and he has been working with lymphedema clinic diligently on this. He endorses persistent imbalance on his feet and has been using a walker when ambulating but has noted no worsening of his symptoms  post radiation. He specifically denies fevers, chills, headaches, vision, hearing changes, mouth sores, odynophagia or shortness of breath. The rest of his ROS are within normal. Of note, he underwent post treatment PET/CT on 10/16/19, which revealed indeterminate focal FDG uptake along the right paramedian hard palate with an ill-defined soft tissue density on CT. This finding was discussed in our multidisciplinary head and neck tumor conference and was felt to be non malignant and related to post-treatment changes.     PHYSICAL EXAM:  VITALS: BP (!) 153/93   Wt 118.4 kg (261 lb)   BMI 40.88 kg/m    GEN: Appears well, alert, oriented, and in NAD  HEENT: EOMI, normal conjunctiva, MMM  Oral Cavity: Mildly dry mucous membranes. No visible oral cavity lesions.  The left maxillary free flap he has healthy in appearance with no erythema, induration or nodularity. There is some mild tenderness to palpation along the medial aspect of the flap although with no underlying palpable abnormalities. The remainder of the right hard palate, soft palate and maxillary gingiva are normal in appearance without any lesions. The floor of mouth, oral tongue and bilateral tongue base are also soft to palpation.  Neck: Mild to moderate submental lymphedema. No palpable cervical adenopathy.  Lungs: No wheezing, stridor or respiratory distress  Neuro: Loss in light touch sensation in the distribution of Lt V2, V3 distribution, CN II-XII otherwise grossly intact. Normal and symmetric motor and sensory exam in bilateral upper and lower extremities, gait evaluation deferred  Skin: Mild scattered hyperpigmentation throughout the left upper neck.  Otherwise normal color and turgor  PSYCH: Appropriate mood and affect    LABS AND IMAGING:  TSH   Date Value Ref Range Status   10/16/2019 2.22 0.40 - 4.00 mU/L Final     Imaging: reviewed    IMPRESSION:   Mr. Tucker is a 53 year old male with a pT4a N0 M0 squamous cell carcinoma of the left  maxillary gingiva status post surgical resection and adjuvant radiotherapy.  He is doing well on examination and remains clinically and radiologically CÉSAR. He does have some mild tenderness to palpation over the medial aspect of the left maxillary free flap although this area is soft with associated masses or induration on exam.    PLAN:   1. Follow-up in radiation oncology clinic in 1 month for reevaluation of the noted slight tenderness along the medial aspect of the left maxillary free flap  2. Start Evoxac 30 mg 3 times daily for treatment-induced xerostomia  3. Encourage compliance with lymphedema and swallowing exercises  4. Follow-up with ENT clinic as scheduled for ongoing disease surveillance    The patient was seen and discussed with staff, Dr. Verdin.    Darlyn Mcneal MD  Resident, PGY-2  Department of Radiation Oncology  [facility]       Attending addendum:   I saw and examined the patient with the resident and agree with the documented plan of care.    Emerson Verdin MD/PhD    Dept of Radiation Oncology  [facility]

## 2019-10-30 ENCOUNTER — OFFICE VISIT (OUTPATIENT)
Dept: RADIATION ONCOLOGY | Facility: CLINIC | Age: 54
End: 2019-10-30
Attending: RADIOLOGY
Payer: COMMERCIAL

## 2019-10-30 ENCOUNTER — OFFICE VISIT (OUTPATIENT)
Dept: OTOLARYNGOLOGY | Facility: CLINIC | Age: 54
End: 2019-10-30
Payer: COMMERCIAL

## 2019-10-30 ENCOUNTER — THERAPY VISIT (OUTPATIENT)
Dept: SPEECH THERAPY | Facility: CLINIC | Age: 54
End: 2019-10-30
Payer: COMMERCIAL

## 2019-10-30 VITALS
BODY MASS INDEX: 41.08 KG/M2 | RESPIRATION RATE: 16 BRPM | WEIGHT: 261.7 LBS | HEART RATE: 87 BPM | OXYGEN SATURATION: 96 % | HEIGHT: 67 IN

## 2019-10-30 VITALS — DIASTOLIC BLOOD PRESSURE: 93 MMHG | SYSTOLIC BLOOD PRESSURE: 153 MMHG | BODY MASS INDEX: 40.88 KG/M2 | WEIGHT: 261 LBS

## 2019-10-30 DIAGNOSIS — R13.12 OROPHARYNGEAL DYSPHAGIA: ICD-10-CM

## 2019-10-30 DIAGNOSIS — I89.0 LYMPHEDEMA: ICD-10-CM

## 2019-10-30 DIAGNOSIS — C05.9 SQUAMOUS CELL CARCINOMA OF PALATE (H): Primary | ICD-10-CM

## 2019-10-30 DIAGNOSIS — C03.9 PRIMARY CANCER OF ALVEOLAR RIDGE MUCOSA (H): ICD-10-CM

## 2019-10-30 DIAGNOSIS — K11.7 XEROSTOMIA DUE TO RADIOTHERAPY: Primary | ICD-10-CM
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
## 2019-10-30 PROCEDURE — G0463 HOSPITAL OUTPT CLINIC VISIT: HCPCS | Mod: 25 | Performed by: RADIOLOGY

## 2019-10-30 RX ORDER — CEVIMELINE HYDROCHLORIDE 30 MG/1
30 CAPSULE ORAL 3 TIMES DAILY
Qty: 30 CAPSULE | Refills: 3 | Status: SHIPPED | OUTPATIENT
Start: 2019-10-30 | End: 2019-10-30

## 2019-10-30 RX ORDER — CEVIMELINE HYDROCHLORIDE 30 MG/1
30 CAPSULE ORAL 3 TIMES DAILY
Qty: 30 CAPSULE | Refills: 3 | Status: SHIPPED | OUTPATIENT
Start: 2019-10-30 | End: 2019-12-11

## 2019-10-30 ASSESSMENT — MIFFLIN-ST. JEOR: SCORE: 1985.69

## 2019-10-30 ASSESSMENT — PAIN SCALES - GENERAL: PAINLEVEL: SEVERE PAIN (7)

## 2019-10-30 NOTE — PROGRESS NOTES
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 He is s/p a left palatectomy by Dr. Treviño on 5/9/2019 and a left radial forearm free flap.  XRT, 6000 cGy in 30 fractrions, from 6/10/2019 - 7/22/2019.    3 month PET scan on 10/16/2019 showed an area of Indeterminate focal FDG uptake along the right paramedian hard palate with ill-defined soft tissue density on CT, otherwise post-operative and post-XRT related changes. This was reviewed at tumor board and felt to be nonneoplastic but they did recommend direct visualization here in clinic.  On exam today, both by me and Dr. Treviño, there was no visible mass or palpable mass noted.  The area was slightly tender to palpation.        HPI:  Mr. Tucker reports that overall, he is improving.  He thinks that his pain is much better.  He has minimal pain in the hard palate region of his mouth.  Sometimes, he feels like it reminds him of his pain he had before diagnosis but is not quite that severe.    He continued to have epistasis up until about 2 to 3 weeks ago.  That has resolved.  When he would get it, it was quite heavy bleeding.  He has been using Aquaphor to the nose and that seems to have helped.  He is also continuing to work on his loss of balance which his radiation oncologist feels is related to radiation therapy.  He is working with PT and is can have a balance study in November.  He has not had any issues with hearing loss, tinnitus, ear pain or drainage.  The dizziness and associated nausea that he has had with that is improving with the therapy.    In regards to his neck, he has been dealing with some lymphedema that is waxing and waning.  He is continuing to do lymphedema therapy and they are working on more aggressive therapy since the lymphedema worsens slightly in the last  "few weeks.  He has follow-up scheduled with that.    Finally, in regards to eating and drinking, he feels like overall he is eating better.  He still cannot eat all types of solid foods.  Breads are very difficult for him as are meats like chicken or fish.  He can eat small bites of beef and pork if he chews really well.  He intermittently does his swallowing exercises.        Physical Exam:  Pulse 87   Resp 16   Ht 1.702 m (5' 7\")   Wt 118.7 kg (261 lb 11.2 oz)   SpO2 96%   BMI 40.99 kg/m      Constitutional: The patient is unaccompanied, well-groomed, and in no acute distress.    Head: Normocephalic and atraumatic.   Eyes: Pupils were equal and reactive.  Extraocular movement intact.    Ears: Pinnae and tragus non-tender.  EACs and TMs were clear.     Nose: Sinuses were non-tender.  Anterior rhinoscopy revealed midline septum and absence of purulence or polyps. Dry mucosa bilaterally, with crusting noted    Oral Cavity: Normal tongue, floor of mouth, buccal mucosa.  Free flap on left palate is very well appearing and healing well.  No masses visible or palpable on the right hard palate, minimal tenderness to palpation along the median edge of the free flap and right palate. No other lesions or masses on inspection or palpation.    Oropharynx: Normal mucosa, Free flap on left palate is very well appearing and healing well.  No abnormal lymph tissue in the oropharynx.  Pterygoid region non-tender.   Hypopharynx/Larynx: Deferred for fiberoptic endoscopic exam  Neck: Supple with normal laryngeal and tracheal landmarks.  The parotid beds were without masses.  No palpable thyroid.  Normal range of motion  Lymphatic: There is no palpable lymphadenopathy in the neck.   Respiratory: Breathing comfortably without stridor or exertion of accessory muscles.   Skin: Normal:  warm and pink without rash  Neurologic: Alert and oriented x 3.  CN's III-XII within normal limits.  Voice normal.   Psychiatric: The patient's affect " was calm, cooperative, and appropriate.            Fiberoptic Endoscopy:  Consent for fiberoptic laryngoscopy was obtained, and we confirmed correctness of procedure and identity of patient.  Fiberoptic laryngoscopy was indicated due to dysphagia and findings on PET.  The nose was topically decongested and anesthetized.  The fiberoptic laryngoscope was passed under endoscopic vision.  The turbinates were normal.  The inferior and middle meati were clear bilaterally without purulence, masses, or polyps.  The nasopharynx was clear.  The Eustachian tubes were clear.  The soft palate appeared normal with good mobility.  The epiglottis was sharp and the visualized portion of the vallecula was clear.  The larynx was clear with mobile cords.  The arytenoids were clear and there was no pooling in the hypopharynx.      See Imagestream recording in the [facility] section      Diagnostic Imaging studies:  CT soft tissue neck from 10/16/2019:  Impression:   1. Indeterminate focal FDG uptake along the right paramedian hard palate with ill-defined soft tissue density on CT. Recommend direct visualization and/or close attention on follow-up.  2. Postoperative changes as described above without focal FDG uptake in the operative bed. No enlarged or FDG avid cervical lymph nodes.  3. Mild diffuse mucosal edema in the oropharynx and hypopharynx with mild low-level FDG uptake likely radiation-induced.  4. Please refer to the whole body PET CT performed as a separate report, for the findings of the remainder of the body.    PET CT from 10/16/2019:  1. No evidence of metastatic disease in the chest, abdomen, or pelvis.  2. See dedicated neuroradiology report for the results of the high resolution PET CT of the neck.           IMPRESSION AND PLAN:    1.  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  s/p a left palatectomy by Dr. Treviño on 5/9/2019 and a left radial forearm free flap.  XRT, 6000 cGy in 30 fractrions, from 6/10/2019 -  7/22/2019.  PET scan from 610/16/2019 did show an indeterminate focal FDG uptake along the right paramedian hard palate with ill-defined soft tissue density on the neck CT portion of the scan.  This was reviewed in tumor board on 10/18/2019.  It was felt to be non-malignant/non-neoplasm.  Recommended direct visualization in clinic.    On exam today, there is no palpable or visible mass.  There is some very minimal tenderness along the right side of the free flap.  This was evaluated both by me and Dr. Ferrara.  We feel that there is no malignancy at this time.    Discussed with patient is comfortable with plan that we will continue to watch this closely.  He will return in 2 months for a follow-up visit.  He will call with any new symptoms prior.    As for the previous nosebleeds, this is typical after this type of surgery and radiation.  They have stopped.  He still has fairly dry mucosa.  I advised him to use saline nasal spray/Ocean nasal spray 4 times a day and Aquaphor after each use.  Patient agreeable to try this.      2.  Oropharyngeal dysphasia.  Patient is having ongoing issues with swallowing.  Is working with our speech-language pathology team, specifically Pooja Ty.  He will continue to do the home exercises and recommendations per Pooja.  Please refer to her full note for details.    3.  Lymphedema.  Secondary to radiation therapy.  Is working with lymphedema therapy at this time and will continue to do the exercises recommended at home.         Yesy Disla PA-C  Otolaryngology  Head & Neck Surgery  [phone]       CC:  Jett Treviño MD  Otolaryngology/Head & Neck Surgery  [facility] 396    Sumit Atwood MD  Otolaryngology/Head & Neck Surgery  [facility] 396     Rich Medellin MD  ENT Specialty[address]  [address]     Emerson Verdin MD   Radiation Oncology, [facility] 162

## 2019-10-30 NOTE — PATIENT INSTRUCTIONS
Devonte Tucker,    It was a pleasure to see you today.    1. You were seen in the ENT Clinic today by Yesy Disla PA-C    If you have any questions or concerns after your appointment, please call   - Option 1: ENT Clinic: [phone]  - Option 2: Tressa (Dr. Treviño's Nurse): [phone]    2. Please return in 8 weeks for a follow up visit.    3.  For the lymphedema, continue with Lymphedema therapy and treatment at home.    4.  For the dysphagia (trouble swallowing), please continue the swallowing exercises and recommendations by CELINA Patton.    5.  For the nose:   -Use a humidifier in the bed room at night and consider running one in the home during the day     -Use Saline nasal spray (aka Ocean spray), 2-4 sprays to each nostril 4 times a day. Spray this first before the Aquaphor.     -Use Aquaphor in the nose 2-4 times a day.    6.  The findings on the neck portion of the PET scan are likely non-neoplastic (not cancer).  We do not see anything on exam today.  We will watch this closely.  If you experience new symptoms (increasing pain, mass, etc.), call the clinic and we will see you sooner.      Thank you,  Yesy Disla PA-C  Otolaryngology  Head & Neck Surgery  [phone]

## 2019-10-30 NOTE — DISCHARGE INSTRUCTIONS
Start the 7-7-7 protocol for completion of your jaw range of motion exercises. This means you will do 7 repetitions at 7 seconds each, 7 different times during the day. This averages out to be about every other hour you do a set of stretches.     Keep doing your exercises at least 2 times daily.     Continue to trial different foods and keeping up eating/drinking!

## 2019-10-30 NOTE — NURSING NOTE
"Chief Complaint   Patient presents with     RECHECK     Squamous cell carcinoma of palate      Results     PET 10/16     Pulse 87   Resp 16   Ht 1.702 m (5' 7\")   Wt 118.7 kg (261 lb 11.2 oz)   SpO2 96%   BMI 40.99 kg/m      Daphne Thorpe CMA    "

## 2019-10-30 NOTE — LETTER
10/30/2019    RE: Devonte Tucker  [address] [facility] [[email or web address] [place] [address]     Radiation Oncology Follow-up Visit  2019    Devonte Tucker  MRN: [id number]   : 1965     DISEASE TREATED:   pT4a N0 M0 squamous cell carcinoma of the left maxillary gingiva    RADIATION THERAPY DELIVERED:   6000 cGy delivered in 30 once-daily fractions, from 6/10/2019 - 2019    SYSTEMIC THERAPY:  None    INTERVAL SINCE COMPLETION OF RADIATION THERAPY:   3 months    SUBJECTIVE:   Devonte Tucker is a 53 year old male with a PMH significant for a 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 He underwent a left infrastructure maxillectomy and left level I lymph node dissection on 2019 with pathology revealing a 1.3 cm well-differentiated squamous cell carcinoma with 13 mm depth of invasion and involvement of the underlying bone. He received adjuvant radiotherapy alone as described above for improved local disease control.     Mr. Tucker returns to radiation oncology clinic today for a routine post-treatment disease surveillance visit. On exam, Mr. Tucker reports a persistent dry nose, for which he is using aquaphor; xerostomia, for which he finds relief with oral hydration and sugar-free mints; and mild fatigue which does not limit his daily activities. He endorses some difficulty with very dry foods such as breads due to his treatment-induced xerostomia however he continues to eat an otherwise normal diet without difficulty. He endorses improvement of his submental lymphedema and he has been working with lymphedema clinic diligently on this. He endorses persistent imbalance on his feet and has been using a walker when ambulating but has noted no worsening of his symptoms post radiation. He specifically denies fevers, chills,  headaches, vision, hearing changes, mouth sores, odynophagia or shortness of breath. The rest of his ROS are within normal. Of note, he underwent post treatment PET/CT on 10/16/19, which revealed indeterminate focal FDG uptake along the right paramedian hard palate with an ill-defined soft tissue density on CT. This finding was discussed in our multidisciplinary head and neck tumor conference and was felt to be non malignant and related to post-treatment changes.     PHYSICAL EXAM:  VITALS: BP (!) 153/93   Wt 118.4 kg (261 lb)   BMI 40.88 kg/m     GEN: Appears well, alert, oriented, and in NAD  HEENT: EOMI, normal conjunctiva, MMM  Oral Cavity: Mildly dry mucous membranes. No visible oral cavity lesions.  The left maxillary free flap he has healthy in appearance with no erythema, induration or nodularity. There is some mild tenderness to palpation along the medial aspect of the flap although with no underlying palpable abnormalities. The remainder of the right hard palate, soft palate and maxillary gingiva are normal in appearance without any lesions. The floor of mouth, oral tongue and bilateral tongue base are also soft to palpation.  Neck: Mild to moderate submental lymphedema. No palpable cervical adenopathy.  Lungs: No wheezing, stridor or respiratory distress  Neuro: Loss in light touch sensation in the distribution of Lt V2, V3 distribution, CN II-XII otherwise grossly intact. Normal and symmetric motor and sensory exam in bilateral upper and lower extremities, gait evaluation deferred  Skin: Mild scattered hyperpigmentation throughout the left upper neck.  Otherwise normal color and turgor  PSYCH: Appropriate mood and affect    LABS AND IMAGING:  TSH   Date Value Ref Range Status   10/16/2019 2.22 0.40 - 4.00 mU/L Final     Imaging: reviewed    IMPRESSION:   Mr. Tucker is a 53 year old male with a pT4a N0 M0 squamous cell carcinoma of the left maxillary gingiva status post surgical resection and adjuvant  radiotherapy.  He is doing well on examination and remains clinically and radiologically CÉSAR. He does have some mild tenderness to palpation over the medial aspect of the left maxillary free flap although this area is soft with associated masses or induration on exam.    PLAN:   1. Follow-up in radiation oncology clinic in 1 month for reevaluation of the noted slight tenderness along the medial aspect of the left maxillary free flap  2. Start Evoxac 30 mg 3 times daily for treatment-induced xerostomia  3. Encourage compliance with lymphedema and swallowing exercises  4. Follow-up with ENT clinic as scheduled for ongoing disease surveillance    The patient was seen and discussed with staff, Dr. Verdin.    Darlyn Mcneal MD  Resident, PGY-2  Department of Radiation Oncology  [facility]       Attending addendum:   I saw and examined the patient with the resident and agree with the documented plan of care.    Emerson Verdin MD/PhD    Dept of Radiation Oncology  [facility]                    FOLLOW-UP VISIT    Patient Name: Devonte Tucker      : 1965     Age: 53 year old        ______________________________________________________________________________     Chief Complaint   Patient presents with     Cancer     Pt is here for a follow up:Maxillary Sinus Cancer: Left max aveolar ridge 6000 cGy completed 19     Pain  Denies    Labs  Other Labs: Yes: 10/16/19    Imaging  CT: 10/16/19      Dental:   Most Recent Dental Visit: Yes  Trays: Frequency Daily    Speech/Swallowing:   Most Recent evaluation or testing: Yes  Swallowing Restrictions: No difficulties with swallowing    Trismus/Jaw Exercises: Yes    Nutrition:    Weight:   Wt Readings from Last 3 Encounters:   10/30/19 118.7 kg (261 lb 11.2 oz)   10/07/19 117.9 kg (260 lb)   19 112.5 kg (248 lb)         Oral Symptoms:   Xerostomia:1- Symptomatic without significant dietary alteration; unstimulated  saliva flow >0.2 ml/min  Dysphagia: 0-None  Mucositis Oral Symptoms: 0-None  Mucositis: 0- None  Esophagitis:0- None    Other Appointments:     MD Name:Dr Treviño  Appointment Date: 12/018/19   MD Name: Appointment Date:   MD Name: Appointment Date:   Other Appointment Notes:     Residual Radiation side effect: Dry mouth, no taste    Additional Instructions:     Nurse face-to-face time: Level 3:  10 min face to face time      Emerson Verdin MD

## 2019-10-30 NOTE — LETTER
10/30/2019     RE: Devonte Tucker  [address]  [place] [address]     Dear Colleague,    Thank you for referring your patient, Devonte Tucker, to the [facility] EAR NOSE AND THROAT at [facility]. Please see a copy of my visit note below.  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 He is s/p a left palatectomy by Dr. Treviño on 5/9/2019 and a left radial forearm free flap.  XRT, 6000 cGy in 30 fractrions, from 6/10/2019 - 7/22/2019.    3 month PET scan on 10/16/2019 showed an area of Indeterminate focal FDG uptake along the right paramedian hard palate with ill-defined soft tissue density on CT, otherwise post-operative and post-XRT related changes. This was reviewed at tumor board and felt to be nonneoplastic but they did recommend direct visualization here in clinic.  On exam today, both by me and Dr. Treviño, there was no visible mass or palpable mass noted.  The area was slightly tender to palpation.        HPI:  Mr. Tucker reports that overall, he is improving.  He thinks that his pain is much better.  He has minimal pain in the hard palate region of his mouth.  Sometimes, he feels like it reminds him of his pain he had before diagnosis but is not quite that severe.    He continued to have epistasis up until about 2 to 3 weeks ago.  That has resolved.  When he would get it, it was quite heavy bleeding.  He has been using Aquaphor to the nose and that seems to have helped.  He is also continuing to work on his loss of balance which his radiation oncologist feels is related to radiation therapy.  He is working with PT and is can have a balance study in November.  He has not had any issues with hearing loss, tinnitus, ear pain or drainage.  The dizziness and associated nausea that  "he has had with that is improving with the therapy.    In regards to his neck, he has been dealing with some lymphedema that is waxing and waning.  He is continuing to do lymphedema therapy and they are working on more aggressive therapy since the lymphedema worsens slightly in the last few weeks.  He has follow-up scheduled with that.    Finally, in regards to eating and drinking, he feels like overall he is eating better.  He still cannot eat all types of solid foods.  Breads are very difficult for him as are meats like chicken or fish.  He can eat small bites of beef and pork if he chews really well.  He intermittently does his swallowing exercises.    Physical Exam:  Pulse 87   Resp 16   Ht 1.702 m (5' 7\")   Wt 118.7 kg (261 lb 11.2 oz)   SpO2 96%   BMI 40.99 kg/m       Constitutional: The patient is unaccompanied, well-groomed, and in no acute distress.    Head: Normocephalic and atraumatic.   Eyes: Pupils were equal and reactive.  Extraocular movement intact.    Ears: Pinnae and tragus non-tender.  EACs and TMs were clear.     Nose: Sinuses were non-tender.  Anterior rhinoscopy revealed midline septum and absence of purulence or polyps. Dry mucosa bilaterally, with crusting noted    Oral Cavity: Normal tongue, floor of mouth, buccal mucosa.  Free flap on left palate is very well appearing and healing well.  No masses visible or palpable on the right hard palate, minimal tenderness to palpation along the median edge of the free flap and right palate. No other lesions or masses on inspection or palpation.    Oropharynx: Normal mucosa, Free flap on left palate is very well appearing and healing well.  No abnormal lymph tissue in the oropharynx.  Pterygoid region non-tender.   Hypopharynx/Larynx: Deferred for fiberoptic endoscopic exam  Neck: Supple with normal laryngeal and tracheal landmarks.  The parotid beds were without masses.  No palpable thyroid.  Normal range of motion  Lymphatic: There is no " palpable lymphadenopathy in the neck.   Respiratory: Breathing comfortably without stridor or exertion of accessory muscles.   Skin: Normal:  warm and pink without rash  Neurologic: Alert and oriented x 3.  CN's III-XII within normal limits.  Voice normal.   Psychiatric: The patient's affect was calm, cooperative, and appropriate.            Fiberoptic Endoscopy:  Consent for fiberoptic laryngoscopy was obtained, and we confirmed correctness of procedure and identity of patient.  Fiberoptic laryngoscopy was indicated due to dysphagia and findings on PET.  The nose was topically decongested and anesthetized.  The fiberoptic laryngoscope was passed under endoscopic vision.  The turbinates were normal.  The inferior and middle meati were clear bilaterally without purulence, masses, or polyps.  The nasopharynx was clear.  The Eustachian tubes were clear.  The soft palate appeared normal with good mobility.  The epiglottis was sharp and the visualized portion of the vallecula was clear.  The larynx was clear with mobile cords.  The arytenoids were clear and there was no pooling in the hypopharynx.      See Imagestream recording in the [facility] Orders section    Diagnostic Imaging studies:  CT soft tissue neck from 10/16/2019:  Impression:   1. Indeterminate focal FDG uptake along the right paramedian hard palate with ill-defined soft tissue density on CT. Recommend direct visualization and/or close attention on follow-up.  2. Postoperative changes as described above without focal FDG uptake in the operative bed. No enlarged or FDG avid cervical lymph nodes.  3. Mild diffuse mucosal edema in the oropharynx and hypopharynx with mild low-level FDG uptake likely radiation-induced.  4. Please refer to the whole body PET CT performed as a separate report, for the findings of the remainder of the body.    PET CT from 10/16/2019:  1. No evidence of metastatic disease in the chest, abdomen, or pelvis.  2. See dedicated  neuroradiology report for the results of the high resolution PET CT of the neck.     IMPRESSION AND PLAN:  1.  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  s/p a left palatectomy by Dr. Treviño on 5/9/2019 and a left radial forearm free flap.  XRT, 6000 cGy in 30 fractrions, from 6/10/2019 - 7/22/2019.  PET scan from 610/16/2019 did show an indeterminate focal FDG uptake along the right paramedian hard palate with ill-defined soft tissue density on the neck CT portion of the scan.  This was reviewed in tumor board on 10/18/2019.  It was felt to be non-malignant/non-neoplasm.  Recommended direct visualization in clinic.    On exam today, there is no palpable or visible mass.  There is some very minimal tenderness along the right side of the free flap.  This was evaluated both by me and Dr. Ferrara.  We feel that there is no malignancy at this time.    Discussed with patient is comfortable with plan that we will continue to watch this closely.  He will return in 2 months for a follow-up visit.  He will call with any new symptoms prior.    As for the previous nosebleeds, this is typical after this type of surgery and radiation.  They have stopped.  He still has fairly dry mucosa.  I advised him to use saline nasal spray/Ocean nasal spray 4 times a day and Aquaphor after each use.  Patient agreeable to try this.      2.  Oropharyngeal dysphasia.  Patient is having ongoing issues with swallowing.  Is working with our speech-language pathology team, specifically Pooja Ty.  He will continue to do the home exercises and recommendations per Pooja.  Please refer to her full note for details.    3.  Lymphedema.  Secondary to radiation therapy.  Is working with lymphedema therapy at this time and will continue to do the exercises recommended at home.    Yesy Disla PA-C  Otolaryngology  Head & Neck Surgery  [phone]     CC:  Jett Treviño MD  Otolaryngology/Head & Neck Surgery  [facility] [address]    Sumit Atwood  MD  Otolaryngology/Head & Neck Surgery       Rich Medellin MD  ENT Specialty[address]  [address]     Emerson Verdin MD   Radiation Oncology, [facility]

## 2019-10-31 NOTE — PROGRESS NOTES
09/13/19 1000   General Information   Type Of Visit Initial   Start Of Care Date 09/13/19   Referring Physician Dr. 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  Baseline VFSS was completed in 6/2019 which demonstrated mild oropharyngeal dysphagia.  He completed post-operative chemoradiation therapy on 7/22/19.  He was last seen by SLP in conjunction with ENT clinic on 8/21/19 and a repeat VFSS with esophagram was recommended due to increased reflux symptoms and report of occasional choking.  Upon clinical interview today, patient reported he is swallowing more than he was at his last SLP visit.  Stated he has ongoing xerostomia which makes it hard to swallow foods.  Stated he requires a lot of water to get any food down.  Endorsed when he tries to eat chicken or fish, it turns into paste and is very difficult to swallow.  Stated he can eat foods such as scrambled eggs, beef with beef broth, cooked veggies, oatmeal with yogurt, etc. with more success.  Reported he continues to complete 2 protein shakes per day.  Stated he cuts food into smaller pieces and uses a liquid wash.  Stated he avoids bread at this time.  Endorsed ongoing dysgeusia where beef is the only thing that tastes okay at this time.  Stated he is still nauseated and this is worse in the morning but is improving as compared to his last visit.  He denied odynophagia.  Stated when food sticks  in his throat, he still washed out liquid or he has to regurgitate it and cough.  Stated he has not had this many reflux symptoms recently and reported he is taking another medication seems to be working.  He believes he is maintaining his weight.  He does not have a PEG.  Stated he does cough with food and liquid every day but he is unsure if it happens every meal.  He endorsed he is only been completing the jaw stretch this week and has not been completing his other swallowing exercises at this time.  Stated he has not made as much progress as he would like to with the jaw stretch   Respiratory Status Room air   Prior Level Of Function Swallowing   Prior Level Of Function Comment Soft, moist foods; thin liquids   General Observations Patient cooperative throughout evaluation   Patient/family Goals To eat and drink without difficulty   Clinical Swallow Evaluation   Oral Musculature anomalies present   Structural Abnormalities present  (see h/p)   Dentition other (see comments)  (teeth missing along flap; adequate amount of right side)   Mucosal Quality dry   Mandibular Strength and Mobility intact  (jaw ROM measured at 33 mm)   Oral Labial Strength and Mobility impaired retraction  (reduced ROM on left side)   Lingual Strength and Mobility WFL   Velar Elevation intact   Buccal Strength and Mobility intact   Laryngeal Function Swallow;Voicing initiated   Oral Musculature Comments Reduced labial ROM. Trismus present.    VFSS Evaluation   VFSS Additional Documentation Yes   VFSS Eval: Radiology   Radiologist Resident   Views Taken left lateral;A/P   Physical Location of Procedure [facility]   VFSS Eval: Thin Liquid Texture Trial   Mode of Presentation, Thin Liquid cup;self-fed   Order of Presentation 1, 2, 4, 7, 9, 10-barium tablet, 11   Preparatory Phase WFL   Oral Phase, Thin Liquid WFL   Pharyngeal Phase, Thin Liquid other (see comments)  (delayed epiglottic inversion)   Rosenbek's Penetration Aspiration Scale:  Thin Liquid Trial Results 3 - contrast remains above the vocal cords, visible residue remains (penetration)   Diagnostic Statement Inconsistent small amounts of penetration; intermittent trace residuals remain in laryngeal vestibule from penetration. No aspiration.    VFSS Eval: Puree Solid Texture Trial   Mode of Presentation, Puree spoon;self-fed   Order of Presentation 3, 5, 12   Preparatory Phase WFL   Oral Phase, Puree WFL   Pharyngeal Phase, Puree Residue in valleculae  (Mild-moderate in amount)   Rosenbek's Penetration Aspiration Scale: Puree Food Trial Results 1 - no aspiration, contrast does not enter airway   Diagnostic Statement No penetration or aspiration.    Successful Strategies Trialed During Procedure, Puree hard swallow;other (see comments)  (liquid wash)   VFSS Eval: Solid Food Texture Trial   Mode of Presentation, Solid self-fed   Order of Presentation 6, 8   Preparatory Phase WFL   Oral Phase, Solid WFL   Pharyngeal Phase, Solid Residue in valleculae;other (see comments)  (residue near aryepiglottic folds; significant in amount)   Rosenbek's Penetration Aspiration Scale: Solid Food Trial Results 1 - no aspiration, contrast does not enter airway   Diagnostic Statement No penetratio or aspiration. Consistent in valleculae but residue also present near AE folds during second trial. Residuals mostly cleared with subsequent dry swallows and liquid wash.    Successful Strategies Trialed During Procedure, Solid hard swallow;other (see comments)  (dry swallows, liquid wash)   Swallow Compensations   Swallow Compensations Alternate viscosity of consistencies;Effortful swallow;Reduce amounts;Multiple swallow   Educational Assessment   Barriers to Learning No barriers   Esophageal Phase of Swallow   Patient reports or presents with symptoms of esophageal dysphagia Yes   Esophageal sweep performed during today s vidofluoroscopic exam  Yes;Please refer to radiologist's report for details   Esophageal  comments Esophagram completed after VFSS; please see radiology report for details.    General Therapy Interventions   Planned Therapy Interventions Dysphagia Treatment   Dysphagia treatment Oropharyngeal exercise training;Modified diet education;Compensatory strategies for swallowing;Instruction of safe swallow strategies   Swallow Eval: Clinical Impressions   Skilled Criteria for Therapy Intervention Skilled criteria met.  Treatment indicated.   Dysphagia Outcome Severity Scale (LISA) Level 4 - LISA   Treatment Diagnosis Mild-moderate oropharyngeal dysphagia   Diet texture recommendations Thin liquids  (soft/moist regular textures)   Recommended Feeding/Eating Techniques alternate between small bites and sips of food/liquid;hard swallow w/ each bite or sip;maintain upright posture during/after eating for 30 mins;small sips/bites;other (see comments)  (double swallow, oral cares)   Rehab Potential good, to achieve stated therapy goals   Therapy Frequency other (see comments)  (1x/month x 6 months)   Anticipated Discharge Disposition home w/ outpatient services   Risks and Benefits of Treatment have been explained. Yes   Patient, family and/or staff in agreement with Plan of Care Yes   Clinical Impression Comments Patient presents with mild-moderate oropharyngeal dysphagia s/p resection/reconstruction and chemoradiation therapy for SCC of palate.  Oral phase is marked by ongoing trismus and reduced labial retraction.  Pharyngeal phase is characterized by an inconsistent delay in epiglottic inversion with thin liquids resulting in penetration of liquids during the swallow.  Inconsistent small amounts of residuals remain in the laryngeal vestibule after the swallow.  No aspiration of thin liquids and no penetration or aspiration of other consistencies is present during today's evaluation.  Patient demonstrates weak tongue base retraction and pharyngeal constriction resulting in pharyngeal residuals of puréed dry solid  textures.  Residuals are most significant with dry solids.  They are mostly eliminated with use of a hard swallow, secondary dry swallow, and/or liquid wash.  Barium tablet was swallowed without difficulty for her.  Given the results of today's evaluation, patient is at mildly increased risk of aspiration.  Risk can be reduced with use of swallowing strategies and slightly modified diet textures.  Recommend ongoing SLP services to treat current dysphagia and to prevent future dysphasia given risk of late effects of radiation.   Swallow Goals   SLP Swallow Goals 1;2   Swallow Goal 1   Goal Identifier Diet   Goal Description 1. Pt will tolerate regular moist solids and thin liquids while adhering to safe swallowing precautions 100% of the time independently.    Target Date 12/22/19   Swallow Goal 2   Goal Identifier Exercise   Goal Description 2. Pt will improve and/or maintain ROM and strength of jaw, pharynx and BOT by completing 10 repetitions of 5/5 exercises 3 times daily with minimal written or verbal cues.    Target Date 12/22/19   Total Session Time   SLP Eval: VideoFluoroscopic Swallow function Minutes (87741) 35   Total Evaluation Time 35     Thank you for the referral of Devonte Tucker. If you have any questions about this report, please contact me using the information below.     Josephine Carlisle MA, CCC-SLP  Speech-Language Pathologist   [facility]  Department of Otolaryngology/D&T - 4th floor  Pager: [phone]  Phone: [phone]  Email: [email or web address]

## 2019-11-04 ENCOUNTER — THERAPY VISIT (OUTPATIENT)
Dept: PHYSICAL THERAPY | Facility: CLINIC | Age: 54
End: 2019-11-04
Payer: COMMERCIAL

## 2019-11-04 DIAGNOSIS — R42 VERTIGO: Primary | ICD-10-CM

## 2019-11-06 ENCOUNTER — TELEPHONE (OUTPATIENT)
Dept: OTOLARYNGOLOGY | Facility: CLINIC | Age: 54
End: 2019-11-06

## 2019-11-06 NOTE — TELEPHONE ENCOUNTER
Health Call Center    Phone Message    May a detailed message be left on voicemail: yes    Reason for Call: Form or Letter   Type or form/letter needing completion: Requesting office notes from 10/30, visit with SATHISH Disla  Provider: Dr. Trveiño  Date form needed: Next avail; initial request sent 11/1, Yaquelin will resend request to [phone] fax.  Once completed: Fax form to: [phone]      Action Taken: Message routed to:  [facility] ([facility] ENT

## 2019-11-06 NOTE — TELEPHONE ENCOUNTER
Recent progress note, CT/PET scan results, and f/o appointment info to provided fax number 11/6 at [phone].     Natice Schwab, RN BSN

## 2019-11-11 ENCOUNTER — THERAPY VISIT (OUTPATIENT)
Dept: PHYSICAL THERAPY | Facility: CLINIC | Age: 54
End: 2019-11-11
Payer: COMMERCIAL

## 2019-11-11 DIAGNOSIS — R42 VERTIGO: Primary | ICD-10-CM

## 2019-11-11 DIAGNOSIS — R26.89 IMBALANCE: ICD-10-CM

## 2019-11-15 ENCOUNTER — HOSPITAL ENCOUNTER (OUTPATIENT)
Dept: PHYSICAL THERAPY | Facility: CLINIC | Age: 54
Setting detail: THERAPIES SERIES
End: 2019-11-15
Attending: FAMILY MEDICINE
Payer: COMMERCIAL

## 2019-11-15 PROCEDURE — 97535 SELF CARE MNGMENT TRAINING: CPT | Mod: GP,ZF | Performed by: PHYSICAL THERAPIST

## 2019-11-15 PROCEDURE — 97140 MANUAL THERAPY 1/> REGIONS: CPT | Mod: GP,ZF | Performed by: PHYSICAL THERAPIST

## 2019-11-21 ENCOUNTER — OFFICE VISIT (OUTPATIENT)
Dept: AUDIOLOGY | Facility: CLINIC | Age: 54
End: 2019-11-21
Payer: COMMERCIAL

## 2019-11-21 DIAGNOSIS — R42 VERTIGO: ICD-10-CM

## 2019-11-21 DIAGNOSIS — R42 DIZZINESS AND GIDDINESS: Primary | ICD-10-CM

## 2019-11-21 NOTE — Clinical Note
Thanks for the referral- I found right PC- BPPV, and some mild central findings. Looks like he is already scheduled with vestibular PT for treatment. See my report for details.Thanks,Torito
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
## 2019-11-21 NOTE — PROGRESS NOTES
AUDIOLOGY REPORT    SUMMARY: Audiology visit completed. See audiogram for results.      RECOMMENDATIONS: Follow-up with ENT.      Alf Berrios.  Licensed Audiologist  [place] #[id number]

## 2019-11-21 NOTE — Clinical Note
Sanjay Sher, Looks like Mariel saw this patient before, but  He is scheduled with you next week. I saw him for VNG/chair testing and found right PC-BPPV, and some mild central findings. Looks like he also had left PC-BPPV when Mariel saw him, but that was negative today. Also, Dr. Treviño ordered comprehensive balance testing, but I did not do CDP with him due to insurance coverage. If you think it might help, you could add that too- not sure if it work as he is pretty off balance and reliant on his cane. See my note for more details.Thanks,Torito

## 2019-11-25 ENCOUNTER — RECORDS - HEALTHEAST (OUTPATIENT)
Dept: LAB | Facility: CLINIC | Age: 54
End: 2019-11-25

## 2019-11-25 LAB
ANION GAP SERPL CALCULATED.3IONS-SCNC: 11 MMOL/L (ref 5–18)
BUN SERPL-MCNC: 10 MG/DL (ref 8–22)
CALCIUM SERPL-MCNC: 9.7 MG/DL (ref 8.5–10.5)
CHLORIDE BLD-SCNC: 100 MMOL/L (ref 98–107)
CHOLEST SERPL-MCNC: 124 MG/DL
CO2 SERPL-SCNC: 27 MMOL/L (ref 22–31)
CREAT SERPL-MCNC: 0.77 MG/DL (ref 0.7–1.3)
FASTING STATUS PATIENT QL REPORTED: NORMAL
GFR SERPL CREATININE-BSD FRML MDRD: >60 ML/MIN/1.73M2
GLUCOSE BLD-MCNC: 113 MG/DL (ref 70–125)
HDLC SERPL-MCNC: 44 MG/DL
LDLC SERPL CALC-MCNC: 65 MG/DL
POTASSIUM BLD-SCNC: 4.3 MMOL/L (ref 3.5–5)
SODIUM SERPL-SCNC: 138 MMOL/L (ref 136–145)
TRIGL SERPL-MCNC: 73 MG/DL

## 2019-11-26 ENCOUNTER — THERAPY VISIT (OUTPATIENT)
Dept: PHYSICAL THERAPY | Facility: CLINIC | Age: 54
End: 2019-11-26
Payer: COMMERCIAL

## 2019-11-26 DIAGNOSIS — R26.89 IMBALANCE: Primary | ICD-10-CM

## 2019-11-26 DIAGNOSIS — R42 VERTIGO: ICD-10-CM

## 2019-12-09 ENCOUNTER — TELEPHONE (OUTPATIENT)
Dept: PHYSICAL THERAPY | Facility: CLINIC | Age: 54
End: 2019-12-09

## 2019-12-11 ENCOUNTER — OFFICE VISIT (OUTPATIENT)
Dept: RADIATION ONCOLOGY | Facility: CLINIC | Age: 54
End: 2019-12-11
Attending: RADIOLOGY
Payer: COMMERCIAL

## 2019-12-11 VITALS — SYSTOLIC BLOOD PRESSURE: 122 MMHG | WEIGHT: 255 LBS | BODY MASS INDEX: 39.94 KG/M2 | DIASTOLIC BLOOD PRESSURE: 78 MMHG
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
## 2019-12-11 DIAGNOSIS — C31.0 MAXILLARY SINUS CANCER (H): Primary | ICD-10-CM

## 2019-12-11 DIAGNOSIS — K11.7 XEROSTOMIA DUE TO RADIOTHERAPY: ICD-10-CM

## 2019-12-11 PROCEDURE — G0463 HOSPITAL OUTPT CLINIC VISIT: HCPCS | Performed by: RADIOLOGY

## 2019-12-11 RX ORDER — CEVIMELINE HYDROCHLORIDE 30 MG/1
30 CAPSULE ORAL 3 TIMES DAILY
Qty: 90 CAPSULE | Refills: 11 | Status: SHIPPED | OUTPATIENT
Start: 2019-12-11 | End: 2020-06-04

## 2019-12-11 NOTE — PROGRESS NOTES
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  He underwent a left infrastructure maxillectomy and left level I lymph node dissection on 2018 with pathology revealing a 1.3 cm well-differentiated squamous cell carcinoma with 13 mm depth of invasion and involvement of the underlying bone.  He received adjuvant radiotherapy alone as described above for improved local disease control.      Mr. Tucker returns to radiation oncology clinic today for routine post-treatment disease surveillance visit. On examination, he reports that he is doing very well and he has no pressing concerns or complaints outside of persistent treatment-induced xerostomia. He continues to eat a normal diet with the exception of very dry foods and is taking the Evoxac with a modest improvement in his salivary symptoms. He otherwise denies any pain, odynophagia, dysphagia, headaches, visual changes, fever/chills, dyspnea, chest pain or abdominal pain with     PHYSICAL EXAM:  Weight: 115.7 kg  BP: 122/78    General: Alert, oriented in no acute distress  HEENT: NC/AT. EOMI. No rhinorrhea or epistaxis. Mildly dry mucous membranes. Healthy-appearing intraoral flap with no  palpable nodularity or induration. There is no tenderness to palpation within the hard palate just medial to the edge of the free flap. Intact dentition. Floor of mouth, oral tongue and bilateral tongue base are soft to palpation with no masses.  Neck: Full range of motion. No palpable cervical adenopathy. No submental lymphedema.  Skin: Very slight hyperpigmentation within the left lateral neck. No desquamation or ulceration.    LABS AND IMAGING:  10/16/2019 TSH: 2.22    IMPRESSION:   Mr. 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 He is 5 months out from the completion of adjuvant radiotherapy and remains clinically CÉSAR    PLAN:   1. Follow-up in radiation oncology clinic in 3 months with NP and in 6 months with MD  2. Follow-up in ENT clinic as scheduled on 12/18/2019 for ongoing disease surveillance  3. Repeat TSH due in 4/2019  4. Refilled Evoxac and recommended sugar-free gum for xerostomia during the day    Emerson Verdin MD/PhD    Department of Radiation Oncology  [facility]

## 2019-12-11 NOTE — LETTER
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  He underwent a left infrastructure maxillectomy and left level I lymph node dissection on 2018 with pathology revealing a 1.3 cm well-differentiated squamous cell carcinoma with 13 mm depth of invasion and involvement of the underlying bone.  He received adjuvant radiotherapy alone as described above for improved local disease control.      Mr. Tucker returns to radiation oncology clinic today for routine post-treatment disease surveillance visit. On examination, he reports that he is doing very well and he has no pressing concerns or complaints outside of persistent treatment-induced xerostomia. He continues to eat a normal diet with the exception of very dry foods and is taking the Evoxac with a modest improvement in his salivary symptoms. He otherwise denies any pain, odynophagia, dysphagia, headaches, visual changes, fever/chills, dyspnea, chest pain or abdominal pain with     PHYSICAL EXAM:  Weight: 115.7 kg  BP: 122/78    General: Alert, oriented in no acute distress  HEENT: NC/AT. EOMI. No  rhinorrhea or epistaxis. Mildly dry mucous membranes. Healthy-appearing intraoral flap with no palpable nodularity or induration. There is no tenderness to palpation within the hard palate just medial to the edge of the free flap. Intact dentition. Floor of mouth, oral tongue and bilateral tongue base are soft to palpation with no masses.  Neck: Full range of motion. No palpable cervical adenopathy. No submental lymphedema.  Skin: Very slight hyperpigmentation within the left lateral neck. No desquamation or ulceration.    LABS AND IMAGING:  10/16/2019 TSH: 2.22    IMPRESSION:   Mr. 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 He is 5 months out from the completion of adjuvant radiotherapy and remains clinically CÉSAR    PLAN:   1. Follow-up in radiation oncology clinic in 3 months with NP and in 6 months with MD  2. Follow-up in ENT clinic as scheduled on 2019 for ongoing disease surveillance  3. Repeat TSH due in 2019  4. Refilled Evoxac and recommended sugar-free gum for xerostomia during the day    Emerson Verdin MD/PhD    Department of Radiation Oncology  [facility]      FOLLOW-UP VISIT    Patient Name: Devonte Tucker      : 1965     Age: 53 year old        ______________________________________________________________________________     Chief Complaint   Patient presents with     Cancer     Pt is here for a follow up:Maxillary Sinus Cancer: Left max aveolar ridge 6000 cGy completed 19     /78   Wt 115.7 kg (255 lb)   BMI 39.94 kg/m          Pain  Denies    Labs  Other Labs: No    Imaging  CT: 10/16/19      Dental:   Most Recent Dental Visit: Next month  Trays: Frequency Yes    Speech/Swallowing:   Most Recent evaluation or testing: No  Swallowing Restrictions: No difficulties with swallowing    Trismus/Jaw Exercises:  Yes    Nutrition:    Weight:   Wt Readings from Last 3 Encounters:   12/11/19 115.7 kg (255 lb)   10/30/19 118.4 kg (261 lb)   10/30/19 118.7 kg (261 lb 11.2 oz)         Oral Symptoms:   Xerostomia:1- Symptomatic without significant dietary alteration; unstimulated saliva flow >0.2 ml/min  Dysphagia: 1- Symtomatic, able to eat regulat diet  Mucositis Oral Symptoms: 0-None  Mucositis: 0- None  Esophagitis:0- None    Other Appointments:     MD Name: Yesy Disla NP Appointment Date: 12/18/19   MD Name: Appointment Date:   MD Name: Appointment Date:   Other Appointment Notes:     Residual Radiation side effect: Dry mouth     Additional Instructions:     Nurse face-to-face time: Level 3:  10 min face to face time          Emerson Verdin MD

## 2019-12-11 NOTE — PROGRESS NOTES
FOLLOW-UP VISIT    Patient Name: Devonte Tucker      : 1965     Age: 53 year old        ______________________________________________________________________________     Chief Complaint   Patient presents with     Cancer     Pt is here for a follow up:Maxillary Sinus Cancer: Left max aveolar ridge 6000 cGy completed 19     /78   Wt 115.7 kg (255 lb)   BMI 39.94 kg/m         Pain  Denies    Labs  Other Labs: No    Imaging  CT: 10/16/19      Dental:   Most Recent Dental Visit: Next month  Trays: Frequency Yes    Speech/Swallowing:   Most Recent evaluation or testing: No  Swallowing Restrictions: No difficulties with swallowing    Trismus/Jaw Exercises: Yes    Nutrition:    Weight:   Wt Readings from Last 3 Encounters:   19 115.7 kg (255 lb)   10/30/19 118.4 kg (261 lb)   10/30/19 118.7 kg (261 lb 11.2 oz)         Oral Symptoms:   Xerostomia:1- Symptomatic without significant dietary alteration; unstimulated saliva flow >0.2 ml/min  Dysphagia: 1- Symtomatic, able to eat regulat diet  Mucositis Oral Symptoms: 0-None  Mucositis: 0- None  Esophagitis:0- None    Other Appointments:     MD Name: Yesy Disla NP Appointment Date: 19   MD Name: Appointment Date:   MD Name: Appointment Date:   Other Appointment Notes:     Residual Radiation side effect: Dry mouth     Additional Instructions:     Nurse face-to-face time: Level 3:  10 min face to face time

## 2019-12-18 ENCOUNTER — OFFICE VISIT (OUTPATIENT)
Dept: BEHAVIORAL HEALTH | Facility: CLINIC | Age: 54
End: 2019-12-18
Payer: COMMERCIAL

## 2019-12-18 ENCOUNTER — OFFICE VISIT (OUTPATIENT)
Dept: OTOLARYNGOLOGY | Facility: CLINIC | Age: 54
End: 2019-12-18
Payer: COMMERCIAL

## 2019-12-18 ENCOUNTER — THERAPY VISIT (OUTPATIENT)
Dept: SPEECH THERAPY | Facility: CLINIC | Age: 54
End: 2019-12-18
Payer: COMMERCIAL

## 2019-12-18 VITALS
DIASTOLIC BLOOD PRESSURE: 83 MMHG | SYSTOLIC BLOOD PRESSURE: 120 MMHG | HEART RATE: 93 BPM | WEIGHT: 258 LBS | BODY MASS INDEX: 40.49 KG/M2 | HEIGHT: 67 IN | TEMPERATURE: 98.5 F | RESPIRATION RATE: 14 BRPM

## 2019-12-18 DIAGNOSIS — R13.12 OROPHARYNGEAL DYSPHAGIA: ICD-10-CM

## 2019-12-18 DIAGNOSIS — C05.9 SQUAMOUS CELL CARCINOMA OF PALATE (H): Primary | ICD-10-CM

## 2019-12-18 DIAGNOSIS — F32.A DEPRESSION, UNSPECIFIED DEPRESSION TYPE: Primary | ICD-10-CM

## 2019-12-18 DIAGNOSIS — F32.A DEPRESSION, UNSPECIFIED DEPRESSION TYPE: ICD-10-CM

## 2019-12-18 ASSESSMENT — PAIN SCALES - GENERAL: PAINLEVEL: NO PAIN (0)

## 2019-12-18 ASSESSMENT — MIFFLIN-ST. JEOR: SCORE: 1961.52

## 2019-12-18 ASSESSMENT — PATIENT HEALTH QUESTIONNAIRE - PHQ9: SUM OF ALL RESPONSES TO PHQ QUESTIONS 1-9: 10

## 2019-12-18 NOTE — LETTER
12/18/2019       RE: Devonte Tucker  [address]  [place] [address]     Dear Colleague,    Thank you for referring your patient, Devonte Tucker, to the [facility] E[facility] NO[facility] AND [facility]ROAT at [facility]. Please see a copy of my visit note below.    [facility] Follow Up Visit Note  Head and Neck Surgery    December 18, 2019        Prior Oncologic History: Devonte Tucker is a 53 year old male with a history of a T4a N0 M0 SCC of the left maxillary gingiva. He is s/p a left palatectomy by Dr. Treviño on 5/9/2019 and a left radial forearm free flap.  XRT, 6000 cGy in 30 fractrions, from 6/10/2019 - 7/22/2019.     3 month PET scan on 10/16/2019 showed an area of Indeterminate focal FDG uptake along the right paramedian hard palate with ill-defined soft tissue density on CT, otherwise post-operative and post-XRT related changes. This was reviewed at tumor board and felt to be nonneoplastic but they did recommend direct visualization here in clinic.  On exam today, both by me and Dr. Treviño, there was no visible mass or palpable mass noted.  The area was slightly tender to palpation.           HPI:  Mr. Tucker reports that overall he is doing well.  He states that he still has a little bit of discomfort on the right side of his free flap on the roof of his mouth, but it is no different than what it has been.  He has not felt any swelling or masses.  He still continues to avoid dry foods such as bread and dry meats.  Otherwise, he has no new swallowing issues.  He continues to do his swallow exercises on a regular basis. Devonte denies any hoarseness of voice, referred otalgia, odynophagia, dysphagia, dizziness/lightheadedness, loss of balance, facial paresthesias, tinnitus, hemoptysis, other pain, bleeding, and recurrent/new lumps or bumps. Weight is stable.    He does report he has been having some ongoing issues with depression.  This is been present  "since his completion of treatment.  He has been trying to manage it at home, however, he feels like it is just worsening.  It is not out of control and he is not suicidal or homicidal, but he does want to start addressing it so he does not get worse.  We do have 1 of our psychiatrists here in the clinic today seeing another patient so I asked the patient if he would be willing to speak with that person and he was very comfortable doing that.        Physical Exam:  /83   Pulse 93   Temp 98.5  F (36.9  C)   Resp 14   Ht 1.69 m (5' 6.54\")   Wt 117 kg (258 lb)   BMI 40.98 kg/m       Constitutional: The patient is unaccompanied, well-groomed, and in no acute distress.    Head: Normocephalic and atraumatic.   Eyes: Pupils were equal and reactive.  Extraocular movement intact.    Ears: Pinnae and tragus non-tender.  EACs and TMs were clear.     Nose: Sinuses were non-tender.  Anterior rhinoscopy revealed midline septum and absence of purulence or polyps.  Dry mucosa bilaterally, with crusting noted    Oral Cavity: Normal tongue, floor of mouth, buccal mucosa.   Free flap on left palate is very well appearing and healing well.  No masses visible or palpable on the right hard palate, minimal tenderness to palpation along the median edge of the free flap and right palate.  No lesions or masses on inspection or palpation.    Oropharynx: Normal mucosa. No abnormal lymph tissue in the oropharynx.  Pterygoid region non-tender.   Hypopharynx/Larynx: Deferred for fiberoptic endoscopic exam  Neck: Supple with normal laryngeal and tracheal landmarks.  The parotid beds were without masses.  No palpable thyroid.  Normal range of motion  Lymphatic: There is no palpable lymphadenopathy in the neck.   Skin: Normal:  warm and pink without rash  Neurologic: Alert and oriented x 3.  CN's III-XII within normal limits.  Voice normal.   Psychiatric: The patient's affect was calm, cooperative, and appropriate.          Fiberoptic " Endoscopy:  Consent for fiberoptic laryngoscopy was obtained, and we confirmed correctness of procedure and identity of patient.  Fiberoptic laryngoscopy was indicated due to SCC of the palate.  The nose was topically decongested and anesthetized.  The fiberoptic laryngoscope was passed under endoscopic vision.  The turbinates were normal.  The inferior and middle meati were clear bilaterally without purulence, masses, or polyps, however there is very dry crusting on the septum bilaterally.  The nasopharynx was clear.  The Eustachian tubes were clear.  The soft palate appeared normal with good mobility.  The epiglottis was sharp and the visualized portion of the vallecula was clear.  The larynx was clear with mobile cords.  The arytenoids were clear and there was no pooling in the hypopharynx.  No concerning lesions, masses, or signs of recurrence.    See Imagestream recording in the [facility] Orders section          IMPRESSION AND PLAN:    1. Squamous cell carcinoma of palate (H)  Patient doing well.  No concerning history or exam findings.  Endoscopy unremarkable.  Patient will return in 2 months for a follow-up visit, he will call sooner with any questions or concerns.  Neck set of scans will be in April 2020.    2. Oropharyngeal dysphagia.  Improving.  Doing his swallowing exercises. Will continue to work with SLP team.    3. Depression.  Patient with ongoing issues of depression.  They are not severe at this point and is not suicidal/homicidal, however, I am concerned that his symptoms are worsening.  We did have 1 of our psychiatrist colleagues here in the clinic seeing another patient.  The patient was agreeable to meet with him today.  Dr. Romero did meet with the patient and they are going to set up follow-up visits.    Yesy Disla PA-C  Otolaryngology  Head & Neck Surgery  [phone]       CC:  Jett Treviño MD  Otolaryngology/Head & Neck Surgery       Sumit Atwood MD  Otolaryngology/Head &  Neck Surgery  [facility] [address]     Rich Medellin MD  [facility]  [address]     Emerson Verdin MD   Radiation Oncology, [facility] [address]           Again, thank you for allowing me to participate in the care of your patient.      Sincerely,    Yesy Disla PA-C

## 2019-12-18 NOTE — PROGRESS NOTES
12/18/19 1130   Signing Clinician's Name / Credentials   Signing clinician's name /credentials Josephine Carlisle MA, CCC-SLP   Session Number   Session Number 4   Subjective Report   Subjective Report Devonte Tucker is a 53-year-old man who underwent left inferior maxillectomy, left level 1 neck dissection and identification of vessels, free tissue flap reconstruction and placement on NG tube on 5/9/19 for a pT4aN0 squamous cell carcinoma of the left palate. He is s/p chemoradiation completed on 7/22/2019. He was seen in conjunction with ENT clinic today per provider request. He reported ongoing dysphagia.    Objective Measure 1   Objective Measure Jaw ROM   Details 28mm   Swallow Goal 1   Goal Identifier Diet   Goal Description 1. Pt will tolerate regular moist solids and thin liquids while adhering to safe swallowing precautions 100% of the time independently.    Target Date 03/17/20   Swallow Goal 2   Goal Identifier Exercise   Goal Description 2. Pt will improve and/or maintain ROM and strength of jaw, pharynx and BOT by completing 10 repetitions of 5/5 exercises 3 times daily with minimal written or verbal cues.    Target Date 03/17/20   Treatment Swallow/Oral dysfunction   Treatment of Swallowing Dysfunction &/or Oral Function for Feeding Minutes (06416) 25 Minutes   Skilled Intervention Provided written and verbal information on diet modifications.;Educated patient on swallowing strategies.;Demonstrated safe swallow strategies;Cued swallowing strategies (auditory, visual, tactile);Assessed oral intake trials;Other  (trained exercises)   Patient Response Pt verbalized understanding of material provided.   Treatment Detail 1.  Patient stated he continues to have difficulty eating foods such as dry meats and breads.  Stated he can sometimes eat bread if he has an entire glass of water with it but stated this does not wear that time.  He cannot eat fish or chicken at this point and notes previously the foods  he used to eat.  Stated he is going to try some dark meats as those tend to be a little bit more moist.  Stated the meats and breads and other dry foods tend to turn into a paste in his mouth when he is chewing.  Endorsed he tries to push them down but then they seem to stick in his throat.  Endorsed he tries to use effortful swallows to clear them and sometimes the food mostly move through his throat and other times he needs to force it back up.  Stated a liquid wash is not always beneficial in clearing foods.  Endorsed he has tried adding sauces to foods but again this is not very beneficial.  Reported he continues to have xerostomia and he trialed a medication for this back in October and the medication was re-prescribed last week at his radiation oncology appointment.  Endorsed he has also tried lozenges and chewing sugar-free gum and sucking on sugar-free mints.  Stated he has been encouraged to use of Biotene spray as well.  He reported he does not cough when he eats and drinks and very rarely will feel like he is choking.  Stated he has ongoing difficulty with his jaw range of motion and is unable to open his mouth wide enough to even bite into a banana.  Patient was assessed with thin liquids and demonstrated no signs or symptoms of aspiration.  He was trained to continue soft regular textured foods and thin liquids with strategies including small bites and sips, effortful swallow, adding moisture to foods, and using a liquid wash. he verbalized understanding of information.      2.  Patient stated he has been completing his exercises (emilie, mendelsohn, effortful swallow) a few times per day.  Endorsed he previously completed the jaw stretch using tongue depressors.  He was retrained on rationale for all of his swallowing exercises and retrained on exercises.  He demonstrated the Emilie, Mendelson, effortful swallow, and supraglottic swallow.  Jaw range of motion was measured at 28 mm on right indicating  ongoing moderate to significance trismus.  Normal range is 40 to 50 mm.  Patient trained on option of Therabite as well as rationale and benefits of device.  Recommend use of 7-7-7 protocol.  [employer] Therabite handout provided.  Patient expressed interest in device.   Progress Fair to good   Equipment   Equipment Therabite Rx sent to [facility] medical   Education   Learner Patient   Readiness Acceptance   Method Explanation;Demonstration   Response Verbalizes understanding;Demonstrates understanding   Plan   Home program Home exercise program, Therabite, PO with strategies   Plan for next session Follow up in ENT clinic for ongoing dysphagia therapy, exercise training and swallow stratgies; follow sooner for training on Therabite after he receives device   Total Session Time   Total Treatment Time (sum of timed and untimed services) 25   AMBULATORY CLINICS ONLY-MEDICAL AND TREATMENT DIAGNOSIS   Medical Diagnosis Squamous cell carcinoma of the left palate   Treatment Diagnosis Mild oropharyngeal dysphagia      Thank you for the referral of Devonte Tucker. If you have any questions about this report, please contact me using the information below.     Josephine Carlisle MA, CCC-SLP  Speech-Language Pathologist   [facility]  Department of Otolaryngology/D&T - 4th floor  Pager: [phone]  Phone: [phone]  Email: [email or web address]

## 2019-12-18 NOTE — NURSING NOTE
"Chief Complaint   Patient presents with     RECHECK     8 week follow up      Blood pressure 120/83, pulse 93, temperature 98.5  F (36.9  C), resp. rate 14, height 1.69 m (5' 6.54\"), weight 117 kg (258 lb).    Andres Purcell LPN    "

## 2019-12-18 NOTE — PROGRESS NOTES
[facility]: Integrated Behavioral Health  December 18, 2019      Behavioral Health Clinician Progress Note    Patient Name: eDvonte Tucker           Service Type:  Consult Note      Service Location:   Face to Face in Clinic     Session Start Time: 12:00  Session End Time: 12:20      Session Length: 16 - 37      Attendees: Client    Visit Activities (Refresh list every visit): NEW and Warm-handoff     Diagnostic Assessment Date: NA  Treatment Plan Review Date: NA  See Flowsheets for today's PHQ-9 and GILDA-7 results  Previous PHQ-9:   PHQ-9 SCORE 4/1/2019 10/7/2019 12/18/2019   PHQ-9 Total Score MyChart 13 (Moderate depression) - -   PHQ-9 Total Score 13 0 10       DATA  Extended Session (60+ minutes): No  Interactive Complexity: No  Crisis: No  [facility] Patient: No    Treatment Objective(s) Addressed in This Session:  Target Behavior(s): NA    Depressed Mood: Increase interest, engagement, and pleasure in doing things  Feel less tired and more energy during the day   Improve concentration, focus, and mindfulness in daily activities     Current Stressors / Issues:    Patient had appointment with his ENT provider. [facility] services were requested by ENT staff. No immediate safety/risk issues were reported or identified.  Explained the role of the [facility] and provided informational handout and contact information for the [facility].     The patient reported gradually worsening depressive symptoms since the Summer of 2019. Specifically, he endorsed problems with loss of interest, low energy/fatigue, social withdrawal, being quick to anger/irritability, concentration troubles at work, and loss of appetite. He denied prior episodes similar to this in his life. Due to these symptoms, the patient reported he had interest in talking with a mental health provider to discuss treatment options. I provided him information about [facility] services and mechanisms of how therapy can be effective for reducing depressive  symptoms. I assessed for suicide risk and the patient denied current and historical thoughts of self-harm and suicide. He denied prior attempts of suicide, appeared motivated/interested in therapy, and appeared future-oriented. As such, the client did not appear to meet criteria to complete a safety plan or for a 72-hour-hold.     Prior to ending the meeting, the patient reported he struggles during the holiday season due to difficulties with social isolation and distance from family. I provided the patient cognitive-behavioral skills to help address maladaptive thinking and behavior patterns that could exacerbate depressive symptoms during the holidays. I helped the patient practice these skills during the appointment to help him become more familiar with them prior to leaving. I encouraged the patient to engage proactively with symptoms of depression (e.g. reduce social isolation, engage in pleasant activities) as he noted inactivity tends to worsen his mood.     Zeferino Romero, Behavioral Health Clinician    Progress on Treatment Objective(s) / Homework:    Also provided psychoeducation about behavioral health condition, symptoms, and treatment options    Medication Review:  NA    Medication Compliance:  NA    Changes in Health Issues:   None reported    Chemical Use Review:   Substance Use: Chemical use reviewed, no active concerns identified      Tobacco Use: No current tobacco use.      Assessment: Current Emotional / Mental Status (status of significant symptoms):  Risk status (Self / Other harm or suicidal ideation)  Patient denies a history of suicidal ideation, suicide attempts, self-injurious behavior, homicidal ideation, homicidal behavior and and other safety concerns  Patient denies current fears or concerns for personal safety.  Patient denies current or recent suicidal ideation or behaviors.  Patient denies current or recent homicidal ideation or behaviors.  Patient denies current or recent self  injurious behavior or ideation.  Patient denies other safety concerns.  A safety and risk management plan has not been developed at this time, however patient was encouraged to call [place] [phone] should there be a change in any of these risk factors.    Appearance:   Appropriate   Eye Contact:   Good   Psychomotor Behavior: Normal   Attitude:   Cooperative   Orientation:   All  Speech   Rate / Production: Normal    Volume:  Normal   Mood:    Depressed   Affect:    Appropriate   Thought Content:  Clear   Thought Form:  Coherent  Logical   Insight:    Good     Diagnoses:  1. Depression, unspecified depression type        Collateral Reports Completed:  Not Applicable    Plan: (Homework, other):  Patient was given information about behavioral services and encouraged to schedule a follow up appointment with the clinic [facility] - he was given information about mental health symptoms and treatment options  and Cognitive Behavioral Therapy skills to practice when experiencing depression.  CD Recommendations: No indications of CD issues.         Zeferino Romero  December 18, 2019

## 2019-12-18 NOTE — PATIENT INSTRUCTIONS
Devonte Tucker,    It was a pleasure to see you today.    1. You were seen in the ENT Clinic today by Yesy Disla PA-C.  If you have any questions or concerns after your appointment, please call   - Option 1: ENT Clinic: [phone]  - Option 2: [facility] (Dr. Treviño's Nurse): [phone]    2. Please return in 2 months for a follow up visit.    The next set of scans will be in April 2020.      Thank you,  Yesy Disla PA-C  Otolaryngology  Head & Neck Surgery  [phone]

## 2019-12-18 NOTE — PROGRESS NOTES
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 He is s/p a left palatectomy by Dr. Treviño on 5/9/2019 and a left radial forearm free flap.  XRT, 6000 cGy in 30 fractrions, from 6/10/2019 - 7/22/2019.     3 month PET scan on 10/16/2019 showed an area of Indeterminate focal FDG uptake along the right paramedian hard palate with ill-defined soft tissue density on CT, otherwise post-operative and post-XRT related changes. This was reviewed at tumor board and felt to be nonneoplastic but they did recommend direct visualization here in clinic.  On exam today, both by me and Dr. Treviño, there was no visible mass or palpable mass noted.  The area was slightly tender to palpation.           HPI:  Mr. Tuckre reports that overall he is doing well.  He states that he still has a little bit of discomfort on the right side of his free flap on the roof of his mouth, but it is no different than what it has been.  He has not felt any swelling or masses.  He still continues to avoid dry foods such as bread and dry meats.  Otherwise, he has no new swallowing issues.  He continues to do his swallow exercises on a regular basis. Devonte denies any hoarseness of voice, referred otalgia, odynophagia, dysphagia, dizziness/lightheadedness, loss of balance, facial paresthesias, tinnitus, hemoptysis, other pain, bleeding, and recurrent/new lumps or bumps. Weight is stable.    He does report he has been having some ongoing issues with depression.  This is been present since his completion of treatment.  He has been trying to manage it at home, however, he feels like it is just worsening.  It is not out of control and he is not suicidal or homicidal, but he does want to start addressing it so he does not get worse.  We do have 1 of our psychiatrists here in  "the clinic today seeing another patient so I asked the patient if he would be willing to speak with that person and he was very comfortable doing that.        Physical Exam:  /83   Pulse 93   Temp 98.5  F (36.9  C)   Resp 14   Ht 1.69 m (5' 6.54\")   Wt 117 kg (258 lb)   BMI 40.98 kg/m      Constitutional: The patient is unaccompanied, well-groomed, and in no acute distress.    Head: Normocephalic and atraumatic.   Eyes: Pupils were equal and reactive.  Extraocular movement intact.    Ears: Pinnae and tragus non-tender.  EACs and TMs were clear.     Nose: Sinuses were non-tender.  Anterior rhinoscopy revealed midline septum and absence of purulence or polyps.  Dry mucosa bilaterally, with crusting noted    Oral Cavity: Normal tongue, floor of mouth, buccal mucosa.   Free flap on left palate is very well appearing and healing well.  No masses visible or palpable on the right hard palate, minimal tenderness to palpation along the median edge of the free flap and right palate.  No lesions or masses on inspection or palpation.    Oropharynx: Normal mucosa. No abnormal lymph tissue in the oropharynx.  Pterygoid region non-tender.   Hypopharynx/Larynx: Deferred for fiberoptic endoscopic exam  Neck: Supple with normal laryngeal and tracheal landmarks.  The parotid beds were without masses.  No palpable thyroid.  Normal range of motion  Lymphatic: There is no palpable lymphadenopathy in the neck.   Skin: Normal:  warm and pink without rash  Neurologic: Alert and oriented x 3.  CN's III-XII within normal limits.  Voice normal.   Psychiatric: The patient's affect was calm, cooperative, and appropriate.          Fiberoptic Endoscopy:  Consent for fiberoptic laryngoscopy was obtained, and we confirmed correctness of procedure and identity of patient.  Fiberoptic laryngoscopy was indicated due to SCC of the palate.  The nose was topically decongested and anesthetized.  The fiberoptic laryngoscope was passed under " endoscopic vision.  The turbinates were normal.  The inferior and middle meati were clear bilaterally without purulence, masses, or polyps, however there is very dry crusting on the septum bilaterally.  The nasopharynx was clear.  The Eustachian tubes were clear.  The soft palate appeared normal with good mobility.  The epiglottis was sharp and the visualized portion of the vallecula was clear.  The larynx was clear with mobile cords.  The arytenoids were clear and there was no pooling in the hypopharynx.  No concerning lesions, masses, or signs of recurrence.    See Imagestream recording in the [facility] Orders section          IMPRESSION AND PLAN:    1. Squamous cell carcinoma of palate (H)  Patient doing well.  No concerning history or exam findings.  Endoscopy unremarkable.  Patient will return in 2 months for a follow-up visit, he will call sooner with any questions or concerns.  Neck set of scans will be in April 2020.    2. Oropharyngeal dysphagia.  Improving.  Doing his swallowing exercises. Will continue to work with SLP team.    3. Depression.  Patient with ongoing issues of depression.  They are not severe at this point and is not suicidal/homicidal, however, I am concerned that his symptoms are worsening.  We did have 1 of our psychiatrist colleagues here in the clinic seeing another patient.  The patient was agreeable to meet with him today.  Dr. Romero did meet with the patient and they are going to set up follow-up visits.    Yesy Disla PA-C  Otolaryngology  Head & Neck Surgery  [phone]       CC:  Jett Treviño MD  Otolaryngology/Head & Neck Surgery  [facility] [address]     Sumit Atwood MD  Otolaryngology/Head & Neck Surgery  [facility] [address]     Rich Medellin MD  ENT Specialty[address]  [address]     Emerson Verdin MD   Radiation Oncology, [facility] [address]85

## 2019-12-30 NOTE — PROGRESS NOTES
Outpatient Physical Therapy Discharge Note     Patient: Devonte Tucker  : 1965    Beginning/End Dates of Reporting Period:  19 to 2019    Referring Provider: Dr. Jett Treviño    Therapy Diagnosis: Lymphedema of head and neck     Client Self Report: More swelling a couple weeks ago when he saw Dr. Verdin. Mostly submental swelling that gets big.  L cheek is tender and sore, firm swelling later in the day.     Objective Measurements:  Objective Measure: Skin  Details: intact, non-pitting, soft, no fibrosis  Objective Measure: Pain  Details: Pt is having L side jaw, cheek pain.  Not as bad as it was and he tolerates manual therapy but feels the nerves fired up afterward.   Objective Measure: measurements  Details: decreased head and neck measurements today    Goals:  Goal Identifier HEP   Goal Description In order to improve tolerance for functional mobility, ADLs, and recreational activities outside of physical therapy, patient will demonstrate independence with home program of exercise and lifestyle modification and possible compression.    Target Date 19   Date Met  08/15/19   Progress:     Goal Identifier circumference   Goal Description Pt will have 1 cm decrease or more in 2 Left sided measurements of face in order to eat more easily.   Target Date 19   Date Met  08/15/19   Progress:     Goal Identifier pain   Goal Description Pt will be able to wash his face with firm touch without pain   Target Date 19   Date Met  08/15/19   Progress:     Goal Identifier Shoulder mobility   Goal Description Pt will be independent with his shoulder exercise program to report decreased numbness with overhead reaching and active flexion to 140 degrees.   Target Date 10/13/19   Date Met  10/11/19   Progress:     Goal Identifier Maintenance   Goal Description Pt will continue with home program to prevent exacerbation of swelling, infection or loss of shoulder/neck ROM   Target Date 19    Date Met  11/15/19   Progress:     Progress Toward Goals:   Progress this reporting period: Pt was seen for 12 visits between 6/5 and 11/15/19.  He met all goals. Pt continues to have chronic swelling but managing without exacerbation using exercise, compression, pump, self massage. Pt also worked with CLT on posture, shoulder ROM and neck pain. Recommended vestibular therapy which pt started a few months ago for his dizziness and also to start hand therapy for more specific L UE interventions when appropriate.  Pt was in agreement with watching symptoms for 6 weeks and if no exacerbation or new issues with swelling, will discharge this episode.      Plan:  Discharge from therapy.    Discharge:    Reason for Discharge: Patient has met all goals.    Equipment Issued: compression bandage, pump, home program    Discharge Plan: Patient to continue home program.  Return with new order if future problems

## 2019-12-31 ENCOUNTER — TELEPHONE (OUTPATIENT)
Dept: OTOLARYNGOLOGY | Facility: CLINIC | Age: 54
End: 2019-12-31

## 2019-12-31 NOTE — TELEPHONE ENCOUNTER
Office notes from 12/18/19 visit for patient was faxed to Yaquelin Sifuentes from [facility] at [phone].    Tiffany Salinas, EMT

## 2019-12-31 NOTE — TELEPHONE ENCOUNTER
[facility] Call Center    Phone Message    May a detailed message be left on voicemail: yes    Reason for Call: Other: Yaquelin Sifuentes from [facility] would like the nnotes form the 12/16 visit, she is the  for this patient please fax to [phone]      Action Taken: Other: ump ENT

## 2020-01-06 DIAGNOSIS — K21.9 GASTROESOPHAGEAL REFLUX DISEASE WITHOUT ESOPHAGITIS: ICD-10-CM

## 2020-01-06 DIAGNOSIS — C03.9 PRIMARY CANCER OF ALVEOLAR RIDGE MUCOSA (H): ICD-10-CM

## 2020-01-07 ENCOUNTER — THERAPY VISIT (OUTPATIENT)
Dept: PHYSICAL THERAPY | Facility: CLINIC | Age: 55
End: 2020-01-07
Payer: COMMERCIAL

## 2020-01-07 DIAGNOSIS — R26.89 IMBALANCE: Primary | ICD-10-CM

## 2020-01-07 NOTE — PROGRESS NOTES
Computerized Dynamic Posturography (CDP)     Background Information: Devonte was seen today for a Computerized Dynamic Posturography (CDP) evaluation. He reports symptoms of nausea, dizziness and imbalance   Medical history of see medical chart    Test Results and Procedures:   Sensory Organization Test: The patients ability to maintain upright stance was intact   Overall composite score was 69.8.   Normal for (her/his) age is above 70   Patient s [facility] was shifted anterior   No falls were recorded on trials.   Imbalance demonstrated on trials;mostly demonstrated on condition 5    Summary and Recommendations:   These findings are consistent with intact vestibular, somatosensory and visual integration. Very slight decrease in overall balance score compared to age matched norms.    The patients ability to maintain upright stance under a variety of changing sensory inputs. Recommend follow up with neuro-otology for assessment of dizziness/imbalance without clear deficits in PT and audiology balance testing,   Continued vestibular rehab may be helpful/beneficial balance retraining, balance confidence retraining and symptom management via habituation training

## 2020-01-09 ENCOUNTER — TELEPHONE (OUTPATIENT)
Dept: OTOLARYNGOLOGY | Facility: CLINIC | Age: 55
End: 2020-01-09

## 2020-01-14 ASSESSMENT — ANXIETY QUESTIONNAIRES
GAD7 TOTAL SCORE: 15
GAD7 TOTAL SCORE: 15
6. BECOMING EASILY ANNOYED OR IRRITABLE: NEARLY EVERY DAY
4. TROUBLE RELAXING: NEARLY EVERY DAY
5. BEING SO RESTLESS THAT IT IS HARD TO SIT STILL: SEVERAL DAYS
7. FEELING AFRAID AS IF SOMETHING AWFUL MIGHT HAPPEN: SEVERAL DAYS
3. WORRYING TOO MUCH ABOUT DIFFERENT THINGS: MORE THAN HALF THE DAYS
7. FEELING AFRAID AS IF SOMETHING AWFUL MIGHT HAPPEN: SEVERAL DAYS
2. NOT BEING ABLE TO STOP OR CONTROL WORRYING: MORE THAN HALF THE DAYS
1. FEELING NERVOUS, ANXIOUS, OR ON EDGE: NEARLY EVERY DAY

## 2020-01-15 ENCOUNTER — OFFICE VISIT (OUTPATIENT)
Dept: BEHAVIORAL HEALTH | Facility: CLINIC | Age: 55
End: 2020-01-15
Payer: COMMERCIAL

## 2020-01-15 DIAGNOSIS — F32.A DEPRESSION, UNSPECIFIED DEPRESSION TYPE: Primary | ICD-10-CM

## 2020-01-15 ASSESSMENT — PATIENT HEALTH QUESTIONNAIRE - PHQ9
10. IF YOU CHECKED OFF ANY PROBLEMS, HOW DIFFICULT HAVE THESE PROBLEMS MADE IT FOR YOU TO DO YOUR WORK, TAKE CARE OF THINGS AT HOME, OR GET ALONG WITH OTHER PEOPLE: SOMEWHAT DIFFICULT
SUM OF ALL RESPONSES TO PHQ QUESTIONS 1-9: 15
SUM OF ALL RESPONSES TO PHQ QUESTIONS 1-9: 15

## 2020-01-15 ASSESSMENT — ANXIETY QUESTIONNAIRES: GAD7 TOTAL SCORE: 15

## 2020-01-15 NOTE — PROGRESS NOTES
[facility]: Integrated Behavioral Health  January 15, 2020      Behavioral Health Clinician Progress Note    Patient Name: Devonte Tucker           Service Type:  Individual      Service Location:   Face to Face in Clinic     Session Start Time: 11:00am  Session End Time: 12:00pm      Session Length: 53 - 60      Attendees: Client    Visit Activities (Refresh list every visit): NEW and [facility] Only    Diagnostic Assessment Date: n/a  Treatment Plan Review Date: n/a  See Flowsheets for today's PHQ-9 and GILDA-7 results  Previous PHQ-9:   PHQ-9 SCORE 10/7/2019 12/18/2019 1/15/2020   PHQ-9 Total Score MyChart - - 15 (Moderately severe depression)   PHQ-9 Total Score 0 10 15     Previous GILDA-7:   GILDA-7 SCORE 1/14/2020   Total Score 15 (severe anxiety)   Total Score 15     Answers for HPI/ROS submitted by the patient on 1/15/2020   GILDA 7 TOTAL SCORE: 15  If you checked off any problems, how difficult have these problems made it for you to do your work, take care of things at home, or get along with other people?: Somewhat difficult  PHQ9 TOTAL SCORE: 15    ELEANOR LEVEL:  No flowsheet data found.    DATA  Extended Session (60+ minutes): No  Interactive Complexity: No  Crisis: No  [facility] Patient: No    Treatment Objective(s) Addressed in This Session:  Target Behavior(s): disease management/lifestyle changes      Depressed Mood: Increase interest, engagement, and pleasure in doing things    Current Stressors / Issues:  [facility] Intern met patient for first session.  Reviewed confidentiality and discussed patient's understanding and expectations of the the [facility]'s role.  Discussed format for initial session and future sessions, including diagnostic assessment, person-centered treatment planning, therapy, and on-going assessment towards reaching goals.  Discussed IB model, including short-term behavioral health services that could consist of sessions that are 30-60 minutes and referral process to specialty  behavioral health if ongoing services were needed and agreed upon.    [facility] Intern spent time building rapport and discussing current concerns and stressors.  Patient reported feelings of frustration due to the amount of time it is taking him to recover from cancer treatment.  He has been feeling down and is feeling less motivate.  He feels self-conscious due to the reconstructive surgery, which is impacting his motivation to socialize.  Patient also stated that he is feeling worried and a sense of panic at times.      MI interventions used to provide empathy and understanding.  Open-ended questions asked to further explore current concerns and assess patient's stage of change. Patient reported that he wants to determine why he is having these feeling and experiences.  He also wants to learn how to approach and cope with his distress.      I affirmed the steps this patient has taken to address physical and behavioral health issues, and offered continued behavioral health services or referral, now or in the future, as needed by the patient.    Progress on Treatment Objective(s) / Homework:  Discussed symptoms and recommended ongoing psychotherapy; patient accepted.    Motivational Interviewing    MI Intervention: Expressed Empathy/Understanding and Open-ended questions     Change Talk Expressed by the Patient: Desire to change Reasons to change    Provider Response to Change Talk: E - Evoked more info from patient about behavior change    Also provided psychoeducation about behavioral health condition, symptoms, and treatment options    Care Plan review completed: n/a    Medication Review:  No changes to current psychiatric medication(s)    Medication Compliance:  Yes    Changes in Health Issues:   None reported    Chemical Use Review:   Substance Use: will follow-up at next session     Tobacco Use: will follow-up at next session    Assessment: Current Emotional / Mental Status (status of significant symptoms):  Risk  status (Self / Other harm or suicidal ideation)  Patient denies a history of suicidal ideation, suicide attempts, self-injurious behavior, homicidal ideation, homicidal behavior and and other safety concerns  Patient denies current fears or concerns for personal safety.  Patient denies current or recent suicidal ideation or behaviors.  Patient denies current or recent homicidal ideation or behaviors.  Patient denies current or recent self injurious behavior or ideation.  Patient denies other safety concerns.  A safety and risk management plan has not been developed at this time, however patient was encouraged to call [place] [phone] should there be a change in any of these risk factors.    Appearance:   Appropriate   Eye Contact:   Good   Psychomotor Behavior: Normal   Attitude:   Cooperative   Orientation:   All  Speech   Rate / Production: Normal    Volume:  Normal   Mood:    Anxious  Depressed  Normal  Affect:    Appropriate   Thought Content:  Clear   Thought Form:  Coherent  Logical   Insight:    Good     Diagnoses:  1. Depression, unspecified depression type    2. R/O Adjustment DO  3. R/O GILDA    Collateral Reports Completed:  Will collaborate with care team as indicated during treatment    Plan: (Homework, other):  Patient was given information about behavioral services and encouraged to schedule a follow up appointment with the clinic [facility] Next appointment scheduled for 1/29/2020.  He was also given information about mental health symptoms and treatment options .  CD Recommendations: No indications of CD issues.   Zulma Hwang, [facility] Intern      ______________________________________________________________________    Zulma Hwang  January 15, 2020      This patient was seen by a Behavioral Health Clinician intern. Although I did not see this patient directly, this patient was discussed with me by the intern in individual supervision, and I agree with the treatment and plan as documented.  Adrian MORALES  CRISTIANE Lowe, FILOMENAFT, Lead Behavioral Health Clinician, [facility] [place]

## 2020-01-16 ASSESSMENT — PATIENT HEALTH QUESTIONNAIRE - PHQ9: SUM OF ALL RESPONSES TO PHQ QUESTIONS 1-9: 15

## 2020-01-22 ASSESSMENT — ENCOUNTER SYMPTOMS
WEIGHT GAIN: 1
TASTE DISTURBANCE: 1
SNORES LOUDLY: 1
SWOLLEN GLANDS: 0
TROUBLE SWALLOWING: 1
SPEECH CHANGE: 1
SEIZURES: 0
FATIGUE: 1
TINGLING: 1
NECK PAIN: 1
TREMORS: 0
HEMATURIA: 0
POLYPHAGIA: 1
ARTHRALGIAS: 0
DIFFICULTY URINATING: 0
SINUS CONGESTION: 1
DYSURIA: 0
BRUISES/BLEEDS EASILY: 1
HEADACHES: 1
INSOMNIA: 1
SINUS PAIN: 0
DYSPNEA ON EXERTION: 0
SHORTNESS OF BREATH: 0
DECREASED APPETITE: 0
INCREASED ENERGY: 0
DEPRESSION: 0
SORE THROAT: 0
POLYDIPSIA: 0
COUGH: 0
SPUTUM PRODUCTION: 0
HOARSE VOICE: 1
DECREASED CONCENTRATION: 1
BACK PAIN: 1
LOSS OF CONSCIOUSNESS: 0
NUMBNESS: 1
PANIC: 1
DIZZINESS: 1
NIGHT SWEATS: 1
FLANK PAIN: 0
WHEEZING: 0
NERVOUS/ANXIOUS: 1
WEAKNESS: 1
DISTURBANCES IN COORDINATION: 1
HALLUCINATIONS: 0
MYALGIAS: 1
NECK MASS: 0
WEIGHT LOSS: 0
MUSCLE WEAKNESS: 0
JOINT SWELLING: 0
STIFFNESS: 0
CHILLS: 1
FEVER: 0
PARALYSIS: 0
POSTURAL DYSPNEA: 1
MEMORY LOSS: 0
COUGH DISTURBING SLEEP: 1
ALTERED TEMPERATURE REGULATION: 1
HEMOPTYSIS: 0
MUSCLE CRAMPS: 1
SMELL DISTURBANCE: 0

## 2020-01-27 ENCOUNTER — OFFICE VISIT (OUTPATIENT)
Dept: ENDOCRINOLOGY | Facility: CLINIC | Age: 55
End: 2020-01-27
Payer: COMMERCIAL

## 2020-01-27 VITALS
DIASTOLIC BLOOD PRESSURE: 77 MMHG | HEIGHT: 68 IN | HEART RATE: 86 BPM | SYSTOLIC BLOOD PRESSURE: 109 MMHG | WEIGHT: 247.6 LBS | BODY MASS INDEX: 37.53 KG/M2

## 2020-01-27 DIAGNOSIS — E11.9 TYPE 2 DIABETES MELLITUS WITHOUT COMPLICATION, WITHOUT LONG-TERM CURRENT USE OF INSULIN (H): Primary | ICD-10-CM

## 2020-01-27 PROBLEM — C03.9: Status: ACTIVE | Noted: 2019-05-27

## 2020-01-27 LAB — HBA1C MFR BLD: 5.6 % (ref 4.3–6)

## 2020-01-27 RX ORDER — LANCETS
EACH MISCELLANEOUS
Qty: 100 EACH | Refills: 3 | Status: SHIPPED | OUTPATIENT
Start: 2020-01-27 | End: 2020-08-31

## 2020-01-27 ASSESSMENT — PAIN SCALES - GENERAL: PAINLEVEL: NO PAIN (0)

## 2020-01-27 ASSESSMENT — MIFFLIN-ST. JEOR: SCORE: 1924.67

## 2020-01-27 NOTE — PROGRESS NOTES
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 Post operatively, he required NGT placement for enteral feeding, and experienced hyperglycemia related to enteral feeding.    Upon last visit with me 10/7/19, his A1c was improved and he was working with SLP to increase his solid food intake. He was no longer on tube feeds.     Devonte reports doing well overall. He has been able to have more solid foods, more raw vegetables and meats. Still not able to eat much bread, as it is too dry.     Remains on gabapentin for left arm neuropathy. Has titrated down to twice daily as of December.       Pt denies headaches, visual changes, vomiting, SOB at rest, chest pain, abd pain, nausea/vomiting, diarrhea, dysuria, hematuria or foot ulcers.     Current Diabetes Regimen:   Metformin IR 1000mg BID    Glucometer Settings   Average B  SD: 12  Fasting glucose range: 103-144  Prandial glucose range:   Documented hypoglycemia: none.     Weight: 247 lbs, from 260 recently  Physical Activity: less dizzy, now doing stationary exercise bike 3x weekly  Nutrition: improving, three meals a day with more variety of foods tolerated.     Diabetes Care  Retinopathy: recent eye appt 10/2019.     Nephropathy: BP suboptimally controlled. No hx microalbuminuria. Creatinine 0.68 (stable). Taking ACEi/ARB: yes    Neuropathy: yes, recent. Tested sensation today, intact to left foot. Right foot with reduced sensation to big toe.    Foot Exam: no wounds or ulcers.     Lipids: no recent labs. Taking statin: yes, ASA: yes  LDL Cholesterol Calculated   Date Value Ref Range Status   2019 41 <100 mg/dL Final     Comment:      Desirable:       <100 mg/dl        ROS: 10 point review of systems completed; pertinent positives and negatives documented in HPI    Allergies  No Known Allergies    Medications  Current Outpatient Medications   Medication Sig Dispense Refill     acetaminophen (TYLENOL) 325 MG tablet Take 2 tablets (650 mg) by mouth every 4 hours as needed for mild pain 100 tablet 0     aspirin (ASA) 325 MG tablet Take 1 tablet (325 mg) by mouth daily 30 tablet 0     atorvastatin (LIPITOR) 20 MG tablet 1 tablet (20 mg) by Per Feeding Tube route every morning       blood glucose (NO BRAND SPECIFIED) test strip Use to test blood sugar 4 times daily or as directed. To accompany: Blood Glucose Monitor Brands: per insurance. 100 strip 6     cevimeline (EVOXAC) 30 MG capsule Take 1 capsule (30 mg) by mouth 3 times daily 90 capsule 11     doxazosin (CARDURA) 1 MG tablet Take 1 tablet (1 mg) by mouth daily 30 tablet 0     gabapentin (NEURONTIN) 300 MG capsule Take 1 capsule (300 mg) by mouth 3 times daily 90 capsule 3     lisinopril (PRINIVIL/ZESTRIL) 10 MG tablet 1 tablet (10 mg) by Per Feeding Tube route every morning       metFORMIN (GLUCOPHAGE) 1000 MG tablet Take 1 tablet (1,000 mg) by mouth 2 times daily (with meals) 90 tablet 4     mineral oil-hydrophilic petrolatum (AQUAPHOR) external ointment Apply topically every 8 hours Apply to neck wound 420 g 0     multivitamin w/minerals (THERA-VIT-M) tablet Take 1 tablet by mouth daily 30 tablet 0     omeprazole (PRILOSEC) 20 MG DR capsule Take 1 capsule (20 mg) by mouth daily 30 capsule 3     ondansetron (ZOFRAN) 8 MG tablet Take 1 tablet (8 mg) by mouth every 8 hours as needed for nausea 30 tablet 3     order for DME Equipment being ordered: Facial compression garment for lymphedema 2 each 1     oxyCODONE (ROXICODONE) 5 MG/5ML solution Take 5 mLs (5 mg) by mouth 3 times daily as needed for severe pain 500 mL 0     polyethylene glycol (MIRALAX/GLYCOLAX) packet Take 17 g by mouth daily as  "needed for constipation 100 packet 0     sodium fluoride dental gel (PREVIDENT) 1.1 % GEL topical gel Apply to affected area At Bedtime 112 g 11     thin (NO BRAND SPECIFIED) lancets Use to test blood sugar 4 times daily or as directed. To accompany: Blood Glucose Monitor Brands: per insurance. 100 each 3     White Petrolatum-Mineral Oil (REFRESH P.M.) OINT Apply to left eye prior to bedtime. 1 Tube 3       Family History  family history includes Cancer in his brother and mother; Cardiovascular in his brother; Kidney Cancer in his sister.    Social History   reports that he has never smoked. He has never used smokeless tobacco. He reports that he does not drink alcohol or use drugs.     Past Medical History  Past Medical History:   Diagnosis Date     Diabetes (H)      Hypertension      Maxillary sinus cancer (H)      Obesity        Past Surgical History:   Procedure Laterality Date     ------------OTHER-------------      Mucosa and bone biopsy     EXPLORE NECK Left 5/9/2019    Procedure: Left Neck Exploration;  Surgeon: Jett Treviño MD;  Location: [facility] OR     EXTRACTION(S) DENTAL      x2 under general anesthesia     GRAFT FREE VASCULARIZED (LOCATION) Right 5/9/2019    Procedure: Left Radial Forearm Free Flap (soft tissue);  Surgeon: Sumit Atwood MD;  Location: [facility] OR     GRAFT SKIN SPLIT THICKNESS FROM EXTREMITY Right 5/9/2019    Procedure: Graft skin split thickness from right thigh, nasogastric feeding tube placement;  Surgeon: Sumit Atwood MD;  Location: [facility] OR     IR LYMPH NODE BIOPSY  4/4/2019     OSTEOTOMY MAXILLA Left 5/9/2019    Procedure: Left Infrastructure Maxillectomy,;  Surgeon: Jett Treviño MD;  Location: [facility] OR       Physical Exam  /77   Pulse 86   Ht 1.715 m (5' 7.5\")   Wt 112.3 kg (247 lb 9.6 oz)   BMI 38.21 kg/m    Body mass index is 38.21 kg/m .    GENERAL : In no apparent distress. Voice hoarse.  SKIN: Normal color, normal temperature, texture. Slight redness to face at site of " radiation tx.   EYES: PERRLA.  No proptosis.  MOUTH: Moist, pink; pharynx clear  NECK: No visible masses. No palpable adenopathy, or masses. No goiter.  RESP: normal respiratory effort.  cough   ABDOMEN: soft, nontender to palpation   NEURO: awake, alert, responds appropriately to questions.  Moves all extremities; Gait normal.     EXTREMITIES: No ulcers or open wounds.     RESULTS    Lab Results   Component Value Date    A1C 7.4 04/24/2019       Creatinine   Date Value Ref Range Status   07/01/2019 0.68 0.66 - 1.25 mg/dL Final     GFR Estimate   Date Value Ref Range Status   10/16/2019 >90 >60 mL/min/[1.73_m2] Final     Hemoglobin A1C   Date Value Ref Range Status   04/24/2019 7.4 (H) 0 - 5.6 % Final     Comment:     Normal <5.7% Prediabetes 5.7-6.4%  Diabetes 6.5% or higher - adopted from ADA   consensus guidelines.       Potassium   Date Value Ref Range Status   07/01/2019 4.1 3.4 - 5.3 mmol/L Final     TSH   Date Value Ref Range Status   10/16/2019 2.22 0.40 - 4.00 mU/L Final       TSH   Date Value Ref Range Status   10/16/2019 2.22 0.40 - 4.00 mU/L Final   10/07/2019 2.03 0.40 - 4.00 mU/L Final   05/10/2019 0.32 (L) 0.40 - 4.00 mU/L Final       Creatinine   Date Value Ref Range Status   07/01/2019 0.68 0.66 - 1.25 mg/dL Final       No results for input(s): CHOL, HDL, LDL, TRIG, CHOLHDLRATIO in the last 00043 hours.  No results found for: [id number], [id number], [id number]      ASSESSMENT/PLAN:    1. Diabetes type II, now well controlled with weight loss and dietary modification (somewhat unintentional due to his cancer and recent surgery)   -most recent A1c 5.4> 5.6%   -continue Metformin 1000mg BID    Refills today:  -Metformin     Follow up:  1. With diabetes provider in 4 months.    The patient is  enrolled in [employer] services    This patient has met [place] community measures for diabetes control: no (D5: Control blood pressure ,Lower bad cholesterol Maintain blood sugar, Be tobacco-free, Take aspirin as  recommended)    This patient is eligible for [facility] from [facility]: no    I spent 25 minutes with this patient face to face and explained the conditions and plans (more than 50% of time was counseling/coordination of care, diabetes management, follow up plan for worsening hyper and hypoglycemia) . The patient understood and is satisfied with today's visit.     GIORGI Mccoy, PA-C  [facility]   Pager [phone]

## 2020-01-27 NOTE — LETTER
2020       RE: Devonte Tucker  [address]  [place] [address]     Dear Colleague,    Thank you for referring your patient, Devonte Tucker, to the [facility] ENDOCRINOLOGY at [facility]. Please see a copy of my visit note below.    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 Post operatively, he required NGT placement for enteral feeding, and experienced hyperglycemia related to enteral feeding.    Upon last visit with me 10/7/19, his A1c was improved and he was working with SLP to increase his solid food intake. He was no longer on tube feeds.     Devonte reports doing well overall. He has been able to have more solid foods, more raw vegetables and meats. Still not able to eat much bread, as it is too dry.     Remains on gabapentin for left arm neuropathy. Has titrated down to twice daily as of December.       Pt denies headaches, visual changes, vomiting, SOB at rest, chest pain, abd pain, nausea/vomiting, diarrhea, dysuria, hematuria or foot ulcers.     Current Diabetes Regimen:   Metformin IR 1000mg BID    Glucometer Settings   Average B  SD: 12  Fasting glucose range: 103-144  Prandial glucose range:   Documented hypoglycemia: none.     Weight: 247 lbs, from 260 recently  Physical Activity: less dizzy, now doing stationary exercise bike 3x weekly  Nutrition: improving, three meals a day with more variety of foods tolerated.     Diabetes Care  Retinopathy: recent eye appt 10/2019.     Nephropathy: BP suboptimally controlled. No hx microalbuminuria. Creatinine 0.68 (stable). Taking ACEi/ARB: yes    Neuropathy: yes, recent.  Tested sensation today, intact to left foot. Right foot with reduced sensation to big toe.    Foot Exam: no wounds or ulcers.     Lipids: no recent labs. Taking statin: yes, ASA: yes  LDL Cholesterol Calculated   Date Value Ref Range Status   07/01/2019 41 <100 mg/dL Final     Comment:     Desirable:       <100 mg/dl        ROS: 10 point review of systems completed; pertinent positives and negatives documented in HPI    Allergies  No Known Allergies    Medications  Current Outpatient Medications   Medication Sig Dispense Refill     acetaminophen (TYLENOL) 325 MG tablet Take 2 tablets (650 mg) by mouth every 4 hours as needed for mild pain 100 tablet 0     aspirin (ASA) 325 MG tablet Take 1 tablet (325 mg) by mouth daily 30 tablet 0     atorvastatin (LIPITOR) 20 MG tablet 1 tablet (20 mg) by Per Feeding Tube route every morning       blood glucose (NO BRAND SPECIFIED) test strip Use to test blood sugar 4 times daily or as directed. To accompany: Blood Glucose Monitor Brands: per insurance. 100 strip 6     cevimeline (EVOXAC) 30 MG capsule Take 1 capsule (30 mg) by mouth 3 times daily 90 capsule 11     doxazosin (CARDURA) 1 MG tablet Take 1 tablet (1 mg) by mouth daily 30 tablet 0     gabapentin (NEURONTIN) 300 MG capsule Take 1 capsule (300 mg) by mouth 3 times daily 90 capsule 3     lisinopril (PRINIVIL/ZESTRIL) 10 MG tablet 1 tablet (10 mg) by Per Feeding Tube route every morning       metFORMIN (GLUCOPHAGE) 1000 MG tablet Take 1 tablet (1,000 mg) by mouth 2 times daily (with meals) 90 tablet 4     mineral oil-hydrophilic petrolatum (AQUAPHOR) external ointment Apply topically every 8 hours Apply to neck wound 420 g 0     multivitamin w/minerals (THERA-VIT-M) tablet Take 1 tablet by mouth daily 30 tablet 0     omeprazole (PRILOSEC) 20 MG DR capsule Take 1 capsule (20 mg) by mouth daily 30 capsule 3     ondansetron (ZOFRAN) 8 MG tablet Take 1 tablet (8 mg) by mouth every 8 hours as needed for nausea 30 tablet 3      order for DME Equipment being ordered: Facial compression garment for lymphedema 2 each 1     oxyCODONE (ROXICODONE) 5 MG/5ML solution Take 5 mLs (5 mg) by mouth 3 times daily as needed for severe pain 500 mL 0     polyethylene glycol (MIRALAX/GLYCOLAX) packet Take 17 g by mouth daily as needed for constipation 100 packet 0     sodium fluoride dental gel (PREVIDENT) 1.1 % GEL topical gel Apply to affected area At Bedtime 112 g 11     thin (NO BRAND SPECIFIED) lancets Use to test blood sugar 4 times daily or as directed. To accompany: Blood Glucose Monitor Brands: per insurance. 100 each 3     White Petrolatum-Mineral Oil (REFRESH P.M.) OINT Apply to left eye prior to bedtime. 1 Tube 3       Family History  family history includes Cancer in his brother and mother; Cardiovascular in his brother; Kidney Cancer in his sister.    Social History   reports that he has never smoked. He has never used smokeless tobacco. He reports that he does not drink alcohol or use drugs.     Past Medical History  Past Medical History:   Diagnosis Date     Diabetes (H)      Hypertension      Maxillary sinus cancer (H)      Obesity        Past Surgical History:   Procedure Laterality Date     ------------OTHER-------------      Mucosa and bone biopsy     EXPLORE NECK Left 5/9/2019    Procedure: Left Neck Exploration;  Surgeon: Jett Treviño MD;  Location: [facility] OR     EXTRACTION(S) DENTAL      x2 under general anesthesia     GRAFT FREE VASCULARIZED (LOCATION) Right 5/9/2019    Procedure: Left Radial Forearm Free Flap (soft tissue);  Surgeon: Sumit Atwood MD;  Location: [facility] OR     GRAFT SKIN SPLIT THICKNESS FROM EXTREMITY Right 5/9/2019    Procedure: Graft skin split thickness from right thigh, nasogastric feeding tube placement;  Surgeon: Sumit Atwood MD;  Location: [facility] OR     IR LYMPH NODE BIOPSY  4/4/2019     OSTEOTOMY MAXILLA Left 5/9/2019    Procedure: Left Infrastructure Maxillectomy,;  Surgeon: Jett Treviño MD;  Location: [facility]  "OR       Physical Exam  /77   Pulse 86   Ht 1.715 m (5' 7.5\")   Wt 112.3 kg (247 lb 9.6 oz)   BMI 38.21 kg/m     Body mass index is 38.21 kg/m .    GENERAL : In no apparent distress. Voice hoarse.  SKIN: Normal color, normal temperature, texture. Slight redness to face at site of radiation tx.   EYES: PERRLA.  No proptosis.  MOUTH: Moist, pink; pharynx clear  NECK: No visible masses. No palpable adenopathy, or masses. No goiter.  RESP: normal respiratory effort.  cough   ABDOMEN: soft, nontender to palpation   NEURO: awake, alert, responds appropriately to questions.  Moves all extremities; Gait normal.     EXTREMITIES: No ulcers or open wounds.     RESULTS    Lab Results   Component Value Date    A1C 7.4 04/24/2019       Creatinine   Date Value Ref Range Status   07/01/2019 0.68 0.66 - 1.25 mg/dL Final     GFR Estimate   Date Value Ref Range Status   10/16/2019 >90 >60 mL/min/[1.73_m2] Final     Hemoglobin A1C   Date Value Ref Range Status   04/24/2019 7.4 (H) 0 - 5.6 % Final     Comment:     Normal <5.7% Prediabetes 5.7-6.4%  Diabetes 6.5% or higher - adopted from ADA   consensus guidelines.       Potassium   Date Value Ref Range Status   07/01/2019 4.1 3.4 - 5.3 mmol/L Final     TSH   Date Value Ref Range Status   10/16/2019 2.22 0.40 - 4.00 mU/L Final       TSH   Date Value Ref Range Status   10/16/2019 2.22 0.40 - 4.00 mU/L Final   10/07/2019 2.03 0.40 - 4.00 mU/L Final   05/10/2019 0.32 (L) 0.40 - 4.00 mU/L Final       Creatinine   Date Value Ref Range Status   07/01/2019 0.68 0.66 - 1.25 mg/dL Final       No results for input(s): CHOL, HDL, LDL, TRIG, CHOLHDLRATIO in the last 44049 hours.  No results found for: [id number], [id number], [id number]      ASSESSMENT/PLAN:    1. Diabetes type II, now well controlled with weight loss and dietary modification (somewhat unintentional due to his cancer and recent surgery)   -most recent A1c 5.4> 5.6%   -continue Metformin 1000mg BID    Refills " today:  -Metformin     Follow up:  1. With diabetes provider in 4 months.    The patient is  enrolled in [employer] services    This patient [place] met [place] community measures for diabetes control: no (D5: Control blood pressure ,Lower bad cholesterol Maintain blood sugar, Be tobacco-free, Take aspirin as recommended)    This patient is eligible for graduation from [facility]: no    I spent 25 minutes with this patient face to face and explained the conditions and plans (more than 50% of time was counseling/coordination of care, diabetes management, follow up plan for worsening hyper and hypoglycemia) . The patient understood and is satisfied with today's visit.     GIORGI Mccoy, PA-C  [facility]   Pager [phone]

## 2020-01-29 ENCOUNTER — OFFICE VISIT (OUTPATIENT)
Dept: BEHAVIORAL HEALTH | Facility: CLINIC | Age: 55
End: 2020-01-29
Payer: COMMERCIAL

## 2020-01-29 DIAGNOSIS — F32.A DEPRESSION, UNSPECIFIED DEPRESSION TYPE: Primary | ICD-10-CM

## 2020-01-29 ASSESSMENT — COLUMBIA-SUICIDE SEVERITY RATING SCALE - C-SSRS
6. HAVE YOU EVER DONE ANYTHING, STARTED TO DO ANYTHING, OR PREPARED TO DO ANYTHING TO END YOUR LIFE?: NO
2. HAVE YOU ACTUALLY HAD ANY THOUGHTS OF KILLING YOURSELF?: NO
1. IN THE PAST MONTH, HAVE YOU WISHED YOU WERE DEAD OR WISHED YOU COULD GO TO SLEEP AND NOT WAKE UP?: NO
2. HAVE YOU ACTUALLY HAD ANY THOUGHTS OF KILLING YOURSELF LIFETIME?: YES
1. IN THE PAST MONTH, HAVE YOU WISHED YOU WERE DEAD OR WISHED YOU COULD GO TO SLEEP AND NOT WAKE UP?: YES
TOTAL  NUMBER OF INTERRUPTED ATTEMPTS PAST 3 MONTHS: NO
3. HAVE YOU BEEN THINKING ABOUT HOW YOU MIGHT KILL YOURSELF?: YES
TOTAL  NUMBER OF ABORTED OR SELF INTERRUPTED ATTEMPTS PAST 3 MONTHS: NO
6. HAVE YOU EVER DONE ANYTHING, STARTED TO DO ANYTHING, OR PREPARED TO DO ANYTHING TO END YOUR LIFE?: NO
5. HAVE YOU STARTED TO WORK OUT OR WORKED OUT THE DETAILS OF HOW TO KILL YOURSELF? DO YOU INTEND TO CARRY OUT THIS PLAN?: NO
4. HAVE YOU HAD THESE THOUGHTS AND HAD SOME INTENTION OF ACTING ON THEM?: NO
ATTEMPT LIFETIME: NO
TOTAL  NUMBER OF ABORTED OR SELF INTERRUPTED ATTEMPTS PAST LIFETIME: NO
5. HAVE YOU STARTED TO WORK OUT OR WORKED OUT THE DETAILS OF HOW TO KILL YOURSELF? DO YOU INTEND TO CARRY OUT THIS PLAN?: NO
ATTEMPT PAST THREE MONTHS: NO
TOTAL  NUMBER OF INTERRUPTED ATTEMPTS LIFETIME: NO
REASONS FOR IDEATION LIFETIME: COMPLETELY TO END OR STOP THE PAIN (YOU COULDN'T GO ON LIVING WITH THE PAIN OR HOW YOU WERE FEELING)
4. HAVE YOU HAD THESE THOUGHTS AND HAD SOME INTENTION OF ACTING ON THEM?: YES

## 2020-01-29 NOTE — Clinical Note
Hi-ready for review.  Sorry so late on this note, I thought it was going to be a DA but I was mistaken.  Thanks

## 2020-02-03 ENCOUNTER — THERAPY VISIT (OUTPATIENT)
Dept: PHYSICAL THERAPY | Facility: CLINIC | Age: 55
End: 2020-02-03
Payer: COMMERCIAL

## 2020-02-03 DIAGNOSIS — R26.89 IMBALANCE: Primary | ICD-10-CM

## 2020-02-03 NOTE — DISCHARGE INSTRUCTIONS
"2/3/20   - During your work day, try to sit down for 30 min increments, every 2 hours.   - Change background color or brightness on your computer and see if you can increase the text size - to reduce eye fatigue.   - Perform \"eye breaks\" every 30-45 minutes during work: Take 5-10 min to take your eyes off the screen.   "

## 2020-02-17 ENCOUNTER — THERAPY VISIT (OUTPATIENT)
Dept: PHYSICAL THERAPY | Facility: CLINIC | Age: 55
End: 2020-02-17
Payer: COMMERCIAL

## 2020-02-17 DIAGNOSIS — R26.89 IMBALANCE: Primary | ICD-10-CM

## 2020-02-17 NOTE — DISCHARGE INSTRUCTIONS
2/17/20   - Slowly increase the duration you are able to watch the functional videos for   - Begin static balance exercise: either feet together or in modified tandem position.   - In seated position, hold pen out in front of you and rotate one direction, stop, back to center, and rotate the other direction while eyes are fixated on pen. Perform x 5 reps, 1-2x/day.

## 2020-02-18 ENCOUNTER — OFFICE VISIT (OUTPATIENT)
Dept: OTOLARYNGOLOGY | Facility: CLINIC | Age: 55
End: 2020-02-18
Payer: COMMERCIAL

## 2020-02-18 ENCOUNTER — THERAPY VISIT (OUTPATIENT)
Dept: SPEECH THERAPY | Facility: CLINIC | Age: 55
End: 2020-02-18
Payer: COMMERCIAL

## 2020-02-18 VITALS — HEIGHT: 68 IN | WEIGHT: 246 LBS | BODY MASS INDEX: 37.28 KG/M2

## 2020-02-18 DIAGNOSIS — C05.9 SQUAMOUS CELL CARCINOMA OF PALATE (H): Primary | ICD-10-CM

## 2020-02-18 DIAGNOSIS — R09.81 NASAL CONGESTION: ICD-10-CM

## 2020-02-18 DIAGNOSIS — R13.12 OROPHARYNGEAL DYSPHAGIA: ICD-10-CM

## 2020-02-18 ASSESSMENT — PAIN SCALES - GENERAL: PAINLEVEL: NO PAIN (0)

## 2020-02-18 ASSESSMENT — MIFFLIN-ST. JEOR: SCORE: 1917.41

## 2020-02-18 NOTE — PROGRESS NOTES
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 He is s/p a left palatectomy by Dr. Treviño on 5/9/2019 and a left radial forearm free flap.  XRT, 6000 cGy in 30 fractrions, from 6/10/2019 - 7/22/2019.     3 month PET scan on 10/16/2019 showed an area of Indeterminate focal FDG uptake along the right paramedian hard palate with ill-defined soft tissue density on CT, otherwise post-operative and post-XRT related changes. This was reviewed at tumor board and felt to be nonneoplastic but they did recommend direct visualization here in clinic.  On exam on 12/18/2019, both by me and Dr. Treviño, there was no visible mass or palpable mass noted.  The area was slightly tender to palpation.         HPI:  Mr. Tucker is here today for 2-month follow-up visit.  He reports that overall, things are going well.  He continues to have the pain on his roof of his mouth.  Is not any worse but also has not better.  He does not look at that area so he has not noted anything different visibly.  He does push on it and has not felt anything that is abnormal.    He continues to get some dried congestion in each side of his nose and occasional nosebleeds on the right.  He has been trying to keep the area cleaned out by using saline nasal spray every few hours.  Sometimes, when it does come out its large chunks of debris.  He is congested quite a bit as well.  He is wondering if there is any other things he can do.    In regards to eating and drinking, he continues to improve.  He still struggles with dry meats and fish.  He is doing better with dark meats.  He still has a hard time with bread.  He has no pain with swallowing.  Occasionally food gets caught but he is able to either get it down with water or cough it up.  This is happening less frequently.  He  "continues to do his swallow exercises and lymphedema exercises every day.  His lymphedema has been under really good control as of late.    The patient denies any hoarseness of voice, referred otalgia, odynophagia, dysphagia, dizziness/lightheadedness, loss of balance, facial paresthesias, tinnitus, hemoptysis, other pain, bleeding, and recurrent/new lumps or bumps. Weight is stable.          Physical Exam:  Ht 1.715 m (5' 7.5\")   Wt 111.6 kg (246 lb)   BMI 37.96 kg/m      Constitutional: The patient is unaccompanied, well-groomed, and in no acute distress.    Head: Normocephalic and atraumatic.   Eyes: Pupils were equal and reactive.  Extraocular movement intact.    Ears: Pinnae and tragus non-tender.  EACs and TMs were clear.     Nose: Sinuses were non-tender.  Anterior rhinoscopy revealed midline septum and absence of purulence or polyps.  Dry mucosa bilaterally, with crusting noted    Oral Cavity: Normal tongue, floor of mouth, buccal mucosa.   Free flap on left palate is very well appearing and healing well.  No masses visible or palpable on the right hard palate, mild tenderness to palpation along the median edge of the free flap and right palate.  No lesions or masses on inspection or palpation.    Oropharynx: Normal mucosa. No abnormal lymph tissue in the oropharynx.  Pterygoid region non-tender.   Hypopharynx/Larynx: Deferred for fiberoptic endoscopic exam  Neck: Supple with normal laryngeal and tracheal landmarks.  The parotid beds were without masses.  No palpable thyroid.  Normal range of motion  Lymphatic: There is no palpable lymphadenopathy in the neck.   Respiratory: Breathing comfortably without stridor or exertion of accessory muscles.   Skin: Normal:  warm and pink without rash  Neurologic: Alert and oriented x 3.  CN's III-XII within normal limits.  Voice normal.   Psychiatric: The patient's affect was calm, cooperative, and appropriate.          Fiberoptic Endoscopy:  Consent for fiberoptic " laryngoscopy was obtained, and we confirmed correctness of procedure and identity of patient.  Fiberoptic laryngoscopy was indicated due to SCC of the palate.  The nose was topically decongested and anesthetized.  The fiberoptic laryngoscope was passed under endoscopic vision.  The turbinates were normal.  The inferior and middle meati were clear bilaterally with dried crusted mucus but without masses, or polyps. Dried crusted mucus noted on the anterior septum as well. The nasopharynx was clear.  The Eustachian tubes were clear.  The soft palate appeared normal with good mobility.  The epiglottis was sharp and the visualized portion of the vallecula was clear.  The larynx was clear with mobile cords.  The arytenoids were clear and there was no pooling in the hypopharynx.  No concerning lesions, masses, or signs of recurrence.    See Imagestream recording in the [facility] Orders section        IMPRESSION AND PLAN:    1. Squamous cell carcinoma of palate (H)  Doing well.  No concerning history or exam findings.  Patient continues to have chronic pain on the median aspect of the free flap and the right palate.  There is no specific palpable mass in either area.  Endoscopy unremarkable other than the dried mucus in the anterior nares bilaterally.    Patient will be due for his next set of scans in April 2020.  Because his PET scan did have some areas of mild avidity along the palate, at the surgical site, I will repeat a PET scan with a neck CT for evaluation.  Patient will follow-up with Dr. Atwood in my absence and Dr. Treviño's absence.    2. Oropharyngeal dysphagia.  Improving.  Patient continues to do exercises and was seen today by Josephine Kaur MA CCC-SLP. Refer to her note for further recommendations and plan of care.    3. Nasal congestion.  Secondary to dry mucosa from XRT and the air.  Recommended twice daily saline rinses to nose, Ashly saline gel, and humidifier at night.        Yesy Disla,  SATHISH  Otolaryngology  Head & Neck Surgery  [phone]       CC:  Jett Treviño MD  Otolaryngology/Head & Neck Surgery       Sumit Atwood MD  Otolaryngology/Head & Neck Surgery       Rich Medellin MD  ENT Specialty[address]  [address]     Emerson Verdin MD   Radiation Oncology, [facility]

## 2020-02-18 NOTE — PATIENT INSTRUCTIONS
Devonte Tucker,    It was a pleasure to see you today.    1. You were seen in the ENT Clinic today by Yesy Disla PA-C.  If you have any questions or concerns after your appointment, please call   - Option 1: ENT Clinic: [phone]  - Option 2: [facility] (Dr. Treviño's Nurse): [phone]    2. Please return to see one of our Head and Neck Surgeons in in 2 months.  PET scan prior.    3. Twice daily saline rinses of the nose.    Use the Ayr saline gel as well.      Thank you,  Yesy Disla PA-C  Otolaryngology  Head & Neck Surgery  [phone]      Cleaning of the Nasal or Sinus Cavity    Please follow all three steps.  Nasal irrigation (cleaning) should be done 3-4 times/day.    Preparation:  1.  Wash your hands  2.  We suggest that you buy the NeilMed Sinus Rinse Kit  3.  Use distilled water or tap water that has been boiled and brought to room temperature.  4.  Fill the irrigation bottle with room temperature water (distilled or boiled tap water) and add mixture (pre made packet or homemade recipe).        If you wish to make a homemade recipe:        Mix 1/4 teaspoon salt with 1/4 teaspoon baking soda in each bottle.    Cleansing Irrigation/Sinus Rinse:    1.  Lean forward over a sink and insert the rinse bottle applicator into the right side of your nose.    2.  Tilt head down and to the left side.  With your mouth open (breathing through your mouth), gently direct the water around the inside of your nose until clear fluid starts to drain from the opposite nostril.  This is called flushing or irrigation.    3.  When you have used 1/4 to 1/2 or the solution, switch to the left nostril.    4.  To irrigate the left nostril, tilt your head down and to the right side.  With your mouth open (breathing through your mouth),  gently direct the water around the inside of your nose until clear fluid starts to drain from the opposite nostril.     Cleaning the Equipment:  1.  Throw away any leftover solution  2.   Clean the rinse bottle and cap with clear water. Air dry.

## 2020-02-18 NOTE — LETTER
RE: Devonte Tucker  [address]  [facility] [address]     Dear Colleague,    Thank you for referring your patient, Devonte Tucker, to the [facility] EAR NOSE AND THROAT at [facility]. Please see a copy of my visit note below.  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 He is s/p a left palatectomy by Dr. Treviño on 5/9/2019 and a left radial forearm free flap.  XRT, 6000 cGy in 30 fractrions, from 6/10/2019 - 7/22/2019.     3 month PET scan on 10/16/2019 showed an area of Indeterminate focal FDG uptake along the right paramedian hard palate with ill-defined soft tissue density on CT, otherwise post-operative and post-XRT related changes. This was reviewed at tumor board and felt to be nonneoplastic but they did recommend direct visualization here in clinic.  On exam on 12/18/2019, both by me and Dr. Treviño, there was no visible mass or palpable mass noted.  The area was slightly tender to palpation.       HPI:  Mr. Tucker is here today for 2-month follow-up visit.  He reports that overall, things are going well.  He continues to have the pain on his roof of his mouth.  Is not any worse but also has not better.  He does not look at that area so he has not noted anything different visibly.  He does push on it and has not felt anything that is abnormal.    He continues to get some dried congestion in each side of his nose and occasional nosebleeds on the right.  He has been trying to keep the area cleaned out by using saline nasal spray every few hours.  Sometimes, when it does come out its large chunks of debris.  He is congested quite a bit as well.  He is wondering if there is any other things he can do.    In regards to eating and drinking, he continues to improve.  He  "still struggles with dry meats and fish.  He is doing better with dark meats.  He still has a hard time with bread.  He has no pain with swallowing.  Occasionally food gets caught but he is able to either get it down with water or cough it up.  This is happening less frequently.  He continues to do his swallow exercises and lymphedema exercises every day.  His lymphedema has been under really good control as of late.    The patient denies any hoarseness of voice, referred otalgia, odynophagia, dysphagia, dizziness/lightheadedness, loss of balance, facial paresthesias, tinnitus, hemoptysis, other pain, bleeding, and recurrent/new lumps or bumps. Weight is stable.      Physical Exam:  Ht 1.715 m (5' 7.5\")   Wt 111.6 kg (246 lb)   BMI 37.96 kg/m       Constitutional: The patient is unaccompanied, well-groomed, and in no acute distress.    Head: Normocephalic and atraumatic.   Eyes: Pupils were equal and reactive.  Extraocular movement intact.    Ears: Pinnae and tragus non-tender.  EACs and TMs were clear.     Nose: Sinuses were non-tender.  Anterior rhinoscopy revealed midline septum and absence of purulence or polyps.  Dry mucosa bilaterally, with crusting noted    Oral Cavity: Normal tongue, floor of mouth, buccal mucosa.   Free flap on left palate is very well appearing and healing well.  No masses visible or palpable on the right hard palate, mild tenderness to palpation along the median edge of the free flap and right palate.  No lesions or masses on inspection or palpation.    Oropharynx: Normal mucosa. No abnormal lymph tissue in the oropharynx.  Pterygoid region non-tender.   Hypopharynx/Larynx: Deferred for fiberoptic endoscopic exam  Neck: Supple with normal laryngeal and tracheal landmarks.  The parotid beds were without masses.  No palpable thyroid.  Normal range of motion  Lymphatic: There is no palpable lymphadenopathy in the neck.   Respiratory: Breathing comfortably without stridor or exertion of " accessory muscles.   Skin: Normal:  warm and pink without rash  Neurologic: Alert and oriented x 3.  CN's III-XII within normal limits.  Voice normal.   Psychiatric: The patient's affect was calm, cooperative, and appropriate.        Fiberoptic Endoscopy:  Consent for fiberoptic laryngoscopy was obtained, and we confirmed correctness of procedure and identity of patient.  Fiberoptic laryngoscopy was indicated due to SCC of the palate.  The nose was topically decongested and anesthetized.  The fiberoptic laryngoscope was passed under endoscopic vision.  The turbinates were normal.  The inferior and middle meati were clear bilaterally with dried crusted mucus but without masses, or polyps. Dried crusted mucus noted on the anterior septum as well. The nasopharynx was clear.  The Eustachian tubes were clear.  The soft palate appeared normal with good mobility.  The epiglottis was sharp and the visualized portion of the vallecula was clear.  The larynx was clear with mobile cords.  The arytenoids were clear and there was no pooling in the hypopharynx.  No concerning lesions, masses, or signs of recurrence.    See Imagestream recording in the [facility] Orders section        IMPRESSION AND PLAN:    1. Squamous cell carcinoma of palate (H)  Doing well.  No concerning history or exam findings.  Patient continues to have chronic pain on the median aspect of the free flap and the right palate.  There is no specific palpable mass in either area.  Endoscopy unremarkable other than the dried mucus in the anterior nares bilaterally.    Patient will be due for his next set of scans in April 2020.  Because his PET scan did have some areas of mild avidity along the palate, at the surgical site, I will repeat a PET scan with a neck CT for evaluation.  Patient will follow-up with Dr. Atwood in my absence and Dr. Treviño's absence.    2. Oropharyngeal dysphagia.  Improving.  Patient continues to do exercises and was seen today by Josephine  Natasha, MA CCC-SLP. Refer to her note for further recommendations and plan of care.    3. Nasal congestion.  Secondary to dry mucosa from XRT and the air.  Recommended twice daily saline rinses to nose, Ashly saline gel, and humidifier at night.      Yesy Disla PA-C  Otolaryngology  Head & Neck Surgery  [phone]     CC:  Jett Treviño MD  Otolaryngology/Head & Neck Surgery  [facility] [address]     Sumit Atwood MD  Otolaryngology/Head & Neck Surgery  [facility] [address]     Rich Medellin MD  ENT Specialty[address]  [address]     Emerson Verdin MD   Radiation Oncology, [facility]

## 2020-02-22 ASSESSMENT — ENCOUNTER SYMPTOMS
MUSCLE WEAKNESS: 0
BACK PAIN: 1
SMELL DISTURBANCE: 0
BLOOD IN STOOL: 0
DEPRESSION: 0
WHEEZING: 0
VOMITING: 0
HOARSE VOICE: 1
INSOMNIA: 1
NECK MASS: 0
FLANK PAIN: 0
MUSCLE CRAMPS: 0
NECK PAIN: 1
HEARTBURN: 0
SPEECH CHANGE: 0
BOWEL INCONTINENCE: 0
NERVOUS/ANXIOUS: 1
INCREASED ENERGY: 1
ALTERED TEMPERATURE REGULATION: 1
STIFFNESS: 0
MEMORY LOSS: 0
SEIZURES: 0
SINUS CONGESTION: 1
BRUISES/BLEEDS EASILY: 1
WEAKNESS: 0
DISTURBANCES IN COORDINATION: 0
JAUNDICE: 0
WEIGHT GAIN: 0
POLYDIPSIA: 0
ABDOMINAL PAIN: 0
LOSS OF CONSCIOUSNESS: 0
MYALGIAS: 1
CHILLS: 1
SWOLLEN GLANDS: 0
WEIGHT LOSS: 0
SNORES LOUDLY: 1
SORE THROAT: 0
BLOATING: 1
SINUS PAIN: 0
PANIC: 0
POSTURAL DYSPNEA: 1
DIFFICULTY URINATING: 1
NAUSEA: 1
COUGH: 0
TINGLING: 1
FATIGUE: 1
SPUTUM PRODUCTION: 0
TREMORS: 0
PARALYSIS: 0
TASTE DISTURBANCE: 1
HEMATURIA: 0
POLYPHAGIA: 0
SHORTNESS OF BREATH: 0
ARTHRALGIAS: 0
DIARRHEA: 0
DYSURIA: 0
NIGHT SWEATS: 0
DIZZINESS: 1
HALLUCINATIONS: 0
RECTAL PAIN: 0
HEADACHES: 1
NUMBNESS: 1
DYSPNEA ON EXERTION: 0
FEVER: 0
JOINT SWELLING: 0
DECREASED APPETITE: 0
DECREASED CONCENTRATION: 1
CONSTIPATION: 1
HEMOPTYSIS: 0
COUGH DISTURBING SLEEP: 1
TROUBLE SWALLOWING: 1

## 2020-02-25 ENCOUNTER — OFFICE VISIT (OUTPATIENT)
Dept: INTERNAL MEDICINE | Facility: CLINIC | Age: 55
End: 2020-02-25
Attending: PHYSICIAN ASSISTANT

## 2020-02-25 ENCOUNTER — TELEPHONE (OUTPATIENT)
Dept: GASTROENTEROLOGY | Facility: CLINIC | Age: 55
End: 2020-02-25

## 2020-02-25 VITALS
OXYGEN SATURATION: 98 % | DIASTOLIC BLOOD PRESSURE: 84 MMHG | HEART RATE: 78 BPM | BODY MASS INDEX: 38.55 KG/M2 | SYSTOLIC BLOOD PRESSURE: 128 MMHG | WEIGHT: 249.8 LBS

## 2020-02-25 DIAGNOSIS — C31.0 MAXILLARY SINUS CANCER (H): ICD-10-CM

## 2020-02-25 DIAGNOSIS — Z00.00 PREVENTATIVE HEALTH CARE: ICD-10-CM

## 2020-02-25 DIAGNOSIS — Z76.89 ENCOUNTER TO ESTABLISH CARE WITH NEW DOCTOR: Primary | ICD-10-CM

## 2020-02-25 DIAGNOSIS — R63.0 ANOREXIA: ICD-10-CM

## 2020-02-25 DIAGNOSIS — Z11.3 SCREEN FOR STD (SEXUALLY TRANSMITTED DISEASE): ICD-10-CM

## 2020-02-25 DIAGNOSIS — R53.83 OTHER FATIGUE: ICD-10-CM

## 2020-02-25 ASSESSMENT — ENCOUNTER SYMPTOMS
LOSS OF CONSCIOUSNESS: 0
NIGHT SWEATS: 0
BLOATING: 1
ABDOMINAL PAIN: 0
SEIZURES: 0
WEIGHT LOSS: 0
SLEEP DISTURBANCES DUE TO BREATHING: 0
LEG SWELLING: 0
BACK PAIN: 1
JOINT SWELLING: 0
BLOOD IN STOOL: 0
DECREASED APPETITE: 0
NAUSEA: 1
ARTHRALGIAS: 0
BOWEL INCONTINENCE: 0
DIFFICULTY URINATING: 1
ORTHOPNEA: 0
NAIL CHANGES: 0
DEPRESSION: 0
COUGH: 0
HOARSE VOICE: 1
FATIGUE: 1
PALPITATIONS: 0
HEMATURIA: 0
EYE IRRITATION: 0
CHILLS: 1
DIARRHEA: 0
ALTERED TEMPERATURE REGULATION: 1
INSOMNIA: 1
WEIGHT GAIN: 0
HALLUCINATIONS: 0
SNORES LOUDLY: 1
MUSCLE CRAMPS: 0
VOMITING: 0
DECREASED CONCENTRATION: 1
SINUS CONGESTION: 1
SPUTUM PRODUCTION: 0
STIFFNESS: 0
FLANK PAIN: 0
RECTAL PAIN: 0
HEARTBURN: 0
SMELL DISTURBANCE: 0
MUSCLE WEAKNESS: 0
DISTURBANCES IN COORDINATION: 0
TROUBLE SWALLOWING: 1
CONSTIPATION: 1
SHORTNESS OF BREATH: 0
PARALYSIS: 0
TINGLING: 1
DIZZINESS: 1
HEMOPTYSIS: 0
EYE PAIN: 0
SWOLLEN GLANDS: 0
DYSURIA: 0
LIGHT-HEADEDNESS: 0
NERVOUS/ANXIOUS: 1
DYSPNEA ON EXERTION: 0
HYPOTENSION: 0
DOUBLE VISION: 0
SYNCOPE: 0
NECK MASS: 0
SKIN CHANGES: 0
JAUNDICE: 0
SPEECH CHANGE: 0
INCREASED ENERGY: 1
POLYDIPSIA: 0
SORE THROAT: 0
POLYPHAGIA: 0
PANIC: 0
TASTE DISTURBANCE: 1
EYE WATERING: 0
POOR WOUND HEALING: 0
MYALGIAS: 1
COUGH DISTURBING SLEEP: 1
BRUISES/BLEEDS EASILY: 1
NUMBNESS: 1
TREMORS: 0
WEAKNESS: 0
SINUS PAIN: 0
EXERCISE INTOLERANCE: 0
TACHYCARDIA: 0
MEMORY LOSS: 0
LEG PAIN: 0
NECK PAIN: 1
FEVER: 0
EYE REDNESS: 0
HYPERTENSION: 0
WHEEZING: 0
HEADACHES: 1
CLAUDICATION: 0
POSTURAL DYSPNEA: 1

## 2020-02-25 ASSESSMENT — PAIN SCALES - GENERAL: PAINLEVEL: NO PAIN (0)

## 2020-02-25 NOTE — PATIENT INSTRUCTIONS
[facility] Medication Refill Request Information:  * Please contact your pharmacy regarding ANY request for medication refills.  ** [facility] Prescription Fax = [phone]  * Please allow 3 business days for routine medication refills.  * Please allow 5 business days for controlled substance medication refills.     [facility] Test Result notification information:  *You will be notified with in 7-10 days of your appointment day regarding the results of your test.  If you are on MyChart you will be notified as soon as the provider has reviewed the results and signed off on them.    [facility] Care Center: [phone]          Medical Center Clinic         Internal Medicine Resident                   Continuity Clinic    Who We Are    Resident Continuity Clinic is a part of the [facility] Primary Care Clinic.  Resident physicians see patients independently and establish a relationship with them over the course of their three-year residency program.  As with the Primary Care Clinic, our Resident Continuity Clinic models a group practice.  If your doctor is not available, you will be able to see another resident physician.  At the end of a resident s training, patients will be transitioned to a new resident physician for ongoing care.     We treat patients with a wide array of medical needs from routine physicals, to acute illnesses, to diabetes and blood pressure management, to complex medical illness.  What is a Resident Physician?    Resident physicians hold medical degrees and are doctors. They are training to become specialists in Internal Medicine. They work under the supervision of board-certified faculty physicians.  Expectations for Your Care    We strive to provide accessible, quality care at all times.    In order to provide this care, it is best to see your primary care resident doctor consistently rather switching between providers.  In the event you do see another physician, you should schedule  a follow-up visit with your usual primary care doctor.    If you are transitioning your care from another clinic, it is helpful to have your records available for your doctor to review.    We do not prescribe controlled substances, such as ADD medications or narcotic pain medications, on your first visit.  We will review your health records and concerns prior to devising a treatment plan with you in order to provide the best care.      Clinic Services     Extended clinic hours; patient  to help navigate your visit;  parking; laboratory and imaging services with evening and weekend hours    Multiple medical and surgical specialties in one building    Complementary services, including Nutrition, Integrative Medicine, Pharmacy consultations, Mental and Behavioral Health, Sports Medicine and Physical Therapy    Thank You    We would like to thank you for choosing the [facility] Internal Medicine Resident Continuity Clinic for your primary care. You are making a priceless contribution to the training of the next generation of health care practitioners.     Contact us at [phone] for appointments or questions.    Resident Clinic Hours are Tuesdays and Thursdays, 7:30am-5:00pm    Residents   Shai Rand MD  (Male)   Gabriel Garduno MD   (Male)   Becky Gonsalez MD  (Female)  Kimmie Knapp MD   (Female)   Teresa Thrasher MD   (Female)    Kim Patel MD    (Female)   Roque Alvarado MD  (Male)   Chava Butler MD  (Male)    Yuliana Walton MD  (Female)   Keyanna Hernandez MD  (Female)   Devan Schulte MD    (Female)   Monty Reis MD  (Male)   Sanju Rebolledo MD  (Male)   Bryant Stuart MD  (Male)   PRATIK Bar MD   (Male)   David Salazar MD  (Male)    Anuradha Blackwell MD (Female)   Deshawn Thorpe MD  (Male)   Treasure Ling MD  (Male)   Wanda Duke MD  (Male)   Sol Bui MD    (Female)   Antonio Gutierrez MD  (Female)     Supervising Physicians   MD Zurdo Dorantes,  MD Deborah Marshall, MD Goldy Caputo, MD Emiliano Andrea, MD Yohana Guo MD Charles Moldow, MD Pooja Musa MD          Your health care Provider has recommended that you receive the new Shingle vaccine called Shingrix to prevent shingles for ages 50 and above. Many private insurance and Medicare Part D plans cover Shingrix. However, not all insurance carriers cover the entire cost of the Shingrix vaccine if the vaccine is administered at your primary care clinic. The clinic cannot determine your insurance benefits.  Please call your insurance carrier prior. The vaccine comes in two doses. Your second dose should be 2-6 months from your first dose.       Prior to receiving the vaccine, we recommend that you call your insurance carrier and ask them the following questions:            1. Is there a cost difference if I receive the vaccine at my doctor's office or a pharmacy?          2. Does my insurance cover the Shingrix Vaccine and administration of the vaccine?          3. What is my co-pay or deductible for the vaccine?        Please call to schedule a Nurse-Visit only at [phone].  Nurse Visit hours are available Monday, Wednesday, and Friday from 9:00 AM-11:00 AM and 1:00 PM-3:00 PM.

## 2020-02-25 NOTE — NURSING NOTE
Chief Complaint   Patient presents with     Establish Care     Pt is here to establish care      DEBBIE Hernandez at 8:50 AM sign on 2/25/2020

## 2020-02-25 NOTE — PROGRESS NOTES
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  He recently underwent resection, radiation and flap placement over his left maxillary gingiva after being diagnosed with oral SCC.  He notes that he had no risk factors such as smoking or alcohol use.  His flap placement required graft from his right arm and a subsequent graft from his right leg to his arm.  Radiation may have had some off target effects to his vestibular system.  Given the leg graft, vestibular damage and general weakness after his surgery, he has been having difficulty with balance and walking and has required the use of a cane.      Patient reports that his recovery is progressing and his walking is improving.  However, he still suffers from significant fatigue.  He is getting poor sleep.  He has no appetite and \"eats because he has to.\"  Claims that food doesn't taste good, but is getting taste back slowly.  Recently, he has not had energy to play music or go to game nights as he normally had done in the past.  He is currently seeing a therapist and wants to continue to do so.     Regarding his T2DM, he notes that he follows with Endocrinology, who was considering cutting his metformin based on his very low A1C.  Patient lost about 70 lbs since his surgery, mostly due to his decreased appetite.  He started a [employer] weight loss program.  Notes that he is eating solid foods now (after having been on tube feeds and liquid diet).  Rides exercise bike 15 minutes per day.    He struggles with frequent constipation, but taking PRN mirilax seems to help.     Denies fevers, chills, headache, abdominal pain, dysuria.  Does report decreased appetite, weight loss, feelings of depression.      PMHx:  Past Medical History: "   Diagnosis Date     Diabetes (H)      Hypertension      Maxillary sinus cancer (H)      Obesity        PSHx:  Past Surgical History:   Procedure Laterality Date     ------------OTHER-------------      Mucosa and bone biopsy     EXPLORE NECK Left 5/9/2019    Procedure: Left Neck Exploration;  Surgeon: Jett Treviño MD;  Location: [facility] OR     EXTRACTION(S) DENTAL      x2 under general anesthesia     GRAFT FREE VASCULARIZED (LOCATION) Right 5/9/2019    Procedure: Left Radial Forearm Free Flap (soft tissue);  Surgeon: Sumit Atwood MD;  Location: [facility] OR     GRAFT SKIN SPLIT THICKNESS FROM EXTREMITY Right 5/9/2019    Procedure: Graft skin split thickness from right thigh, nasogastric feeding tube placement;  Surgeon: Sumit Atwood MD;  Location: [facility] OR     IR LYMPH NODE BIOPSY  4/4/2019     OSTEOTOMY MAXILLA Left 5/9/2019    Procedure: Left Infrastructure Maxillectomy,;  Surgeon: Jett Treviño MD;  Location: [facility] OR       FamHx:  Family History   Problem Relation Age of Onset     Cancer Mother         ovarian     Cancer Brother      Cardiovascular Brother         Stent     Kidney Cancer Sister         s/p nephrectomy        Allergies:   No Known Allergies    Meds:    Current Outpatient Medications:      acetaminophen (TYLENOL) 325 MG tablet, Take 2 tablets (650 mg) by mouth every 4 hours as needed for mild pain, Disp: 100 tablet, Rfl: 0     aspirin (ASA) 325 MG tablet, Take 1 tablet (325 mg) by mouth daily, Disp: 30 tablet, Rfl: 0     atorvastatin (LIPITOR) 20 MG tablet, 1 tablet (20 mg) by Per Feeding Tube route every morning, Disp: , Rfl:      blood glucose (NO BRAND SPECIFIED) test strip, Use to test blood sugar 4 times daily or as directed. To accompany: Blood Glucose Monitor Brands: per insurance., Disp: 100 strip, Rfl: 6     cevimeline (EVOXAC) 30 MG capsule, Take 1 capsule (30 mg) by mouth 3 times daily, Disp: 90 capsule, Rfl: 11     doxazosin (CARDURA) 1 MG tablet, Take 1 tablet (1 mg) by mouth daily, Disp:  30 tablet, Rfl: 0     gabapentin (NEURONTIN) 300 MG capsule, Take 1 capsule (300 mg) by mouth 3 times daily, Disp: 90 capsule, Rfl: 3     lisinopril (PRINIVIL/ZESTRIL) 10 MG tablet, 1 tablet (10 mg) by Per Feeding Tube route every morning, Disp: , Rfl:      metFORMIN (GLUCOPHAGE) 1000 MG tablet, Take 1 tablet (1,000 mg) by mouth 2 times daily (with meals), Disp: 90 tablet, Rfl: 4     mineral oil-hydrophilic petrolatum (AQUAPHOR) external ointment, Apply topically every 8 hours Apply to neck wound, Disp: 420 g, Rfl: 0     multivitamin w/minerals (THERA-VIT-M) tablet, Take 1 tablet by mouth daily, Disp: 30 tablet, Rfl: 0     omeprazole (PRILOSEC) 20 MG DR capsule, Take 1 capsule (20 mg) by mouth daily, Disp: 30 capsule, Rfl: 3     ondansetron (ZOFRAN) 8 MG tablet, Take 1 tablet (8 mg) by mouth every 8 hours as needed for nausea, Disp: 30 tablet, Rfl: 3     order for DME, Equipment being ordered: Facial compression garment for lymphedema, Disp: 2 each, Rfl: 1     oxyCODONE (ROXICODONE) 5 MG/5ML solution, Take 5 mLs (5 mg) by mouth 3 times daily as needed for severe pain, Disp: 500 mL, Rfl: 0     polyethylene glycol (MIRALAX/GLYCOLAX) packet, Take 17 g by mouth daily as needed for constipation, Disp: 100 packet, Rfl: 0     sodium fluoride dental gel (PREVIDENT) 1.1 % GEL topical gel, Apply to affected area At Bedtime, Disp: 112 g, Rfl: 11     thin (NO BRAND SPECIFIED) lancets, Use to test blood sugar 4 times daily or as directed. To accompany: Blood Glucose Monitor Brands: per insurance., Disp: 100 each, Rfl: 3     White Petrolatum-Mineral Oil (REFRESH P.M.) OINT, Apply to left eye prior to bedtime., Disp: 1 Tube, Rfl: 3  No current facility-administered medications for this visit.     Facility-Administered Medications Ordered in Other Visits:      barium sulfate (EZ-DISK) tablet 700 mg, 700 mg, Oral, Once, Emerson Verdin MD     barium sulfate 40% (VARIBAR NECTAR) oral suspension 25 mL, 25 mL, Oral, Once,  Michael Lucio MD    SocHx:  Social History     Socioeconomic History     Marital status: Single     Spouse name: None     Number of children: None     Years of education: None     Highest education level: Bachelor's degree (e.g., BA, AB, BS)   Occupational History     None   Social Needs     Financial resource strain: Not very hard     Food insecurity:     Worry: Never true     Inability: Never true     Transportation needs:     Medical: No     Non-medical: No   Tobacco Use     Smoking status: Never Smoker     Smokeless tobacco: Never Used   Substance and Sexual Activity     Alcohol use: No     Frequency: Never     Drug use: No     Sexual activity: None   Lifestyle     Physical activity:     Days per week: 3 days     Minutes per session: 30 min     Stress: To some extent   Relationships     Social connections:     Talks on phone: Once a week     Gets together: Once a week     Attends Restorationist service: More than 4 times per year     Active member of club or organization: No     Attends meetings of clubs or organizations: None     Relationship status:      Intimate partner violence:     Fear of current or ex partner: None     Emotionally abused: None     Physically abused: None     Forced sexual activity: None   Other Topics Concern     None   Social History Narrative     None       Problem, Medication and Allergy Lists were reviewed and are current.  Patient is a new patient to this clinic and so  I reviewed/updated the Past Medical History, the Family History and the Social History.          Review of Systems:   Review of Systems     Constitutional:  Positive for chills, fatigue, recent stressors, increased energy and hyperactivity. Negative for fever, weight loss, weight gain, decreased appetite, night sweats, height loss, post-operative complications, incisional pain, hallucinations and confused.   HENT:  Positive for nosebleeds, trouble swallowing, hoarse voice, taste disturbance, dry mouth and  sinus congestion. Negative for ear pain, hearing loss, tinnitus, mouth sores, sore throat, ear discharge, tooth pain, gum tenderness, smell disturbance, hearing aid, bleeding gums, sinus pain and neck mass.    Eyes:  Negative for double vision, pain, redness, eye pain, decreased vision, eye watering, eye bulging, eye dryness, flashing lights, spots, floaters, strabismus, tunnel vision, jaundice and eye irritation.   Respiratory:   Positive for snores loudly, cough disturbing sleep and postural dyspnea. Negative for cough, hemoptysis, sputum production, shortness of breath, wheezing, sleep disturbances due to breathing and dyspnea on exertion.    Cardiovascular:  Positive for edema. Negative for chest pain, dyspnea on exertion, palpitations, orthopnea, claudication, leg swelling, fingers/toes turn blue, hypertension, hypotension, syncope, history of heart murmur, chest pain on exertion, chest pain at rest, pacemaker, few scattered varicosities, leg pain, sleep disturbances due to breathing, tachycardia, light-headedness and exercise intolerance.   Gastrointestinal:  Positive for nausea, constipation, bloating and change in stool. Negative for heartburn, vomiting, abdominal pain, diarrhea, blood in stool, melena, rectal pain, bowel incontinence, jaundice and coffee ground emesis.   Genitourinary:  Positive for bladder incontinence, urgency, difficulty urinating and nocturia. Negative for dysuria, hematuria, flank pain, voiding less frequently, scrotal pain, ulcerations, penile discharge, male genitourinary complaint and reduced libido.   Musculoskeletal:  Positive for myalgias, back pain and neck pain. Negative for joint swelling, arthralgias, stiffness, muscle cramps, bone pain, muscle weakness and fracture.   Skin:  Negative for nail changes, itching, poor wound healing, rash, hair changes, skin changes, acne, warts, poor wound healing, scarring, flaky skin, Raynaud's phenomenon, sensitivity to sunlight and skin  thickening.   Neurological:  Positive for dizziness, tingling, numbness, headaches and difficulty walking. Negative for tremors, speech change, seizures, loss of consciousness, weakness, light-headedness, disturbances in coordination, memory loss and paralysis.   Endo/Heme:  Positive for bruises/bleeds easily. Negative for anemia and swollen glands.   Psychiatric/Behavioral:  Positive for decreased concentration and mood swings. Negative for depression, hallucinations, memory loss and panic attacks.    Endocrine:  Positive for altered temperature regulation.Negative for polyphagia, polydipsia, unwanted hair growth and change in facial hair.    I have personally reviewed and updated the complete ROS on the day of the visit.           Physical Exam:   /84 (BP Location: Right arm, Patient Position: Sitting, Cuff Size: Adult Large)   Pulse 78   Wt 113.3 kg (249 lb 12.8 oz)   SpO2 98%   BMI 38.55 kg/m    Body mass index is 38.55 kg/m .  Vitals were reviewed       GENERAL APPEARANCE: Appears stated age.  Not ill-appearing.     HENT: graft over left maxillary gingiva appears to be healing well      RESP: lungs clear to auscultation - no rales, rhonchi or wheezes     CV: regular rates and rhythm, normal S1 S2, no S3 or S4 and no murmur, click or rub     ABDOMEN:  Obese, soft, nontender, no HSM or masses and bowel sounds normal     MS: extremities normal- no gross deformities noted, no evidence of inflammation in joints, FROM in all extremities.     SKIN: Skin graft over right forearm at site of flap harvest.  Scar over right thigh from donor source of skin graft to arm, both appear well healed     NEURO: Normal strength and tone.  Gait is slow and unsteady-appearing with cane.       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     Devonte was seen today for establish care.    Diagnoses and all orders for this visit:    Fatigue  Anorexia  Recent maxillary sinus cancer (H)  Suspect fatigue is multifactorial in setting of ongoing recovery after surgery for oral SCC, decreased nutritional status with ongoing anorexia, and likely a component of depression.  Patient is seeing a therapist currently and will continue with this CBT.  Will monitor his symptoms of depression as he progresses in his recovery and his appetite and diet return to normal.  He was hypothyroid in the past so will rule that out today.    -     TSH with free T4 reflex; Future  -     NUTRITION REFERRAL    Preventative health:  Age-appropriate colon cancer screening        -     GASTROENTEROLOGY ADULT REF PROCEDURE ONLY  Screen for STD (sexually transmitted disease)  -     HIV Antigen Antibody Combo; Future  -     HCV Screen; Future  Other orders  -     ZOSTER VACCINE RECOMBINANT ADJUVANTED IM NJX      Options for treatment and follow-up care were reviewed with the patient. Devonte Tucker engaged in the decision making process and verbalized understanding of the options discussed and agreed with the final plan.    Edwin Alvarado, DO  Feb 25, 2020    Pt was seen and plan of care discussed with Dr. Snyder.     Teaching Physician Note:  I was present during the visit and the patient was seen and examined by me.   I discussed the case with the resident and agree with the note as documented by the resident with  the following exceptions:  None.    Kelsey Snyder M.D.  Internal Medicine   pager [phone]

## 2020-02-26 ENCOUNTER — TELEPHONE (OUTPATIENT)
Dept: GASTROENTEROLOGY | Facility: CLINIC | Age: 55
End: 2020-02-26

## 2020-03-03 ENCOUNTER — TELEPHONE (OUTPATIENT)
Dept: GASTROENTEROLOGY | Facility: CLINIC | Age: 55
End: 2020-03-03

## 2020-03-05 ENCOUNTER — TELEPHONE (OUTPATIENT)
Dept: OTOLARYNGOLOGY | Facility: CLINIC | Age: 55
End: 2020-03-05

## 2020-03-05 NOTE — TELEPHONE ENCOUNTER
Health Call Center    Phone Message    May a detailed message be left on voicemail: yes     Reason for Call: Other: Yaquelin Bateman, patient's , called to follow up on her request for progress notes from Yesy Disla.  Yaquelin has faxed over 2 requests but, has not received anything.  Please follow up with Yaquelin at [phone], ext. [phone].  Thank you!     Action Taken: Message routed to:  [facility] ([facility]    Travel Screening: Not Applicable

## 2020-03-06 NOTE — TELEPHONE ENCOUNTER
Contacted Yaquelin Bateman and obtained Fax Number. Progress notes from last visit with Bree Disla faxed.  Andres Purcell LPN

## 2020-03-09 ENCOUNTER — ONCOLOGY VISIT (OUTPATIENT)
Dept: ONCOLOGY | Facility: CLINIC | Age: 55
End: 2020-03-09
Attending: DIETITIAN, REGISTERED
Payer: COMMERCIAL

## 2020-03-09 DIAGNOSIS — C03.0 SQUAMOUS CELL CARCINOMA OF MAXILLARY ALVEOLAR RIDGE (H): Primary | ICD-10-CM

## 2020-03-09 PROCEDURE — 97802 MEDICAL NUTRITION INDIV IN: CPT | Mod: ZF | Performed by: DIETITIAN, REGISTERED

## 2020-03-09 NOTE — PROGRESS NOTES
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 80-100g protein/day  2. 1500-1800kcal/day (for desired/safe weight loss  3. 25-30g fiber/day     NUTRITION HISTORY  Factors affecting nutrition intake include:taste aversions and swallowing difficulties  Current diet: general, soft foods  Current appetite/intake: fair  PEG Tube: No  Chemotherapy/radiation: Completed treatment in July 2019         Devonte presents today to seek advise and help with his nutrition as he continues to strive for increased variety of foods.   His primary barriers to eating at this time are dysphagia, dysgeusia and constipation.      He continues to find foods weekly that he is able to swallow with less difficulty.  He strives for adding a lot of water between bites of foods to enable ease of swallowing.      He has been exploring spicy/seasoned foods to help with flavor and feels that this has been helpful.  He adds salsa to potatoes and spices to cooked vegetables.      He has not been taking any bowel aids to relieve constipation.  He tends to go only every 3-4 days. Today is Monday and his last BM was on Thursday.  He was advised to take Miralax but has not been taking this as he is worried about having urgency or accidents while at work.  He tells me that his bathroom is hard to get to.     His food choices include dark chicken meat, mushy rice, well-steamed vegetables, fruits with and without skins, beef and shrimp.  Up utnil recently, he was not able to eat fish.  This weekend, he prepared fish with broth and was able to eat this.            ANTHROPOMETRICS  Height: 67kg  Weight: 249 lbs/113kg  BMI: 38.9  Weight Status:  Obesity Grade II BMI 35-39.9  IBW: 150 lbs  (166%)  Weight History: down 26 lb x past 8 months (10%)  Wt Readings from Last 10 Encounters:   02/25/20 113.3 kg (249 lb 12.8 oz)   02/18/20 111.6 kg (246 lb)   01/27/20 112.3 kg (247 lb 9.6 oz)   12/18/19 117 kg (258 lb)   12/11/19 115.7 kg (255 lb)   10/30/19 118.4 kg (261 lb)   10/30/19 118.7 kg (261 lb 11.2 oz)   10/07/19 117.9 kg (260 lb)   09/23/19 112.5 kg (248 lb)   08/30/19 116.2 kg (256 lb 1.6 oz)       Dosing Weight: 75kg    Medications/vitamins/minerals/herbals:   Reviewed    Labs:   Labs reviewed    NUTRITION FOCUSED PHYSICAL ASSESSMENT FOR DIAGNOSING MALNUTRITION:  Observed:  No nutrition-related physical findings observed    ASSESSED NUTRITION NEEDS:  Estimated Energy Needs: 5552-8047 kcals (20-25 Kcal/Kg)  Justification: maintenance/desired wt loss  Estimated Protein Needs:  grams protein (1-1.2 g pro/Kg)  Justification: maintenance/increased needs with healing  Estimated Fluid Needs: 2000  mL   Justification: maintenance    MALNUTRITION:  % Weight Loss:  > 10% in 6 months (severe malnutrition)  % Intake:  Decreased intake does not meet criteria for malnutrition   Subcutaneous Fat Loss:  None observed  Muscle Loss:  None observed  Fluid Retention:  None noted    Malnutrition Diagnosis: Patient does not meet two of the above criteria necessary for diagnosing malnutrition    NUTRITION DIAGNOSIS:  Food and nutrition-related knowledge deficit related to food choices for healing and constipation as evidenced by pt with questions regarding optimal food choices.      INTERVENTIONS  Provided written & verbal education:   - Discussed protein sources.  Reviewed protein needs. Advised Devonte to aim for at least 80-100g protein/day. Suggested he focus on small, frequent meals, having protein for all meals and snacks.   - Discussed fiber sources.  Reviewed fiber needs to aid in reducing/preventing constipation.  Encouraged Devonte to aim for at least 25-30g fiber/day.  Reviewed insoluble versus soluble  fiber sources. Reviewed 'Coping with Constipation'.  Suggested Devonte try natural aid such as Smooth Move tea and/or Pear and prune juice in additioan to increased fiber intake.  Discussed that if natural fiber aids do not move his bowels, suggested start taking 1/2 cap MiraLax daily.   May need to increase to 1 cap Miralax    Provided pt with corresponding education materials/handouts on:  Six Small Meals a Day, High Calorie, High Protein Recipe booklet, Tips to Increase the Calories in Your Diet and Sources of Protein.     Pt verbalize understanding of materials provided during consult.   Patient Understanding: Excellent  Expected Compliance: Excellent     Goals  1.  Aim for 5-6 small frequent meals  2.  Aim for 1500-1800kcal and 80-100g protein/day  3. Aim for 25-30g fiber/day  4. Weight loss desired (0.5-1 lb wt loss/week okay)      Follow-Up Plans: Pt has RD contact information for questions.      Tisha Ventura RDN, LD

## 2020-03-09 NOTE — LETTER
3/9/2020       RE: Devonte Tucker  [address]  [place] [address]     Dear Colleague,    Thank you for referring your patient, Devonte Tucker, to the [facility]. Please see a copy of my visit note below.    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 80-100g protein/day  2. 1500-1800kcal/day (for desired/safe weight loss  3. 25-30g fiber/day     NUTRITION HISTORY  Factors affecting nutrition intake include:taste aversions and swallowing difficulties  Current diet: general, soft foods  Current appetite/intake: fair  PEG Tube: No  Chemotherapy/radiation: Completed treatment in July 2019         Devonte presents today to seek advise and help with his nutrition as he continues to strive for increased variety of foods.   His primary barriers to eating at this time are dysphagia, dysgeusia and constipation.      He continues to find foods weekly that he is able to swallow with less difficulty.  He strives for adding a lot of water between bites of foods to enable ease of swallowing.      He has been exploring spicy/seasoned foods to help with flavor and feels that this has been helpful.  He adds salsa to potatoes and spices to cooked vegetables.      He has not been taking any bowel aids to relieve constipation.  He tends to go only every 3-4 days. Today is Monday and his last BM was on Thursday.  He was advised to take Miralax but has not been taking this as he is worried about having urgency or accidents while at work.  He tells me that his bathroom is hard to get to.     His food choices include dark chicken meat, mushy rice, well-steamed vegetables, fruits with and without skins, beef and shrimp.  Up  utnil recently, he was not able to eat fish.  This weekend, he prepared fish with broth and was able to eat this.            ANTHROPOMETRICS  Height: 67kg  Weight: 249 lbs/113kg  BMI: 38.9  Weight Status:  Obesity Grade II BMI 35-39.9  IBW: 150 lbs (166%)  Weight History: down 26 lb x past 8 months (10%)  Wt Readings from Last 10 Encounters:   02/25/20 113.3 kg (249 lb 12.8 oz)   02/18/20 111.6 kg (246 lb)   01/27/20 112.3 kg (247 lb 9.6 oz)   12/18/19 117 kg (258 lb)   12/11/19 115.7 kg (255 lb)   10/30/19 118.4 kg (261 lb)   10/30/19 118.7 kg (261 lb 11.2 oz)   10/07/19 117.9 kg (260 lb)   09/23/19 112.5 kg (248 lb)   08/30/19 116.2 kg (256 lb 1.6 oz)       Dosing Weight: 75kg    Medications/vitamins/minerals/herbals:   Reviewed    Labs:   Labs reviewed    NUTRITION FOCUSED PHYSICAL ASSESSMENT FOR DIAGNOSING MALNUTRITION:  Observed:  No nutrition-related physical findings observed    ASSESSED NUTRITION NEEDS:  Estimated Energy Needs: 0961-0165 kcals (20-25 Kcal/Kg)  Justification: maintenance/desired wt loss  Estimated Protein Needs:  grams protein (1-1.2 g pro/Kg)  Justification: maintenance/increased needs with healing  Estimated Fluid Needs: 2000  mL   Justification: maintenance    MALNUTRITION:  % Weight Loss:  > 10% in 6 months (severe malnutrition)  % Intake:  Decreased intake does not meet criteria for malnutrition   Subcutaneous Fat Loss:  None observed  Muscle Loss:  None observed  Fluid Retention:  None noted    Malnutrition Diagnosis: Patient does not meet two of the above criteria necessary for diagnosing malnutrition    NUTRITION DIAGNOSIS:  Food and nutrition-related knowledge deficit related to food choices for healing and constipation as evidenced by pt with questions regarding optimal food choices.      INTERVENTIONS  Provided written & verbal education:   - Discussed protein sources.  Reviewed protein needs. Advised Devonte to aim for at least 80-100g protein/day. Suggested he focus on  small, frequent meals, having protein for all meals and snacks.   - Discussed fiber sources.  Reviewed fiber needs to aid in reducing/preventing constipation.  Encouraged Devonte to aim for at least 25-30g fiber/day.  Reviewed insoluble versus soluble fiber sources. Reviewed 'Coping with Constipation'.  Suggested Devonte try natural aid such as Smooth Move tea and/or Pear and prune juice in additioan to increased fiber intake.  Discussed that if natural fiber aids do not move his bowels, suggested start taking 1/2 cap MiraLax daily.   May need to increase to 1 cap Miralax    Provided pt with corresponding education materials/handouts on:  Six Small Meals a Day, High Calorie, High Protein Recipe booklet, Tips to Increase the Calories in Your Diet and Sources of Protein.     Pt verbalize understanding of materials provided during consult.   Patient Understanding: Excellent  Expected Compliance: Excellent     Goals  1.  Aim for 5-6 small frequent meals  2.  Aim for 1500-1800kcal and 80-100g protein/day  3. Aim for 25-30g fiber/day  4. Weight loss desired (0.5-1 lb wt loss/week okay)      Follow-Up Plans: Pt has RD contact information for questions.      Tisha Ventura RDN, LD

## 2020-03-11 ENCOUNTER — HEALTH MAINTENANCE LETTER (OUTPATIENT)
Age: 55
End: 2020-03-11

## 2020-03-12 ENCOUNTER — OFFICE VISIT (OUTPATIENT)
Dept: RADIATION ONCOLOGY | Facility: CLINIC | Age: 55
End: 2020-03-12
Attending: RADIOLOGY
Payer: COMMERCIAL

## 2020-03-12 VITALS — DIASTOLIC BLOOD PRESSURE: 62 MMHG | WEIGHT: 255 LBS | SYSTOLIC BLOOD PRESSURE: 109 MMHG | BODY MASS INDEX: 39.35 KG/M2

## 2020-03-12 DIAGNOSIS — C03.9 PRIMARY CANCER OF ALVEOLAR RIDGE MUCOSA (H): Primary | ICD-10-CM

## 2020-03-12 PROCEDURE — G0463 HOSPITAL OUTPT CLINIC VISIT: HCPCS | Performed by: NURSE PRACTITIONER

## 2020-03-12 NOTE — PROGRESS NOTES
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 6000 cGy completed 19     /62   Wt 115.7 kg (255 lb)   BMI 39.35 kg/m         Pain  Denies    Labs  Other Labs: No    Imaging  PET:       Dental:   Most Recent Dental Visit: Yes  Trays: Frequency yes    Speech/Swallowing:   Most Recent evaluation or testing: Yes  Swallowing Restrictions: No difficulties with swallowing    Trismus/Jaw Exercises: Yes    Nutrition:    Weight:   Wt Readings from Last 3 Encounters:   20 115.7 kg (255 lb)   20 113.3 kg (249 lb 12.8 oz)   20 111.6 kg (246 lb)         Oral Symptoms:   Xerostomia:1- Symptomatic without significant dietary alteration; unstimulated saliva flow >0.2 ml/min  Dysphagia: 0-None  Mucositis Oral Symptoms: 0-None  Mucositis: 0- None  Esophagitis:0- None    Other Appointments:     MD Name: Dr Atwood Appointment Date: 20   MD Name: Appointment Date:   MD Name: Appointment Date:   Other Appointment Notes:     Residual Radiation side effect: Dry nose and mouth, taste changes    Additional Instructions:     Nurse face-to-face time: Level 3:  10 min face to face time

## 2020-03-12 NOTE — LETTER
3/12/2020       RE: Devonte Tucker  [address]  [place] [address]     Dear Colleague,    Thank you for referring your patient, Devonte Tucker, to the RADIATION ONCOLOGY CLINIC. Please see a copy of my visit note below.    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 6000 cGy completed 19     /62   Wt 115.7 kg (255 lb)   BMI 39.35 kg/m         Pain  Denies    Labs  Other Labs: No    Imaging  PET:       Dental:   Most Recent Dental Visit: Yes  Trays: Frequency yes    Speech/Swallowing:   Most Recent evaluation or testing: Yes  Swallowing Restrictions: No difficulties with swallowing    Trismus/Jaw Exercises: Yes    Nutrition:    Weight:   Wt Readings from Last 3 Encounters:   20 115.7 kg (255 lb)   20 113.3 kg (249 lb 12.8 oz)   20 111.6 kg (246 lb)         Oral Symptoms:   Xerostomia:1- Symptomatic without significant dietary alteration; unstimulated saliva flow >0.2 ml/min  Dysphagia: 0-None  Mucositis Oral Symptoms: 0-None  Mucositis: 0- None  Esophagitis:0- None    Other Appointments:     MD Name: Dr Atwood Appointment Date: 20   MD Name: Appointment Date:   MD Name: Appointment Date:   Other Appointment Notes:     Residual Radiation side effect: Dry nose and mouth, taste changes    Additional Instructions:     Nurse face-to-face time: Level 3:  10 min face to face time             Department of Radiation Oncology  [facility]  [address] [place] [address] [phone]       Radiation Oncology Follow-up Visit  2020      Devonte Tucker  MRN: [id number]   : 1965     DISEASE TREATED:   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  He underwent a left infrastructure maxillectomy and left level I lymph node dissection on 5/9/2018 with pathology revealing a 1.3 cm well-differentiated squamous cell carcinoma with 13 mm depth of invasion and involvement of the underlying bone.  He received adjuvant radiotherapy alone as described above for improved local disease control.      Mr. Tucker returns to radiation oncology clinic today for routine post-treatment disease surveillance visit. On examination, he reports that he is doing very well and he has no pressing concerns or complaints outside of persistent treatment-induced xerostomia. He continues to eat a normal diet with the exception of very dry foods and is taking the Evoxac with a modest improvement in his salivary symptoms. He does have a cracked tooth they are watching.  He is using fluoride trays and doing jaw and lymphedema exercises.  He still has fatigue, but has started exercising in hope that helps.He otherwise denies any pain, odynophagia, dysphagia, headaches, visual changes, fever/chills, dyspnea, chest pain or abdominal pain with     PHYSICAL EXAM:  /62   Wt 115.7 kg (255 lb)   BMI 39.35 kg/m        General: Alert, oriented in no acute distress  HEENT: NC/AT. EOMI. No rhinorrhea or epistaxis. Mildly dry mucous membranes. Healthy-appearing intraoral flap with no palpable nodularity or induration. There is no tenderness to palpation within the hard palate just  medial to the edge of the free flap. Intact dentition. Floor of mouth, oral tongue and bilateral tongue base are soft to palpation with no masses.  Neck: Full range of motion. No palpable cervical adenopathy. No submental lymphedema.  Skin: Very slight hyperpigmentation within the left lateral neck. No desquamation or ulceration.    LABS AND IMAGING:  10/16/2019 TSH: 2.22    IMPRESSION:   Mr. 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 He is 8 months out from the completion of adjuvant radiotherapy and remains clinically CÉSAR.    PLAN:   1. Follow-up in radiation oncology clinic in 3 months with MD   2. Follow-up in ENT clinic as scheduled for ongoing disease surveillance  3. Will check TSH on Monday.  4. Continue all exercises and fluoride.    Concepcion Gutierrez NP  Department of Radiation Oncology  [facility]      Again, thank you for allowing me to participate in the care of your patient.      Sincerely,    DAT Arce CNP

## 2020-03-12 NOTE — PROGRESS NOTES
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  He underwent a left infrastructure maxillectomy and left level I lymph node dissection on 2018 with pathology revealing a 1.3 cm well-differentiated squamous cell carcinoma with 13 mm depth of invasion and involvement of the underlying bone.  He received adjuvant radiotherapy alone as described above for improved local disease control.      Mr. Tucker returns to radiation oncology clinic today for routine post-treatment disease surveillance visit. On examination, he reports that he is doing very well and he has no pressing concerns or complaints outside of persistent treatment-induced xerostomia. He continues to eat a normal diet with the exception of very dry foods and is taking the Evoxac with a modest improvement in his salivary symptoms. He does have a cracked tooth they are watching.  He is using fluoride trays and doing jaw and lymphedema exercises.  He still has fatigue, but has started exercising in hope that helps.He otherwise denies any pain, odynophagia, dysphagia, headaches, visual changes, fever/chills, dyspnea, chest pain or abdominal pain with     PHYSICAL EXAM:  /62   Wt  115.7 kg (255 lb)   BMI 39.35 kg/m        General: Alert, oriented in no acute distress  HEENT: NC/AT. EOMI. No rhinorrhea or epistaxis. Mildly dry mucous membranes. Healthy-appearing intraoral flap with no palpable nodularity or induration. There is no tenderness to palpation within the hard palate just medial to the edge of the free flap. Intact dentition. Floor of mouth, oral tongue and bilateral tongue base are soft to palpation with no masses.  Neck: Full range of motion. No palpable cervical adenopathy. No submental lymphedema.  Skin: Very slight hyperpigmentation within the left lateral neck. No desquamation or ulceration.    LABS AND IMAGING:  10/16/2019 TSH: 2.22    IMPRESSION:   Mr. 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 He is 8 months out from the completion of adjuvant radiotherapy and remains clinically CÉSAR.    PLAN:   1. Follow-up in radiation oncology clinic in 3 months with MD   2. Follow-up in ENT clinic as scheduled for ongoing disease surveillance  3. Will check TSH on Monday.  4. Continue all exercises and fluoride.    Concepcion Gutierrez NP  Department of Radiation Oncology  [facility]

## 2020-03-21 NOTE — PROGRESS NOTES
"[facility]: Integrated Behavioral Health  January 29, 2020      Behavioral Health Clinician Progress Note    Patient Name: Devonte Tucker           Service Type:  Individual      Service Location:   Face to Face in Clinic     Session Start Time: 11:00am  Session End Time: 12:00pm      Session Length: 53 - 60      Attendees: Client    Visit Activities (Refresh list every visit): [facility] Only    Diagnostic Assessment Date: n/a  Treatment Plan Review Date: n/a  See Flowsheets for today's PHQ-9 and GILDA-7 results  Previous PHQ-9:   PHQ-9 SCORE 10/7/2019 12/18/2019 1/15/2020   PHQ-9 Total Score MyChart - - 15 (Moderately severe depression)   PHQ-9 Total Score 0 10 15     Previous GILDA-7:   GILDA-7 SCORE 1/14/2020   Total Score 15 (severe anxiety)   Total Score 15     Answers for HPI/ROS submitted by the patient on 1/15/2020   GILDA 7 TOTAL SCORE: 15  If you checked off any problems, how difficult have these problems made it for you to do your work, take care of things at home, or get along with other people?: Somewhat difficult  PHQ9 TOTAL SCORE: 15    ELEANOR LEVEL:  No flowsheet data found.    DATA  Extended Session (60+ minutes): No  Interactive Complexity: No  Crisis: No  [facility] Patient: No    Treatment Objective(s) Addressed in This Session:  Target Behavior(s): disease management/lifestyle changes      Depressed Mood: Increase interest, engagement, and pleasure in doing things    Current Stressors / Issues:  [facility] met with patient to provide psychotherapy and support regarding depression, anxiety, life stresses, and relationships.     Patient arrived on time for appointment.  He reported he is feeling \"okay\".  [facility] Intern continued to build rapport with patient and continued discussion of current concerns. Patient reported that he continues to go to work, but otherwise feels like the has \"a boring routine\".  Processed thoughts and feelings around this statement.  Patient stated that he has not " "engaged much with his friends since his surgery.  He is embarrassed about the state of his house, as he has not be able to meet the same cleaning expectations he had prior to his cancer.  He reported prior to cancer, he was much more social and engaged in game nights, social actives, and retreats.  Processed what has changed since his medical diagnosis and treatment.  He identified that he is self-conscious of how he looks due to the reconstructive surgery and use of a cane.  He also stated he is \"afraid gonna be told no\" if he invites a friend to do something.  Processed thoughts and feelings and challenged distored thoughts to create new realistic thoughts.      [facility] and patient discussed solutions for patient to engage in behavior activation, to include increasing social interactions.  Patient discussed fears of social awkwardness and not wanting friends to come to his home.  Processed how patient can set simple parameters around social activity.  Patient identified meeting a friend for lunch in a public place, as this would place a time limit on the visit and it would take place in an environment that he is comfortable with.  [facility] affirmed his strengths and actions on his own behalf.           Patient expressed fear around \"Who am I going to be?\".  [facility] encouraged patient to process this statement further.  Patient stated he is unsure who his is since his diagnosis and treatment of cancer.  He spoke about who he was and now he is unclear about who he is.  Processed the normalcy of questioning one's identity and having these questions, feelings, and thoughts after a major life change/event.  [facility] posed that many people see identity as an evolutionary process as one walks through life.  Patient gave permission for [facility] to pose a question; [facility] inquired if patient's identity has changed throughout his life time and how did he come about developing his identity along the way.  Patient responded positively to the question " and processed this thoughts.  [facility] provided a visual analogy-identity could be thought of as a puzzle and your are completing the puzzle by choosing pieces that fit along the way.  Patient responded that visuals are helpful to him and this resonated for him.      I affirmed the steps this patient has taken to address physical and behavioral health issues, and offered continued behavioral health services or referral, now or in the future, as needed by the patient.    Progress on Treatment Objective(s) / Homework:  Satisfactory Progress-patient was able to engage, reflect, and process thoughts and emotions.     Homework: Patient will further consider engaging in a social activity outside of work and will reflect upon his identity moving forward.  Patient and [facility] will discuss at next session.    Interventions drawn from:     Psycho-eduction    Provide information regarding mental health diagnosis and treatment options    Motivational Interviewing    Utilize Open-ended questions, empathy, reflective listening: simple and complex, change talk, and reframe    Request permission to raise concern or advise    Support autonomy, collaboration, evocation    Explore change talk and Reframe    Skills Training    Explore skills useful to patient in current situation.    Skills include assertiveness, communication, conflict management, problem-solving, relaxation, etc     Solution-Focused Therapy    Explore patterns in patient's relationships and discuss options for new behaviors.    Explore patterns in patient's actions and choices and discuss options for new behaviors.     Strengths-Based Therapy    Explore strengths of the patient and discuss how they have utilized strengths in previous situations/experiences.     Discuss these strengths and draw from previous experiences to make positive change.     Cognitive-behavioral Therapy    Discuss common cognitive distortions, identify them in patient's life.    Explore ways to  challenge, replace, and act against these cognitions.     Acceptance and Commitment Therapy    Explore and identify important values in patient's life.    Discuss ways to commit to behavioral activation around these values.     Psychodynamic psychotherapy    Discuss patient's emotional dynamics and issues and how they impact behaviors.    Explore patient's history of relationships and how they impact present behaviors.    Explore how to work with and make changes in these schemas and patterns.     Narrative Therapy    Explore the patient's story of his/her life from his/her perspective.    Explore alternate ways of understanding their experience, identifying exceptions, developing new themes.     Interpersonal Psychotherapy    Explore patterns in relationships that are effective or ineffective at helping patient reach their goals, find satisfying experience.    Discuss new patterns or behaviors to engage in for improved social functioning.     Behavioral Activation    Discuss steps patient can take to become more involved in meaningful activity.    Identify barriers to these activities and explore possible solutions.     Mindfulness-Based Strategies    Discuss skills based on development and application of mindfulness.    Skills drawn from compassion-focused therapy, dialectical behavior therapy, mindfulness-based stress reduction, mindfulness-based cognitive therapy, etc.      Care Plan review completed: n/a    Medication Review:  No changes to current psychiatric medication(s)    Medication Compliance:  Yes    Changes in Health Issues:   None reported    Chemical Use Review:   Substance Use: will follow-up at next session     Tobacco Use: will follow-up at next session    Assessment: Current Emotional / Mental Status (status of significant symptoms):  Risk status (Self / Other harm or suicidal ideation)  Patient denies a history of suicidal ideation, suicide attempts, self-injurious behavior, homicidal ideation,  homicidal behavior and and other safety concerns  Patient denies current fears or concerns for personal safety.  Patient denies current or recent suicidal ideation or behaviors.  Patient denies current or recent homicidal ideation or behaviors.  Patient denies current or recent self injurious behavior or ideation.  Patient denies other safety concerns.  A safety and risk management plan has not been developed at this time, however patient was encouraged to call [facility] should there be a change in any of these risk factors.    Appearance:   Appropriate   Eye Contact:   Good   Psychomotor Behavior: Normal   Attitude:   Cooperative   Orientation:   All  Speech   Rate / Production: Normal    Volume:  Normal   Mood:    Anxious  Depressed  Normal  Affect:    Appropriate   Thought Content:  Clear   Thought Form:  Coherent  Logical   Insight:    Good     Diagnoses:  1. Depression, unspecified depression type    2. R/O Adjustment DO  3. R/O GILDA    Collateral Reports Completed:  Will collaborate with care team as indicated during treatment    Plan: (Homework, other):  Patient was given information about behavioral services and encouraged to schedule a follow up appointment with the clinic [facility] Next appointment schduled.  He was also given information about mental health symptoms and treatment options .  CD Recommendations: No indications of CD issues.         Zulma Hwang, [facility] Intern           January 29, 2020      This patient was seen by a Behavioral Health Clinician intern. Although I did not see this patient directly, this patient was discussed with me by the intern in individual supervision, and I agree with the treatment and plan as documented.  Adrian Lowe MA, LM, Lead Behavioral Health Clinician, [facility]

## 2020-03-24 ENCOUNTER — TELEPHONE (OUTPATIENT)
Dept: BEHAVIORAL HEALTH | Facility: CLINIC | Age: 55
End: 2020-03-24

## 2020-03-24 ENCOUNTER — MYC MEDICAL ADVICE (OUTPATIENT)
Dept: BEHAVIORAL HEALTH | Facility: CLINIC | Age: 55
End: 2020-03-24

## 2020-03-24 NOTE — TELEPHONE ENCOUNTER
Visit Activities (Refresh list every visit): Phone Encounter    I called Devonte to let him know that our [facility] Intern, Zulma Hwang, is unable to see patients at the present time due to Zulma's school/internship policies regarding safety and the current COVID-19 concerns. I let Devonte know that we are unsure when and if Zulma will be able to return, conveyed Zulma's well-wishes and hope that she can meet again soon, and offered support services/ongoing therapy through our remaining behavioral health staff (we are offering phone support and hopefully video-based teletherapy services soon). Devonte said he understood and will think about these options. I told him that we would stay in touch as we learned more about Zulma's internship status, as well.    I also provided my phone number for contact about any of these issues: [phone].      Ryan Lowe MA, LM  Lead Behavioral Health Clinician  [facility]    [email or web address]       Phone: [phone] (mobility extension)  Pager: [phone]

## 2020-04-21 ENCOUNTER — MYC MEDICAL ADVICE (OUTPATIENT)
Dept: BEHAVIORAL HEALTH | Facility: CLINIC | Age: 55
End: 2020-04-21

## 2020-04-21 DIAGNOSIS — C03.9 PRIMARY CANCER OF ALVEOLAR RIDGE MUCOSA (H): ICD-10-CM

## 2020-04-21 DIAGNOSIS — C03.0 SQUAMOUS CELL CARCINOMA OF MAXILLARY ALVEOLAR RIDGE (H): ICD-10-CM

## 2020-04-22 ENCOUNTER — HOSPITAL ENCOUNTER (OUTPATIENT)
Dept: PET IMAGING | Facility: CLINIC | Age: 55
End: 2020-04-22
Attending: PHYSICIAN ASSISTANT
Payer: COMMERCIAL

## 2020-04-22 ENCOUNTER — MYC REFILL (OUTPATIENT)
Dept: RADIATION ONCOLOGY | Facility: CLINIC | Age: 55
End: 2020-04-22

## 2020-04-22 ENCOUNTER — OFFICE VISIT (OUTPATIENT)
Dept: OTOLARYNGOLOGY | Facility: CLINIC | Age: 55
End: 2020-04-22

## 2020-04-22 ENCOUNTER — THERAPY VISIT (OUTPATIENT)
Dept: SPEECH THERAPY | Facility: CLINIC | Age: 55
End: 2020-04-22

## 2020-04-22 VITALS
DIASTOLIC BLOOD PRESSURE: 77 MMHG | SYSTOLIC BLOOD PRESSURE: 126 MMHG | RESPIRATION RATE: 18 BRPM | BODY MASS INDEX: 39.24 KG/M2 | HEART RATE: 93 BPM | WEIGHT: 250 LBS | HEIGHT: 67 IN

## 2020-04-22 DIAGNOSIS — C31.0 MAXILLARY SINUS CANCER (H): ICD-10-CM

## 2020-04-22 DIAGNOSIS — K21.9 GASTROESOPHAGEAL REFLUX DISEASE WITHOUT ESOPHAGITIS: ICD-10-CM

## 2020-04-22 DIAGNOSIS — C05.9 SQUAMOUS CELL CARCINOMA OF PALATE (H): ICD-10-CM

## 2020-04-22 DIAGNOSIS — C03.9 PRIMARY CANCER OF ALVEOLAR RIDGE MUCOSA (H): ICD-10-CM

## 2020-04-22 DIAGNOSIS — C03.0 SQUAMOUS CELL CARCINOMA OF MAXILLARY ALVEOLAR RIDGE (H): Primary | ICD-10-CM

## 2020-04-22 DIAGNOSIS — R13.12 OROPHARYNGEAL DYSPHAGIA: ICD-10-CM

## 2020-04-22 DIAGNOSIS — C05.9 SQUAMOUS CELL CARCINOMA OF PALATE (H): Primary | ICD-10-CM

## 2020-04-22 LAB
CREAT BLD-MCNC: 0.8 MG/DL (ref 0.66–1.25)
GFR SERPL CREATININE-BSD FRML MDRD: >90 ML/MIN/{1.73_M2}

## 2020-04-22 PROCEDURE — A9552 F18 FDG: HCPCS | Performed by: PHYSICIAN ASSISTANT

## 2020-04-22 PROCEDURE — 82565 ASSAY OF CREATININE: CPT

## 2020-04-22 PROCEDURE — 71260 CT THORAX DX C+: CPT

## 2020-04-22 PROCEDURE — 25000128 H RX IP 250 OP 636: Performed by: PHYSICIAN ASSISTANT

## 2020-04-22 PROCEDURE — 34300033 ZZH RX 343: Performed by: PHYSICIAN ASSISTANT

## 2020-04-22 PROCEDURE — 70491 CT SOFT TISSUE NECK W/DYE: CPT

## 2020-04-22 RX ORDER — IOPAMIDOL 755 MG/ML
135 INJECTION, SOLUTION INTRAVASCULAR ONCE
Status: COMPLETED | OUTPATIENT
Start: 2020-04-22 | End: 2020-04-22

## 2020-04-22 RX ORDER — GABAPENTIN 300 MG/1
300 CAPSULE ORAL 3 TIMES DAILY
Qty: 90 CAPSULE | Refills: 3 | Status: SHIPPED | OUTPATIENT
Start: 2020-04-22 | End: 2020-08-20

## 2020-04-22 RX ADMIN — IOPAMIDOL 135 ML: 755 INJECTION, SOLUTION INTRAVENOUS at 11:30

## 2020-04-22 RX ADMIN — FLUDEOXYGLUCOSE F-18 15.32 MCI.: 500 INJECTION, SOLUTION INTRAVENOUS at 10:30

## 2020-04-22 ASSESSMENT — MIFFLIN-ST. JEOR: SCORE: 1926.49

## 2020-04-22 ASSESSMENT — PAIN SCALES - GENERAL: PAINLEVEL: EXTREME PAIN (8)

## 2020-04-22 NOTE — PROGRESS NOTES
HISTORY OF PRESENT ILLNESS:  Mr. 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  Dr. Treviño performed a left palatectomy on 5/9/2019 and I did a left radial forearm free flap.  He had postop radiation therapy completed on 7/22/2019, 6000 cGy.  He has been watched carefully due to an indeterminate area of focal uptake along the right peroneal hard palate and there has been no area of concern on exam, however.      INTERVAL HISTORY:  The patient is doing very well.  He has been working from home during the COVID outbreak and has not noticed any problems with the exception of congestion in his nasal cavity.  He does not have any lumps or bumps in his mouth or neck.  He has no odynophagia, dysphagia, hoarseness or otalgia.      PHYSICAL EXAMINATION:  He is well appearing, in no distress.  Anterior rhinoscopy reveals crusting  bilaterally anteriorly in the nose.  I did remove this and he said he could breathe better immediately.  Examination of the oral cavity reveals the flap is healthy in appearance.  There is no evidence of recurrence on visual inspection or palpation of the palate.  The remainder of the oral cavity including the tongue, floor of mouth, gingival and buccal mucosa, retromolar trigone and posterior pharyngeal wall are normal.  Palpation of floor of mouth, tongue and base of tongue are normal.  He cannot tolerate mirror exam.  Examination of the neck reveals no mass or lymphadenopathy.      IMPRESSION:  CÉSAR.      PLAN:   1.  The patient had scans today which are negative.   2.  I will see him back in four months.         cc:       Jett Treviño MD   [facility] - Department of Otolaryngology    [address] [place] [address] [address]   [place], [place]  [id number]       Rich Medellin MD   [facility]   [address] [place] [address]  [id number]      Emerson Verdin MD   [facility] Radiation Oncology   [facility]  494

## 2020-04-22 NOTE — LETTER
4/22/2020       RE: Devonte Tucker  [address]  [place] [address]     Dear Colleague,    Thank you for referring your patient, Dveonte Tucker, to the [facility] EAR NOSE [facility] THROAT at [facility]. Please see a copy of my visit note below.    HISTORY OF PRESENT ILLNESS:  Mr. 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  Dr. Treviño performed a left palatectomy on 5/9/2019 and I did a left radial forearm free flap.  He had postop radiation therapy completed on 7/22/2019, 6000 cGy.  He has been watched carefully due to an indeterminate area of focal uptake along the right peroneal hard palate and there has been no area of concern on exam, however.      INTERVAL HISTORY:  The patient is doing very well.  He has been working from home during the COVID outbreak and has not noticed any problems with the exception of congestion in his nasal cavity.  He does not have any lumps or bumps in his mouth or neck.  He has no odynophagia, dysphagia, hoarseness or otalgia.      PHYSICAL EXAMINATION:  He is well appearing, in no distress.  Anterior rhinoscopy reveals crusting  bilaterally anteriorly in the nose.  I did remove this and he said he could breathe better immediately.  Examination of the oral cavity reveals the flap is healthy in appearance.  There is no evidence of recurrence on visual inspection or palpation of the palate.  The remainder of the oral cavity including the tongue, floor of mouth, gingival and buccal mucosa, retromolar trigone and posterior pharyngeal wall are normal.  Palpation of floor of mouth, tongue and base of tongue are normal.  He cannot tolerate mirror exam.  Examination of the neck reveals no mass or lymphadenopathy.      IMPRESSION:  CÉSAR.      PLAN:   1.  The patient had scans today which are negative.   2.  I will see him back in four months.          cc:       Jett Treviño MD   [facility] - Department of Otolaryngology    [address] [facility] [address]   [place] [id number]       Rich Medellin MD   [facility]   [address] [place], [place] [id number]      Emerson Verdin MD   [facility] Radiation Oncology   [facility] [address]         Again, thank you for allowing me to participate in the care of your patient.      Sincerely,    Sumit Atwood MD

## 2020-04-23 ENCOUNTER — PATIENT OUTREACH (OUTPATIENT)
Dept: OTOLARYNGOLOGY | Facility: CLINIC | Age: 55
End: 2020-04-23

## 2020-04-23 NOTE — PROGRESS NOTES
Called patient and left message with the following PET scan results:    IMPRESSION: In this patient with a history of squama cell carcinoma of  the left maxillary alveolar ridge:  1. No evidence of metastatic disease in the chest, abdomen or pelvis.    Impression: In this patient with history of left maxilla alveolar  ridge squamous cell carcinoma, status post surgery and adjuvant  chemotherapy:  1. No evidence for residual or metastatic disease in the neck.  2. Stable postsurgical changes of left maxillary alveolar ridge  resection and neck dissection.  3. Please refer to the whole body PET CT performed as a separate  report, for the findings of the remainder of the body.     Patient will follow-up in 4 months with Dr. Atwood. He is scheduled for 8/26 . Left direct line for patient with any further questions or concerns.     Brittny Meredith, RN, BSN

## 2020-04-28 RX ORDER — GABAPENTIN 300 MG/1
CAPSULE ORAL
Qty: 90 CAPSULE | Refills: 0 | OUTPATIENT
Start: 2020-04-28

## 2020-05-17 ENCOUNTER — MYC REFILL (OUTPATIENT)
Dept: RADIATION ONCOLOGY | Facility: CLINIC | Age: 55
End: 2020-05-17

## 2020-05-17 DIAGNOSIS — C03.9 PRIMARY CANCER OF ALVEOLAR RIDGE MUCOSA (H): ICD-10-CM

## 2020-05-17 DIAGNOSIS — K21.9 GASTROESOPHAGEAL REFLUX DISEASE WITHOUT ESOPHAGITIS: ICD-10-CM

## 2020-06-01 NOTE — PROGRESS NOTES
"Devonte Tucker is a 54 year old male who is being evaluated via a billable telephone visit.      The patient has been notified of following:     \"This telephone visit will be conducted via a call between you and your physician/provider. We have found that certain health care needs can be provided without the need for a physical exam.  This service lets us provide the care you need with a short phone conversation.  If a prescription is necessary we can send it directly to your pharmacy.  If lab work is needed we can place an order for that and you can then stop by our lab to have the test done at a later time.    Telephone visits are billed at different rates depending on your insurance coverage. During this emergency period, for some insurers they may be billed the same as an in-person visit.  Please reach out to your insurance provider with any questions.    If during the course of the call the physician/provider feels a telephone visit is not appropriate, you will not be charged for this service.\"    Patient has given verbal consent for Telephone visit?  Yes    How would you like to obtain your AVS? 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  He underwent a left infrastructure maxillectomy and left level I lymph node dissection on 2018 with pathology revealing a 1.3 cm well-differentiated squamous cell carcinoma with 13 mm depth of invasion and involvement of the underlying bone. He received adjuvant radiotherapy alone as described above for improved local disease control.      Mr. Tucker is contacted by telephone today for a routine post-treatment disease surveillance visit. He continues to have moderate xerostomia which he is treating with a combination of increased fluid intake, sugar free candies, Biotene products and Evoxac. He additional describes some mild to moderate fibrosis within the prior operative bed and he continues to be diligent on massages and jaw stretching exercises. He does have some mild headaches that he attributes to stress. His remaining ROS are otherwise negative.    LABS AND IMAGIN2020 PET/CT:  Post-treatment changes with no evidence of recurrent or metastatic disease.    IMPRESSION:   Mr. 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 He is nearing a year out from the completion of adjuvant radiotherapy and is doing well with no clinical signs concerning for recurrent disease.    PLAN:   1. Follow-up in radiation oncology clinic in 3 months with NP and in 6 months with MD  2. Follow-up in ENT clinic as scheduled on 2020  3. Repeat TSH with next lab draw  4. Refilled Evoxac     Emerson Verdin MD/PhD    Department of Radiation Oncology  [facility]    Telephone call start time: 1002  Telephone call end time: 1014  Telephone call duration: 12 minutes

## 2020-06-04 ENCOUNTER — VIRTUAL VISIT (OUTPATIENT)
Dept: RADIATION ONCOLOGY | Facility: CLINIC | Age: 55
End: 2020-06-04
Attending: RADIOLOGY
Payer: COMMERCIAL
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
## 2020-06-04 DIAGNOSIS — K11.7 XEROSTOMIA DUE TO RADIOTHERAPY: ICD-10-CM

## 2020-06-04 DIAGNOSIS — C03.9 PRIMARY CANCER OF ALVEOLAR RIDGE MUCOSA (H): Primary | ICD-10-CM

## 2020-06-04 PROCEDURE — 40001009 ZZH VIDEO/TELEPHONE VISIT; NO CHARGE

## 2020-06-04 RX ORDER — CEVIMELINE HYDROCHLORIDE 30 MG/1
30 CAPSULE ORAL 3 TIMES DAILY
Qty: 90 CAPSULE | Refills: 11 | Status: SHIPPED | OUTPATIENT
Start: 2020-06-04 | End: 2021-05-18

## 2020-06-04 NOTE — LETTER
"    2020         RE: Devonte Tucker  [address]  [place] [address]      Devonte Tucker is a 54 year old male who is being evaluated via a billable telephone visit.      The patient has been notified of following:     \"This telephone visit will be conducted via a call between you and your physician/provider. We have found that certain health care needs can be provided without the need for a physical exam.  This service lets us provide the care you need with a short phone conversation.  If a prescription is necessary we can send it directly to your pharmacy.  If lab work is needed we can place an order for that and you can then stop by our lab to have the test done at a later time.    Telephone visits are billed at different rates depending on your insurance coverage. During this emergency period, for some insurers they may be billed the same as an in-person visit.  Please reach out to your insurance provider with any questions.    If during the course of the call the physician/provider feels a telephone visit is not appropriate, you will not be charged for this service.\"    Patient has given verbal consent for Telephone visit?  Yes    How would you like to obtain your AVS? 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  He underwent a left infrastructure maxillectomy and left level I lymph node dissection on 2018 with pathology revealing a 1.3 cm well-differentiated squamous cell carcinoma with 13 mm depth of invasion and involvement of the underlying bone. He received adjuvant radiotherapy alone as described above for improved local disease control.      Mr. Tucker is contacted by telephone today for a routine post-treatment disease surveillance visit. He continues to have moderate xerostomia which he is treating with a combination of increased fluid intake, sugar free candies, Biotene products and Evoxac. He additional describes some mild to moderate fibrosis within the prior operative bed and he continues to be diligent on massages and jaw stretching exercises. He does have some mild headaches that he attributes to stress. His remaining ROS are otherwise negative.    LABS AND IMAGIN2020 PET/CT:  Post-treatment changes with no evidence of recurrent or metastatic disease.    IMPRESSION:   Mr. 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 He is nearing a year out from the completion of adjuvant radiotherapy and is doing well with no clinical signs concerning for recurrent disease.    PLAN:   1. Follow-up in radiation oncology clinic in 3 months with NP and in 6 months with MD  2. Follow-up in ENT clinic as scheduled on 2020  3. Repeat TSH with next lab draw  4. Refilled Evoxac     Emerson Verdin MD/PhD    Department of Radiation Oncology  [facility]    Telephone call start time: 1002  Telephone call end time: 1014  Telephone call duration: 12 minutes        Devonte Tucker is a 54 year old male who is being evaluated via a billable telephone visit.   "    The patient has been notified of following:     \"This telephone visit will be conducted via a call between you and your physician/provider. We have found that certain health care needs can be provided without the need for a physical exam.  This service lets us provide the care you need with a short phone conversation.  If a prescription is necessary we can send it directly to your pharmacy.  If lab work is needed we can place an order for that and you can then stop by our lab to have the test done at a later time.    Telephone visits are billed at different rates depending on your insurance coverage. During this emergency period, for some insurers they may be billed the same as an in-person visit.  Please reach out to your insurance provider with any questions.    If during the course of the call the physician/provider feels a telephone visit is not appropriate, you will not be charged for this service.\"    Patient has given verbal consent for Telephone visit?  Yes    What phone number would you like to be contacted at? [phone]        Phone call duration: 10 minutes      FOLLOW-UP VISIT    Patient Name: Devonte Tucker      : 1965     Age: 54 year old        ______________________________________________________________________________     No chief complaint on file.    There were no vitals taken for this visit.     Date Radiation Completed: 2019    Pain  Denies    Labs  Other Labs: No    Imaging  CT:  Neck and PET 2020          Other Appointments:     MD Name: Rai Appointment Date:    MD Name: Appointment Date:   MD Name: Appointment Date:   Other Appointment Notes:     Residual Radiation side effect:  Dry mouth    Additional Instructions: continue with mouth exercises and cutting food into small pieces    Nurse face-to-face time: Level 3:  10 min face to face time        Emerson Verdin MD  "

## 2020-06-04 NOTE — PROGRESS NOTES
"Devonte Tucker is a 54 year old male who is being evaluated via a billable telephone visit.      The patient has been notified of following:     \"This telephone visit will be conducted via a call between you and your physician/provider. We have found that certain health care needs can be provided without the need for a physical exam.  This service lets us provide the care you need with a short phone conversation.  If a prescription is necessary we can send it directly to your pharmacy.  If lab work is needed we can place an order for that and you can then stop by our lab to have the test done at a later time.    Telephone visits are billed at different rates depending on your insurance coverage. During this emergency period, for some insurers they may be billed the same as an in-person visit.  Please reach out to your insurance provider with any questions.    If during the course of the call the physician/provider feels a telephone visit is not appropriate, you will not be charged for this service.\"    Patient has given verbal consent for Telephone visit?  Yes    What phone number would you like to be contacted at? [phone]        Phone call duration: 10 minutes      FOLLOW-UP VISIT    Patient Name: Devonte Tucker      : 1965     Age: 54 year old        ______________________________________________________________________________     No chief complaint on file.    There were no vitals taken for this visit.     Date Radiation Completed: 2019    Pain  Denies    Labs  Other Labs: No    Imaging  CT:  Neck and PET 2020          Other Appointments:     MD Name: Rai Appointment Date:    MD Name: Appointment Date:   MD Name: Appointment Date:   Other Appointment Notes:     Residual Radiation side effect:  Dry mouth    Additional Instructions: continue with mouth exercises and cutting food into small pieces    Nurse face-to-face time: Level 3:  10 min face to face time          "

## 2020-06-05 NOTE — PROGRESS NOTES
[facility] Endocrinology- Outpatient Diabetes Follow up  Devonte is a 53 year old male with TIIDM, obesity, HTN, and invasive squamous cell carcinoma of the left alveolar ridge with invasion of the maxilla, who was hospitalized -19 for left maxillectomy, left radial forearm free flap, left neck exploration, and skin grafting from left thigh to left forearm. Post operatively, he required NGT placement for enteral feeding, and experienced hyperglycemia related to enteral feeding.    Upon last visit with me 10/7/19, his A1c was improved and he was working with SLP to increase his solid food intake.He had been able to have more solid foods, more raw vegetables and meats.    Recently followed up with heme onc, felt to have no evidence of recurrent disease at this time; has interval 3 and 6 month follow up scheduled. He has some loose teeth that have continued to impact what he can eat, but feels he has been able to maintain nutrition. Still drinking at least one Premier protein shake daily.     He is working from home. Dinner is typically largest meal, notices fasting BG higher after a particularly large dinner the night before. Has been working on reducing this.     Pt denies headaches, visual changes, vomiting, SOB at rest, chest pain, abd pain, nausea/vomiting, diarrhea, dysuria, hematuria or foot ulcers.     Current Diabetes Regimen:   Metformin IR 1000mg BID    Glucometer Settings   AM B-141  PM B-124    Weight: 255 lbs, from 247 recently  Physical Activity: stationary exercise bike 3x weekly  Nutrition: three meals a day with more variety of foods tolerated.     Diabetes Care  Retinopathy: recent eye appt 10/2019.     Nephropathy: No hx microalbuminuria. Creatinine 0.8 (stable). Taking ACEi/ARB: yes    Neuropathy: yes    Lipids: no recent labs. Taking statin: yes, ASA: yes  LDL Cholesterol Calculated   Date Value Ref Range Status   2019 41 <100 mg/dL Final     Comment:     Desirable:        <100 mg/dl        ROS: 10 point review of systems completed; pertinent positives and negatives documented in HPI    Allergies  Allergies   Allergen Reactions     Seasonal Allergies Other (See Comments) and Shortness Of Breath       Medications  Current Outpatient Medications   Medication Sig Dispense Refill     acetaminophen (TYLENOL) 325 MG tablet Take 2 tablets (650 mg) by mouth every 4 hours as needed for mild pain 100 tablet 0     aspirin (ASA) 325 MG tablet Take 1 tablet (325 mg) by mouth daily 30 tablet 0     atorvastatin (LIPITOR) 20 MG tablet 1 tablet (20 mg) by Per Feeding Tube route every morning       blood glucose (NO BRAND SPECIFIED) test strip Use to test blood sugar 4 times daily or as directed. To accompany: Blood Glucose Monitor Brands: per insurance. 100 strip 6     cevimeline (EVOXAC) 30 MG capsule Take 1 capsule (30 mg) by mouth 3 times daily 90 capsule 11     doxazosin (CARDURA) 1 MG tablet Take 1 tablet (1 mg) by mouth daily 30 tablet 0     gabapentin (NEURONTIN) 300 MG capsule Take 1 capsule (300 mg) by mouth 3 times daily 90 capsule 3     lisinopril (PRINIVIL/ZESTRIL) 10 MG tablet 1 tablet (10 mg) by Per Feeding Tube route every morning       metFORMIN (GLUCOPHAGE) 1000 MG tablet Take 1 tablet (1,000 mg) by mouth 2 times daily (with meals) 90 tablet 4     multivitamin w/minerals (THERA-VIT-M) tablet Take 1 tablet by mouth daily 30 tablet 0     omeprazole (PRILOSEC) 20 MG DR capsule Take 1 capsule (20 mg) by mouth daily 30 capsule 3     order for DME Equipment being ordered: Facial compression garment for lymphedema 2 each 1     oxyCODONE (ROXICODONE) 5 MG/5ML solution Take 5 mLs (5 mg) by mouth 3 times daily as needed for severe pain 500 mL 0     polyethylene glycol (MIRALAX/GLYCOLAX) packet Take 17 g by mouth daily as needed for constipation 100 packet 0     sodium fluoride dental gel (PREVIDENT) 1.1 % GEL topical gel Apply to affected area At Bedtime 112 g 11     thin (NO BRAND SPECIFIED)  lancets Use to test blood sugar 4 times daily or as directed. To accompany: Blood Glucose Monitor Brands: per insurance. 100 each 3       Family History  family history includes Cancer in his brother, mother, and sister; Cardiovascular in his brother; Depression in his mother and sister; Diabetes in his brother, mother, and sister; Heart Disease in his brother and mother; Hypertension in his brother, mother, sister, and sister; Kidney Cancer in his sister; Substance Abuse in his brother, father, and sister.    Social History   reports that he has never smoked. He has never used smokeless tobacco. He reports that he does not drink alcohol or use drugs.     Past Medical History  Past Medical History:   Diagnosis Date     Diabetes (H)      Hypertension      Maxillary sinus cancer (H)      Obesity        Past Surgical History:   Procedure Laterality Date     ------------OTHER-------------      Mucosa and bone biopsy     EXPLORE NECK Left 5/9/2019    Procedure: Left Neck Exploration;  Surgeon: Jett Treviño MD;  Location: [facility] OR     EXTRACTION(S) DENTAL      x2 under general anesthesia     GRAFT FREE VASCULARIZED (LOCATION) Right 5/9/2019    Procedure: Left Radial Forearm Free Flap (soft tissue);  Surgeon: Sumit Atwood MD;  Location: [facility] OR     GRAFT SKIN SPLIT THICKNESS FROM EXTREMITY Right 5/9/2019    Procedure: Graft skin split thickness from right thigh, nasogastric feeding tube placement;  Surgeon: Sumit Atwood MD;  Location: [facility] OR     IR LYMPH NODE BIOPSY  4/4/2019     OSTEOTOMY MAXILLA Left 5/9/2019    Procedure: Left Infrastructure Maxillectomy,;  Surgeon: Jett Treviño MD;  Location: [facility] OR       Physical Exam  There were no vitals taken for this visit.  There is no height or weight on file to calculate BMI.    GENERAL : In no apparent distress. Voice hoarse.  RESP: normal respiratory effort.  No cough  NEURO: awake, alert, responds appropriately to questions.    Remainder of physical exam not able to  be completed 2/2 COVID precaution, social distancing       RESULTS    Lab Results   Component Value Date    A1C 7.4 04/24/2019       Creatinine   Date Value Ref Range Status   07/01/2019 0.68 0.66 - 1.25 mg/dL Final     GFR Estimate   Date Value Ref Range Status   04/22/2020 >90 >60 mL/min/[1.73_m2] Final     Hemoglobin A1C   Date Value Ref Range Status   04/24/2019 7.4 (H) 0 - 5.6 % Final     Comment:     Normal <5.7% Prediabetes 5.7-6.4%  Diabetes 6.5% or higher - adopted from ADA   consensus guidelines.       Potassium   Date Value Ref Range Status   07/01/2019 4.1 3.4 - 5.3 mmol/L Final     TSH   Date Value Ref Range Status   10/16/2019 2.22 0.40 - 4.00 mU/L Final       TSH   Date Value Ref Range Status   10/16/2019 2.22 0.40 - 4.00 mU/L Final   10/07/2019 2.03 0.40 - 4.00 mU/L Final   05/10/2019 0.32 (L) 0.40 - 4.00 mU/L Final       Creatinine   Date Value Ref Range Status   07/01/2019 0.68 0.66 - 1.25 mg/dL Final       No results for input(s): CHOL, HDL, LDL, TRIG, CHOLHDLRATIO in the last 32869 hours.  No results found for: [id number], [id number], [id number]      ASSESSMENT/PLAN:    1. Diabetes type II, now well controlled with weight loss and dietary modification (somewhat unintentional due to his cancer and recent surgery)   -most recent A1c 5.4> 5.6%   -continue Metformin 1000mg BID    Follow up:  1. With diabetes provider in 3 months.    The patient is  enrolled in [employer] services    This patient has met [place] community measures for diabetes control: no (D5: Control blood pressure ,Lower bad cholesterol Maintain blood sugar, Be tobacco-free, Take aspirin as recommended)    This patient is eligible for graduation from [facility] Endocrinology clinic: no      Follow up was done today via phone encounter, due to high risk patients implementing social distancing due to COVID 19. No updated labs were drawn for the same reason on this date.    I spent a total of 15 minutes via telephone with patient, and  and 10  "minutes chart review/coordination.     Leanna Taylor PA-C  Diabetes Management Service  Pager [phone]  Job code pager [phone]    ___________________      Devonte Tucker is a 54 year old male who is being evaluated via a billable telephone visit.      The patient has been notified of following:     \"This telephone visit will be conducted via a call between you and your physician/provider. We have found that certain health care needs can be provided without the need for a physical exam.  This service lets us provide the care you need with a short phone conversation.  If a prescription is necessary we can send it directly to your pharmacy.  If lab work is needed we can place an order for that and you can then stop by our lab to have the test done at a later time.    Telephone visits are billed at different rates depending on your insurance coverage. During this emergency period, for some insurers they may be billed the same as an in-person visit.  Please reach out to your insurance provider with any questions.    If during the course of the call the physician/provider feels a telephone visit is not appropriate, you will not be charged for this service.\"    Patient has given verbal consent for Telephone visit?  Yes    What phone number would you like to be contacted at? [phone]    How would you like to obtain your AVS? Tali Byers MA    Patients Glucose Data was Sent in [employer]    "

## 2020-06-08 ENCOUNTER — VIRTUAL VISIT (OUTPATIENT)
Dept: ENDOCRINOLOGY | Facility: CLINIC | Age: 55
End: 2020-06-08

## 2020-06-08 DIAGNOSIS — E11.9 TYPE 2 DIABETES MELLITUS WITHOUT COMPLICATION, WITHOUT LONG-TERM CURRENT USE OF INSULIN (H): Primary | ICD-10-CM

## 2020-06-08 NOTE — LETTER
2020       RE: Devonte Tucker  [address] [place] [address]     Dear Colleague,    Thank you for referring your patient, Devonte Tucker, to the [facility] ENDOCRINOLOGY at [facility]. Please see a copy of my visit note below.    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 Post operatively, he required NGT placement for enteral feeding, and experienced hyperglycemia related to enteral feeding.    Upon last visit with me 10/7/19, his A1c was improved and he was working with SLP to increase his solid food intake.He had been able to have more solid foods, more raw vegetables and meats.    Recently followed up with heme onc, felt to have no evidence of recurrent disease at this time; has interval 3 and 6 month follow up scheduled. He has some loose teeth that have continued to impact what he can eat, but feels he has been able to maintain nutrition. Still drinking at least one Premier protein shake daily.     He is working from home. Dinner is typically largest meal, notices fasting BG higher after a particularly large dinner the night before. Has been working on reducing this.     Pt denies headaches, visual changes, vomiting, SOB at rest, chest pain, abd pain, nausea/vomiting, diarrhea, dysuria, hematuria or foot ulcers.     Current Diabetes Regimen:   Metformin IR 1000mg BID    Glucometer Settings   AM B-141  PM B-124    Weight: 255 lbs, from 247 recently  Physical Activity: stationary exercise bike 3x weekly  Nutrition: three meals a day with more variety of foods tolerated.     Diabetes Care  Retinopathy: recent eye appt 10/2019.      Nephropathy: No hx microalbuminuria. Creatinine 0.8 (stable). Taking ACEi/ARB: yes    Neuropathy: yes    Lipids: no recent labs. Taking statin: yes, ASA: yes  LDL Cholesterol Calculated   Date Value Ref Range Status   07/01/2019 41 <100 mg/dL Final     Comment:     Desirable:       <100 mg/dl        ROS: 10 point review of systems completed; pertinent positives and negatives documented in HPI    Allergies  Allergies   Allergen Reactions     Seasonal Allergies Other (See Comments) and Shortness Of Breath       Medications  Current Outpatient Medications   Medication Sig Dispense Refill     acetaminophen (TYLENOL) 325 MG tablet Take 2 tablets (650 mg) by mouth every 4 hours as needed for mild pain 100 tablet 0     aspirin (ASA) 325 MG tablet Take 1 tablet (325 mg) by mouth daily 30 tablet 0     atorvastatin (LIPITOR) 20 MG tablet 1 tablet (20 mg) by Per Feeding Tube route every morning       blood glucose (NO BRAND SPECIFIED) test strip Use to test blood sugar 4 times daily or as directed. To accompany: Blood Glucose Monitor Brands: per insurance. 100 strip 6     cevimeline (EVOXAC) 30 MG capsule Take 1 capsule (30 mg) by mouth 3 times daily 90 capsule 11     doxazosin (CARDURA) 1 MG tablet Take 1 tablet (1 mg) by mouth daily 30 tablet 0     gabapentin (NEURONTIN) 300 MG capsule Take 1 capsule (300 mg) by mouth 3 times daily 90 capsule 3     lisinopril (PRINIVIL/ZESTRIL) 10 MG tablet 1 tablet (10 mg) by Per Feeding Tube route every morning       metFORMIN (GLUCOPHAGE) 1000 MG tablet Take 1 tablet (1,000 mg) by mouth 2 times daily (with meals) 90 tablet 4     multivitamin w/minerals (THERA-VIT-M) tablet Take 1 tablet by mouth daily 30 tablet 0     omeprazole (PRILOSEC) 20 MG DR capsule Take 1 capsule (20 mg) by mouth daily 30 capsule 3     order for DME Equipment being ordered: Facial compression garment for lymphedema 2 each 1     oxyCODONE (ROXICODONE) 5 MG/5ML solution Take 5 mLs (5 mg) by mouth 3 times daily as  needed for severe pain 500 mL 0     polyethylene glycol (MIRALAX/GLYCOLAX) packet Take 17 g by mouth daily as needed for constipation 100 packet 0     sodium fluoride dental gel (PREVIDENT) 1.1 % GEL topical gel Apply to affected area At Bedtime 112 g 11     thin (NO BRAND SPECIFIED) lancets Use to test blood sugar 4 times daily or as directed. To accompany: Blood Glucose Monitor Brands: per insurance. 100 each 3       Family History  family history includes Cancer in his brother, mother, and sister; Cardiovascular in his brother; Depression in his mother and sister; Diabetes in his brother, mother, and sister; Heart Disease in his brother and mother; Hypertension in his brother, mother, sister, and sister; Kidney Cancer in his sister; Substance Abuse in his brother, father, and sister.    Social History   reports that he has never smoked. He has never used smokeless tobacco. He reports that he does not drink alcohol or use drugs.     Past Medical History  Past Medical History:   Diagnosis Date     Diabetes (H)      Hypertension      Maxillary sinus cancer (H)      Obesity        Past Surgical History:   Procedure Laterality Date     ------------OTHER-------------      Mucosa and bone biopsy     EXPLORE NECK Left 5/9/2019    Procedure: Left Neck Exploration;  Surgeon: Jett Treviño MD;  Location: [facility] OR     EXTRACTION(S) DENTAL      x2 under general anesthesia     GRAFT FREE VASCULARIZED (LOCATION) Right 5/9/2019    Procedure: Left Radial Forearm Free Flap (soft tissue);  Surgeon: Sumit Atwood MD;  Location: [facility] OR     GRAFT SKIN SPLIT THICKNESS FROM EXTREMITY Right 5/9/2019    Procedure: Graft skin split thickness from right thigh, nasogastric feeding tube placement;  Surgeon: Sumit Atwood MD;  Location: [facility] OR     IR LYMPH NODE BIOPSY  4/4/2019     OSTEOTOMY MAXILLA Left 5/9/2019    Procedure: Left Infrastructure Maxillectomy,;  Surgeon: Jett Treviño MD;  Location: [facility] OR       Physical Exam  There were  no vitals taken for this visit.  There is no height or weight on file to calculate BMI.    GENERAL : In no apparent distress. Voice hoarse.  RESP: normal respiratory effort.  No cough  NEURO: awake, alert, responds appropriately to questions.    Remainder of physical exam not able to be completed 2/2 COVID precaution, social distancing       RESULTS    Lab Results   Component Value Date    A1C 7.4 04/24/2019       Creatinine   Date Value Ref Range Status   07/01/2019 0.68 0.66 - 1.25 mg/dL Final     GFR Estimate   Date Value Ref Range Status   04/22/2020 >90 >60 mL/min/[1.73_m2] Final     Hemoglobin A1C   Date Value Ref Range Status   04/24/2019 7.4 (H) 0 - 5.6 % Final     Comment:     Normal <5.7% Prediabetes 5.7-6.4%  Diabetes 6.5% or higher - adopted from ADA   consensus guidelines.       Potassium   Date Value Ref Range Status   07/01/2019 4.1 3.4 - 5.3 mmol/L Final     TSH   Date Value Ref Range Status   10/16/2019 2.22 0.40 - 4.00 mU/L Final       TSH   Date Value Ref Range Status   10/16/2019 2.22 0.40 - 4.00 mU/L Final   10/07/2019 2.03 0.40 - 4.00 mU/L Final   05/10/2019 0.32 (L) 0.40 - 4.00 mU/L Final       Creatinine   Date Value Ref Range Status   07/01/2019 0.68 0.66 - 1.25 mg/dL Final       No results for input(s): CHOL, HDL, LDL, TRIG, CHOLHDLRATIO in the last 11625 hours.  No results found for: [id number], [id number], [id number]      ASSESSMENT/PLAN:    1. Diabetes type II, now well controlled with weight loss and dietary modification (somewhat unintentional due to his cancer and recent surgery)   -most recent A1c 5.4> 5.6%   -continue Metformin 1000mg BID    Follow up:  1. With diabetes provider in 3 months.    The patient is  enrolled in [employer] services    This patient has met [place] community measures for diabetes control: no (D5: Control blood pressure ,Lower bad cholesterol Maintain blood sugar, Be tobacco-free, Take aspirin as recommended)    This patient is eligible for graduation from [employer]  "Endocrinology clinic: no      Follow up was done today via phone encounter, due to high risk patients implementing social distancing due to COVID 19. No updated labs were drawn for the same reason on this date.    I spent a total of 15 minutes via telephone with patient, and  and 10 minutes chart review/coordination.     Leanna Taylor PA-C  Diabetes Management Service  Pager [phone]  Job code pager [phone]    ___________________      Devonte Tucker is a 54 year old male who is being evaluated via a billable telephone visit.      The patient has been notified of following:     \"This telephone visit will be conducted via a call between you and your physician/provider. We have found that certain health care needs can be provided without the need for a physical exam.  This service lets us provide the care you need with a short phone conversation.  If a prescription is necessary we can send it directly to your pharmacy.  If lab work is needed we can place an order for that and you can then stop by our lab to have the test done at a later time.    Telephone visits are billed at different rates depending on your insurance coverage. During this emergency period, for some insurers they may be billed the same as an in-person visit.  Please reach out to your insurance provider with any questions.    If during the course of the call the physician/provider feels a telephone visit is not appropriate, you will not be charged for this service.\"    Patient has given verbal consent for Telephone visit?  Yes    What phone number would you like to be contacted at? [phone]    How would you like to obtain your AVS? [facility] MA    Patients Glucose Data was Sent in [employer]      Again, thank you for allowing me to participate in the care of your patient.      Sincerely,    Leanna Taylor PA-C      "

## 2020-06-29 ENCOUNTER — VIRTUAL VISIT (OUTPATIENT)
Dept: BEHAVIORAL HEALTH | Facility: CLINIC | Age: 55
End: 2020-06-29

## 2020-06-29 DIAGNOSIS — F33.1 MODERATE RECURRENT MAJOR DEPRESSION (H): ICD-10-CM

## 2020-06-29 DIAGNOSIS — F41.1 GENERALIZED ANXIETY DISORDER: Primary | ICD-10-CM

## 2020-06-29 ASSESSMENT — ANXIETY QUESTIONNAIRES
1. FEELING NERVOUS, ANXIOUS, OR ON EDGE: NEARLY EVERY DAY
GAD7 TOTAL SCORE: 15
4. TROUBLE RELAXING: NEARLY EVERY DAY
7. FEELING AFRAID AS IF SOMETHING AWFUL MIGHT HAPPEN: NEARLY EVERY DAY
6. BECOMING EASILY ANNOYED OR IRRITABLE: NEARLY EVERY DAY
GAD7 TOTAL SCORE: 15
GAD7 TOTAL SCORE: 15
5. BEING SO RESTLESS THAT IT IS HARD TO SIT STILL: SEVERAL DAYS
3. WORRYING TOO MUCH ABOUT DIFFERENT THINGS: SEVERAL DAYS
2. NOT BEING ABLE TO STOP OR CONTROL WORRYING: SEVERAL DAYS
7. FEELING AFRAID AS IF SOMETHING AWFUL MIGHT HAPPEN: NEARLY EVERY DAY

## 2020-06-29 ASSESSMENT — PATIENT HEALTH QUESTIONNAIRE - PHQ9
10. IF YOU CHECKED OFF ANY PROBLEMS, HOW DIFFICULT HAVE THESE PROBLEMS MADE IT FOR YOU TO DO YOUR WORK, TAKE CARE OF THINGS AT HOME, OR GET ALONG WITH OTHER PEOPLE: VERY DIFFICULT
SUM OF ALL RESPONSES TO PHQ QUESTIONS 1-9: 18
SUM OF ALL RESPONSES TO PHQ QUESTIONS 1-9: 18

## 2020-06-29 NOTE — PROGRESS NOTES
[facility]: Integrated Behavioral Health  June 29, 2020  Video Visit    Behavioral Health Clinician Progress Note    Patient Name: Devonte Tucker           Service Type:  Individual      Service Location:   Face to Face in Clinic     Session Start Time: 1010am  Session End Time: 1105am      Session Length: 53 - 60      Attendees: Client    Visit Activities (Refresh list every visit): [facility] Only    Diagnostic Assessment Date: none on file  Treatment Plan Review Date: none with me yet  CGI-S: 5    See Flowsheets for today's PHQ-9 and GILDA-7 results  Previous PHQ-9:   PHQ-9 SCORE 12/18/2019 1/15/2020 6/29/2020   PHQ-9 Total Score MyChart - 15 (Moderately severe depression) 18 (Moderately severe depression)   PHQ-9 Total Score 10 15 18     Previous GILDA-7:   GILDA-7 SCORE 1/14/2020 6/29/2020   Total Score 15 (severe anxiety) 15 (severe anxiety)   Total Score 15 15       ELEANOR LEVEL:  No flowsheet data found.    DATA  Extended Session (60+ minutes): No  Interactive Complexity: No  Crisis: No  Located within [facility] Patient: No    Treatment Objective(s) Addressed in This Session:  Target Behavior(s): disease management/lifestyle changes      Depressed Mood: Increase interest, engagement, and pleasure in doing things    Current Stressors / Issues:  Telemedicine Visit: The patient's condition can be safely assessed and treated via synchronous audio and visual telemedicine encounter.      Reason for Telemedicine Visit: Covid-19    Originating Site (Patient Location): Patient's home    Distant Site (Provider Location): Provider Remote Setting    Consent:  The patient/guardian has verbally consented to: the potential risks and benefits of telemedicine (video visit) versus in person care; bill my insurance or make self-payment for services provided; and responsibility for payment of non-covered services.     Mode of Communication:  Video Conference via Tomo Clases    As the provider I attest to compliance with  "applicable laws and regulations related to telemedicine.      [facility] met with patient to provide psychotherapy and support regarding depression, anxiety, life stresses, and relationships.     Devonte has seen our [facility] Intern, Zulma Hwang, last fall and winter, and has asked to continue care with another provider. We spent today's session discussing Devonte's history, current stressors, and exploring the possible benefits of psychotherapy. Focus was on establishing a positive therapeutic alliance and establishing initial goals.    We reviewed his hx as reflected in the charts of previous visits with behavioral health, as well as his cancer and surgical hx. His cancer dx was first in 2014, and it has been a long nikia since then. He has had lots of treatments and surgery. He has had some reconstructive surgery that he is very grateful for. He believes he is close to cancer free now, but will not be sure until he is able to see his providers in person. He does have some ongoing pain issues due to his surgeries.    In addition to the stress form these personal concerns, he has a lot of anxiety and stress related to the Covid-19 pandemic and social unrest. He says he did not experience depression or anxiety in any significant way prior to his cancer. But he has had significant emotional issues since then.     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 His finances are steady but he has had to have a cut in pay, and he worries about where this might lead for him in the future. He has had to let go of most of his staff, and is concerned about the future for his job. He has been working there since 1995, and is not sure what he would do moving forward. [place]     He reports that his darwin is very important to him, and he relies on prayer and trust in God's plan for next steps.     His anxiety is really about \"the unknown\"and uncertainty about the " future, his sense of having a lack of control/trying to follow his spiritual pathway, he says. He has some good supports through his work colleagues and darwin community, but his family is in [place] - he feels supported but isolated at the same time. He does state he gets panic attacks, and this is complicated by brething difficulties due to his surgeries. He seems to have some good coping skills and uses self-talk, deep breathing to cope. Often after a panic attack he finds he often has a very low emotional state, can become self-critical, etc.    He says he has never had suicidal thoughts, plans or intent, no attempts. He has had suicide in his family, he reports, and has seen the impact on others, and he would never want to do taht to anyone. His darwin gives him hope in the dark moments, and he always works to pray and remember the people who care about him and that he cares about.    He does find motivation and engagement as difficulties. Depressed mood is not the main issue. He can be quite tired during the day, but not able to fall asleep at night. He also wakes a lot during the night. This can lead to a cycle of tiredness, distraction, frustration and sometimes some self-criticism.    Discussed the possibility of meds, did some basic psycho-education about psychiatric medications, encouraged consideration of these options and discussion with his PCP if he is interested.    His first goal is to feel calmer, less overwhelmed, less scattered.  We discussed some basic strategies:    Structuring his day    Including exercise    Meditation/relaxation    Practiced relaxation    Basics of CBT    I will send him some resources by Tali, as well, to explore - relaxation and CBT basics.    I affirmed the steps this patient has taken to address physical and behavioral health issues, and offered continued behavioral health services or referral, now or in the future, as needed by the patient.    Progress on  Treatment Objective(s) / Homework:  Satisfactory Progress-patient was able to engage, reflect, and process thoughts and emotions.     Interventions drawn from:     Psycho-eduction    Provide information regarding mental health diagnosis and treatment options    Motivational Interviewing    Utilize Open-ended questions, empathy, reflective listening: simple and complex, change talk, and reframe    Request permission to raise concern or advise    Support autonomy, collaboration, evocation    Explore change talk and Reframe    Skills Training    Explore skills useful to patient in current situation.    Skills include assertiveness, communication, conflict management, problem-solving, relaxation, etc     Solution-Focused Therapy    Explore patterns in patient's relationships and discuss options for new behaviors.    Explore patterns in patient's actions and choices and discuss options for new behaviors.     Strengths-Based Therapy    Explore strengths of the patient and discuss how they have utilized strengths in previous situations/experiences.     Discuss these strengths and draw from previous experiences to make positive change.     Cognitive-behavioral Therapy    Discuss common cognitive distortions, identify them in patient's life.    Explore ways to challenge, replace, and act against these cognitions.     Acceptance and Commitment Therapy    Explore and identify important values in patient's life.    Discuss ways to commit to behavioral activation around these values.     Psychodynamic psychotherapy    Discuss patient's emotional dynamics and issues and how they impact behaviors.    Explore patient's history of relationships and how they impact present behaviors.    Explore how to work with and make changes in these schemas and patterns.     Narrative Therapy    Explore the patient's story of his/her life from his/her perspective.    Explore alternate ways of understanding their experience, identifying  exceptions, developing new themes.     Interpersonal Psychotherapy    Explore patterns in relationships that are effective or ineffective at helping patient reach their goals, find satisfying experience.    Discuss new patterns or behaviors to engage in for improved social functioning.     Behavioral Activation    Discuss steps patient can take to become more involved in meaningful activity.    Identify barriers to these activities and explore possible solutions.     Mindfulness-Based Strategies    Discuss skills based on development and application of mindfulness.    Skills drawn from compassion-focused therapy, dialectical behavior therapy, mindfulness-based stress reduction, mindfulness-based cognitive therapy, etc.      Care Plan review completed: n/a    Medication Review:  No changes to current psychiatric medication(s)    Medication Compliance:  Yes    Changes in Health Issues:  None reported    Chemical Use Review:   Substance Use: will follow-up at next session     Tobacco Use: will follow-up at next session    Assessment: Current Emotional / Mental Status (status of significant symptoms):  Risk status (Self / Other harm or suicidal ideation)  Patient denies a history of suicidal ideation, suicide attempts, self-injurious behavior, homicidal ideation, homicidal behavior and and other safety concerns  Patient denies current fears or concerns for personal safety.  Patient denies current or recent suicidal ideation or behaviors.  Patient denies current or recent homicidal ideation or behaviors.  Patient denies current or recent self injurious behavior or ideation.  Patient denies other safety concerns.  A safety and risk management plan has not been developed at this time, however patient was encouraged to call [place] [phone] should there be a change in any of these risk factors.    Appearance:   Appropriate   Eye Contact:   Good   Psychomotor Behavior: Normal   Attitude:   Cooperative    Orientation:   All  Speech   Rate / Production: Normal    Volume:  Normal   Mood:    Anxious  Depressed  Normal  Affect:    Appropriate   Thought Content:  Clear   Thought Form:  Coherent  Logical   Insight:    Good     Diagnoses:  1. Generalized anxiety disorder    2. Moderate recurrent major depression (H)    Consider panic disorder    Collateral Reports Completed:  Will collaborate with care team as indicated during treatment    Plan: (Homework, other):  Patient was given information about behavioral services and encouraged to schedule a follow up appointment with the clinic [facility] Next appointment schduled.  He was also given information about mental health symptoms and treatment options .  CD Recommendations: No indications of CD issues.         SILVERIO Abraham, [facility]

## 2020-06-30 ASSESSMENT — PATIENT HEALTH QUESTIONNAIRE - PHQ9: SUM OF ALL RESPONSES TO PHQ QUESTIONS 1-9: 18

## 2020-06-30 ASSESSMENT — ANXIETY QUESTIONNAIRES: GAD7 TOTAL SCORE: 15

## 2020-07-09 ENCOUNTER — VIRTUAL VISIT (OUTPATIENT)
Dept: BEHAVIORAL HEALTH | Facility: CLINIC | Age: 55
End: 2020-07-09

## 2020-07-09 DIAGNOSIS — F41.1 GENERALIZED ANXIETY DISORDER: Primary | ICD-10-CM

## 2020-07-09 DIAGNOSIS — F33.1 MODERATE RECURRENT MAJOR DEPRESSION (H): ICD-10-CM

## 2020-07-09 NOTE — PROGRESS NOTES
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 behavioral activation around these goals   Status: Continued - Date(s): 7/9/2020    Objective #B: Patient will address relationship difficulties in a more adaptive manner  Status: Continued - Date(s): 7/9/2020    Objective #C: Patient will develop coping/problem-solving skills to facilitate more adaptive adjustment and will effectively address problems that interfere with adaptive functioning  Status: Continued - Date(s): 7/9/2020      Possible Therapeutic Intervention(s)  Psycho-education regarding mental health diagnoses and treatment options    Eye-Movement Desensitization and Reprocessing Therapy    Clinical Hypnosis    Skills training    Explore skills useful to client in current situation.    Skills include assertiveness, communication, conflict management, problem-solving, relaxation, etc.    Solution-Focused Therapy    Explore patterns in patient's relationships and discuss options for new behaviors.    Explore patterns in patient's actions and choices and discuss options for new behaviors.    Cognitive-behavioral Therapy    Discuss common cognitive distortions, identify them in patient's life.    Explore ways to challenge, replace, and act against these cognitions.    Acceptance and Commitment  Therapy    Explore and identify important values in patient's life.    Discuss ways to commit to behavioral activation around these values.    Psychodynamic psychotherapy    Discuss patient's emotional dynamics and issues and how they impact behaviors.    Explore patient's history of relationships and how they impact present behaviors.    Explore how to work with and make changes in these schemas and patterns.    Narrative Therapy    Explore the patient's story of his/her life from his/her perspective.    Explore alternate ways of understanding their experience, identifying exceptions, developing new themes.    Interpersonal Psychotherapy    Explore patterns in relationships that are effective or ineffective at helping patient reach their goals, find satisfying experience.    Discuss new patterns or behaviors to engage in for improved social functioning.    Behavioral Activation    Discuss steps patient can take to become more involved in meaningful activity.    Identify barriers to these activities and explore possible solutions.    Mindfulness-Based Strategies    Discuss skills based on development and application of mindfulness.    Skills drawn from compassion-focused therapy, dialectical behavior therapy, mindfulness-based stress reduction, mindfulness-based cognitive therapy, etc.      We have developed these goals together during our work to this point. Patient has assisted in the development of these goals and has agreed to this treatment plan.       SILVERIO Bocanegra, [facility]  July 9, 2020

## 2020-07-20 ENCOUNTER — VIRTUAL VISIT (OUTPATIENT)
Dept: BEHAVIORAL HEALTH | Facility: CLINIC | Age: 55
End: 2020-07-20

## 2020-07-20 DIAGNOSIS — F41.1 GENERALIZED ANXIETY DISORDER: Primary | ICD-10-CM

## 2020-07-20 DIAGNOSIS — F33.1 MODERATE RECURRENT MAJOR DEPRESSION (H): ICD-10-CM
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
## 2020-08-13 ENCOUNTER — VIRTUAL VISIT (OUTPATIENT)
Dept: BEHAVIORAL HEALTH | Facility: CLINIC | Age: 55
End: 2020-08-13

## 2020-08-13 DIAGNOSIS — F41.1 GENERALIZED ANXIETY DISORDER: Primary | ICD-10-CM

## 2020-08-13 DIAGNOSIS — F33.1 MODERATE RECURRENT MAJOR DEPRESSION (H): ICD-10-CM

## 2020-08-13 NOTE — PATIENT INSTRUCTIONS
Panic Disorder Handout     What Is a Panic Attack?   Sometimes we experience a sudden and severe onset of symptoms that can be scary. These symptoms can include some or all of the following:         Pounding heart or increased heart rate        Feeling dizzy, unsteady, lightheaded, or faint          Sweating       Nausea        Feelings of unreality or being detached from yourself          Trembling or shaking        Fear of losing control or going crazy          Shortness of breath        Fear of dying          Feeling of choking        Numbness or tingling          Chest pain        Chills or hot flashes           Although we don t fully understand why some people experience panic attacks and other people don t, we do know that these symptoms are related to a very normal response called the fight-flight response. This response allows our body to react quickly when we think that something is dangerous, such as being attacked or being cut off when we are driving.     How Panic Attacks Affect Our Lives   Because symptoms of a panic attack occur out of the blue, we can become worried about them, and we may begin to avoid situations that we think will result in these panic symptoms, such as crowded stores, public transportation, or driving. What situations have you avoided because of panic attacks?     Changing Thinking Patterns   One of the most important changes associated with decreasing panic attacks is changing how we think. The fear associated with having a panic attack may increase the likelihood of having an attack. Therefore, a willingness to experience a panic attack, knowing that the symptoms, although uncomfortable, will not harm you, is an important aspect of managing the symptoms of panic.        Thinking that increases panic  Thinking that decreases panic    I m having a heart attack!  This is not an emergency.    I m going to die.  This doesn t feel good, but it won t hurt me.    I can t stand this.   I can feel uncomfortable and still be OK.    I have to get out of here.  This will go away with time.    Oh no, here it comes!  I can handle this.      What are the things that you say to yourself that may increase panic symptoms?    What could you say to decrease panic symptoms?     Breathing Retraining   People who have panic attacks show some signs of hyperventilation or overbreathing. When people hyperventilate, certain blood vessels in the body become narrower, which can contribute to numbness or tingling in the hands or feet or the sensation of cold, clammy hands, and increased heart rate. You can help overcome overbreathing by learning breathing control.    Instructions for Breathing Retraining   1. Choose a comfortable quiet location.   2. Count,  One,  on the first breath in, and think,  Relax,  on the breath out.   3. Focus your attention on breathing and counting.   4. Maintain a normal rate and depth of breathing.   5. Expand your abdomen on the breath in and keep your chest still.   6. Continue counting up to 10 and back to 1.   7. Practice two times per day, for 10 minutes each time.    Decreasing Avoidance   Regardless of whether you can identify why you began having panic attacks or whether they seemed to come out of the blue, the places where you began having panic attacks often can become triggers themselves.   To break the cycle of avoidance, it is important to first identify the places or situations that are being avoided and then to do some  relearning.  Just as the negative experience of a panic attack can result in learning to avoid certain locations, having positive, successful experiences can result in learning that the location is nothing to be afraid of.     Which item on your list of avoided locations or situations would you like to target first?     Please list this situation here: _______________________     Now, you can develop a hierarchy for this situation or location. A hierarchy is  a list of situations that result in increasingly higher intensities of anxiety. Develop a list of situations that result in a range of anxiety intensities, that is, some result in low intensity and others result in higher intensities. Then rank order the situation from the lowest to the highest anxiety producing situations. This hierarchy will help guide you as you gradually begin to  expose  yourself to the situation or location that you have been avoiding.
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
## 2020-08-17 ENCOUNTER — TELEPHONE (OUTPATIENT)
Dept: OTOLARYNGOLOGY | Facility: CLINIC | Age: 55
End: 2020-08-17

## 2020-08-17 DIAGNOSIS — C03.9 PRIMARY CANCER OF ALVEOLAR RIDGE MUCOSA (H): ICD-10-CM

## 2020-08-17 DIAGNOSIS — C03.0 SQUAMOUS CELL CARCINOMA OF MAXILLARY ALVEOLAR RIDGE (H): ICD-10-CM

## 2020-08-17 NOTE — TELEPHONE ENCOUNTER
Called patient and LVM.     Informed them that 8/26 appointment with Dr. Atwood has been rescheduled to 8/24 with Dr. Calvillo. Explained that Dr. Calvillo will be seeing all of Dr Treviño s patients for the time being/while Dr Treviño is in interim  position and therefore appointment has been moved. Provided ENT call center number for pt to call back and reschedule if necessary.     Patient is welcome to RS in any available [facility] RETURN slot of Dr. Calvillo s if new appointment date/time will not work for him. Thanks!

## 2020-08-20 NOTE — TELEPHONE ENCOUNTER
gabapentin (NEURONTIN) 300 MG capsule  Last Written Prescription Date:  4/22/20  Last Fill Quantity: 90,   # refills: 3  Last Office Visit :4/22/20  Future Office visit:  8/24/20    Routing refill request to provider for review/approval because: not on protocol

## 2020-08-26 RX ORDER — GABAPENTIN 300 MG/1
300 CAPSULE ORAL 3 TIMES DAILY
Qty: 90 CAPSULE | Refills: 1 | Status: SHIPPED | OUTPATIENT
Start: 2020-08-26 | End: 2020-10-27

## 2020-08-27 DIAGNOSIS — E11.9 TYPE 2 DIABETES MELLITUS WITHOUT COMPLICATION, WITHOUT LONG-TERM CURRENT USE OF INSULIN (H): ICD-10-CM

## 2020-08-31 RX ORDER — LANCETS
EACH MISCELLANEOUS
Qty: 400 EACH | Refills: 3 | Status: SHIPPED | OUTPATIENT
Start: 2020-08-31 | End: 2021-06-09

## 2020-09-08 DIAGNOSIS — C03.9 PRIMARY CANCER OF ALVEOLAR RIDGE MUCOSA (H): ICD-10-CM

## 2020-10-13 DIAGNOSIS — E11.9 TYPE 2 DIABETES MELLITUS WITHOUT COMPLICATION, WITHOUT LONG-TERM CURRENT USE OF INSULIN (H): ICD-10-CM

## 2020-10-23 DIAGNOSIS — C03.9 PRIMARY CANCER OF ALVEOLAR RIDGE MUCOSA (H): ICD-10-CM

## 2020-10-23 DIAGNOSIS — C03.0 SQUAMOUS CELL CARCINOMA OF MAXILLARY ALVEOLAR RIDGE (H): ICD-10-CM

## 2020-10-27 NOTE — TELEPHONE ENCOUNTER
Gabapentin Oral Capsule 300 MG  Last Written Prescription Date:  4/22/2020  Last Fill Quantity: 90,   # refills: 1  Last Office Visit : 4/22/2020  Future Office visit:  None  Routing refill request to provider for review/approval because:  Drug not on the G, P or [facility] refill protocol or controlled substance      Cande Leiva RN  Central Triage Red Flags/Med Refills

## 2020-11-09 ENCOUNTER — MYC REFILL (OUTPATIENT)
Dept: RADIATION ONCOLOGY | Facility: CLINIC | Age: 55
End: 2020-11-09

## 2020-11-09 ENCOUNTER — VIRTUAL VISIT (OUTPATIENT)
Dept: BEHAVIORAL HEALTH | Facility: CLINIC | Age: 55
End: 2020-11-09
Payer: COMMERCIAL

## 2020-11-09 DIAGNOSIS — F33.1 MODERATE RECURRENT MAJOR DEPRESSION (H): ICD-10-CM

## 2020-11-09 DIAGNOSIS — F41.1 GENERALIZED ANXIETY DISORDER: Primary | ICD-10-CM

## 2020-11-09 DIAGNOSIS — C31.0 MAXILLARY SINUS CANCER (H): ICD-10-CM

## 2020-11-09 PROCEDURE — 90834 PSYTX W PT 45 MINUTES: CPT | Mod: GT | Performed by: MARRIAGE & FAMILY THERAPIST

## 2020-11-09 RX ORDER — SODIUM FLUORIDE 5 MG/G
GEL, DENTIFRICE DENTAL AT BEDTIME
Qty: 112 G | Refills: 11 | Status: SHIPPED | OUTPATIENT
Start: 2020-11-09 | End: 2022-02-04

## 2020-11-09 RX ORDER — GABAPENTIN 300 MG/1
300 CAPSULE ORAL 3 TIMES DAILY
Qty: 90 CAPSULE | Refills: 0 | Status: SHIPPED | OUTPATIENT
Start: 2020-11-09 | End: 2020-12-03
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
## 2020-11-23 ENCOUNTER — RECORDS - HEALTHEAST (OUTPATIENT)
Dept: LAB | Facility: CLINIC | Age: 55
End: 2020-11-23

## 2020-11-23 LAB
ANION GAP SERPL CALCULATED.3IONS-SCNC: 11 MMOL/L (ref 5–18)
BUN SERPL-MCNC: 13 MG/DL (ref 8–22)
CALCIUM SERPL-MCNC: 9.6 MG/DL (ref 8.5–10.5)
CHLORIDE BLD-SCNC: 102 MMOL/L (ref 98–107)
CHOLEST SERPL-MCNC: 125 MG/DL
CO2 SERPL-SCNC: 25 MMOL/L (ref 22–31)
CREAT SERPL-MCNC: 0.74 MG/DL (ref 0.7–1.3)
FASTING STATUS PATIENT QL REPORTED: NORMAL
GFR SERPL CREATININE-BSD FRML MDRD: >60 ML/MIN/1.73M2
GLUCOSE BLD-MCNC: 114 MG/DL (ref 70–125)
HDLC SERPL-MCNC: 43 MG/DL
LDLC SERPL CALC-MCNC: 64 MG/DL
POTASSIUM BLD-SCNC: 4.4 MMOL/L (ref 3.5–5)
SODIUM SERPL-SCNC: 138 MMOL/L (ref 136–145)
TRIGL SERPL-MCNC: 91 MG/DL

## 2020-11-30 DIAGNOSIS — C03.0 SQUAMOUS CELL CARCINOMA OF MAXILLARY ALVEOLAR RIDGE (H): ICD-10-CM

## 2020-11-30 DIAGNOSIS — C03.9 PRIMARY CANCER OF ALVEOLAR RIDGE MUCOSA (H): ICD-10-CM

## 2020-12-02 ENCOUNTER — VIRTUAL VISIT (OUTPATIENT)
Dept: BEHAVIORAL HEALTH | Facility: CLINIC | Age: 55
End: 2020-12-02
Payer: COMMERCIAL

## 2020-12-02 DIAGNOSIS — F33.1 MODERATE RECURRENT MAJOR DEPRESSION (H): ICD-10-CM

## 2020-12-02 DIAGNOSIS — F41.1 GENERALIZED ANXIETY DISORDER: Primary | ICD-10-CM

## 2020-12-02 PROCEDURE — 90837 PSYTX W PT 60 MINUTES: CPT | Mod: GT | Performed by: MARRIAGE & FAMILY THERAPIST
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
## 2020-12-03 NOTE — TELEPHONE ENCOUNTER
Gabapentin Oral Capsule 300 MG  Last Written Prescription Date:  11/9/2020  Last Fill Quantity: 90,   # refills: 0  Last Office Visit : 4/22/2020  Future Office visit:  None  Routing refill request to provider for review/approval because:  Drug not on the [facility], [facility] or [facility] refill protocol or controlled substance      Cande Leiva RN  Central Triage Red Flags/Med Refills

## 2020-12-06 ENCOUNTER — MYC REFILL (OUTPATIENT)
Dept: OTOLARYNGOLOGY | Facility: CLINIC | Age: 55
End: 2020-12-06

## 2020-12-06 DIAGNOSIS — C03.0 SQUAMOUS CELL CARCINOMA OF MAXILLARY ALVEOLAR RIDGE (H): ICD-10-CM

## 2020-12-06 DIAGNOSIS — C03.9 PRIMARY CANCER OF ALVEOLAR RIDGE MUCOSA (H): ICD-10-CM

## 2020-12-07 RX ORDER — GABAPENTIN 300 MG/1
300 CAPSULE ORAL 3 TIMES DAILY
Qty: 90 CAPSULE | Refills: 0 | Status: SHIPPED | OUTPATIENT
Start: 2020-12-07 | End: 2021-03-15

## 2020-12-08 NOTE — PROGRESS NOTES
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 He was diagnosed after presenting with a several year history of oral cavity pain and precancerous lesions of the left upper gingiva.  He underwent a left infrastructure maxillectomy and left level I lymph node dissection on 2018, with pathology revealing a 1.3 cm well-differentiated squamous cell carcinoma with 13 mm depth of invasion and involvement of the underlying bone. He received adjuvant radiotherapy alone, as described above, for improved local disease control. Surveillance studies from 2020 showed no evidence of local or distantly recurrent disease.    When we last saw Mr. Tucker 6 months ago he was noted to have persistent but manageable xerostomia, but was otherwise doing well. He continues to work closely with behavioral health services in the management of his depression and generalized anxiety disorder.    Mr. Tucker returns to clinic today for a routine follow-up visit. He states that is generally well, but has persistent dry mouth which he has \"gotten used to.\" He continues to each food of all types and textures and has gained a small amount of weight since last visit. He complains of some mild discomfort on the most anterior part of his hard palate, but otherwise has no concerns. He continues doing his " neck and lymphedema exercises and denies odynophagia or dysphagia. He has 3/10 pain in his back and right leg, which is chronic for him and he ambulates with a cane. He plans to see his dentist tomorrow.    PHYSICAL EXAM:  Weight: 123.4 kg  BP: 127/76    General: Healthy-appearing 54 year old gentleman seated comfortably in an examination chair in [facility]  HEENT: NC/AT. EOMI. No rhinorrhea or epistaxis. Mildly dry mucus membranes. Healthy-appearing free flap covering the left hard palate. There is a small area of tenderness along the anteromedial edge of the palate at the junction of the flap. There are no palpable abnormalities no changes of the overlying mucosa. Remainder of the oral cavity and oropharynx examination is without any lesions or masses.   Pulmonary: No wheezing, stridor or respiratory distress  Skin: Normal color and turgor.    LABS AND IMAGIN2020 TSH: 2.92    IMPRESSION:   Mr. 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 He is 16 months status post completion of adjuvant radiotherapy and is doing well with no clinical signs concerning for recurrent disease.    PLAN:   1. Follow-up in radiation oncology clinic in 3 months with NP and in 6 months with MD  2. Repeat TSH due in 2021  3. Repeat CT neck and chest in 3/2021    Adeel Torres MD-PhD PGY-5  Chief Radiation Oncology Resident, [facility]  [phone]      Attending addendum:   I saw and examined the patient with the resident and agree with the documented plan of care.    Emerson Verdin MD/PhD    Dept of Radiation Oncology  [facility]

## 2020-12-09 ENCOUNTER — OFFICE VISIT (OUTPATIENT)
Dept: RADIATION ONCOLOGY | Facility: CLINIC | Age: 55
End: 2020-12-09
Attending: RADIOLOGY
Payer: COMMERCIAL

## 2020-12-09 VITALS — DIASTOLIC BLOOD PRESSURE: 76 MMHG | WEIGHT: 272 LBS | SYSTOLIC BLOOD PRESSURE: 127 MMHG | BODY MASS INDEX: 42.69 KG/M2

## 2020-12-09 DIAGNOSIS — E03.9 HYPOTHYROIDISM, UNSPECIFIED TYPE: ICD-10-CM

## 2020-12-09 DIAGNOSIS — C31.0 MAXILLARY SINUS CANCER (H): Primary | ICD-10-CM

## 2020-12-09 LAB — TSH SERPL DL<=0.005 MIU/L-ACNC: 2.92 MU/L (ref 0.4–4)

## 2020-12-09 PROCEDURE — G0463 HOSPITAL OUTPT CLINIC VISIT: HCPCS | Mod: 25 | Performed by: RADIOLOGY

## 2020-12-09 PROCEDURE — 84443 ASSAY THYROID STIM HORMONE: CPT | Performed by: STUDENT IN AN ORGANIZED HEALTH CARE EDUCATION/TRAINING PROGRAM

## 2020-12-09 PROCEDURE — 36415 COLL VENOUS BLD VENIPUNCTURE: CPT | Performed by: STUDENT IN AN ORGANIZED HEALTH CARE EDUCATION/TRAINING PROGRAM

## 2020-12-09 NOTE — PROGRESS NOTES
FOLLOW-UP VISIT    Patient Name: Devonte Tucker      : 1965     Age: 54 year old        ______________________________________________________________________________     Chief Complaint   Patient presents with     Cancer     Pt is here for a follow up:Maxillary Sinus Cancer: Left max aveolar ridge 6000 cGy completed 19     /76   Wt 123.4 kg (272 lb)   BMI 42.69 kg/m         Pain  Denies    Labs  Other Labs: Yes: today    Imaging  None      Dental:   Most Recent Dental Visit: Yes, tomorrow  Trays: Frequency Yes    Speech/Swallowing:   Most Recent evaluation or testing: No  Swallowing Restrictions: No difficulties with swallowing    Trismus/Jaw Exercises: sometimes    Nutrition:    Weight:   Wt Readings from Last 3 Encounters:   20 123.4 kg (272 lb)   20 113.4 kg (250 lb)   20 115.7 kg (255 lb)         Oral Symptoms:   Xerostomia:1- Symptomatic without significant dietary alteration; unstimulated saliva flow >0.2 ml/min  Dysphagia: 0-None  Mucositis Oral Symptoms: 0-None  Mucositis: 0- None  Esophagitis:0- None    Other Appointments:     MD Name:  Appointment Date:    MD Name: Appointment Date:   MD Name: Appointment Date:   Other Appointment Notes:     Residual Radiation side effect: Dry mouth     Additional Instructions:     Nurse face-to-face time: Level 3:  10 min face to face time

## 2020-12-09 NOTE — LETTER
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 He was diagnosed after presenting with a several year history of oral cavity pain and precancerous lesions of the left upper gingiva.  He underwent a left infrastructure maxillectomy and left level I lymph node dissection on 2018, with pathology revealing a 1.3 cm well-differentiated squamous cell carcinoma with 13 mm depth of invasion and involvement of the underlying bone. He received adjuvant radiotherapy alone, as described above, for improved local disease control. Surveillance studies from 2020 showed no evidence of local or distantly recurrent disease.    When we last saw Mr. Tucker 6 months ago he was noted to have persistent but manageable xerostomia, but was otherwise doing well. He continues to work closely with behavioral health services in the management of his depression and generalized anxiety disorder.    Mr. Tucker returns to clinic today for a routine follow-up visit. He states that is generally well, but has persistent dry mouth which he has \"gotten used to.\" He continues to each food of all types and textures and has gained a small amount of weight since last visit. He complains of some mild discomfort on the " most anterior part of his hard palate, but otherwise has no concerns. He continues doing his neck and lymphedema exercises and denies odynophagia or dysphagia. He has 3/10 pain in his back and right leg, which is chronic for him and he ambulates with a cane. He plans to see his dentist tomorrow.    PHYSICAL EXAM:  Weight: 123.4 kg  BP: 127/76    General: Healthy-appearing 54 year old gentleman seated comfortably in an examination chair in [facility]  HEENT: NC/AT. EOMI. No rhinorrhea or epistaxis. Mildly dry mucus membranes. Healthy-appearing free flap covering the left hard palate. There is a small area of tenderness along the anteromedial edge of the palate at the junction of the flap. There are no palpable abnormalities no changes of the overlying mucosa. Remainder of the oral cavity and oropharynx examination is without any lesions or masses.   Pulmonary: No wheezing, stridor or respiratory distress  Skin: Normal color and turgor.    LABS AND IMAGIN2020 TSH: 2.92    IMPRESSION:   Mr. 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 He is 16 months status post completion of adjuvant radiotherapy and is doing well with no clinical signs concerning for recurrent disease.    PLAN:   1. Follow-up in radiation oncology clinic in 3 months with NP and in 6 months with MD  2. Repeat TSH due in 2021  3. Repeat CT neck and chest in 3/2021    Adeel Torres MD-PhD PGY-5  Chief Radiation Oncology Resident, [facility]  [phone]      Attending addendum:   I saw and examined the patient with the resident and agree with the documented plan of care.    Emerson Verdin MD/PhD    Dept of Radiation Oncology  [facility]        FOLLOW-UP VISIT    Patient Name: Devonte Tucker      : 1965     Age: 54 year old         ______________________________________________________________________________     Chief Complaint   Patient presents with     Cancer     Pt is here for a follow up:Maxillary Sinus Cancer: Left max aveolar ridge 6000 cGy completed 07/22/19     /76   Wt 123.4 kg (272 lb)   BMI 42.69 kg/m         Pain  Denies    Labs  Other Labs: Yes: today    Imaging  None      Dental:   Most Recent Dental Visit: Yes, tomorrow  Trays: Frequency Yes    Speech/Swallowing:   Most Recent evaluation or testing: No  Swallowing Restrictions: No difficulties with swallowing    Trismus/Jaw Exercises: sometimes    Nutrition:    Weight:   Wt Readings from Last 3 Encounters:   12/09/20 123.4 kg (272 lb)   04/22/20 113.4 kg (250 lb)   03/12/20 115.7 kg (255 lb)         Oral Symptoms:   Xerostomia:1- Symptomatic without significant dietary alteration; unstimulated saliva flow >0.2 ml/min  Dysphagia: 0-None  Mucositis Oral Symptoms: 0-None  Mucositis: 0- None  Esophagitis:0- None    Other Appointments:     MD Name:  Appointment Date:    MD Name: Appointment Date:   MD Name: Appointment Date:   Other Appointment Notes:     Residual Radiation side effect: Dry mouth     Additional Instructions:     Nurse face-to-face time: Level 3:  10 min face to face time        Emerson Verdin MD

## 2020-12-11 ENCOUNTER — TELEPHONE (OUTPATIENT)
Dept: OTOLARYNGOLOGY | Facility: CLINIC | Age: 55
End: 2020-12-11

## 2020-12-11 NOTE — TELEPHONE ENCOUNTER
"LVM after being unable to reach patient on multiple attempts. Left ENT scheduling number for patient to call and make appointment.      SCHEDULING INSTRUCTIONS: Please schedule patient for next available in clinic visit w/Dr. Atwood per patient's schedule. Please include in notes \"ongoing surveillance\"     Thank you.   "

## 2020-12-14 ENCOUNTER — TELEPHONE (OUTPATIENT)
Dept: OTOLARYNGOLOGY | Facility: CLINIC | Age: 55
End: 2020-12-14

## 2020-12-14 NOTE — TELEPHONE ENCOUNTER
LVM after being unable to reach patient on multiple attempts. Left ENT and Pod 4J number for patient to call and make appointment.       SCHEDULING INSTRUCTIONS: Please schedule patient for follow up w/Dr Atwood for next available follow up appointment.

## 2020-12-15 RX ORDER — GABAPENTIN 300 MG/1
300 CAPSULE ORAL 3 TIMES DAILY
Qty: 90 CAPSULE | Refills: 0 | Status: SHIPPED | OUTPATIENT
Start: 2020-12-15 | End: 2021-02-24

## 2021-01-03 ENCOUNTER — HEALTH MAINTENANCE LETTER (OUTPATIENT)
Age: 56
End: 2021-01-03

## 2021-01-07 ENCOUNTER — VIRTUAL VISIT (OUTPATIENT)
Dept: BEHAVIORAL HEALTH | Facility: CLINIC | Age: 56
End: 2021-01-07
Payer: COMMERCIAL

## 2021-01-07 DIAGNOSIS — F41.1 GENERALIZED ANXIETY DISORDER: Primary | ICD-10-CM

## 2021-01-07 DIAGNOSIS — F33.1 MODERATE RECURRENT MAJOR DEPRESSION (H): ICD-10-CM

## 2021-01-07 PROCEDURE — 90837 PSYTX W PT 60 MINUTES: CPT | Mod: GT | Performed by: MARRIAGE & FAMILY THERAPIST

## 2021-01-07 ASSESSMENT — ANXIETY QUESTIONNAIRES
4. TROUBLE RELAXING: SEVERAL DAYS
1. FEELING NERVOUS, ANXIOUS, OR ON EDGE: MORE THAN HALF THE DAYS
7. FEELING AFRAID AS IF SOMETHING AWFUL MIGHT HAPPEN: NOT AT ALL
5. BEING SO RESTLESS THAT IT IS HARD TO SIT STILL: MORE THAN HALF THE DAYS
2. NOT BEING ABLE TO STOP OR CONTROL WORRYING: SEVERAL DAYS
7. FEELING AFRAID AS IF SOMETHING AWFUL MIGHT HAPPEN: NOT AT ALL
6. BECOMING EASILY ANNOYED OR IRRITABLE: SEVERAL DAYS
3. WORRYING TOO MUCH ABOUT DIFFERENT THINGS: SEVERAL DAYS
GAD7 TOTAL SCORE: 8
GAD7 TOTAL SCORE: 8

## 2021-01-07 ASSESSMENT — PATIENT HEALTH QUESTIONNAIRE - PHQ9
SUM OF ALL RESPONSES TO PHQ QUESTIONS 1-9: 12
10. IF YOU CHECKED OFF ANY PROBLEMS, HOW DIFFICULT HAVE THESE PROBLEMS MADE IT FOR YOU TO DO YOUR WORK, TAKE CARE OF THINGS AT HOME, OR GET ALONG WITH OTHER PEOPLE: SOMEWHAT DIFFICULT
SUM OF ALL RESPONSES TO PHQ QUESTIONS 1-9: 12
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
## 2021-01-08 ASSESSMENT — ANXIETY QUESTIONNAIRES: GAD7 TOTAL SCORE: 8

## 2021-01-08 ASSESSMENT — PATIENT HEALTH QUESTIONNAIRE - PHQ9: SUM OF ALL RESPONSES TO PHQ QUESTIONS 1-9: 12

## 2021-02-23 DIAGNOSIS — E11.9 TYPE 2 DIABETES MELLITUS WITHOUT COMPLICATION, WITHOUT LONG-TERM CURRENT USE OF INSULIN (H): ICD-10-CM

## 2021-02-23 NOTE — TELEPHONE ENCOUNTER
metFORMIN (GLUCOPHAGE) 1000 MG tablet  Last Written Prescription Date:  1/27/2020  Last Fill Quantity: 90,   # refills: 4  Last Office Visit : 6/8/2020  Future Office visit:  None    Routing refill request to provider for review/approval because:  Over due A1C & Creatinine Labs  90 day pended  Provider notified       Cande Leiva RN  Central Triage Red Flags/Med Refills

## 2021-02-24 ENCOUNTER — OFFICE VISIT (OUTPATIENT)
Dept: OTOLARYNGOLOGY | Facility: CLINIC | Age: 56
End: 2021-02-24
Payer: COMMERCIAL

## 2021-02-24 ENCOUNTER — VIRTUAL VISIT (OUTPATIENT)
Dept: BEHAVIORAL HEALTH | Facility: CLINIC | Age: 56
End: 2021-02-24
Payer: COMMERCIAL

## 2021-02-24 VITALS
HEART RATE: 92 BPM | TEMPERATURE: 98.4 F | SYSTOLIC BLOOD PRESSURE: 134 MMHG | OXYGEN SATURATION: 97 % | DIASTOLIC BLOOD PRESSURE: 84 MMHG | BODY MASS INDEX: 44.29 KG/M2 | HEIGHT: 67 IN | RESPIRATION RATE: 17 BRPM | WEIGHT: 282.19 LBS

## 2021-02-24 DIAGNOSIS — C03.0 SQUAMOUS CELL CARCINOMA OF MAXILLARY ALVEOLAR RIDGE (H): Primary | ICD-10-CM

## 2021-02-24 DIAGNOSIS — F33.1 MODERATE RECURRENT MAJOR DEPRESSION (H): ICD-10-CM

## 2021-02-24 DIAGNOSIS — F41.1 GENERALIZED ANXIETY DISORDER: Primary | ICD-10-CM

## 2021-02-24 DIAGNOSIS — E66.01 MORBID OBESITY (H): ICD-10-CM

## 2021-02-24 DIAGNOSIS — R04.0 EPISTAXIS: ICD-10-CM

## 2021-02-24 PROCEDURE — 90834 PSYTX W PT 45 MINUTES: CPT | Mod: GT | Performed by: MARRIAGE & FAMILY THERAPIST

## 2021-02-24 PROCEDURE — 99213 OFFICE O/P EST LOW 20 MIN: CPT | Performed by: OTOLARYNGOLOGY

## 2021-02-24 ASSESSMENT — PAIN SCALES - GENERAL: PAINLEVEL: SEVERE PAIN (7)

## 2021-02-24 ASSESSMENT — MIFFLIN-ST. JEOR: SCORE: 2068.63
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
## 2021-02-24 NOTE — NURSING NOTE
"Chief Complaint   Patient presents with     RECHECK     ongoing surveilance      Blood pressure 134/84, pulse 92, temperature 98.4  F (36.9  C), resp. rate 17, height 1.702 m (5' 7\"), weight 128 kg (282 lb 3 oz), SpO2 97 %.    Andres Purcell LPN    "

## 2021-02-24 NOTE — LETTER
2/24/2021       RE: Devonte Tucker  [address] [place] [address]     Dear Colleague,    Thank you for referring your patient, Devonte Tucker, to the [facility] [place] at [facility]. Please see a copy of my visit note below.    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  Dr. Treviño performed a left palatectomy on 5/9/2019 with Dr. Atwood performing a left radial forearm free flap reconstruction.  He had postop radiation therapy completed on 7/22/2019, 6000 cGy.  He has been watched carefully due to an indeterminate area of focal uptake along the right peroneal hard palate and there has been no area of concern on exam, however.    Interval History:   Patient comes in today for routine surveillance. He continues to do well but does have persistent xerostomia and nasal congestion/epistaxis. He feels that he is managing his xerostomia as best he can with hydration and saliva medication. He continues to use saline spray and nba gel to his nose. He is also using a nasal rinse to both nares. Due to the dry weather, he is having more bloody noses and this is bothersome to him.  He is most concerned with persistent pain in the anterior hard palate directly behind tooth #8.     He denies any odynophagia, dysphagia, otalgia, new hoarseness, hemoptysis, bleeding from the mouth or new lumps/bumps.    Past Medical History:  Past Medical History:   Diagnosis Date     Diabetes (H)      Hypertension      Maxillary sinus cancer (H)      Obesity        Past Surgical History:  Past Surgical History:   Procedure Laterality Date     ------------OTHER-------------      Mucosa and bone biopsy     EXPLORE NECK Left 5/9/2019    Procedure: Left Neck Exploration;   Surgeon: Jett Treviño MD;  Location: [facility] OR     EXTRACTION(S) DENTAL      x2 under general anesthesia     GRAFT FREE VASCULARIZED (LOCATION) Right 5/9/2019    Procedure: Left Radial Forearm Free Flap (soft tissue);  Surgeon: Sumit Atwood MD;  Location: [facility] OR     GRAFT SKIN SPLIT THICKNESS FROM EXTREMITY Right 5/9/2019    Procedure: Graft skin split thickness from right thigh, nasogastric feeding tube placement;  Surgeon: Sumit Atwood MD;  Location: [facility] OR     IR LYMPH NODE BIOPSY  4/4/2019     OSTEOTOMY MAXILLA Left 5/9/2019    Procedure: Left Infrastructure Maxillectomy,;  Surgeon: Jett Treviño MD;  Location: [facility] OR     Medications:  Current Outpatient Medications   Medication Sig Dispense Refill     acetaminophen (TYLENOL) 325 MG tablet Take 2 tablets (650 mg) by mouth every 4 hours as needed for mild pain 100 tablet 0     aspirin (ASA) 325 MG tablet Take 1 tablet (325 mg) by mouth daily 30 tablet 0     atorvastatin (LIPITOR) 20 MG tablet 1 tablet (20 mg) by Per Feeding Tube route every morning       blood glucose (NO BRAND SPECIFIED) test strip Use to test blood sugar 4 times daily or as directed. To accompany: Blood Glucose Monitor Brands: per insurance. 100 strip 6     cevimeline (EVOXAC) 30 MG capsule Take 1 capsule (30 mg) by mouth 3 times daily 90 capsule 11     doxazosin (CARDURA) 1 MG tablet Take 1 tablet (1 mg) by mouth daily 30 tablet 0     gabapentin (NEURONTIN) 300 MG capsule Take 1 capsule (300 mg) by mouth 3 times daily 90 capsule 0     lisinopril (PRINIVIL/ZESTRIL) 10 MG tablet 1 tablet (10 mg) by Per Feeding Tube route every morning       metFORMIN (GLUCOPHAGE) 1000 MG tablet Take 1 tablet (1,000 mg) by mouth 2 times daily (with meals) 60 tablet 0     multivitamin w/minerals (THERA-VIT-M) tablet Take 1 tablet by mouth daily 30 tablet 0     omeprazole (PRILOSEC) 20 MG DR capsule Take 1 capsule (20 mg) by mouth daily 30 capsule 3     sodium fluoride dental gel (PREVIDENT) 1.1 % GEL topical  "gel Apply to affected area At Bedtime 112 g 11     thin (NO BRAND SPECIFIED) lancets Use to test blood sugar 4 times daily or as directed. To accompany: Blood Glucose Monitor Brands: per insurance. 400 each 3       Allergies:  Allergies   Allergen Reactions     Seasonal Allergies Other (See Comments) and Shortness Of Breath        Social History:  Social History     Tobacco Use     Smoking status: Never Smoker     Smokeless tobacco: Never Used   Substance Use Topics     Alcohol use: No     Frequency: Never     Drug use: No     ROS: 10 point ROS neg other than the symptoms noted above in the HPI.    Physical Exam:    /84   Pulse 92   Temp 98.4  F (36.9  C)   Resp 17   Ht 1.702 m (5' 7\")   Wt 128 kg (282 lb 3 oz)   SpO2 97%   BMI 44.20 kg/m    Wt Readings from Last 3 Encounters:   02/24/21 128 kg (282 lb 3 oz)   12/09/20 123.4 kg (272 lb)   04/22/20 113.4 kg (250 lb)        Constitutional:  The patient was unaccompanied, well-groomed, and in no acute distress.     Skin: Normal:  warm and pink without rash    Neurologic: Alert and oriented x 3.    Psychiatric: The patient's affect was calm, cooperative, and appropriate.     Communication:  Normal; communicates verbally, normal voice quality.    Respiratory: Breathing comfortably without stridor or exertion of accessory muscles.    Head/Face:  Normocephalic and atraumatic.  No lesions or scars.   Salivary glands -  Normal size, no tenderness, swelling, or palpable masses    Nose:  Anterior rhinoscopy revealed midline septum and absence of purulence or polyps. Mild crusting in left nare.    Oral Cavity: Healthy appearing flap in left maxilla. White mucosalized area along medial portion. Tenderness with palpation around tooth #8 Normal tongue, floor of mouth, and  buccal mucosa.  No lesions or masses on inspection or palpation.     Oropharynx: Normal mucosa, palate symmetric with normal elevation. No abnormal lymph tissue in the oropharynx.    Neck: Supple " with normal laryngeal and tracheal landmarks.  The parotid beds were without masses.  No palpable thyroid.  Normal range of motion   Lymphatic: There is no palpable lymphadenopathy in the neck.        Results Reviewed:    Imagin20  CT neck  Impression: In this patient with history of left maxilla alveolar  ridge squamous cell carcinoma, status post surgery and adjuvant  chemotherapy:  1. No evidence for residual or metastatic disease in the neck.  2. Stable postsurgical changes of left maxillary alveolar ridge  resection and neck dissection.    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 He is doing well with no evidence of disease on today's exam. Pain in right palate surrounds tooth #8. Recommend that patient be evaluated by dentist to assess tooth. If pain persists, patient should return to clinic for further evaluation.     Surveillance CT scans scheduled  3/25/2021. Plan for follow up in 4 months.    Reviewed signs and symptoms that would necessitate a sooner exam including new sore throat, mouth pain, ear pain, dysphagia, bleeding from the mouth or lumps/bumps of the neck.    Regarding epistaxis, recommend that patient continue nba gel to nare to decrease dryness that can be associated with the dry winter environment.    Marie Coburn DNP, APRN, CNP  Otolaryngology  Head & Neck Surgery  [phone]      Smuit Atwood M.D.

## 2021-02-24 NOTE — PROGRESS NOTES
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  Dr. Treviño performed a left palatectomy on 5/9/2019 with Dr. Atwood performing a left radial forearm free flap reconstruction.  He had postop radiation therapy completed on 7/22/2019, 6000 cGy.  He has been watched carefully due to an indeterminate area of focal uptake along the right peroneal hard palate and there has been no area of concern on exam, however.    Interval History:   Patient comes in today for routine surveillance. He continues to do well but does have persistent xerostomia and nasal congestion/epistaxis. He feels that he is managing his xerostomia as best he can with hydration and saliva medication. He continues to use saline spray and nab gel to his nose. He is also using a nasal rinse to both nares. Due to the dry weather, he is having more bloody noses and this is bothersome to him.  He is most concerned with persistent pain in the anterior hard palate directly behind tooth #8.     He denies any odynophagia, dysphagia, otalgia, new hoarseness, hemoptysis, bleeding from the mouth or new lumps/bumps.    Past Medical History:  Past Medical History:   Diagnosis Date     Diabetes (H)      Hypertension      Maxillary sinus cancer (H)      Obesity        Past Surgical History:  Past Surgical History:   Procedure Laterality Date     ------------OTHER-------------      Mucosa and bone biopsy     EXPLORE NECK Left 5/9/2019    Procedure: Left Neck Exploration;  Surgeon: Jett Treviño MD;  Location: [facility] OR     EXTRACTION(S) DENTAL      x2 under general anesthesia     GRAFT FREE VASCULARIZED (LOCATION) Right 5/9/2019    Procedure: Left Radial Forearm Free Flap (soft tissue);  Surgeon: Sumit Atwood MD;  Location: [facility] OR     GRAFT SKIN SPLIT THICKNESS FROM EXTREMITY Right 5/9/2019     Procedure: Graft skin split thickness from right thigh, nasogastric feeding tube placement;  Surgeon: Sumit Atwood MD;  Location: [facility] OR     IR LYMPH NODE BIOPSY  4/4/2019     OSTEOTOMY MAXILLA Left 5/9/2019    Procedure: Left Infrastructure Maxillectomy,;  Surgeon: Jett Treviño MD;  Location: [facility] OR     Medications:  Current Outpatient Medications   Medication Sig Dispense Refill     acetaminophen (TYLENOL) 325 MG tablet Take 2 tablets (650 mg) by mouth every 4 hours as needed for mild pain 100 tablet 0     aspirin (ASA) 325 MG tablet Take 1 tablet (325 mg) by mouth daily 30 tablet 0     atorvastatin (LIPITOR) 20 MG tablet 1 tablet (20 mg) by Per Feeding Tube route every morning       blood glucose (NO BRAND SPECIFIED) test strip Use to test blood sugar 4 times daily or as directed. To accompany: Blood Glucose Monitor Brands: per insurance. 100 strip 6     cevimeline (EVOXAC) 30 MG capsule Take 1 capsule (30 mg) by mouth 3 times daily 90 capsule 11     doxazosin (CARDURA) 1 MG tablet Take 1 tablet (1 mg) by mouth daily 30 tablet 0     gabapentin (NEURONTIN) 300 MG capsule Take 1 capsule (300 mg) by mouth 3 times daily 90 capsule 0     lisinopril (PRINIVIL/ZESTRIL) 10 MG tablet 1 tablet (10 mg) by Per Feeding Tube route every morning       metFORMIN (GLUCOPHAGE) 1000 MG tablet Take 1 tablet (1,000 mg) by mouth 2 times daily (with meals) 60 tablet 0     multivitamin w/minerals (THERA-VIT-M) tablet Take 1 tablet by mouth daily 30 tablet 0     omeprazole (PRILOSEC) 20 MG DR capsule Take 1 capsule (20 mg) by mouth daily 30 capsule 3     sodium fluoride dental gel (PREVIDENT) 1.1 % GEL topical gel Apply to affected area At Bedtime 112 g 11     thin (NO BRAND SPECIFIED) lancets Use to test blood sugar 4 times daily or as directed. To accompany: Blood Glucose Monitor Brands: per insurance. 400 each 3       Allergies:  Allergies   Allergen Reactions     Seasonal Allergies Other (See Comments) and Shortness Of Breath     "    Social History:  Social History     Tobacco Use     Smoking status: Never Smoker     Smokeless tobacco: Never Used   Substance Use Topics     Alcohol use: No     Frequency: Never     Drug use: No     ROS: 10 point ROS neg other than the symptoms noted above in the HPI.    Physical Exam:    /84   Pulse 92   Temp 98.4  F (36.9  C)   Resp 17   Ht 1.702 m (5' 7\")   Wt 128 kg (282 lb 3 oz)   SpO2 97%   BMI 44.20 kg/m    Wt Readings from Last 3 Encounters:   21 128 kg (282 lb 3 oz)   20 123.4 kg (272 lb)   20 113.4 kg (250 lb)        Constitutional:  The patient was unaccompanied, well-groomed, and in no acute distress.     Skin: Normal:  warm and pink without rash    Neurologic: Alert and oriented x 3.    Psychiatric: The patient's affect was calm, cooperative, and appropriate.     Communication:  Normal; communicates verbally, normal voice quality.    Respiratory: Breathing comfortably without stridor or exertion of accessory muscles.    Head/Face:  Normocephalic and atraumatic.  No lesions or scars.   Salivary glands -  Normal size, no tenderness, swelling, or palpable masses    Nose:  Anterior rhinoscopy revealed midline septum and absence of purulence or polyps. Mild crusting in left nare.    Oral Cavity: Healthy appearing flap in left maxilla. White mucosalized area along medial portion. Tenderness with palpation around tooth #8 Normal tongue, floor of mouth, and  buccal mucosa.  No lesions or masses on inspection or palpation.     Oropharynx: Normal mucosa, palate symmetric with normal elevation. No abnormal lymph tissue in the oropharynx.    Neck: Supple with normal laryngeal and tracheal landmarks.  The parotid beds were without masses.  No palpable thyroid.  Normal range of motion   Lymphatic: There is no palpable lymphadenopathy in the neck.        Results Reviewed:    Imagin20  CT neck  Impression: In this patient with history of left maxilla alveolar  ridge " squamous cell carcinoma, status post surgery and adjuvant  chemotherapy:  1. No evidence for residual or metastatic disease in the neck.  2. Stable postsurgical changes of left maxillary alveolar ridge  resection and neck dissection.    Labs:  TSH   Date Value Ref Range Status   12/09/2020 2.92 0.40 - 4.00 mU/L Final       Assessment/Plan:  1. 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 He is doing well with no evidence of disease on today's exam. Pain in right palate surrounds tooth #8. Recommend that patient be evaluated by dentist to assess tooth. If pain persists, patient should return to clinic for further evaluation.     Surveillance CT scans scheduled  3/25/2021. Plan for follow up in 4 months.    Reviewed signs and symptoms that would necessitate a sooner exam including new sore throat, mouth pain, ear pain, dysphagia, bleeding from the mouth or lumps/bumps of the neck.    Regarding epistaxis, recommend that patient continue nba gel to nare to decrease dryness that can be associated with the dry winter environment.    Marie Coburn DNP, APRN, CNP  Otolaryngology  Head & Neck Surgery  [phone]      Sumit Atwood M.D.

## 2021-02-26 PROBLEM — E66.01 MORBID OBESITY (H): Status: ACTIVE | Noted: 2021-02-26

## 2021-02-26 PROBLEM — E11.9 DIABETES MELLITUS, TYPE 2 (H): Status: ACTIVE | Noted: 2021-02-26

## 2021-03-12 NOTE — PROGRESS NOTES
Devonte is a 55 year old who is being evaluated via a billable video visit.      How would you like to obtain your AVS? MyChart    Will anyone else be joining your video visit? No    Brenda Byers MA    Outcome for 03/12/21 11:20 AM :Glucose data sent in [employer] Message

## 2021-03-12 NOTE — PATIENT INSTRUCTIONS
- check your blood sugars three times daily with meals, and send me the data at your earliest convenience next week.   -we will touch base regarding your labs, and your blood sugars, in case we need to make adjustments    _____________________________________________________    We appreciate your assistance in coordinating your healthcare.     Please upload your insulin pump, blood sugar meter and/or continuous glucose monitor at home 1-2 days before your next diabetes-related appointment.   This will allow your provider to review your  data before your scheduled virtual visit.    To ask a question to your Endocrine care team, please send them a [employer] message, or reach them by phone at [phone]     To expedite your medication refill(s), please contact your pharmacy and have them   fax a refill request to: [phone].  *Please allow 3 business days for routine medication refills.  *Please allow 5 business days for controlled substance medication refills.    For after-hours urgent Endocrine issues, that do not require 911, please dial [phone], and ask to speak with the Endocrinologist On-Call

## 2021-03-15 ENCOUNTER — VIRTUAL VISIT (OUTPATIENT)
Dept: ENDOCRINOLOGY | Facility: CLINIC | Age: 56
End: 2021-03-15
Payer: COMMERCIAL

## 2021-03-15 DIAGNOSIS — C03.0 SQUAMOUS CELL CARCINOMA OF MAXILLARY ALVEOLAR RIDGE (H): ICD-10-CM

## 2021-03-15 DIAGNOSIS — C03.9 PRIMARY CANCER OF ALVEOLAR RIDGE MUCOSA (H): ICD-10-CM

## 2021-03-15 DIAGNOSIS — E11.9 TYPE 2 DIABETES MELLITUS WITHOUT COMPLICATION, WITHOUT LONG-TERM CURRENT USE OF INSULIN (H): ICD-10-CM

## 2021-03-15 PROCEDURE — 99215 OFFICE O/P EST HI 40 MIN: CPT | Mod: 95 | Performed by: PHYSICIAN ASSISTANT

## 2021-03-15 RX ORDER — GABAPENTIN 300 MG/1
300 CAPSULE ORAL 3 TIMES DAILY
Qty: 90 CAPSULE | Refills: 0 | Status: SHIPPED | OUTPATIENT
Start: 2021-03-15 | End: 2021-04-20

## 2021-03-15 RX ORDER — ATORVASTATIN CALCIUM 20 MG/1
20 TABLET, FILM COATED ORAL EVERY MORNING
Qty: 30 TABLET | Refills: 3 | Status: SHIPPED | OUTPATIENT
Start: 2021-03-15 | End: 2021-07-20

## 2021-03-15 RX ORDER — LISINOPRIL 10 MG/1
10 TABLET ORAL EVERY MORNING
Qty: 30 TABLET | Refills: 3 | Status: SHIPPED | OUTPATIENT
Start: 2021-03-15

## 2021-03-15 NOTE — LETTER
3/15/2021       RE: Devonte Tucker  [address]  [place] [address]     Dear Colleague,    Thank you for referring your patient, Devonte Tucker, to the [facility] [place] [facility]. Please see a copy of my visit note below.    Devonte is a 55 year old who is being evaluated via a billable video visit.      How would you like to obtain your AVS? MyChart    Will anyone else be joining your video visit? No    Brenda Byers MA    Outcome for 21 11:20 AM :Glucose data sent in [employer] Message      [facility]- Outpatient Diabetes Follow up  Devonte is a 53 year old male with TIIDM, obesity, HTN, and invasive squamous cell carcinoma of the left alveolar ridge with invasion of the maxilla, who was hospitalized -19 for left maxillectomy, left radial forearm free flap, left neck exploration, and skin grafting from left thigh to left forearm. Post operatively, he required NGT placement for enteral feeding, and experienced hyperglycemia related to enteral feeding.    Devonte is doing well. Continues to work from home, but does need to come into the office occasionally.   BG data he sent looks good. He incidentally reported that, with some spot checks midday, he is seeing BG over 200, highest was 270. He reports some dietary indiscretion that he can work on. Stationary biking daily, about 20 minutes, and will begin walking regimen as weather is getting nicer.     PET scan next week for surveillance of cancer.     Pt denies headaches, visual changes, vomiting, SOB at rest, chest pain, abd pain, nausea/vomiting, diarrhea, dysuria, hematuria or foot ulcers.     Current Diabetes Regimen:   Metformin IR 1000mg BID    Glucometer Settings   AM B-141  PM B-124  Spot checks after eating/afternoon are elevated.     Weight: 255 lbs, from 247 recently  Physical Activity: stationary exercise bike 3x  weekly  Nutrition: three meals a day with more variety of foods tolerated.     Diabetes Care  Retinopathy: recent eye appt 11/2020.    Nephropathy: No hx microalbuminuria. Creatinine 0.8 (stable). Taking ACEi/ARB: yes    Neuropathy: yes.    Lipids: no recent labs. Taking statin: yes, ASA: yes  LDL Cholesterol Calculated   Date Value Ref Range Status   07/01/2019 41 <100 mg/dL Final     Comment:     Desirable:       <100 mg/dl        ROS: 10 point review of systems completed; pertinent positives and negatives documented in HPI    Allergies  Allergies   Allergen Reactions     Seasonal Allergies Other (See Comments) and Shortness Of Breath       Medications  Current Outpatient Medications   Medication Sig Dispense Refill     acetaminophen (TYLENOL) 325 MG tablet Take 2 tablets (650 mg) by mouth every 4 hours as needed for mild pain 100 tablet 0     aspirin (ASA) 325 MG tablet Take 1 tablet (325 mg) by mouth daily 30 tablet 0     atorvastatin (LIPITOR) 20 MG tablet Take 1 tablet (20 mg) by mouth every morning 30 tablet 3     blood glucose (NO BRAND SPECIFIED) test strip Use to test blood sugar 4 times daily or as directed. 200 strip 3     blood glucose (NO BRAND SPECIFIED) test strip Use to test blood sugar 4 times daily or as directed. To accompany: Blood Glucose Monitor Brands: per insurance. 100 strip 6     blood glucose monitoring (ANDREW MICROLET) lancets Use to test blood sugar 4 times daily or as directed. 200 each 3     cevimeline (EVOXAC) 30 MG capsule Take 1 capsule (30 mg) by mouth 3 times daily 90 capsule 11     doxazosin (CARDURA) 1 MG tablet Take 1 tablet (1 mg) by mouth daily 30 tablet 0     gabapentin (NEURONTIN) 300 MG capsule Take 1 capsule (300 mg) by mouth 3 times daily 90 capsule 0     lisinopril (ZESTRIL) 10 MG tablet Take 1 tablet (10 mg) by mouth every morning 30 tablet 3     metFORMIN (GLUCOPHAGE) 1000 MG tablet Take 1 tablet (1,000 mg) by mouth 2 times daily (with meals) 60 tablet 3      multivitamin w/minerals (THERA-VIT-M) tablet Take 1 tablet by mouth daily 30 tablet 0     omeprazole (PRILOSEC) 20 MG DR capsule Take 1 capsule (20 mg) by mouth daily 30 capsule 3     sodium fluoride dental gel (PREVIDENT) 1.1 % GEL topical gel Apply to affected area At Bedtime 112 g 11     thin (NO BRAND SPECIFIED) lancets Use to test blood sugar 4 times daily or as directed. To accompany: Blood Glucose Monitor Brands: per insurance. 400 each 3       Family History  family history includes Cancer in his brother, mother, and sister; Cardiovascular in his brother; Depression in his mother and sister; Diabetes in his brother, mother, and sister; Heart Disease in his brother and mother; Hypertension in his brother, mother, sister, and sister; Kidney Cancer in his sister; Substance Abuse in his brother, father, and sister.    Social History   reports that he has never smoked. He has never used smokeless tobacco. He reports that he does not drink alcohol or use drugs.     Past Medical History  Past Medical History:   Diagnosis Date     Diabetes (H)      Hypertension      Maxillary sinus cancer (H)      Obesity        Past Surgical History:   Procedure Laterality Date     ------------OTHER-------------      Mucosa and bone biopsy     EXPLORE NECK Left 5/9/2019    Procedure: Left Neck Exploration;  Surgeon: Jett Treviño MD;  Location: [facility] OR     EXTRACTION(S) DENTAL      x2 under general anesthesia     GRAFT FREE VASCULARIZED (LOCATION) Right 5/9/2019    Procedure: Left Radial Forearm Free Flap (soft tissue);  Surgeon: Sumit Atwood MD;  Location: [facility] OR     GRAFT SKIN SPLIT THICKNESS FROM EXTREMITY Right 5/9/2019    Procedure: Graft skin split thickness from right thigh, nasogastric feeding tube placement;  Surgeon: Sumit Atwood MD;  Location: [facility]     IR LYMPH NODE BIOPSY  4/4/2019     OSTEOTOMY MAXILLA Left 5/9/2019    Procedure: Left Infrastructure Maxillectomy,;  Surgeon: Jett Treviño MD;  Location: [facility] OR        Physical Exam  There were no vitals taken for this visit.  There is no height or weight on file to calculate BMI.  GENERAL : In no apparent distress over video call  SKIN: Normal color, no  suspicious lesions or rashes  EYES: No proptosis.  MOUTH: Moist, pink  NECK: No visible masses/goiter  RESP: normal respiratory effort. No cough  NEURO: awake, alert, responds appropriately to questions.    Remainder of physical exam not able to be completed 2/2 COVID precaution, video virtual visit.    RESULTS    Lab Results   Component Value Date    A1C 7.4 04/24/2019       Creatinine   Date Value Ref Range Status   07/01/2019 0.68 0.66 - 1.25 mg/dL Final     GFR Estimate   Date Value Ref Range Status   04/22/2020 >90 >60 mL/min/[1.73_m2] Final     Hemoglobin A1C   Date Value Ref Range Status   04/24/2019 7.4 (H) 0 - 5.6 % Final     Comment:     Normal <5.7% Prediabetes 5.7-6.4%  Diabetes 6.5% or higher - adopted from ADA   consensus guidelines.       Potassium   Date Value Ref Range Status   07/01/2019 4.1 3.4 - 5.3 mmol/L Final     TSH   Date Value Ref Range Status   12/09/2020 2.92 0.40 - 4.00 mU/L Final       TSH   Date Value Ref Range Status   12/09/2020 2.92 0.40 - 4.00 mU/L Final   10/16/2019 2.22 0.40 - 4.00 mU/L Final   10/07/2019 2.03 0.40 - 4.00 mU/L Final   05/10/2019 0.32 (L) 0.40 - 4.00 mU/L Final       Creatinine   Date Value Ref Range Status   07/01/2019 0.68 0.66 - 1.25 mg/dL Final       No results for input(s): CHOL, HDL, LDL, TRIG, CHOLHDLRATIO in the last [id number] hours.  No results found for: [id number], [id number], [id number]      ASSESSMENT/PLAN:    1. Diabetes type II, now well controlled    -most recent A1c 5.4> 5.6%, recheck ordered.   -continue Metformin 1000mg BID   -asked Devonte to check pre-lunch BG (now TID with meals)- with this data and A1c, will assess for additional medication (Glipizide)    Follow up:  1. With diabetes provider in 3 months.    Follow up was done today via virtual/ video  encounter, due to high risk patients and implementing social distancing due to COVID 19.       45 minutes spent on the date of the encounter doing chart review, history and exam, documentation and further activities as noted above        Leanna Taylor PA-C  Diabetes Management Service

## 2021-03-15 NOTE — PROGRESS NOTES
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 Post operatively, he required NGT placement for enteral feeding, and experienced hyperglycemia related to enteral feeding.    Devonte is doing well. Continues to work from home, but does need to come into the office occasionally.   BG data he sent looks good. He incidentally reported that, with some spot checks midday, he is seeing BG over 200, highest was 270. He reports some dietary indiscretion that he can work on. Stationary biking daily, about 20 minutes, and will begin walking regimen as weather is getting nicer.     PET scan next week for surveillance of cancer.     Pt denies headaches, visual changes, vomiting, SOB at rest, chest pain, abd pain, nausea/vomiting, diarrhea, dysuria, hematuria or foot ulcers.     Current Diabetes Regimen:   Metformin IR 1000mg BID    Glucometer Settings   AM B-141  PM B-124  Spot checks after eating/afternoon are elevated.     Weight: 255 lbs, from 247 recently  Physical Activity: stationary exercise bike 3x weekly  Nutrition: three meals a day with more variety of foods tolerated.     Diabetes Care  Retinopathy: recent eye appt 2020.    Nephropathy: No hx microalbuminuria. Creatinine 0.8 (stable). Taking ACEi/ARB: yes    Neuropathy: yes.    Lipids: no recent labs. Taking statin: yes, ASA: yes  LDL Cholesterol Calculated   Date Value Ref Range Status   2019 41 <100 mg/dL Final     Comment:     Desirable:       <100 mg/dl        ROS: 10 point review of systems completed; pertinent positives and negatives documented in HPI    Allergies  Allergies   Allergen Reactions     Seasonal Allergies Other (See  Comments) and Shortness Of Breath       Medications  Current Outpatient Medications   Medication Sig Dispense Refill     acetaminophen (TYLENOL) 325 MG tablet Take 2 tablets (650 mg) by mouth every 4 hours as needed for mild pain 100 tablet 0     aspirin (ASA) 325 MG tablet Take 1 tablet (325 mg) by mouth daily 30 tablet 0     atorvastatin (LIPITOR) 20 MG tablet Take 1 tablet (20 mg) by mouth every morning 30 tablet 3     blood glucose (NO BRAND SPECIFIED) test strip Use to test blood sugar 4 times daily or as directed. 200 strip 3     blood glucose (NO BRAND SPECIFIED) test strip Use to test blood sugar 4 times daily or as directed. To accompany: Blood Glucose Monitor Brands: per insurance. 100 strip 6     blood glucose monitoring (ANDREW MICROLET) lancets Use to test blood sugar 4 times daily or as directed. 200 each 3     cevimeline (EVOXAC) 30 MG capsule Take 1 capsule (30 mg) by mouth 3 times daily 90 capsule 11     doxazosin (CARDURA) 1 MG tablet Take 1 tablet (1 mg) by mouth daily 30 tablet 0     gabapentin (NEURONTIN) 300 MG capsule Take 1 capsule (300 mg) by mouth 3 times daily 90 capsule 0     lisinopril (ZESTRIL) 10 MG tablet Take 1 tablet (10 mg) by mouth every morning 30 tablet 3     metFORMIN (GLUCOPHAGE) 1000 MG tablet Take 1 tablet (1,000 mg) by mouth 2 times daily (with meals) 60 tablet 3     multivitamin w/minerals (THERA-VIT-M) tablet Take 1 tablet by mouth daily 30 tablet 0     omeprazole (PRILOSEC) 20 MG DR capsule Take 1 capsule (20 mg) by mouth daily 30 capsule 3     sodium fluoride dental gel (PREVIDENT) 1.1 % GEL topical gel Apply to affected area At Bedtime 112 g 11     thin (NO BRAND SPECIFIED) lancets Use to test blood sugar 4 times daily or as directed. To accompany: Blood Glucose Monitor Brands: per insurance. 400 each 3       Family History  family history includes Cancer in his brother, mother, and sister; Cardiovascular in his brother; Depression in his mother and sister; Diabetes in  his brother, mother, and sister; Heart Disease in his brother and mother; Hypertension in his brother, mother, sister, and sister; Kidney Cancer in his sister; Substance Abuse in his brother, father, and sister.    Social History   reports that he has never smoked. He has never used smokeless tobacco. He reports that he does not drink alcohol or use drugs.     Past Medical History  Past Medical History:   Diagnosis Date     Diabetes (H)      Hypertension      Maxillary sinus cancer (H)      Obesity        Past Surgical History:   Procedure Laterality Date     ------------OTHER-------------      Mucosa and bone biopsy     EXPLORE NECK Left 5/9/2019    Procedure: Left Neck Exploration;  Surgeon: Jett Treviño MD;  Location: [facility] OR     EXTRACTION(S) DENTAL      x2 under general anesthesia     GRAFT FREE VASCULARIZED (LOCATION) Right 5/9/2019    Procedure: Left Radial Forearm Free Flap (soft tissue);  Surgeon: Sumit Atwood MD;  Location: [facility] OR     GRAFT SKIN SPLIT THICKNESS FROM EXTREMITY Right 5/9/2019    Procedure: Graft skin split thickness from right thigh, nasogastric feeding tube placement;  Surgeon: Sumit Atwood MD;  Location: [facility] OR     IR LYMPH NODE BIOPSY  4/4/2019     OSTEOTOMY MAXILLA Left 5/9/2019    Procedure: Left Infrastructure Maxillectomy,;  Surgeon: Jett Treviño MD;  Location: [facility] OR       Physical Exam  There were no vitals taken for this visit.  There is no height or weight on file to calculate BMI.  GENERAL : In no apparent distress over video call  SKIN: Normal color, no  suspicious lesions or rashes  EYES: No proptosis.  MOUTH: Moist, pink  NECK: No visible masses/goiter  RESP: normal respiratory effort. No cough  NEURO: awake, alert, responds appropriately to questions.    Remainder of physical exam not able to be completed 2/2 COVID precaution, video virtual visit.    RESULTS    Lab Results   Component Value Date    A1C 7.4 04/24/2019       Creatinine   Date Value Ref Range Status    07/01/2019 0.68 0.66 - 1.25 mg/dL Final     GFR Estimate   Date Value Ref Range Status   04/22/2020 >90 >60 mL/min/[1.73_m2] Final     Hemoglobin A1C   Date Value Ref Range Status   04/24/2019 7.4 (H) 0 - 5.6 % Final     Comment:     Normal <5.7% Prediabetes 5.7-6.4%  Diabetes 6.5% or higher - adopted from ADA   consensus guidelines.       Potassium   Date Value Ref Range Status   07/01/2019 4.1 3.4 - 5.3 mmol/L Final     TSH   Date Value Ref Range Status   12/09/2020 2.92 0.40 - 4.00 mU/L Final       TSH   Date Value Ref Range Status   12/09/2020 2.92 0.40 - 4.00 mU/L Final   10/16/2019 2.22 0.40 - 4.00 mU/L Final   10/07/2019 2.03 0.40 - 4.00 mU/L Final   05/10/2019 0.32 (L) 0.40 - 4.00 mU/L Final       Creatinine   Date Value Ref Range Status   07/01/2019 0.68 0.66 - 1.25 mg/dL Final       No results for input(s): CHOL, HDL, LDL, TRIG, CHOLHDLRATIO in the last 20121 hours.  No results found for: [id number], [id number], [id number]      ASSESSMENT/PLAN:    1. Diabetes type II, now well controlled    -most recent A1c 5.4> 5.6%, recheck ordered.   -continue Metformin 1000mg BID   -asked Devonte to check pre-lunch BG (now TID with meals)- with this data and A1c, will assess for additional medication (Glipizide)    Follow up:  1. With diabetes provider in 3 months.    Follow up was done today via virtual/ video encounter, due to high risk patients and implementing social distancing due to COVID 19.       45 minutes spent on the date of the encounter doing chart review, history and exam, documentation and further activities as noted above        Leanna Taylor PA-C  Diabetes Management Service

## 2021-03-22 ENCOUNTER — VIRTUAL VISIT (OUTPATIENT)
Dept: BEHAVIORAL HEALTH | Facility: CLINIC | Age: 56
End: 2021-03-22
Payer: COMMERCIAL

## 2021-03-22 DIAGNOSIS — F41.1 GENERALIZED ANXIETY DISORDER: Primary | ICD-10-CM

## 2021-03-22 DIAGNOSIS — F33.1 MODERATE RECURRENT MAJOR DEPRESSION (H): ICD-10-CM

## 2021-03-22 PROCEDURE — 90837 PSYTX W PT 60 MINUTES: CPT | Mod: GT | Performed by: MARRIAGE & FAMILY THERAPIST
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
## 2021-03-25 ENCOUNTER — HOSPITAL ENCOUNTER (OUTPATIENT)
Dept: CT IMAGING | Facility: CLINIC | Age: 56
End: 2021-03-25
Attending: RADIOLOGY
Payer: COMMERCIAL

## 2021-03-25 ENCOUNTER — OFFICE VISIT (OUTPATIENT)
Dept: RADIATION ONCOLOGY | Facility: CLINIC | Age: 56
End: 2021-03-25
Attending: NURSE PRACTITIONER
Payer: COMMERCIAL

## 2021-03-25 DIAGNOSIS — C31.0 MAXILLARY SINUS CANCER (H): ICD-10-CM

## 2021-03-25 DIAGNOSIS — E11.9 TYPE 2 DIABETES MELLITUS WITHOUT COMPLICATION, WITHOUT LONG-TERM CURRENT USE OF INSULIN (H): ICD-10-CM

## 2021-03-25 DIAGNOSIS — Z12.11 COLON CANCER SCREENING: ICD-10-CM

## 2021-03-25 DIAGNOSIS — C03.9 PRIMARY CANCER OF ALVEOLAR RIDGE MUCOSA (H): Primary | ICD-10-CM

## 2021-03-25 LAB
CREAT BLD-MCNC: 0.7 MG/DL (ref 0.66–1.25)
DEPRECATED CALCIDIOL+CALCIFEROL SERPL-MC: 51 UG/L (ref 20–75)
GFR SERPL CREATININE-BSD FRML MDRD: >90 ML/MIN/{1.73_M2}
HBA1C MFR BLD: 5.6 % (ref 0–5.6)

## 2021-03-25 PROCEDURE — 71260 CT THORAX DX C+: CPT

## 2021-03-25 PROCEDURE — 70491 CT SOFT TISSUE NECK W/DYE: CPT

## 2021-03-25 PROCEDURE — 83036 HEMOGLOBIN GLYCOSYLATED A1C: CPT | Performed by: PHYSICIAN ASSISTANT

## 2021-03-25 PROCEDURE — 36415 COLL VENOUS BLD VENIPUNCTURE: CPT | Performed by: PHYSICIAN ASSISTANT

## 2021-03-25 PROCEDURE — 250N000011 HC RX IP 250 OP 636: Performed by: RADIOLOGY

## 2021-03-25 PROCEDURE — 82565 ASSAY OF CREATININE: CPT

## 2021-03-25 PROCEDURE — 82306 VITAMIN D 25 HYDROXY: CPT | Performed by: PHYSICIAN ASSISTANT

## 2021-03-25 PROCEDURE — 999N000128 HC STATISTIC PERIPHERAL IV START W/O US GUIDANCE

## 2021-03-25 PROCEDURE — 70491 CT SOFT TISSUE NECK W/DYE: CPT | Mod: 26 | Performed by: RADIOLOGY

## 2021-03-25 PROCEDURE — 71260 CT THORAX DX C+: CPT | Mod: 26 | Performed by: RADIOLOGY

## 2021-03-25 RX ORDER — IOPAMIDOL 755 MG/ML
100 INJECTION, SOLUTION INTRAVASCULAR ONCE
Status: COMPLETED | OUTPATIENT
Start: 2021-03-25 | End: 2021-03-25

## 2021-03-25 RX ADMIN — IOPAMIDOL 100 ML: 755 INJECTION, SOLUTION INTRAVENOUS at 10:20

## 2021-03-25 NOTE — LETTER
3/25/2021         RE: Devonte Tucker  [address] [place] [address]        Dear Colleague,    Thank you for referring your patient, Devonte Tucker, to the [facility] RADIATION ONCOLOGY. Please see a copy of my visit note below.         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 He was diagnosed after presenting with a several year history of oral cavity pain and precancerous lesions of the left upper gingiva.  He underwent a left infrastructure maxillectomy and left level I lymph node dissection on 2018, with pathology revealing a 1.3 cm well-differentiated squamous cell carcinoma with 13 mm depth of invasion and involvement of the underlying bone. He received adjuvant radiotherapy alone, as described above, for improved local disease control. Surveillance studies from 2020 showed no evidence of local or distantly recurrent disease.    When we last saw Mr. Tucker 3 months ago he was noted to have persistent but manageable xerostomia, but was otherwise doing well. He continues to work closely with behavioral health services in the management of his depression and generalized anxiety disorder.    Mr. Tucker returns to clinic today for a routine follow-up visit. He states that is generally well, but has persistent dry mouth which he has  "\"gotten used to.\" He continues to each food of all types and textures and has gained a small amount of weight since last visit. He complains of some mild discomfort on the most anterior part of his hard palate.  He feels that there is a change in the tissue there.  He has seen Dr. Atwood and there was some discussion about removing a tooth, but the dentist did not want to do that until she knows what he is dealing with.  He will see her again in 2 months to see if there is any progression.  He continues doing his neck and lymphedema exercises and denies odynophagia or dysphagia. He has 3/10 pain in his back and right leg, which is chronic for him and he ambulates with a cane.     PHYSICAL EXAM:      General: Healthy-appearing 54 year old gentleman seated comfortably in an examination chair in [facility]  HEENT: NC/AT. EOMI. No rhinorrhea or epistaxis. Mildly dry mucus membranes. Healthy-appearing free flap covering the left hard palate. There is a small area of tenderness along the anteromedial edge of the palate at the junction of the flap. There are no palpable abnormalities no changes of the overlying mucosa.  I really don't see any appreciable tissue changes. Remainder of the oral cavity and oropharynx examination is without any lesions or masses.   Pulmonary: No wheezing, stridor or respiratory distress  Skin: Normal color and turgor.    LABS AND IMAGIN2020 TSH: 2.92    CT scan chest and neck:  No evidence of disease recurrence or metastatic disease.    IMPRESSION:   Mr. 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 He is 19 months status post completion of adjuvant radiotherapy and is doing well with no clinical signs concerning for recurrent disease.    PLAN:   1. Follow-up in radiation oncology clinic in 3 months with MD  2. Repeat TSH due in 2021  3. Repeat CT neck and chest in " 3/2021  4. He is due for a colonoscopy, order placed.    Concepcion Gutierrez NP  Dept of Radiation Oncology  [facility]

## 2021-03-25 NOTE — PROGRESS NOTES
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 He was diagnosed after presenting with a several year history of oral cavity pain and precancerous lesions of the left upper gingiva.  He underwent a left infrastructure maxillectomy and left level I lymph node dissection on 2018, with pathology revealing a 1.3 cm well-differentiated squamous cell carcinoma with 13 mm depth of invasion and involvement of the underlying bone. He received adjuvant radiotherapy alone, as described above, for improved local disease control. Surveillance studies from 2020 showed no evidence of local or distantly recurrent disease.    When we last saw Mr. Tucker 3 months ago he was noted to have persistent but manageable xerostomia, but was otherwise doing well. He continues to work closely with behavioral health services in the management of his depression and generalized anxiety disorder.    Mr. Tucker returns to clinic today for a routine follow-up visit. He states that is generally well, but has persistent dry mouth which he has \"gotten used to.\" He continues to each food of all types and textures and has gained a small amount of weight since last visit. He complains of some mild discomfort on the most anterior part of his hard palate.  He feels that there is a change in the tissue there.  He " has seen Dr. Atwood and there was some discussion about removing a tooth, but the dentist did not want to do that until she knows what he is dealing with.  He will see her again in 2 months to see if there is any progression.  He continues doing his neck and lymphedema exercises and denies odynophagia or dysphagia. He has 3/10 pain in his back and right leg, which is chronic for him and he ambulates with a cane.     PHYSICAL EXAM:      General: Healthy-appearing 54 year old gentleman seated comfortably in an examination chair in [facility]  HEENT: NC/AT. EOMI. No rhinorrhea or epistaxis. Mildly dry mucus membranes. Healthy-appearing free flap covering the left hard palate. There is a small area of tenderness along the anteromedial edge of the palate at the junction of the flap. There are no palpable abnormalities no changes of the overlying mucosa.  I really don't see any appreciable tissue changes. Remainder of the oral cavity and oropharynx examination is without any lesions or masses.   Pulmonary: No wheezing, stridor or respiratory distress  Skin: Normal color and turgor.    LABS AND IMAGIN2020 TSH: 2.92    CT scan chest and neck:  No evidence of disease recurrence or metastatic disease.    IMPRESSION:   Mr. 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 He is 19 months status post completion of adjuvant radiotherapy and is doing well with no clinical signs concerning for recurrent disease.    PLAN:   1. Follow-up in radiation oncology clinic in 3 months with MD  2. Repeat TSH due in 2021  3. Repeat CT neck and chest in 3/2021  4. He is due for a colonoscopy, order placed.    Concepcion Gutierrez NP  Dept of Radiation Oncology  [facility]

## 2021-03-27 ENCOUNTER — IMMUNIZATION (OUTPATIENT)
Dept: NURSING | Facility: CLINIC | Age: 56
End: 2021-03-27
Payer: COMMERCIAL

## 2021-03-27 PROCEDURE — 91301 PR COVID VAC MODERNA 100 MCG/0.5 ML IM: CPT

## 2021-03-27 PROCEDURE — 0011A PR COVID VAC MODERNA 100 MCG/0.5 ML IM: CPT

## 2021-03-29 ENCOUNTER — TELEPHONE (OUTPATIENT)
Dept: OTOLARYNGOLOGY | Facility: CLINIC | Age: 56
End: 2021-03-29

## 2021-03-29 NOTE — TELEPHONE ENCOUNTER
[facility] Call Center    Phone Message    May a detailed message be left on voicemail: yes     Reason for Call: Other: Friendly patient called and said he received a MyChart message from Dr. Paul that he missed an appointment. Writer notice a letter was also placed to day as well. Patient stated he has never seen Dr. Paul and Dr. Bearden. He has only been in for [facility] which is with Dr. Pascual. Patient is requesting either a ilabhart message back with more information on what is happening or a call back with a detailed message.     Action Taken: Message routed to:  [facility] ([facility]): [facility] [[id number]]    Travel Screening: Not Applicable

## 2021-04-13 ENCOUNTER — TELEPHONE (OUTPATIENT)
Dept: GASTROENTEROLOGY | Facility: CLINIC | Age: 56
End: 2021-04-13

## 2021-04-13 NOTE — TELEPHONE ENCOUNTER
Patient is scheduled for COLONOSCOPY with Dr. LOWERY    Spoke with: ODALIS    Date of Procedure: 05/24/2021    Location: [facility]    Sedation Type CS    Conscious Sedation- Needs  for 6 hours after the procedure  MAC/General-Needs  for 24 hours after procedure    Pre-op for Unit J MAC and OR NOT NEEDED    (if yes advise patient they will need a pre-op prior to procedure)      Is patient on blood thinners? -NO (If yes- inform patient to follow up with PCP or provider for follow up instructions)     Informed patient they will need an adult  YES  Cannot take any type of public or medical transportation alone    Informed Patient of COVID Test Requirement YES    Preferred Pharmacy for Pre Prescription NA    Confirmed Nurse will call to complete assessment YES    Additional comments: NA

## 2021-04-20 DIAGNOSIS — C03.0 SQUAMOUS CELL CARCINOMA OF MAXILLARY ALVEOLAR RIDGE (H): ICD-10-CM

## 2021-04-20 DIAGNOSIS — C03.9 PRIMARY CANCER OF ALVEOLAR RIDGE MUCOSA (H): ICD-10-CM

## 2021-04-20 RX ORDER — GABAPENTIN 300 MG/1
300 CAPSULE ORAL 3 TIMES DAILY
Qty: 90 CAPSULE | Refills: 0 | Status: SHIPPED | OUTPATIENT
Start: 2021-04-20 | End: 2021-05-21

## 2021-04-20 NOTE — TELEPHONE ENCOUNTER
gabapentin (NEURONTIN) 300 MG capsule      Last Written Prescription Date:  3/15/21  Last Fill Quantity: 90,   # refills: 0  Last Office Visit : 3/15/21 recommended 3 month follow up  Future Office visit:  None scheduled    Routing refill request to provider for review/approval because:  Drug not on endocrinology refill protocol

## 2021-04-24 ENCOUNTER — IMMUNIZATION (OUTPATIENT)
Dept: NURSING | Facility: CLINIC | Age: 56
End: 2021-04-24
Attending: INTERNAL MEDICINE
Payer: COMMERCIAL

## 2021-04-24 PROCEDURE — 0012A PR COVID VAC MODERNA 100 MCG/0.5 ML IM: CPT

## 2021-04-24 PROCEDURE — 91301 PR COVID VAC MODERNA 100 MCG/0.5 ML IM: CPT

## 2021-04-25 ENCOUNTER — HEALTH MAINTENANCE LETTER (OUTPATIENT)
Age: 56
End: 2021-04-25

## 2021-05-04 NOTE — PROGRESS NOTES
"Otolaryngology Progress Note  May 15, 2019    S: Mr. Tucker is looking good this AM. No acute events overnight, afebrile and vitally stable.  Continues to have elevated BG despite HISS, endocrine to formally evaluate today. Otherwise his pain is controlled. Unable to perform TF cares independently 2/2 pain.    O: /71   Pulse 87   Temp 95.2  F (35.1  C) (Axillary)   Resp 16   Ht 1.702 m (5' 7\")   Wt 132.9 kg (293 lb)   SpO2 97%   BMI 45.89 kg/m    General: Alert and oriented, NAD  HEENT: Oral flap pale, soft, warm, pale with strong implantable doppler signal. No signs of flap congestion or dehiscenence. Flap is stable, no additional swelling noted. Neck incisions clean, dry, intact. Neck soft and flat without evidence of hematoma or fluid collection. Acceptable arterial and venous hand held doppler signals obtained over flap pedicle markings on the left neck. Strong implantable doppler signal.   Pulmonary: Non-labored breathing on room air   Extremities: Dressing in place, good cap refill and sensation to finger tips. Right thigh STSG donor site with tegaderm dressing in place.     LABS:  ROUTINE IP LABS (Last four results)  BMP  Recent Labs   Lab 05/15/19  0630 05/14/19  0527 05/13/19  0441 05/12/19  0436    143 143 144   POTASSIUM 4.1 3.8 3.7 3.6   CHLORIDE 110* 111* 110* 111*   MANOJ 9.0 8.9 8.7 8.5   CO2 26 22 24 26   BUN 23 20 19 22   CR 0.54* 0.64* 0.66 0.70   * 289* 225* 155*     CBC  Recent Labs   Lab 05/15/19  0630 05/14/19  0527 05/13/19 0441 05/12/19  0436   WBC 7.8 6.8 7.6 9.1   RBC 3.54* 3.54* 3.40* 3.08*   HGB 10.7* 10.7* 10.2* 9.4*   HCT 34.1* 34.1* 33.7* 30.8*   MCV 96 96 99 100   MCH 30.2 30.2 30.0 30.5   MCHC 31.4* 31.4* 30.3* 30.5*   RDW 12.3 12.2 12.1 12.3    297 269 255     INRNo lab results found in last 7 days.  Mg 2.1  Phos 3.5    POD#1 labs  TSH 0.32 (L)  Albumin 3.2 (L)    A/P: Devonte Tucker is a 53 year old male with a past medical history of oral " .. Ongoing SW/CM Assessment/Plan of Care Note     See SW/CM flowsheets for goals and other objective data.    Progress note:  ERCP pending. GI and surgery following. Pt remains NPO.     CM/SW team to continue to follow for discharge needs.     preneoplastic disease now with invasive SCC of the left alveolar ridge with invasion of the maxilla. He is POD#6 s/p left infrastructure maxillectomy, left radial forearm free flap, left neck exploration, STSG from left thigh to left forearm, NG feeding tube placement.     Neuro:  - Pain control: scheduled Tylenol, oxycodone PRN, gabapentin 300mg Q8H    HEENT:  - Nothing around the neck (no gown ties, trach ties, lines, ect.)  - RN flap checks Q4H   - ENT flap checks Q12H  - Clean incisions with 0.9% sodium chloride and apply Aquaphor Q8H  - Peridex oral rinses 4 times daily  - Saline oral rinses Q8H and PRN. Gentle suction with red meek catheter only (no yankeur), do not cut red meek  - Right UE: daily dressing changes   - Right thigh STSG site: calcium alginate/tegaderm, reinforce or replace as needed for drainage. Once no longer draining may leave open to air    Respiratory:  - Supplemental O2 PRN to maintain sats >90     CV/heme:  - NO vasopressors  - hemodynamically stable, hgb 10.7  -  mg and SQ heparin for flap  - Resumed PTA atorvastatin, lisinopril    FEN/GI:  - NPO.  - Severe malnutrition in the context of acute illness: Appreciate nutrition recs. Currently on isosource 1.5 at continuous goal  - PRN Zofran for nausea  - Bowel regimen: Dulcolax PRN     /Fluids/Lytes:  - Voiding spontaneously  - electrolyte replacement protocol  - Cardura (substitute for PTA Flomax)    Endo  - h/o DM2, holding home metformin  - HISS  - Endocrine consulted. Appreciate recs.    ID:  - Perioperative antibiotics (Unasyn) x 48h post-op - completed     PPX:  - SQ heparin 5,000 units Q8H  - SCDs    Dispo:   - OT/PT - TCU  - Likely discharge in 1-2 days pending TCU placement.    -- Patient and above plan discussed with Dr. Atwood and Dr. Treviño.     Patti Brown MD  Otolaryngology-Head & Neck Surgery  Please contact ENT by dialing [phone] and entering job code [phone].

## 2021-05-09 DIAGNOSIS — Z11.59 ENCOUNTER FOR SCREENING FOR OTHER VIRAL DISEASES: ICD-10-CM

## 2021-05-13 ENCOUNTER — TELEPHONE (OUTPATIENT)
Dept: GASTROENTEROLOGY | Facility: CLINIC | Age: 56
End: 2021-05-13

## 2021-05-13 NOTE — TELEPHONE ENCOUNTER
Attempted to contact patient regarding upcoming colonoscopy procedure on 5.24.2021 for pre assessment questions.  No answer.  Left message to return call to [phone] #2    COVID test 5.20.2021      aMra Gomez RN
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
## 2021-05-18 DIAGNOSIS — K11.7 XEROSTOMIA DUE TO RADIOTHERAPY: ICD-10-CM

## 2021-05-18 RX ORDER — CEVIMELINE HYDROCHLORIDE 30 MG/1
30 CAPSULE ORAL 3 TIMES DAILY
Qty: 90 CAPSULE | Refills: 11 | Status: SHIPPED | OUTPATIENT
Start: 2021-05-18 | End: 2022-05-10

## 2021-05-18 NOTE — TELEPHONE ENCOUNTER
2nd attempt to contact patient regarding upcoming colonoscopy procedure on 5.24.2021 for pre assessment questions.  No answer.  Left message to return call to [phone] #2    COVID test 5.20.2021    Mara Gomez RN

## 2021-05-20 DIAGNOSIS — C03.9 PRIMARY CANCER OF ALVEOLAR RIDGE MUCOSA (H): ICD-10-CM

## 2021-05-20 DIAGNOSIS — Z11.59 ENCOUNTER FOR SCREENING FOR OTHER VIRAL DISEASES: ICD-10-CM

## 2021-05-20 DIAGNOSIS — E03.9 HYPOTHYROIDISM, UNSPECIFIED TYPE: ICD-10-CM

## 2021-05-20 DIAGNOSIS — C03.0 SQUAMOUS CELL CARCINOMA OF MAXILLARY ALVEOLAR RIDGE (H): ICD-10-CM

## 2021-05-20 DIAGNOSIS — C31.0 MAXILLARY SINUS CANCER (H): ICD-10-CM

## 2021-05-20 LAB
LABORATORY COMMENT REPORT: NORMAL
SARS-COV-2 RNA RESP QL NAA+PROBE: NEGATIVE
SARS-COV-2 RNA RESP QL NAA+PROBE: NORMAL
SPECIMEN SOURCE: NORMAL
SPECIMEN SOURCE: NORMAL
TSH SERPL DL<=0.005 MIU/L-ACNC: 2.38 MU/L (ref 0.4–4)

## 2021-05-20 PROCEDURE — 84443 ASSAY THYROID STIM HORMONE: CPT | Performed by: PATHOLOGY

## 2021-05-20 PROCEDURE — 36415 COLL VENOUS BLD VENIPUNCTURE: CPT | Performed by: PATHOLOGY

## 2021-05-20 PROCEDURE — U0005 INFEC AGEN DETEC AMPLI PROBE: HCPCS | Mod: 90 | Performed by: PATHOLOGY

## 2021-05-20 PROCEDURE — U0003 INFECTIOUS AGENT DETECTION BY NUCLEIC ACID (DNA OR RNA); SEVERE ACUTE RESPIRATORY SYNDROME CORONAVIRUS 2 (SARS-COV-2) (CORONAVIRUS DISEASE [COVID-19]), AMPLIFIED PROBE TECHNIQUE, MAKING USE OF HIGH THROUGHPUT TECHNOLOGIES AS DESCRIBED BY CMS-2020-01-R: HCPCS | Mod: 90 | Performed by: PATHOLOGY

## 2021-05-20 NOTE — TELEPHONE ENCOUNTER
Third attempt.     Attempted to contact patient regarding upcoming colonoscopy procedure on 5/24/21 for pre assessment questions. No answer.     Left message to return call to [phone] #2    Covid test scheduled 5/20/21    Will send [employer] Being [employer]ched message regarding returning call.    Kristina Echeverria RN

## 2021-05-20 NOTE — TELEPHONE ENCOUNTER
Pre assessment questions completed for upcoming colonoscopy  procedure scheduled on 5/24/21    COVID test scheduled 5/20/21    Reviewed Miralax prep instructions with patient.  verified.     Patient verbalized understanding and had no questions or concerns at this time.    Kristina Echeverria RN

## 2021-05-21 ENCOUNTER — MYC REFILL (OUTPATIENT)
Dept: ENDOCRINOLOGY | Facility: CLINIC | Age: 56
End: 2021-05-21

## 2021-05-21 DIAGNOSIS — C03.9 PRIMARY CANCER OF ALVEOLAR RIDGE MUCOSA (H): ICD-10-CM

## 2021-05-21 DIAGNOSIS — C03.0 SQUAMOUS CELL CARCINOMA OF MAXILLARY ALVEOLAR RIDGE (H): ICD-10-CM

## 2021-05-21 RX ORDER — GABAPENTIN 300 MG/1
300 CAPSULE ORAL 3 TIMES DAILY
Qty: 90 CAPSULE | Refills: 0 | Status: SHIPPED | OUTPATIENT
Start: 2021-05-21 | End: 2021-07-20

## 2021-05-21 RX ORDER — GABAPENTIN 300 MG/1
300 CAPSULE ORAL 3 TIMES DAILY
Qty: 90 CAPSULE | Refills: 1 | Status: SHIPPED | OUTPATIENT
Start: 2021-05-21 | End: 2021-06-09

## 2021-05-21 NOTE — TELEPHONE ENCOUNTER
gabapentin (NEURONTIN) 300 MG capsule  Last Written Prescription Date:  4/20/2021  Last Fill Quantity: 90,   # refills: 0  Last Office Visit :  3/15/2021  Future Office visit:  None    Routing refill request to provider for review/approval because:  Drug not on the [facility], [facility] or [facility] refill protocol or controlled substance      Cande Leiva RN  Central Triage Red Flags/Med Refills

## 2021-05-24 ENCOUNTER — ANESTHESIA (OUTPATIENT)
Dept: SURGERY | Facility: AMBULATORY SURGERY CENTER | Age: 56
End: 2021-05-24
Payer: COMMERCIAL

## 2021-05-24 ENCOUNTER — ANESTHESIA EVENT (OUTPATIENT)
Dept: SURGERY | Facility: AMBULATORY SURGERY CENTER | Age: 56
End: 2021-05-24
Payer: COMMERCIAL

## 2021-05-24 ENCOUNTER — HOSPITAL ENCOUNTER (OUTPATIENT)
Facility: AMBULATORY SURGERY CENTER | Age: 56
End: 2021-05-24
Attending: INTERNAL MEDICINE
Payer: COMMERCIAL

## 2021-05-24 VITALS
HEIGHT: 67 IN | SYSTOLIC BLOOD PRESSURE: 126 MMHG | RESPIRATION RATE: 12 BRPM | TEMPERATURE: 98 F | DIASTOLIC BLOOD PRESSURE: 71 MMHG | OXYGEN SATURATION: 97 % | BODY MASS INDEX: 43.95 KG/M2 | HEART RATE: 89 BPM | WEIGHT: 280 LBS

## 2021-05-24 VITALS — HEART RATE: 98 BPM

## 2021-05-24 LAB
COLONOSCOPY: NORMAL
GLUCOSE BLDC GLUCOMTR-MCNC: 114 MG/DL (ref 70–99)

## 2021-05-24 PROCEDURE — 45385 COLONOSCOPY W/LESION REMOVAL: CPT | Mod: 33

## 2021-05-24 PROCEDURE — 88305 TISSUE EXAM BY PATHOLOGIST: CPT | Performed by: PATHOLOGY

## 2021-05-24 RX ORDER — FLUMAZENIL 0.1 MG/ML
0.2 INJECTION, SOLUTION INTRAVENOUS
Status: DISCONTINUED | OUTPATIENT
Start: 2021-05-24 | End: 2021-05-25 | Stop reason: HOSPADM

## 2021-05-24 RX ORDER — ONDANSETRON 2 MG/ML
4 INJECTION INTRAMUSCULAR; INTRAVENOUS EVERY 6 HOURS PRN
Status: DISCONTINUED | OUTPATIENT
Start: 2021-05-24 | End: 2021-05-25 | Stop reason: HOSPADM

## 2021-05-24 RX ORDER — NALOXONE HYDROCHLORIDE 0.4 MG/ML
0.4 INJECTION, SOLUTION INTRAMUSCULAR; INTRAVENOUS; SUBCUTANEOUS
Status: DISCONTINUED | OUTPATIENT
Start: 2021-05-24 | End: 2021-05-25 | Stop reason: HOSPADM

## 2021-05-24 RX ORDER — PROPOFOL 10 MG/ML
INJECTION, EMULSION INTRAVENOUS CONTINUOUS PRN
Status: DISCONTINUED | OUTPATIENT
Start: 2021-05-24 | End: 2021-05-24

## 2021-05-24 RX ORDER — PROPOFOL 10 MG/ML
INJECTION, EMULSION INTRAVENOUS PRN
Status: DISCONTINUED | OUTPATIENT
Start: 2021-05-24 | End: 2021-05-24

## 2021-05-24 RX ORDER — ONDANSETRON 2 MG/ML
4 INJECTION INTRAMUSCULAR; INTRAVENOUS
Status: DISCONTINUED | OUTPATIENT
Start: 2021-05-24 | End: 2021-05-24 | Stop reason: HOSPADM

## 2021-05-24 RX ORDER — NALOXONE HYDROCHLORIDE 0.4 MG/ML
0.2 INJECTION, SOLUTION INTRAMUSCULAR; INTRAVENOUS; SUBCUTANEOUS
Status: DISCONTINUED | OUTPATIENT
Start: 2021-05-24 | End: 2021-05-25 | Stop reason: HOSPADM

## 2021-05-24 RX ORDER — SIMETHICONE
LIQUID (ML) MISCELLANEOUS PRN
Status: DISCONTINUED | OUTPATIENT
Start: 2021-05-24 | End: 2021-05-24 | Stop reason: HOSPADM

## 2021-05-24 RX ORDER — LIDOCAINE HYDROCHLORIDE 20 MG/ML
INJECTION, SOLUTION INFILTRATION; PERINEURAL PRN
Status: DISCONTINUED | OUTPATIENT
Start: 2021-05-24 | End: 2021-05-24

## 2021-05-24 RX ORDER — SODIUM CHLORIDE, SODIUM LACTATE, POTASSIUM CHLORIDE, CALCIUM CHLORIDE 600; 310; 30; 20 MG/100ML; MG/100ML; MG/100ML; MG/100ML
INJECTION, SOLUTION INTRAVENOUS CONTINUOUS PRN
Status: DISCONTINUED | OUTPATIENT
Start: 2021-05-24 | End: 2021-05-24

## 2021-05-24 RX ORDER — ONDANSETRON 4 MG/1
4 TABLET, ORALLY DISINTEGRATING ORAL EVERY 6 HOURS PRN
Status: DISCONTINUED | OUTPATIENT
Start: 2021-05-24 | End: 2021-05-25 | Stop reason: HOSPADM

## 2021-05-24 RX ORDER — PROCHLORPERAZINE MALEATE 10 MG
10 TABLET ORAL EVERY 6 HOURS PRN
Status: DISCONTINUED | OUTPATIENT
Start: 2021-05-24 | End: 2021-05-25 | Stop reason: HOSPADM

## 2021-05-24 RX ORDER — LIDOCAINE 40 MG/G
CREAM TOPICAL
Status: DISCONTINUED | OUTPATIENT
Start: 2021-05-24 | End: 2021-05-24 | Stop reason: HOSPADM

## 2021-05-24 RX ADMIN — PROPOFOL 150 MCG/KG/MIN: 10 INJECTION, EMULSION INTRAVENOUS at 11:47

## 2021-05-24 RX ADMIN — LIDOCAINE HYDROCHLORIDE 60 MG: 20 INJECTION, SOLUTION INFILTRATION; PERINEURAL at 11:46

## 2021-05-24 RX ADMIN — SODIUM CHLORIDE, SODIUM LACTATE, POTASSIUM CHLORIDE, CALCIUM CHLORIDE: 600; 310; 30; 20 INJECTION, SOLUTION INTRAVENOUS at 11:41

## 2021-05-24 RX ADMIN — PROPOFOL 50 MG: 10 INJECTION, EMULSION INTRAVENOUS at 11:47

## 2021-05-24 RX ADMIN — PROPOFOL 30 MG: 10 INJECTION, EMULSION INTRAVENOUS at 11:51

## 2021-05-24 ASSESSMENT — MIFFLIN-ST. JEOR: SCORE: 2058.7

## 2021-05-24 NOTE — ANESTHESIA POSTPROCEDURE EVALUATION
Patient: Devonte Tucker    Procedure(s):  COLONOSCOPY, WITH POLYPECTOMY    Diagnosis:Colon cancer screening [Z12.11]  Diagnosis Additional Information: No value filed.    Anesthesia Type:  MAC    Note:  Disposition: Outpatient   Postop Pain Control: Uneventful            Sign Out: Well controlled pain   PONV: No   Neuro/Psych: Uneventful            Sign Out: Acceptable/Baseline neuro status   Airway/Respiratory: Uneventful            Sign Out: Acceptable/Baseline resp. status   CV/Hemodynamics: Uneventful            Sign Out: Acceptable CV status; No obvious hypovolemia; No obvious fluid overload   Other NRE: NONE   DID A NON-ROUTINE EVENT OCCUR? No           Last vitals:  Vitals:    05/24/21 1051 05/24/21 1223 05/24/21 1240   BP: (!) 142/86 138/70 126/71   Pulse: 94 91 89   Resp: 18 12 12   Temp: 36.1  C (96.9  F) 36.7  C (98.1  F) 36.7  C (98  F)   SpO2: 97% 97% 97%       Last vitals prior to Anesthesia Care Transfer:  CRNA VITALS  5/24/2021 1146 - 5/24/2021 1246      5/24/2021             Pulse:  100    SpO2:  98 %          Electronically Signed By: Bakari Houston MD, MD  May 24, 2021  12:50 PM

## 2021-05-24 NOTE — H&P
Devonte Tucker  [id number]  male  55 year old      Reason for procedure/surgery: colon cancer screening    Patient Active Problem List   Diagnosis     Maxillary sinus cancer (H)     Weakness     Primary cancer of alveolar ridge mucosa (H)     Depression, unspecified depression type     Generalized anxiety disorder     Moderate recurrent major depression (H)     Diabetes mellitus, type 2 (H)     Morbid obesity (H)       Past Surgical History:    Past Surgical History:   Procedure Laterality Date     ------------OTHER-------------      Mucosa and bone biopsy     EXPLORE NECK Left 5/9/2019    Procedure: Left Neck Exploration;  Surgeon: Jett Treviño MD;  Location: [facility] OR     EXTRACTION(S) DENTAL      x2 under general anesthesia     GRAFT FREE VASCULARIZED (LOCATION) Right 5/9/2019    Procedure: Left Radial Forearm Free Flap (soft tissue);  Surgeon: Sumit Atwood MD;  Location: [facility] OR     GRAFT SKIN SPLIT THICKNESS FROM EXTREMITY Right 5/9/2019    Procedure: Graft skin split thickness from right thigh, nasogastric feeding tube placement;  Surgeon: Sumit Atwood MD;  Location: [facility] OR     IR LYMPH NODE BIOPSY  4/4/2019     OSTEOTOMY MAXILLA Left 5/9/2019    Procedure: Left Infrastructure Maxillectomy,;  Surgeon: Jett Treviño MD;  Location: [facility] OR       Past Medical History:   Past Medical History:   Diagnosis Date     Diabetes (H)      Hypertension      Maxillary sinus cancer (H)      Obesity        Social History:   Social History     Tobacco Use     Smoking status: Never Smoker     Smokeless tobacco: Never Used   Substance Use Topics     Alcohol use: No     Frequency: Never       Family History:   Family History   Problem Relation Age of Onset     Cancer Mother         Ovarian Cancer     Diabetes Mother      Heart Disease Mother      Hypertension Mother      Depression Mother      Cancer Brother         Lung Cancer     Cardiovascular Brother         Stent     Diabetes Brother      Heart Disease Brother       Hypertension Brother      Substance Abuse Brother      Kidney Cancer Sister         s/p nephrectomy      Cancer Sister         Kidney Cancer     Hypertension Sister      Diabetes Sister      Hypertension Sister      Depression Sister      Substance Abuse Sister      Substance Abuse Father        Allergies:   Allergies   Allergen Reactions     Seasonal Allergies Other (See Comments) and Shortness Of Breath       Active Medications:   Current Outpatient Medications   Medication Sig Dispense Refill     acetaminophen (TYLENOL) 325 MG tablet Take 2 tablets (650 mg) by mouth every 4 hours as needed for mild pain 100 tablet 0     aspirin (ASA) 325 MG tablet Take 1 tablet (325 mg) by mouth daily 30 tablet 0     atorvastatin (LIPITOR) 20 MG tablet Take 1 tablet (20 mg) by mouth every morning 30 tablet 3     doxazosin (CARDURA) 1 MG tablet Take 1 tablet (1 mg) by mouth daily 30 tablet 0     gabapentin (NEURONTIN) 300 MG capsule Take 1 capsule (300 mg) by mouth 3 times daily 90 capsule 1     gabapentin (NEURONTIN) 300 MG capsule Take 1 capsule (300 mg) by mouth 3 times daily 90 capsule 0     lisinopril (ZESTRIL) 10 MG tablet Take 1 tablet (10 mg) by mouth every morning 30 tablet 3     metFORMIN (GLUCOPHAGE) 1000 MG tablet Take 1 tablet (1,000 mg) by mouth 2 times daily (with meals) 60 tablet 3     blood glucose (NO BRAND SPECIFIED) test strip Use to test blood sugar 4 times daily or as directed. 200 strip 3     blood glucose (NO BRAND SPECIFIED) test strip Use to test blood sugar 4 times daily or as directed. To accompany: Blood Glucose Monitor Brands: per insurance. 100 strip 6     blood glucose monitoring (ANDREW MICROLET) lancets Use to test blood sugar 4 times daily or as directed. 200 each 3     cevimeline (EVOXAC) 30 MG capsule Take 1 capsule (30 mg) by mouth 3 times daily 90 capsule 11     multivitamin w/minerals (THERA-VIT-M) tablet Take 1 tablet by mouth daily 30 tablet 0     omeprazole (PRILOSEC) 20 MG DR capsule Take  "1 capsule (20 mg) by mouth daily 30 capsule 3     sodium fluoride dental gel (PREVIDENT) 1.1 % GEL topical gel Apply to affected area At Bedtime 112 g 11     thin (NO BRAND SPECIFIED) lancets Use to test blood sugar 4 times daily or as directed. To accompany: Blood Glucose Monitor Brands: per insurance. 400 each 3       Systemic Review:   CONSTITUTIONAL: NEGATIVE for fever, chills, change in weight  ENT/MOUTH: NEGATIVE for ear, mouth and throat problems  RESP: NEGATIVE for significant cough or SOB  CV: NEGATIVE for chest pain, palpitations or peripheral edema    Physical Examination:   Vital Signs: BP (!) 142/86   Pulse 94   Temp 96.9  F (36.1  C) (Temporal)   Resp 18   Ht 1.702 m (5' 7\")   Wt 127 kg (280 lb)   SpO2 97%   BMI 43.85 kg/m    GENERAL: healthy, alert and no distress  NECK: no adenopathy, no asymmetry, masses, or scars  RESP: lungs clear to auscultation - no rales, rhonchi or wheezes  CV: regular rate and rhythm, normal S1 S2, no S3 or S4, no murmur, click or rub, no peripheral edema and peripheral pulses strong  ABDOMEN: soft, nontender, no hepatosplenomegaly, no masses and bowel sounds normal  MS: no gross musculoskeletal defects noted, no edema    Plan: Appropriate to proceed as scheduled.      Terrell De Luna MD  5/24/2021    PCP:  Edwin Alvarado    "

## 2021-05-24 NOTE — ANESTHESIA PREPROCEDURE EVALUATION
Anesthesia Pre-Procedure Evaluation    Patient: Devonte Tucker   MRN: [id number] : 1965        Preoperative Diagnosis: Colon cancer screening [Z12.11]   Procedure : Procedure(s):  COLONOSCOPY, WITH POLYPECTOMY     Past Medical History:   Diagnosis Date     Diabetes (H)      Hypertension      Maxillary sinus cancer (H)      Obesity       Past Surgical History:   Procedure Laterality Date     ------------OTHER-------------      Mucosa and bone biopsy     EXPLORE NECK Left 2019    Procedure: Left Neck Exploration;  Surgeon: Jett Treviño MD;  Location: [facility]     EXTRACTION(S) DENTAL      x2 under general anesthesia     GRAFT FREE VASCULARIZED (LOCATION) Right 2019    Procedure: Left Radial Forearm Free Flap (soft tissue);  Surgeon: Sumit Atwood MD;  Location: [facility]     GRAFT SKIN SPLIT THICKNESS FROM EXTREMITY Right 2019    Procedure: Graft skin split thickness from right thigh, nasogastric feeding tube placement;  Surgeon: Sumit Atwood MD;  Location: [facility]     IR LYMPH NODE BIOPSY  2019     OSTEOTOMY MAXILLA Left 2019    Procedure: Left Infrastructure Maxillectomy,;  Surgeon: Jett Treviño MD;  Location: [facility]      Allergies   Allergen Reactions     Seasonal Allergies Other (See Comments) and Shortness Of Breath      Social History     Tobacco Use     Smoking status: Never Smoker     Smokeless tobacco: Never Used   Substance Use Topics     Alcohol use: No     Frequency: Never      Wt Readings from Last 1 Encounters:   21 127 kg (280 lb)        Anesthesia Evaluation   Pt has had prior anesthetic. Type: MAC.        ROS/MED HX  ENT/Pulmonary:  - neg pulmonary ROS     Neurologic:  - neg neurologic ROS     Cardiovascular:     (+) hypertension-----    METS/Exercise Tolerance:     Hematologic:  - neg hematologic  ROS     Musculoskeletal:  - neg musculoskeletal ROS     GI/Hepatic:  - neg GI/hepatic ROS     Renal/Genitourinary:  - neg Renal ROS     Endo:     (+) type II DM,      Psychiatric/Substance Use:  - neg psychiatric ROS     Infectious Disease:       Malignancy:       Other:            Physical Exam    Airway  airway exam normal           Respiratory Devices and Support         Dental  no notable dental history         Cardiovascular   cardiovascular exam normal          Pulmonary   pulmonary exam normal                OUTSIDE LABS:  CBC:   Lab Results   Component Value Date    WBC 6.7 07/01/2019    WBC 4.6 05/30/2019    HGB 12.6 (L) 07/01/2019    HGB 11.0 (L) 05/30/2019    HCT 39.8 (L) 07/01/2019    HCT 34.5 (L) 05/30/2019     07/01/2019     05/30/2019     BMP:   Lab Results   Component Value Date     07/01/2019     05/30/2019    POTASSIUM 4.1 07/01/2019    POTASSIUM 4.5 05/30/2019    CHLORIDE 102 07/01/2019    CHLORIDE 104 05/30/2019    CO2 24 07/01/2019    CO2 24 05/30/2019    BUN 11 07/01/2019    BUN 23 05/30/2019    CR 0.68 07/01/2019    CR 0.71 05/30/2019     (H) 07/01/2019     (H) 05/30/2019     COAGS:   Lab Results   Component Value Date    INR 1.10 04/04/2019     POC:   Lab Results   Component Value Date     (H) 05/24/2021     HEPATIC:   Lab Results   Component Value Date    ALBUMIN 3.2 (L) 05/10/2019     OTHER:   Lab Results   Component Value Date    PH 7.36 05/09/2019    A1C 5.6 03/25/2021    MANOJ 9.6 07/01/2019    PHOS 3.5 05/16/2019    MAG 2.2 05/16/2019    TSH 2.38 05/20/2021       Anesthesia Plan    ASA Status:  3   NPO Status:  NPO Appropriate    Anesthesia Type: MAC.     - Reason for MAC: straight local not clinically adequate   Induction: Intravenous.   Maintenance: TIVA.        Consents    Anesthesia Plan(s) and associated risks, benefits, and realistic alternatives discussed. Questions answered and patient/representative(s) expressed understanding.     - Discussed with:  Patient         Postoperative Care    Pain management: Oral pain medications.   PONV prophylaxis: Ondansetron (or other 5HT-3), Dexamethasone or  Solumedrol     Comments:                Bakari Houston MD, MD

## 2021-05-24 NOTE — ANESTHESIA CARE TRANSFER NOTE
Patient: Devonte Tucker    Procedure(s):  COLONOSCOPY, WITH POLYPECTOMY    Diagnosis: Colon cancer screening [Z12.11]  Diagnosis Additional Information: No value filed.    Anesthesia Type:   MAC     Note:      Level of Consciousness: awake  Oxygen Supplementation: room air    Independent Airway: airway patency satisfactory and stable  Dentition: dentition unchanged  Vital Signs Stable: post-procedure vital signs reviewed and stable  Report to RN Given: handoff report given  Patient transferred to: Phase II    Handoff Report: Identifed the Patient, Identified the Reponsible Provider, Reviewed the pertinent medical history, Discussed the surgical course, Reviewed Intra-OP anesthesia mangement and issues during anesthesia, Set expectations for post-procedure period and Allowed opportunity for questions and acknowledgement of understanding      Vitals: (Last set prior to Anesthesia Care Transfer)  CRNA VITALS  5/24/2021 1146 - 5/24/2021 1219      5/24/2021             Pulse:  100    SpO2:  98 %        Electronically Signed By: DAT Romero CRNA  May 24, 2021  12:19 PM

## 2021-05-26 LAB — COPATH REPORT: NORMAL

## 2021-06-09 ENCOUNTER — OFFICE VISIT (OUTPATIENT)
Dept: RADIATION ONCOLOGY | Facility: CLINIC | Age: 56
End: 2021-06-09
Attending: RADIOLOGY
Payer: COMMERCIAL

## 2021-06-09 VITALS — SYSTOLIC BLOOD PRESSURE: 127 MMHG | WEIGHT: 286 LBS | BODY MASS INDEX: 44.79 KG/M2 | DIASTOLIC BLOOD PRESSURE: 86 MMHG

## 2021-06-09 DIAGNOSIS — C03.9 PRIMARY CANCER OF ALVEOLAR RIDGE MUCOSA (H): Primary | ICD-10-CM

## 2021-06-09 PROCEDURE — G0463 HOSPITAL OUTPT CLINIC VISIT: HCPCS | Performed by: RADIOLOGY

## 2021-06-09 NOTE — LETTER
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 He underwent a left infrastructure maxillectomy and left level I lymph node dissection on 2019 with pathology revealing a 1.3 cm well-differentiated squamous cell carcinoma with 13 mm depth of invasion and invasion into the underlying bone. He received adjuvant radiotherapy as described above for improved local disease control.    Mr. Tucker returns to radiation oncology clinic today for a routine posttreatment disease surveillance visit. On examination, he reports he is feeling well and has no pressing concerns or complaints. He has noted some mild tenderness along the right anterior gingiva predominantly involving the maxillary right central incisor. He has been previously evaluated for these symptoms by the head and neck surgery service with the opinion that this tooth may need to be pulled as it is potentially causing mass-effect and strain along the previous suture line. He is scheduled to follow-up with dentistry in the coming weeks for further evaluation of this. He is otherwise eating a diet predominantly consisting  of soft foods given his lack of left maxillary dentition. He denies any odynophagia or dysphagia, nausea/vomiting, dyspnea, chest pain or abdominal pain with his remaining ROS likewise negative.    PHYSICAL EXAM:  Weight: 129.7 kg  BP: 127/86    General: Healthy-appearing 55-year-old gentleman seated comfortably in an examination chair no acute distress  HEENT: NC/AT.  EOMI.  No rhinorrhea or epistaxis.  Mildly dry mucous membranes.  Healthy-appearing free flap within the left hard palate.  Palpation of the flap and margins reveals no induration, nodularity or discrete masses.  The remainder of the maxillary and mandibular dentition is intact.  No additional oral cavity lesions appreciated.  Neck: Supple.  Normal range of motion.  Mild fibrosis of the left sternocleidomastoid muscle.  No palpable cervical adenopathy bilaterally.  Pulmonary: No wheezing, stridor or respiratory distress  Skin: Normal color and turgor    LABS AND IMAGING:  3/25/2021 CT neck:  Stable post-treatment changes without evidence of recurrent disease    3/25/2021 CT chest:  No evidence of pulmonary metastases    2021 labs:  TSH: 2.38    IMPRESSION:   Mr. 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 He is nearly 2 years out from the completion of therapy and remains CÉSAR.    PLAN:   1. Follow-up in radiation oncology clinic with NP in 3 months and MD in 9 months  2. Repeat CT neck and chest in 2021  3. TSH due in 2021    Emerson Verdin MD/PhD    Department of Radiation Oncology  [facility]      FOLLOW-UP VISIT    Patient Name: Devonte Tucker      : 1965     Age: 55 year old        ______________________________________________________________________________     Chief Complaint   Patient presents with     Cancer     Pt is here for a follow up:Maxillary Sinus  Cancer: Left max aveolar ridge 6000 cGy completed 07/22/19     Pain  Denies    Labs  Other Labs: No    Imaging  None      Dental:   Most Recent Dental Visit: 03/21  Trays: Frequency 2-3 week    Speech/Swallowing:   Most Recent evaluation or testing: No  Swallowing Restrictions: No difficulties with swallowing    Trismus/Jaw Exercises: Yes    Nutrition:    Weight:   Wt Readings from Last 3 Encounters:   05/24/21 127 kg (280 lb)   02/24/21 128 kg (282 lb 3 oz)   12/09/20 123.4 kg (272 lb)         Oral Symptoms:   Xerostomia:1- Symptomatic without significant dietary alteration; unstimulated saliva flow >0.2 ml/min  Dysphagia: 0-None  Mucositis Oral Symptoms: 0-None  Mucositis: 0- None  Esophagitis:0- None    Other Appointments:     MD Name: Dr Atwood Appointment Date: 06/30/21   MD Name: Appointment Date:   MD Name: Appointment Date:   Other Appointment Notes:     Residual Radiation side effect: Dry mouth, tooth pain     Additional Instructions:     Nurse face-to-face time: Level 3:  10 min face to face time              Emerson Verdin MD

## 2021-06-09 NOTE — PROGRESS NOTES
FOLLOW-UP VISIT    Patient Name: Devonte Tucker      : 1965     Age: 55 year old        ______________________________________________________________________________     Chief Complaint   Patient presents with     Cancer     Pt is here for a follow up:Maxillary Sinus Cancer: Left max aveolar ridge 6000 cGy completed 19     Pain  Denies    Labs  Other Labs: No    Imaging  None      Dental:   Most Recent Dental Visit:   Trays: Frequency 2-3 week    Speech/Swallowing:   Most Recent evaluation or testing: No  Swallowing Restrictions: No difficulties with swallowing    Trismus/Jaw Exercises: Yes    Nutrition:    Weight:   Wt Readings from Last 3 Encounters:   21 127 kg (280 lb)   21 128 kg (282 lb 3 oz)   20 123.4 kg (272 lb)         Oral Symptoms:   Xerostomia:1- Symptomatic without significant dietary alteration; unstimulated saliva flow >0.2 ml/min  Dysphagia: 0-None  Mucositis Oral Symptoms: 0-None  Mucositis: 0- None  Esophagitis:0- None    Other Appointments:     MD Name: Dr Atwood Appointment Date: 21   MD Name: Appointment Date:   MD Name: Appointment Date:   Other Appointment Notes:     Residual Radiation side effect: Dry mouth, tooth pain     Additional Instructions:     Nurse face-to-face time: Level 3:  10 min face to face time

## 2021-06-09 NOTE — PROGRESS NOTES
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 He underwent a left infrastructure maxillectomy and left level I lymph node dissection on 2019 with pathology revealing a 1.3 cm well-differentiated squamous cell carcinoma with 13 mm depth of invasion and invasion into the underlying bone. He received adjuvant radiotherapy as described above for improved local disease control.    Mr. Tucker returns to radiation oncology clinic today for a routine posttreatment disease surveillance visit. On examination, he reports he is feeling well and has no pressing concerns or complaints. He has noted some mild tenderness along the right anterior gingiva predominantly involving the maxillary right central incisor. He has been previously evaluated for these symptoms by the head and neck surgery service with the opinion that this tooth may need to be pulled as it is potentially causing mass-effect and strain along the previous suture line. He is scheduled to follow-up with dentistry in the coming weeks for further evaluation of this. He is otherwise eating a diet predominantly consisting of soft foods given his lack of left maxillary dentition. He denies any odynophagia or  dysphagia, nausea/vomiting, dyspnea, chest pain or abdominal pain with his remaining ROS likewise negative.    PHYSICAL EXAM:  Weight: 129.7 kg  BP: 127/86    General: Healthy-appearing 55-year-old gentleman seated comfortably in an examination chair no acute distress  HEENT: NC/AT.  EOMI.  No rhinorrhea or epistaxis.  Mildly dry mucous membranes.  Healthy-appearing free flap within the left hard palate.  Palpation of the flap and margins reveals no induration, nodularity or discrete masses.  The remainder of the maxillary and mandibular dentition is intact.  No additional oral cavity lesions appreciated.  Neck: Supple.  Normal range of motion.  Mild fibrosis of the left sternocleidomastoid muscle.  No palpable cervical adenopathy bilaterally.  Pulmonary: No wheezing, stridor or respiratory distress  Skin: Normal color and turgor    LABS AND IMAGING:  3/25/2021 CT neck:  Stable post-treatment changes without evidence of recurrent disease    3/25/2021 CT chest:  No evidence of pulmonary metastases    5/20/2021 labs:  TSH: 2.38    IMPRESSION:   Mr. 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 He is nearly 2 years out from the completion of therapy and remains CÉSAR.    PLAN:   1. Follow-up in radiation oncology clinic with NP in 3 months and MD in 9 months  2. Repeat CT neck and chest in 9/2021  3. TSH due in 11/2021    Emerson Verdin MD/PhD    Department of Radiation Oncology  [facility]

## 2021-06-26 NOTE — PLAN OF CARE
"Attempt to call pt, left voice mail for patient to call clinic back .#1     \" Okay to relay message\"     letter being sent out   " OT: Pt educated on and provided reacher, sock aid, and shoe horn to ease indep with dressing. Pt demod ability to follow one handed dressing techniques with verbal cues throughout.

## 2021-06-30 ENCOUNTER — OFFICE VISIT (OUTPATIENT)
Dept: OTOLARYNGOLOGY | Facility: CLINIC | Age: 56
End: 2021-06-30
Payer: COMMERCIAL

## 2021-06-30 ENCOUNTER — THERAPY VISIT (OUTPATIENT)
Dept: SPEECH THERAPY | Facility: CLINIC | Age: 56
End: 2021-06-30
Payer: COMMERCIAL

## 2021-06-30 VITALS
HEART RATE: 87 BPM | DIASTOLIC BLOOD PRESSURE: 80 MMHG | BODY MASS INDEX: 44.98 KG/M2 | WEIGHT: 286.6 LBS | TEMPERATURE: 97.3 F | OXYGEN SATURATION: 98 % | HEIGHT: 67 IN | SYSTOLIC BLOOD PRESSURE: 130 MMHG

## 2021-06-30 DIAGNOSIS — C03.0 SQUAMOUS CELL CARCINOMA OF MAXILLARY ALVEOLAR RIDGE (H): Primary | ICD-10-CM

## 2021-06-30 DIAGNOSIS — C05.9 SQUAMOUS CELL CARCINOMA OF PALATE (H): Primary | ICD-10-CM

## 2021-06-30 DIAGNOSIS — C31.0 MAXILLARY SINUS CANCER (H): ICD-10-CM

## 2021-06-30 DIAGNOSIS — R13.12 OROPHARYNGEAL DYSPHAGIA: ICD-10-CM

## 2021-06-30 PROCEDURE — 99213 OFFICE O/P EST LOW 20 MIN: CPT | Performed by: OTOLARYNGOLOGY

## 2021-06-30 PROCEDURE — 92526 ORAL FUNCTION THERAPY: CPT | Mod: GN | Performed by: SPEECH-LANGUAGE PATHOLOGIST

## 2021-06-30 ASSESSMENT — MIFFLIN-ST. JEOR: SCORE: 2088.63

## 2021-06-30 ASSESSMENT — PAIN SCALES - GENERAL: PAINLEVEL: NO PAIN (0)

## 2021-06-30 NOTE — LETTER
6/30/2021     RE: Devonte Tucker  [address] [place] [facility] [address]     Dear Colleague,    Thank you for referring your patient, Devonte Tucker, to the [facility]. Please see a copy of my visit note below.    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  Dr. Treviño performed a left palatectomy on 5/9/2019 with Dr. Atwood performing a left radial forearm free flap reconstruction.  He had postop radiation therapy completed on 7/22/2019, 6000 cGy.  He has been watched carefully due to an indeterminate area of focal uptake along the right peroneal hard palate and there has been no area of concern on exam, however. He was last seen on 2/24/2021 for a routine surveillance appointment, at which time he complained of persistent xerostomia and nasal congestion/epistaxis. He noted then he was managing these with hydration and sugar-free lemon drops and saline spray and AYR gel, respectively. Another complaint he shared was left-sided epistaxis, which today he states has improved. However, his most significant complaint was persistent pain in the anterior hard palate directly behind tooth #8.     Interval History: His anterior palate pain behind tooth #8 has not changed, but he has developed coping behaviors to limit its occurrence. His epistaxis has improved, now occurring twice weekly and stopped with <10 minutes of applied pressure. His only new complaint since his last visit is left torticollis. Of note, his left neck was previously irradiated. This lasts several minutes but does resolve with digital massage and time. Otherwise, he is doing very well. Notably, he has maintained weight.    He denies any odynophagia, dysphagia, otalgia, new hoarseness,  hemoptysis, bleeding from the mouth or new lumps/bumps.    Past Medical History:  Past Medical History:   Diagnosis Date     Allergic rhinitis experienced it for many years     Anxiety 2019     Benign positional vertigo May 2019     Depressive disorder 2019     Diabetes (H)      Gastroesophageal reflux disease May 2021     Head injury April 2018     Hypertension      Maxillary sinus cancer (H)      Obesity        Past Surgical History:  Past Surgical History:   Procedure Laterality Date     ------------OTHER-------------      Mucosa and bone biopsy     COLONOSCOPY N/A 5/24/2021    Procedure: COLONOSCOPY, WITH POLYPECTOMY;  Surgeon: Terrell De Luna MD;  Location: [facility] OR     EXPLORE NECK Left 5/9/2019    Procedure: Left Neck Exploration;  Surgeon: Jett Treviño MD;  Location: [facility] OR     EXTRACTION(S) DENTAL      x2 under general anesthesia     GRAFT FREE VASCULARIZED (LOCATION) Right 5/9/2019    Procedure: Left Radial Forearm Free Flap (soft tissue);  Surgeon: Sumit Atwood MD;  Location: [facility] OR     GRAFT SKIN SPLIT THICKNESS FROM EXTREMITY Right 5/9/2019    Procedure: Graft skin split thickness from right thigh, nasogastric feeding tube placement;  Surgeon: Sumit Atwood MD;  Location: [facility] OR     IR LYMPH NODE BIOPSY  4/4/2019     OSTEOTOMY MAXILLA Left 5/9/2019    Procedure: Left Infrastructure Maxillectomy,;  Surgeon: Jett Treviño MD;  Location: [facility] OR     Medications:  Current Outpatient Medications   Medication Sig Dispense Refill     acetaminophen (TYLENOL) 325 MG tablet Take 2 tablets (650 mg) by mouth every 4 hours as needed for mild pain 100 tablet 0     aspirin (ASA) 325 MG tablet Take 1 tablet (325 mg) by mouth daily 30 tablet 0     atorvastatin (LIPITOR) 20 MG tablet Take 1 tablet (20 mg) by mouth every morning 30 tablet 3     blood glucose monitoring (ANDREW MICROLET) lancets Use to test blood sugar 4 times daily or as directed. 200 each 3     cevimeline (EVOXAC) 30 MG capsule Take 1  "capsule (30 mg) by mouth 3 times daily 90 capsule 11     doxazosin (CARDURA) 1 MG tablet Take 1 tablet (1 mg) by mouth daily 30 tablet 0     gabapentin (NEURONTIN) 300 MG capsule Take 1 capsule (300 mg) by mouth 3 times daily 90 capsule 0     lisinopril (ZESTRIL) 10 MG tablet Take 1 tablet (10 mg) by mouth every morning 30 tablet 3     metFORMIN (GLUCOPHAGE) 1000 MG tablet Take 1 tablet (1,000 mg) by mouth 2 times daily (with meals) 60 tablet 3     multivitamin w/minerals (THERA-VIT-M) tablet Take 1 tablet by mouth daily 30 tablet 0     omeprazole (PRILOSEC) 20 MG DR capsule Take 1 capsule (20 mg) by mouth daily 30 capsule 3     sodium fluoride dental gel (PREVIDENT) 1.1 % GEL topical gel Apply to affected area At Bedtime 112 g 11     Allergies:  Allergies   Allergen Reactions     Seasonal Allergies Other (See Comments) and Shortness Of Breath      Social History:  Social History     Tobacco Use     Smoking status: Never Smoker     Smokeless tobacco: Never Used   Substance Use Topics     Alcohol use: No     Frequency: Never     Drug use: No     ROS: 10 point ROS neg other than the symptoms noted above in the HPI.    Physical Exam:    /80   Pulse 87   Temp 97.3  F (36.3  C)   Ht 1.702 m (5' 7\")   Wt 130 kg (286 lb 9.6 oz)   SpO2 98%   BMI 44.89 kg/m    Wt Readings from Last 3 Encounters:   06/30/21 130 kg (286 lb 9.6 oz)   06/09/21 129.7 kg (286 lb)   05/24/21 127 kg (280 lb)      Constitutional:  The patient was unaccompanied, well-groomed, and in no acute distress.     Skin: Normal:  warm and pink without rash    Neurologic: Alert and oriented x 3.    Psychiatric: The patient's affect was calm, cooperative, and appropriate.     Communication:  Normal; communicates verbally, normal voice quality.    Respiratory: Breathing comfortably without stridor or exertion of accessory muscles.    Head/Face:  Normocephalic and atraumatic.  No lesions or scars.   Salivary glands -  Normal size, no tenderness, " swelling, or palpable masses    Nose:  Anterior rhinoscopy revealed midline septum and absence of purulence or polyps.    Oral Cavity: Healthy appearing flap in left maxilla. White mucosalized area along medial portion. Tenderness with palpation around tooth #8 Normal tongue, floor of mouth, and  buccal mucosa.  No lesions or masses on inspection or palpation.     Oropharynx: Normal mucosa, palate symmetric with normal elevation. No abnormal lymph tissue in the oropharynx.    Neck: Supple with normal laryngeal and tracheal landmarks.  The parotid beds were without masses.  No palpable thyroid.  Normal range of motion   Lymphatic: There is no palpable lymphadenopathy in the neck.        Results Reviewed:    Imaging:   3/25/21  Chest CT with contrast: No definite evidence for metastasis in the chest.  Scattered pulmonary calcified granulomas  Neck CT with contrast: Stable post surgical changes with no evidence of local or metastatic  cancer recurrence.    4/22/20  CT neck  Impression: In this patient with history of left maxilla alveolar  ridge squamous cell carcinoma, status post surgery and adjuvant  chemotherapy:  1. No evidence for residual or metastatic disease in the neck.  2. Stable postsurgical changes of left maxillary alveolar ridge  resection and neck dissection.    Labs:  TSH   Date Value Ref Range Status   05/20/2021 2.38 0.40 - 4.00 mU/L Final     Assessment/Plan:  1. 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 He is doing well with no evidence of disease on today's exam. He continues to complain of right palate pain localized posterior to tooth #8. He has learned to manage this pain through behavioral modifications, and today the adjacent gingival mucosa and tooth appear healthy. Surveillance CT scans on 3/25/2021 returned normal. We will plan to see him back in 4-6 months, or sooner  if he develops any of the following: new sore throat, mouth pain, ear pain, dysphagia, bleeding from the mouth or lumps/bumps of the neck. We reviewed these signs and symptoms together.    2. Torticollis, left, post-radiotherapy  Recommended the patient see physical therapy to help reduce the frequency of these episodes. He preferred to try some home exercises recommended to him elsewhere. He was advised to reach out if this pain worsens, as we can easily place a referral to physical therapy.       Rohit Mosqueda, MS-4    I, Sumit Atwood MD, was present with the medical student who participated in the service and in the documentation of the note.  I have verified the history and personally performed the physical exam and medical decision making.  I agree with the assessment and plan of care as documented in the note.

## 2021-06-30 NOTE — PATIENT INSTRUCTIONS
1. Please follow-up in clinic in 4-6 months.   2. Please call the ENT clinic with any questions,concerns, new or worsening symptoms.    -Clinic number is [phone]   - Brittny[facility] direct line (Dr. Singleton's nurse) [phone]    3.A referral has been placed for you for Physical therapy. You will receive a call from rehab schedulers to arrange your first appointment. If you have not heard from scheduling within 3 business days, please call [phone] to arrange.      4. Dental clinic :   [facility]  [address], [address]  [place], [place] [address]  [phone]

## 2021-06-30 NOTE — PROGRESS NOTES
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  Dr. Treviño performed a left palatectomy on 5/9/2019 with Dr. Atwood performing a left radial forearm free flap reconstruction.  He had postop radiation therapy completed on 7/22/2019, 6000 cGy.  He has been watched carefully due to an indeterminate area of focal uptake along the right peroneal hard palate and there has been no area of concern on exam, however. He was last seen on 2/24/2021 for a routine surveillance appointment, at which time he complained of persistent xerostomia and nasal congestion/epistaxis. He noted then he was managing these with hydration and sugar-free lemon drops and saline spray and AYR gel, respectively. Another complaint he shared was left-sided epistaxis, which today he states has improved. However, his most significant complaint was persistent pain in the anterior hard palate directly behind tooth #8.     Interval History: His anterior palate pain behind tooth #8 has not changed, but he has developed coping behaviors to limit its occurrence. His epistaxis has improved, now occurring twice weekly and stopped with <10 minutes of applied pressure. His only new complaint since his last visit is left torticollis. Of note, his left neck was previously irradiated. This lasts several minutes but does resolve with digital massage and time. Otherwise, he is doing very well. Notably, he has maintained weight.    He denies any odynophagia, dysphagia, otalgia, new hoarseness, hemoptysis, bleeding from the mouth or new lumps/bumps.    Past Medical History:  Past Medical History:   Diagnosis Date     Allergic rhinitis experienced it for many years     Anxiety 2019     Benign positional vertigo May 2019     Depressive disorder 2019     Diabetes (H)      Gastroesophageal reflux disease May 2021     Head  injury April 2018     Hypertension      Maxillary sinus cancer (H)      Obesity        Past Surgical History:  Past Surgical History:   Procedure Laterality Date     ------------OTHER-------------      Mucosa and bone biopsy     COLONOSCOPY N/A 5/24/2021    Procedure: COLONOSCOPY, WITH POLYPECTOMY;  Surgeon: Terrell De Luna MD;  Location: [facility] OR     EXPLORE NECK Left 5/9/2019    Procedure: Left Neck Exploration;  Surgeon: Jett Treviño MD;  Location: [facility] OR     EXTRACTION(S) DENTAL      x2 under general anesthesia     GRAFT FREE VASCULARIZED (LOCATION) Right 5/9/2019    Procedure: Left Radial Forearm Free Flap (soft tissue);  Surgeon: Sumit Atwood MD;  Location: [facility] OR     GRAFT SKIN SPLIT THICKNESS FROM EXTREMITY Right 5/9/2019    Procedure: Graft skin split thickness from right thigh, nasogastric feeding tube placement;  Surgeon: Sumit Atwood MD;  Location: [facility] OR     IR LYMPH NODE BIOPSY  4/4/2019     OSTEOTOMY MAXILLA Left 5/9/2019    Procedure: Left Infrastructure Maxillectomy,;  Surgeon: Jett Treviño MD;  Location: [facility] OR     Medications:  Current Outpatient Medications   Medication Sig Dispense Refill     acetaminophen (TYLENOL) 325 MG tablet Take 2 tablets (650 mg) by mouth every 4 hours as needed for mild pain 100 tablet 0     aspirin (ASA) 325 MG tablet Take 1 tablet (325 mg) by mouth daily 30 tablet 0     atorvastatin (LIPITOR) 20 MG tablet Take 1 tablet (20 mg) by mouth every morning 30 tablet 3     blood glucose monitoring (ANDREW MICROLET) lancets Use to test blood sugar 4 times daily or as directed. 200 each 3     cevimeline (EVOXAC) 30 MG capsule Take 1 capsule (30 mg) by mouth 3 times daily 90 capsule 11     doxazosin (CARDURA) 1 MG tablet Take 1 tablet (1 mg) by mouth daily 30 tablet 0     gabapentin (NEURONTIN) 300 MG capsule Take 1 capsule (300 mg) by mouth 3 times daily 90 capsule 0     lisinopril (ZESTRIL) 10 MG tablet Take 1 tablet (10 mg) by mouth every morning 30 tablet 3  "    metFORMIN (GLUCOPHAGE) 1000 MG tablet Take 1 tablet (1,000 mg) by mouth 2 times daily (with meals) 60 tablet 3     multivitamin w/minerals (THERA-VIT-M) tablet Take 1 tablet by mouth daily 30 tablet 0     omeprazole (PRILOSEC) 20 MG DR capsule Take 1 capsule (20 mg) by mouth daily 30 capsule 3     sodium fluoride dental gel (PREVIDENT) 1.1 % GEL topical gel Apply to affected area At Bedtime 112 g 11       Allergies:  Allergies   Allergen Reactions     Seasonal Allergies Other (See Comments) and Shortness Of Breath        Social History:  Social History     Tobacco Use     Smoking status: Never Smoker     Smokeless tobacco: Never Used   Substance Use Topics     Alcohol use: No     Frequency: Never     Drug use: No     ROS: 10 point ROS neg other than the symptoms noted above in the HPI.    Physical Exam:    /80   Pulse 87   Temp 97.3  F (36.3  C)   Ht 1.702 m (5' 7\")   Wt 130 kg (286 lb 9.6 oz)   SpO2 98%   BMI 44.89 kg/m    Wt Readings from Last 3 Encounters:   06/30/21 130 kg (286 lb 9.6 oz)   06/09/21 129.7 kg (286 lb)   05/24/21 127 kg (280 lb)        Constitutional:  The patient was unaccompanied, well-groomed, and in no acute distress.     Skin: Normal:  warm and pink without rash    Neurologic: Alert and oriented x 3.    Psychiatric: The patient's affect was calm, cooperative, and appropriate.     Communication:  Normal; communicates verbally, normal voice quality.    Respiratory: Breathing comfortably without stridor or exertion of accessory muscles.    Head/Face:  Normocephalic and atraumatic.  No lesions or scars.   Salivary glands -  Normal size, no tenderness, swelling, or palpable masses    Nose:  Anterior rhinoscopy revealed midline septum and absence of purulence or polyps.    Oral Cavity: Healthy appearing flap in left maxilla. White mucosalized area along medial portion. Tenderness with palpation around tooth #8 Normal tongue, floor of mouth, and  buccal mucosa.  No lesions or masses " on inspection or palpation.     Oropharynx: Normal mucosa, palate symmetric with normal elevation. No abnormal lymph tissue in the oropharynx.    Neck: Supple with normal laryngeal and tracheal landmarks.  The parotid beds were without masses.  No palpable thyroid.  Normal range of motion   Lymphatic: There is no palpable lymphadenopathy in the neck.        Results Reviewed:    Imaging:   3/25/21  Chest CT with contrast: No definite evidence for metastasis in the chest.  Scattered pulmonary calcified granulomas  Neck CT with contrast: Stable post surgical changes with no evidence of local or metastatic  cancer recurrence.    4/22/20  CT neck  Impression: In this patient with history of left maxilla alveolar  ridge squamous cell carcinoma, status post surgery and adjuvant  chemotherapy:  1. No evidence for residual or metastatic disease in the neck.  2. Stable postsurgical changes of left maxillary alveolar ridge  resection and neck dissection.        Labs:  TSH   Date Value Ref Range Status   05/20/2021 2.38 0.40 - 4.00 mU/L Final       Assessment/Plan:  1. 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 He is doing well with no evidence of disease on today's exam. He continues to complain of right palate pain localized posterior to tooth #8. He has learned to manage this pain through behavioral modifications, and today the adjacent gingival mucosa and tooth appear healthy. Surveillance CT scans on 3/25/2021 returned normal. We will plan to see him back in 4-6 months, or sooner if he develops any of the following: new sore throat, mouth pain, ear pain, dysphagia, bleeding from the mouth or lumps/bumps of the neck. We reviewed these signs and symptoms together.    2. Torticollis, left, post-radiotherapy  Recommended the patient see physical therapy to help reduce the frequency of these episodes. He  preferred to try some home exercises recommended to him elsewhere. He was advised to reach out if this pain worsens, as we can easily place a referral to physical therapy.       Rohit Mosqueda, MS-4    I, Sumit Atwood MD, was present with the medical student who participated in the service and in the documentation of the note.  I have verified the history and personally performed the physical exam and medical decision making.  I agree with the assessment and plan of care as documented in the note.

## 2021-06-30 NOTE — NURSING NOTE
"Chief Complaint   Patient presents with     RECHECK      4 month follow up      Blood pressure 130/80, pulse 87, temperature 97.3  F (36.3  C), height 1.702 m (5' 7\"), weight 130 kg (286 lb 9.6 oz), SpO2 98 %.    Andres Purcell LPN    "

## 2021-07-14 DIAGNOSIS — E11.9 TYPE 2 DIABETES MELLITUS WITHOUT COMPLICATION, WITHOUT LONG-TERM CURRENT USE OF INSULIN (H): ICD-10-CM

## 2021-07-16 NOTE — TELEPHONE ENCOUNTER
metFORMIN (GLUCOPHAGE) 1000 MG tablet    Last Written Prescription Date:  3/15/2021  Last Fill Quantity: 60,   # refills: 3  Last Office Visit :  3/15/2021  Future Office visit:  None    Routing refill request to provider for review/approval because:  Only a 120 days sent to pharm last order to pharmacy.    Is it okay to fill for 90 day with refills for Pt care.     Please advise       Cande Leiva RN  Central Triage Red Flags/Med Refills

## 2021-07-18 DIAGNOSIS — C03.9 PRIMARY CANCER OF ALVEOLAR RIDGE MUCOSA (H): ICD-10-CM

## 2021-07-18 DIAGNOSIS — C03.0 SQUAMOUS CELL CARCINOMA OF MAXILLARY ALVEOLAR RIDGE (H): ICD-10-CM

## 2021-07-20 RX ORDER — GABAPENTIN 300 MG/1
300 CAPSULE ORAL 3 TIMES DAILY
Qty: 90 CAPSULE | Refills: 0 | Status: SHIPPED | OUTPATIENT
Start: 2021-07-20 | End: 2021-09-02

## 2021-07-20 RX ORDER — ATORVASTATIN CALCIUM 20 MG/1
20 TABLET, FILM COATED ORAL EVERY MORNING
Qty: 30 TABLET | Refills: 2 | Status: SHIPPED | OUTPATIENT
Start: 2021-07-20 | End: 2021-09-23

## 2021-07-20 NOTE — TELEPHONE ENCOUNTER
gabapentin (NEURONTIN) 300 MG capsule      Last Written Prescription Date:  5/21/21  Last Fill Quantity: 90,   # refills: 0  Last Office Visit : 3/15/21 recommended 3 month follow up  Future Office visit:  None scheduled    Routing refill request to provider for review/approval because:  Drug not on endocrinology refill protocol       Atorvastatin   epic protocol firing incorrectly  Last LDL   Lab Test 11/23/20  0844   LDL 64

## 2021-07-28 ENCOUNTER — TELEPHONE (OUTPATIENT)
Dept: ENDOCRINOLOGY | Facility: CLINIC | Age: 56
End: 2021-07-28

## 2021-07-28 NOTE — TELEPHONE ENCOUNTER
CLINIC COORDINATOR SCHEDULING NOTES    CALL RESULT: MyC + Letter sent    APPT TYPE: VIDEO VISIT RETURN / [facility] RETURN DIABETES    PROVIDER: any provider accepting new diabetic patients, preferably [place]    DATE APPT NEEDED: 1st available    ADDITIONAL NOTES: Previous scheduling attempt made at previous checkout. Pt aware of need to schedule.

## 2021-08-15 ENCOUNTER — HEALTH MAINTENANCE LETTER (OUTPATIENT)
Age: 56
End: 2021-08-15

## 2021-08-24 ENCOUNTER — TELEPHONE (OUTPATIENT)
Dept: ENDOCRINOLOGY | Facility: CLINIC | Age: 56
End: 2021-08-24

## 2021-08-24 NOTE — TELEPHONE ENCOUNTER
[facility]    Phone Message    May a detailed message be left on voicemail: yes     Reason for Call: Other: . Per Patient is wanting to get a call back, patient states received a message to schedule an appt. I tried to schedule an appt for patient, nothing was populating for availability with Brandon Ram. Patient is wanting to be scheduled with her.  Please advise.     Action Taken: Message routed to:  [facility] Endo    Travel Screening: Not Applicable

## 2021-09-01 NOTE — TELEPHONE ENCOUNTER
CLINIC COORDINATOR SCHEDULING NOTES    RESULT: LVM x2    APPT TYPE: VIDEO VISIT RETURN / [facility] RETURN DIABETES         PROVIDER: any provider who sees new diabetic patients    DATE APPT NEEDED: 1st available    ADDITIONAL NOTES: Leanna Taylor has left our clinic. Full list of providers available on [facility] website.

## 2021-09-02 ENCOUNTER — MYC REFILL (OUTPATIENT)
Dept: ENDOCRINOLOGY | Facility: CLINIC | Age: 56
End: 2021-09-02

## 2021-09-02 DIAGNOSIS — C03.0 SQUAMOUS CELL CARCINOMA OF MAXILLARY ALVEOLAR RIDGE (H): ICD-10-CM

## 2021-09-02 DIAGNOSIS — C03.9 PRIMARY CANCER OF ALVEOLAR RIDGE MUCOSA (H): ICD-10-CM

## 2021-09-02 RX ORDER — GABAPENTIN 300 MG/1
300 CAPSULE ORAL 3 TIMES DAILY
Qty: 90 CAPSULE | Refills: 0 | Status: SHIPPED | OUTPATIENT
Start: 2021-09-02 | End: 2021-10-04

## 2021-09-20 ENCOUNTER — OFFICE VISIT (OUTPATIENT)
Dept: RADIATION ONCOLOGY | Facility: CLINIC | Age: 56
End: 2021-09-20
Attending: NURSE PRACTITIONER
Payer: COMMERCIAL

## 2021-09-20 ENCOUNTER — HOSPITAL ENCOUNTER (OUTPATIENT)
Dept: CT IMAGING | Facility: CLINIC | Age: 56
End: 2021-09-20
Attending: RADIOLOGY
Payer: COMMERCIAL

## 2021-09-20 VITALS — WEIGHT: 292.8 LBS | BODY MASS INDEX: 45.86 KG/M2

## 2021-09-20 DIAGNOSIS — C03.9 PRIMARY CANCER OF ALVEOLAR RIDGE MUCOSA (H): ICD-10-CM

## 2021-09-20 DIAGNOSIS — C03.9 PRIMARY CANCER OF ALVEOLAR RIDGE MUCOSA (H): Primary | ICD-10-CM

## 2021-09-20 PROCEDURE — 70491 CT SOFT TISSUE NECK W/DYE: CPT | Mod: 26 | Performed by: STUDENT IN AN ORGANIZED HEALTH CARE EDUCATION/TRAINING PROGRAM

## 2021-09-20 PROCEDURE — 71250 CT THORAX DX C-: CPT | Mod: 26 | Performed by: RADIOLOGY

## 2021-09-20 PROCEDURE — 250N000011 HC RX IP 250 OP 636: Performed by: STUDENT IN AN ORGANIZED HEALTH CARE EDUCATION/TRAINING PROGRAM

## 2021-09-20 PROCEDURE — 70491 CT SOFT TISSUE NECK W/DYE: CPT

## 2021-09-20 PROCEDURE — 71250 CT THORAX DX C-: CPT

## 2021-09-20 RX ORDER — IOPAMIDOL 755 MG/ML
100 INJECTION, SOLUTION INTRAVASCULAR ONCE
Status: COMPLETED | OUTPATIENT
Start: 2021-09-20 | End: 2021-09-20

## 2021-09-20 RX ADMIN — IOPAMIDOL 100 ML: 755 INJECTION, SOLUTION INTRAVENOUS at 09:43

## 2021-09-20 NOTE — LETTER
2021         RE: Devonte Tucker  [address] [place] [address]        Dear Colleague,    Thank you for referring your patient, Devonte Tucker, to the [facility] RADIATION ONCOLOGY. Please see a copy of my visit note below.       Department of Radiation Oncology  [facility]  [address] [place] [address]  [phone]       Radiation Oncology Follow-up Visit  2021        Devonte Tucker  MRN: [id number]   : 1965     DISEASE TREATED:   pT4a N0 M0 squamous cell carcinoma of the left maxillary alveolar ridge    RADIATION THERAPY DELIVERED:   6000 cGy in 200 fractions, from 6/10/2019 - 2019    SYSTEMIC THERAPY:  None    INTERVAL SINCE COMPLETION OF RADIATION THERAPY:   26 months    SUBJECTIVE:   Devonte Tucker is a 55 year old male with a PMH significant for a local advanced 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 He underwent a left infrastructure maxillectomy and left level I lymph node dissection on 2019 with pathology revealing a 1.3 cm well-differentiated squamous cell carcinoma with 13 mm depth of invasion and invasion into the underlying bone. He received adjuvant radiotherapy as described above for improved local disease control.    Mr. Tucker returns to radiation oncology clinic today for a routine posttreatment disease surveillance visit. On examination, he reports he is feeling well and has no pressing concerns or complaints. He feels like he is mourning the lose of his teeth and normal eating habits.  He now feels like he has to accept how things are since it has been 2 years.  He does have less pain now and overall he is doing really well.  He has more stress being back at work. He is otherwise eating a diet predominantly consisting of soft  foods given his lack of left maxillary dentition.  He is gaining weight and he isn't happy about that. He denies any odynophagia or dysphagia, nausea/vomiting, dyspnea, chest pain or abdominal pain with his remaining ROS likewise negative.    PHYSICAL EXAM:  292 lbs 12.8 oz  Wt Readings from Last 4 Encounters:   21 132.8 kg (292 lb 12.8 oz)   21 130 kg (286 lb 9.6 oz)   21 129.7 kg (286 lb)   21 127 kg (280 lb)       General: Healthy-appearing 55-year-old gentleman seated comfortably in an examination chair no acute distress  HEENT: NC/AT.  EOMI.  No rhinorrhea or epistaxis.  Mildly dry mucous membranes.  Healthy-appearing free flap within the left hard palate.  Palpation of the flap and margins reveals no induration, nodularity or discrete masses.  The remainder of the maxillary and mandibular dentition is intact.  No additional oral cavity lesions appreciated.  Neck: Supple.  Normal range of motion.  Mild fibrosis of the left sternocleidomastoid muscle.  No palpable cervical adenopathy bilaterally.  Pulmonary: No wheezing, stridor or respiratory distress  Skin: Normal color and turgor    LABS AND IMAGIN21 CT neck:  Stable post-treatment changes without evidence of recurrent disease    2021 CT chest:  No evidence of pulmonary metastases    2021 labs:  TSH: 2.38    IMPRESSION:   Mr. 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 He is over 2 years out from the completion of therapy and remains CÉSAR.    PLAN:   1. Follow-up in radiation oncology clinic with MD in 6 months  2. TSH due in 2021  3. He sees ENT in November.    Concepcion Gutierrez NP  Department of Radiation Oncology  [facility]

## 2021-09-20 NOTE — PROGRESS NOTES
Department of Radiation Oncology  [facility]  [address] [place] [address]  [phone]       Radiation Oncology Follow-up Visit  2021        Devonte Tucker  MRN: [id number]   : 1965     DISEASE TREATED:   pT4a N0 M0 squamous cell carcinoma of the left maxillary alveolar ridge    RADIATION THERAPY DELIVERED:   6000 cGy in 200 fractions, from 6/10/2019 - 2019    SYSTEMIC THERAPY:  None    INTERVAL SINCE COMPLETION OF RADIATION THERAPY:   26 months    SUBJECTIVE:   Devonte Tucker is a 55 year old male with a PMH significant for a local advanced 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 He underwent a left infrastructure maxillectomy and left level I lymph node dissection on 2019 with pathology revealing a 1.3 cm well-differentiated squamous cell carcinoma with 13 mm depth of invasion and invasion into the underlying bone. He received adjuvant radiotherapy as described above for improved local disease control.    Mr. Tucker returns to radiation oncology clinic today for a routine posttreatment disease surveillance visit. On examination, he reports he is feeling well and has no pressing concerns or complaints. He feels like he is mourning the lose of his teeth and normal eating habits.  He now feels like he has to accept how things are since it has been 2 years.  He does have less pain now and overall he is doing really well.  He has more stress being back at work. He is otherwise eating a diet predominantly consisting of soft foods given his lack of left maxillary dentition.  He is gaining weight and he isn't happy about that. He denies any odynophagia or dysphagia, nausea/vomiting, dyspnea, chest pain or abdominal pain with his remaining ROS likewise negative.    PHYSICAL EXAM:  292 lbs  12.8 oz  Wt Readings from Last 4 Encounters:   21 132.8 kg (292 lb 12.8 oz)   21 130 kg (286 lb 9.6 oz)   21 129.7 kg (286 lb)   21 127 kg (280 lb)       General: Healthy-appearing 55-year-old gentleman seated comfortably in an examination chair no acute distress  HEENT: NC/AT.  EOMI.  No rhinorrhea or epistaxis.  Mildly dry mucous membranes.  Healthy-appearing free flap within the left hard palate.  Palpation of the flap and margins reveals no induration, nodularity or discrete masses.  The remainder of the maxillary and mandibular dentition is intact.  No additional oral cavity lesions appreciated.  Neck: Supple.  Normal range of motion.  Mild fibrosis of the left sternocleidomastoid muscle.  No palpable cervical adenopathy bilaterally.  Pulmonary: No wheezing, stridor or respiratory distress  Skin: Normal color and turgor    LABS AND IMAGIN21 CT neck:  Stable post-treatment changes without evidence of recurrent disease    2021 CT chest:  No evidence of pulmonary metastases    2021 labs:  TSH: 2.38    IMPRESSION:   Mr. 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 He is over 2 years out from the completion of therapy and remains CÉSAR.    PLAN:   1. Follow-up in radiation oncology clinic with MD in 6 months  2. TSH due in 2021  3. He sees ENT in November.    Concepcion Gutierrez NP  Department of Radiation Oncology  [facility]

## 2021-09-23 DIAGNOSIS — C03.0 SQUAMOUS CELL CARCINOMA OF MAXILLARY ALVEOLAR RIDGE (H): ICD-10-CM

## 2021-09-23 RX ORDER — ATORVASTATIN CALCIUM 20 MG/1
20 TABLET, FILM COATED ORAL EVERY MORNING
Qty: 30 TABLET | Refills: 0 | Status: SHIPPED | OUTPATIENT
Start: 2021-09-23 | End: 2021-11-15

## 2021-09-23 NOTE — TELEPHONE ENCOUNTER
Last Clinic Visit: 3/15/2021  [facility] [place]    9- Next appointment

## 2021-09-30 DIAGNOSIS — E11.9 TYPE 2 DIABETES MELLITUS WITHOUT COMPLICATION, WITHOUT LONG-TERM CURRENT USE OF INSULIN (H): Primary | ICD-10-CM

## 2021-10-04 ENCOUNTER — MYC REFILL (OUTPATIENT)
Dept: ENDOCRINOLOGY | Facility: CLINIC | Age: 56
End: 2021-10-04

## 2021-10-04 DIAGNOSIS — C03.0 SQUAMOUS CELL CARCINOMA OF MAXILLARY ALVEOLAR RIDGE (H): ICD-10-CM

## 2021-10-04 DIAGNOSIS — C03.9 PRIMARY CANCER OF ALVEOLAR RIDGE MUCOSA (H): ICD-10-CM

## 2021-10-05 RX ORDER — GABAPENTIN 300 MG/1
300 CAPSULE ORAL 3 TIMES DAILY
Qty: 90 CAPSULE | Refills: 0 | Status: SHIPPED | OUTPATIENT
Start: 2021-10-05 | End: 2021-10-21

## 2021-10-18 ASSESSMENT — ENCOUNTER SYMPTOMS
DECREASED APPETITE: 0
INCREASED ENERGY: 1
DIFFICULTY URINATING: 1
WEIGHT GAIN: 1
HALLUCINATIONS: 0
HEMATURIA: 0
NIGHT SWEATS: 0
ALTERED TEMPERATURE REGULATION: 0
POLYPHAGIA: 1
FATIGUE: 1
FLANK PAIN: 0
CHILLS: 0
FEVER: 0
DYSURIA: 0
WEIGHT LOSS: 0
POLYDIPSIA: 1

## 2021-10-19 NOTE — PROGRESS NOTES
Outcome for 10/19/21 1:14 PM :Left Voicemail for patient to call back     Outcome for 10/19/21 3:06 PM :Left Voicemail for patient to call back.    Outcome for 10/19/21 3:18 PM : Attempted to reach patient via telephone. Unable to reach. [employer]t message sent.

## 2021-10-20 ENCOUNTER — THERAPY VISIT (OUTPATIENT)
Dept: PHYSICAL THERAPY | Facility: CLINIC | Age: 56
End: 2021-10-20
Payer: COMMERCIAL

## 2021-10-20 DIAGNOSIS — C03.0 SQUAMOUS CELL CARCINOMA OF MAXILLARY ALVEOLAR RIDGE (H): Primary | ICD-10-CM

## 2021-10-20 PROCEDURE — 97110 THERAPEUTIC EXERCISES: CPT | Mod: GP | Performed by: PHYSICAL THERAPIST

## 2021-10-20 PROCEDURE — 97161 PT EVAL LOW COMPLEX 20 MIN: CPT | Mod: GP | Performed by: PHYSICAL THERAPIST

## 2021-10-20 ASSESSMENT — 6 MINUTE WALK TEST (6MWT)
TOTAL DISTANCE WALKED (METERS): 176
TOTAL DISTANCE WALKED (FT): 580

## 2021-10-21 ENCOUNTER — VIRTUAL VISIT (OUTPATIENT)
Dept: ENDOCRINOLOGY | Facility: CLINIC | Age: 56
End: 2021-10-21
Payer: COMMERCIAL

## 2021-10-21 DIAGNOSIS — E11.9 TYPE 2 DIABETES MELLITUS WITHOUT COMPLICATION, WITHOUT LONG-TERM CURRENT USE OF INSULIN (H): Primary | ICD-10-CM

## 2021-10-21 DIAGNOSIS — C03.0 SQUAMOUS CELL CARCINOMA OF MAXILLARY ALVEOLAR RIDGE (H): ICD-10-CM

## 2021-10-21 DIAGNOSIS — C03.9 PRIMARY CANCER OF ALVEOLAR RIDGE MUCOSA (H): ICD-10-CM

## 2021-10-21 PROCEDURE — 99215 OFFICE O/P EST HI 40 MIN: CPT | Mod: 95 | Performed by: PHYSICIAN ASSISTANT

## 2021-10-21 RX ORDER — GABAPENTIN 300 MG/1
300 CAPSULE ORAL 3 TIMES DAILY
Qty: 270 CAPSULE | Refills: 3 | Status: SHIPPED | OUTPATIENT
Start: 2021-10-21 | End: 2022-10-26

## 2021-10-21 ASSESSMENT — PATIENT HEALTH QUESTIONNAIRE - PHQ9: SUM OF ALL RESPONSES TO PHQ QUESTIONS 1-9: 19

## 2021-10-21 NOTE — NURSING NOTE
Can Brewer, ANTONIO    Devonte is a 55 year old who is being evaluated via a billable video visit.      How would you like to obtain your AVS? MyChart  If the video visit is dropped, the invitation should be resent by: Send to e-mail at: [email or web address]  Will anyone else be joining your video visit? No

## 2021-10-21 NOTE — PATIENT INSTRUCTIONS
We appreciate your assistance in coordinating your healthcare.     Please upload your insulin pump, blood sugar meter and/or continuous glucose monitor at home 1-2 days before your next diabetes-related appointment.   This will allow your provider to review your  data before your scheduled virtual visit.    To ask a question to your Endocrine care team, please send them a [employer] message, or reach them by phone at [phone]     To expedite your medication refill(s), please contact your pharmacy and have them   fax a refill request to: [phone].  *Please allow 3 business days for routine medication refills.  *Please allow 5 business days for controlled substance medication refills.    For after-hours urgent Endocrine issues, that do not require [phone], please dial [phone], and ask to speak with the Endocrinologist On-Call

## 2021-10-21 NOTE — PROGRESS NOTES
Due to the COVID 19 pandemic this visit was converted to a video visit in order to help prevent spread of infection in this patient and the general population.    Time of start: 7:58 am  Time of end: 8:15 am  Total duration of video visit: 17 minutes.    HPI  Emigdio Tucker is a 55 year old male with type 2 diabetes mellitus. Video visit today for diabetes follow up.  Pt had a virtual visit with Leanna Taylor in March 2021.  Pt gives a hx of type 2 diabetes dx in early 2000.  He has neuropathy.  Pt denies hx of retinopathy or nephropathy.  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   He also has hx of obesity, HTN, depression and generalized anxiety disorder.  For his diabetes, he is currently taking Metformin 1000 mg BID.  Most recent A1C was 5.6 % in March 2021.  I have no glucose meter download or blood sugar data at this time.  He states his FBS are in the 120-140 range.  Blood sugars during the day range from  range.  On ROS today, he reports doing well.  He has severe neuropathy in his feet and also has neuropathy in both hands.  Denies foot ulcers.  Mild nausea. No vomiting.  Constipation.  Pt denies headaches, SOB at rest, cough, fever or chills.  No chest pain, abd pain, diarrhea, dysuria or hematuria.    Diabetes Care  Retinopathy: none; pt has Oph exam scheduled in 2 weeks.  Nephropathy:none; urine microalbuminuria negative in 7/2021. He is taking Lisinopril.  Neuropathy:yes.  Foot Exam: no exam today.  Taking aspirin: taking ASA 81 mg daily.  Lipids: LDL 64 in 11/2020. Taking Lipitor.  CAD: no.  Mental health: hx of depression and generalized anxiety.  Insulin: none.  DM meds: Metformin.  Testing: glucose meter.      ROS  See under HPI.    Allergies  Allergies   Allergen Reactions     Seasonal Allergies Other (See Comments) and  Shortness Of Breath       Medications  Current Outpatient Medications   Medication Sig Dispense Refill     acetaminophen (TYLENOL) 325 MG tablet Take 2 tablets (650 mg) by mouth every 4 hours as needed for mild pain 100 tablet 0     aspirin (ASA) 325 MG tablet Take 1 tablet (325 mg) by mouth daily 30 tablet 0     atorvastatin (LIPITOR) 20 MG tablet Take 1 tablet (20 mg) by mouth every morning 30 tablet 0     blood glucose monitoring (ANDREW MICROLET) lancets Use to test blood sugar 4 times daily or as directed. 200 each 3     cevimeline (EVOXAC) 30 MG capsule Take 1 capsule (30 mg) by mouth 3 times daily 90 capsule 11     doxazosin (CARDURA) 1 MG tablet Take 1 tablet (1 mg) by mouth daily 30 tablet 0     gabapentin (NEURONTIN) 300 MG capsule Take 1 capsule (300 mg) by mouth 3 times daily 270 capsule 3     lisinopril (ZESTRIL) 10 MG tablet Take 1 tablet (10 mg) by mouth every morning 30 tablet 3     metFORMIN (GLUCOPHAGE) 1000 MG tablet Take 1 tablet (1,000 mg) by mouth 2 times daily (with meals) 180 tablet 1     multivitamin w/minerals (THERA-VIT-M) tablet Take 1 tablet by mouth daily 30 tablet 0     omeprazole (PRILOSEC) 20 MG DR capsule Take 1 capsule (20 mg) by mouth daily 30 capsule 3     sodium fluoride dental gel (PREVIDENT) 1.1 % GEL topical gel Apply to affected area At Bedtime 112 g 11       Family History  family history includes Cancer in his brother, mother, and sister; Cardiovascular in his brother; Cerebrovascular Disease in his brother; Depression in his mother, sister, and sister; Diabetes in his brother, mother, and sister; Heart Disease in his brother and mother; Hypertension in his brother, mother, sister, and sister; Kidney Cancer in his sister; Substance Abuse in his brother, father, and sister.    Social History   reports that he has never smoked. He has never used smokeless tobacco. He reports that he does not drink alcohol and does not use drugs.     Past Medical History  Past Medical History:    Diagnosis Date     Allergic rhinitis experienced it for many years     Anxiety 2019     Benign positional vertigo May 2019     Depressive disorder 2019     Diabetes (H)      Gastroesophageal reflux disease May 2021     Head injury April 2018     Hypertension      Maxillary sinus cancer (H)      Obesity        Past Surgical History:   Procedure Laterality Date     ------------OTHER-------------      Mucosa and bone biopsy     COLONOSCOPY N/A 5/24/2021    Procedure: COLONOSCOPY, WITH POLYPECTOMY;  Surgeon: Terrell De Luna MD;  Location: [facility] OR     EXPLORE NECK Left 5/9/2019    Procedure: Left Neck Exploration;  Surgeon: Jett Treviño MD;  Location: [facility] OR     EXTRACTION(S) DENTAL      x2 under general anesthesia     GRAFT FREE VASCULARIZED (LOCATION) Right 5/9/2019    Procedure: Left Radial Forearm Free Flap (soft tissue);  Surgeon: Sumit Atwood MD;  Location: [facility] OR     GRAFT SKIN SPLIT THICKNESS FROM EXTREMITY Right 5/9/2019    Procedure: Graft skin split thickness from right thigh, nasogastric feeding tube placement;  Surgeon: Sumit Atwood MD;  Location: [facility] OR     IR LYMPH NODE BIOPSY  4/4/2019     OSTEOTOMY MAXILLA Left 5/9/2019    Procedure: Left Infrastructure Maxillectomy,;  Surgeon: Jett Treviño MD;  Location: [facility] OR       Physical Exam\    No exam today.    RESULTS  Creatinine   Date Value Ref Range Status   11/23/2020 0.74 0.70 - 1.30 mg/dL Final   07/01/2019 0.68 0.66 - 1.25 mg/dL Final     GFR Estimate   Date Value Ref Range Status   03/25/2021 >90 >60 mL/min/[1.73_m2] Final     Hemoglobin A1C   Date Value Ref Range Status   03/25/2021 5.6 0 - 5.6 % Final     Comment:     Normal <5.7% Prediabetes 5.7-6.4%  Diabetes 6.5% or higher - adopted from ADA   consensus guidelines.       Potassium   Date Value Ref Range Status   11/23/2020 4.4 3.5 - 5.0 mmol/L Final   07/01/2019 4.1 3.4 - 5.3 mmol/L Final     TSH   Date Value Ref Range Status   05/20/2021 2.38 0.40 - 4.00 mU/L Final        Cholesterol   Date Value Ref Range Status   11/23/2020 125 <=199 mg/dL Final   11/25/2019 124 <=199 mg/dL Final   07/01/2019 105 <200 mg/dL Final     HDL Cholesterol   Date Value Ref Range Status   07/01/2019 38 (L) >39 mg/dL Final     Direct Measure HDL   Date Value Ref Range Status   11/23/2020 43 >=40 mg/dL Final   11/25/2019 44 >=40 mg/dL Final     LDL Cholesterol Calculated   Date Value Ref Range Status   11/23/2020 64 <=129 mg/dL Final   11/25/2019 65 <=129 mg/dL Final   07/01/2019 41 <100 mg/dL Final     Comment:     Desirable:       <100 mg/dl     Triglycerides   Date Value Ref Range Status   11/23/2020 91 <=149 mg/dL Final   11/25/2019 73 <=149 mg/dL Final   07/01/2019 128 <150 mg/dL Final         ASSESSMENT/PLAN:    1.  TYPE 2 DIABETES MELLITUS:  I have no glucose meter download data at this time.  Will check A1C.  Continue Metformin for now.  If A1C is above target, will add another diabetic medication. This was discussed with patient today.  Pt denies hx of retinopathy and will be seeing Oph in a few weeks.  His urine microalbuminuria has been negative. He is taking Lisinopril.  Most recent creat was 0.7 with GFR > 90 mL/min in 3/2021.  No vitals today.  Reviewed the importance of healthy eating, portion control, weight loss and need for daily activity.  Pt received the COVID19 vaccines (Moderna).  He also received the flu vaccine this Fall.    2.  NEUROPATHY: Gabapentin refilled today.  He denies foot ulcers.  Reviewed diabetic foot care today.    3.  MENTAL HEALTH: Stable at this time.    4.  FOLLOW UP: with me in 3 months.  I placed an order for an A1C and annual fasting diabetic labs today.    Time spent reviewing chart and labs today = 8 minutes.  Time for video visit today= 17 minutes.  Time for documentation today = 15 minutes.    TOTAL TIME FOR VISIT TODAY = 40 minutes.    Elsa Perez PA-C

## 2021-10-21 NOTE — LETTER
10/21/2021       RE: Devonte Tucker  [address] [place] [address]     Dear Colleague,    Thank you for referring your patient, Devonte Tucker, to the [facility] [place] [facility]. Please see a copy of my visit note below.    Outcome for 10/19/21 1:14 PM :Left Voicemail for patient to call back     Outcome for 10/19/21 3:06 PM :Left Voicemail for patient to call back.    Outcome for 10/19/21 3:18 PM : Attempted to reach patient via telephone. Unable to reach. [employer] message sent.           Due to the COVID 19 pandemic this visit was converted to a video visit in order to help prevent spread of infection in this patient and the general population.    Time of start: 7:58 am  Time of end: 8:15 am  Total duration of video visit: 17 minutes.    HPI  Emigdio Tucker is a 55 year old male with type 2 diabetes mellitus. Video visit today for diabetes follow up.  Pt had a virtual visit with Leanna Taylor in March 2021.  Pt gives a hx of type 2 diabetes dx in early 2000.  He has neuropathy.  Pt denies hx of retinopathy or nephropathy.  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   He also has hx of obesity, HTN, depression and generalized anxiety disorder.  For his diabetes, he is currently taking Metformin 1000 mg BID.  Most recent A1C was 5.6 % in March 2021.  I have no glucose meter download or blood sugar data at this time.  He states his FBS are in the 120-140 range.  Blood sugars during the day range from  range.  On ROS today, he reports doing well.  He has severe neuropathy in his feet and also has neuropathy in both hands.  Denies foot ulcers.  Mild nausea. No vomiting.  Constipation.  Pt denies headaches, SOB at rest, cough, fever or chills.  No  chest pain, abd pain, diarrhea, dysuria or hematuria.    Diabetes Care  Retinopathy: none; pt has Oph exam scheduled in 2 weeks.  Nephropathy:none; urine microalbuminuria negative in 7/2021. He is taking Lisinopril.  Neuropathy:yes.  Foot Exam: no exam today.  Taking aspirin: taking ASA 81 mg daily.  Lipids: LDL 64 in 11/2020. Taking Lipitor.  CAD: no.  Mental health: hx of depression and generalized anxiety.  Insulin: none.  DM meds: Metformin.  Testing: glucose meter.      ROS  See under HPI.    Allergies  Allergies   Allergen Reactions     Seasonal Allergies Other (See Comments) and Shortness Of Breath       Medications  Current Outpatient Medications   Medication Sig Dispense Refill     acetaminophen (TYLENOL) 325 MG tablet Take 2 tablets (650 mg) by mouth every 4 hours as needed for mild pain 100 tablet 0     aspirin (ASA) 325 MG tablet Take 1 tablet (325 mg) by mouth daily 30 tablet 0     atorvastatin (LIPITOR) 20 MG tablet Take 1 tablet (20 mg) by mouth every morning 30 tablet 0     blood glucose monitoring (ANDREW MICROLET) lancets Use to test blood sugar 4 times daily or as directed. 200 each 3     cevimeline (EVOXAC) 30 MG capsule Take 1 capsule (30 mg) by mouth 3 times daily 90 capsule 11     doxazosin (CARDURA) 1 MG tablet Take 1 tablet (1 mg) by mouth daily 30 tablet 0     gabapentin (NEURONTIN) 300 MG capsule Take 1 capsule (300 mg) by mouth 3 times daily 270 capsule 3     lisinopril (ZESTRIL) 10 MG tablet Take 1 tablet (10 mg) by mouth every morning 30 tablet 3     metFORMIN (GLUCOPHAGE) 1000 MG tablet Take 1 tablet (1,000 mg) by mouth 2 times daily (with meals) 180 tablet 1     multivitamin w/minerals (THERA-VIT-M) tablet Take 1 tablet by mouth daily 30 tablet 0     omeprazole (PRILOSEC) 20 MG DR capsule Take 1 capsule (20 mg) by mouth daily 30 capsule 3     sodium fluoride dental gel (PREVIDENT) 1.1 % GEL topical gel Apply to affected area At Bedtime 112 g 11       Family History  family history  includes Cancer in his brother, mother, and sister; Cardiovascular in his brother; Cerebrovascular Disease in his brother; Depression in his mother, sister, and sister; Diabetes in his brother, mother, and sister; Heart Disease in his brother and mother; Hypertension in his brother, mother, sister, and sister; Kidney Cancer in his sister; Substance Abuse in his brother, father, and sister.    Social History   reports that he has never smoked. He has never used smokeless tobacco. He reports that he does not drink alcohol and does not use drugs.     Past Medical History  Past Medical History:   Diagnosis Date     Allergic rhinitis experienced it for many years     Anxiety 2019     Benign positional vertigo May 2019     Depressive disorder 2019     Diabetes (H)      Gastroesophageal reflux disease May 2021     Head injury April 2018     Hypertension      Maxillary sinus cancer (H)      Obesity        Past Surgical History:   Procedure Laterality Date     ------------OTHER-------------      Mucosa and bone biopsy     COLONOSCOPY N/A 5/24/2021    Procedure: COLONOSCOPY, WITH POLYPECTOMY;  Surgeon: Terrell De Luna MD;  Location: [facility] OR     EXPLORE NECK Left 5/9/2019    Procedure: Left Neck Exploration;  Surgeon: Jett Treviño MD;  Location: [facility]     EXTRACTION(S) DENTAL      x2 under general anesthesia     GRAFT FREE VASCULARIZED (LOCATION) Right 5/9/2019    Procedure: Left Radial Forearm Free Flap (soft tissue);  Surgeon: Sumit Atwood MD;  Location: [facility] OR     GRAFT SKIN SPLIT THICKNESS FROM EXTREMITY Right 5/9/2019    Procedure: Graft skin split thickness from right thigh, nasogastric feeding tube placement;  Surgeon: Sumit Atwood MD;  Location: [facility] OR     IR LYMPH NODE BIOPSY  4/4/2019     OSTEOTOMY MAXILLA Left 5/9/2019    Procedure: Left Infrastructure Maxillectomy,;  Surgeon: Jett Treviño MD;  Location: [facility] OR       Physical Exam\    No exam today.    RESULTS  Creatinine   Date Value Ref Range  Status   11/23/2020 0.74 0.70 - 1.30 mg/dL Final   07/01/2019 0.68 0.66 - 1.25 mg/dL Final     GFR Estimate   Date Value Ref Range Status   03/25/2021 >90 >60 mL/min/[1.73_m2] Final     Hemoglobin A1C   Date Value Ref Range Status   03/25/2021 5.6 0 - 5.6 % Final     Comment:     Normal <5.7% Prediabetes 5.7-6.4%  Diabetes 6.5% or higher - adopted from ADA   consensus guidelines.       Potassium   Date Value Ref Range Status   11/23/2020 4.4 3.5 - 5.0 mmol/L Final   07/01/2019 4.1 3.4 - 5.3 mmol/L Final     TSH   Date Value Ref Range Status   05/20/2021 2.38 0.40 - 4.00 mU/L Final       Cholesterol   Date Value Ref Range Status   11/23/2020 125 <=199 mg/dL Final   11/25/2019 124 <=199 mg/dL Final   07/01/2019 105 <200 mg/dL Final     HDL Cholesterol   Date Value Ref Range Status   07/01/2019 38 (L) >39 mg/dL Final     Direct Measure HDL   Date Value Ref Range Status   11/23/2020 43 >=40 mg/dL Final   11/25/2019 44 >=40 mg/dL Final     LDL Cholesterol Calculated   Date Value Ref Range Status   11/23/2020 64 <=129 mg/dL Final   11/25/2019 65 <=129 mg/dL Final   07/01/2019 41 <100 mg/dL Final     Comment:     Desirable:       <100 mg/dl     Triglycerides   Date Value Ref Range Status   11/23/2020 91 <=149 mg/dL Final   11/25/2019 73 <=149 mg/dL Final   07/01/2019 128 <150 mg/dL Final         ASSESSMENT/PLAN:    1.  TYPE 2 DIABETES MELLITUS:  I have no glucose meter download data at this time.  Will check A1C.  Continue Metformin for now.  If A1C is above target, will add another diabetic medication. This was discussed with patient today.  Pt denies hx of retinopathy and will be seeing Oph in a few weeks.  His urine microalbuminuria has been negative. He is taking Lisinopril.  Most recent creat was 0.7 with GFR > 90 mL/min in 3/2021.  No vitals today.  Reviewed the importance of healthy eating, portion control, weight loss and need for daily activity.  Pt received the COVID19 vaccines (Moderna).  He also received the  flu vaccine this Fall.    2.  NEUROPATHY: Gabapentin refilled today.  He denies foot ulcers.  Reviewed diabetic foot care today.    3.  MENTAL HEALTH: Stable at this time.    4.  FOLLOW UP: with me in 3 months.  I placed an order for an A1C and annual fasting diabetic labs today.    Time spent reviewing chart and labs today = 8 minutes.  Time for video visit today= 17 minutes.  Time for documentation today = 15 minutes.    TOTAL TIME FOR VISIT TODAY = 40 minutes.    Elsa Perez PA-C

## 2021-10-23 ENCOUNTER — TELEPHONE (OUTPATIENT)
Dept: ENDOCRINOLOGY | Facility: CLINIC | Age: 56
End: 2021-10-23

## 2021-10-23 NOTE — TELEPHONE ENCOUNTER
Attempted to reach patient to schedule follow up in the Endocrinology Clinic.  Amarjit and Tali message sent.     Schedule with JANNIE Munoz video visit in 3 months.    Zulma Torres on 10/23/2021 at 12:10 PM

## 2021-11-03 ENCOUNTER — THERAPY VISIT (OUTPATIENT)
Dept: PHYSICAL THERAPY | Facility: CLINIC | Age: 56
End: 2021-11-03
Payer: COMMERCIAL

## 2021-11-03 ENCOUNTER — OFFICE VISIT (OUTPATIENT)
Dept: OTOLARYNGOLOGY | Facility: CLINIC | Age: 56
End: 2021-11-03
Payer: COMMERCIAL

## 2021-11-03 ENCOUNTER — LAB (OUTPATIENT)
Dept: LAB | Facility: CLINIC | Age: 56
End: 2021-11-03
Payer: COMMERCIAL

## 2021-11-03 VITALS — BODY MASS INDEX: 44.26 KG/M2 | HEIGHT: 67 IN | WEIGHT: 282 LBS

## 2021-11-03 DIAGNOSIS — C03.0 SQUAMOUS CELL CARCINOMA OF MAXILLARY ALVEOLAR RIDGE (H): Primary | ICD-10-CM

## 2021-11-03 DIAGNOSIS — R26.89 IMBALANCE: ICD-10-CM

## 2021-11-03 DIAGNOSIS — M62.838 MUSCLE SPASM: ICD-10-CM

## 2021-11-03 DIAGNOSIS — E11.9 TYPE 2 DIABETES MELLITUS WITHOUT COMPLICATION, WITHOUT LONG-TERM CURRENT USE OF INSULIN (H): ICD-10-CM

## 2021-11-03 DIAGNOSIS — R04.0 EPISTAXIS: ICD-10-CM

## 2021-11-03 LAB
ALT SERPL W P-5'-P-CCNC: 30 U/L (ref 0–70)
AST SERPL W P-5'-P-CCNC: 16 U/L (ref 0–45)
CHOLEST SERPL-MCNC: 135 MG/DL
CREAT SERPL-MCNC: 0.84 MG/DL (ref 0.66–1.25)
CREAT UR-MCNC: 69 MG/DL
FASTING STATUS PATIENT QL REPORTED: NORMAL
GFR SERPL CREATININE-BSD FRML MDRD: >90 ML/MIN/1.73M2
HBA1C MFR BLD: 5.8 % (ref 0–5.6)
HDLC SERPL-MCNC: 50 MG/DL
LDLC SERPL CALC-MCNC: 71 MG/DL
MICROALBUMIN UR-MCNC: 6 MG/L
MICROALBUMIN/CREAT UR: 8.7 MG/G CR (ref 0–17)
NONHDLC SERPL-MCNC: 85 MG/DL
TRIGL SERPL-MCNC: 70 MG/DL
TSH SERPL DL<=0.005 MIU/L-ACNC: 3.01 MU/L (ref 0.4–4)

## 2021-11-03 PROCEDURE — 83036 HEMOGLOBIN GLYCOSYLATED A1C: CPT | Performed by: PATHOLOGY

## 2021-11-03 PROCEDURE — 82565 ASSAY OF CREATININE: CPT | Performed by: PATHOLOGY

## 2021-11-03 PROCEDURE — 80061 LIPID PANEL: CPT | Performed by: PATHOLOGY

## 2021-11-03 PROCEDURE — 84450 TRANSFERASE (AST) (SGOT): CPT | Performed by: PATHOLOGY

## 2021-11-03 PROCEDURE — 97140 MANUAL THERAPY 1/> REGIONS: CPT | Mod: GP | Performed by: PHYSICAL THERAPIST

## 2021-11-03 PROCEDURE — 84460 ALANINE AMINO (ALT) (SGPT): CPT | Performed by: PATHOLOGY

## 2021-11-03 PROCEDURE — 97110 THERAPEUTIC EXERCISES: CPT | Mod: GP | Performed by: PHYSICAL THERAPIST

## 2021-11-03 PROCEDURE — 36415 COLL VENOUS BLD VENIPUNCTURE: CPT | Performed by: PATHOLOGY

## 2021-11-03 PROCEDURE — 84443 ASSAY THYROID STIM HORMONE: CPT | Performed by: PATHOLOGY

## 2021-11-03 PROCEDURE — 82043 UR ALBUMIN QUANTITATIVE: CPT | Performed by: PATHOLOGY

## 2021-11-03 PROCEDURE — 99214 OFFICE O/P EST MOD 30 MIN: CPT | Performed by: REGISTERED NURSE

## 2021-11-03 RX ORDER — EUCALYPTUS/PEPPERMINT OIL
SOLUTION, NON-ORAL NASAL
Qty: 30 ML | Refills: 3 | Status: SHIPPED | OUTPATIENT
Start: 2021-11-03 | End: 2023-01-10

## 2021-11-03 ASSESSMENT — PAIN SCALES - GENERAL: PAINLEVEL: NO PAIN (0)

## 2021-11-03 ASSESSMENT — MIFFLIN-ST. JEOR: SCORE: 2067.77

## 2021-11-03 NOTE — LETTER
11/3/2021       RE: Devonte Tucker  [address] [place] [address]     Dear Colleague,    Thank you for referring your patient, Devonte Tucker, to the [facility] [place] at [facility]. Please see a copy of my visit note below.    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  Dr. Treviño performed a left palatectomy on 5/9/2019 with Dr. Atwood performing a left radial forearm free flap reconstruction.  He had postop radiation therapy completed on 7/22/2019, 6000 cGy. Was last seen in ENT clinic by Dr. Atwood 6/30/21. Surveillance CT scans completed on 9/20/21 and were negative for recurrence or metastases.    Interval History:   Patient comes in today for routine surveillance. He is doing well. Continues to have persistent oral pain in the anterior hard palate directly behind tooth #8 and left maxilla pain. This is managed with tylenol, massage to the left face, and warm compresses to the left side. Also continues to have left sided neck spasms. Works with PT weekly and manages pain with warmth and massages. Does report intermittent dysphagia, particularly with dry foods. Needs to drink a large amount of water between bites. No odynophagia.     Continues to have intermittent epistaxis that is managed with aquaphor to the nares. Concerned that epistaxis may increase in frequency now that the weather is getting colder.    Patient denies any new sore throat, ear pain, bleeding from the mouth, hemoptysis, voice changes or lumps/bumps of the neck.    Past Medical History:  Past Medical History:   Diagnosis Date     Allergic rhinitis experienced it for many years     Anxiety 2019     Benign positional vertigo May 2019     Depressive disorder 2019      Diabetes (H)      Gastroesophageal reflux disease May 2021     Head injury April 2018     Hypertension      Maxillary sinus cancer (H)      Obesity      Past Surgical History:  Past Surgical History:   Procedure Laterality Date     ------------OTHER-------------      Mucosa and bone biopsy     COLONOSCOPY N/A 5/24/2021    Procedure: COLONOSCOPY, WITH POLYPECTOMY;  Surgeon: Terrell De Luna MD;  Location: [facility] OR     EXPLORE NECK Left 5/9/2019    Procedure: Left Neck Exploration;  Surgeon: Jett Treviño MD;  Location: [facility] OR     EXTRACTION(S) DENTAL      x2 under general anesthesia     GRAFT FREE VASCULARIZED (LOCATION) Right 5/9/2019    Procedure: Left Radial Forearm Free Flap (soft tissue);  Surgeon: Sumit Atwood MD;  Location: [facility] OR     GRAFT SKIN SPLIT THICKNESS FROM EXTREMITY Right 5/9/2019    Procedure: Graft skin split thickness from right thigh, nasogastric feeding tube placement;  Surgeon: Sumit Atwood MD;  Location: [facility] OR     IR LYMPH NODE BIOPSY  4/4/2019     OSTEOTOMY MAXILLA Left 5/9/2019    Procedure: Left Infrastructure Maxillectomy,;  Surgeon: Jett Treviño MD;  Location: [facility] OR     Medications:  Current Outpatient Medications   Medication Sig Dispense Refill     acetaminophen (TYLENOL) 325 MG tablet Take 2 tablets (650 mg) by mouth every 4 hours as needed for mild pain 100 tablet 0     aspirin (ASA) 325 MG tablet Take 1 tablet (325 mg) by mouth daily 30 tablet 0     atorvastatin (LIPITOR) 20 MG tablet Take 1 tablet (20 mg) by mouth every morning 30 tablet 0     blood glucose monitoring (ANDREW MICROLET) lancets Use to test blood sugar 4 times daily or as directed. 200 each 3     cevimeline (EVOXAC) 30 MG capsule Take 1 capsule (30 mg) by mouth 3 times daily 90 capsule 11     doxazosin (CARDURA) 1 MG tablet Take 1 tablet (1 mg) by mouth daily 30 tablet 0     gabapentin (NEURONTIN) 300 MG capsule Take 1 capsule (300 mg) by mouth 3 times daily 270 capsule 3     lisinopril (ZESTRIL) 10  "MG tablet Take 1 tablet (10 mg) by mouth every morning 30 tablet 3     metFORMIN (GLUCOPHAGE) 1000 MG tablet Take 1 tablet (1,000 mg) by mouth 2 times daily (with meals) 180 tablet 1     Misc Natural Product Nasal (PONARIS) SOLN With head tilted back, place 1 or 2 drops in each nostril once daily. 30 mL 3     multivitamin w/minerals (THERA-VIT-M) tablet Take 1 tablet by mouth daily 30 tablet 0     omeprazole (PRILOSEC) 20 MG DR capsule Take 1 capsule (20 mg) by mouth daily 30 capsule 3     sodium fluoride dental gel (PREVIDENT) 1.1 % GEL topical gel Apply to affected area At Bedtime 112 g 11     Allergies:  Allergies   Allergen Reactions     Seasonal Allergies Other (See Comments) and Shortness Of Breath      Social History:  Social History     Tobacco Use     Smoking status: Never Smoker     Smokeless tobacco: Never Used   Substance Use Topics     Alcohol use: No     Drug use: No     ROS: 10 point ROS neg other than the symptoms noted above in the HPI.    Physical Exam:    Ht 1.702 m (5' 7\")   Wt 127.9 kg (282 lb)   BMI 44.17 kg/m    Wt Readings from Last 3 Encounters:   11/03/21 127.9 kg (282 lb)   09/20/21 132.8 kg (292 lb 12.8 oz)   06/30/21 130 kg (286 lb 9.6 oz)        Constitutional:  The patient was unaccompanied, well-groomed, and in no acute distress.     Neurologic: Alert and oriented x 3.    Psychiatric: The patient's affect was calm, cooperative, and appropriate.     Communication:  Normal; communicates verbally, normal voice quality.   Respiratory: Breathing comfortably without stridor or exertion of accessory muscles.    Head/Face:  Normocephalic and atraumatic.  No lesions or scars.    Eyes: Pupils were equal and reactive.  Extraocular movement intact.    Ears: Pinnae and tragus non-tender.  EAC's and TM's were clear.     Oral Cavity: Healthy appearing flap in left maxilla. White mucosalized area along medial portion. Tenderness with palpation around tooth #8. Normal tongue, floor of mouth, and  " buccal mucosa.  No lesions or masses on inspection or palpation.    Oropharynx: Normal mucosa, palate symmetric with normal elevation. No abnormal lymph tissue in the oropharynx.     Neck: Well healed left neck incision. Tightness in the left SCM. Normal range of motion   Lymphatic: There is no palpable lymphadenopathy in the neck.      Labs and Imaging Reviewed:  [email or web address]  CT neck  Impression: Stable postsurgical changes of left palatectomy, partial  left maxillectomy and left neck dissection without recurrent tumor. No  cervical lymphadenopathy.     CT chest  IMPRESSION:   No definite evidence for metastatic disease in the chest    Labs:  TSH   Date Value Ref Range Status   2021 3.01 0.40 - 4.00 mU/L Final   2021 2.38 0.40 - 4.00 mU/L Final     Assessment/Plan:  1. 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 He is doing well with no evidence of disease on today's exam. He continues to have oral pain that is managed with current regimen. Today the adjacent gingival mucosa and tooth appear healthy. Recent CT scans did not report any concerning findings. Continue to closely monitor area.     2. Epistaxis  Persistent epistaxis due to mucosal dryness. Patient can continue to manage with aquaphor. Order for Ponaris placed for patient to try. Continue to monitor.     3. Muscle spasm  Continue to work with PT for management. May benefit from a consultation with Dr. Hurtado in PM&R.     Reviewed signs and symptoms that would necessitate a sooner exam including new sore throat, mouth pain, ear pain, dysphagia, bleeding from the mouth or lumps/bumps of the neck.    Otherwise, patient will follow up in 4 months with Dr. Atwood for continued surveillance.    Marie Coburn DNP, APRN, CNP  Otolaryngology  Head & Neck Surgery  [phone]    30 minutes spent on the date of the encounter  doing chart review, history and exam, documentation and further activities per the note

## 2021-11-03 NOTE — PROGRESS NOTES
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  Dr. Treviño performed a left palatectomy on 5/9/2019 with Dr. Atwood performing a left radial forearm free flap reconstruction.  He had postop radiation therapy completed on 7/22/2019, 6000 cGy. Was last seen in ENT clinic by Dr. Atwood 6/30/21. Surveillance CT scans completed on 9/20/21 and were negative for recurrence or metastases.    Interval History:   Patient comes in today for routine surveillance. He is doing well. Continues to have persistent oral pain in the anterior hard palate directly behind tooth #8 and left maxilla pain. This is managed with tylenol, massage to the left face, and warm compresses to the left side. Also continues to have left sided neck spasms. Works with PT weekly and manages pain with warmth and massages. Does report intermittent dysphagia, particularly with dry foods. Needs to drink a large amount of water between bites. No odynophagia.     Continues to have intermittent epistaxis that is managed with aquaphor to the nares. Concerned that epistaxis may increase in frequency now that the weather is getting colder.    Patient denies any new sore throat, ear pain, bleeding from the mouth, hemoptysis, voice changes or lumps/bumps of the neck.    Past Medical History:  Past Medical History:   Diagnosis Date     Allergic rhinitis experienced it for many years     Anxiety 2019     Benign positional vertigo May 2019     Depressive disorder 2019     Diabetes (H)      Gastroesophageal reflux disease May 2021     Head injury April 2018     Hypertension      Maxillary sinus cancer (H)      Obesity      Past Surgical History:  Past Surgical History:   Procedure Laterality Date     ------------OTHER-------------      Mucosa and bone biopsy     COLONOSCOPY N/A 5/24/2021    Procedure:  COLONOSCOPY, WITH POLYPECTOMY;  Surgeon: Terrell De Luna MD;  Location: [facility] OR     EXPLORE NECK Left 5/9/2019    Procedure: Left Neck Exploration;  Surgeon: Jett Treviño MD;  Location: [facility] OR     EXTRACTION(S) DENTAL      x2 under general anesthesia     GRAFT FREE VASCULARIZED (LOCATION) Right 5/9/2019    Procedure: Left Radial Forearm Free Flap (soft tissue);  Surgeon: Sumit Atwood MD;  Location: [facility] OR     GRAFT SKIN SPLIT THICKNESS FROM EXTREMITY Right 5/9/2019    Procedure: Graft skin split thickness from right thigh, nasogastric feeding tube placement;  Surgeon: Sumit Atwood MD;  Location: [facility] OR     IR LYMPH NODE BIOPSY  4/4/2019     OSTEOTOMY MAXILLA Left 5/9/2019    Procedure: Left Infrastructure Maxillectomy,;  Surgeon: Jett Treviño MD;  Location: [facility] OR     Medications:  Current Outpatient Medications   Medication Sig Dispense Refill     acetaminophen (TYLENOL) 325 MG tablet Take 2 tablets (650 mg) by mouth every 4 hours as needed for mild pain 100 tablet 0     aspirin (ASA) 325 MG tablet Take 1 tablet (325 mg) by mouth daily 30 tablet 0     atorvastatin (LIPITOR) 20 MG tablet Take 1 tablet (20 mg) by mouth every morning 30 tablet 0     blood glucose monitoring (ANDREW MICROLET) lancets Use to test blood sugar 4 times daily or as directed. 200 each 3     cevimeline (EVOXAC) 30 MG capsule Take 1 capsule (30 mg) by mouth 3 times daily 90 capsule 11     doxazosin (CARDURA) 1 MG tablet Take 1 tablet (1 mg) by mouth daily 30 tablet 0     gabapentin (NEURONTIN) 300 MG capsule Take 1 capsule (300 mg) by mouth 3 times daily 270 capsule 3     lisinopril (ZESTRIL) 10 MG tablet Take 1 tablet (10 mg) by mouth every morning 30 tablet 3     metFORMIN (GLUCOPHAGE) 1000 MG tablet Take 1 tablet (1,000 mg) by mouth 2 times daily (with meals) 180 tablet 1     Misc Natural Product Nasal (PONARIS) SOLN With head tilted back, place 1 or 2 drops in each nostril once daily. 30 mL 3     multivitamin w/minerals  "(THERA-VIT-M) tablet Take 1 tablet by mouth daily 30 tablet 0     omeprazole (PRILOSEC) 20 MG DR capsule Take 1 capsule (20 mg) by mouth daily 30 capsule 3     sodium fluoride dental gel (PREVIDENT) 1.1 % GEL topical gel Apply to affected area At Bedtime 112 g 11     Allergies:  Allergies   Allergen Reactions     Seasonal Allergies Other (See Comments) and Shortness Of Breath      Social History:  Social History     Tobacco Use     Smoking status: Never Smoker     Smokeless tobacco: Never Used   Substance Use Topics     Alcohol use: No     Drug use: No     ROS: 10 point ROS neg other than the symptoms noted above in the HPI.    Physical Exam:    Ht 1.702 m (5' 7\")   Wt 127.9 kg (282 lb)   BMI 44.17 kg/m    Wt Readings from Last 3 Encounters:   11/03/21 127.9 kg (282 lb)   09/20/21 132.8 kg (292 lb 12.8 oz)   06/30/21 130 kg (286 lb 9.6 oz)        Constitutional:  The patient was unaccompanied, well-groomed, and in no acute distress.     Neurologic: Alert and oriented x 3.    Psychiatric: The patient's affect was calm, cooperative, and appropriate.     Communication:  Normal; communicates verbally, normal voice quality.   Respiratory: Breathing comfortably without stridor or exertion of accessory muscles.    Head/Face:  Normocephalic and atraumatic.  No lesions or scars.    Eyes: Pupils were equal and reactive.  Extraocular movement intact.    Ears: Pinnae and tragus non-tender.  EAC's and TM's were clear.     Oral Cavity: Healthy appearing flap in left maxilla. White mucosalized area along medial portion. Tenderness with palpation around tooth #8. Normal tongue, floor of mouth, and  buccal mucosa.  No lesions or masses on inspection or palpation.    Oropharynx: Normal mucosa, palate symmetric with normal elevation. No abnormal lymph tissue in the oropharynx.     Neck: Well healed left neck incision. Tightness in the left SCM. Normal range of motion   Lymphatic: There is no palpable lymphadenopathy in the neck.  "     Labs and Imaging Reviewed:  Imagin21  CT neck  Impression: Stable postsurgical changes of left palatectomy, partial  left maxillectomy and left neck dissection without recurrent tumor. No  cervical lymphadenopathy.     CT chest  IMPRESSION:   No definite evidence for metastatic disease in the chest    Labs:  TSH   Date Value Ref Range Status   2021 3.01 0.40 - 4.00 mU/L Final   2021 2.38 0.40 - 4.00 mU/L Final     Assessment/Plan:  1. 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 He is doing well with no evidence of disease on today's exam. He continues to have oral pain that is managed with current regimen. Today the adjacent gingival mucosa and tooth appear healthy. Recent CT scans did not report any concerning findings. Continue to closely monitor area.     2. Epistaxis  Persistent epistaxis due to mucosal dryness. Patient can continue to manage with aquaphor. Order for Ponaris placed for patient to try. Continue to monitor.     3. Muscle spasm  Continue to work with PT for management. May benefit from a consultation with Dr. Hurtado in PM&R.     Reviewed signs and symptoms that would necessitate a sooner exam including new sore throat, mouth pain, ear pain, dysphagia, bleeding from the mouth or lumps/bumps of the neck.    Otherwise, patient will follow up in 4 months with Dr. Atwood for continued surveillance.    Marie Coburn DNP, APRN, CNP  Otolaryngology  Head & Neck Surgery  [phone]    30 minutes spent on the date of the encounter doing chart review, history and exam, documentation and further activities per the note

## 2021-11-03 NOTE — PATIENT INSTRUCTIONS
- You were seen in the ENT Clinic today by Marie Coburn NP.   - Follow up in 4 months with Dr. Atwood.   - Please send a [employer] message or call the ENT clinic at [phone] with any questions or concerns.

## 2021-11-10 ENCOUNTER — THERAPY VISIT (OUTPATIENT)
Dept: PHYSICAL THERAPY | Facility: CLINIC | Age: 56
End: 2021-11-10
Payer: COMMERCIAL

## 2021-11-10 DIAGNOSIS — C03.0 SQUAMOUS CELL CARCINOMA OF MAXILLARY ALVEOLAR RIDGE (H): Primary | ICD-10-CM

## 2021-11-10 PROCEDURE — 97110 THERAPEUTIC EXERCISES: CPT | Mod: GP | Performed by: PHYSICAL THERAPIST

## 2021-11-13 DIAGNOSIS — C03.0 SQUAMOUS CELL CARCINOMA OF MAXILLARY ALVEOLAR RIDGE (H): ICD-10-CM

## 2021-11-15 RX ORDER — ATORVASTATIN CALCIUM 20 MG/1
20 TABLET, FILM COATED ORAL EVERY MORNING
Qty: 90 TABLET | Refills: 3 | Status: SHIPPED | OUTPATIENT
Start: 2021-11-15 | End: 2022-11-25

## 2021-11-15 NOTE — TELEPHONE ENCOUNTER
Last Clinic Visit: 10/21/2021  [facility] [place]    LDL Cholesterol Calculated   Date Value Ref Range Status   11/03/2021 71 <=100 mg/dL Final   07/01/2019 41 <100 mg/dL Final     Comment:     Desirable:       <100 mg/dl

## 2021-12-01 ENCOUNTER — THERAPY VISIT (OUTPATIENT)
Dept: PHYSICAL THERAPY | Facility: CLINIC | Age: 56
End: 2021-12-01
Payer: COMMERCIAL

## 2021-12-01 DIAGNOSIS — C03.0 SQUAMOUS CELL CARCINOMA OF MAXILLARY ALVEOLAR RIDGE (H): Primary | ICD-10-CM

## 2021-12-01 PROCEDURE — 97116 GAIT TRAINING THERAPY: CPT | Mod: GP | Performed by: PHYSICAL THERAPIST

## 2021-12-01 PROCEDURE — 97110 THERAPEUTIC EXERCISES: CPT | Mod: GP | Performed by: PHYSICAL THERAPIST

## 2021-12-03 ENCOUNTER — THERAPY VISIT (OUTPATIENT)
Dept: PHYSICAL THERAPY | Facility: CLINIC | Age: 56
End: 2021-12-03
Payer: COMMERCIAL

## 2021-12-03 DIAGNOSIS — C03.0 SQUAMOUS CELL CARCINOMA OF MAXILLARY ALVEOLAR RIDGE (H): Primary | ICD-10-CM

## 2021-12-03 PROCEDURE — 97110 THERAPEUTIC EXERCISES: CPT | Mod: GP | Performed by: PHYSICAL THERAPIST

## 2021-12-03 PROCEDURE — 97116 GAIT TRAINING THERAPY: CPT | Mod: GP | Performed by: PHYSICAL THERAPIST

## 2021-12-03 PROCEDURE — 97140 MANUAL THERAPY 1/> REGIONS: CPT | Mod: GP | Performed by: PHYSICAL THERAPIST

## 2021-12-03 NOTE — DISCHARGE INSTRUCTIONS
1. Add scalene neck stretch  2. Okay to spread leg strengthening out every other day  3. We raised your cane up a few notches (was on 4th hole from bottom) to promote improved posture and hip stability. Continue to try and use cane on right side    Mariel Hardwick PT, DPT  Physical Therapist  [facility] [address]  [address] [place] [address]  [email or web address]  Appointments: [phone]

## 2021-12-27 DIAGNOSIS — I10 HYPERTENSION, UNSPECIFIED TYPE: ICD-10-CM

## 2021-12-27 DIAGNOSIS — E11.9 TYPE 2 DIABETES MELLITUS WITHOUT COMPLICATION, WITHOUT LONG-TERM CURRENT USE OF INSULIN (H): ICD-10-CM

## 2021-12-28 RX ORDER — DOXAZOSIN 1 MG/1
1 TABLET ORAL DAILY
Qty: 30 TABLET | Refills: 0 | OUTPATIENT
Start: 2021-12-28

## 2021-12-29 ENCOUNTER — TELEPHONE (OUTPATIENT)
Dept: INTERNAL MEDICINE | Facility: CLINIC | Age: 56
End: 2021-12-29
Payer: COMMERCIAL

## 2022-02-02 DIAGNOSIS — C31.0 MAXILLARY SINUS CANCER (H): ICD-10-CM

## 2022-02-04 RX ORDER — SODIUM FLUORIDE 1.1 G/100G
GEL ORAL
Qty: 112 G | Refills: 0 | Status: SHIPPED | OUTPATIENT
Start: 2022-02-04 | End: 2022-03-07

## 2022-02-15 DIAGNOSIS — E11.9 TYPE 2 DIABETES MELLITUS WITHOUT COMPLICATION, WITHOUT LONG-TERM CURRENT USE OF INSULIN (H): ICD-10-CM

## 2022-02-17 NOTE — TELEPHONE ENCOUNTER
metFORMIN (GLUCOPHAGE) 1000 MG tablet    10/21/2021  [facility] [place]     Elsa Perez PA-C    Endocrinology, Diabetes, and Metabolism      
[Nl] : Integumentary

## 2022-03-07 DIAGNOSIS — C31.0 MAXILLARY SINUS CANCER (H): ICD-10-CM

## 2022-03-07 RX ORDER — SODIUM FLUORIDE 1.1 G/100G
GEL ORAL
Qty: 112 G | Refills: 11 | Status: SHIPPED | OUTPATIENT
Start: 2022-03-07 | End: 2022-09-26

## 2022-03-09 ENCOUNTER — THERAPY VISIT (OUTPATIENT)
Dept: SPEECH THERAPY | Facility: CLINIC | Age: 57
End: 2022-03-09
Payer: COMMERCIAL

## 2022-03-09 ENCOUNTER — OFFICE VISIT (OUTPATIENT)
Dept: OTOLARYNGOLOGY | Facility: CLINIC | Age: 57
End: 2022-03-09
Payer: COMMERCIAL

## 2022-03-09 VITALS
BODY MASS INDEX: 45.36 KG/M2 | SYSTOLIC BLOOD PRESSURE: 149 MMHG | DIASTOLIC BLOOD PRESSURE: 81 MMHG | WEIGHT: 289 LBS | TEMPERATURE: 97.7 F | HEART RATE: 94 BPM | HEIGHT: 67 IN

## 2022-03-09 DIAGNOSIS — R13.12 OROPHARYNGEAL DYSPHAGIA: Primary | ICD-10-CM

## 2022-03-09 DIAGNOSIS — C05.9 SQUAMOUS CELL CARCINOMA OF PALATE (H): ICD-10-CM

## 2022-03-09 DIAGNOSIS — C03.0 SQUAMOUS CELL CARCINOMA OF MAXILLARY ALVEOLAR RIDGE (H): Primary | ICD-10-CM

## 2022-03-09 DIAGNOSIS — C31.0 MAXILLARY SINUS CANCER (H): ICD-10-CM

## 2022-03-09 PROCEDURE — 92526 ORAL FUNCTION THERAPY: CPT | Mod: GN | Performed by: SPEECH-LANGUAGE PATHOLOGIST

## 2022-03-09 PROCEDURE — 99214 OFFICE O/P EST MOD 30 MIN: CPT | Performed by: REGISTERED NURSE

## 2022-03-09 ASSESSMENT — PAIN SCALES - GENERAL: PAINLEVEL: NO PAIN (0)

## 2022-03-09 NOTE — PATIENT INSTRUCTIONS
- You were seen in the ENT Clinic today by Marie Coburn NP.   - The plan is follow up in clinic in 4 months.  - Please send a [employer] message or call the ENT clinic at [phone] with any questions or concerns.

## 2022-03-09 NOTE — NURSING NOTE
"Chief Complaint   Patient presents with     RECHECK     4 month follow up      Blood pressure (!) 149/81, pulse 94, temperature 97.7  F (36.5  C), height 1.702 m (5' 7\"), weight 131.1 kg (289 lb).    Andres Purcell LPN    "

## 2022-03-09 NOTE — LETTER
3/9/2022       RE: Devonte Tucker  [address] [place] [address]     Dear Colleague,    Thank you for referring your patient, Devonte Tucker, to the [facility]. Please see a copy of my visit note below.    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  Dr. Treviño performed a left palatectomy on 5/9/2019 with Dr. Atwood performing a left radial forearm free flap reconstruction.  He had postop radiation therapy completed on 7/22/2019, 6000 cGy. Was last seen in ENT clinic by Dr. Atwood 6/30/21. Surveillance CT scans completed on 9/20/21 and were negative for recurrence or metastases.    Interval History:   Patient comes in today for routine surveillance. States that he is doing well. Significant improvement in epistaxis since starting Ponaris. Continues to have persistent oral pain in the anterior hard palate directly behind tooth #8 and left maxilla pain that is stable. Also continues to have left sided neck spasms, working with PT.     Slight dysphagia related to xerostomia, no odynophagia. Weight is stable. Patient denies any new sore throat, ear pain, bleeding from the mouth, hemoptysis, voice changes or lumps/bumps of the neck.    Past Medical History:  Past Medical History:   Diagnosis Date     Allergic rhinitis experienced it for many years     Anxiety 2019     Benign positional vertigo May 2019     Depressive disorder 2019     Diabetes (H)      Gastroesophageal reflux disease May 2021     Head injury April 2018     Hypertension      Maxillary sinus cancer (H)      Obesity      Past Surgical History:  Past Surgical History:   Procedure Laterality Date     ------------OTHER-------------      Mucosa and bone biopsy      COLONOSCOPY N/A 5/24/2021    Procedure: COLONOSCOPY, WITH POLYPECTOMY;  Surgeon: Terrell De Luna MD;  Location: [facility] OR     EXPLORE NECK Left 5/9/2019    Procedure: Left Neck Exploration;  Surgeon: Jett Treviño MD;  Location: [facility] OR     EXTRACTION(S) DENTAL      x2 under general anesthesia     GRAFT FREE VASCULARIZED (LOCATION) Right 5/9/2019    Procedure: Left Radial Forearm Free Flap (soft tissue);  Surgeon: Sumit Atwood MD;  Location: [facility] OR     GRAFT SKIN SPLIT THICKNESS FROM EXTREMITY Right 5/9/2019    Procedure: Graft skin split thickness from right thigh, nasogastric feeding tube placement;  Surgeon: Sumit Atwood MD;  Location: [facility] OR     IR LYMPH NODE BIOPSY  4/4/2019     OSTEOTOMY MAXILLA Left 5/9/2019    Procedure: Left Infrastructure Maxillectomy,;  Surgeon: Jett Treviño MD;  Location: [facility] OR     Medications:  Current Outpatient Medications   Medication Sig Dispense Refill     acetaminophen (TYLENOL) 325 MG tablet Take 2 tablets (650 mg) by mouth every 4 hours as needed for mild pain 100 tablet 0     aspirin (ASA) 325 MG tablet Take 1 tablet (325 mg) by mouth daily 30 tablet 0     atorvastatin (LIPITOR) 20 MG tablet Take 1 tablet (20 mg) by mouth every morning 90 tablet 3     blood glucose (NO BRAND SPECIFIED) test strip Use to test blood sugar 3 times daily or as directed. 300 strip 3     blood glucose monitoring (ANDREW MICROLET) lancets Use to test blood sugar 4 times daily or as directed. 200 each 3     cevimeline (EVOXAC) 30 MG capsule Take 1 capsule (30 mg) by mouth 3 times daily 90 capsule 11     DENTAGEL 1.1 % GEL topical gel Apply to affected area At Bedtime 112 g 11     doxazosin (CARDURA) 1 MG tablet Take 1 tablet (1 mg) by mouth daily 30 tablet 0     gabapentin (NEURONTIN) 300 MG capsule Take 1 capsule (300 mg) by mouth 3 times daily 270 capsule 3     lisinopril (ZESTRIL) 10 MG tablet Take 1 tablet (10 mg) by mouth every morning 30 tablet 3     metFORMIN (GLUCOPHAGE) 1000 MG  "tablet Take 1 tablet (1,000 mg) by mouth 2 times daily (with meals) 180 tablet 1     Misc Natural Product Nasal (PONARIS) SOLN With head tilted back, place 1 or 2 drops in each nostril once daily. 30 mL 3     multivitamin w/minerals (THERA-VIT-M) tablet Take 1 tablet by mouth daily 30 tablet 0     omeprazole (PRILOSEC) 20 MG DR capsule Take 1 capsule (20 mg) by mouth daily 30 capsule 3     Allergies:  Allergies   Allergen Reactions     Seasonal Allergies Other (See Comments) and Shortness Of Breath      Social History:  Social History     Tobacco Use     Smoking status: Never Smoker     Smokeless tobacco: Never Used   Substance Use Topics     Alcohol use: No     Drug use: No     ROS: 10 point ROS neg other than the symptoms noted above in the HPI.    Physical Exam:    BP (!) 149/81   Pulse 94   Temp 97.7  F (36.5  C)   Ht 1.702 m (5' 7\")   Wt 131.1 kg (289 lb)   BMI 45.26 kg/m    Wt Readings from Last 3 Encounters:   03/09/22 131.1 kg (289 lb)   11/03/21 127.9 kg (282 lb)   09/20/21 132.8 kg (292 lb 12.8 oz)      Constitutional:  The patient was unaccompanied, well-groomed, and in no acute distress.     Neurologic: Alert and oriented x 3.    Psychiatric: The patient's affect was calm, cooperative, and appropriate.     Communication:  Normal; communicates verbally, normal voice quality.   Respiratory: Breathing comfortably without stridor or exertion of accessory muscles.    Head/Face:  Normocephalic and atraumatic.  No lesions or scars.    Oral Cavity: Healthy appearing flap in left maxilla. White mucosalized area along medial portion. Tenderness with palpation around tooth #8. Normal tongue, floor of mouth, and  buccal mucosa.  No lesions or masses on inspection or palpation.    Oropharynx: Normal mucosa, palate symmetric with normal elevation. No abnormal lymph tissue in the oropharynx.     Neck: Well healed left neck incision. Tightness in the left SCM. Normal range of motion   Lymphatic: There is no " palpable lymphadenopathy in the neck.      Labs and Imaging Reviewed:  Imagin21  CT neck  Impression: Stable postsurgical changes of left palatectomy, partial  left maxillectomy and left neck dissection without recurrent tumor. No  cervical lymphadenopathy.     CT chest  IMPRESSION:   No definite evidence for metastatic disease in the chest     TSH   Date Value Ref Range Status   2021 3.01 0.40 - 4.00 mU/L Final   2021 2.38 0.40 - 4.00 mU/L Final       Assessment/Plan:  1. 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 He is doing well with no evidence of disease on today's exam. Continue to monitor oral pain. Discussed referral to Dr. Hurtado for management of left neck pain. Patient will continue to monitor and contact clinic if he would like to pursue a referral.      Reviewed signs and symptoms that would necessitate a sooner exam including new sore throat, mouth pain, ear pain, dysphagia, bleeding from the mouth or lumps/bumps of the neck.    Otherwise, patient will follow up in 4 months for continued surveillance. Will plan for repeat surveillance imaging in 2022.     Marie Coburn DNP, APRN, CNP  Otolaryngology  Head & Neck Surgery  [phone]    30 minutes spent on the date of the encounter doing chart review, history and exam, documentation and further activities.        Again, thank you for allowing me to participate in the care of your patient.      Sincerely,    Marietta Coburn, NP

## 2022-03-09 NOTE — PROGRESS NOTES
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  Dr. Treviño performed a left palatectomy on 5/9/2019 with Dr. Atwood performing a left radial forearm free flap reconstruction.  He had postop radiation therapy completed on 7/22/2019, 6000 cGy. Was last seen in ENT clinic by Dr. Atwood 6/30/21. Surveillance CT scans completed on 9/20/21 and were negative for recurrence or metastases.    Interval History:   Patient comes in today for routine surveillance. States that he is doing well. Significant improvement in epistaxis since starting Ponaris. Continues to have persistent oral pain in the anterior hard palate directly behind tooth #8 and left maxilla pain that is stable. Also continues to have left sided neck spasms, working with PT.     Slight dysphagia related to xerostomia, no odynophagia. Weight is stable. Patient denies any new sore throat, ear pain, bleeding from the mouth, hemoptysis, voice changes or lumps/bumps of the neck.    Past Medical History:  Past Medical History:   Diagnosis Date     Allergic rhinitis experienced it for many years     Anxiety 2019     Benign positional vertigo May 2019     Depressive disorder 2019     Diabetes (H)      Gastroesophageal reflux disease May 2021     Head injury April 2018     Hypertension      Maxillary sinus cancer (H)      Obesity      Past Surgical History:  Past Surgical History:   Procedure Laterality Date     ------------OTHER-------------      Mucosa and bone biopsy     COLONOSCOPY N/A 5/24/2021    Procedure: COLONOSCOPY, WITH POLYPECTOMY;  Surgeon: Terrell De Luna MD;  Location: [facility] OR     EXPLORE NECK Left 5/9/2019    Procedure: Left Neck Exploration;  Surgeon: Jett Treviño MD;  Location: [facility] OR     EXTRACTION(S) DENTAL      x2 under general anesthesia     GRAFT FREE VASCULARIZED  (LOCATION) Right 5/9/2019    Procedure: Left Radial Forearm Free Flap (soft tissue);  Surgeon: Sumit Atwood MD;  Location: [facility] OR     GRAFT SKIN SPLIT THICKNESS FROM EXTREMITY Right 5/9/2019    Procedure: Graft skin split thickness from right thigh, nasogastric feeding tube placement;  Surgeon: Sumit Atwood MD;  Location: [facility] OR     IR LYMPH NODE BIOPSY  4/4/2019     OSTEOTOMY MAXILLA Left 5/9/2019    Procedure: Left Infrastructure Maxillectomy,;  Surgeon: Jett Treviño MD;  Location: [facility] OR     Medications:  Current Outpatient Medications   Medication Sig Dispense Refill     acetaminophen (TYLENOL) 325 MG tablet Take 2 tablets (650 mg) by mouth every 4 hours as needed for mild pain 100 tablet 0     aspirin (ASA) 325 MG tablet Take 1 tablet (325 mg) by mouth daily 30 tablet 0     atorvastatin (LIPITOR) 20 MG tablet Take 1 tablet (20 mg) by mouth every morning 90 tablet 3     blood glucose (NO BRAND SPECIFIED) test strip Use to test blood sugar 3 times daily or as directed. 300 strip 3     blood glucose monitoring (PaeDaeET) lancets Use to test blood sugar 4 times daily or as directed. 200 each 3     cevimeline (EVOXAC) 30 MG capsule Take 1 capsule (30 mg) by mouth 3 times daily 90 capsule 11     DENTAGEL 1.1 % GEL topical gel Apply to affected area At Bedtime 112 g 11     doxazosin (CARDURA) 1 MG tablet Take 1 tablet (1 mg) by mouth daily 30 tablet 0     gabapentin (NEURONTIN) 300 MG capsule Take 1 capsule (300 mg) by mouth 3 times daily 270 capsule 3     lisinopril (ZESTRIL) 10 MG tablet Take 1 tablet (10 mg) by mouth every morning 30 tablet 3     metFORMIN (GLUCOPHAGE) 1000 MG tablet Take 1 tablet (1,000 mg) by mouth 2 times daily (with meals) 180 tablet 1     Misc Natural Product Nasal (PONARIS) SOLN With head tilted back, place 1 or 2 drops in each nostril once daily. 30 mL 3     multivitamin w/minerals (THERA-VIT-M) tablet Take 1 tablet by mouth daily 30 tablet 0     omeprazole (PRILOSEC) 20 MG  "DR capsule Take 1 capsule (20 mg) by mouth daily 30 capsule 3     Allergies:  Allergies   Allergen Reactions     Seasonal Allergies Other (See Comments) and Shortness Of Breath      Social History:  Social History     Tobacco Use     Smoking status: Never Smoker     Smokeless tobacco: Never Used   Substance Use Topics     Alcohol use: No     Drug use: No     ROS: 10 point ROS neg other than the symptoms noted above in the HPI.    Physical Exam:    BP (!) 149/81   Pulse 94   Temp 97.7  F (36.5  C)   Ht 1.702 m (5' 7\")   Wt 131.1 kg (289 lb)   BMI 45.26 kg/m    Wt Readings from Last 3 Encounters:   22 131.1 kg (289 lb)   21 127.9 kg (282 lb)   21 132.8 kg (292 lb 12.8 oz)      Constitutional:  The patient was unaccompanied, well-groomed, and in no acute distress.     Neurologic: Alert and oriented x 3.    Psychiatric: The patient's affect was calm, cooperative, and appropriate.     Communication:  Normal; communicates verbally, normal voice quality.   Respiratory: Breathing comfortably without stridor or exertion of accessory muscles.    Head/Face:  Normocephalic and atraumatic.  No lesions or scars.    Oral Cavity: Healthy appearing flap in left maxilla. White mucosalized area along medial portion. Tenderness with palpation around tooth #8. Normal tongue, floor of mouth, and  buccal mucosa.  No lesions or masses on inspection or palpation.    Oropharynx: Normal mucosa, palate symmetric with normal elevation. No abnormal lymph tissue in the oropharynx.     Neck: Well healed left neck incision. Tightness in the left SCM. Normal range of motion   Lymphatic: There is no palpable lymphadenopathy in the neck.      Labs and Imaging Reviewed:  Imagin21  CT neck  Impression: Stable postsurgical changes of left palatectomy, partial  left maxillectomy and left neck dissection without recurrent tumor. No  cervical lymphadenopathy.     CT chest  IMPRESSION:   No definite evidence for metastatic " disease in the chest     TSH   Date Value Ref Range Status   11/03/2021 3.01 0.40 - 4.00 mU/L Final   05/20/2021 2.38 0.40 - 4.00 mU/L Final       Assessment/Plan:  1. 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 He is doing well with no evidence of disease on today's exam. Continue to monitor oral pain. Discussed referral to Dr. Hurtado for management of left neck pain. Patient will continue to monitor and contact clinic if he would like to pursue a referral.      Reviewed signs and symptoms that would necessitate a sooner exam including new sore throat, mouth pain, ear pain, dysphagia, bleeding from the mouth or lumps/bumps of the neck.    Otherwise, patient will follow up in 4 months for continued surveillance. Will plan for repeat surveillance imaging in September 2022.     Marie Coburn DNP, APRN, CNP  Otolaryngology  Head & Neck Surgery  [phone]    30 minutes spent on the date of the encounter doing chart review, history and exam, documentation and further activities.
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
## 2022-03-10 DIAGNOSIS — K11.7 XEROSTOMIA DUE TO RADIOTHERAPY: ICD-10-CM

## 2022-03-10 DIAGNOSIS — E11.9 DIABETES MELLITUS (H): Primary | ICD-10-CM

## 2022-03-26 ENCOUNTER — HEALTH MAINTENANCE LETTER (OUTPATIENT)
Age: 57
End: 2022-03-26

## 2022-04-06 ENCOUNTER — LAB REQUISITION (OUTPATIENT)
Dept: LAB | Facility: CLINIC | Age: 57
End: 2022-04-06

## 2022-04-06 DIAGNOSIS — R53.82 CHRONIC FATIGUE, UNSPECIFIED: ICD-10-CM

## 2022-04-06 DIAGNOSIS — I10 ESSENTIAL (PRIMARY) HYPERTENSION: ICD-10-CM

## 2022-04-06 DIAGNOSIS — E78.00 PURE HYPERCHOLESTEROLEMIA, UNSPECIFIED: ICD-10-CM

## 2022-04-06 LAB
ANION GAP SERPL CALCULATED.3IONS-SCNC: 10 MMOL/L (ref 5–18)
BUN SERPL-MCNC: 16 MG/DL (ref 8–22)
CALCIUM SERPL-MCNC: 10.2 MG/DL (ref 8.5–10.5)
CHLORIDE BLD-SCNC: 98 MMOL/L (ref 98–107)
CHOLEST SERPL-MCNC: 145 MG/DL
CO2 SERPL-SCNC: 27 MMOL/L (ref 22–31)
CREAT SERPL-MCNC: 0.77 MG/DL (ref 0.7–1.3)
FASTING STATUS PATIENT QL REPORTED: NORMAL
GFR SERPL CREATININE-BSD FRML MDRD: >90 ML/MIN/1.73M2
GLUCOSE BLD-MCNC: 123 MG/DL (ref 70–125)
HDLC SERPL-MCNC: 43 MG/DL
LDLC SERPL CALC-MCNC: 81 MG/DL
POTASSIUM BLD-SCNC: 4.7 MMOL/L (ref 3.5–5)
SODIUM SERPL-SCNC: 135 MMOL/L (ref 136–145)
TRIGL SERPL-MCNC: 105 MG/DL
TSH SERPL DL<=0.005 MIU/L-ACNC: 4.01 UIU/ML (ref 0.3–5)

## 2022-04-06 PROCEDURE — 80048 BASIC METABOLIC PNL TOTAL CA: CPT | Performed by: NURSE PRACTITIONER

## 2022-04-06 PROCEDURE — 82306 VITAMIN D 25 HYDROXY: CPT | Performed by: NURSE PRACTITIONER

## 2022-04-06 PROCEDURE — 84443 ASSAY THYROID STIM HORMONE: CPT | Performed by: NURSE PRACTITIONER

## 2022-04-06 PROCEDURE — 80061 LIPID PANEL: CPT | Performed by: NURSE PRACTITIONER

## 2022-04-07 LAB — DEPRECATED CALCIDIOL+CALCIFEROL SERPL-MC: 48 UG/L (ref 20–75)

## 2022-04-26 NOTE — PROGRESS NOTES
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 He underwent a left infrastructure maxillectomy and left level I lymph node dissection on 2019 with pathology revealing a 1.3 cm well-differentiated squamous cell carcinoma with 13 mm depth of invasion and invasion into the underlying bone. He received adjuvant radiotherapy as described above for improved local disease control.    Mr. Tucker returns to radiation oncology clinic today for a routine posttreatment disease surveillance visit. He was last seen by ENT on 3/9/2022, at which time he reported persistent stable pain in the anterior hard palate and left maxilla.    On interview today, he reports low mood associated with ongoing toxicities and frustrations after his cancer treatment. He continues to have persistent xerostomia that is mildly improved with cevimeline and using gum and hard candies. He has a hard time swallowing dry foods and utilizes water with p.o. intake. He additionally endorses stiffness and pain within the left neck reports that he is compliant with his recommended lymphedema and physical therapy exercises. Finally, he describes increased fatigue and a mildly depressed mood over the  past several months that transiently worsened following a COVID infection in January and has not yet recovered to his pre-treatment baseline.    PHYSICAL EXAM:  BP (!) 143/87   Pulse 80   Wt 132.5 kg (292 lb)   BMI 45.73 kg/m      General: Healthy-appearing 56-year-old gentleman seated comfortably in an examination chair no acute distress  HEENT: NC/AT.  EOMI.  No rhinorrhea or epistaxis.  Mildly dry mucous membranes.  Healthy-appearing free flap within the left hard palate.  Palpation of the flap and margins reveals no induration, nodularity or discrete masses.  Fillings present in multiple teeth. No additional oral cavity lesions appreciated.  Neck: Supple.  Normal range of motion.  Mild fibrosis of the left sternocleidomastoid muscle.  No palpable cervical adenopathy bilaterally.  Pulmonary: No wheezing, stridor or respiratory distress  Skin: Normal color and turgor  Psych: Mildly flat mood.    LABS AND IMAGIN2022 labs:  TSH: 4.01    IMPRESSION:   Mr. 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 He is over 2.5 years out from the completion of therapy and remains CÉSAR.    PLAN:   1. Follow-up in radiation oncology clinic with NP in 2022 with CT neck and chest primary  2. Follow-up in radiation oncology clinic with MD in 3/2023  3. Repeat TSH due in 10/2022  4. Referral to oncology psych and cancer rehabilitation    The patient was seen and examined with Dr. Verdin.    Tahir Jung MD PGY-5  Radiation Oncology, [facility]  [phone] clinic  Pager [phone]    Attending addendum:   I saw and examined the patient with the resident and agree with the documented plan of care.    Emerson Verdin MD/PhD    Dept of Radiation Oncology  [facility]

## 2022-04-27 ENCOUNTER — OFFICE VISIT (OUTPATIENT)
Dept: RADIATION ONCOLOGY | Facility: CLINIC | Age: 57
End: 2022-04-27
Attending: RADIOLOGY
Payer: COMMERCIAL

## 2022-04-27 ENCOUNTER — PATIENT OUTREACH (OUTPATIENT)
Dept: ONCOLOGY | Facility: CLINIC | Age: 57
End: 2022-04-27
Payer: COMMERCIAL

## 2022-04-27 VITALS
HEART RATE: 80 BPM | WEIGHT: 292 LBS | DIASTOLIC BLOOD PRESSURE: 87 MMHG | SYSTOLIC BLOOD PRESSURE: 143 MMHG | BODY MASS INDEX: 45.73 KG/M2

## 2022-04-27 DIAGNOSIS — C03.9 PRIMARY CANCER OF ALVEOLAR RIDGE MUCOSA (H): Primary | ICD-10-CM

## 2022-04-27 DIAGNOSIS — C31.0 MAXILLARY SINUS CANCER (H): ICD-10-CM

## 2022-04-27 PROCEDURE — G0463 HOSPITAL OUTPT CLINIC VISIT: HCPCS | Performed by: RADIOLOGY

## 2022-04-27 NOTE — PROGRESS NOTES
FOLLOW-UP VISIT    Patient Name: Devonte Tucker      : 1965     Age: 56 year old        ______________________________________________________________________________     Chief Complaint   Patient presents with     Cancer     Follow up:Maxillary Sinus Cancer: Left max aveolar ridge 6000 cGy completed 19       Pain  Denies    Labs  Other Labs: No    Imaging  None      Dental:   Most Recent Dental Visit: Next week      Speech/Swallowing:   Most Recent evaluation or testing: No  Swallowing Restrictions: No difficulties with swallowing    Trismus/Jaw Exercises: yes    Nutrition:    Weight:   Wt Readings from Last 3 Encounters:   22 131.1 kg (289 lb)   21 127.9 kg (282 lb)   21 132.8 kg (292 lb 12.8 oz)         Oral Symptoms:   Xerostomia:1- Symptomatic without significant dietary alteration; unstimulated saliva flow >0.2 ml/min  Dysphagia: 0-None  Mucositis Oral Symptoms: 0-None  Mucositis: 0- None  Esophagitis:0- None    Other Appointments:     MD Name: JANNIE Casas Appointment Date: 22   MD Name: Appointment Date:   MD Name: Appointment Date:   Other Appointment Notes:     Residual Radiation side effect: Dry mouth, left side jaw pain    Additional Instructions:     Nurse face-to-face time: Level 3:  10 min face to face time

## 2022-04-27 NOTE — PROGRESS NOTES
Writer received referral for Oncology Psychology. Reviewed referral for appropriate location and information, sent to New Patient Scheduling for completion.    Opal Nash, HEATHERN, RN, PHN, OCN  Hematology/Oncology Nurse Navigator  [facility]  [phone]

## 2022-04-27 NOTE — LETTER
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 He underwent a left infrastructure maxillectomy and left level I lymph node dissection on 2019 with pathology revealing a 1.3 cm well-differentiated squamous cell carcinoma with 13 mm depth of invasion and invasion into the underlying bone. He received adjuvant radiotherapy as described above for improved local disease control.    Mr. Tucker returns to radiation oncology clinic today for a routine posttreatment disease surveillance visit. He was last seen by ENT on 3/9/2022, at which time he reported persistent stable pain in the anterior hard palate and left maxilla.    On interview today, he reports low mood associated with ongoing toxicities and frustrations after his cancer treatment. He continues to have persistent xerostomia that is mildly improved with cevimeline and using gum and hard candies. He has a hard time swallowing dry foods and utilizes water with p.o. intake. He additionally endorses stiffness and pain within the left neck reports that he is compliant with his recommended lymphedema and physical  therapy exercises. Finally, he describes increased fatigue and a mildly depressed mood over the past several months that transiently worsened following a COVID infection in January and has not yet recovered to his pre-treatment baseline.    PHYSICAL EXAM:  BP (!) 143/87   Pulse 80   Wt 132.5 kg (292 lb)   BMI 45.73 kg/m      General: Healthy-appearing 56-year-old gentleman seated comfortably in an examination chair no acute distress  HEENT: NC/AT.  EOMI.  No rhinorrhea or epistaxis.  Mildly dry mucous membranes.  Healthy-appearing free flap within the left hard palate.  Palpation of the flap and margins reveals no induration, nodularity or discrete masses.  Fillings present in multiple teeth. No additional oral cavity lesions appreciated.  Neck: Supple.  Normal range of motion.  Mild fibrosis of the left sternocleidomastoid muscle.  No palpable cervical adenopathy bilaterally.  Pulmonary: No wheezing, stridor or respiratory distress  Skin: Normal color and turgor  Psych: Mildly flat mood.    LABS AND IMAGIN2022 labs:  TSH: 4.01    IMPRESSION:   Mr. 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 He is over 2.5 years out from the completion of therapy and remains CÉSAR.    PLAN:   1. Follow-up in radiation oncology clinic with NP in 2022 with CT neck and chest primary  2. Follow-up in radiation oncology clinic with MD in 3/2023  3. Repeat TSH due in 10/2022  4. Referral to oncology psych and cancer rehabilitation    The patient was seen and examined with Dr. Verdin.    Tahir Jung MD PGY-5  Radiation Oncology, [facility]  [phone] clinic  Pager [phone]    Attending addendum:   I saw and examined the patient with the resident and agree with the documented plan of care.    Emerson Verdin MD/PhD    Dept of Radiation Oncology  [place]  [facility]          FOLLOW-UP VISIT    Patient Name: Devonte Tucker      : 1965     Age: 56 year old        ______________________________________________________________________________     Chief Complaint   Patient presents with     Cancer     Follow up:Maxillary Sinus Cancer: Left max aveolar ridge 6000 cGy completed 19       Pain  Denies    Labs  Other Labs: No    Imaging  None      Dental:   Most Recent Dental Visit: Next week      Speech/Swallowing:   Most Recent evaluation or testing: No  Swallowing Restrictions: No difficulties with swallowing    Trismus/Jaw Exercises: yes    Nutrition:    Weight:   Wt Readings from Last 3 Encounters:   22 131.1 kg (289 lb)   21 127.9 kg (282 lb)   21 132.8 kg (292 lb 12.8 oz)         Oral Symptoms:   Xerostomia:1- Symptomatic without significant dietary alteration; unstimulated saliva flow >0.2 ml/min  Dysphagia: 0-None  Mucositis Oral Symptoms: 0-None  Mucositis: 0- None  Esophagitis:0- None    Other Appointments:     MD Name: JANNIE Casas Appointment Date: 22   MD Name: Appointment Date:   MD Name: Appointment Date:   Other Appointment Notes:     Residual Radiation side effect: Dry mouth, left side jaw pain    Additional Instructions:     Nurse face-to-face time: Level 3:  10 min face to face time            Emerson Verdin MD

## 2022-05-02 ENCOUNTER — PATIENT OUTREACH (OUTPATIENT)
Dept: ONCOLOGY | Facility: CLINIC | Age: 57
End: 2022-05-02
Payer: COMMERCIAL

## 2022-05-02 NOTE — PROGRESS NOTES
received referral for Dr Hurtado in the PM&R clinic. Scheduling instructions updated and sent to New Patient Scheduling for completion.
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
## 2022-05-08 DIAGNOSIS — K11.7 XEROSTOMIA DUE TO RADIOTHERAPY: ICD-10-CM

## 2022-05-09 DIAGNOSIS — K11.7 XEROSTOMIA DUE TO RADIOTHERAPY: ICD-10-CM
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
## 2022-05-09 PROBLEM — E78.00 PURE HYPERCHOLESTEROLEMIA: Status: ACTIVE | Noted: 2022-05-09

## 2022-05-09 PROBLEM — E55.9 VITAMIN D DEFICIENCY: Status: ACTIVE | Noted: 2022-05-09

## 2022-05-09 PROBLEM — I10 HIGH BLOOD PRESSURE: Status: ACTIVE | Noted: 2022-05-09

## 2022-05-09 PROBLEM — E11.42 POLYNEUROPATHY DUE TO TYPE 2 DIABETES MELLITUS (H): Status: ACTIVE | Noted: 2022-05-09

## 2022-05-09 PROBLEM — J30.9 ALLERGIC RHINITIS: Status: ACTIVE | Noted: 2022-05-09

## 2022-05-09 PROBLEM — G56.01 CARPAL TUNNEL SYNDROME OF RIGHT WRIST: Status: ACTIVE | Noted: 2022-05-09

## 2022-05-09 PROBLEM — I10 BENIGN ESSENTIAL HYPERTENSION: Status: ACTIVE | Noted: 2022-05-09

## 2022-05-09 PROBLEM — C41.1: Status: ACTIVE | Noted: 2022-05-09

## 2022-05-09 RX ORDER — ASPIRIN 325 MG
TABLET ORAL
COMMUNITY
End: 2022-09-26

## 2022-05-09 RX ORDER — TAMSULOSIN HYDROCHLORIDE 0.4 MG/1
CAPSULE ORAL
COMMUNITY
Start: 2022-04-06 | End: 2023-04-01

## 2022-05-09 RX ORDER — ATORVASTATIN CALCIUM 20 MG/1
TABLET, FILM COATED ORAL
COMMUNITY
End: 2022-09-26

## 2022-05-09 RX ORDER — GABAPENTIN 300 MG/1
CAPSULE ORAL
COMMUNITY
End: 2022-09-26

## 2022-05-09 RX ORDER — DOXAZOSIN 1 MG/1
1 TABLET ORAL
COMMUNITY
End: 2022-09-26

## 2022-05-09 RX ORDER — LISINOPRIL 10 MG/1
1 TABLET ORAL
COMMUNITY
End: 2022-09-26

## 2022-05-09 RX ORDER — CEVIMELINE HYDROCHLORIDE 30 MG/1
CAPSULE ORAL
COMMUNITY
End: 2022-09-26

## 2022-05-09 RX ORDER — CEVIMELINE HYDROCHLORIDE 30 MG/1
CAPSULE ORAL
Qty: 90 CAPSULE | Refills: 0 | OUTPATIENT
Start: 2022-05-09

## 2022-05-10 RX ORDER — CEVIMELINE HYDROCHLORIDE 30 MG/1
CAPSULE ORAL
Qty: 270 CAPSULE | Refills: 3 | Status: SHIPPED | OUTPATIENT
Start: 2022-05-10 | End: 2023-05-22

## 2022-05-17 ENCOUNTER — VIRTUAL VISIT (OUTPATIENT)
Dept: ONCOLOGY | Facility: CLINIC | Age: 57
End: 2022-05-17
Attending: RADIOLOGY
Payer: COMMERCIAL

## 2022-05-17 DIAGNOSIS — F43.21 ADJUSTMENT DISORDER WITH DEPRESSED MOOD: Primary | ICD-10-CM

## 2022-05-17 PROCEDURE — 90832 PSYTX W PT 30 MINUTES: CPT | Mod: 95 | Performed by: PSYCHOLOGIST

## 2022-05-17 NOTE — PROGRESS NOTES
[facility] Oncology- Psychotherapy (Provider Oversight- Dr. Brant Vivar)                                    Progress Note    Patient Name: Devonte Tucker  Date: 5/17/22         Service Type: Individual      Session Start Time: 8:00  Session End Time: 8:30     Session Length: 30 minutes    Session #: 1    Attendees: Client attended alone    Service Modality:  Video Visit:      Provider verified identity through the following two step process.  Patient provided:  Patient photo    Telemedicine Visit: The patient's condition can be safely assessed and treated via synchronous audio and visual telemedicine encounter.      Reason for Telemedicine Visit: Services only offered telehealth    Originating Site (Patient Location): Patient's home    Distant Site (Provider Location): Provider Remote Setting- Home Office    Consent:  The patient/guardian has verbally consented to: the potential risks and benefits of telemedicine (video visit) versus in person care; bill my insurance or make self-payment for services provided; and responsibility for payment of non-covered services.     Patient would like the video invitation sent by:  My Chart    Mode of Communication:  Video Conference via [employer]well    As the provider I attest to compliance with applicable laws and regulations related to telemedicine.    DATA  Interactive Complexity: No  Crisis: No     Current / Ongoing Stressors and Concerns:   Recovery from cancer, cancer treatment long-term effects, sadness, changes in identity, depressed mood, feeling overwhelmed, feeling lonely     Treatment Objective(s) Addressed in This Session:   identify coping strategies to more effectively address stressors     Intervention: Motivational Interviewing: to help him connect with what is true about him post cancer treatment        ASSESSMENT: Current Emotional / Mental Status (status of significant symptoms):   Risk status (Self / Other harm or suicidal ideation)   Patient  "denies current fears or concerns for personal safety.   Patient denies current or recent suicidal ideation or behaviors.   Patient denies current or recent homicidal ideation or behaviors.   Patient denies current or recent self injurious behavior or ideation.   Patient denies other safety concerns.   Patient reports there has been no change in risk factors since their last session.     Patient reports there has been no change in protective factors since their last session.     Recommended that patient call 911 or go to the local ED should there be a change in any of these risk factors.     Appearance:   Appropriate    Eye Contact:   Good    Psychomotor Behavior: Normal    Attitude:   Cooperative    Orientation:   All   Speech    Rate / Production: Normal     Volume:  Normal    Mood:    Normal   Affect:    Appropriate    Thought Content:  Clear    Thought Form:  Coherent  Logical    Insight:    Good      Medication Review:   No changes to current psychiatric medication(s)     Medication Compliance:   Yes     Changes in Health Issues:   None reported     Chemical Use Review:   Substance Use: Chemical use reviewed, no active concerns identified      Tobacco Use: No current tobacco use.      Diagnosis:  1. Adjustment disorder with depressed mood        Collateral Reports Completed:   Not Applicable    PLAN: (Patient Tasks / Therapist Tasks / Other)  He is to identify what is true about him after cancer treatment. This will assist him in reconnecting with his identity as well as his strengths. After strengths are identified, he can begin to set goals for his \"new normal.\"    Devonte Eid, Alina                                                         ______________________________________________________________________    Individual Treatment Plan    Patient's Name: Devonte Tucker  YOB: 1965    Date of Creation: 5/17/22  Date Treatment Plan Last Reviewed/Revised:     DSM5 Diagnoses: 311 (F32.8) " Other/unspec. Depressive Disorder  Psychosocial / Contextual Factors: cancer, long-term side-effects, loss of identity, depressed mood  Anticipated number of session for this episode of care: 12  Anticipation frequency of session: Every other week  Anticipated Duration of each session: 16-37 minutes  Treatment plan will be reviewed in 90 days or when goals have been changed.       MeasurableTreatment Goal(s) related to diagnosis / functional impairment(s)  Goal 1: Patient will identify the truths about himself    I will know I've met my goal when I feel I know who I am again.      Patient has reviewed and agreed to the above plan.      Devonte Eid PsyD  May 17, 2022

## 2022-05-18 ENCOUNTER — TRANSFERRED RECORDS (OUTPATIENT)
Dept: HEALTH INFORMATION MANAGEMENT | Facility: CLINIC | Age: 57
End: 2022-05-18
Payer: COMMERCIAL

## 2022-05-18 LAB — RETINOPATHY: NEGATIVE

## 2022-05-21 ENCOUNTER — HEALTH MAINTENANCE LETTER (OUTPATIENT)
Age: 57
End: 2022-05-21

## 2022-05-24 ENCOUNTER — VIRTUAL VISIT (OUTPATIENT)
Dept: ONCOLOGY | Facility: CLINIC | Age: 57
End: 2022-05-24
Attending: PSYCHOLOGIST
Payer: COMMERCIAL

## 2022-05-24 DIAGNOSIS — F43.21 ADJUSTMENT DISORDER WITH DEPRESSED MOOD: Primary | ICD-10-CM

## 2022-05-24 PROCEDURE — 90832 PSYTX W PT 30 MINUTES: CPT | Mod: 95 | Performed by: PSYCHOLOGIST

## 2022-05-24 NOTE — PROGRESS NOTES
[facility] Oncology- Psychotherapy (Provider Oversight- Dr. Brant Vivar)                                    Progress Note    Patient Name: Devonte Tucker  Date: 5/24/22         Service Type: Individual      Session Start Time: 8:00  Session End Time: 8:30     Session Length: 30 minutes    Session #: 1    Attendees: Client attended alone    Service Modality:  Video Visit:      Provider verified identity through the following two step process.  Patient provided:  Patient photo    Telemedicine Visit: The patient's condition can be safely assessed and treated via synchronous audio and visual telemedicine encounter.      Reason for Telemedicine Visit: Services only offered telehealth    Originating Site (Patient Location): Patient's home    Distant Site (Provider Location): Provider Remote Setting- Home Office    Consent:  The patient/guardian has verbally consented to: the potential risks and benefits of telemedicine (video visit) versus in person care; bill my insurance or make self-payment for services provided; and responsibility for payment of non-covered services.     Patient would like the video invitation sent by:  My Chart    Mode of Communication:  Video Conference via Amwell    As the provider I attest to compliance with applicable laws and regulations related to telemedicine.    DATA  Interactive Complexity: No  Crisis: No     Current / Ongoing Stressors and Concerns:   Recovery from cancer, cancer treatment long-term effects, sadness, changes in identity, depressed mood, feeling overwhelmed, feeling lonely     Treatment Objective(s) Addressed in This Session:   identify coping strategies to more effectively address stressors     Intervention: Motivational Interviewing: to help him connect with what is true about him post cancer treatment        ASSESSMENT: Current Emotional / Mental Status (status of significant symptoms):   Risk status (Self / Other harm or suicidal ideation)   Patient  "denies current fears or concerns for personal safety.   Patient denies current or recent suicidal ideation or behaviors.   Patient denies current or recent homicidal ideation or behaviors.   Patient denies current or recent self injurious behavior or ideation.   Patient denies other safety concerns.   Patient reports there has been no change in risk factors since their last session.     Patient reports there has been no change in protective factors since their last session.     Recommended that patient call 911 or go to the local ED should there be a change in any of these risk factors.     Appearance:   Appropriate    Eye Contact:   Good    Psychomotor Behavior: Normal    Attitude:   Cooperative    Orientation:   All   Speech    Rate / Production: Normal     Volume:  Normal    Mood:    Normal   Affect:    Appropriate    Thought Content:  Clear    Thought Form:  Coherent  Logical    Insight:    Good      Medication Review:   No changes to current psychiatric medication(s)     Medication Compliance:   Yes     Changes in Health Issues:   None reported     Chemical Use Review:   Substance Use: Chemical use reviewed, no active concerns identified      Tobacco Use: No current tobacco use.      Diagnosis:  1. Adjustment disorder with depressed mood        Collateral Reports Completed:   Not Applicable    PLAN: (Patient Tasks / Therapist Tasks / Other)  He is to identify what is true about him after cancer treatment. This will assist him in reconnecting with his identity as well as his strengths. After strengths are identified, he can begin to set goals for his \"new normal.\"    Devonte Eid, Alina                                                         ______________________________________________________________________    Individual Treatment Plan    Patient's Name: Devonte Tucker  YOB: 1965    Date of Creation: 5/17/22  Date Treatment Plan Last Reviewed/Revised:     DSM5 Diagnoses: 311 (F32.8) " Other/unspec. Depressive Disorder  Psychosocial / Contextual Factors: cancer, long-term side-effects, loss of identity, depressed mood  Anticipated number of session for this episode of care: 12  Anticipation frequency of session: Every other week  Anticipated Duration of each session: 16-37 minutes  Treatment plan will be reviewed in 90 days or when goals have been changed.       MeasurableTreatment Goal(s) related to diagnosis / functional impairment(s)  Goal 1: Patient will identify the truths about himself    I will know I've met my goal when I feel I know who I am again.      Patient has reviewed and agreed to the above plan.      Devonte Eid PsyD  May 24, 2022

## 2022-06-01 ENCOUNTER — ONCOLOGY VISIT (OUTPATIENT)
Dept: ONCOLOGY | Facility: CLINIC | Age: 57
End: 2022-06-01
Attending: RADIOLOGY
Payer: COMMERCIAL

## 2022-06-01 VITALS
TEMPERATURE: 98.2 F | HEART RATE: 73 BPM | OXYGEN SATURATION: 97 % | DIASTOLIC BLOOD PRESSURE: 71 MMHG | BODY MASS INDEX: 45.62 KG/M2 | RESPIRATION RATE: 18 BRPM | SYSTOLIC BLOOD PRESSURE: 119 MMHG | WEIGHT: 291.3 LBS

## 2022-06-01 DIAGNOSIS — M54.2 NECK PAIN: ICD-10-CM

## 2022-06-01 DIAGNOSIS — F43.21 ADJUSTMENT DISORDER WITH DEPRESSED MOOD: Primary | ICD-10-CM

## 2022-06-01 DIAGNOSIS — C03.9 PRIMARY CANCER OF ALVEOLAR RIDGE MUCOSA (H): ICD-10-CM

## 2022-06-01 DIAGNOSIS — C31.0 MAXILLARY SINUS CANCER (H): ICD-10-CM

## 2022-06-01 PROCEDURE — 99205 OFFICE O/P NEW HI 60 MIN: CPT | Performed by: STUDENT IN AN ORGANIZED HEALTH CARE EDUCATION/TRAINING PROGRAM

## 2022-06-01 PROCEDURE — G0463 HOSPITAL OUTPT CLINIC VISIT: HCPCS

## 2022-06-01 PROCEDURE — 99417 PROLNG OP E/M EACH 15 MIN: CPT | Performed by: STUDENT IN AN ORGANIZED HEALTH CARE EDUCATION/TRAINING PROGRAM

## 2022-06-01 RX ORDER — CYCLOBENZAPRINE HCL 5 MG
5 TABLET ORAL 3 TIMES DAILY PRN
Qty: 60 TABLET | Refills: 1 | Status: SHIPPED | OUTPATIENT
Start: 2022-06-01 | End: 2022-07-07

## 2022-06-01 ASSESSMENT — PAIN SCALES - GENERAL: PAINLEVEL: NO PAIN (0)

## 2022-06-01 NOTE — PROGRESS NOTES
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    Oncology History:  -Diagnosed after he presented with a several year history of oral cavity pain and precancerous lesions of the left upper gingiva.   -He underwent a left infrastructure maxillectomy and left level I lymph node dissection on 2019 with pathology revealing a 1.3 cm well-differentiated squamous cell carcinoma with 13 mm depth of invasion and invasion into the underlying bone.   -He received adjuvant radiotherapy as described above for improved local disease control.  -He was last seen by ENT on 3/9/2022, at which time he reported persistent stable pain in the anterior hard palate and left maxilla.  -Seen by Dr. Verdin on 22 and complained of low mood with ongoing toxicities and frustrations after cancer treatment. Persistent xerostomia, dysphagia to dry foods, continued stiffness and pain in the left neck. Plan for follow up with rad onc MD in 3/2023 and NP in 2022 with CT neck and chest. Repeat TSH due in 10/22. Referral to Oncology Psych and PM&R        Symptoms,  Patient presents for an initial evaluation today.  Patient complains of pain in his left neck that radiates into his left shoulder and up into his masseter area. Pain is described as a sharp pain, and he has continued with the stretches that he was taught by lymphedema therapy. He last saw them in  at the time of his radiation. He also has seen SLP due to dysphagia  Muscles feel like they are cramping/spasming. This happens multiple times per day while brushing  his teeth or yawning. He states that it doesn't happen much when he eats anymore.  He denies any lymphedema in his neck. He has a neck compression that he uses and has been using this less frequently. He also continues with MLD exercises as taught by lymphedema therapy.   He has not tried any medications for the neck pain/cramping. He has tried tylenol which sometimes helps but more often than not.   Pain does also go into the posterior lateral part of his left neck. He states that neck pain, cramping wakes him up at night and disrupts sleep.      Therapies/HEP,  SLP and lymphedema therapy in the past      Functionally,   Patient is modified independent with mobility with a cane, and independent with ADLs and IADLs.             Past Medical and Surgical History:     Past Medical History:   Diagnosis Date     Allergic rhinitis experienced it for many years     Anxiety 2019     Benign positional vertigo May 2019     Depressive disorder 2019     Diabetes (H)      Gastroesophageal reflux disease May 2021     Head injury April 2018     Hypertension      Maxillary sinus cancer (H)      Obesity      Past Surgical History:   Procedure Laterality Date     ------------OTHER-------------      Mucosa and bone biopsy     COLONOSCOPY N/A 5/24/2021    Procedure: COLONOSCOPY, WITH POLYPECTOMY;  Surgeon: Terrell De Luna MD;  Location: [facility] OR     EXPLORE NECK Left 5/9/2019    Procedure: Left Neck Exploration;  Surgeon: Jett Treviño MD;  Location: [facility] OR     EXTRACTION(S) DENTAL      x2 under general anesthesia     GRAFT FREE VASCULARIZED (LOCATION) Right 5/9/2019    Procedure: Left Radial Forearm Free Flap (soft tissue);  Surgeon: Sumit Atwood MD;  Location: [facility] OR     GRAFT SKIN SPLIT THICKNESS FROM EXTREMITY Right 5/9/2019    Procedure: Graft skin split thickness from right thigh, nasogastric feeding tube placement;  Surgeon: Sumit Atwood MD;  Location: [facility] OR     IR LYMPH NODE BIOPSY  4/4/2019     OSTEOTOMY  MAXILLA Left 5/9/2019    Procedure: Left Infrastructure Maxillectomy,;  Surgeon: Jett Treviño MD;  Location:  OR            Social History:     Social History     Tobacco Use     Smoking status: Never Smoker     Smokeless tobacco: Never Used   Substance Use Topics     Alcohol use: No       Living situation: Lives [place].   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    ADLs: Independent  Assistive devices: cane  iADLs (medication management and finances): Independent  Hand dominance: left handed.   Driving: yes           Family History:     Family History   Problem Relation Age of Onset     Cancer Mother         Ovarian Cancer     Diabetes Mother      Heart Disease Mother      Hypertension Mother      Depression Mother      Cancer Brother         Lung Cancer     Cardiovascular Brother         Stent     Diabetes Brother      Heart Disease Brother      Hypertension Brother      Substance Abuse Brother      Cerebrovascular Disease Brother      Kidney Cancer Sister         s/p nephrectomy      Cancer Sister         Kidney Cancer     Hypertension Sister      Diabetes Sister      Hypertension Sister      Depression Sister      Substance Abuse Sister      Substance Abuse Father      Depression Sister             Medications:     Current Outpatient Medications   Medication Sig Dispense Refill     acetaminophen (TYLENOL) 325 MG tablet Take 2 tablets (650 mg) by mouth every 4 hours as needed for mild pain 100 tablet 0     aspirin (ASA) 325 MG tablet 1 tab(s)       aspirin (ASA) 325 MG tablet Take 1 tablet (325 mg) by mouth daily 30 tablet 0     atorvastatin (LIPITOR) 20 MG tablet 1 tab(s)       atorvastatin (LIPITOR) 20 MG tablet Take 1 tablet (20 mg) by mouth every morning 90 tablet 3     blood glucose (NO BRAND SPECIFIED) lancets standard Use to test blood sugar  4 times daily or  as directed. ( [place] micolet lancet) 400 each 0     blood glucose (NO BRAND SPECIFIED) test strip Use to test blood sugar 3 times daily or as directed. 300 strip 3     blood glucose monitoring (ISRAEL MICROLET) lancets Use to test blood sugar 4 times daily or as directed. 200 each 3     cevimeline (EVOXAC) 30 MG capsule TAKE ONE CAPSULE BY MOUTH THREE TIMES DAILY 270 capsule 3     cevimeline (EVOXAC) 30 MG capsule 1 cap(s)       DENTAGEL 1.1 % GEL topical gel Apply to affected area At Bedtime 112 g 11     doxazosin (CARDURA) 1 MG tablet Take 1 tablet by mouth       doxazosin (CARDURA) 1 MG tablet Take 1 tablet (1 mg) by mouth daily 30 tablet 0     gabapentin (NEURONTIN) 300 MG capsule 1 cap(s)       gabapentin (NEURONTIN) 300 MG capsule Take 1 capsule (300 mg) by mouth 3 times daily 270 capsule 3     lisinopril (ZESTRIL) 10 MG tablet Take 1 tablet by mouth       lisinopril (ZESTRIL) 10 MG tablet Take 1 tablet (10 mg) by mouth every morning 30 tablet 3     metFORMIN (GLUCOPHAGE) 1000 MG tablet Take 1 tablet (1,000 mg) by mouth 2 times daily (with meals) 180 tablet 1     Misc Natural Product Nasal (PONARIS) SOLN With head tilted back, place 1 or 2 drops in each nostril once daily. 30 mL 3     multivitamin w/minerals (THERA-VIT-M) tablet Take 1 tablet by mouth daily 30 tablet 0     omeprazole (PRILOSEC) 20 MG DR capsule Take 1 capsule by mouth daily       omeprazole (PRILOSEC) 20 MG DR capsule Take 1 capsule (20 mg) by mouth daily 30 capsule 3     tamsulosin (FLOMAX) 0.4 MG capsule 1 capsule              Allergies:     Allergies   Allergen Reactions     Seasonal Allergies Other (See Comments) and Shortness Of Breath              ROS:     A focused ROS is negative other than the symptoms noted above in the HPI.           Physical Examiniation:     VITAL SIGNS: There were no vitals taken for this visit.  BMI: Estimated body mass index is 45.73 kg/m  as calculated from the following:    Height as of 3/9/22: 1.702 m (5'  "7\").    Weight as of 4/27/22: 132.5 kg (292 lb).    Gen: NAD, pleasant and cooperative  HEENT: Extra-ocular movements appear intact  Pulm: non-labored breathing in room air  Neuro/MSK:   Orientation: Oriented to person, place, time, situation. Exhibits good insight into his condition and ongoing management/symptoms.  Cervical Exam:  ROM- Full neck flexion, limited neck extension- 10 degrees short of full extension, limited left lateral rotation-20 degrees short of full range and limited left side bending 30 degrees short of full range. Limited right lateral rotation and right side bending by about 15 degrees.  Palpation: Scar tissue palpable over left SCM, left trapezius, left levator. No notable lymphedema on inspection. Negative Spurling's bilaterally.            Laboratory/Imaging:     CT Soft Tissue Neck W Contrast (9/20/21):  Impression: Stable postsurgical changes of left palatectomy, partial  left maxillectomy and left neck dissection without recurrent tumor. No  cervical lymphadenopathy.           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 Management options including oral muscle relaxants versus botox injections were discussed with Devonte. We decided to proceed with a trial of prn flexeril for severe spasms/discomfort, and patient was instructed not to take this when driving/working and try it at home to assess side effects such as drowsiness. In addition, referral to physical therapy for manual therapy to address neck scar tissue was placed. Will plan a return visit in 3 months. Patient is in agreement with the above plan.    1. Patient education: In depth discussion and education was provided about the assessment and implications of each of the below recommendations for " management. Patient indicated readiness to learn, all questions were answered and understanding of material presented was confirmed.  2. Therapy/equipment/braces:  1. Physical Therapy referral placed to address neck ROM and scar tissue with manual therapy.  2. Continue daily stretches and MLD exercises.  3. Interventions:  1. Could consider botox in future if needed to address pain/discomfort/spasms.  4. Medications:  1. Flexeril 5 mg TID prn was prescribed to help with spasms.  5. Follow up: 3 months.    Marivel Hurtado MD  Physical Medicine & Rehabilitation    I appreciate the opportunity to participate in the care of your patient.     90 minutes spent on the date of the encounter doing chart review, history and exam, documentation and further activities as noted above.

## 2022-06-01 NOTE — NURSING NOTE
"Oncology Rooming Note    June 1, 2022 8:50 AM   Devonte Tucker is a 56 year old male who presents for:    Chief Complaint   Patient presents with     Oncology Clinic Visit     Primary Cancer of alveolar ridge mucosa      Initial Vitals: /71   Pulse 73   Temp 98.2  F (36.8  C) (Oral)   Resp 18   Wt 132.1 kg (291 lb 4.8 oz)   SpO2 97%   BMI 45.62 kg/m   Estimated body mass index is 45.62 kg/m  as calculated from the following:    Height as of 3/9/22: 1.702 m (5' 7\").    Weight as of this encounter: 132.1 kg (291 lb 4.8 oz). Body surface area is 2.5 meters squared.  No Pain (0) Comment: Data Unavailable   No LMP for male patient.  Allergies reviewed: Yes  Medications reviewed: Yes    Medications: Medication refills not needed today.  Pharmacy name entered into [facility]:    [id number] - [place], [place] - [address] [facility] - [place], [place] - [address]    Clinical concerns: None       Siena Oneill LPN            "

## 2022-06-01 NOTE — LETTER
2022         RE: Devonte Tucker  [address] [place] [address]        Dear Colleague,    Thank you for referring your patient, Devonte Tucker, to the [facility]. Please see a copy of my visit note below.           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    Oncology History:  -Diagnosed after he presented with a several year history of oral cavity pain and precancerous lesions of the left upper gingiva.   -He underwent a left infrastructure maxillectomy and left level I lymph node dissection on 2019 with pathology revealing a 1.3 cm well-differentiated squamous cell carcinoma with 13 mm depth of invasion and invasion into the underlying bone.   -He received adjuvant radiotherapy as described above for improved local disease control.  -He was last seen by ENT on 3/9/2022, at which time he reported persistent stable pain in the anterior hard palate and left maxilla.  -Seen by Dr. Verdin on 22 and complained of low mood with ongoing toxicities and frustrations after cancer treatment. Persistent xerostomia, dysphagia to dry foods, continued stiffness and pain in the left neck. Plan for follow up with rad onc MD in 3/2023 and NP in 2022 with CT neck and chest. Repeat TSH due in 10/22. Referral to Oncology Psych and PM&R        Symptoms,  Patient presents for an initial evaluation today.  Patient complains of pain in his left neck that radiates into his left shoulder and up into his masseter area. Pain is described as a sharp pain, and he has  continued with the stretches that he was taught by lymphedema therapy. He last saw them in 2019 at the time of his radiation. He also has seen SLP due to dysphagia  Muscles feel like they are cramping/spasming. This happens multiple times per day while brushing his teeth or yawning. He states that it doesn't happen much when he eats anymore.  He denies any lymphedema in his neck. He has a neck compression that he uses and has been using this less frequently. He also continues with MLD exercises as taught by lymphedema therapy.   He has not tried any medications for the neck pain/cramping. He has tried tylenol which sometimes helps but more often than not.   Pain does also go into the posterior lateral part of his left neck. He states that neck pain, cramping wakes him up at night and disrupts sleep.      Therapies/HEP,  SLP and lymphedema therapy in the past      Functionally,   Patient is modified independent with mobility with a cane, and independent with ADLs and IADLs.             Past Medical and Surgical History:     Past Medical History:   Diagnosis Date     Allergic rhinitis experienced it for many years     Anxiety 2019     Benign positional vertigo May 2019     Depressive disorder 2019     Diabetes (H)      Gastroesophageal reflux disease May 2021     Head injury April 2018     Hypertension      Maxillary sinus cancer (H)      Obesity      Past Surgical History:   Procedure Laterality Date     ------------OTHER-------------      Mucosa and bone biopsy     COLONOSCOPY N/A 5/24/2021    Procedure: COLONOSCOPY, WITH POLYPECTOMY;  Surgeon: Terrell De Luna MD;  Location: [facility] OR     EXPLORE NECK Left 5/9/2019    Procedure: Left Neck Exploration;  Surgeon: Jett Treviño MD;  Location: [facility] OR     EXTRACTION(S) DENTAL      x2 under general anesthesia     GRAFT FREE VASCULARIZED (LOCATION) Right 5/9/2019    Procedure: Left Radial Forearm Free Flap (soft tissue);  Surgeon: Sumit Atwood MD;  Location:  [facility] OR     GRAFT SKIN SPLIT THICKNESS FROM EXTREMITY Right 5/9/2019    Procedure: Graft skin split thickness from right thigh, nasogastric feeding tube placement;  Surgeon: Sumit Atwood MD;  Location: [facility] OR     IR LYMPH NODE BIOPSY  4/4/2019     OSTEOTOMY MAXILLA Left 5/9/2019    Procedure: Left Infrastructure Maxillectomy,;  Surgeon: Jett Treviño MD;  Location: [facility] OR            Social History:     Social History     Tobacco Use     Smoking status: Never Smoker     Smokeless tobacco: Never Used   Substance Use Topics     Alcohol use: No       Living situation: Lives [place].   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    ADLs: Independent  Assistive devices: cane  iADLs (medication management and finances): Independent  Hand dominance: left handed.   Driving: yes           Family History:     Family History   Problem Relation Age of Onset     Cancer Mother         Ovarian Cancer     Diabetes Mother      Heart Disease Mother      Hypertension Mother      Depression Mother      Cancer Brother         Lung Cancer     Cardiovascular Brother         Stent     Diabetes Brother      Heart Disease Brother      Hypertension Brother      Substance Abuse Brother      Cerebrovascular Disease Brother      Kidney Cancer Sister         s/p nephrectomy      Cancer Sister         Kidney Cancer     Hypertension Sister      Diabetes Sister      Hypertension Sister      Depression Sister      Substance Abuse Sister      Substance Abuse Father      Depression Sister             Medications:     Current Outpatient Medications   Medication Sig Dispense Refill     acetaminophen (TYLENOL) 325 MG tablet Take 2 tablets (650 mg) by mouth every 4 hours as needed for mild pain 100 tablet 0     aspirin (ASA) 325 MG tablet 1 tab(s)       aspirin (ASA) 325 MG tablet Take 1 tablet (325 mg) by mouth  daily 30 tablet 0     atorvastatin (LIPITOR) 20 MG tablet 1 tab(s)       atorvastatin (LIPITOR) 20 MG tablet Take 1 tablet (20 mg) by mouth every morning 90 tablet 3     blood glucose (NO BRAND SPECIFIED) lancets standard Use to test blood sugar  4 times daily or as directed. ( [place] micolet lancet) 400 each 0     blood glucose (NO BRAND SPECIFIED) test strip Use to test blood sugar 3 times daily or as directed. 300 strip 3     blood glucose monitoring (ISRAEL MICROLET) lancets Use to test blood sugar 4 times daily or as directed. 200 each 3     cevimeline (EVOXAC) 30 MG capsule TAKE ONE CAPSULE BY MOUTH THREE TIMES DAILY 270 capsule 3     cevimeline (EVOXAC) 30 MG capsule 1 cap(s)       DENTAGEL 1.1 % GEL topical gel Apply to affected area At Bedtime 112 g 11     doxazosin (CARDURA) 1 MG tablet Take 1 tablet by mouth       doxazosin (CARDURA) 1 MG tablet Take 1 tablet (1 mg) by mouth daily 30 tablet 0     gabapentin (NEURONTIN) 300 MG capsule 1 cap(s)       gabapentin (NEURONTIN) 300 MG capsule Take 1 capsule (300 mg) by mouth 3 times daily 270 capsule 3     lisinopril (ZESTRIL) 10 MG tablet Take 1 tablet by mouth       lisinopril (ZESTRIL) 10 MG tablet Take 1 tablet (10 mg) by mouth every morning 30 tablet 3     metFORMIN (GLUCOPHAGE) 1000 MG tablet Take 1 tablet (1,000 mg) by mouth 2 times daily (with meals) 180 tablet 1     Misc Natural Product Nasal (PONARIS) SOLN With head tilted back, place 1 or 2 drops in each nostril once daily. 30 mL 3     multivitamin w/minerals (THERA-VIT-M) tablet Take 1 tablet by mouth daily 30 tablet 0     omeprazole (PRILOSEC) 20 MG DR capsule Take 1 capsule by mouth daily       omeprazole (PRILOSEC) 20 MG DR capsule Take 1 capsule (20 mg) by mouth daily 30 capsule 3     tamsulosin (FLOMAX) 0.4 MG capsule 1 capsule              Allergies:     Allergies   Allergen Reactions     Seasonal Allergies Other (See Comments) and Shortness Of Breath              ROS:     A focused ROS is  "negative other than the symptoms noted above in the HPI.           Physical Examiniation:     VITAL SIGNS: There were no vitals taken for this visit.  BMI: Estimated body mass index is 45.73 kg/m  as calculated from the following:    Height as of 3/9/22: 1.702 m (5' 7\").    Weight as of 4/27/22: 132.5 kg (292 lb).    Gen: NAD, pleasant and cooperative  HEENT: Extra-ocular movements appear intact  Pulm: non-labored breathing in room air  Neuro/MSK:   Orientation: Oriented to person, place, time, situation. Exhibits good insight into his condition and ongoing management/symptoms.  Cervical Exam:  ROM- Full neck flexion, limited neck extension- 10 degrees short of full extension, limited left lateral rotation-20 degrees short of full range and limited left side bending 30 degrees short of full range. Limited right lateral rotation and right side bending by about 15 degrees.  Palpation: Scar tissue palpable over left SCM, left trapezius, left levator. No notable lymphedema on inspection. Negative Spurling's bilaterally.            Laboratory/Imaging:     CT Soft Tissue Neck W Contrast (9/20/21):  Impression: Stable postsurgical changes of left palatectomy, partial  left maxillectomy and left neck dissection without recurrent tumor. No  cervical lymphadenopathy.           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 Management options including oral muscle relaxants versus botox injections were discussed with Devonte. We decided to proceed with a trial of prn flexeril for severe spasms/discomfort, and patient was instructed not to take this when driving/working and try it at home to assess side effects such as drowsiness. In addition, referral to physical therapy for manual therapy to address " neck scar tissue was placed. Will plan a return visit in 3 months. Patient is in agreement with the above plan.    1. Patient education: In depth discussion and education was provided about the assessment and implications of each of the below recommendations for management. Patient indicated readiness to learn, all questions were answered and understanding of material presented was confirmed.  2. Therapy/equipment/braces:  1. Physical Therapy referral placed to address neck ROM and scar tissue with manual therapy.  2. Continue daily stretches and MLD exercises.  3. Interventions:  1. Could consider botox in future if needed to address pain/discomfort/spasms.  4. Medications:  1. Flexeril 5 mg TID prn was prescribed to help with spasms.  5. Follow up: 3 months.    Marivel Hurtado MD  Physical Medicine & Rehabilitation    I appreciate the opportunity to participate in the care of your patient.     90 minutes spent on the date of the encounter doing chart review, history and exam, documentation and further activities as noted above.    Again, thank you for allowing me to participate in the care of your patient.        Sincerely,        Marivel Hurtado MD

## 2022-06-01 NOTE — PATIENT INSTRUCTIONS
A physical therapy referral was placed to help with manual therapy to address scar tissue and range of motion of your neck.  Continue your daily stretches and massage techniques.  Flexeril 5 mg to be taken up to three times daily as needed was prescribed to help with spasms.  Follow up with Dr. Hurtado in 3 months.

## 2022-07-06 DIAGNOSIS — F43.21 ADJUSTMENT DISORDER WITH DEPRESSED MOOD: ICD-10-CM

## 2022-07-06 DIAGNOSIS — M54.2 NECK PAIN: ICD-10-CM

## 2022-07-06 DIAGNOSIS — C31.0 MAXILLARY SINUS CANCER (H): ICD-10-CM

## 2022-07-06 DIAGNOSIS — C03.9 PRIMARY CANCER OF ALVEOLAR RIDGE MUCOSA (H): ICD-10-CM

## 2022-07-06 NOTE — TELEPHONE ENCOUNTER
Medication:Cyclobenzaprine  Last prescribing provider:Dr. Hurtado    Last clinic visit date: 6/1/22 Dr. Hurtado  Recommendations for requested medication:   Muscle spasms    Any missed appointments or no-shows since last clinic visit?: none recently  Next clinic visit date: 9/2/22 Dr. Hurtado    Any other pertinent information:  Routed to Dr. Hurtado.

## 2022-07-07 RX ORDER — CYCLOBENZAPRINE HCL 5 MG
5 TABLET ORAL 3 TIMES DAILY PRN
Qty: 90 TABLET | Refills: 1 | Status: SHIPPED | OUTPATIENT
Start: 2022-07-07 | End: 2022-10-24

## 2022-07-11 ENCOUNTER — VIRTUAL VISIT (OUTPATIENT)
Dept: ONCOLOGY | Facility: CLINIC | Age: 57
End: 2022-07-11
Attending: PSYCHOLOGIST
Payer: COMMERCIAL

## 2022-07-11 DIAGNOSIS — F43.21 ADJUSTMENT DISORDER WITH DEPRESSED MOOD: Primary | ICD-10-CM

## 2022-07-11 PROCEDURE — 90832 PSYTX W PT 30 MINUTES: CPT | Mod: 95 | Performed by: PSYCHOLOGIST

## 2022-07-11 NOTE — PROGRESS NOTES
[facility] Oncology- Psychotherapy (Provider Oversight- Dr. Brant Vivar)                                    Progress Note    Patient Name: Devonte Tucker  Date: 7/11/22         Service Type: Individual      Session Start Time: 8:00  Session End Time: 8:30     Session Length: 30 minutes    Session #: 3    Attendees: Client attended alone    Service Modality:  Video Visit:      Provider verified identity through the following two step process.  Patient provided:  Patient photo    Telemedicine Visit: The patient's condition can be safely assessed and treated via synchronous audio and visual telemedicine encounter.      Reason for Telemedicine Visit: Services only offered telehealth    Originating Site (Patient Location): Patient's home    Distant Site (Provider Location): Provider Remote Setting- Home Office    Consent:  The patient/guardian has verbally consented to: the potential risks and benefits of telemedicine (video visit) versus in person care; bill my insurance or make self-payment for services provided; and responsibility for payment of non-covered services.     Patient would like the video invitation sent by:  My Chart    Mode of Communication:  Video Conference via [employer]well    As the provider I attest to compliance with applicable laws and regulations related to telemedicine.    DATA  Interactive Complexity: No  Crisis: No     Current / Ongoing Stressors and Concerns:   Recovery from cancer, cancer treatment long-term effects, sadness, changes in identity, depressed mood, feeling overwhelmed, feeling lonely     Treatment Objective(s) Addressed in This Session:   identify coping strategies to more effectively address stressors     Intervention: Motivational Interviewing: to help him connect with what is true about him post cancer treatment        ASSESSMENT: Current Emotional / Mental Status (status of significant symptoms):   Risk status (Self / Other harm or suicidal ideation)   Patient  "denies current fears or concerns for personal safety.   Patient denies current or recent suicidal ideation or behaviors.   Patient denies current or recent homicidal ideation or behaviors.   Patient denies current or recent self injurious behavior or ideation.   Patient denies other safety concerns.   Patient reports there has been no change in risk factors since their last session.     Patient reports there has been no change in protective factors since their last session.     Recommended that patient call 911 or go to the local ED should there be a change in any of these risk factors.     Appearance:   Appropriate    Eye Contact:   Good    Psychomotor Behavior: Normal    Attitude:   Cooperative    Orientation:   All   Speech    Rate / Production: Normal     Volume:  Normal    Mood:    Normal   Affect:    Appropriate    Thought Content:  Clear    Thought Form:  Coherent  Logical    Insight:    Good      Medication Review:   No changes to current psychiatric medication(s)     Medication Compliance:   Yes     Changes in Health Issues:   None reported     Chemical Use Review:   Substance Use: Chemical use reviewed, no active concerns identified      Tobacco Use: No current tobacco use.      Diagnosis:  1. Adjustment disorder with depressed mood        Collateral Reports Completed:   Not Applicable    PLAN: (Patient Tasks / Therapist Tasks / Other)  He is to identify what is true about him after cancer treatment. This will assist him in reconnecting with his identity as well as his strengths. After strengths are identified, he can begin to set goals for his \"new normal.\"    Devonte Eid, Alina                                                         ______________________________________________________________________    Individual Treatment Plan    Patient's Name: Devonte Tucker  YOB: 1965    Date of Creation: 5/17/22  Date Treatment Plan Last Reviewed/Revised: 7/11/22    DSM5 Diagnoses: 311 " (F32.8) Other/unspec. Depressive Disorder  Psychosocial / Contextual Factors: cancer, long-term side-effects, loss of identity, depressed mood  Anticipated number of session for this episode of care: 12  Anticipation frequency of session: Every other week  Anticipated Duration of each session: 16-37 minutes  Treatment plan will be reviewed in 90 days or when goals have been changed.       MeasurableTreatment Goal(s) related to diagnosis / functional impairment(s)  Goal 1: Patient will identify the truths about himself    I will know I've met my goal when I feel I know who I am again.      Patient has reviewed and agreed to the above plan.      Devonte Eid PsyD  July 11, 2022

## 2022-07-13 ENCOUNTER — OFFICE VISIT (OUTPATIENT)
Dept: OTOLARYNGOLOGY | Facility: CLINIC | Age: 57
End: 2022-07-13

## 2022-07-13 VITALS
WEIGHT: 289 LBS | HEIGHT: 67 IN | SYSTOLIC BLOOD PRESSURE: 139 MMHG | DIASTOLIC BLOOD PRESSURE: 84 MMHG | BODY MASS INDEX: 45.36 KG/M2 | TEMPERATURE: 96.9 F | HEART RATE: 103 BPM

## 2022-07-13 DIAGNOSIS — C03.0 SQUAMOUS CELL CARCINOMA OF MAXILLARY ALVEOLAR RIDGE (H): Primary | ICD-10-CM

## 2022-07-13 PROCEDURE — 99213 OFFICE O/P EST LOW 20 MIN: CPT | Performed by: REGISTERED NURSE

## 2022-07-13 ASSESSMENT — PAIN SCALES - GENERAL: PAINLEVEL: NO PAIN (0)

## 2022-07-13 NOTE — PATIENT INSTRUCTIONS
- You were seen in the ENT Clinic today by Marie Coburn NP.   - The plan is to follow up in 6 months for surveillance visit.  - Please send a [employer]t message or call the ENT clinic at [phone] with any questions or concerns.

## 2022-07-13 NOTE — NURSING NOTE
"Chief Complaint   Patient presents with     RECHECK     4 month follow up      ,.Blood pressure 139/84, pulse 103, temperature 96.9  F (36.1  C), height 1.702 m (5' 7\"), weight 131.1 kg (289 lb).      Andres Purcell LPN    "

## 2022-07-13 NOTE — PROGRESS NOTES
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  Dr. rTeviño performed a left palatectomy on 5/9/2019 with Dr. Atwood performing a left radial forearm free flap reconstruction.  He had postop radiation therapy completed on 7/22/2019, 6000 cGy. Was last seen in ENT clinic by Dr. Atwood 6/30/21. Surveillance CT scans completed on 9/20/21 and were negative for recurrence or metastases. Patient is scheduled for repeat surveillance CT scans on 9/6/22 ordered by Dr. Verdin in Radiation Oncology.    Interval History:   Patient comes in today for routine surveillance. Patient states that he is doing well without any concerns today. Continues to have some dysphagia with large bites or dry foods. Needs to wash food down with water. This is stable and not worsening. Recently had consult with Dr. Hurtado in PM&R for left neck muscle spasm. Has started Flexeril which is moderately managing pain. Scheduled for PT in August.     Patient denies any odynophagia, ear pain, bleeding from the mouth, or lumps/bumps of the neck.     Past Medical History:  Past Medical History:   Diagnosis Date     Allergic rhinitis experienced it for many years     Anxiety 2019     Benign positional vertigo May 2019     Depressive disorder 2019     Diabetes (H)      Gastroesophageal reflux disease May 2021     Head injury April 2018     Hypertension      Maxillary sinus cancer (H)      Obesity      Past Surgical History:  Past Surgical History:   Procedure Laterality Date     ------------OTHER-------------      Mucosa and bone biopsy     COLONOSCOPY N/A 5/24/2021    Procedure: COLONOSCOPY, WITH POLYPECTOMY;  Surgeon: Terrell De Luna MD;  Location: [facility] OR     EXPLORE NECK Left 5/9/2019    Procedure: Left Neck Exploration;  Surgeon: Jett Treviño MD;  Location: [facility] OR     EXTRACTION(S)  DENTAL      x2 under general anesthesia     GRAFT FREE VASCULARIZED (LOCATION) Right 5/9/2019    Procedure: Left Radial Forearm Free Flap (soft tissue);  Surgeon: Sumit Atwood MD;  Location: [facility] OR     GRAFT SKIN SPLIT THICKNESS FROM EXTREMITY Right 5/9/2019    Procedure: Graft skin split thickness from right thigh, nasogastric feeding tube placement;  Surgeon: Sumit Atwood MD;  Location: [facility] OR     IR LYMPH NODE BIOPSY  4/4/2019     OSTEOTOMY MAXILLA Left 5/9/2019    Procedure: Left Infrastructure Maxillectomy,;  Surgeon: Jett Treviño MD;  Location: [facility] OR     Medications:  Current Outpatient Medications   Medication Sig Dispense Refill     acetaminophen (TYLENOL) 325 MG tablet Take 2 tablets (650 mg) by mouth every 4 hours as needed for mild pain 100 tablet 0     aspirin (ASA) 325 MG tablet 1 tab(s)       aspirin (ASA) 325 MG tablet Take 1 tablet (325 mg) by mouth daily 30 tablet 0     atorvastatin (LIPITOR) 20 MG tablet 1 tab(s)       atorvastatin (LIPITOR) 20 MG tablet Take 1 tablet (20 mg) by mouth every morning 90 tablet 3     blood glucose (NO BRAND SPECIFIED) lancets standard Use to test blood sugar  4 times daily or as directed. ( [place] micolet lancet) 400 each 0     blood glucose (NO BRAND SPECIFIED) test strip Use to test blood sugar 3 times daily or as directed. 300 strip 3     blood glucose monitoring ([place] MICROLET) lancets Use to test blood sugar 4 times daily or as directed. 200 each 3     cevimeline (EVOXAC) 30 MG capsule TAKE ONE CAPSULE BY MOUTH THREE TIMES DAILY 270 capsule 3     cevimeline (EVOXAC) 30 MG capsule 1 cap(s)       cyclobenzaprine (FLEXERIL) 5 MG tablet Take 1 tablet (5 mg) by mouth 3 times daily as needed for muscle spasms 90 tablet 1     DENTAGEL 1.1 % GEL topical gel Apply to affected area At Bedtime 112 g 11     doxazosin (CARDURA) 1 MG tablet Take 1 tablet by mouth       doxazosin (CARDURA) 1 MG tablet Take 1 tablet (1 mg) by mouth daily 30 tablet 0     gabapentin  "(NEURONTIN) 300 MG capsule 1 cap(s)       gabapentin (NEURONTIN) 300 MG capsule Take 1 capsule (300 mg) by mouth 3 times daily 270 capsule 3     lisinopril (ZESTRIL) 10 MG tablet Take 1 tablet by mouth       lisinopril (ZESTRIL) 10 MG tablet Take 1 tablet (10 mg) by mouth every morning 30 tablet 3     metFORMIN (GLUCOPHAGE) 1000 MG tablet Take 1 tablet (1,000 mg) by mouth 2 times daily (with meals) 180 tablet 1     Misc Natural Product Nasal (PONARIS) SOLN With head tilted back, place 1 or 2 drops in each nostril once daily. 30 mL 3     multivitamin w/minerals (THERA-VIT-M) tablet Take 1 tablet by mouth daily 30 tablet 0     omeprazole (PRILOSEC) 20 MG DR capsule Take 1 capsule by mouth daily       omeprazole (PRILOSEC) 20 MG DR capsule Take 1 capsule (20 mg) by mouth daily 30 capsule 3     tamsulosin (FLOMAX) 0.4 MG capsule 1 capsule       Allergies:  Allergies   Allergen Reactions     Seasonal Allergies Other (See Comments) and Shortness Of Breath      Social History:  Social History     Tobacco Use     Smoking status: Never Smoker     Smokeless tobacco: Never Used   Substance Use Topics     Alcohol use: No     Drug use: No     ROS: 10 point ROS neg other than the symptoms noted above in the HPI.    Physical Exam:    /84   Pulse 103   Temp 96.9  F (36.1  C)   Ht 1.702 m (5' 7\")   Wt 131.1 kg (289 lb)   BMI 45.26 kg/m    Wt Readings from Last 3 Encounters:   07/13/22 131.1 kg (289 lb)   06/01/22 132.1 kg (291 lb 4.8 oz)   04/27/22 132.5 kg (292 lb)        Constitutional:  The patient was unaccompanied, well-groomed, and in no acute distress.     Neurologic: Alert and oriented x 3.    Psychiatric: The patient's affect was calm, cooperative, and appropriate.     Communication:  Normal; communicates verbally, normal voice quality.   Respiratory: Breathing comfortably without stridor or exertion of accessory muscles.    Head/Face:  Normocephalic and atraumatic.  No lesions or scars.    Oral Cavity: Healthy " appearing flap in left maxilla. White mucosalized area along medial portion. Tenderness with palpation around tooth #8. Normal tongue, floor of mouth, and  buccal mucosa.  No lesions or masses on inspection or palpation.    Oropharynx: Normal mucosa, palate symmetric with normal elevation. No abnormal lymph tissue in the oropharynx.     Neck: Well healed left neck incision. Tightness in the left SCM. Normal range of motion   Lymphatic: There is no palpable lymphadenopathy in the neck.       Labs and Imaging Reviewed:  [id number]  CT neck  Impression: Stable postsurgical changes of left palatectomy, partial  left maxillectomy and left neck dissection without recurrent tumor. No  cervical lymphadenopathy.     CT chest  IMPRESSION:   No definite evidence for metastatic disease in the chest     Labs:  TSH   Date Value Ref Range Status   2022 4.01 0.30 - 5.00 uIU/mL Final   2021 3.01 0.40 - 4.00 mU/L Final   2021 2.38 0.40 - 4.00 mU/L Final       Assessment/Plan:  1. 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 He is doing well with no evidence of disease on today's exam. He is scheduled for surveillance imaging in 2022. Continue to work with Dr. Hurtado for neck pain. Continue to monitor swallowing.     Reviewed signs and symptoms that would necessitate a sooner exam including new sore throat, mouth pain, ear pain, dysphagia, bleeding from the mouth or lumps/bumps of the neck.    Otherwise, patient will follow up in 6 months for continued surveillance.    Marie Coburn DNP, APRN, CNP  Otolaryngology  Head & Neck Surgery  [phone]    20 minutes spent on the date of the encounter doing chart review, history and exam, documentation and further activities per the note.

## 2022-07-13 NOTE — LETTER
7/13/2022       RE: Devonte Tucker  [address] [place] [address]     Dear Colleague,    Thank you for referring your patient, Devonte Tucker, to the [facility] [place] at [facility]. Please see a copy of my visit note below.    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  Dr. Treviño performed a left palatectomy on 5/9/2019 with Dr. Atwood performing a left radial forearm free flap reconstruction.  He had postop radiation therapy completed on 7/22/2019, 6000 cGy. Was last seen in ENT clinic by Dr. Atwood 6/30/21. Surveillance CT scans completed on 9/20/21 and were negative for recurrence or metastases. Patient is scheduled for repeat surveillance CT scans on 9/6/22 ordered by Dr. Verdin in Radiation Oncology.    Interval History:   Patient comes in today for routine surveillance. Patient states that he is doing well without any concerns today. Continues to have some dysphagia with large bites or dry foods. Needs to wash food down with water. This is stable and not worsening. Recently had consult with Dr. Hurtado in PM&R for left neck muscle spasm. Has started Flexeril which is moderately managing pain. Scheduled for PT in August.     Patient denies any odynophagia, ear pain, bleeding from the mouth, or lumps/bumps of the neck.     Past Medical History:  Past Medical History:   Diagnosis Date     Allergic rhinitis experienced it for many years     Anxiety 2019     Benign positional vertigo May 2019     Depressive disorder 2019     Diabetes (H)      Gastroesophageal reflux disease May 2021     Head injury April 2018     Hypertension      Maxillary sinus cancer (H)      Obesity      Past Surgical History:  Past Surgical History:   Procedure Laterality  Date     ------------OTHER-------------      Mucosa and bone biopsy     COLONOSCOPY N/A 5/24/2021    Procedure: COLONOSCOPY, WITH POLYPECTOMY;  Surgeon: Terrell De Luna MD;  Location: [facility] OR     EXPLORE NECK Left 5/9/2019    Procedure: Left Neck Exploration;  Surgeon: Jett Treviño MD;  Location: [facility] OR     EXTRACTION(S) DENTAL      x2 under general anesthesia     GRAFT FREE VASCULARIZED (LOCATION) Right 5/9/2019    Procedure: Left Radial Forearm Free Flap (soft tissue);  Surgeon: Sumit Atwood MD;  Location: [facility] OR     GRAFT SKIN SPLIT THICKNESS FROM EXTREMITY Right 5/9/2019    Procedure: Graft skin split thickness from right thigh, nasogastric feeding tube placement;  Surgeon: Sumit Atwood MD;  Location: [facility] OR     IR LYMPH NODE BIOPSY  4/4/2019     OSTEOTOMY MAXILLA Left 5/9/2019    Procedure: Left Infrastructure Maxillectomy,;  Surgeon: Jett Treviño MD;  Location: [facility] OR     Medications:  Current Outpatient Medications   Medication Sig Dispense Refill     acetaminophen (TYLENOL) 325 MG tablet Take 2 tablets (650 mg) by mouth every 4 hours as needed for mild pain 100 tablet 0     aspirin (ASA) 325 MG tablet 1 tab(s)       aspirin (ASA) 325 MG tablet Take 1 tablet (325 mg) by mouth daily 30 tablet 0     atorvastatin (LIPITOR) 20 MG tablet 1 tab(s)       atorvastatin (LIPITOR) 20 MG tablet Take 1 tablet (20 mg) by mouth every morning 90 tablet 3     blood glucose (NO BRAND SPECIFIED) lancets standard Use to test blood sugar  4 times daily or as directed. ( [place] micolet lancet) 400 each 0     blood glucose (NO BRAND SPECIFIED) test strip Use to test blood sugar 3 times daily or as directed. 300 strip 3     blood glucose monitoring (ANDREW MICROLET) lancets Use to test blood sugar 4 times daily or as directed. 200 each 3     cevimeline (EVOXAC) 30 MG capsule TAKE ONE CAPSULE BY MOUTH THREE TIMES DAILY 270 capsule 3     cevimeline (EVOXAC) 30 MG capsule 1 cap(s)       cyclobenzaprine (FLEXERIL) 5 MG  "tablet Take 1 tablet (5 mg) by mouth 3 times daily as needed for muscle spasms 90 tablet 1     DENTAGEL 1.1 % GEL topical gel Apply to affected area At Bedtime 112 g 11     doxazosin (CARDURA) 1 MG tablet Take 1 tablet by mouth       doxazosin (CARDURA) 1 MG tablet Take 1 tablet (1 mg) by mouth daily 30 tablet 0     gabapentin (NEURONTIN) 300 MG capsule 1 cap(s)       gabapentin (NEURONTIN) 300 MG capsule Take 1 capsule (300 mg) by mouth 3 times daily 270 capsule 3     lisinopril (ZESTRIL) 10 MG tablet Take 1 tablet by mouth       lisinopril (ZESTRIL) 10 MG tablet Take 1 tablet (10 mg) by mouth every morning 30 tablet 3     metFORMIN (GLUCOPHAGE) 1000 MG tablet Take 1 tablet (1,000 mg) by mouth 2 times daily (with meals) 180 tablet 1     Misc Natural Product Nasal (PONARIS) SOLN With head tilted back, place 1 or 2 drops in each nostril once daily. 30 mL 3     multivitamin w/minerals (THERA-VIT-M) tablet Take 1 tablet by mouth daily 30 tablet 0     omeprazole (PRILOSEC) 20 MG DR capsule Take 1 capsule by mouth daily       omeprazole (PRILOSEC) 20 MG DR capsule Take 1 capsule (20 mg) by mouth daily 30 capsule 3     tamsulosin (FLOMAX) 0.4 MG capsule 1 capsule       Allergies:  Allergies   Allergen Reactions     Seasonal Allergies Other (See Comments) and Shortness Of Breath      Social History:  Social History     Tobacco Use     Smoking status: Never Smoker     Smokeless tobacco: Never Used   Substance Use Topics     Alcohol use: No     Drug use: No     ROS: 10 point ROS neg other than the symptoms noted above in the HPI.    Physical Exam:    /84   Pulse 103   Temp 96.9  F (36.1  C)   Ht 1.702 m (5' 7\")   Wt 131.1 kg (289 lb)   BMI 45.26 kg/m    Wt Readings from Last 3 Encounters:   07/13/22 131.1 kg (289 lb)   06/01/22 132.1 kg (291 lb 4.8 oz)   04/27/22 132.5 kg (292 lb)        Constitutional:  The patient was unaccompanied, well-groomed, and in no acute distress.     Neurologic: Alert and oriented x 3.  "   Psychiatric: The patient's affect was calm, cooperative, and appropriate.     Communication:  Normal; communicates verbally, normal voice quality.   Respiratory: Breathing comfortably without stridor or exertion of accessory muscles.    Head/Face:  Normocephalic and atraumatic.  No lesions or scars.    Oral Cavity: Healthy appearing flap in left maxilla. White mucosalized area along medial portion. Tenderness with palpation around tooth #8. Normal tongue, floor of mouth, and  buccal mucosa.  No lesions or masses on inspection or palpation.    Oropharynx: Normal mucosa, palate symmetric with normal elevation. No abnormal lymph tissue in the oropharynx.     Neck: Well healed left neck incision. Tightness in the left SCM. Normal range of motion   Lymphatic: There is no palpable lymphadenopathy in the neck.       Labs and Imaging Reviewed:  Imagin21  CT neck  Impression: Stable postsurgical changes of left palatectomy, partial  left maxillectomy and left neck dissection without recurrent tumor. No  cervical lymphadenopathy.     CT chest  IMPRESSION:   No definite evidence for metastatic disease in the chest     Labs:  TSH   Date Value Ref Range Status   2022 4.01 0.30 - 5.00 uIU/mL Final   2021 3.01 0.40 - 4.00 mU/L Final   2021 2.38 0.40 - 4.00 mU/L Final       Assessment/Plan:  1. 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 He is doing well with no evidence of disease on today's exam. He is scheduled for surveillance imaging in 2022. Continue to work with Dr. Hurtado for neck pain. Continue to monitor swallowing.     Reviewed signs and symptoms that would necessitate a sooner exam including new sore throat, mouth pain, ear pain, dysphagia, bleeding from the mouth or lumps/bumps of the neck.    Otherwise, patient will follow up in 6 months for continued  surveillance.    Marie Coburn DNP, APRN, CNP  Otolaryngology  Head & Neck Surgery  [phone]    20 minutes spent on the date of the encounter doing chart review, history and exam, documentation and further activities per the note.

## 2022-07-16 ENCOUNTER — HEALTH MAINTENANCE LETTER (OUTPATIENT)
Age: 57
End: 2022-07-16

## 2022-07-25 ENCOUNTER — VIRTUAL VISIT (OUTPATIENT)
Dept: ONCOLOGY | Facility: CLINIC | Age: 57
End: 2022-07-25
Attending: PSYCHOLOGIST
Payer: COMMERCIAL

## 2022-07-25 DIAGNOSIS — F43.21 ADJUSTMENT DISORDER WITH DEPRESSED MOOD: Primary | ICD-10-CM

## 2022-07-25 PROCEDURE — 90832 PSYTX W PT 30 MINUTES: CPT | Mod: 95 | Performed by: PSYCHOLOGIST

## 2022-07-25 NOTE — PROGRESS NOTES
[facility] Oncology- Psychotherapy (Provider Oversight- Dr. Brant Vivar)                                    Progress Note    Patient Name: Devonte Tucker  Date: 7/25/22         Service Type: Individual      Session Start Time: 8:00  Session End Time: 8:30     Session Length: 30 minutes    Session #: 4    Attendees: Client attended alone    Service Modality:  Video Visit:      Provider verified identity through the following two step process.  Patient provided:  Patient photo    Telemedicine Visit: The patient's condition can be safely assessed and treated via synchronous audio and visual telemedicine encounter.      Reason for Telemedicine Visit: Services only offered telehealth    Originating Site (Patient Location): Patient's home    Distant Site (Provider Location): Provider Remote Setting- Home Office    Consent:  The patient/guardian has verbally consented to: the potential risks and benefits of telemedicine (video visit) versus in person care; bill my insurance or make self-payment for services provided; and responsibility for payment of non-covered services.     Patient would like the video invitation sent by:  My Chart    Mode of Communication:  Video Conference via Amwell    As the provider I attest to compliance with applicable laws and regulations related to telemedicine.    DATA  Interactive Complexity: No  Crisis: No     Current / Ongoing Stressors and Concerns:   Recovery from cancer, cancer treatment long-term effects, sadness, changes in identity, depressed mood, feeling overwhelmed, feeling lonely     Treatment Objective(s) Addressed in This Session:   identify coping strategies to more effectively address stressors     Intervention: Motivational Interviewing: to help him connect with what is true about him post cancer treatment        ASSESSMENT: Current Emotional / Mental Status (status of significant symptoms):   Risk status (Self / Other harm or suicidal ideation)   Patient  "denies current fears or concerns for personal safety.   Patient denies current or recent suicidal ideation or behaviors.   Patient denies current or recent homicidal ideation or behaviors.   Patient denies current or recent self injurious behavior or ideation.   Patient denies other safety concerns.   Patient reports there has been no change in risk factors since their last session.     Patient reports there has been no change in protective factors since their last session.     Recommended that patient call 911 or go to the local ED should there be a change in any of these risk factors.     Appearance:   Appropriate    Eye Contact:   Good    Psychomotor Behavior: Normal    Attitude:   Cooperative    Orientation:   All   Speech    Rate / Production: Normal     Volume:  Normal    Mood:    Normal   Affect:    Appropriate    Thought Content:  Clear    Thought Form:  Coherent  Logical    Insight:    Good      Medication Review:   No changes to current psychiatric medication(s)     Medication Compliance:   Yes     Changes in Health Issues:   None reported     Chemical Use Review:   Substance Use: Chemical use reviewed, no active concerns identified      Tobacco Use: No current tobacco use.      Diagnosis:  1. Adjustment disorder with depressed mood        Collateral Reports Completed:   Not Applicable    PLAN: (Patient Tasks / Therapist Tasks / Other)  He is to identify what is true about him after cancer treatment. This will assist him in reconnecting with his identity as well as his strengths. After strengths are identified, he can begin to set goals for his \"new normal.\"    Devonte Eid, Alina                                                         ______________________________________________________________________    Individual Treatment Plan    Patient's Name: Devonte Tucker  YOB: 1965    Date of Creation: 5/17/22  Date Treatment Plan Last Reviewed/Revised: 7/25/22    DSM5 Diagnoses: 311 " (F32.8) Other/unspec. Depressive Disorder  Psychosocial / Contextual Factors: cancer, long-term side-effects, loss of identity, depressed mood  Anticipated number of session for this episode of care: 12  Anticipation frequency of session: Every other week  Anticipated Duration of each session: 16-37 minutes  Treatment plan will be reviewed in 90 days or when goals have been changed.       MeasurableTreatment Goal(s) related to diagnosis / functional impairment(s)  Goal 1: Patient will identify the truths about himself    I will know I've met my goal when I feel I know who I am again.      Patient has reviewed and agreed to the above plan.      Devonte Eid PsyD  July 25, 2022

## 2022-08-04 ENCOUNTER — THERAPY VISIT (OUTPATIENT)
Dept: PHYSICAL THERAPY | Facility: CLINIC | Age: 57
End: 2022-08-04
Attending: STUDENT IN AN ORGANIZED HEALTH CARE EDUCATION/TRAINING PROGRAM
Payer: COMMERCIAL

## 2022-08-04 DIAGNOSIS — F43.21 ADJUSTMENT DISORDER WITH DEPRESSED MOOD: ICD-10-CM

## 2022-08-04 DIAGNOSIS — C31.0 MAXILLARY SINUS CANCER (H): ICD-10-CM

## 2022-08-04 DIAGNOSIS — C03.9 PRIMARY CANCER OF ALVEOLAR RIDGE MUCOSA (H): ICD-10-CM

## 2022-08-04 DIAGNOSIS — M54.2 NECK PAIN: ICD-10-CM

## 2022-08-04 PROCEDURE — 97140 MANUAL THERAPY 1/> REGIONS: CPT | Mod: GP | Performed by: PHYSICAL THERAPIST

## 2022-08-04 PROCEDURE — 97161 PT EVAL LOW COMPLEX 20 MIN: CPT | Mod: GP | Performed by: PHYSICAL THERAPIST

## 2022-08-04 PROCEDURE — 97110 THERAPEUTIC EXERCISES: CPT | Mod: GP | Performed by: PHYSICAL THERAPIST

## 2022-08-04 NOTE — PROGRESS NOTES
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  Muscle relaxors helping jaw.  Still having trouble with jaw opening.  Having to use water with getting food down.  Feeling tired in general.  Struggle to stay focused in the day.  Tries to move around during the day.  Neck pain interfering with sleep.  The clarity isnt there either.   Getting out for walking and then will do bike in winter. Walks close to wall. Has had a couple falls this summer.   One at work, when he is walking it seems   Prior level of function comment Has been using a cane for three years, about the same mobility recently   Patient role/Employment history Employed   Living environment Apartment/condo   Current Assistive Devices Standard Cane  (but doesnt use this at work)   Patient/Family Goals Statement Improve neck strain, improve balance, improve fatigue   Fall Risk Screen   Fall screen completed by PT   Have you fallen 2 or more times in the past year? Yes   Have you fallen and had an injury in the past year? No   Abuse Screen (yes response referral indicated)   Feels Unsafe at Home or Work/School no   Cognitive Status Examination   Orientation orientation to person, place and time   Level of Consciousness alert   Follows Commands and Answers Questions 100% of the time   Personal Safety and Judgment intact   Range of Motion (ROM)   ROM Comment Seated Cervical ROM:  Rotation limited to 50% bilaterally, Sidebending limited 50% bilaterally.  Ext WNLs.   Gait   Gait Comments Patient ambulates with a single-point cane with increased stance time noted on the right leg.  Patient reports that he has pain in his right leg that causes him to walk like this   Gait Special Tests   Gait Special Tests 25 FOOT TIMED WALK;DYNAMIC GAIT INDEX   Gait Special Tests 25 Foot Timed Walk   Seconds 15.72   Comments Gait speed - 1.59 ft/sec   Gait Special Tests Dynamic Gait Index   Score out of 24 12   Comments performed with Cane   Balance Special Tests   Balance Special Tests Timed up and go   Balance Special Tests Timed Up and Go   Seconds 22 Seconds   Comments Fall Risk   Modality Interventions   Planned Modality Interventions Comments per therapist discrection   Planned Therapy Interventions   Planned Therapy Interventions balance training;gait training;motor coordination training;ROM;strengthening;stretching;manual therapy   Clinical Impression   Criteria for Skilled Therapeutic Interventions Met yes, treatment indicated   PT Diagnosis Deconditioning   Influenced by the following impairments decreased gait speed, fall risk and past history of falls, limited neck ROM with pain   Functional limitations due to impairments Falls at work, limits social outings, fatigue with daily activities   Clinical Presentation Stable/Uncomplicated   Clinical Presentation Rationale medically stable   Clinical Decision Making (Complexity) Low complexity   Therapy Frequency 1 time/week   Predicted Duration of Therapy Intervention (days/wks) 8 weeks   Risk & Benefits of therapy have been explained Yes   Patient, Family & other staff in agreement with plan of care Yes   Clinical Impression Comments Patient is a 56-year-old male with a past history of 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  Currently he is not in any active cancer treatments.  He does continue to have left neck pain from past surgery in  2019 as well as ongoing fatigue and imbalance.  He is at a risk for falls and has had recent falls as well.  He would benefit from a course of skilled PT intervention to decrease neck pain and improve overall conditioning to allow him to return to more social activities and report less fatigue with his daily activities.   GOALS   PT Eval Goals 1;2;3;4;5   Goal 1   Goal Identifier TUG   Goal Description Patient will improve T UG score to less than 13.5 to decrease fall risk   Target Date 09/29/22   Goal 2   Goal Identifier Neck ROM   Goal Description Pt will demosntrate improved lateral flexion of neck to at least 30 degrees bilaterally to improve motion and decrease pain.   Target Date 09/29/22   Goal 3   Goal Identifier HEP   Goal Description Patient will be independent with his HEP to continue to manage fatigue once therapy is discontinued.   Target Date 09/29/22   Goal 4   Goal Identifier Function   Goal Description Patient will report attending to social events over a 1 to 2-week period without limitation from fatigue   Target Date 09/29/22   Goal 5   Goal Identifier DGI   Goal Description Patient will improve DGI score to greater than 19 to decrease fall risk   Target Date 09/29/22   Total Evaluation Time   PT Eval, Low Complexity Minutes (60951) 30

## 2022-08-04 NOTE — PROGRESS NOTES
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  Muscle relaxors helping jaw.  Still having trouble with jaw opening.  Having to use water with getting food down.  Feeling tired in general.  Struggle to stay focused in the day.  Tries to move around during the day.  Neck pain interfering with sleep.  The clarity isnt there either.   Getting out for walking and then will do bike in winter. Walks close to wall. Has had a couple falls this summer.   One at work, when he is walking it seems   Prior level of function comment Has been using a cane for three years, about the same mobility recently   Patient role/Employment history Employed   Living environment Apartment/condo   Current Assistive Devices Standard Cane  (but doesnt use this at work)   Patient/Family Goals Statement Improve neck strain, improve balance, improve fatigue   Fall Risk Screen   Fall screen completed by PT   Have you fallen 2 or more times in the past year? Yes   Have you fallen and had an injury in the past year? No   Abuse Screen (yes response referral indicated)   Feels Unsafe at Home or Work/School no   Cognitive Status Examination   Orientation orientation to person, place and time   Level of Consciousness alert   Follows Commands and Answers Questions 100% of the time   Personal Safety and Judgment intact   Range of Motion (ROM)   ROM Comment Seated Cervical ROM:  Rotation limited to 50% bilaterally, Sidebending limited 50% bilaterally.  Ext WNLs.   Gait Special Tests   Gait Special Tests 25 FOOT TIMED WALK;DYNAMIC GAIT INDEX   Gait Special Tests 25 Foot Timed Walk   Seconds 15.72   Comments Gait speed - 1.59 ft/sec   Gait Special Tests Dynamic Gait Index   Score out of 24 12   Comments performed with Cane   Balance Special Tests   Balance Special Tests Timed up and go   Balance Special Tests Timed Up and Go   Seconds 22 Seconds   Comments Fall Risk   Modality Interventions   Planned Modality Interventions Comments per therapist discrection   Planned Therapy Interventions   Planned Therapy Interventions balance training;gait training;motor coordination training;ROM;strengthening;stretching;manual therapy   Clinical Impression   Criteria for Skilled Therapeutic Interventions Met yes, treatment indicated   PT Diagnosis Deconditioning   Influenced by the following impairments decreased gait speed, fall risk and past history of falls, limited neck ROM with pain   Functional limitations due to impairments Falls at work, limits social outings, fatigue with daily activities   Clinical Presentation Stable/Uncomplicated   Clinical Presentation Rationale medically stable   Clinical Decision Making (Complexity) Low complexity   Therapy Frequency 1 time/week   Predicted Duration of Therapy Intervention (days/wks) 8 weeks   Risk & Benefits of therapy have been explained Yes   Patient, Family & other staff in agreement with plan of care Yes   Clinical Impression Comments Patient is a 56-year-old male with a past history of 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  Currently he is not in any active cancer treatments.  He does continue to have left neck pain from past surgery in 2019 as well as ongoing fatigue and imbalance.  He is at a risk for falls and has had recent falls as well.  He would benefit from a course of skilled PT intervention to decrease neck pain and improve  overall conditioning to allow him to return to more social activities and report less fatigue with his daily activities.   GOALS   PT Eval Goals 1;2;3;4   Goal 1   Goal Identifier TUG   Goal Description Patient will improve T UG score to less than 13.5 to decrease fall risk   Target Date 09/29/22   Goal 2   Goal Identifier Neck ROM   Goal Description Pt will demosntrate improved lateral flexion of neck to at least 30 degrees bilaterally to improve motion and decrease pain.   Target Date 09/29/22   Goal 3   Goal Identifier HEP   Goal Description Patient will be independent with his HEP to continue to manage fatigue once therapy is discontinued.   Target Date 09/29/22   Goal 4   Goal Identifier Function   Goal Description Patient will report attending to social events over a 1 to 2-week period without limitation from fatigue   Target Date 09/29/22   Total Evaluation Time   PT Haley Low Complexity Minutes ([id number]) 30

## 2022-08-08 ENCOUNTER — VIRTUAL VISIT (OUTPATIENT)
Dept: ONCOLOGY | Facility: CLINIC | Age: 57
End: 2022-08-08
Attending: PSYCHOLOGIST
Payer: COMMERCIAL

## 2022-08-08 DIAGNOSIS — F43.21 ADJUSTMENT DISORDER WITH DEPRESSED MOOD: Primary | ICD-10-CM

## 2022-08-08 PROCEDURE — 90832 PSYTX W PT 30 MINUTES: CPT | Mod: 95 | Performed by: PSYCHOLOGIST

## 2022-08-08 NOTE — PROGRESS NOTES
[facility] Oncology- Psychotherapy (Provider Oversight- Dr. Brant Vivar)                                    Progress Note    Patient Name: Devonte Tucker  Date: 8/8/22         Service Type: Individual      Session Start Time: 8:00  Session End Time: 8:33     Session Length: 33 minutes    Session #: 5    Attendees: Client attended alone    Service Modality:  Video Visit:      Provider verified identity through the following two step process.  Patient provided:  Patient photo    Telemedicine Visit: The patient's condition can be safely assessed and treated via synchronous audio and visual telemedicine encounter.      Reason for Telemedicine Visit: Services only offered telehealth    Originating Site (Patient Location): Patient's home    Distant Site (Provider Location): Provider Remote Setting- Home Office    Consent:  The patient/guardian has verbally consented to: the potential risks and benefits of telemedicine (video visit) versus in person care; bill my insurance or make self-payment for services provided; and responsibility for payment of non-covered services.     Patient would like the video invitation sent by:  My Chart    Mode of Communication:  Video Conference via Amwell    As the provider I attest to compliance with applicable laws and regulations related to telemedicine.    DATA  Interactive Complexity: No  Crisis: No     Current / Ongoing Stressors and Concerns:   Recovery from cancer, cancer treatment long-term effects, sadness, changes in identity, depressed mood, feeling overwhelmed, feeling lonely     Treatment Objective(s) Addressed in This Session:   identify coping strategies to more effectively address stressors     Intervention: Motivational Interviewing: to help him connect with what is true about him post cancer treatment        ASSESSMENT: Current Emotional / Mental Status (status of significant symptoms):   Risk status (Self / Other harm or suicidal ideation)   Patient  "denies current fears or concerns for personal safety.   Patient denies current or recent suicidal ideation or behaviors.   Patient denies current or recent homicidal ideation or behaviors.   Patient denies current or recent self injurious behavior or ideation.   Patient denies other safety concerns.   Patient reports there has been no change in risk factors since their last session.     Patient reports there has been no change in protective factors since their last session.     Recommended that patient call 911 or go to the local ED should there be a change in any of these risk factors.     Appearance:   Appropriate    Eye Contact:   Good    Psychomotor Behavior: Normal    Attitude:   Cooperative    Orientation:   All   Speech    Rate / Production: Normal     Volume:  Normal    Mood:    Normal   Affect:    Appropriate    Thought Content:  Clear    Thought Form:  Coherent  Logical    Insight:    Good      Medication Review:   No changes to current psychiatric medication(s)     Medication Compliance:   Yes     Changes in Health Issues:   None reported     Chemical Use Review:   Substance Use: Chemical use reviewed, no active concerns identified      Tobacco Use: No current tobacco use.      Diagnosis:  1. Adjustment disorder with depressed mood        Collateral Reports Completed:   Not Applicable    PLAN: (Patient Tasks / Therapist Tasks / Other)  He is to identify what is true about him after cancer treatment. This will assist him in reconnecting with his identity as well as his strengths. After strengths are identified, he can begin to set goals for his \"new normal.\"    Devonte Eid, Alina                                                         ______________________________________________________________________    Individual Treatment Plan    Patient's Name: Devonte Tucker  YOB: 1965    Date of Creation: 5/17/22  Date Treatment Plan Last Reviewed/Revised: 7/25/22    DSM5 Diagnoses: 311 " (F32.8) Other/unspec. Depressive Disorder  Psychosocial / Contextual Factors: cancer, long-term side-effects, loss of identity, depressed mood  Anticipated number of session for this episode of care: 12  Anticipation frequency of session: Every other week  Anticipated Duration of each session: 16-37 minutes  Treatment plan will be reviewed in 90 days or when goals have been changed.       MeasurableTreatment Goal(s) related to diagnosis / functional impairment(s)  Goal 1: Patient will identify the truths about himself    I will know I've met my goal when I feel I know who I am again.      Patient has reviewed and agreed to the above plan.      Devonte Eid PsyD  August 8, 2022

## 2022-08-14 DIAGNOSIS — E11.9 TYPE 2 DIABETES MELLITUS WITHOUT COMPLICATION, WITHOUT LONG-TERM CURRENT USE OF INSULIN (H): ICD-10-CM

## 2022-08-16 NOTE — TELEPHONE ENCOUNTER
metFORMIN HCl Oral Tablet 1000 MG      Last Written Prescription Date:  2/17/22  Last Fill Quantity: 180,   # refills: 1  Last Office Visit : 10/21/21 recommended 3 month follow up  Future Office visit:  None scheduled    Routing refill request to provider for review/approval because:  Overdue for A1C  Lab Test 11/03/21  0957 03/25/21  1111 01/27/20  0000   A1C 5.8*   < >  --    HEMOGLOBINA1  --   --  5.6     Nothing more recent in care everywhere nor media

## 2022-08-22 ENCOUNTER — VIRTUAL VISIT (OUTPATIENT)
Dept: ONCOLOGY | Facility: CLINIC | Age: 57
End: 2022-08-22
Attending: PSYCHOLOGIST
Payer: COMMERCIAL

## 2022-08-22 DIAGNOSIS — Z53.9 ERRONEOUS ENCOUNTER--DISREGARD: Primary | ICD-10-CM

## 2022-08-22 NOTE — LETTER
Date:August 24, 2022      Provider requested that no letter be sent. Do not send.       [facility]

## 2022-08-22 NOTE — TELEPHONE ENCOUNTER
Final attempt: LVM for pt to c/b to schedule appointment with Elsa Perez. Letter sent. MAXIMUM NUMBER OF ATTEMPTS REACHED.

## 2022-08-22 NOTE — LETTER
8/22/2022         RE: Devonte Tucker  [address]  [place] [address]        Dear Colleague,    Thank you for referring your patient, Devonte Tucker, to the [facility]. Please see a copy of my visit note below.      This encounter was opened in error. Please disregard.      Again, thank you for allowing me to participate in the care of your patient.        Sincerely,        Devonte Eid PsyD

## 2022-08-25 DIAGNOSIS — E11.9 TYPE 2 DIABETES MELLITUS WITHOUT COMPLICATION, WITHOUT LONG-TERM CURRENT USE OF INSULIN (H): Primary | ICD-10-CM

## 2022-09-14 NOTE — PROGRESS NOTES
"[facility], [place]   Internal Medicine Daily Note           Interval History/Events     Overnight events reviewed  Reported some hard swelling on the left upper neck below the mandible.   No redness. Pain also slightly more  Also, had some nausea last night, abdomen felt full.        Review of Systems        4 point ROS including Respiratory, CV, GI and , other than that noted above is negative      Medications   I have reviewed current medications  in the \"current medication\" section of Epic.  Relevant changes include:     Physical Exam   General:       Vital signs:    Blood pressure 101/62, pulse 80, temperature 98.6  F (37  C), temperature source Axillary, resp. rate 16, weight 131.9 kg (290 lb 11.2 oz), SpO2 98 %.  Estimated body mass index is 45.53 kg/m  as calculated from the following:    Height as of 5/20/19: 1.702 m (5' 7\").    Weight as of this encounter: 131.9 kg (290 lb 11.2 oz).      Intake/Output Summary (Last 24 hours) at 5/24/2019 0945  Last data filed at 5/23/2019 2200  Gross per 24 hour   Intake 305 ml   Output --   Net 305 ml      HEENT: No icterus, no pallor  Left cheek and jaw swelling. Suture on the plate is intact.   Left neck swelling. No obvious redness, discharge  Incision on the left neck looks c/d/i  Cardiovascular: S1, S2 normal.   Respiratory:  B/L CTA  GI/Abdomen: Soft, NT, BS+  Neurology: Alert, awake, and oriented. No tremors.   Extremities: B/L pre tibia edema.   Skin: Right forearm dressing in place, and right thigh dressing in place.      Laboratory and Imaging Studies     I have reviewed  laboratory and imaging studies in the Epic. Pertinent findings are as below:    BMP  Recent Labs   Lab 05/23/19  0641      POTASSIUM 4.2   CHLORIDE 104   MANOJ 8.6   CO2 28   BUN 25   CR 0.75   *     CBC  Recent Labs   Lab 05/27/19  0713 05/23/19  0641   WBC  --  6.1   RBC  --  3.45*   HGB  --  10.7*   HCT  --  33.0*   MCV  --  96   MCH  --  31.0   MCHC " Resent with new instructions    --  32.4   RDW  --  13.2    363     INRNo lab results found in last 7 days.  LFTsNo lab results found in last 7 days.   PANCNo lab results found in last 7 days.        Impression/Plan        53 year old male with hx HTN, DM2, Invasive SCC of left Alveolar Ridge SP Left infrastructure maxillectomy, left radial forearm free flap, left neck exploration, STSG from left thigh to left forearm on 5/9/19, NG tube placement 5/10/19 admitted on 5/16/2019 for rehab 2/2 weakness.     # Invasive SCC of left Alveolar Ridge s/p  Left infrastructure maxillectomy, left radial forearm free flap, left neck exploration, STSG from Right  thigh to Right forearm, NG feeding tube placement  + Evaluated by SLP on 05/23 with following recommendations: Advance to clear liquid diet on 05/23. Advance to full luiqid diet on Saturday 05/25/2019. Advance to Dysphagia diet level 2 on 05/27/2019. Okay for pills whole or crushed on 05/25 with puree consistency  Felt hardening on left upper neck. No obvious external changes, will follow up with ENT  -Dressing instruction per ENT  -Per patient, plan for radiation therapy in 3 weeks, will need mouthguard.  Paperwork states 3 mm thickness, otherwise no other information.  Patient will call his personal dentist to determine whether she is comfortable making this mouthguard for him, otherwise will place referral for [facility] dental clinic.  -Follow-up with Dr. Treviño 05/29/2019      # DM  2/2 TFs.  Endocrine following.  BG range over past 24 hours 147-213.  Currently on following regimen:  -Glargine 16 units daily AM  -NPH 45 units at 2000 (at start time of cycled TF, hold if TF do not run)  -Metformin 1000mg daily twice daily (full gut in morning at end of cycled TF)  -Insulin Aspart  2 units/30 for BG >140  at 2100, 0100, 0500 (cycled TF on)  -Aspart medium intensity at  0900, 1300, 1700 (cycled TF off)  -Prn D10 at nutritional support rate if cycled TFs are interrupted at unscheduled time.                   # HTN:   Normotensive. On  lisinopril and Doxazosin  - Continue     # Hyperlipidemia: On Atorvastatin  - Continue      # Severe malnutrition: ON Tube feeding. No nausea overnight.  Evaluated and managed by RD.   - Continue nocturnal tube feeding.     # Generalized weakness: PT/OT        Diet: Adult Formula Drip Feeding: Continuous Isosource 1.5; Nasogastric tube; Goal Rate: 100; mL/hr; From: 8:00 PM; 8:00 AM; Medication - Feeding Tube Flush Frequency: At least 15-30 mL water before and after medication administration and with tube clog.  Clear liquid diet      Fluids: none  Lines: R nostril naso enteral tube   DVT Prophylaxis: Heparin SQ  Colindres Catheter: not present  Code Status: Full Code    Pneumonia vaccine: Per immunization record, he has completed Polysaccharide vaccine. Last on 10/15/2013. He will qualify for Conjugate Vaccine    Pt's care was discussed with bedside RN, patient and  during Care Team Rounds.               Dakotah Neal MD  Hospitalist ( Internal medicine)  Pager: [phone]

## 2022-09-18 ENCOUNTER — HEALTH MAINTENANCE LETTER (OUTPATIENT)
Age: 57
End: 2022-09-18

## 2022-09-19 ENCOUNTER — VIRTUAL VISIT (OUTPATIENT)
Dept: ONCOLOGY | Facility: CLINIC | Age: 57
End: 2022-09-19
Attending: PSYCHOLOGIST
Payer: COMMERCIAL

## 2022-09-19 DIAGNOSIS — F43.21 ADJUSTMENT DISORDER WITH DEPRESSED MOOD: Primary | ICD-10-CM

## 2022-09-19 PROCEDURE — 90832 PSYTX W PT 30 MINUTES: CPT | Mod: 95 | Performed by: PSYCHOLOGIST

## 2022-09-19 NOTE — PROGRESS NOTES
[facility] Oncology- Psychotherapy (Provider Oversight- Dr. Brant Vivar)                                    Progress Note    Patient Name: Devonte Tucker  Date: 9/19/22         Service Type: Individual      Session Start Time: 8:00  Session End Time: 8:23     Session Length: 23 minutes    Session #: 6    Attendees: Client attended alone    Service Modality:  Video Visit:      Provider verified identity through the following two step process.  Patient provided:  Patient photo    Telemedicine Visit: The patient's condition can be safely assessed and treated via synchronous audio and visual telemedicine encounter.      Reason for Telemedicine Visit: Services only offered telehealth    Originating Site (Patient Location): Patient's home    Distant Site (Provider Location): Provider Remote Setting- Home Office    Consent:  The patient/guardian has verbally consented to: the potential risks and benefits of telemedicine (video visit) versus in person care; bill my insurance or make self-payment for services provided; and responsibility for payment of non-covered services.     Patient would like the video invitation sent by:  My Chart    Mode of Communication:  Video Conference via [employer]well    As the provider I attest to compliance with applicable laws and regulations related to telemedicine.    DATA  Interactive Complexity: No  Crisis: No     Current / Ongoing Stressors and Concerns:   Recovery from cancer, cancer treatment long-term effects, sadness, changes in identity, depressed mood, feeling overwhelmed, feeling lonely     Treatment Objective(s) Addressed in This Session:   identify coping strategies to more effectively address stressors     Intervention: Motivational Interviewing: to help him connect with what is true about him post cancer treatment        ASSESSMENT: Current Emotional / Mental Status (status of significant symptoms):   Risk status (Self / Other harm or suicidal ideation)   Patient  "denies current fears or concerns for personal safety.   Patient denies current or recent suicidal ideation or behaviors.   Patient denies current or recent homicidal ideation or behaviors.   Patient denies current or recent self injurious behavior or ideation.   Patient denies other safety concerns.   Patient reports there has been no change in risk factors since their last session.     Patient reports there has been no change in protective factors since their last session.     Recommended that patient call 911 or go to the local ED should there be a change in any of these risk factors.     Appearance:   Appropriate    Eye Contact:   Good    Psychomotor Behavior: Normal    Attitude:   Cooperative    Orientation:   All   Speech    Rate / Production: Normal     Volume:  Normal    Mood:    Normal   Affect:    Appropriate    Thought Content:  Clear    Thought Form:  Coherent  Logical    Insight:    Good      Medication Review:   No changes to current psychiatric medication(s)     Medication Compliance:   Yes     Changes in Health Issues:   None reported     Chemical Use Review:   Substance Use: Chemical use reviewed, no active concerns identified      Tobacco Use: No current tobacco use.      Diagnosis:  1. Adjustment disorder with depressed mood        Collateral Reports Completed:   Not Applicable    PLAN: (Patient Tasks / Therapist Tasks / Other)  He is to identify what is true about him after cancer treatment. This will assist him in reconnecting with his identity as well as his strengths. After strengths are identified, he can begin to set goals for his \"new normal.\"    Devonte Eid, Alina                                                         ______________________________________________________________________    Individual Treatment Plan    Patient's Name: Devonte Tucker  YOB: 1965    Date of Creation: 5/17/22  Date Treatment Plan Last Reviewed/Revised: 7/25/22    DSM5 Diagnoses: 311 " (F32.8) Other/unspec. Depressive Disorder  Psychosocial / Contextual Factors: cancer, long-term side-effects, loss of identity, depressed mood  Anticipated number of session for this episode of care: 12  Anticipation frequency of session: Every other week  Anticipated Duration of each session: 16-37 minutes  Treatment plan will be reviewed in 90 days or when goals have been changed.       MeasurableTreatment Goal(s) related to diagnosis / functional impairment(s)  Goal 1: Patient will identify the truths about himself    I will know I've met my goal when I feel I know who I am again.      Patient has reviewed and agreed to the above plan.      Devonte Eid PsyD  September 19, 2022

## 2022-09-26 ENCOUNTER — HOSPITAL ENCOUNTER (OUTPATIENT)
Dept: CT IMAGING | Facility: CLINIC | Age: 57
Discharge: HOME | End: 2022-09-26
Attending: RADIOLOGY
Payer: COMMERCIAL

## 2022-09-26 ENCOUNTER — OFFICE VISIT (OUTPATIENT)
Dept: RADIATION ONCOLOGY | Facility: CLINIC | Age: 57
End: 2022-09-26
Attending: RADIOLOGY
Payer: COMMERCIAL

## 2022-09-26 VITALS — SYSTOLIC BLOOD PRESSURE: 139 MMHG | DIASTOLIC BLOOD PRESSURE: 85 MMHG | BODY MASS INDEX: 45.42 KG/M2 | WEIGHT: 290 LBS

## 2022-09-26 DIAGNOSIS — E11.9 TYPE 2 DIABETES MELLITUS WITHOUT COMPLICATION, WITHOUT LONG-TERM CURRENT USE OF INSULIN (H): ICD-10-CM

## 2022-09-26 DIAGNOSIS — C31.0 MAXILLARY SINUS CANCER (H): ICD-10-CM

## 2022-09-26 DIAGNOSIS — C03.9 PRIMARY CANCER OF ALVEOLAR RIDGE MUCOSA (H): ICD-10-CM

## 2022-09-26 LAB
HBA1C MFR BLD: 6.4 %
TSH SERPL DL<=0.005 MIU/L-ACNC: 3.5 UIU/ML (ref 0.3–4.2)

## 2022-09-26 PROCEDURE — 71250 CT THORAX DX C-: CPT

## 2022-09-26 PROCEDURE — 36415 COLL VENOUS BLD VENIPUNCTURE: CPT | Performed by: NURSE PRACTITIONER

## 2022-09-26 PROCEDURE — 83036 HEMOGLOBIN GLYCOSYLATED A1C: CPT | Performed by: NURSE PRACTITIONER

## 2022-09-26 PROCEDURE — 99213 OFFICE O/P EST LOW 20 MIN: CPT | Performed by: NURSE PRACTITIONER

## 2022-09-26 PROCEDURE — 70491 CT SOFT TISSUE NECK W/DYE: CPT

## 2022-09-26 PROCEDURE — G0463 HOSPITAL OUTPT CLINIC VISIT: HCPCS | Mod: 25

## 2022-09-26 PROCEDURE — 84443 ASSAY THYROID STIM HORMONE: CPT | Performed by: NURSE PRACTITIONER

## 2022-09-26 PROCEDURE — 70491 CT SOFT TISSUE NECK W/DYE: CPT | Mod: 26 | Performed by: RADIOLOGY

## 2022-09-26 PROCEDURE — 250N000011 HC RX IP 250 OP 636: Performed by: RADIOLOGY

## 2022-09-26 PROCEDURE — 71250 CT THORAX DX C-: CPT | Mod: 26 | Performed by: RADIOLOGY

## 2022-09-26 RX ORDER — IOPAMIDOL 755 MG/ML
100 INJECTION, SOLUTION INTRAVASCULAR ONCE
Status: COMPLETED | OUTPATIENT
Start: 2022-09-26 | End: 2022-09-26

## 2022-09-26 RX ORDER — SODIUM FLUORIDE 5 MG/G
GEL, DENTIFRICE DENTAL AT BEDTIME
Qty: 112 G | Refills: 11 | Status: SHIPPED | OUTPATIENT
Start: 2022-09-26

## 2022-09-26 RX ADMIN — IOPAMIDOL 100 ML: 755 INJECTION, SOLUTION INTRAVENOUS at 13:31

## 2022-09-26 NOTE — PROGRESS NOTES
FOLLOW-UP VISIT    Patient Name: Devonte Tucker      : 1965     Age: 56 year old        ______________________________________________________________________________     Chief Complaint   Patient presents with     Cancer     Follow up:Maxillary Sinus Cancer: Left max aveolar ridge 6000 cGy completed 19     DeniesPain  Tania    Labs  Other Labs: No    Imaging  CT: today      Dental:   Most Recent Dental Visit:needs to make an appt  Trays: Frequency yes    Speech/Swallowing:   Most Recent evaluation or testing: no  Swallowing Restrictions: No difficulties with swallowing    Trismus/Jaw Exercises: Yes    Nutrition:    Weight:   Wt Readings from Last 3 Encounters:   22 131.5 kg (290 lb)   22 131.1 kg (289 lb)   22 132.1 kg (291 lb 4.8 oz)         Oral Symptoms:   Xerostomia:1- Symptomatic without significant dietary alteration; unstimulated saliva flow >0.2 ml/min  Dysphagia: 0-None  Mucositis Oral Symptoms: 0-None  Mucositis: 0- None  Esophagitis:0- None    Other Appointments:     MD Name:  Appointment Date:    MD Name: Appointment Date:   MD Name: Appointment Date:   Other Appointment Notes:     Residual Radiation side effect: Dry mouth

## 2022-09-26 NOTE — PROGRESS NOTES
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 He underwent a left infrastructure maxillectomy and left level I lymph node dissection on 2019 with pathology revealing a 1.3 cm well-differentiated squamous cell carcinoma with 13 mm depth of invasion and invasion into the underlying bone. He received adjuvant radiotherapy as described above for improved local disease control.    Mr. Tucker returns to radiation oncology clinic today for a routine posttreatment disease surveillance visit.     On interview today, he reports that he is doing well. He continues to have persistent xerostomia that is mildly improved with cevimeline and using gum and hard candies. He has a hard time swallowing dry foods and utilizes water with p.o. intake.  He does have only about 50% of his taste back.  He additionally endorses stiffness and pain within the left neck reports that he is compliant with his recommended lymphedema and physical therapy exercises.  He feels like mentally he is in a decent place and has been seeing Brant Eid.  He has accepted some of the long term side effects from treatment and is thankful to be here.    PHYSICAL EXAM:  /85   Wt 131.5 kg (290 lb)   BMI  45.42 kg/m     Wt Readings from Last 4 Encounters:   22 131.5 kg (290 lb)   22 131.1 kg (289 lb)   22 132.1 kg (291 lb 4.8 oz)   22 132.5 kg (292 lb)       General: Healthy-appearing 56-year-old gentleman seated comfortably in an examination chair no acute distress  HEENT: NC/AT.  EOMI.  No rhinorrhea or epistaxis.  Mildly dry mucous membranes.  Healthy-appearing free flap within the left hard palate.  Palpation of the flap and margins reveals no induration, nodularity or discrete masses.  Fillings present in multiple teeth. No additional oral cavity lesions appreciated.  Neck: Supple.  Normal range of motion.  Mild fibrosis of the left sternocleidomastoid muscle.  No palpable cervical adenopathy bilaterally.  Pulmonary: No wheezing, stridor or respiratory distress  Skin: Normal color and turgor  Psych: Mildly flat mood.    LABS AND IMAGIN2022 labs  TSH: 3.50    2022 CT neck and chest:  No evidence of metastatic disease or local recurrence.    IMPRESSION:   Mr. 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 He is over 3 years out from the completion of therapy and remains CÉSAR.    PLAN:     1. Follow-up in radiation oncology clinic with MD in 3/2023  2. TSH-we will have him get labs drawn today.  3.  Scans done today and look clear on my  review.  We will call him with any significant findings once radiology has completed.    Concepcion Gutierrez NP  Dept of Radiation Oncology  [facility]

## 2022-09-26 NOTE — LETTER
2022         RE: Devonte Tucker  [address]  [place] [address]        Dear Colleague,    Thank you for referring your patient, Devonte Tucker, to the [facility] RADIATION ONCOLOGY. Please see a copy of my visit note below.    FOLLOW-UP VISIT    Patient Name: Devonte Tucker      : 1965     Age: 56 year old        ______________________________________________________________________________     Chief Complaint   Patient presents with     Cancer     Follow up:Maxillary Sinus Cancer: Left max aveolar ridge 6000 cGy completed 19     DeniesPain  Tania    Labs  Other Labs: No    Imaging  CT: today      Dental:   Most Recent Dental Visit:needs to make an appt  Trays: Frequency yes    Speech/Swallowing:   Most Recent evaluation or testing: no  Swallowing Restrictions: No difficulties with swallowing    Trismus/Jaw Exercises: Yes    Nutrition:    Weight:   Wt Readings from Last 3 Encounters:   22 131.5 kg (290 lb)   22 131.1 kg (289 lb)   22 132.1 kg (291 lb 4.8 oz)         Oral Symptoms:   Xerostomia:1- Symptomatic without significant dietary alteration; unstimulated saliva flow >0.2 ml/min  Dysphagia: 0-None  Mucositis Oral Symptoms: 0-None  Mucositis: 0- None  Esophagitis:0- None    Other Appointments:     MD Name:  Appointment Date:    MD Name: Appointment Date:   MD Name: Appointment Date:   Other Appointment Notes:     Residual Radiation side effect: Dry mouth            Department of Radiation Oncology  [facility]  [address] [place] [address] [phone]       Radiation Oncology Follow-up Visit  Sep 26, 2022    Devonte Tucker  MRN: [id number]   : 1965     DISEASE TREATED:   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 He underwent a left infrastructure maxillectomy and left level I lymph node dissection on 5/9/2019 with pathology revealing a 1.3 cm well-differentiated squamous cell carcinoma with 13 mm depth of invasion and invasion into the underlying bone. He received adjuvant radiotherapy as described above for improved local disease control.    Mr. Tucker returns to radiation oncology clinic today for a routine posttreatment disease surveillance visit.     On interview today, he reports that he is doing well. He continues to have persistent xerostomia that is mildly improved with cevimeline and using gum and hard candies. He has a hard time swallowing dry foods and utilizes water with p.o. intake.  He does have only about 50% of his taste back.  He additionally endorses stiffness and pain within the left neck reports that he is compliant with his recommended lymphedema and physical therapy exercises.  He feels like mentally he is in a decent place and has been seeing Brant Eid.  He has accepted some of the long term side effects from treatment and is thankful to be here.    PHYSICAL EXAM:  /85   Wt 131.5 kg (290 lb)   BMI 45.42 kg/m     Wt Readings from Last 4 Encounters:   09/26/22 131.5 kg (290 lb)   07/13/22 131.1 kg (289 lb)   06/01/22 132.1 kg (291 lb 4.8 oz)   04/27/22 132.5 kg (292 lb)       General: Healthy-appearing 56-year-old gentleman seated comfortably in an examination chair no acute distress  HEENT: NC/AT.  EOMI.  No rhinorrhea or epistaxis.  Mildly dry mucous membranes.  Healthy-appearing free flap within the left hard palate.  Palpation of the flap and margins reveals  no induration, nodularity or discrete masses.  Fillings present in multiple teeth. No additional oral cavity lesions appreciated.  Neck: Supple.  Normal range of motion.  Mild fibrosis of the left sternocleidomastoid muscle.  No palpable cervical adenopathy bilaterally.  Pulmonary: No wheezing, stridor or respiratory distress  Skin: Normal color and turgor  Psych: Mildly flat mood.    LABS AND IMAGIN2022 labs  TSH: 3.50    2022 CT neck and chest:  No evidence of metastatic disease or local recurrence.    IMPRESSION:   Mr. 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 He is over 3 years out from the completion of therapy and remains CÉSAR.    PLAN:     1. Follow-up in radiation oncology clinic with MD in 3/2023  2. TSH-we will have him get labs drawn today.  3.  Scans done today and look clear on my  review.  We will call him with any significant findings once radiology has completed.    Concepcion Gutierrez NP  Dept of Radiation Oncology  [facility]

## 2022-09-29 NOTE — PROGRESS NOTES
Outcome for 09/29/22 10:16 AM: Hang w/ message sent  EMERY Hernandez  Outcome for 10/05/22 10:40 AM: Left Voicemail   EMERY Hernandez  Outcome for 10/05/22 1:54 PM: Left Voicemail   EMERY Hernandez  Outcome for 10/06/22 7:11 AM: Glucose Readings sent via Hang w/  Zulma Torres, VF

## 2022-09-30 ENCOUNTER — VIRTUAL VISIT (OUTPATIENT)
Dept: ONCOLOGY | Facility: CLINIC | Age: 57
End: 2022-09-30
Attending: STUDENT IN AN ORGANIZED HEALTH CARE EDUCATION/TRAINING PROGRAM
Payer: COMMERCIAL

## 2022-09-30 DIAGNOSIS — C03.9 PRIMARY CANCER OF ALVEOLAR RIDGE MUCOSA (H): Primary | ICD-10-CM

## 2022-09-30 DIAGNOSIS — L90.5 SCAR TISSUE: ICD-10-CM

## 2022-09-30 DIAGNOSIS — L59.8 RADIATION FIBROSIS OF SOFT TISSUE FROM THERAPEUTIC PROCEDURE: ICD-10-CM
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
## 2022-09-30 DIAGNOSIS — M54.2 NECK PAIN: ICD-10-CM

## 2022-09-30 PROCEDURE — G0463 HOSPITAL OUTPT CLINIC VISIT: HCPCS | Mod: PN,RTG | Performed by: STUDENT IN AN ORGANIZED HEALTH CARE EDUCATION/TRAINING PROGRAM

## 2022-09-30 PROCEDURE — 99215 OFFICE O/P EST HI 40 MIN: CPT | Mod: 95 | Performed by: STUDENT IN AN ORGANIZED HEALTH CARE EDUCATION/TRAINING PROGRAM

## 2022-09-30 ASSESSMENT — ENCOUNTER SYMPTOMS
SINUS PAIN: 0
MUSCLE CRAMPS: 1
NUMBNESS: 1
INSOMNIA: 1
NIGHT SWEATS: 0
MEMORY LOSS: 0
MYALGIAS: 1
INCREASED ENERGY: 1
DECREASED APPETITE: 1
DISTURBANCES IN COORDINATION: 0
SPEECH CHANGE: 0
FLANK PAIN: 0
MUSCLE WEAKNESS: 1
LOSS OF CONSCIOUSNESS: 0
DEPRESSION: 0
PANIC: 1
DECREASED CONCENTRATION: 1
BACK PAIN: 1
FEVER: 0
HEMATURIA: 0
TROUBLE SWALLOWING: 1
WEAKNESS: 1
WEIGHT GAIN: 1
STIFFNESS: 0
HALLUCINATIONS: 0
PARALYSIS: 0
NECK MASS: 0
NECK PAIN: 1
FATIGUE: 1
SINUS CONGESTION: 0
HEADACHES: 0
ALTERED TEMPERATURE REGULATION: 1
TINGLING: 1
SORE THROAT: 0
ARTHRALGIAS: 0
POLYPHAGIA: 0
SMELL DISTURBANCE: 0
CHILLS: 0
NERVOUS/ANXIOUS: 1
TREMORS: 0
SEIZURES: 0
POLYDIPSIA: 1
DIZZINESS: 1
DIFFICULTY URINATING: 0
JOINT SWELLING: 0
DYSURIA: 0
WEIGHT LOSS: 0
TASTE DISTURBANCE: 0
HOARSE VOICE: 0

## 2022-09-30 NOTE — PATIENT INSTRUCTIONS
1.  Continue daily massage and stretches.  2.  Continue Flexeril 5 mg twice daily with a third dose as needed.  Reach out to Dr. Hurtado as discussed when you need new refills.  3.  Continue your swallowing exercises daily.  4.  Follow-up with Dr. Hurtado for a virtual visit in 6 months or earlier if needed.

## 2022-09-30 NOTE — PROGRESS NOTES
Devonte is a 56 year old who is being evaluated via a billable video visit.    Patient stated he is in the state of [place] for the visit today.    How would you like to obtain your AVS? MyChart  If the video visit is dropped, the invitation should be resent by: Send to e-mail at: [email or web address]  Will anyone else be joining your video visit? No        Video-Visit Details    Video Start Time: 8:04 AM    Type of service:  Video Visit    Video End Time:8:22 AM    Originating Location (pt. Location): Home    Distant Location (provider location):  [facility] CANC[facility]     Platform used for Video Visit: Nixon Chow, Virtual Visit Facilitator

## 2022-09-30 NOTE — PROGRESS NOTES
[facility]   PM&R clinic note        Interval history:     Devonte Tucker presents to clinic today for follow up reg his rehab needs.   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  Recommendations included:  1. Therapy/equipment/braces:  1. Physical Therapy referral placed to address neck ROM and scar tissue with manual therapy.  2. Continue daily stretches and MLD exercises.  2. Interventions:  1. Could consider botox in future if needed to address pain/discomfort/spasms.  3. Medications:  1. Flexeril 5 mg TID prn was prescribed to help with spasms.  4. Follow up: 3 months.    Oncology History:  -Diagnosed after he presented with a several year history of oral cavity pain and precancerous lesions of the left upper gingiva.   -He underwent a left infrastructure maxillectomy and left level I lymph node dissection on 5/9/2019 with pathology revealing a 1.3 cm well-differentiated squamous cell carcinoma with 13 mm depth of invasion and invasion into the underlying bone.   -He received adjuvant radiotherapy as described above for improved local disease control.  -He was last seen by ENT on 3/9/2022, at which time he reported persistent stable pain in the anterior hard palate and left maxilla.  -Seen by Dr. Verdin on 4/27/22 and complained of low mood with ongoing toxicities and frustrations after cancer treatment. Persistent xerostomia, dysphagia to dry foods, continued stiffness and pain in the left neck. Plan for follow up with rad onc MD in 3/2023 and NP in 9/2022 with CT neck and chest. Repeat TSH due in 10/22. Referral to Oncology Psych and PM&R         Symptoms,  Patient presents for a follow up visit today. He states the neck spasms are not getting worse, about the same. He is taking the flexeril twice daily. He still gets cramping, spasming and has to work on stretching it every day. He has been  doing the massage and stretches.   The length of spasms have improved with the use of flexeril. It isn't as painful as it was. He feels like he can stretch it more easily. He notices if he doesn't take the flexeril, it gets worse in terms of frequency. He does not endorse any grogginess or sleepiness with flexeril.   He still works on his swallowing. When he eats he has to push with water to get things down. He has learned strategies to help with this, and has adjusted his food choices to help.      Therapies/HEP,  Not currently participating in therapies.  He is continuing with his daily lymphedema massage exercises and stretches.      Functionally,   No changes in function.  Independent with mobility, ADLs and IADLs.      Social history is unchanged.        Medications:  Current Outpatient Medications   Medication Sig Dispense Refill     acetaminophen (TYLENOL) 325 MG tablet Take 2 tablets (650 mg) by mouth every 4 hours as needed for mild pain 100 tablet 0     aspirin (ASA) 325 MG tablet Take 1 tablet (325 mg) by mouth daily 30 tablet 0     atorvastatin (LIPITOR) 20 MG tablet Take 1 tablet (20 mg) by mouth every morning 90 tablet 3     blood glucose (NO BRAND SPECIFIED) lancets standard Use to test blood sugar  4 times daily or as directed. ( [place] micolet lancet) 400 each 0     blood glucose (NO BRAND SPECIFIED) test strip Use to test blood sugar 3 times daily or as directed. 300 strip 3     blood glucose monitoring ([place] MICROLET) lancets Use to test blood sugar 4 times daily or as directed. 200 each 3     cevimeline (EVOXAC) 30 MG capsule TAKE ONE CAPSULE BY MOUTH THREE TIMES DAILY 270 capsule 3     cyclobenzaprine (FLEXERIL) 5 MG tablet Take 1 tablet (5 mg) by mouth 3 times daily as needed for muscle spasms 90 tablet 1     doxazosin (CARDURA) 1 MG tablet Take 1 tablet (1 mg) by mouth daily 30 tablet 0     gabapentin (NEURONTIN) 300 MG capsule Take 1 capsule (300 mg) by mouth 3 times daily 270 capsule 3      lisinopril (ZESTRIL) 10 MG tablet Take 1 tablet (10 mg) by mouth every morning 30 tablet 3     metFORMIN (GLUCOPHAGE) 1000 MG tablet Take 1 tablet (1,000 mg) by mouth 2 times daily (with meals) For additional refills, please schedule a follow-up appointment at [phone] 180 tablet 1     Misc Natural Product Nasal (PONARIS) SOLN With head tilted back, place 1 or 2 drops in each nostril once daily. 30 mL 3     multivitamin w/minerals (THERA-VIT-M) tablet Take 1 tablet by mouth daily 30 tablet 0     omeprazole (PRILOSEC) 20 MG DR capsule Take 1 capsule (20 mg) by mouth daily 30 capsule 3     sodium fluoride dental gel (DENTAGEL) 1.1 % GEL topical gel Apply to affected area At Bedtime 112 g 11     tamsulosin (FLOMAX) 0.4 MG capsule 1 capsule                Physical Exam:   There were no vitals taken for this visit.  Gen: NAD, pleasant and cooperative   HEENT: Atraumatic, normocephalic, extraocular movements appear intact  Pulm: non-labored breathing in room air  Neuro/MSK:   Orientation: Oriented to person, time, place and situation, Exhibits good insight into his condition and ongoing treatment  Motor: Observed moving upper extremities actively against gravity      Labs/Imaging:  Lab Results   Component Value Date    WBC 6.7 07/01/2019    HGB 12.6 (L) 07/01/2019    HCT 39.8 (L) 07/01/2019    MCV 94 07/01/2019     07/01/2019     Lab Results   Component Value Date     (L) 04/06/2022    POTASSIUM 4.7 04/06/2022    CHLORIDE 98 04/06/2022    CO2 27 04/06/2022     04/06/2022     Lab Results   Component Value Date    GFRESTIMATED >90 04/06/2022    GFRESTBLACK >90 03/25/2021     Lab Results   Component Value Date    AST 16 11/03/2021    ALT 30 11/03/2021     Lab Results   Component Value Date    INR 1.10 04/04/2019     Lab Results   Component Value Date    BUN 16 04/06/2022    CR 0.77 04/06/2022              Assessment/Plan   Devonte Tucker presents to clinic today for follow up reg his rehab needs.  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 Was last seen in clinic on 6/1/22.  As discussed with Devonte, he has been very stable with his symptoms in terms of neck spasming and tightness with the use of Flexeril and his daily exercise regimen.  He should continue Flexeril twice daily at his current dosing his daily stretches and massage.  He will reach out when he needs a new refill for Flexeril.  He should also continue his swallowing exercises, as though there are still challenges with certain food consistencies, he has been overall stable with this.  We will plan for a 6-month return virtual visit.  Patient is in agreement with this plan.      1. Therapy/equipment/braces:  1. Continue daily lymphedema massage and neck stretches to help with symptoms.  2. Continue swallow exercises per SLP recommendations.  2. Medications:  1. Continue Flexeril 5 mg twice daily, and can add a third dose daily if symptoms are difficult as needed.  Has 1 refill left, and will call when he needs more refills.  3. Follow up: 6 months.      Marivel Hurtado MD  Physical Medicine & Rehabilitation      50 minutes spent on the date of the encounter doing chart review, history and exam, documentation and further activities as noted above.

## 2022-09-30 NOTE — LETTER
9/30/2022         RE: Devonte Tucker  [address]  [place] [address]        Dear Colleague,    Thank you for referring your patient, Devonte Tucker, to the [facility]. Please see a copy of my visit note below.    [facility]   PM&R clinic note        Interval history:     Devonte Tucker presents to clinic today for follow up reg his rehab needs.   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  Recommendations included:  1. Therapy/equipment/braces:  1. Physical Therapy referral placed to address neck ROM and scar tissue with manual therapy.  2. Continue daily stretches and MLD exercises.  2. Interventions:  1. Could consider botox in future if needed to address pain/discomfort/spasms.  3. Medications:  1. Flexeril 5 mg TID prn was prescribed to help with spasms.  4. Follow up: 3 months.    Oncology History:  -Diagnosed after he presented with a several year history of oral cavity pain and precancerous lesions of the left upper gingiva.   -He underwent a left infrastructure maxillectomy and left level I lymph node dissection on 5/9/2019 with pathology revealing a 1.3 cm well-differentiated squamous cell carcinoma with 13 mm depth of invasion and invasion into the underlying bone.   -He received adjuvant radiotherapy as described above for improved local disease control.  -He was last seen by ENT on 3/9/2022, at which time he reported persistent stable pain in the anterior hard palate and left maxilla.  -Seen by Dr. Verdin on 4/27/22 and complained of low mood with ongoing toxicities and frustrations after cancer treatment. Persistent xerostomia, dysphagia to dry foods, continued stiffness and pain in the left neck. Plan for follow up with rad onc MD in 3/2023 and NP in 9/2022 with CT neck and chest. Repeat TSH due in 10/22. Referral to Oncology  Psych and PM&R         Symptoms,  Patient presents for a follow up visit today. He states the neck spasms are not getting worse, about the same. He is taking the flexeril twice daily. He still gets cramping, spasming and has to work on stretching it every day. He has been doing the massage and stretches.   The length of spasms have improved with the use of flexeril. It isn't as painful as it was. He feels like he can stretch it more easily. He notices if he doesn't take the flexeril, it gets worse in terms of frequency. He does not endorse any grogginess or sleepiness with flexeril.   He still works on his swallowing. When he eats he has to push with water to get things down. He has learned strategies to help with this, and has adjusted his food choices to help.      Therapies/HEP,  Not currently participating in therapies.  He is continuing with his daily lymphedema massage exercises and stretches.      Functionally,   No changes in function.  Independent with mobility, ADLs and IADLs.      Social history is unchanged.        Medications:  Current Outpatient Medications   Medication Sig Dispense Refill     acetaminophen (TYLENOL) 325 MG tablet Take 2 tablets (650 mg) by mouth every 4 hours as needed for mild pain 100 tablet 0     aspirin (ASA) 325 MG tablet Take 1 tablet (325 mg) by mouth daily 30 tablet 0     atorvastatin (LIPITOR) 20 MG tablet Take 1 tablet (20 mg) by mouth every morning 90 tablet 3     blood glucose (NO BRAND SPECIFIED) lancets standard Use to test blood sugar  4 times daily or as directed. ( [place] micolet lancet) 400 each 0     blood glucose (NO BRAND SPECIFIED) test strip Use to test blood sugar 3 times daily or as directed. 300 strip 3     blood glucose monitoring ([place] MICROLET) lancets Use to test blood sugar 4 times daily or as directed. 200 each 3     cevimeline (EVOXAC) 30 MG capsule TAKE ONE CAPSULE BY MOUTH THREE TIMES DAILY 270 capsule 3     cyclobenzaprine (FLEXERIL) 5 MG tablet  Take 1 tablet (5 mg) by mouth 3 times daily as needed for muscle spasms 90 tablet 1     doxazosin (CARDURA) 1 MG tablet Take 1 tablet (1 mg) by mouth daily 30 tablet 0     gabapentin (NEURONTIN) 300 MG capsule Take 1 capsule (300 mg) by mouth 3 times daily 270 capsule 3     lisinopril (ZESTRIL) 10 MG tablet Take 1 tablet (10 mg) by mouth every morning 30 tablet 3     metFORMIN (GLUCOPHAGE) 1000 MG tablet Take 1 tablet (1,000 mg) by mouth 2 times daily (with meals) For additional refills, please schedule a follow-up appointment at [phone] 180 tablet 1     Misc Natural Product Nasal (PONARIS) SOLN With head tilted back, place 1 or 2 drops in each nostril once daily. 30 mL 3     multivitamin w/minerals (THERA-VIT-M) tablet Take 1 tablet by mouth daily 30 tablet 0     omeprazole (PRILOSEC) 20 MG DR capsule Take 1 capsule (20 mg) by mouth daily 30 capsule 3     sodium fluoride dental gel (DENTAGEL) 1.1 % GEL topical gel Apply to affected area At Bedtime 112 g 11     tamsulosin (FLOMAX) 0.4 MG capsule 1 capsule                Physical Exam:   There were no vitals taken for this visit.  Gen: NAD, pleasant and cooperative   HEENT: Atraumatic, normocephalic, extraocular movements appear intact  Pulm: non-labored breathing in room air  Neuro/MSK:   Orientation: Oriented to person, time, place and situation, Exhibits good insight into his condition and ongoing treatment  Motor: Observed moving upper extremities actively against gravity      Labs/Imaging:  Lab Results   Component Value Date    WBC 6.7 07/01/2019    HGB 12.6 (L) 07/01/2019    HCT 39.8 (L) 07/01/2019    MCV 94 07/01/2019     07/01/2019     Lab Results   Component Value Date     (L) 04/06/2022    POTASSIUM 4.7 04/06/2022    CHLORIDE 98 04/06/2022    CO2 27 04/06/2022     04/06/2022     Lab Results   Component Value Date    GFRESTIMATED >90 04/06/2022    GFRESTBLACK >90 03/25/2021     Lab Results   Component Value Date    AST 16 11/03/2021     ALT 30 11/03/2021     Lab Results   Component Value Date    INR 1.10 04/04/2019     Lab Results   Component Value Date    BUN 16 04/06/2022    CR 0.77 04/06/2022              Assessment/Plan   Devonte Tucker presents to clinic today for follow up reg his rehab needs. 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 Was last seen in clinic on 6/1/22.  As discussed with Devonte, he has been very stable with his symptoms in terms of neck spasming and tightness with the use of Flexeril and his daily exercise regimen.  He should continue Flexeril twice daily at his current dosing his daily stretches and massage.  He will reach out when he needs a new refill for Flexeril.  He should also continue his swallowing exercises, as though there are still challenges with certain food consistencies, he has been overall stable with this.  We will plan for a 6-month return virtual visit.  Patient is in agreement with this plan.      1. Therapy/equipment/braces:  1. Continue daily lymphedema massage and neck stretches to help with symptoms.  2. Continue swallow exercises per SLP recommendations.  2. Medications:  1. Continue Flexeril 5 mg twice daily, and can add a third dose daily if symptoms are difficult as needed.  Has 1 refill left, and will call when he needs more refills.  3. Follow up: 6 months.      Marivel Hurtado MD  Physical Medicine & Rehabilitation      50 minutes spent on the date of the encounter doing chart review, history and exam, documentation and further activities as noted above.

## 2022-09-30 NOTE — NURSING NOTE
Patient verified medications and allergies are correct via eCheck-in. Patient confirms no changes at this time and/or since last reviewed by clinic staff.    Nerissa Chow, Virtual Facilitator

## 2022-10-03 ENCOUNTER — DOCUMENTATION ONLY (OUTPATIENT)
Dept: ONCOLOGY | Facility: CLINIC | Age: 57
End: 2022-10-03

## 2022-10-03 ENCOUNTER — VIRTUAL VISIT (OUTPATIENT)
Dept: ONCOLOGY | Facility: CLINIC | Age: 57
End: 2022-10-03
Attending: PSYCHOLOGIST
Payer: COMMERCIAL

## 2022-10-03 DIAGNOSIS — F43.21 ADJUSTMENT DISORDER WITH DEPRESSED MOOD: Primary | ICD-10-CM

## 2022-10-03 PROCEDURE — 90832 PSYTX W PT 30 MINUTES: CPT | Mod: 95 | Performed by: PSYCHOLOGIST

## 2022-10-03 NOTE — PROGRESS NOTES
CLINICAL NUTRITION SERVICES     Reason for Contact: Patient was contacted by phone due to requested to speak with a Dietitian on the Oncology Distress Screening tool.    Action: RD called patient indicating reason for phone call. Left a VM with a return call back number.     Follow up: Wait for a return phone call.    Rosa Leon RD, LD  [phone]

## 2022-10-03 NOTE — PROGRESS NOTES
[facility] Oncology- Psychotherapy (Provider Oversight- Dr. Brant Vivar)                                    Progress Note    Patient Name: Devonte Tucker  Date: 10/3/22         Service Type: Individual      Session Start Time: 8:00  Session End Time: 8:30     Session Length: 30 minutes    Session #: 7    Attendees: Client attended alone    Service Modality:  Video Visit:      Provider verified identity through the following two step process.  Patient provided:  Patient photo    Telemedicine Visit: The patient's condition can be safely assessed and treated via synchronous audio and visual telemedicine encounter.      Reason for Telemedicine Visit: Services only offered telehealth    Originating Site (Patient Location): Patient's home    Distant Site (Provider Location): Provider Remote Setting- Home Office    Consent:  The patient/guardian has verbally consented to: the potential risks and benefits of telemedicine (video visit) versus in person care; bill my insurance or make self-payment for services provided; and responsibility for payment of non-covered services.     Patient would like the video invitation sent by:  My Chart    Mode of Communication:  Video Conference via [employer]    As the provider I attest to compliance with applicable laws and regulations related to telemedicine.    DATA  Interactive Complexity: No  Crisis: No     Current / Ongoing Stressors and Concerns:   Recovery from cancer, cancer treatment long-term effects, sadness, changes in identity, depressed mood, feeling overwhelmed, feeling lonely     Treatment Objective(s) Addressed in This Session:   identify coping strategies to more effectively address stressors     Intervention: Motivational Interviewing: to help him connect with what is true about him post cancer treatment        ASSESSMENT: Current Emotional / Mental Status (status of significant symptoms):   Risk status (Self / Other harm or suicidal ideation)   Patient  "denies current fears or concerns for personal safety.   Patient denies current or recent suicidal ideation or behaviors.   Patient denies current or recent homicidal ideation or behaviors.   Patient denies current or recent self injurious behavior or ideation.   Patient denies other safety concerns.   Patient reports there has been no change in risk factors since their last session.     Patient reports there has been no change in protective factors since their last session.     Recommended that patient call 911 or go to the local ED should there be a change in any of these risk factors.     Appearance:   Appropriate    Eye Contact:   Good    Psychomotor Behavior: Normal    Attitude:   Cooperative    Orientation:   All   Speech    Rate / Production: Normal     Volume:  Normal    Mood:    Normal   Affect:    Appropriate    Thought Content:  Clear    Thought Form:  Coherent  Logical    Insight:    Good      Medication Review:   No changes to current psychiatric medication(s)     Medication Compliance:   Yes     Changes in Health Issues:   None reported     Chemical Use Review:   Substance Use: Chemical use reviewed, no active concerns identified      Tobacco Use: No current tobacco use.      Diagnosis:  No diagnosis found.    Collateral Reports Completed:   Not Applicable    PLAN: (Patient Tasks / Therapist Tasks / Other)  He is to identify what is true about him after cancer treatment. This will assist him in reconnecting with his identity as well as his strengths. After strengths are identified, he can begin to set goals for his \"new normal.\"    Devonte Eid PsyD                                                         ______________________________________________________________________    Individual Treatment Plan    Patient's Name: Devonte Tucker  YOB: 1965    Date of Creation: 5/17/22  Date Treatment Plan Last Reviewed/Revised: 7/25/22    DSM5 Diagnoses: 311 (F32.8) Other/unspec. " Depressive Disorder  Psychosocial / Contextual Factors: cancer, long-term side-effects, loss of identity, depressed mood  Anticipated number of session for this episode of care: 12  Anticipation frequency of session: Every other week  Anticipated Duration of each session: 16-37 minutes  Treatment plan will be reviewed in 90 days or when goals have been changed.       MeasurableTreatment Goal(s) related to diagnosis / functional impairment(s)  Goal 1: Patient will identify the truths about himself    I will know I've met my goal when I feel I know who I am again.      Patient has reviewed and agreed to the above plan.      Devonte Eid PsyD  October 3, 2022

## 2022-10-05 ENCOUNTER — TELEPHONE (OUTPATIENT)
Dept: ENDOCRINOLOGY | Facility: CLINIC | Age: 57
End: 2022-10-05

## 2022-10-05 NOTE — TELEPHONE ENCOUNTER
Called patient and left voicemail. Patient has an appointment on 10/6/22. Need to collect the last 14 days worth of blood sugar readings to prepare for patient's visit. Zulma Torres on 10/5/2022 at 1:55 PM

## 2022-10-06 ENCOUNTER — TELEPHONE (OUTPATIENT)
Dept: ENDOCRINOLOGY | Facility: CLINIC | Age: 57
End: 2022-10-06

## 2022-10-06 ENCOUNTER — VIRTUAL VISIT (OUTPATIENT)
Dept: ENDOCRINOLOGY | Facility: CLINIC | Age: 57
End: 2022-10-06
Payer: COMMERCIAL

## 2022-10-06 DIAGNOSIS — E11.9 TYPE 2 DIABETES MELLITUS WITHOUT COMPLICATION, WITHOUT LONG-TERM CURRENT USE OF INSULIN (H): Primary | ICD-10-CM

## 2022-10-06 PROCEDURE — 99215 OFFICE O/P EST HI 40 MIN: CPT | Mod: 95 | Performed by: PHYSICIAN ASSISTANT

## 2022-10-06 NOTE — PROGRESS NOTES
Devonte is a 56 year old who is being evaluated via a billable video visit.      How would you like to obtain your AVS? [employer]  If the video visit is dropped, the invitation should be resent by: Text to cell phone: [phone]  Will anyone else be joining your video visit? No

## 2022-10-06 NOTE — PROGRESS NOTES
Due to the COVID 19 pandemic this visit was converted to a video visit in order to help prevent spread of infection in this patient and the general population.    Time of start: 11:30 am  Time of end: 11:51 am  Total duration of video visit: 21 minutes.    HPI  Emigdio Tucker is a 56 year old male with type 2 diabetes mellitus. Video visit today for diabetes follow up.  Pt gives a hx of type 2 diabetes dx in early 2000.  He has neuropathy.  Pt denies hx of retinopathy or nephropathy.  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   He also has hx of obesity, HTN, depression and generalized anxiety disorder.  For his diabetes, he is currently taking Metformin 1000 mg BID.  Most recent A1C was 6.4% in Sept 2022.  Pt provided me with blood sugar data which I scanned in his note below.  Blood sugar readings are higher.  On ROS today, he reports doing well.  He has severe neuropathy in his feet and also has neuropathy in both hands.  Denies foot ulcers.  Mild nausea. No vomiting.  Constipation.  Pt denies headaches, SOB at rest, cough, fever or chills.  No chest pain, abd pain, diarrhea, dysuria or hematuria.    Diabetes Care  Retinopathy: none; pt seen by Oph in July 2022.  Nephropathy:none; urine microalbuminuria negative in 11/2021. He is taking Lisinopril.  Neuropathy:yes.  Foot Exam: no exam today.  Taking aspirin: taking ASA 81 mg daily.  Lipids: LDL 81 in 4/2022. Taking Lipitor.  CAD: no.  Mental health: hx of depression and generalized anxiety.  Insulin: none.  DM meds: Metformin. ADDING Ozempic today.  Testing: glucose meter.      ROS  See under HPI.    Allergies  Allergies   Allergen Reactions     Seasonal Allergies Other (See Comments) and Shortness Of Breath       Medications  Current Outpatient Medications   Medication Sig Dispense Refill      acetaminophen (TYLENOL) 325 MG tablet Take 2 tablets (650 mg) by mouth every 4 hours as needed for mild pain 100 tablet 0     aspirin (ASA) 325 MG tablet Take 1 tablet (325 mg) by mouth daily 30 tablet 0     atorvastatin (LIPITOR) 20 MG tablet Take 1 tablet (20 mg) by mouth every morning 90 tablet 3     blood glucose (NO BRAND SPECIFIED) lancets standard Use to test blood sugar  4 times daily or as directed. ( [place] micolet lancet) 400 each 0     blood glucose (NO BRAND SPECIFIED) test strip Use to test blood sugar 3 times daily or as directed. 300 strip 3     blood glucose monitoring (ISRAEL MICROLET) lancets Use to test blood sugar 4 times daily or as directed. 200 each 3     cevimeline (EVOXAC) 30 MG capsule TAKE ONE CAPSULE BY MOUTH THREE TIMES DAILY 270 capsule 3     cyclobenzaprine (FLEXERIL) 5 MG tablet Take 1 tablet (5 mg) by mouth 3 times daily as needed for muscle spasms 90 tablet 1     doxazosin (CARDURA) 1 MG tablet Take 1 tablet (1 mg) by mouth daily 30 tablet 0     gabapentin (NEURONTIN) 300 MG capsule Take 1 capsule (300 mg) by mouth 3 times daily 270 capsule 3     lisinopril (ZESTRIL) 10 MG tablet Take 1 tablet (10 mg) by mouth every morning 30 tablet 3     metFORMIN (GLUCOPHAGE) 1000 MG tablet Take 1 tablet (1,000 mg) by mouth 2 times daily (with meals) For additional refills, please schedule a follow-up appointment at [phone] 180 tablet 1     Misc Natural Product Nasal (PONARIS) SOLN With head tilted back, place 1 or 2 drops in each nostril once daily. 30 mL 3     multivitamin w/minerals (THERA-VIT-M) tablet Take 1 tablet by mouth daily 30 tablet 0     omeprazole (PRILOSEC) 20 MG DR capsule Take 1 capsule (20 mg) by mouth daily 30 capsule 3     sodium fluoride dental gel (DENTAGEL) 1.1 % GEL topical gel Apply to affected area At Bedtime 112 g 11     tamsulosin (FLOMAX) 0.4 MG capsule 1 capsule         Family History  family history includes Cancer in his brother, mother, and sister;  Cardiovascular in his brother; Cerebrovascular Disease in his brother; Depression in his mother, sister, and sister; Diabetes in his brother, mother, and sister; Heart Disease in his brother and mother; Hypertension in his brother, mother, sister, and sister; Kidney Cancer in his sister; Substance Abuse in his brother, father, and sister.    Social History   reports that he has never smoked. He has never used smokeless tobacco. He reports that he does not drink alcohol and does not use drugs.     Past Medical History  Past Medical History:   Diagnosis Date     Allergic rhinitis experienced it for many years     Anxiety 2019     Benign positional vertigo 05/2019     Depressive disorder 2019     Diabetes (H)      Gastroesophageal reflux disease 05/2021     Head injury 04/2018     Hypertension      Maxillary sinus cancer (H)      Obesity      Sleep apnea May 2022       Past Surgical History:   Procedure Laterality Date     ------------OTHER-------------      Mucosa and bone biopsy     COLONOSCOPY N/A 5/24/2021    Procedure: COLONOSCOPY, WITH POLYPECTOMY;  Surgeon: Terrell De Luna MD;  Location: [facility] OR     EXPLORE NECK Left 5/9/2019    Procedure: Left Neck Exploration;  Surgeon: Jett Treviño MD;  Location: [facility] OR     EXTRACTION(S) DENTAL      x2 under general anesthesia     GRAFT FREE VASCULARIZED (LOCATION) Right 5/9/2019    Procedure: Left Radial Forearm Free Flap (soft tissue);  Surgeon: Sumit Atwood MD;  Location: [facility] OR     GRAFT SKIN SPLIT THICKNESS FROM EXTREMITY Right 5/9/2019    Procedure: Graft skin split thickness from right thigh, nasogastric feeding tube placement;  Surgeon: Sumit Atwood MD;  Location: [facility] OR     IR LYMPH NODE BIOPSY  4/4/2019     OSTEOTOMY MAXILLA Left 5/9/2019    Procedure: Left Infrastructure Maxillectomy,;  Surgeon: Jett Treviño MD;  Location: [facility] OR       Physical Exam\    No exam today.    RESULTS  Creatinine   Date Value Ref Range Status   04/06/2022 0.77 0.70 -  1.30 mg/dL Final   07/01/2019 0.68 0.66 - 1.25 mg/dL Final     GFR Estimate   Date Value Ref Range Status   04/06/2022 >90 >60 mL/min/1.73m2 Final     Comment:     Effective December 21, 2021 eGFRcr in adults is calculated using the 2021 CKD-EPI creatinine equation which includes age and gender (Cesar et al., NE, DOI: 10.1056/GJNNcb4977347)   03/25/2021 >90 >60 mL/min/[1.73_m2] Final     Hemoglobin A1C   Date Value Ref Range Status   09/26/2022 6.4 (H) <5.7 % Final     Comment:     Normal <5.7%   Prediabetes 5.7-6.4%    Diabetes 6.5% or higher     Note: Adopted from ADA consensus guidelines.   03/25/2021 5.6 0 - 5.6 % Final     Comment:     Normal <5.7% Prediabetes 5.7-6.4%  Diabetes 6.5% or higher - adopted from ADA   consensus guidelines.       Potassium   Date Value Ref Range Status   04/06/2022 4.7 3.5 - 5.0 mmol/L Final   07/01/2019 4.1 3.4 - 5.3 mmol/L Final     ALT   Date Value Ref Range Status   11/03/2021 30 0 - 70 U/L Final     AST   Date Value Ref Range Status   11/03/2021 16 0 - 45 U/L Final     TSH   Date Value Ref Range Status   09/26/2022 3.50 0.30 - 4.20 uIU/mL Final   04/06/2022 4.01 0.30 - 5.00 uIU/mL Final   11/03/2021 3.01 0.40 - 4.00 mU/L Final   05/20/2021 2.38 0.40 - 4.00 mU/L Final       Cholesterol   Date Value Ref Range Status   04/06/2022 145 <=199 mg/dL Final   11/03/2021 135 <200 mg/dL Final   07/01/2019 105 <200 mg/dL Final     HDL Cholesterol   Date Value Ref Range Status   07/01/2019 38 (L) >39 mg/dL Final     Direct Measure HDL   Date Value Ref Range Status   04/06/2022 43 >=40 mg/dL Final     Comment:     HDL Cholesterol Reference Range:     0-2 years:   No reference ranges established for patients under 2 years old  at [facility] for lipid analytes.    2-8 years:  Greater than 45 mg/dL     18 years and older:   Female: Greater than or equal to 50 mg/dL   Male:   Greater than or equal to 40 mg/dL   11/03/2021 50 >=40 mg/dL Final     LDL Cholesterol Calculated   Date  Value Ref Range Status   04/06/2022 81 <=129 mg/dL Final   11/03/2021 71 <=100 mg/dL Final   07/01/2019 41 <100 mg/dL Final     Comment:     Desirable:       <100 mg/dl     Triglycerides   Date Value Ref Range Status   04/06/2022 105 <=149 mg/dL Final   11/03/2021 70 <150 mg/dL Final   07/01/2019 128 <150 mg/dL Final         ASSESSMENT/PLAN:    1.  TYPE 2 DIABETES MELLITUS:  Pt's blood sugar values have been higher.  We discussed adding Ozempic today.  I reviewed how Ozempic works and possible side effects of the drug including n/v, GI distress, diarrhea and rare risk of pancreatitis.  Pt denies hx of pancreatitis.  Start Ozempic 0.25 mg subcutaneous once a week x 4 weeks, then increase 0.5 mg subcutaneous once a week.  Continue Metformin dose.  Pt denies hx of retinopathy and will be seeing Oph in a few weeks.  His urine microalbuminuria has been negative. He is taking Lisinopril.  Most recent creat was 0.77 with GFR > 90 mL/min in 4/2022.  No vitals today. BP at home 121/73.  Reviewed the importance of healthy eating, portion control, weight loss and need for daily activity.  Referred to RD.    2.  NEUROPATHY: No change.  He denies foot ulcers.    3.  MENTAL HEALTH: Stable at this time.    4.  FOLLOW UP: with me in 2 months.  Referral to see RD placed today.    Time spent reviewing chart and labs today = 6 minutes.  Time for video visit today= 21 minutes.  Time for documentation today = 15 minutes.    TOTAL TIME FOR VISIT TODAY = 42 minutes.    Elsa Perez PA-C

## 2022-10-06 NOTE — PATIENT INSTRUCTIONS
Start Ozempic 0.25 mg inject once a week x 4 weeks and then increase 0.5 mg once a week.  I placed a referral for you to have a visit with the dietitian.  Appointment with me in 2 months.  Sincerely,  Elsa Perez PA-C

## 2022-10-06 NOTE — LETTER
10/6/2022       RE: Devonte Tucker  [address]  [place] [address]     Dear Colleague,    Thank you for referring your patient, Devonte Tucker, to the [facility] [place] at [facility]. Please see a copy of my visit note below.    Outcome for 09/29/22 10:16 AM: [employer] message sent  ZulmaEMERY Frank  Outcome for 10/05/22 10:40 AM: Left Voicemail   Zulma Torres, VF  Outcome for 10/05/22 1:54 PM: Left Voicemail   Zulma Melissa, VF  Outcome for 10/06/22 7:11 AM: Glucose Readings sent via [employer]  EMERY Hernandez              Devonte is a 56 year old who is being evaluated via a billable video visit.      How would you like to obtain your AVS? [employer]  If the video visit is dropped, the invitation should be resent by: Text to cell phone: [phone]  Will anyone else be joining your video visit? No    Due to the COVID 19 pandemic this visit was converted to a video visit in order to help prevent spread of infection in this patient and the general population.    Time of start: 11:30 am  Time of end: 11:51 am  Total duration of video visit: 21 minutes.    HPI  Emigdio Tucker is a 56 year old male with type 2 diabetes mellitus. Video visit today for diabetes follow up.  Pt gives a hx of type 2 diabetes dx in early 2000.  He has neuropathy.  Pt denies hx of retinopathy or nephropathy.  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   He also has hx of obesity, HTN, depression and generalized anxiety disorder.  For his diabetes, he is currently taking Metformin 1000 mg BID.  Most recent A1C was 6.4% in Sept 2022.  Pt provided me with blood sugar data which I scanned in his note below.  Blood sugar readings are higher.  On ROS today, he reports doing  well.  He has severe neuropathy in his feet and also has neuropathy in both hands.  Denies foot ulcers.  Mild nausea. No vomiting.  Constipation.  Pt denies headaches, SOB at rest, cough, fever or chills.  No chest pain, abd pain, diarrhea, dysuria or hematuria.    Diabetes Care  Retinopathy: none; pt seen by Oph in July 2022.  Nephropathy:none; urine microalbuminuria negative in 11/2021. He is taking Lisinopril.  Neuropathy:yes.  Foot Exam: no exam today.  Taking aspirin: taking ASA 81 mg daily.  Lipids: LDL 81 in 4/2022. Taking Lipitor.  CAD: no.  Mental health: hx of depression and generalized anxiety.  Insulin: none.  DM meds: Metformin. ADDING Ozempic today.  Testing: glucose meter.      ROS  See under HPI.    Allergies  Allergies   Allergen Reactions     Seasonal Allergies Other (See Comments) and Shortness Of Breath       Medications  Current Outpatient Medications   Medication Sig Dispense Refill     acetaminophen (TYLENOL) 325 MG tablet Take 2 tablets (650 mg) by mouth every 4 hours as needed for mild pain 100 tablet 0     aspirin (ASA) 325 MG tablet Take 1 tablet (325 mg) by mouth daily 30 tablet 0     atorvastatin (LIPITOR) 20 MG tablet Take 1 tablet (20 mg) by mouth every morning 90 tablet 3     blood glucose (NO BRAND SPECIFIED) lancets standard Use to test blood sugar  4 times daily or as directed. ( [place] micolet lancet) 400 each 0     blood glucose (NO BRAND SPECIFIED) test strip Use to test blood sugar 3 times daily or as directed. 300 strip 3     blood glucose monitoring (ISRAEL MICROLET) lancets Use to test blood sugar 4 times daily or as directed. 200 each 3     cevimeline (EVOXAC) 30 MG capsule TAKE ONE CAPSULE BY MOUTH THREE TIMES DAILY 270 capsule 3     cyclobenzaprine (FLEXERIL) 5 MG tablet Take 1 tablet (5 mg) by mouth 3 times daily as needed for muscle spasms 90 tablet 1     doxazosin (CARDURA) 1 MG tablet Take 1 tablet (1 mg) by mouth daily 30 tablet 0     gabapentin (NEURONTIN) 300 MG  capsule Take 1 capsule (300 mg) by mouth 3 times daily 270 capsule 3     lisinopril (ZESTRIL) 10 MG tablet Take 1 tablet (10 mg) by mouth every morning 30 tablet 3     metFORMIN (GLUCOPHAGE) 1000 MG tablet Take 1 tablet (1,000 mg) by mouth 2 times daily (with meals) For additional refills, please schedule a follow-up appointment at [phone] 180 tablet 1     Misc Natural Product Nasal (PONARIS) SOLN With head tilted back, place 1 or 2 drops in each nostril once daily. 30 mL 3     multivitamin w/minerals (THERA-VIT-M) tablet Take 1 tablet by mouth daily 30 tablet 0     omeprazole (PRILOSEC) 20 MG DR capsule Take 1 capsule (20 mg) by mouth daily 30 capsule 3     sodium fluoride dental gel (DENTAGEL) 1.1 % GEL topical gel Apply to affected area At Bedtime 112 g 11     tamsulosin (FLOMAX) 0.4 MG capsule 1 capsule         Family History  family history includes Cancer in his brother, mother, and sister; Cardiovascular in his brother; Cerebrovascular Disease in his brother; Depression in his mother, sister, and sister; Diabetes in his brother, mother, and sister; Heart Disease in his brother and mother; Hypertension in his brother, mother, sister, and sister; Kidney Cancer in his sister; Substance Abuse in his brother, father, and sister.    Social History   reports that he has never smoked. He has never used smokeless tobacco. He reports that he does not drink alcohol and does not use drugs.     Past Medical History  Past Medical History:   Diagnosis Date     Allergic rhinitis experienced it for many years     Anxiety 2019     Benign positional vertigo 05/2019     Depressive disorder 2019     Diabetes (H)      Gastroesophageal reflux disease 05/2021     Head injury 04/2018     Hypertension      Maxillary sinus cancer (H)      Obesity      Sleep apnea May 2022       Past Surgical History:   Procedure Laterality Date     ------------OTHER-------------      Mucosa and bone biopsy     COLONOSCOPY N/A 5/24/2021     Procedure: COLONOSCOPY, WITH POLYPECTOMY;  Surgeon: Terrell De Luna MD;  Location: [facility] OR     EXPLORE NECK Left 5/9/2019    Procedure: Left Neck Exploration;  Surgeon: Jett Treviño MD;  Location: [facility]     EXTRACTION(S) DENTAL      x2 under general anesthesia     GRAFT FREE VASCULARIZED (LOCATION) Right 5/9/2019    Procedure: Left Radial Forearm Free Flap (soft tissue);  Surgeon: Sumit Atwood MD;  Location: [facility] OR     GRAFT SKIN SPLIT THICKNESS FROM EXTREMITY Right 5/9/2019    Procedure: Graft skin split thickness from right thigh, nasogastric feeding tube placement;  Surgeon: Sumit Atwood MD;  Location: [facility] OR     IR LYMPH NODE BIOPSY  4/4/2019     OSTEOTOMY MAXILLA Left 5/9/2019    Procedure: Left Infrastructure Maxillectomy,;  Surgeon: Jett Treviño MD;  Location: [facility] OR       Physical Exam\    No exam today.    RESULTS  Creatinine   Date Value Ref Range Status   04/06/2022 0.77 0.70 - 1.30 mg/dL Final   07/01/2019 0.68 0.66 - 1.25 mg/dL Final     GFR Estimate   Date Value Ref Range Status   04/06/2022 >90 >60 mL/min/1.73m2 Final     Comment:     Effective December 21, 2021 eGFRcr in adults is calculated using the 2021 CKD-EPI creatinine equation which includes age and gender (Cesar et al., NEJ, DOI: 10.1056/XIIAni9751446)   03/25/2021 >90 >60 mL/min/[1.73_m2] Final     Hemoglobin A1C   Date Value Ref Range Status   09/26/2022 6.4 (H) <5.7 % Final     Comment:     Normal <5.7%   Prediabetes 5.7-6.4%    Diabetes 6.5% or higher     Note: Adopted from ADA consensus guidelines.   03/25/2021 5.6 0 - 5.6 % Final     Comment:     Normal <5.7% Prediabetes 5.7-6.4%  Diabetes 6.5% or higher - adopted from ADA   consensus guidelines.       Potassium   Date Value Ref Range Status   04/06/2022 4.7 3.5 - 5.0 mmol/L Final   07/01/2019 4.1 3.4 - 5.3 mmol/L Final     ALT   Date Value Ref Range Status   11/03/2021 30 0 - 70 U/L Final     AST   Date Value Ref Range Status   11/03/2021 16 0 - 45 U/L Final      TSH   Date Value Ref Range Status   09/26/2022 3.50 0.30 - 4.20 uIU/mL Final   04/06/2022 4.01 0.30 - 5.00 uIU/mL Final   11/03/2021 3.01 0.40 - 4.00 mU/L Final   05/20/2021 2.38 0.40 - 4.00 mU/L Final       Cholesterol   Date Value Ref Range Status   04/06/2022 145 <=199 mg/dL Final   11/03/2021 135 <200 mg/dL Final   07/01/2019 105 <200 mg/dL Final     HDL Cholesterol   Date Value Ref Range Status   07/01/2019 38 (L) >39 mg/dL Final     Direct Measure HDL   Date Value Ref Range Status   04/06/2022 43 >=40 mg/dL Final     Comment:     HDL Cholesterol Reference Range:     0-2 years:   No reference ranges established for patients under 2 years old  at [facility] for lipid analytes.    2-8 years:  Greater than 45 mg/dL     18 years and older:   Female: Greater than or equal to 50 mg/dL   Male:   Greater than or equal to 40 mg/dL   11/03/2021 50 >=40 mg/dL Final     LDL Cholesterol Calculated   Date Value Ref Range Status   04/06/2022 81 <=129 mg/dL Final   11/03/2021 71 <=100 mg/dL Final   07/01/2019 41 <100 mg/dL Final     Comment:     Desirable:       <100 mg/dl     Triglycerides   Date Value Ref Range Status   04/06/2022 105 <=149 mg/dL Final   11/03/2021 70 <150 mg/dL Final   07/01/2019 128 <150 mg/dL Final     ASSESSMENT/PLAN:    1.  TYPE 2 DIABETES MELLITUS:  Pt's blood sugar values have been higher.  We discussed adding Ozempic today.  I reviewed how Ozempic works and possible side effects of the drug including n/v, GI distress, diarrhea and rare risk of pancreatitis.  Pt denies hx of pancreatitis.  Start Ozempic 0.25 mg subcutaneous once a week x 4 weeks, then increase 0.5 mg subcutaneous once a week.  Continue Metformin dose.  Pt denies hx of retinopathy and will be seeing Oph in a few weeks.  His urine microalbuminuria has been negative. He is taking Lisinopril.  Most recent creat was 0.77 with GFR > 90 mL/min in 4/2022.  No vitals today. BP at home 121/73.  Reviewed the importance of  healthy eating, portion control, weight loss and need for daily activity.  Referred to RD.    2.  NEUROPATHY: No change.  He denies foot ulcers.    3.  MENTAL HEALTH: Stable at this time.    4.  FOLLOW UP: with me in 2 months.  Referral to see RD placed today.    Time spent reviewing chart and labs today = 6 minutes.  Time for video visit today= 21 minutes.  Time for documentation today = 15 minutes.    TOTAL TIME FOR VISIT TODAY = 42 minutes.    Elsa Perez PA-C

## 2022-10-11 DIAGNOSIS — E11.9 TYPE 2 DIABETES MELLITUS WITHOUT COMPLICATION, WITHOUT LONG-TERM CURRENT USE OF INSULIN (H): ICD-10-CM

## 2022-10-23 DIAGNOSIS — C03.9 PRIMARY CANCER OF ALVEOLAR RIDGE MUCOSA (H): ICD-10-CM

## 2022-10-23 DIAGNOSIS — C03.0 SQUAMOUS CELL CARCINOMA OF MAXILLARY ALVEOLAR RIDGE (H): ICD-10-CM

## 2022-10-24 ENCOUNTER — PATIENT OUTREACH (OUTPATIENT)
Dept: CARE COORDINATION | Facility: CLINIC | Age: 57
End: 2022-10-24

## 2022-10-24 ENCOUNTER — PATIENT OUTREACH (OUTPATIENT)
Dept: ONCOLOGY | Facility: CLINIC | Age: 57
End: 2022-10-24

## 2022-10-24 DIAGNOSIS — C03.9 PRIMARY CANCER OF ALVEOLAR RIDGE MUCOSA (H): ICD-10-CM

## 2022-10-24 DIAGNOSIS — M54.2 NECK PAIN: ICD-10-CM

## 2022-10-24 DIAGNOSIS — F43.21 ADJUSTMENT DISORDER WITH DEPRESSED MOOD: ICD-10-CM

## 2022-10-24 DIAGNOSIS — C31.0 MAXILLARY SINUS CANCER (H): ICD-10-CM

## 2022-10-24 NOTE — PROGRESS NOTES
[facility]: Physical Medicine and Rehabilitation                                                                                    Refill request from [facility] pharmacy for Flexeril.      Patient has follow up appointment scheduled internal jugular April 2023        Syeda Gabriel LPN  Oncology PM&R Care Coordination  [phone]

## 2022-10-24 NOTE — PROGRESS NOTES
Oncology Distress Screening Follow-up  Clinical Social Work  [facility]    Identified Concern and Score From Distress Screenin. How concerned are you about feeling anxious or very scared?  8 Abnormal      Date of Distress Screenin2022      Data: Patient is a 56 year old male that is being followed at this clinic for a diagnosis of local advanced squamous cell carcinoma of the left maxillary alveolar ridge. At time of last visit, Patient scored positive on distress screen.  called Patient today with intention of introducing them to psychosocial services and support, and following up on elevated distress.        Intervention/Education Provided: Phone call to patient about distress screening. No answer - message left. Provided contact information in order to reach writer.       Follow-up Required: Will remain available for support and await patient's return call.    MELANIE Longoria, [facility]   Clinical , Outpatient Specialty Clinics  [phone]

## 2022-10-25 ENCOUNTER — VIRTUAL VISIT (OUTPATIENT)
Dept: ONCOLOGY | Facility: CLINIC | Age: 57
End: 2022-10-25
Attending: PSYCHOLOGIST
Payer: COMMERCIAL

## 2022-10-25 DIAGNOSIS — F43.21 ADJUSTMENT DISORDER WITH DEPRESSED MOOD: Primary | ICD-10-CM

## 2022-10-25 PROCEDURE — 90832 PSYTX W PT 30 MINUTES: CPT | Mod: 95 | Performed by: PSYCHOLOGIST

## 2022-10-25 RX ORDER — CYCLOBENZAPRINE HCL 5 MG
5 TABLET ORAL 3 TIMES DAILY PRN
Qty: 90 TABLET | Refills: 1 | Status: SHIPPED | OUTPATIENT
Start: 2022-10-25 | End: 2023-01-31

## 2022-10-25 NOTE — PROGRESS NOTES
[facility]- Psychotherapy (Provider Oversight- Dr. Brant Vivar)                                    Progress Note    Patient Name: Devonte Tucker  Date: 10/25/22         Service Type: Individual      Session Start Time: 8:00  Session End Time: 8:30     Session Length: 30 minutes    Session #: 8    Attendees: Client attended alone    Service Modality:  Video Visit:      Provider verified identity through the following two step process.  Patient provided:  Patient photo    Telemedicine Visit: The patient's condition can be safely assessed and treated via synchronous audio and visual telemedicine encounter.      Reason for Telemedicine Visit: Services only offered telehealth    Originating Site (Patient Location): Patient's home    Distant Site (Provider Location): Provider Remote Setting- Home Office    Consent:  The patient/guardian has verbally consented to: the potential risks and benefits of telemedicine (video visit) versus in person care; bill my insurance or make self-payment for services provided; and responsibility for payment of non-covered services.     Patient would like the video invitation sent by:  My Chart    Mode of Communication:  Video Conference via Amwell    As the provider I attest to compliance with applicable laws and regulations related to telemedicine.    DATA  Interactive Complexity: No  Crisis: No     Current / Ongoing Stressors and Concerns:   Recovery from cancer, cancer treatment long-term effects, sadness, changes in identity, depressed mood, feeling overwhelmed, feeling lonely     Treatment Objective(s) Addressed in This Session:   identify coping strategies to more effectively address stressors     Intervention: Motivational Interviewing: to help him connect with what is true about him post cancer treatment        ASSESSMENT: Current Emotional / Mental Status (status of significant symptoms):   Risk status (Self / Other harm or suicidal ideation)   Patient  "denies current fears or concerns for personal safety.   Patient denies current or recent suicidal ideation or behaviors.   Patient denies current or recent homicidal ideation or behaviors.   Patient denies current or recent self injurious behavior or ideation.   Patient denies other safety concerns.   Patient reports there has been no change in risk factors since their last session.     Patient reports there has been no change in protective factors since their last session.     Recommended that patient call 911 or go to the local ED should there be a change in any of these risk factors.     Appearance:   Appropriate    Eye Contact:   Good    Psychomotor Behavior: Normal    Attitude:   Cooperative    Orientation:   All   Speech    Rate / Production: Normal     Volume:  Normal    Mood:    Normal   Affect:    Appropriate    Thought Content:  Clear    Thought Form:  Coherent  Logical    Insight:    Good      Medication Review:   No changes to current psychiatric medication(s)     Medication Compliance:   Yes     Changes in Health Issues:   None reported     Chemical Use Review:   Substance Use: Chemical use reviewed, no active concerns identified      Tobacco Use: No current tobacco use.      Diagnosis:  1. Adjustment disorder with depressed mood        Collateral Reports Completed:   Not Applicable    PLAN: (Patient Tasks / Therapist Tasks / Other)  He is to identify what is true about him after cancer treatment. This will assist him in reconnecting with his identity as well as his strengths. After strengths are identified, he can begin to set goals for his \"new normal.\"    Devonte Eid, Alina                                                         ______________________________________________________________________    Individual Treatment Plan    Patient's Name: Devonte Tucker  YOB: 1965    Date of Creation: 5/17/22  Date Treatment Plan Last Reviewed/Revised: 7/25/22    DSM5 Diagnoses: 311 " (F32.8) Other/unspec. Depressive Disorder  Psychosocial / Contextual Factors: cancer, long-term side-effects, loss of identity, depressed mood  Anticipated number of session for this episode of care: 12  Anticipation frequency of session: Every other week  Anticipated Duration of each session: 16-37 minutes  Treatment plan will be reviewed in 90 days or when goals have been changed.       MeasurableTreatment Goal(s) related to diagnosis / functional impairment(s)  Goal 1: Patient will identify the truths about himself    I will know I've met my goal when I feel I know who I am again.      Patient has reviewed and agreed to the above plan.      Devonte Eid PsyD  October 25, 2022

## 2022-10-26 NOTE — TELEPHONE ENCOUNTER
Gabapentin Oral Capsule 300 MG  Last Written Prescription Date:   10/21/2021  Last Fill Quantity: 270,   # refills: 3  Last Office Visit :  10/6/2022  Future Office visit:   12/14/2022    Routing refill request to provider for review/approval because:  Drug not on the [facility], [facility] or [facility] refill protocol or controlled substance      Cande Leiva RN  Central Triage Red Flags/Med Refills

## 2022-10-27 RX ORDER — GABAPENTIN 300 MG/1
300 CAPSULE ORAL 3 TIMES DAILY
Qty: 270 CAPSULE | Refills: 1 | Status: SHIPPED | OUTPATIENT
Start: 2022-10-27 | End: 2022-12-14

## 2022-11-17 ENCOUNTER — VIRTUAL VISIT (OUTPATIENT)
Dept: EDUCATION SERVICES | Facility: CLINIC | Age: 57
End: 2022-11-17
Attending: PHYSICIAN ASSISTANT
Payer: COMMERCIAL

## 2022-11-17 DIAGNOSIS — E11.9 TYPE 2 DIABETES MELLITUS WITHOUT COMPLICATION, WITHOUT LONG-TERM CURRENT USE OF INSULIN (H): ICD-10-CM

## 2022-11-17 PROCEDURE — G0108 DIAB MANAGE TRN  PER INDIV: HCPCS | Mod: 95 | Performed by: NUTRITIONIST

## 2022-11-17 NOTE — PROGRESS NOTES
Devonte is a 56 year old who is being evaluated via a billable video visit.      How would you like to obtain your AVS? MyChart  If the video visit is dropped, the invitation should be resent by: Text to cell phone: [phone]  Will anyone else be joining your video visit? No      Video-Visit Details    Video Start Time: 1130    Type of service:  Video Visit    Video End Time:1230    Originating Location (pt. Location): Home      Distant Location (provider location):  Off-site    Platform used for Video Visit: Pricefalls    Diabetes Self-Management Education & Support    Devonte Tucker presents today for education related to Type 2 diabetes    Patient is being treated with:  oral agents and ozempic    He is accompanied by self    Year of diagnosis: 2002/03  Referring provider:  Chris    PATIENT CONCERNS RELATED TO DIABETES SELF MANAGEMENT: Recently in past 4-6 weeks has had some glucose readings over 200. Would like to look at diet. Difficulty swallowing so has to eat softer foods. Has to drink water with meals    ASSESSMENT:    Taking Medication:     Current Diabetes Management per Patient:  Taking diabetes medications?   yes:     Diabetes Medication(s)     Biguanides       metFORMIN (GLUCOPHAGE) 1000 MG tablet    Take 1 tab po BID.    Incretin Mimetic Agents       semaglutide (OZEMPIC) 2 MG/1.5ML SOPN pen    Inject  0.5 mg ONCE a week subcutaneous        Monitoring    Morning glucose is lower now.   101-119 fasting  Mid day- two hours after lunch  127, 132, 124, 111, 97  At least two hours after dinner/before bedtime  112, 123. 113, 122, 109, 133    Has low appetite anyway  Feels a little bloated, constipation  Using laxative     Patient's most recent   Lab Results   Component Value Date    A1C 6.4 09/26/2022    A1C 5.6 03/25/2021      Patient's A1C goal: <7.0    Activity: walking daily, stationary bike 30 minutes in morning and evening   Switched in the last month from walking to biking   Walking with cane so  gets a better pace on the bike  Has neuropathy  And side effects of surgery is virtigo    Healthy Eating:   Current weight is 280 lbs  Height is 5 7     Breakfast - protein shake premiere 1 (30g protein) tries to get two of these a day at some point or 2 eggs scrambled or fried in butter or hard boiled sometimes with piece of toast 14 g protein and 7 g carb or oatmeal steel cut oats 1/2 cup cooked with fruit berries or frozen cherries  Lunch -   Dinner - Doesn't do much meat so looking for protein sources     Problem Solving:      Patient is at risk of hypoglycemia?: NO  Hospitalizations for hyper or hypoglycemia: No    EDUCATION and INSTRUCTION PROVIDED AT THIS VISIT:    Patient's glucose has improved and is now at goal    We discussed diet in relationship to his texture limitations. Focused on ideas to incorporate protein. Briefly discussed some softer carbohydrate foods as well. With a focus on meeting nutritional needs while maintaining good glucose control and consistent carb. Focused on breakfast ideas. Will need to further discuss lunch and dinner in more detail at follow up.   Reviewed some resources for recipes as well.     Handouts on general healthy eating, carbohydrate and protein sent to patient     Patient-stated goal written and given to Devonte Tucker.  Verbalized and demonstrated understanding of instructions.     FOLLOW-UP:    12/6    Time spent with patient at today's visit was 60 minutes.      Any diabetes medication dose changes were made via the CDE Protocol and Collaborative Practice Agreement with [facility] and  [facility].  A copy of this encounter was provided to patient's referring provider.

## 2022-11-20 DIAGNOSIS — C03.0 SQUAMOUS CELL CARCINOMA OF MAXILLARY ALVEOLAR RIDGE (H): ICD-10-CM

## 2022-11-25 RX ORDER — ATORVASTATIN CALCIUM 20 MG/1
TABLET, FILM COATED ORAL
Qty: 90 TABLET | Refills: 1 | Status: SHIPPED | OUTPATIENT
Start: 2022-11-25

## 2022-12-06 ENCOUNTER — VIRTUAL VISIT (OUTPATIENT)
Dept: EDUCATION SERVICES | Facility: CLINIC | Age: 57
End: 2022-12-06
Payer: COMMERCIAL

## 2022-12-06 DIAGNOSIS — E11.9 DIABETES MELLITUS, TYPE 2 (H): Primary | ICD-10-CM

## 2022-12-06 PROCEDURE — G0108 DIAB MANAGE TRN  PER INDIV: HCPCS | Mod: 95 | Performed by: NUTRITIONIST

## 2022-12-07 NOTE — PROGRESS NOTES
Devonte Tucker  is being evaluated via a billable video visit.      How would you like to obtain your AVS? Telnic  For the video visit, send the invitation by: Text to cell phone: [phone]  Will anyone else be joining your video visit? No        Outcome for 12/07/22 8:54 AM: eVendor Check message sent  Yesy Biswas, VF  Outcome for 12/12/22 1:43 PM: Left Voicemail   Brenda Byers, VF  Outcome for 12/13/22 10:42 AM: Left Voicemail   Zulma Torres, VF  Outcome for 12/14/22 6:31 AM: Glucose Readings sent via eVendor Check  Yesy Biswas, VF

## 2022-12-12 ENCOUNTER — TELEPHONE (OUTPATIENT)
Dept: ENDOCRINOLOGY | Facility: CLINIC | Age: 57
End: 2022-12-12

## 2022-12-12 NOTE — TELEPHONE ENCOUNTER
Attempted to reach patient for upcoming appointment, data needed for blood glucose readings. No answer, LM on VM to call office back.     Appointment with Elsa Perez on 12/14/22    Brenda Byers MA

## 2022-12-12 NOTE — PROGRESS NOTES
Devonte is a 56 year old who is being evaluated via a billable video visit.      How would you like to obtain your AVS? MyChart  If the video visit is dropped, the invitation should be resent by: Text to cell phone: [phone]  Will anyone else be joining your video visit? No        Video-Visit Details    Video Start Time: 3pm    Type of service:  Video Visit    Video End Time:4pm    Originating Location (pt. Location): Home        Distant Location (provider location):  Off-site    Platform used for Video Visit: Nixon    Diabetes Self-Management Education & Support    Devonte Tucker presents today for education related to Type 2 diabetes    Patient is being treated with:  oral agents and ozempic  He is accompanied by self    Year of diagnosis: 2002/03  Referring provider:  Chris       PATIENT CONCERNS RELATED TO DIABETES SELF MANAGEMENT: doing well with changes to his diet  Glucose is well controlled so no concerns there      ASSESSMENT:    Taking Medication:     Current Diabetes Management per Patient:  Taking diabetes medications?   yes:     Diabetes Medication(s)     Biguanides       metFORMIN (GLUCOPHAGE) 1000 MG tablet    Take 1 tab po BID.    Incretin Mimetic Agents       semaglutide (OZEMPIC) 2 MG/1.5ML SOPN pen    Inject  0.5 mg ONCE a week subcutaneous          Monitoring    fastng   2 hours after lunch 101-129  2 hours after dinner 107-132    Patient's most recent   Lab Results   Component Value Date    A1C 6.4 09/26/2022    A1C 5.6 03/25/2021      Patient's A1C goal: <7.0    Activity: not assessed    Healthy Eating:   Previous diet education:  Yes   Paying more attention to labels  Has added more protein options  Ground turkey and chicken with brown rice  Beans and lentils with broth like a soup blended  Turkey  Less potato and more brown rice   Tried quinoa was ok  Fruit and veg steamed     Problem Solving:      Patient is at risk of hypoglycemia?: NO  Hospitalizations for hyper or  hypoglycemia: No    EDUCATION and INSTRUCTION PROVIDED AT THIS VISIT:    We discussed nutritious sources of carbohydrate today and meal planning  No further changes to plan. Doing well.     Patient-stated goal written and given to Devonte Tucker.  Verbalized and demonstrated understanding of instructions.       FOLLOW-UP:        Time spent with patient at today's visit was 60 minutes.      Any diabetes medication dose changes were made via the [facility] Protocol and Collaborative Practice [facility].  A copy of this encounter was provided to patient's referring provider.

## 2022-12-13 NOTE — TELEPHONE ENCOUNTER
Called patient and left voicemail. Patient has an appointment on 12/14/22. Need to collect the last 14 days worth of blood sugar readings to prepare for patient's visit. Zulma Torres on 12/13/2022 at 10:43 AM

## 2022-12-14 ENCOUNTER — TELEPHONE (OUTPATIENT)
Dept: ENDOCRINOLOGY | Facility: CLINIC | Age: 57
End: 2022-12-14

## 2022-12-14 ENCOUNTER — VIRTUAL VISIT (OUTPATIENT)
Dept: ENDOCRINOLOGY | Facility: CLINIC | Age: 57
End: 2022-12-14
Payer: COMMERCIAL

## 2022-12-14 DIAGNOSIS — C03.9 PRIMARY CANCER OF ALVEOLAR RIDGE MUCOSA (H): ICD-10-CM

## 2022-12-14 DIAGNOSIS — C03.0 SQUAMOUS CELL CARCINOMA OF MAXILLARY ALVEOLAR RIDGE (H): ICD-10-CM

## 2022-12-14 PROCEDURE — 99214 OFFICE O/P EST MOD 30 MIN: CPT | Mod: 95 | Performed by: PHYSICIAN ASSISTANT

## 2022-12-14 RX ORDER — GABAPENTIN 300 MG/1
CAPSULE ORAL
Qty: 275 CAPSULE | Refills: 1 | Status: SHIPPED | OUTPATIENT
Start: 2022-12-14 | End: 2023-04-26

## 2022-12-14 NOTE — LETTER
12/14/2022       RE: Devonte Tucker  [address]  [place] [address]     Dear Colleague,    Thank you for referring your patient, Devonte Tucker, to the [facility] [place] at [facility]. Please see a copy of my visit note below.    Devonte Tucker  is being evaluated via a billable video visit.      How would you like to obtain your AVS? KeepIdeas  For the video visit, send the invitation by: Text to cell phone: [phone]  Will anyone else be joining your video visit? No        Outcome for 12/07/22 8:54 AM: [email or web address] message sent  Yesy Biswas, VF  Outcome for 12/12/22 1:43 PM: Left Voicemail   Brendasrinivasan Byers, VF  Outcome for 12/13/22 10:42 AM: Left Voicemail   Zulma Quispeck, VF  Outcome for 12/14/22 6:31 AM: Glucose Readings sent via [email or web address]  Yesy Biswas, VF          Due to the COVID 19 pandemic this visit was converted to a video visit in order to help prevent spread of infection in this patient and the general population.    Time of start: 7:30 am  Time of end: 7:45 am  Total duration of video visit: 15 minutes.  Provider's location: offsite.    HPI  Emigdio Tucker is a 56 year old male with type 2 diabetes mellitus. Video visit today for diabetes follow up.  Pt gives a hx of type 2 diabetes dx in early 2000.  He has neuropathy.  Pt denies hx of retinopathy or nephropathy.  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   He also has hx of obesity, HTN, depression and generalized anxiety disorder.  Last visit, I referred Brant to Jena Dove RD which he found to be very helpful and also start him on Ozempic which he is tolerating well.  For his diabetes, he is currently taking Metformin 1000 mg BID and Ozempic 0.25 mg subcutaneous once a  week.  Most recent A1C was 6.4% in Sept 2022.  Pt provided me with blood sugar data which I scanned in his note below.  Blood sugar readings are good at this time.  On ROS today, he reports doing well.  He has severe neuropathy in his feet and also has neuropathy in both hands. Pain and numbness.   Denies foot ulcers.  Constipation.  Pt denies headaches,blurred vision, n/v, SOB at rest, cough, fever or chills.  No chest pain, abd pain, diarrhea, dysuria or hematuria.    Diabetes Care  Retinopathy: none; pt seen by Oph in May 2022.  Nephropathy:none; urine microalbuminuria negative in 11/2021. He is taking Lisinopril.  Neuropathy:yes.  Foot Exam: no exam today.  Taking aspirin: taking ASA 81 mg daily.  Lipids: LDL 81 in 4/2022. Taking Lipitor.  CAD: no.  Mental health: hx of depression and generalized anxiety.  Insulin: none.  DM meds: Metformin and Ozempic.  Testing: glucose meter.            ROS  See under HPI.    Allergies  Allergies   Allergen Reactions     Seasonal Allergies Other (See Comments) and Shortness Of Breath       Medications  Current Outpatient Medications   Medication Sig Dispense Refill     acetaminophen (TYLENOL) 325 MG tablet Take 2 tablets (650 mg) by mouth every 4 hours as needed for mild pain 100 tablet 0     aspirin (ASA) 81 MG EC tablet Take 1 tablet (81 mg) by mouth daily 100 tablet 3     atorvastatin (LIPITOR) 20 MG tablet TAKE 1 TABLET BY MOUTH EVERY MORNING 90 tablet 1     blood glucose (NO BRAND SPECIFIED) lancets standard Use to test blood sugar  4 times daily or as directed. ( [place] micolet lancet) 400 each 0     blood glucose (NO BRAND SPECIFIED) test strip Use to test blood sugar 3 times daily or as directed. 300 strip 3     blood glucose monitoring (ISRAEL MICROLET) lancets Use to test blood sugar 4 times daily or as directed. 200 each 3     cevimeline (EVOXAC) 30 MG capsule TAKE ONE CAPSULE BY MOUTH THREE TIMES DAILY 270 capsule 3     cyclobenzaprine (FLEXERIL) 5 MG tablet Take 1  tablet (5 mg) by mouth 3 times daily as needed for muscle spasms 90 tablet 1     doxazosin (CARDURA) 1 MG tablet Take 1 tablet (1 mg) by mouth daily 30 tablet 0     gabapentin (NEURONTIN) 300 MG capsule Take 1 capsule (300 mg) by mouth 3 times daily 270 capsule 1     lisinopril (ZESTRIL) 10 MG tablet Take 1 tablet (10 mg) by mouth every morning 30 tablet 3     metFORMIN (GLUCOPHAGE) 1000 MG tablet Take 1 tab po BID. 180 tablet 3     Misc Natural Product Nasal (PONARIS) SOLN With head tilted back, place 1 or 2 drops in each nostril once daily. 30 mL 3     multivitamin w/minerals (THERA-VIT-M) tablet Take 1 tablet by mouth daily 30 tablet 0     omeprazole (PRILOSEC) 20 MG DR capsule Take 1 capsule (20 mg) by mouth daily 30 capsule 3     semaglutide (OZEMPIC) 2 MG/1.5ML SOPN pen Inject  0.5 mg ONCE a week subcutaneous 1.5 mL 1     sodium fluoride dental gel (DENTAGEL) 1.1 % GEL topical gel Apply to affected area At Bedtime 112 g 11     tamsulosin (FLOMAX) 0.4 MG capsule 1 capsule         Family History  family history includes Cancer in his brother, mother, and sister; Cardiovascular in his brother; Cerebrovascular Disease in his brother; Depression in his mother, sister, and sister; Diabetes in his brother, mother, and sister; Heart Disease in his brother and mother; Hypertension in his brother, mother, sister, and sister; Kidney Cancer in his sister; Substance Abuse in his brother, father, and sister.    Social History   reports that he has never smoked. He has never used smokeless tobacco. He reports that he does not drink alcohol and does not use drugs.     Past Medical History  Past Medical History:   Diagnosis Date     Allergic rhinitis experienced it for many years     Anxiety 2019     Benign positional vertigo 05/2019     Depressive disorder 2019     Diabetes (H)      Gastroesophageal reflux disease 05/2021     Head injury 04/2018     Hypertension      Maxillary sinus cancer (H)      Obesity      Sleep apnea  May 2022       Past Surgical History:   Procedure Laterality Date     ------------OTHER-------------      Mucosa and bone biopsy     COLONOSCOPY N/A 5/24/2021    Procedure: COLONOSCOPY, WITH POLYPECTOMY;  Surgeon: Terrell De Luna MD;  Location: [facility] OR     EXPLORE NECK Left 5/9/2019    Procedure: Left Neck Exploration;  Surgeon: Jett Treviño MD;  Location: [facility] OR     EXTRACTION(S) DENTAL      x2 under general anesthesia     GRAFT FREE VASCULARIZED (LOCATION) Right 5/9/2019    Procedure: Left Radial Forearm Free Flap (soft tissue);  Surgeon: Sumit Atwood MD;  Location: [facility] OR     GRAFT SKIN SPLIT THICKNESS FROM EXTREMITY Right 5/9/2019    Procedure: Graft skin split thickness from right thigh, nasogastric feeding tube placement;  Surgeon: Sumit Atwood MD;  Location: [facility] OR     IR LYMPH NODE BIOPSY  4/4/2019     OSTEOTOMY MAXILLA Left 5/9/2019    Procedure: Left Infrastructure Maxillectomy,;  Surgeon: Jett Treviño MD;  Location: [facility] OR       Physical Exam\    No exam today.    RESULTS  Creatinine   Date Value Ref Range Status   04/06/2022 0.77 0.70 - 1.30 mg/dL Final   07/01/2019 0.68 0.66 - 1.25 mg/dL Final     GFR Estimate   Date Value Ref Range Status   04/06/2022 >90 >60 mL/min/1.73m2 Final     Comment:     Effective December 21, 2021 eGFRcr in adults is calculated using the 2021 CKD-EPI creatinine equation which includes age and gender (Cesar et al., [facility], DOI: 10.1056/HAUKef4214499)   03/25/2021 >90 >60 mL/min/[1.73_m2] Final     Hemoglobin A1C   Date Value Ref Range Status   09/26/2022 6.4 (H) <5.7 % Final     Comment:     Normal <5.7%   Prediabetes 5.7-6.4%    Diabetes 6.5% or higher     Note: Adopted from ADA consensus guidelines.   03/25/2021 5.6 0 - 5.6 % Final     Comment:     Normal <5.7% Prediabetes 5.7-6.4%  Diabetes 6.5% or higher - adopted from ADA   consensus guidelines.       Potassium   Date Value Ref Range Status   04/06/2022 4.7 3.5 - 5.0 mmol/L Final   07/01/2019 4.1 3.4 -  5.3 mmol/L Final     ALT   Date Value Ref Range Status   11/03/2021 30 0 - 70 U/L Final     AST   Date Value Ref Range Status   11/03/2021 16 0 - 45 U/L Final     TSH   Date Value Ref Range Status   09/26/2022 3.50 0.30 - 4.20 uIU/mL Final   04/06/2022 4.01 0.30 - 5.00 uIU/mL Final   11/03/2021 3.01 0.40 - 4.00 mU/L Final   05/20/2021 2.38 0.40 - 4.00 mU/L Final       Cholesterol   Date Value Ref Range Status   04/06/2022 145 <=199 mg/dL Final   11/03/2021 135 <200 mg/dL Final   07/01/2019 105 <200 mg/dL Final     HDL Cholesterol   Date Value Ref Range Status   07/01/2019 38 (L) >39 mg/dL Final     Direct Measure HDL   Date Value Ref Range Status   04/06/2022 43 >=40 mg/dL Final     Comment:     HDL Cholesterol Reference Range:     0-2 years:   No reference ranges established for patients under 2 years old  at [facility] for lipid analytes.    2-8 years:  Greater than 45 mg/dL     18 years and older:   Female: Greater than or equal to 50 mg/dL   Male:   Greater than or equal to 40 mg/dL   11/03/2021 50 >=40 mg/dL Final     LDL Cholesterol Calculated   Date Value Ref Range Status   04/06/2022 81 <=129 mg/dL Final   11/03/2021 71 <=100 mg/dL Final   07/01/2019 41 <100 mg/dL Final     Comment:     Desirable:       <100 mg/dl     Triglycerides   Date Value Ref Range Status   04/06/2022 105 <=149 mg/dL Final   11/03/2021 70 <150 mg/dL Final   07/01/2019 128 <150 mg/dL Final         ASSESSMENT/PLAN:    1.  TYPE 2 DIABETES MELLITUS:  Pt's blood sugar values are good at this time.  I asked pt to increase Ozempic 0.5 mg subcutaneous once a week x 4 weeks, then increase 1 mg subcutaneous once a week.  I reviewed how Ozempic works and possible side effects of the drug including n/v, GI distress, diarrhea and rare risk of pancreatitis.  Pt denies hx of pancreatitis.  Continue Metformin dose.  Pt denies hx of retinopathy and was seen by Oph in May 2022.  His urine microalbuminuria has been negative. He is taking  Lisinopril.  Most recent creat was 0.77 with GFR > 90 mL/min in 4/2022.  No vitals today. BP good at home.  Reviewed the importance of healthy eating, portion control, weight loss and need for daily activity.  Pt seen by RD.    2.  NEUROPATHY: Pain and numbness in feet. Increase Gabapentin 2 tabs at hs and continue 1 tab in am and 1 tab in afternoon.  He denies foot ulcers.    3.  MENTAL HEALTH: Stable at this time.    4.  FOLLOW UP: with me in 5 months.    Time spent reviewing chart and labs today = 6 minutes.  Time for video visit today= 15 minutes.  Time for documentation today = 15 minutes.    TOTAL TIME FOR VISIT TODAY = 36 minutes.    Elsa Perez PA-C          Answers for HPI/ROS submitted by the patient on 12/13/2022  General Symptoms: No  Skin Symptoms: No  HENT Symptoms: No  EYE SYMPTOMS: No  HEART SYMPTOMS: No  LUNG SYMPTOMS: No  INTESTINAL SYMPTOMS: No  URINARY SYMPTOMS: No  REPRODUCTIVE SYMPTOMS: No  SKELETAL SYMPTOMS: No  BLOOD SYMPTOMS: No  NERVOUS SYSTEM SYMPTOMS: No  MENTAL HEALTH SYMPTOMS: No

## 2022-12-14 NOTE — PROGRESS NOTES
Due to the COVID 19 pandemic this visit was converted to a video visit in order to help prevent spread of infection in this patient and the general population.    Time of start: 7:30 am  Time of end: 7:45 am  Total duration of video visit: 15 minutes.  Provider's location: offsite.    HPI  Emigdio Tucker is a 56 year old male with type 2 diabetes mellitus. Video visit today for diabetes follow up.  Pt gives a hx of type 2 diabetes dx in early 2000.  He has neuropathy.  Pt denies hx of retinopathy or nephropathy.  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   He also has hx of obesity, HTN, depression and generalized anxiety disorder.  Last visit, I referred Brant to Jena Dove RD which he found to be very helpful and also start him on Ozempic which he is tolerating well.  For his diabetes, he is currently taking Metformin 1000 mg BID and Ozempic 0.25 mg subcutaneous once a week.  Most recent A1C was 6.4% in Sept 2022.  Pt provided me with blood sugar data which I scanned in his note below.  Blood sugar readings are good at this time.  On ROS today, he reports doing well.  He has severe neuropathy in his feet and also has neuropathy in both hands. Pain and numbness.   Denies foot ulcers.  Constipation.  Pt denies headaches,blurred vision, n/v, SOB at rest, cough, fever or chills.  No chest pain, abd pain, diarrhea, dysuria or hematuria.    Diabetes Care  Retinopathy: none; pt seen by Oph in May 2022.  Nephropathy:none; urine microalbuminuria negative in 11/2021. He is taking Lisinopril.  Neuropathy:yes.  Foot Exam: no exam today.  Taking aspirin: taking ASA 81 mg daily.  Lipids: LDL 81 in 4/2022. Taking Lipitor.  CAD: no.  Mental health: hx of depression and generalized anxiety.  Insulin: none.  DM meds: Metformin and Ozempic.  Testing: glucose  meter.            ROS  See under HPI.    Allergies  Allergies   Allergen Reactions     Seasonal Allergies Other (See Comments) and Shortness Of Breath       Medications  Current Outpatient Medications   Medication Sig Dispense Refill     acetaminophen (TYLENOL) 325 MG tablet Take 2 tablets (650 mg) by mouth every 4 hours as needed for mild pain 100 tablet 0     aspirin (ASA) 81 MG EC tablet Take 1 tablet (81 mg) by mouth daily 100 tablet 3     atorvastatin (LIPITOR) 20 MG tablet TAKE 1 TABLET BY MOUTH EVERY MORNING 90 tablet 1     blood glucose (NO BRAND SPECIFIED) lancets standard Use to test blood sugar  4 times daily or as directed. ( [place] micolet lancet) 400 each 0     blood glucose (NO BRAND SPECIFIED) test strip Use to test blood sugar 3 times daily or as directed. 300 strip 3     blood glucose monitoring ([place] MICROLET) lancets Use to test blood sugar 4 times daily or as directed. 200 each 3     cevimeline (EVOXAC) 30 MG capsule TAKE ONE CAPSULE BY MOUTH THREE TIMES DAILY 270 capsule 3     cyclobenzaprine (FLEXERIL) 5 MG tablet Take 1 tablet (5 mg) by mouth 3 times daily as needed for muscle spasms 90 tablet 1     doxazosin (CARDURA) 1 MG tablet Take 1 tablet (1 mg) by mouth daily 30 tablet 0     gabapentin (NEURONTIN) 300 MG capsule Take 1 capsule (300 mg) by mouth 3 times daily 270 capsule 1     lisinopril (ZESTRIL) 10 MG tablet Take 1 tablet (10 mg) by mouth every morning 30 tablet 3     metFORMIN (GLUCOPHAGE) 1000 MG tablet Take 1 tab po BID. 180 tablet 3     Misc Natural Product Nasal (PONARIS) SOLN With head tilted back, place 1 or 2 drops in each nostril once daily. 30 mL 3     multivitamin w/minerals (THERA-VIT-M) tablet Take 1 tablet by mouth daily 30 tablet 0     omeprazole (PRILOSEC) 20 MG DR capsule Take 1 capsule (20 mg) by mouth daily 30 capsule 3     semaglutide (OZEMPIC) 2 MG/1.5ML SOPN pen Inject  0.5 mg ONCE a week subcutaneous 1.5 mL 1     sodium fluoride dental gel (DENTAGEL) 1.1 % GEL  topical gel Apply to affected area At Bedtime 112 g 11     tamsulosin (FLOMAX) 0.4 MG capsule 1 capsule         Family History  family history includes Cancer in his brother, mother, and sister; Cardiovascular in his brother; Cerebrovascular Disease in his brother; Depression in his mother, sister, and sister; Diabetes in his brother, mother, and sister; Heart Disease in his brother and mother; Hypertension in his brother, mother, sister, and sister; Kidney Cancer in his sister; Substance Abuse in his brother, father, and sister.    Social History   reports that he has never smoked. He has never used smokeless tobacco. He reports that he does not drink alcohol and does not use drugs.     Past Medical History  Past Medical History:   Diagnosis Date     Allergic rhinitis experienced it for many years     Anxiety 2019     Benign positional vertigo 05/2019     Depressive disorder 2019     Diabetes (H)      Gastroesophageal reflux disease 05/2021     Head injury 04/2018     Hypertension      Maxillary sinus cancer (H)      Obesity      Sleep apnea May 2022       Past Surgical History:   Procedure Laterality Date     ------------OTHER-------------      Mucosa and bone biopsy     COLONOSCOPY N/A 5/24/2021    Procedure: COLONOSCOPY, WITH POLYPECTOMY;  Surgeon: Terrell De Luna MD;  Location: [facility] OR     EXPLORE NECK Left 5/9/2019    Procedure: Left Neck Exploration;  Surgeon: Jett Treviño MD;  Location: [facility] OR     EXTRACTION(S) DENTAL      x2 under general anesthesia     GRAFT FREE VASCULARIZED (LOCATION) Right 5/9/2019    Procedure: Left Radial Forearm Free Flap (soft tissue);  Surgeon: Sumit Atwood MD;  Location: [facility] OR     GRAFT SKIN SPLIT THICKNESS FROM EXTREMITY Right 5/9/2019    Procedure: Graft skin split thickness from right thigh, nasogastric feeding tube placement;  Surgeon: Sumit Atwood MD;  Location: [facility] OR     IR LYMPH NODE BIOPSY  4/4/2019     OSTEOTOMY MAXILLA Left 5/9/2019    Procedure:  Left Infrastructure Maxillectomy,;  Surgeon: Jett Treviño MD;  Location: [facility] OR       Physical Exam\    No exam today.    RESULTS  Creatinine   Date Value Ref Range Status   04/06/2022 0.77 0.70 - 1.30 mg/dL Final   07/01/2019 0.68 0.66 - 1.25 mg/dL Final     GFR Estimate   Date Value Ref Range Status   04/06/2022 >90 >60 mL/min/1.73m2 Final     Comment:     Effective December 21, 2021 eGFRcr in adults is calculated using the 2021 CKD-EPI creatinine equation which includes age and gender (Cesar et al., NEJ, DOI: 10.1056/CURZjz7002992)   03/25/2021 >90 >60 mL/min/[1.73_m2] Final     Hemoglobin A1C   Date Value Ref Range Status   09/26/2022 6.4 (H) <5.7 % Final     Comment:     Normal <5.7%   Prediabetes 5.7-6.4%    Diabetes 6.5% or higher     Note: Adopted from ADA consensus guidelines.   03/25/2021 5.6 0 - 5.6 % Final     Comment:     Normal <5.7% Prediabetes 5.7-6.4%  Diabetes 6.5% or higher - adopted from ADA   consensus guidelines.       Potassium   Date Value Ref Range Status   04/06/2022 4.7 3.5 - 5.0 mmol/L Final   07/01/2019 4.1 3.4 - 5.3 mmol/L Final     ALT   Date Value Ref Range Status   11/03/2021 30 0 - 70 U/L Final     AST   Date Value Ref Range Status   11/03/2021 16 0 - 45 U/L Final     TSH   Date Value Ref Range Status   09/26/2022 3.50 0.30 - 4.20 uIU/mL Final   04/06/2022 4.01 0.30 - 5.00 uIU/mL Final   11/03/2021 3.01 0.40 - 4.00 mU/L Final   05/20/2021 2.38 0.40 - 4.00 mU/L Final       Cholesterol   Date Value Ref Range Status   04/06/2022 145 <=199 mg/dL Final   11/03/2021 135 <200 mg/dL Final   07/01/2019 105 <200 mg/dL Final     HDL Cholesterol   Date Value Ref Range Status   07/01/2019 38 (L) >39 mg/dL Final     Direct Measure HDL   Date Value Ref Range Status   04/06/2022 43 >=40 mg/dL Final     Comment:     HDL Cholesterol Reference Range:     0-2 years:   No reference ranges established for patients under 2 years old  at [facility] for lipid analytes.    2-8 years:  Greater  than 45 mg/dL     18 years and older:   Female: Greater than or equal to 50 mg/dL   Male:   Greater than or equal to 40 mg/dL   11/03/2021 50 >=40 mg/dL Final     LDL Cholesterol Calculated   Date Value Ref Range Status   04/06/2022 81 <=129 mg/dL Final   11/03/2021 71 <=100 mg/dL Final   07/01/2019 41 <100 mg/dL Final     Comment:     Desirable:       <100 mg/dl     Triglycerides   Date Value Ref Range Status   04/06/2022 105 <=149 mg/dL Final   11/03/2021 70 <150 mg/dL Final   07/01/2019 128 <150 mg/dL Final         ASSESSMENT/PLAN:    1.  TYPE 2 DIABETES MELLITUS:  Pt's blood sugar values are good at this time.  I asked pt to increase Ozempic 0.5 mg subcutaneous once a week x 4 weeks, then increase 1 mg subcutaneous once a week.  I reviewed how Ozempic works and possible side effects of the drug including n/v, GI distress, diarrhea and rare risk of pancreatitis.  Pt denies hx of pancreatitis.  Continue Metformin dose.  Pt denies hx of retinopathy and was seen by Oph in May 2022.  His urine microalbuminuria has been negative. He is taking Lisinopril.  Most recent creat was 0.77 with GFR > 90 mL/min in 4/2022.  No vitals today. BP good at home.  Reviewed the importance of healthy eating, portion control, weight loss and need for daily activity.  Pt seen by RD.    2.  NEUROPATHY: Pain and numbness in feet. Increase Gabapentin 2 tabs at hs and continue 1 tab in am and 1 tab in afternoon.  He denies foot ulcers.    3.  MENTAL HEALTH: Stable at this time.    4.  FOLLOW UP: with me in 5 months.    Time spent reviewing chart and labs today = 6 minutes.  Time for video visit today= 15 minutes.  Time for documentation today = 15 minutes.    TOTAL TIME FOR VISIT TODAY = 36 minutes.    Elsa Perez PA-C          Answers for HPI/ROS submitted by the patient on 12/13/2022  General Symptoms: No  Skin Symptoms: No  HENT Symptoms: No  EYE SYMPTOMS: No  HEART SYMPTOMS: No  LUNG SYMPTOMS: No  INTESTINAL SYMPTOMS: No  URINARY  SYMPTOMS: No  REPRODUCTIVE SYMPTOMS: No  SKELETAL SYMPTOMS: No  BLOOD SYMPTOMS: No  NERVOUS SYSTEM SYMPTOMS: No  MENTAL HEALTH SYMPTOMS: No

## 2022-12-14 NOTE — TELEPHONE ENCOUNTER
Attempted to reach patient to schedule follow up in the Endocrinology Clinic. No answer, LM on VM to call office back.    Schedule with JANNIE Munoz  in 5 months.   Future lab ordered..     Zulma Torres, EMERY

## 2022-12-20 ENCOUNTER — VIRTUAL VISIT (OUTPATIENT)
Dept: ONCOLOGY | Facility: CLINIC | Age: 57
End: 2022-12-20
Attending: PSYCHOLOGIST
Payer: COMMERCIAL

## 2022-12-20 DIAGNOSIS — F43.21 ADJUSTMENT DISORDER WITH DEPRESSED MOOD: Primary | ICD-10-CM

## 2022-12-20 PROCEDURE — 90832 PSYTX W PT 30 MINUTES: CPT | Mod: 95 | Performed by: PSYCHOLOGIST

## 2022-12-20 NOTE — LETTER
12/20/2022         RE: Devonte Tucker  [address]  [place] [facility] [address]        Dear Colleague,    Thank you for referring your patient, Devonte Tucker, to the [facility]. Please see a copy of my visit note below.          [facility] Oncology- Psychotherapy (Provider Oversight- Dr. Brant Vivar)                                    Progress Note    Patient Name: Devonte Tucker  Date: 12/20/22         Service Type: Individual      Session Start Time: 10:00  Session End Time: 10:30     Session Length: 30 minutes    Session #: 9    Attendees: Client attended alone    Service Modality:  Video Visit:      Provider verified identity through the following two step process.  Patient provided:  Patient photo    Telemedicine Visit: The patient's condition can be safely assessed and treated via synchronous audio and visual telemedicine encounter.      Reason for Telemedicine Visit: Services only offered telehealth    Originating Site (Patient Location): Patient's home    Distant Site (Provider Location): Provider Remote Setting- Home Office    Consent:  The patient/guardian has verbally consented to: the potential risks and benefits of telemedicine (video visit) versus in person care; bill my insurance or make self-payment for services provided; and responsibility for payment of non-covered services.     Patient would like the video invitation sent by:  My Chart    Mode of Communication:  Video Conference via Amwell    As the provider I attest to compliance with applicable laws and regulations related to telemedicine.    DATA  Interactive Complexity: No  Crisis: No     Current / Ongoing Stressors and Concerns:   Recovery from cancer, cancer treatment long-term effects, sadness, changes in identity, depressed mood, feeling overwhelmed, feeling lonely     Treatment Objective(s) Addressed in This Session:   identify coping strategies to more effectively address  "stressors     Intervention: Motivational Interviewing: to help him connect with what is true about him post cancer treatment        ASSESSMENT: Current Emotional / Mental Status (status of significant symptoms):   Risk status (Self / Other harm or suicidal ideation)   Patient denies current fears or concerns for personal safety.   Patient denies current or recent suicidal ideation or behaviors.   Patient denies current or recent homicidal ideation or behaviors.   Patient denies current or recent self injurious behavior or ideation.   Patient denies other safety concerns.   Patient reports there has been no change in risk factors since their last session.     Patient reports there has been no change in protective factors since their last session.     Recommended that patient call 911 or go to the local ED should there be a change in any of these risk factors.     Appearance:   Appropriate    Eye Contact:   Good    Psychomotor Behavior: Normal    Attitude:   Cooperative    Orientation:   All   Speech    Rate / Production: Normal     Volume:  Normal    Mood:    Normal   Affect:    Appropriate    Thought Content:  Clear    Thought Form:  Coherent  Logical    Insight:    Good      Medication Review:   No changes to current psychiatric medication(s)     Medication Compliance:   Yes     Changes in Health Issues:   None reported     Chemical Use Review:   Substance Use: Chemical use reviewed, no active concerns identified      Tobacco Use: No current tobacco use.      Diagnosis:  1. Adjustment disorder with depressed mood        Collateral Reports Completed:   Not Applicable    PLAN: (Patient Tasks / Therapist Tasks / Other)  He is to identify what is true about him after cancer treatment. This will assist him in reconnecting with his identity as well as his strengths. After strengths are identified, he can begin to set goals for his \"new normal.\"    Devonte Eid PsyD                                                   "       ______________________________________________________________________    Individual Treatment Plan    Patient's Name: Devonte Tucker  YOB: 1965    Date of Creation: 5/17/22  Date Treatment Plan Last Reviewed/Revised: 7/25/22    DSM5 Diagnoses: 311 (F32.8) Other/unspec. Depressive Disorder  Psychosocial / Contextual Factors: cancer, long-term side-effects, loss of identity, depressed mood  Anticipated number of session for this episode of care: 12  Anticipation frequency of session: Every other week  Anticipated Duration of each session: 16-37 minutes  Treatment plan will be reviewed in 90 days or when goals have been changed.       MeasurableTreatment Goal(s) related to diagnosis / functional impairment(s)  Goal 1: Patient will identify the truths about himself    I will know I've met my goal when I feel I know who I am again.      Patient has reviewed and agreed to the above plan.      Devonte Eid PsyD  December 20, 2022

## 2022-12-20 NOTE — PROGRESS NOTES
[facility] Oncology- Psychotherapy (Provider Oversight- Dr. Brant Vivar)                                    Progress Note    Patient Name: Devonte Tucker  Date: 12/20/22         Service Type: Individual      Session Start Time: 10:00  Session End Time: 10:30     Session Length: 30 minutes    Session #: 9    Attendees: Client attended alone    Service Modality:  Video Visit:      Provider verified identity through the following two step process.  Patient provided:  Patient photo    Telemedicine Visit: The patient's condition can be safely assessed and treated via synchronous audio and visual telemedicine encounter.      Reason for Telemedicine Visit: Services only offered telehealth    Originating Site (Patient Location): Patient's home    Distant Site (Provider Location): Provider Remote Setting- Home Office    Consent:  The patient/guardian has verbally consented to: the potential risks and benefits of telemedicine (video visit) versus in person care; bill my insurance or make self-payment for services provided; and responsibility for payment of non-covered services.     Patient would like the video invitation sent by:  My Chart    Mode of Communication:  Video Conference via Amwell    As the provider I attest to compliance with applicable laws and regulations related to telemedicine.    DATA  Interactive Complexity: No  Crisis: No     Current / Ongoing Stressors and Concerns:   Recovery from cancer, cancer treatment long-term effects, sadness, changes in identity, depressed mood, feeling overwhelmed, feeling lonely     Treatment Objective(s) Addressed in This Session:   identify coping strategies to more effectively address stressors     Intervention: Motivational Interviewing: to help him connect with what is true about him post cancer treatment        ASSESSMENT: Current Emotional / Mental Status (status of significant symptoms):   Risk status (Self / Other harm or suicidal ideation)   Patient  "denies current fears or concerns for personal safety.   Patient denies current or recent suicidal ideation or behaviors.   Patient denies current or recent homicidal ideation or behaviors.   Patient denies current or recent self injurious behavior or ideation.   Patient denies other safety concerns.   Patient reports there has been no change in risk factors since their last session.     Patient reports there has been no change in protective factors since their last session.     Recommended that patient call 911 or go to the local ED should there be a change in any of these risk factors.     Appearance:   Appropriate    Eye Contact:   Good    Psychomotor Behavior: Normal    Attitude:   Cooperative    Orientation:   All   Speech    Rate / Production: Normal     Volume:  Normal    Mood:    Normal   Affect:    Appropriate    Thought Content:  Clear    Thought Form:  Coherent  Logical    Insight:    Good      Medication Review:   No changes to current psychiatric medication(s)     Medication Compliance:   Yes     Changes in Health Issues:   None reported     Chemical Use Review:   Substance Use: Chemical use reviewed, no active concerns identified      Tobacco Use: No current tobacco use.      Diagnosis:  1. Adjustment disorder with depressed mood        Collateral Reports Completed:   Not Applicable    PLAN: (Patient Tasks / Therapist Tasks / Other)  He is to identify what is true about him after cancer treatment. This will assist him in reconnecting with his identity as well as his strengths. After strengths are identified, he can begin to set goals for his \"new normal.\"    Devonte Eid, Alina                                                         ______________________________________________________________________    Individual Treatment Plan    Patient's Name: Devonte Tucker  YOB: 1965    Date of Creation: 5/17/22  Date Treatment Plan Last Reviewed/Revised: 7/25/22    DSM5 Diagnoses: 311 " (F32.8) Other/unspec. Depressive Disorder  Psychosocial / Contextual Factors: cancer, long-term side-effects, loss of identity, depressed mood  Anticipated number of session for this episode of care: 12  Anticipation frequency of session: Every other week  Anticipated Duration of each session: 16-37 minutes  Treatment plan will be reviewed in 90 days or when goals have been changed.       MeasurableTreatment Goal(s) related to diagnosis / functional impairment(s)  Goal 1: Patient will identify the truths about himself    I will know I've met my goal when I feel I know who I am again.      Patient has reviewed and agreed to the above plan.      Devonte Eid PsyD  December 20, 2022

## 2023-01-10 ENCOUNTER — MYC MEDICAL ADVICE (OUTPATIENT)
Dept: OTOLARYNGOLOGY | Facility: CLINIC | Age: 58
End: 2023-01-10

## 2023-01-10 DIAGNOSIS — R04.0 EPISTAXIS: ICD-10-CM

## 2023-01-10 DIAGNOSIS — C03.0 SQUAMOUS CELL CARCINOMA OF MAXILLARY ALVEOLAR RIDGE (H): ICD-10-CM

## 2023-01-10 RX ORDER — EUCALYPTUS/PEPPERMINT OIL
SOLUTION, NON-ORAL NASAL
Qty: 30 ML | Refills: 3 | Status: SHIPPED | OUTPATIENT
Start: 2023-01-10 | End: 2024-02-28

## 2023-01-15 DIAGNOSIS — E11.9 TYPE 2 DIABETES MELLITUS WITHOUT COMPLICATION, WITHOUT LONG-TERM CURRENT USE OF INSULIN (H): ICD-10-CM

## 2023-01-17 ENCOUNTER — VIRTUAL VISIT (OUTPATIENT)
Dept: ONCOLOGY | Facility: CLINIC | Age: 58
End: 2023-01-17
Attending: PSYCHOLOGIST
Payer: COMMERCIAL

## 2023-01-17 DIAGNOSIS — F43.21 ADJUSTMENT DISORDER WITH DEPRESSED MOOD: Primary | ICD-10-CM

## 2023-01-17 PROCEDURE — 90832 PSYTX W PT 30 MINUTES: CPT | Mod: 95 | Performed by: PSYCHOLOGIST

## 2023-01-17 NOTE — PROGRESS NOTES
[facility] Oncology- Psychotherapy (Provider Oversight- Dr. Brant Vivar)                                    Progress Note    Patient Name: Devonte Tucker  Date: 1/17/23         Service Type: Individual      Session Start Time: 8:00  Session End Time: 8:30     Session Length: 30 minutes    Session #: 10    Attendees: Client attended alone    Service Modality:  Video Visit:      Provider verified identity through the following two step process.  Patient provided:  Patient photo    Telemedicine Visit: The patient's condition can be safely assessed and treated via synchronous audio and visual telemedicine encounter.      Reason for Telemedicine Visit: Services only offered telehealth    Originating Site (Patient Location): Patient's home    Distant Site (Provider Location): Provider Remote Setting- Home Office    Consent:  The patient/guardian has verbally consented to: the potential risks and benefits of telemedicine (video visit) versus in person care; bill my insurance or make self-payment for services provided; and responsibility for payment of non-covered services.     Patient would like the video invitation sent by:  My Chart    Mode of Communication:  Video Conference via Amwell    As the provider I attest to compliance with applicable laws and regulations related to telemedicine.    DATA  Interactive Complexity: No  Crisis: No     Current / Ongoing Stressors and Concerns:   Recovery from cancer, cancer treatment long-term effects, sadness, changes in identity, depressed mood, feeling overwhelmed, feeling lonely     Treatment Objective(s) Addressed in This Session:   identify coping strategies to more effectively address stressors     Intervention: Motivational Interviewing: to help him connect with what is true about him post cancer treatment        ASSESSMENT: Current Emotional / Mental Status (status of significant symptoms):   Risk status (Self / Other harm or suicidal ideation)   Patient  "denies current fears or concerns for personal safety.   Patient denies current or recent suicidal ideation or behaviors.   Patient denies current or recent homicidal ideation or behaviors.   Patient denies current or recent self injurious behavior or ideation.   Patient denies other safety concerns.   Patient reports there has been no change in risk factors since their last session.     Patient reports there has been no change in protective factors since their last session.     Recommended that patient call 911 or go to the local ED should there be a change in any of these risk factors.     Appearance:   Appropriate    Eye Contact:   Good    Psychomotor Behavior: Normal    Attitude:   Cooperative    Orientation:   All   Speech    Rate / Production: Normal     Volume:  Normal    Mood:    Normal   Affect:    Appropriate    Thought Content:  Clear    Thought Form:  Coherent  Logical    Insight:    Good      Medication Review:   No changes to current psychiatric medication(s)     Medication Compliance:   Yes     Changes in Health Issues:   None reported     Chemical Use Review:   Substance Use: Chemical use reviewed, no active concerns identified      Tobacco Use: No current tobacco use.      Diagnosis:  1. Adjustment disorder with depressed mood        Collateral Reports Completed:   Not Applicable    PLAN: (Patient Tasks / Therapist Tasks / Other)  He is to identify what is true about him after cancer treatment. This will assist him in reconnecting with his identity as well as his strengths. After strengths are identified, he can begin to set goals for his \"new normal.\"    Devonte Eid, Alina                                                         ______________________________________________________________________    Individual Treatment Plan    Patient's Name: Devonte Tucker  YOB: 1965    Date of Creation: 5/17/22  Date Treatment Plan Last Reviewed/Revised: 7/25/22    DSM5 Diagnoses: 311 " (F32.8) Other/unspec. Depressive Disorder  Psychosocial / Contextual Factors: cancer, long-term side-effects, loss of identity, depressed mood  Anticipated number of session for this episode of care: 12  Anticipation frequency of session: Every other week  Anticipated Duration of each session: 16-37 minutes  Treatment plan will be reviewed in 90 days or when goals have been changed.       MeasurableTreatment Goal(s) related to diagnosis / functional impairment(s)  Goal 1: Patient will identify the truths about himself    I will know I've met my goal when I feel I know who I am again.      Patient has reviewed and agreed to the above plan.      Devonte Eid PsyD  December 17, 2023

## 2023-01-17 NOTE — LETTER
1/17/2023         RE: Devonte Tucker  [address]  [place] [address]        Dear Colleague,    Thank you for referring your patient, Devonte Tucker, to the [facility]. Please see a copy of my visit note below.          [facility] Oncology- Psychotherapy (Provider Oversight- Dr. Brant Vivar)                                    Progress Note    Patient Name: Devonte Tucker  Date: 1/17/23         Service Type: Individual      Session Start Time: 8:00  Session End Time: 8:30     Session Length: 30 minutes    Session #: 10    Attendees: Client attended alone    Service Modality:  Video Visit:      Provider verified identity through the following two step process.  Patient provided:  Patient photo    Telemedicine Visit: The patient's condition can be safely assessed and treated via synchronous audio and visual telemedicine encounter.      Reason for Telemedicine Visit: Services only offered telehealth    Originating Site (Patient Location): Patient's home    Distant Site (Provider Location): Provider Remote Setting- Home Office    Consent:  The patient/guardian has verbally consented to: the potential risks and benefits of telemedicine (video visit) versus in person care; bill my insurance or make self-payment for services provided; and responsibility for payment of non-covered services.     Patient would like the video invitation sent by:  My Chart    Mode of Communication:  Video Conference via Amwell    As the provider I attest to compliance with applicable laws and regulations related to telemedicine.    DATA  Interactive Complexity: No  Crisis: No     Current / Ongoing Stressors and Concerns:   Recovery from cancer, cancer treatment long-term effects, sadness, changes in identity, depressed mood, feeling overwhelmed, feeling lonely     Treatment Objective(s) Addressed in This Session:   identify coping strategies to more effectively address  "stressors     Intervention: Motivational Interviewing: to help him connect with what is true about him post cancer treatment        ASSESSMENT: Current Emotional / Mental Status (status of significant symptoms):   Risk status (Self / Other harm or suicidal ideation)   Patient denies current fears or concerns for personal safety.   Patient denies current or recent suicidal ideation or behaviors.   Patient denies current or recent homicidal ideation or behaviors.   Patient denies current or recent self injurious behavior or ideation.   Patient denies other safety concerns.   Patient reports there has been no change in risk factors since their last session.     Patient reports there has been no change in protective factors since their last session.     Recommended that patient call 911 or go to the local ED should there be a change in any of these risk factors.     Appearance:   Appropriate    Eye Contact:   Good    Psychomotor Behavior: Normal    Attitude:   Cooperative    Orientation:   All   Speech    Rate / Production: Normal     Volume:  Normal    Mood:    Normal   Affect:    Appropriate    Thought Content:  Clear    Thought Form:  Coherent  Logical    Insight:    Good      Medication Review:   No changes to current psychiatric medication(s)     Medication Compliance:   Yes     Changes in Health Issues:   None reported     Chemical Use Review:   Substance Use: Chemical use reviewed, no active concerns identified      Tobacco Use: No current tobacco use.      Diagnosis:  1. Adjustment disorder with depressed mood        Collateral Reports Completed:   Not Applicable    PLAN: (Patient Tasks / Therapist Tasks / Other)  He is to identify what is true about him after cancer treatment. This will assist him in reconnecting with his identity as well as his strengths. After strengths are identified, he can begin to set goals for his \"new normal.\"    Devonte Eid PsyD                                                   "       ______________________________________________________________________    Individual Treatment Plan    Patient's Name: Devonte Tucker  YOB: 1965    Date of Creation: 5/17/22  Date Treatment Plan Last Reviewed/Revised: 7/25/22    DSM5 Diagnoses: 311 (F32.8) Other/unspec. Depressive Disorder  Psychosocial / Contextual Factors: cancer, long-term side-effects, loss of identity, depressed mood  Anticipated number of session for this episode of care: 12  Anticipation frequency of session: Every other week  Anticipated Duration of each session: 16-37 minutes  Treatment plan will be reviewed in 90 days or when goals have been changed.       MeasurableTreatment Goal(s) related to diagnosis / functional impairment(s)  Goal 1: Patient will identify the truths about himself    I will know I've met my goal when I feel I know who I am again.      Patient has reviewed and agreed to the above plan.      Devonte Eid PsyD  December 17, 2023

## 2023-01-18 NOTE — TELEPHONE ENCOUNTER
Ozempic (0.25 or 0.5 MG/DOSE) Subcutaneous Solution Pen-injector 2 MG/1.5ML  Last Written Prescription Date:   10/11/2022  Last Fill Quantity: 1.5,   # refills: 1  Last Office Visit :  12/14/2022  Future Office visit:  None    Routing refill request to provider for review/approval because:  Please clarify dose.  Orders in chart and orders from pharmacy do not match.     Please review and send new order.       Cande Leiva RN  Central Triage Red Flags/Med Refills

## 2023-01-27 DIAGNOSIS — F43.21 ADJUSTMENT DISORDER WITH DEPRESSED MOOD: ICD-10-CM

## 2023-01-27 DIAGNOSIS — M54.2 NECK PAIN: ICD-10-CM

## 2023-01-27 DIAGNOSIS — C03.9 PRIMARY CANCER OF ALVEOLAR RIDGE MUCOSA (H): ICD-10-CM

## 2023-01-27 DIAGNOSIS — C31.0 MAXILLARY SINUS CANCER (H): ICD-10-CM

## 2023-01-27 NOTE — TELEPHONE ENCOUNTER
Medication: Cyclobenzaprine 5 MG tablet  Last prescribing provider: Marivel Hurtado MD    Last clinic visit date: 9/30/22 with Dr. Hurtado    Any missed appointments or no-shows since last clinic visit?: No    Recommendations for requested medication (if none, N/A): NA    Next clinic visit date: 4/14/23 with Dr. Hurtado    Any other pertinent information (if none, N/A): NA    Pended and Routed to Provider.

## 2023-01-29 ENCOUNTER — HEALTH MAINTENANCE LETTER (OUTPATIENT)
Age: 58
End: 2023-01-29

## 2023-02-01 RX ORDER — CYCLOBENZAPRINE HCL 5 MG
5 TABLET ORAL 3 TIMES DAILY PRN
Qty: 90 TABLET | Refills: 1 | Status: SHIPPED | OUTPATIENT
Start: 2023-02-01

## 2023-02-06 ENCOUNTER — VIRTUAL VISIT (OUTPATIENT)
Dept: ONCOLOGY | Facility: CLINIC | Age: 58
End: 2023-02-06
Attending: PSYCHOLOGIST
Payer: COMMERCIAL

## 2023-02-06 DIAGNOSIS — F43.21 ADJUSTMENT DISORDER WITH DEPRESSED MOOD: Primary | ICD-10-CM

## 2023-02-06 PROCEDURE — 90832 PSYTX W PT 30 MINUTES: CPT | Mod: 95 | Performed by: PSYCHOLOGIST

## 2023-02-06 NOTE — PROGRESS NOTES
[facility] Oncology- Psychotherapy (Provider Oversight- Dr. Brant Vivar)                                    Progress Note    Patient Name: Devonte Tucker  Date: 2/6/23         Service Type: Individual      Session Start Time: 8:00  Session End Time: 8:27     Session Length: 27  minutes    Session #: 11    Attendees: Client attended alone    Service Modality:  Video Visit:      Provider verified identity through the following two step process.  Patient provided:  Patient photo    Telemedicine Visit: The patient's condition can be safely assessed and treated via synchronous audio and visual telemedicine encounter.      Reason for Telemedicine Visit: Services only offered telehealth    Originating Site (Patient Location): Patient's home    Distant Site (Provider Location): Provider Remote Setting- Home Office    Consent:  The patient/guardian has verbally consented to: the potential risks and benefits of telemedicine (video visit) versus in person care; bill my insurance or make self-payment for services provided; and responsibility for payment of non-covered services.     Patient would like the video invitation sent by:  My Chart    Mode of Communication:  Video Conference via Amwell    As the provider I attest to compliance with applicable laws and regulations related to telemedicine.    DATA  Interactive Complexity: No  Crisis: No     Current / Ongoing Stressors and Concerns:   Recovery from cancer, cancer treatment long-term effects, sadness, changes in identity, depressed mood, feeling overwhelmed, feeling lonely     Treatment Objective(s) Addressed in This Session:   identify coping strategies to more effectively address stressors     Intervention: Motivational Interviewing: to help him connect with what is true about him post cancer treatment        ASSESSMENT: Current Emotional / Mental Status (status of significant symptoms):   Risk status (Self / Other harm or suicidal ideation)   Patient  "denies current fears or concerns for personal safety.   Patient denies current or recent suicidal ideation or behaviors.   Patient denies current or recent homicidal ideation or behaviors.   Patient denies current or recent self injurious behavior or ideation.   Patient denies other safety concerns.   Patient reports there has been no change in risk factors since their last session.     Patient reports there has been no change in protective factors since their last session.     Recommended that patient call 911 or go to the local ED should there be a change in any of these risk factors.     Appearance:   Appropriate    Eye Contact:   Good    Psychomotor Behavior: Normal    Attitude:   Cooperative    Orientation:   All   Speech    Rate / Production: Normal     Volume:  Normal    Mood:    Normal   Affect:    Appropriate    Thought Content:  Clear    Thought Form:  Coherent  Logical    Insight:    Good      Medication Review:   No changes to current psychiatric medication(s)     Medication Compliance:   Yes     Changes in Health Issues:   None reported     Chemical Use Review:   Substance Use: Chemical use reviewed, no active concerns identified      Tobacco Use: No current tobacco use.      Diagnosis:  1. Adjustment disorder with depressed mood        Collateral Reports Completed:   Not Applicable    PLAN: (Patient Tasks / Therapist Tasks / Other)  He is to identify what is true about him after cancer treatment. This will assist him in reconnecting with his identity as well as his strengths. After strengths are identified, he can begin to set goals for his \"new normal.\"    Devonte Eid, Alina                                                         ______________________________________________________________________    Individual Treatment Plan    Patient's Name: Devonte Tucker  YOB: 1965    Date of Creation: 5/17/22  Date Treatment Plan Last Reviewed/Revised: 7/25/22    DSM5 Diagnoses: 311 " (F32.8) Other/unspec. Depressive Disorder  Psychosocial / Contextual Factors: cancer, long-term side-effects, loss of identity, depressed mood  Anticipated number of session for this episode of care: 12  Anticipation frequency of session: Every other week  Anticipated Duration of each session: 16-37 minutes  Treatment plan will be reviewed in 90 days or when goals have been changed.       MeasurableTreatment Goal(s) related to diagnosis / functional impairment(s)  Goal 1: Patient will identify the truths about himself    I will know I've met my goal when I feel I know who I am again.      Patient has reviewed and agreed to the above plan.      Devonte Eid PsyD  February 6, 2023

## 2023-02-13 DIAGNOSIS — E11.9 TYPE 2 DIABETES MELLITUS WITHOUT COMPLICATION, WITHOUT LONG-TERM CURRENT USE OF INSULIN (H): Primary | ICD-10-CM

## 2023-02-15 ENCOUNTER — OFFICE VISIT (OUTPATIENT)
Dept: OTOLARYNGOLOGY | Facility: CLINIC | Age: 58
End: 2023-02-15
Payer: COMMERCIAL

## 2023-02-15 VITALS
BODY MASS INDEX: 44.42 KG/M2 | WEIGHT: 283 LBS | SYSTOLIC BLOOD PRESSURE: 126 MMHG | HEART RATE: 100 BPM | DIASTOLIC BLOOD PRESSURE: 78 MMHG | HEIGHT: 67 IN

## 2023-02-15 DIAGNOSIS — C03.0 SQUAMOUS CELL CARCINOMA OF MAXILLARY ALVEOLAR RIDGE (H): Primary | ICD-10-CM

## 2023-02-15 PROCEDURE — 99213 OFFICE O/P EST LOW 20 MIN: CPT | Performed by: REGISTERED NURSE

## 2023-02-15 ASSESSMENT — PAIN SCALES - GENERAL: PAINLEVEL: NO PAIN (0)

## 2023-02-15 NOTE — PROGRESS NOTES
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  Dr. Treviño performed a left palatectomy on 5/9/2019 with Dr. Atwood performing a left radial forearm free flap reconstruction.  He had postop radiation therapy completed on 7/22/2019, 6000 cGy. Was last seen in ENT clinic by Dr. Atwood 6/30/21. Surveillance CT scans completed on 9/20/21 and were negative for recurrence or metastases. Patient is scheduled for repeat surveillance CT scans on 9/6/22 ordered by Dr. Verdin in Radiation Oncology.    Interval History:   Patient comes in today for routine surveillance. He continues to do well without any significant concerns. Plans to meet with Dr. Bryant davidson for management of neck tension.     Patient denies any dysphagia, odynophagia, new sore throat, mouth pain, ear pain, bleeding from the mouth, hemoptysis, voice changes or lumps/bumps of the neck.    Past Medical History:  Past Medical History:   Diagnosis Date     Allergic rhinitis experienced it for many years     Anxiety 2019     Benign positional vertigo 05/2019     Depressive disorder 2019     Diabetes (H)      Gastroesophageal reflux disease 05/2021     Head injury 04/2018     Hypertension      Maxillary sinus cancer (H)      Obesity      Sleep apnea May 2022       Past Surgical History:  Past Surgical History:   Procedure Laterality Date     ------------OTHER-------------      Mucosa and bone biopsy     COLONOSCOPY N/A 5/24/2021    Procedure: COLONOSCOPY, WITH POLYPECTOMY;  Surgeon: Terrell De Luna MD;  Location: [facility] OR     EXPLORE NECK Left 5/9/2019    Procedure: Left Neck Exploration;  Surgeon: Jett Treviño MD;  Location: [facility] OR     EXTRACTION(S) DENTAL      x2 under general anesthesia     GRAFT FREE VASCULARIZED (LOCATION) Right 5/9/2019    Procedure: Left Radial Forearm Free Flap  (soft tissue);  Surgeon: Sumit Atwood MD;  Location: [facility] OR     GRAFT SKIN SPLIT THICKNESS FROM EXTREMITY Right 5/9/2019    Procedure: Graft skin split thickness from right thigh, nasogastric feeding tube placement;  Surgeon: Sumit Atwood MD;  Location: [facility] OR     IR LYMPH NODE BIOPSY  4/4/2019     OSTEOTOMY MAXILLA Left 5/9/2019    Procedure: Left Infrastructure Maxillectomy,;  Surgeon: Jett Treviño MD;  Location: [facility] OR       Medications:  Current Outpatient Medications   Medication Sig Dispense Refill     acetaminophen (TYLENOL) 325 MG tablet Take 2 tablets (650 mg) by mouth every 4 hours as needed for mild pain 100 tablet 0     aspirin (ASA) 81 MG EC tablet Take 1 tablet (81 mg) by mouth daily 100 tablet 3     atorvastatin (LIPITOR) 20 MG tablet TAKE 1 TABLET BY MOUTH EVERY MORNING 90 tablet 1     blood glucose (NO BRAND SPECIFIED) lancets standard Use to test blood sugar  4 times daily or as directed. ( [place] m[place]let lancet) 400 each 0     blood glucose (NO BRAND SPECIFIED) test strip Use to test blood sugar 3 times daily or as directed. 300 strip 3     blood glucose monitoring (ISRALE MICROLET) lancets Use to test blood sugar 4 times daily or as directed. 200 each 3     cevimeline (EVOXAC) 30 MG capsule TAKE ONE CAPSULE BY MOUTH THREE TIMES DAILY 270 capsule 3     cyclobenzaprine (FLEXERIL) 5 MG tablet Take 1 tablet (5 mg) by mouth 3 times daily as needed for muscle spasms 90 tablet 1     doxazosin (CARDURA) 1 MG tablet Take 1 tablet (1 mg) by mouth daily 30 tablet 0     gabapentin (NEURONTIN) 300 MG capsule Take 1 capsule in am, 1 capsule in afternoon and 2 capsules at bedtime. 275 capsule 1     lisinopril (ZESTRIL) 10 MG tablet Take 1 tablet (10 mg) by mouth every morning 30 tablet 3     metFORMIN (GLUCOPHAGE) 1000 MG tablet Take 1 tab po BID. 180 tablet 3     Misc Natural Product Nasal (PONARIS) SOLN With head tilted back, place 1 or 2 drops in each nostril once daily. 30 mL 3     multivitamin  "w/minerals (THERA-VIT-M) tablet Take 1 tablet by mouth daily 30 tablet 0     omeprazole (PRILOSEC) 20 MG DR capsule Take 1 capsule (20 mg) by mouth daily 30 capsule 3     Semaglutide, 1 MG/DOSE, (OZEMPIC) 4 MG/3ML pen Inject 1 mg Subcutaneous every 7 days 3 mL 3     sodium fluoride dental gel (DENTAGEL) 1.1 % GEL topical gel Apply to affected area At Bedtime 112 g 11     tamsulosin (FLOMAX) 0.4 MG capsule 1 capsule         Allergies:  Allergies   Allergen Reactions     Seasonal Allergies Other (See Comments) and Shortness Of Breath        Social History:  Social History     Tobacco Use     Smoking status: Never     Smokeless tobacco: Never   Substance Use Topics     Alcohol use: No     Drug use: No     ROS: 10 point ROS neg other than the symptoms noted above in the HPI.    Physical Exam:    /78 (BP Location: Left arm, Patient Position: Sitting, Cuff Size: Adult Large)   Pulse 100   Ht 1.702 m (5' 7\")   Wt 128.4 kg (283 lb)   BMI 44.32 kg/m    Wt Readings from Last 3 Encounters:   02/15/23 128.4 kg (283 lb)   09/26/22 131.5 kg (290 lb)   07/13/22 131.1 kg (289 lb)     Constitutional:  The patient was unaccompanied, well-groomed, and in no acute distress.     Neurologic: Alert and oriented x 3.    Psychiatric: The patient's affect was calm, cooperative, and appropriate.     Communication:  Normal; communicates verbally, normal voice quality.   Respiratory: Breathing comfortably without stridor or exertion of accessory muscles.    Head/Face:  Normocephalic and atraumatic.  No lesions or scars.    Oral Cavity: Healthy appearing flap in left maxilla. White mucosalized area along medial portion. Tenderness with palpation around tooth #8. Normal tongue, floor of mouth, and  buccal mucosa.  No lesions or masses on inspection or palpation.    Oropharynx: Normal mucosa, palate symmetric with normal elevation. No abnormal lymph tissue in the oropharynx.     Neck: Well healed left neck incision. Tightness in the left " SCM. Normal range of motion   Lymphatic: There is no palpable lymphadenopathy in the neck.      Labs and Imaging Reviewed:  Imagin22  CT neck  Impression:  No evidence of local or metastatic cancer recurrence.    CT chest  IMPRESSION: No findings to suggest metastatic disease in the chest.  Multiple punctate calcified granulomas which may indicate  Histoplasmosis.    Labs:  TSH   Date Value Ref Range Status   2022 3.50 0.30 - 4.20 uIU/mL Final   2022 4.01 0.30 - 5.00 uIU/mL Final   2021 3.01 0.40 - 4.00 mU/L Final   2021 2.38 0.40 - 4.00 mU/L Final       Assessment/Plan:  1. 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 He is doing well with no evidence of disease on today's exam. He is scheduled for surveillance imaging in 2022. Continue to work with Dr. Hurtado for neck pain. Continue to monitor swallowing.     Reviewed signs and symptoms that would necessitate a sooner exam including new sore throat, mouth pain, ear pain, dysphagia, bleeding from the mouth or lumps/bumps of the neck.     Otherwise, patient will follow up in 6 months for continued surveillance.    Marie Coburn DNP, APRN, CNP  Otolaryngology  Head & Neck Surgery  [phone]    20 minutes spent on the date of the encounter doing chart review, history and exam, documentation and further activities per the note.

## 2023-02-15 NOTE — PATIENT INSTRUCTIONS
- You were seen in the ENT Clinic today by Marie Coburn NP.   - Follow up in 6 months with CT scans.  - Please send a Holographic [employer] for [employer] message or call the ENT clinic at [phone] with any questions or concerns.

## 2023-02-15 NOTE — LETTER
2/15/2023       RE: Devonte Tucker  [address]  [place] [address]     Dear Colleague,    Thank you for referring your patient, Devonte Tucker, to the [facility] [place] [facility]. Please see a copy of my visit note below.    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  Dr. Treviño performed a left palatectomy on 5/9/2019 with Dr. Atwood performing a left radial forearm free flap reconstruction.  He had postop radiation therapy completed on 7/22/2019, 6000 cGy. Was last seen in ENT clinic by Dr. Atwood 6/30/21. Surveillance CT scans completed on 9/20/21 and were negative for recurrence or metastases. Patient is scheduled for repeat surveillance CT scans on 9/6/22 ordered by Dr. Verdin in Radiation Oncology.    Interval History:   Patient comes in today for routine surveillance. He continues to do well without any significant concerns. Plans to meet with Dr. Bryant davidson for management of neck tension.     Patient denies any dysphagia, odynophagia, new sore throat, mouth pain, ear pain, bleeding from the mouth, hemoptysis, voice changes or lumps/bumps of the neck.    Past Medical History:  Past Medical History:   Diagnosis Date     Allergic rhinitis experienced it for many years     Anxiety 2019     Benign positional vertigo 05/2019     Depressive disorder 2019     Diabetes (H)      Gastroesophageal reflux disease 05/2021     Head injury 04/2018     Hypertension      Maxillary sinus cancer (H)      Obesity      Sleep apnea May 2022       Past Surgical History:  Past Surgical History:   Procedure Laterality Date     ------------OTHER-------------      Mucosa and bone biopsy     COLONOSCOPY N/A 5/24/2021    Procedure: COLONOSCOPY, WITH  POLYPECTOMY;  Surgeon: Terrell De Luna MD;  Location: [facility] OR     EXPLORE NECK Left 5/9/2019    Procedure: Left Neck Exploration;  Surgeon: Jett Treviño MD;  Location: [facility] OR     EXTRACTION(S) DENTAL      x2 under general anesthesia     GRAFT FREE VASCULARIZED (LOCATION) Right 5/9/2019    Procedure: Left Radial Forearm Free Flap (soft tissue);  Surgeon: Sumit Atwood MD;  Location: [facility] OR     GRAFT SKIN SPLIT THICKNESS FROM EXTREMITY Right 5/9/2019    Procedure: Graft skin split thickness from right thigh, nasogastric feeding tube placement;  Surgeon: Sumit Atwood MD;  Location: [facility] OR     IR LYMPH NODE BIOPSY  4/4/2019     OSTEOTOMY MAXILLA Left 5/9/2019    Procedure: Left Infrastructure Maxillectomy,;  Surgeon: Jett Treviño MD;  Location: [facility] OR       Medications:  Current Outpatient Medications   Medication Sig Dispense Refill     acetaminophen (TYLENOL) 325 MG tablet Take 2 tablets (650 mg) by mouth every 4 hours as needed for mild pain 100 tablet 0     aspirin (ASA) 81 MG EC tablet Take 1 tablet (81 mg) by mouth daily 100 tablet 3     atorvastatin (LIPITOR) 20 MG tablet TAKE 1 TABLET BY MOUTH EVERY MORNING 90 tablet 1     blood glucose (NO BRAND SPECIFIED) lancets standard Use to test blood sugar  4 times daily or as directed. ( Andrew micolet lancet) 400 each 0     blood glucose (NO BRAND SPECIFIED) test strip Use to test blood sugar 3 times daily or as directed. 300 strip 3     blood glucose monitoring (ANDREW MICROLET) lancets Use to test blood sugar 4 times daily or as directed. 200 each 3     cevimeline (EVOXAC) 30 MG capsule TAKE ONE CAPSULE BY MOUTH THREE TIMES DAILY 270 capsule 3     cyclobenzaprine (FLEXERIL) 5 MG tablet Take 1 tablet (5 mg) by mouth 3 times daily as needed for muscle spasms 90 tablet 1     doxazosin (CARDURA) 1 MG tablet Take 1 tablet (1 mg) by mouth daily 30 tablet 0     gabapentin (NEURONTIN) 300 MG capsule Take 1 capsule in am, 1 capsule in afternoon and 2 capsules  "at bedtime. 275 capsule 1     lisinopril (ZESTRIL) 10 MG tablet Take 1 tablet (10 mg) by mouth every morning 30 tablet 3     metFORMIN (GLUCOPHAGE) 1000 MG tablet Take 1 tab po BID. 180 tablet 3     Misc Natural Product Nasal (PONARIS) SOLN With head tilted back, place 1 or 2 drops in each nostril once daily. 30 mL 3     multivitamin w/minerals (THERA-VIT-M) tablet Take 1 tablet by mouth daily 30 tablet 0     omeprazole (PRILOSEC) 20 MG DR capsule Take 1 capsule (20 mg) by mouth daily 30 capsule 3     Semaglutide, 1 MG/DOSE, (OZEMPIC) 4 MG/3ML pen Inject 1 mg Subcutaneous every 7 days 3 mL 3     sodium fluoride dental gel (DENTAGEL) 1.1 % GEL topical gel Apply to affected area At Bedtime 112 g 11     tamsulosin (FLOMAX) 0.4 MG capsule 1 capsule         Allergies:  Allergies   Allergen Reactions     Seasonal Allergies Other (See Comments) and Shortness Of Breath        Social History:  Social History     Tobacco Use     Smoking status: Never     Smokeless tobacco: Never   Substance Use Topics     Alcohol use: No     Drug use: No     ROS: 10 point ROS neg other than the symptoms noted above in the HPI.    Physical Exam:    /78 (BP Location: Left arm, Patient Position: Sitting, Cuff Size: Adult Large)   Pulse 100   Ht 1.702 m (5' 7\")   Wt 128.4 kg (283 lb)   BMI 44.32 kg/m    Wt Readings from Last 3 Encounters:   02/15/23 128.4 kg (283 lb)   09/26/22 131.5 kg (290 lb)   07/13/22 131.1 kg (289 lb)     Constitutional:  The patient was unaccompanied, well-groomed, and in no acute distress.     Neurologic: Alert and oriented x 3.    Psychiatric: The patient's affect was calm, cooperative, and appropriate.     Communication:  Normal; communicates verbally, normal voice quality.   Respiratory: Breathing comfortably without stridor or exertion of accessory muscles.    Head/Face:  Normocephalic and atraumatic.  No lesions or scars.    Oral Cavity: Healthy appearing flap in left maxilla. White mucosalized area along " medial portion. Tenderness with palpation around tooth #8. Normal tongue, floor of mouth, and  buccal mucosa.  No lesions or masses on inspection or palpation.    Oropharynx: Normal mucosa, palate symmetric with normal elevation. No abnormal lymph tissue in the oropharynx.     Neck: Well healed left neck incision. Tightness in the left SCM. Normal range of motion   Lymphatic: There is no palpable lymphadenopathy in the neck.      Labs and Imaging Reviewed:  Imagin22  CT neck  Impression:  No evidence of local or metastatic cancer recurrence.    CT chest  IMPRESSION: No findings to suggest metastatic disease in the chest.  Multiple punctate calcified granulomas which may indicate  Histoplasmosis.    Labs:  TSH   Date Value Ref Range Status   2022 3.50 0.30 - 4.20 uIU/mL Final   2022 4.01 0.30 - 5.00 uIU/mL Final   2021 3.01 0.40 - 4.00 mU/L Final   2021 2.38 0.40 - 4.00 mU/L Final       Assessment/Plan:  1. 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 He is doing well with no evidence of disease on today's exam. He is scheduled for surveillance imaging in 2022. Continue to work with Dr. Hurtado for neck pain. Continue to monitor swallowing.     Reviewed signs and symptoms that would necessitate a sooner exam including new sore throat, mouth pain, ear pain, dysphagia, bleeding from the mouth or lumps/bumps of the neck.     Otherwise, patient will follow up in 6 months for continued surveillance.    Marie Coburn DNP, APRN, CNP  Otolaryngology  Head & Neck Surgery  [phone]    20 minutes spent on the date of the encounter doing chart review, history and exam, documentation and further activities per the note.

## 2023-04-03 ENCOUNTER — VIRTUAL VISIT (OUTPATIENT)
Dept: ONCOLOGY | Facility: CLINIC | Age: 58
End: 2023-04-03
Attending: PSYCHOLOGIST
Payer: COMMERCIAL

## 2023-04-03 DIAGNOSIS — F43.21 ADJUSTMENT DISORDER WITH DEPRESSED MOOD: Primary | ICD-10-CM

## 2023-04-03 PROCEDURE — 90832 PSYTX W PT 30 MINUTES: CPT | Mod: VID | Performed by: PSYCHOLOGIST

## 2023-04-03 NOTE — LETTER
4/3/2023         RE: Devonte Tucker  [address]  [place] [address]        Dear Colleague,    Thank you for referring your patient, Devonte Tucker, to the [facility] CANCER [facility]IC. Please see a copy of my visit note below.          [facility] Oncology- Psychotherapy (Provider Oversight- Dr. Brant Vivar)                                    Progress Note    Patient Name: Devonte Tucker  Date: 3/6/23         Service Type: Individual      Session Start Time: 8:00  Session End Time: 8:33     Session Length: 33  minutes    Session #: 13    Attendees: Client attended alone    Service Modality:  Video Visit:      Provider verified identity through the following two step process.  Patient provided:  Patient photo    Telemedicine Visit: The patient's condition can be safely assessed and treated via synchronous audio and visual telemedicine encounter.      Reason for Telemedicine Visit: Services only offered telehealth    Originating Site (Patient Location): Patient's home    Distant Site (Provider Location): Provider Remote Setting- Home Office    Consent:  The patient/guardian has verbally consented to: the potential risks and benefits of telemedicine (video visit) versus in person care; bill my insurance or make self-payment for services provided; and responsibility for payment of non-covered services.     Patient would like the video invitation sent by:  My Chart    Mode of Communication:  Video Conference via Amwell    As the provider I attest to compliance with applicable laws and regulations related to telemedicine.    DATA  Interactive Complexity: No  Crisis: No     Current / Ongoing Stressors and Concerns:   Recovery from cancer, cancer treatment long-term effects, sadness, changes in identity, depressed mood, feeling overwhelmed, feeling lonely     Treatment Objective(s) Addressed in This Session:   identify coping strategies to more effectively address  "stressors     Intervention: Motivational Interviewing: to help him connect with what is true about him post cancer treatment        ASSESSMENT: Current Emotional / Mental Status (status of significant symptoms):   Risk status (Self / Other harm or suicidal ideation)   Patient denies current fears or concerns for personal safety.   Patient denies current or recent suicidal ideation or behaviors.   Patient denies current or recent homicidal ideation or behaviors.   Patient denies current or recent self injurious behavior or ideation.   Patient denies other safety concerns.   Patient reports there has been no change in risk factors since their last session.     Patient reports there has been no change in protective factors since their last session.     Recommended that patient call 911 or go to the local ED should there be a change in any of these risk factors.     Appearance:   Appropriate    Eye Contact:   Good    Psychomotor Behavior: Normal    Attitude:   Cooperative    Orientation:   All   Speech    Rate / Production: Normal     Volume:  Normal    Mood:    Normal   Affect:    Appropriate    Thought Content:  Clear    Thought Form:  Coherent  Logical    Insight:    Good      Medication Review:   No changes to current psychiatric medication(s)     Medication Compliance:   Yes     Changes in Health Issues:   None reported     Chemical Use Review:   Substance Use: Chemical use reviewed, no active concerns identified      Tobacco Use: No current tobacco use.      Diagnosis:  1. Adjustment disorder with depressed mood        Collateral Reports Completed:   Not Applicable    PLAN: (Patient Tasks / Therapist Tasks / Other)  He is to identify what is true about him after cancer treatment. This will assist him in reconnecting with his identity as well as his strengths. After strengths are identified, he can begin to set goals for his \"new normal.\"    Devonte Eid PsyD                                                   "       ______________________________________________________________________    Individual Treatment Plan    Patient's Name: Devonte Tucker  YOB: 1965    Date of Creation: 5/17/22  Date Treatment Plan Last Reviewed/Revised: 7/25/22    DSM5 Diagnoses: 311 (F32.8) Other/unspec. Depressive Disorder  Psychosocial / Contextual Factors: cancer, long-term side-effects, loss of identity, depressed mood  Anticipated number of session for this episode of care: 12  Anticipation frequency of session: Every other week  Anticipated Duration of each session: 16-37 minutes  Treatment plan will be reviewed in 90 days or when goals have been changed.       MeasurableTreatment Goal(s) related to diagnosis / functional impairment(s)  Goal 1: Patient will identify the truths about himself    I will know I've met my goal when I feel I know who I am again.      Patient has reviewed and agreed to the above plan.      Devonte Eid PsyD  April 3, 2023

## 2023-04-03 NOTE — PROGRESS NOTES
[facility] Oncology- Psychotherapy (Provider Oversight- Dr. Brant Vivar)                                    Progress Note    Patient Name: Devonte Tucker  Date: 3/6/23         Service Type: Individual      Session Start Time: 8:00  Session End Time: 8:33     Session Length: 33  minutes    Session #: 13    Attendees: Client attended alone    Service Modality:  Video Visit:      Provider verified identity through the following two step process.  Patient provided:  Patient photo    Telemedicine Visit: The patient's condition can be safely assessed and treated via synchronous audio and visual telemedicine encounter.      Reason for Telemedicine Visit: Services only offered telehealth    Originating Site (Patient Location): Patient's home    Distant Site (Provider Location): Provider Remote Setting- Home Office    Consent:  The patient/guardian has verbally consented to: the potential risks and benefits of telemedicine (video visit) versus in person care; bill my insurance or make self-payment for services provided; and responsibility for payment of non-covered services.     Patient would like the video invitation sent by:  My Chart    Mode of Communication:  Video Conference via Amwell    As the provider I attest to compliance with applicable laws and regulations related to telemedicine.    DATA  Interactive Complexity: No  Crisis: No     Current / Ongoing Stressors and Concerns:   Recovery from cancer, cancer treatment long-term effects, sadness, changes in identity, depressed mood, feeling overwhelmed, feeling lonely     Treatment Objective(s) Addressed in This Session:   identify coping strategies to more effectively address stressors     Intervention: Motivational Interviewing: to help him connect with what is true about him post cancer treatment        ASSESSMENT: Current Emotional / Mental Status (status of significant symptoms):   Risk status (Self / Other harm or suicidal ideation)   Patient  "denies current fears or concerns for personal safety.   Patient denies current or recent suicidal ideation or behaviors.   Patient denies current or recent homicidal ideation or behaviors.   Patient denies current or recent self injurious behavior or ideation.   Patient denies other safety concerns.   Patient reports there has been no change in risk factors since their last session.     Patient reports there has been no change in protective factors since their last session.     Recommended that patient call 911 or go to the local ED should there be a change in any of these risk factors.     Appearance:   Appropriate    Eye Contact:   Good    Psychomotor Behavior: Normal    Attitude:   Cooperative    Orientation:   All   Speech    Rate / Production: Normal     Volume:  Normal    Mood:    Normal   Affect:    Appropriate    Thought Content:  Clear    Thought Form:  Coherent  Logical    Insight:    Good      Medication Review:   No changes to current psychiatric medication(s)     Medication Compliance:   Yes     Changes in Health Issues:   None reported     Chemical Use Review:   Substance Use: Chemical use reviewed, no active concerns identified      Tobacco Use: No current tobacco use.      Diagnosis:  1. Adjustment disorder with depressed mood        Collateral Reports Completed:   Not Applicable    PLAN: (Patient Tasks / Therapist Tasks / Other)  He is to identify what is true about him after cancer treatment. This will assist him in reconnecting with his identity as well as his strengths. After strengths are identified, he can begin to set goals for his \"new normal.\"    Devonte Eid, Alina                                                         ______________________________________________________________________    Individual Treatment Plan    Patient's Name: Devonte Tucker  YOB: 1965    Date of Creation: 5/17/22  Date Treatment Plan Last Reviewed/Revised: 7/25/22    DSM5 Diagnoses: 311 " (F32.8) Other/unspec. Depressive Disorder  Psychosocial / Contextual Factors: cancer, long-term side-effects, loss of identity, depressed mood  Anticipated number of session for this episode of care: 12  Anticipation frequency of session: Every other week  Anticipated Duration of each session: 16-37 minutes  Treatment plan will be reviewed in 90 days or when goals have been changed.       MeasurableTreatment Goal(s) related to diagnosis / functional impairment(s)  Goal 1: Patient will identify the truths about himself    I will know I've met my goal when I feel I know who I am again.      Patient has reviewed and agreed to the above plan.      Devonte Eid PsyD  April 3, 2023

## 2023-04-11 ENCOUNTER — LAB REQUISITION (OUTPATIENT)
Dept: LAB | Facility: CLINIC | Age: 58
End: 2023-04-11

## 2023-04-11 DIAGNOSIS — E55.9 VITAMIN D DEFICIENCY, UNSPECIFIED: ICD-10-CM

## 2023-04-11 DIAGNOSIS — E78.00 PURE HYPERCHOLESTEROLEMIA, UNSPECIFIED: ICD-10-CM

## 2023-04-11 DIAGNOSIS — I10 ESSENTIAL (PRIMARY) HYPERTENSION: ICD-10-CM

## 2023-04-11 LAB
ANION GAP SERPL CALCULATED.3IONS-SCNC: 18 MMOL/L (ref 7–15)
BUN SERPL-MCNC: 9.7 MG/DL (ref 6–20)
CALCIUM SERPL-MCNC: 9.9 MG/DL (ref 8.6–10)
CHLORIDE SERPL-SCNC: 98 MMOL/L (ref 98–107)
CHOLEST SERPL-MCNC: 111 MG/DL
CREAT SERPL-MCNC: 0.83 MG/DL (ref 0.67–1.17)
DEPRECATED CALCIDIOL+CALCIFEROL SERPL-MC: 53 UG/L (ref 20–75)
DEPRECATED HCO3 PLAS-SCNC: 21 MMOL/L (ref 22–29)
GFR SERPL CREATININE-BSD FRML MDRD: >90 ML/MIN/1.73M2
GLUCOSE SERPL-MCNC: 101 MG/DL (ref 70–99)
HDLC SERPL-MCNC: 39 MG/DL
LDLC SERPL CALC-MCNC: 54 MG/DL
NONHDLC SERPL-MCNC: 72 MG/DL
POTASSIUM SERPL-SCNC: 4.6 MMOL/L (ref 3.4–5.3)
SODIUM SERPL-SCNC: 137 MMOL/L (ref 136–145)
TRIGL SERPL-MCNC: 91 MG/DL

## 2023-04-11 PROCEDURE — 80061 LIPID PANEL: CPT | Performed by: NURSE PRACTITIONER

## 2023-04-11 PROCEDURE — 80048 BASIC METABOLIC PNL TOTAL CA: CPT | Performed by: NURSE PRACTITIONER

## 2023-04-11 PROCEDURE — 82306 VITAMIN D 25 HYDROXY: CPT | Performed by: NURSE PRACTITIONER

## 2023-04-14 ENCOUNTER — TELEPHONE (OUTPATIENT)
Dept: ONCOLOGY | Facility: CLINIC | Age: 58
End: 2023-04-14

## 2023-04-14 ENCOUNTER — VIRTUAL VISIT (OUTPATIENT)
Dept: ONCOLOGY | Facility: CLINIC | Age: 58
End: 2023-04-14
Attending: STUDENT IN AN ORGANIZED HEALTH CARE EDUCATION/TRAINING PROGRAM
Payer: COMMERCIAL

## 2023-04-14 DIAGNOSIS — C31.0 MAXILLARY SINUS CANCER (H): Primary | ICD-10-CM
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
## 2023-04-14 DIAGNOSIS — L90.5 SCAR TISSUE: ICD-10-CM

## 2023-04-14 DIAGNOSIS — M54.2 NECK PAIN: ICD-10-CM

## 2023-04-14 DIAGNOSIS — L59.8 RADIATION FIBROSIS OF SOFT TISSUE FROM THERAPEUTIC PROCEDURE: ICD-10-CM

## 2023-04-14 PROCEDURE — 99215 OFFICE O/P EST HI 40 MIN: CPT | Mod: VID | Performed by: STUDENT IN AN ORGANIZED HEALTH CARE EDUCATION/TRAINING PROGRAM

## 2023-04-14 PROCEDURE — G0463 HOSPITAL OUTPT CLINIC VISIT: HCPCS | Mod: PN,GT | Performed by: STUDENT IN AN ORGANIZED HEALTH CARE EDUCATION/TRAINING PROGRAM

## 2023-04-14 RX ORDER — CYCLOBENZAPRINE HCL 5 MG
5 TABLET ORAL 2 TIMES DAILY PRN
Qty: 60 TABLET | Refills: 2 | Status: SHIPPED | OUTPATIENT
Start: 2023-04-14 | End: 2024-04-19

## 2023-04-14 NOTE — NURSING NOTE
Is the patient currently in the state of [place]? YES    Visit mode:VIDEO    If the visit is dropped, the patient can be reconnected by: VIDEO VISIT: Text to cell phone: [phone]    Will anyone else be joining the visit? NO    How would you like to obtain your AVS? MyChart    Are changes needed to the allergy or medication list? NO    Reason for visit: Video Visit (Cancer rehab)    EMERY Clemente/BARBARA

## 2023-04-14 NOTE — PATIENT INSTRUCTIONS
1.  Continue your lymphatic massage and neck stretches twice daily.  2.  Continue Flexeril twice a day as needed.  Your prescription was refilled today.  3.  Follow-up with Dr. Hurtado in 6 months for virtual visit.

## 2023-04-14 NOTE — LETTER
4/14/2023         RE: Devonte Tucker  [address]  [place] [address]        Dear Colleague,    Thank you for referring your patient, Devonte Tucker, to the [facility]. Please see a copy of my visit note below.    Virtual Visit Details    Type of service:  Video Visit     Originating Location (pt. Location): Home  Distant Location (provider location):  On-site  Platform used for Video Visit: [facility]   PM&R clinic note        Interval history:     Devonte Tucker presents to clinic today for follow up reg his rehab needs.   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  Was last seen in clinic on 9/30/22.  Recommendations included:  Therapy/equipment/braces:  Continue daily lymphedema massage and neck stretches to help with symptoms.  Continue swallow exercises per SLP recommendations.  Medications:  Continue Flexeril 5 mg twice daily, and can add a third dose daily if symptoms are difficult as needed.  Has 1 refill left, and will call when he needs more refills.  Follow up: 6 months.    Oncology History:  -Diagnosed after he presented with a several year history of oral cavity pain and precancerous lesions of the left upper gingiva.   -He underwent a left infrastructure maxillectomy and left level I lymph node dissection on 5/9/2019 with pathology revealing a 1.3 cm well-differentiated squamous cell carcinoma with 13 mm depth of invasion and invasion into the underlying bone.   -He received adjuvant radiotherapy as described above for improved local disease control.  -He was last seen by ENT on 3/9/2022, at which time he reported persistent stable pain in the anterior hard palate and left maxilla.  -Seen by Dr. Verdin on 4/27/22 and complained of low mood with ongoing toxicities and frustrations after cancer treatment. Persistent xerostomia, dysphagia to dry foods,  continued stiffness and pain in the left neck. Plan for follow up with rad onc MD in 3/2023 and NP in 9/2022 with CT neck and chest. Repeat TSH due in 10/22. Referral to Oncology Psych and PM&R  - Saw Marietta Coburn for follow up on 2/15/2023 for follow-up.  Doing well on surveillance with no evidence of disease on exam.    Symptoms,  Patient was seen for a follow-up virtual visit today.  For the most part he is doing well and he is doing better with muscle cramping. He thinks the flexeril has really helped. He is taking the flexeril BID morning and evening. He is not getting excess drowsiness with the morning dose.   He has been doing his swallowing exercises which have helped, and he doesn't choke as often as he did before. Bread and certain types of foods are still a little difficult. He is getting better with other types of food like lettuce.   He has continued his daily lymphatic massage and stretches, which he does twice daily.  Overall, he feels like he is doing much better since his last visit.    Therapies/HEP,  He has continued with a regular home stretching and lymphedema massage program.      Functionally,   Improvement in neck muscle cramping.      Social history is unchanged.      Medications:  Current Outpatient Medications   Medication Sig Dispense Refill    acetaminophen (TYLENOL) 325 MG tablet Take 2 tablets (650 mg) by mouth every 4 hours as needed for mild pain 100 tablet 0    aspirin (ASA) 81 MG EC tablet Take 1 tablet (81 mg) by mouth daily 100 tablet 3    atorvastatin (LIPITOR) 20 MG tablet TAKE 1 TABLET BY MOUTH EVERY MORNING 90 tablet 1    blood glucose (NO BRAND SPECIFIED) lancets standard Use to test blood sugar  4 times daily or as directed. ( [place] micolet lancet) 400 each 0    blood glucose (NO BRAND SPECIFIED) test strip Use to test blood sugar 3 times daily or as directed. 300 strip 3    blood glucose monitoring ([place] MICROLET) lancets Use to test blood sugar 4 times daily or as  directed. 200 each 3    cevimeline (EVOXAC) 30 MG capsule TAKE ONE CAPSULE BY MOUTH THREE TIMES DAILY 270 capsule 3    cyclobenzaprine (FLEXERIL) 5 MG tablet Take 1 tablet (5 mg) by mouth 3 times daily as needed for muscle spasms 90 tablet 1    doxazosin (CARDURA) 1 MG tablet Take 1 tablet (1 mg) by mouth daily 30 tablet 0    gabapentin (NEURONTIN) 300 MG capsule Take 1 capsule in am, 1 capsule in afternoon and 2 capsules at bedtime. 275 capsule 1    lisinopril (ZESTRIL) 10 MG tablet Take 1 tablet (10 mg) by mouth every morning 30 tablet 3    metFORMIN (GLUCOPHAGE) 1000 MG tablet Take 1 tab po BID. 180 tablet 3    Misc Natural Product Nasal (PONARIS) SOLN With head tilted back, place 1 or 2 drops in each nostril once daily. 30 mL 3    multivitamin w/minerals (THERA-VIT-M) tablet Take 1 tablet by mouth daily 30 tablet 0    omeprazole (PRILOSEC) 20 MG DR capsule Take 1 capsule (20 mg) by mouth daily 30 capsule 3    Semaglutide, 1 MG/DOSE, (OZEMPIC) 4 MG/3ML pen Inject 1 mg Subcutaneous every 7 days 3 mL 3    sodium fluoride dental gel (DENTAGEL) 1.1 % GEL topical gel Apply to affected area At Bedtime 112 g 11              Physical Exam:   There were no vitals taken for this visit.  Gen: NAD, pleasant and cooperative   HEENT: Atraumatic, normocephalic, extraocular movements appear intact  Pulm: non-labored breathing in room air  Neuro/MSK:   Orientation: Oriented to person, time, place and situation, Exhibits good insight into her condition and ongoing treatment  Motor: Observed moving upper extremities actively against gravity      Labs/Imaging:  Lab Results   Component Value Date    WBC 6.7 07/01/2019    HGB 12.6 (L) 07/01/2019    HCT 39.8 (L) 07/01/2019    MCV 94 07/01/2019     07/01/2019     Lab Results   Component Value Date     04/11/2023    POTASSIUM 4.6 04/11/2023    CHLORIDE 98 04/11/2023    CO2 21 (L) 04/11/2023     (H) 04/11/2023     Lab Results   Component Value Date    GFRESTIMATED >90  04/11/2023    GFRESTBLACK >90 03/25/2021     Lab Results   Component Value Date    AST 16 11/03/2021    ALT 30 11/03/2021     Lab Results   Component Value Date    INR 1.10 04/04/2019     Lab Results   Component Value Date    BUN 9.7 04/11/2023    CR 0.83 04/11/2023              Assessment/Plan   Devonte Tucker presents to clinic today for follow up reg his rehab needs.   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 Was last seen in clinic on 9/30/22.  As discussed with Brant, he is doing much better in terms of his neck muscle cramping and symptoms since his last visit.  He should continue with his daily lymphatic massage and neck stretches as well as Flexeril to help with muscle cramping.  He is currently needing Flexeril twice daily as needed, and is in need of a refill so this was done today.  In addition he should continue to monitor swallowing, and practice swallowing techniques as previously taught by SLP.  We will plan for a 6-month return virtual visit.  Brant is in agreement with this plan.      Therapy/equipment/braces:  Continue lymphatic massage and daily neck stretches twice daily.  Continue swallow techniques as previously instructed by SLP.  Medications:  Continue Flexeril at twice daily as needed.  Prescription refilled today.  Follow up: 6 months.      Marivel Hurtado MD  Physical Medicine & Rehabilitation      50 minutes spent on the date of the encounter doing chart review, history and exam, documentation and further activities as noted above.

## 2023-04-14 NOTE — PROGRESS NOTES
Virtual Visit Details    Type of service:  Video Visit     Originating Location (pt. Location): Home  Distant Location (provider location):  On-site  Platform used for Video Visit: [facility]

## 2023-04-14 NOTE — TELEPHONE ENCOUNTER
CLINICAL NUTRITION SERVICES     Reason for Contact: Patient was contacted by phone due to requested to speak with a Dietitian on the Oncology Distress Screening tool, 4/14/23.    Writer met with patient ~3 years ago during his cancer treatment.    Action: RD called patient indicating reason for phone call. Left a VM with a return call back number.     Follow up: Wait for a return phone call.    Tisha Ventura RD, , LD  [facility]  [phone]

## 2023-04-14 NOTE — PROGRESS NOTES
[facility]   PM&R clinic note        Interval history:     Devonte Tucker presents to clinic today for follow up reg his rehab needs.   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  Was last seen in clinic on 9/30/22.  Recommendations included:  1. Therapy/equipment/braces:  1. Continue daily lymphedema massage and neck stretches to help with symptoms.  2. Continue swallow exercises per SLP recommendations.  2. Medications:  1. Continue Flexeril 5 mg twice daily, and can add a third dose daily if symptoms are difficult as needed.  Has 1 refill left, and will call when he needs more refills.  3. Follow up: 6 months.    Oncology History:  -Diagnosed after he presented with a several year history of oral cavity pain and precancerous lesions of the left upper gingiva.   -He underwent a left infrastructure maxillectomy and left level I lymph node dissection on 5/9/2019 with pathology revealing a 1.3 cm well-differentiated squamous cell carcinoma with 13 mm depth of invasion and invasion into the underlying bone.   -He received adjuvant radiotherapy as described above for improved local disease control.  -He was last seen by ENT on 3/9/2022, at which time he reported persistent stable pain in the anterior hard palate and left maxilla.  -Seen by Dr. Verdin on 4/27/22 and complained of low mood with ongoing toxicities and frustrations after cancer treatment. Persistent xerostomia, dysphagia to dry foods, continued stiffness and pain in the left neck. Plan for follow up with rad onc MD in 3/2023 and NP in 9/2022 with CT neck and chest. Repeat TSH due in 10/22. Referral to Oncology Psych and PM&R  - Saw Marietta Irish for follow up on 2/15/2023 for follow-up.  Doing well on surveillance with no evidence of disease on exam.    Symptoms,  Patient was seen for a follow-up virtual visit today.  For the most part he is doing well and he is doing  better with muscle cramping. He thinks the flexeril has really helped. He is taking the flexeril BID morning and evening. He is not getting excess drowsiness with the morning dose.   He has been doing his swallowing exercises which have helped, and he doesn't choke as often as he did before. Bread and certain types of foods are still a little difficult. He is getting better with other types of food like lettuce.   He has continued his daily lymphatic massage and stretches, which he does twice daily.  Overall, he feels like he is doing much better since his last visit.    Therapies/HEP,  He has continued with a regular home stretching and lymphedema massage program.      Functionally,   Improvement in neck muscle cramping.      Social history is unchanged.      Medications:  Current Outpatient Medications   Medication Sig Dispense Refill     acetaminophen (TYLENOL) 325 MG tablet Take 2 tablets (650 mg) by mouth every 4 hours as needed for mild pain 100 tablet 0     aspirin (ASA) 81 MG EC tablet Take 1 tablet (81 mg) by mouth daily 100 tablet 3     atorvastatin (LIPITOR) 20 MG tablet TAKE 1 TABLET BY MOUTH EVERY MORNING 90 tablet 1     blood glucose (NO BRAND SPECIFIED) lancets standard Use to test blood sugar  4 times daily or as directed. ( [place] micolet lancet) 400 each 0     blood glucose (NO BRAND SPECIFIED) test strip Use to test blood sugar 3 times daily or as directed. 300 strip 3     blood glucose monitoring ([place] MICROLET) lancets Use to test blood sugar 4 times daily or as directed. 200 each 3     cevimeline (EVOXAC) 30 MG capsule TAKE ONE CAPSULE BY MOUTH THREE TIMES DAILY 270 capsule 3     cyclobenzaprine (FLEXERIL) 5 MG tablet Take 1 tablet (5 mg) by mouth 3 times daily as needed for muscle spasms 90 tablet 1     doxazosin (CARDURA) 1 MG tablet Take 1 tablet (1 mg) by mouth daily 30 tablet 0     gabapentin (NEURONTIN) 300 MG capsule Take 1 capsule in am, 1 capsule in afternoon and 2 capsules at bedtime.  275 capsule 1     lisinopril (ZESTRIL) 10 MG tablet Take 1 tablet (10 mg) by mouth every morning 30 tablet 3     metFORMIN (GLUCOPHAGE) 1000 MG tablet Take 1 tab po BID. 180 tablet 3     Misc Natural Product Nasal (PONARIS) SOLN With head tilted back, place 1 or 2 drops in each nostril once daily. 30 mL 3     multivitamin w/minerals (THERA-VIT-M) tablet Take 1 tablet by mouth daily 30 tablet 0     omeprazole (PRILOSEC) 20 MG DR capsule Take 1 capsule (20 mg) by mouth daily 30 capsule 3     Semaglutide, 1 MG/DOSE, (OZEMPIC) 4 MG/3ML pen Inject 1 mg Subcutaneous every 7 days 3 mL 3     sodium fluoride dental gel (DENTAGEL) 1.1 % GEL topical gel Apply to affected area At Bedtime 112 g 11              Physical Exam:   There were no vitals taken for this visit.  Gen: NAD, pleasant and cooperative   HEENT: Atraumatic, normocephalic, extraocular movements appear intact  Pulm: non-labored breathing in room air  Neuro/MSK:   Orientation: Oriented to person, time, place and situation, Exhibits good insight into her condition and ongoing treatment  Motor: Observed moving upper extremities actively against gravity      Labs/Imaging:  Lab Results   Component Value Date    WBC 6.7 07/01/2019    HGB 12.6 (L) 07/01/2019    HCT 39.8 (L) 07/01/2019    MCV 94 07/01/2019     07/01/2019     Lab Results   Component Value Date     04/11/2023    POTASSIUM 4.6 04/11/2023    CHLORIDE 98 04/11/2023    CO2 21 (L) 04/11/2023     (H) 04/11/2023     Lab Results   Component Value Date    GFRESTIMATED >90 04/11/2023    GFRESTBLACK >90 03/25/2021     Lab Results   Component Value Date    AST 16 11/03/2021    ALT 30 11/03/2021     Lab Results   Component Value Date    INR 1.10 04/04/2019     Lab Results   Component Value Date    BUN 9.7 04/11/2023    CR 0.83 04/11/2023              Assessment/Plan   Devonte Tucker presents to clinic today for follow up reg his rehab needs.   He has h/o local advanced squamous cell carcinoma of  the left maxillary alveolar ridge. Was last seen in clinic on 9/30/22.  As discussed with Brant, he is doing much better in terms of his neck muscle cramping and symptoms since his last visit.  He should continue with his daily lymphatic massage and neck stretches as well as Flexeril to help with muscle cramping.  He is currently needing Flexeril twice daily as needed, and is in need of a refill so this was done today.  In addition he should continue to monitor swallowing, and practice swallowing techniques as previously taught by SLP.  We will plan for a 6-month return virtual visit.  Brant is in agreement with this plan.      1. Therapy/equipment/braces:  1. Continue lymphatic massage and daily neck stretches twice daily.  2. Continue swallow techniques as previously instructed by SLP.  2. Medications:  1. Continue Flexeril at twice daily as needed.  Prescription refilled today.  3. Follow up: 6 months.      Marivel Hurtado MD  Physical Medicine & Rehabilitation      50 minutes spent on the date of the encounter doing chart review, history and exam, documentation and further activities as noted above.

## 2023-04-15 RX ORDER — SEMAGLUTIDE 1.34 MG/ML
INJECTION, SOLUTION SUBCUTANEOUS
Qty: 1.5 ML | OUTPATIENT
Start: 2023-04-15

## 2023-04-23 DIAGNOSIS — C03.9 PRIMARY CANCER OF ALVEOLAR RIDGE MUCOSA (H): ICD-10-CM

## 2023-04-23 DIAGNOSIS — C03.0 SQUAMOUS CELL CARCINOMA OF MAXILLARY ALVEOLAR RIDGE (H): ICD-10-CM

## 2023-04-26 RX ORDER — GABAPENTIN 300 MG/1
CAPSULE ORAL
Qty: 275 CAPSULE | Refills: 0 | Status: SHIPPED | OUTPATIENT
Start: 2023-04-26 | End: 2023-06-26

## 2023-04-26 NOTE — TELEPHONE ENCOUNTER
Gabapentin Oral Capsule 300 MG      Last Written Prescription Date:  12-14-22  Last Fill Quantity: 275,   # refills: 1  Last Office Visit : 12-14-22  Future Office visit:  none    Routing refill request to provider for review/approval because:  Drug not on the [facility], P or [facility] refill protocol or controlled substance

## 2023-05-01 ENCOUNTER — VIRTUAL VISIT (OUTPATIENT)
Dept: ONCOLOGY | Facility: CLINIC | Age: 58
End: 2023-05-01
Attending: PSYCHOLOGIST
Payer: COMMERCIAL

## 2023-05-01 DIAGNOSIS — F43.21 ADJUSTMENT DISORDER WITH DEPRESSED MOOD: Primary | ICD-10-CM

## 2023-05-01 PROCEDURE — 90832 PSYTX W PT 30 MINUTES: CPT | Mod: VID | Performed by: PSYCHOLOGIST

## 2023-05-01 NOTE — PROGRESS NOTES
[facility]- Psychotherapy (Provider Oversight- Dr. Brant Vivar)                                    Progress Note    Patient Name: Devonte Tucker  Date: 5/1/23         Service Type: Individual      Session Start Time: 8:00  Session End Time: 8:30     Session Length: 30  minutes    Session #: 14    Attendees: Client attended alone    Service Modality:  Video Visit:      Provider verified identity through the following two step process.  Patient provided:  Patient photo    Telemedicine Visit: The patient's condition can be safely assessed and treated via synchronous audio and visual telemedicine encounter.      Reason for Telemedicine Visit: Services only offered telehealth    Originating Site (Patient Location): Patient's home    Distant Site (Provider Location): Provider Remote Setting- Home Office    Consent:  The patient/guardian has verbally consented to: the potential risks and benefits of telemedicine (video visit) versus in person care; bill my insurance or make self-payment for services provided; and responsibility for payment of non-covered services.     Patient would like the video invitation sent by:  My Chart    Mode of Communication:  Video Conference via [employer]    As the provider I attest to compliance with applicable laws and regulations related to telemedicine.    DATA  Interactive Complexity: No  Crisis: No     Current / Ongoing Stressors and Concerns:   Recovery from cancer, cancer treatment long-term effects, sadness, changes in identity, depressed mood, feeling overwhelmed, feeling lonely     Treatment Objective(s) Addressed in This Session:   identify coping strategies to more effectively address stressors     Intervention: Motivational Interviewing: to help him connect with what is true about him post cancer treatment        ASSESSMENT: Current Emotional / Mental Status (status of significant symptoms):   Risk status (Self / Other harm or suicidal ideation)   Patient  "denies current fears or concerns for personal safety.   Patient denies current or recent suicidal ideation or behaviors.   Patient denies current or recent homicidal ideation or behaviors.   Patient denies current or recent self injurious behavior or ideation.   Patient denies other safety concerns.   Patient reports there has been no change in risk factors since their last session.     Patient reports there has been no change in protective factors since their last session.     Recommended that patient call 911 or go to the local ED should there be a change in any of these risk factors.     Appearance:   Appropriate    Eye Contact:   Good    Psychomotor Behavior: Normal    Attitude:   Cooperative    Orientation:   All   Speech    Rate / Production: Normal     Volume:  Normal    Mood:    Normal   Affect:    Appropriate    Thought Content:  Clear    Thought Form:  Coherent  Logical    Insight:    Good      Medication Review:   No changes to current psychiatric medication(s)     Medication Compliance:   Yes     Changes in Health Issues:   None reported     Chemical Use Review:   Substance Use: Chemical use reviewed, no active concerns identified      Tobacco Use: No current tobacco use.      Diagnosis:  No diagnosis found.    Collateral Reports Completed:   Not Applicable    PLAN: (Patient Tasks / Therapist Tasks / Other)  He is to identify what is true about him after cancer treatment. This will assist him in reconnecting with his identity as well as his strengths. After strengths are identified, he can begin to set goals for his \"new normal.\"    Devonte Eid PsyD                                                         ______________________________________________________________________    Individual Treatment Plan    Patient's Name: Devonte Tucker  YOB: 1965    Date of Creation: 5/17/22  Date Treatment Plan Last Reviewed/Revised: 7/25/22    DSM5 Diagnoses: 311 (F32.8) Other/unspec. " Depressive Disorder  Psychosocial / Contextual Factors: cancer, long-term side-effects, loss of identity, depressed mood  Anticipated number of session for this episode of care: 12  Anticipation frequency of session: Every other week  Anticipated Duration of each session: 16-37 minutes  Treatment plan will be reviewed in 90 days or when goals have been changed.       MeasurableTreatment Goal(s) related to diagnosis / functional impairment(s)  Goal 1: Patient will identify the truths about himself    I will know I've met my goal when I feel I know who I am again.      Patient has reviewed and agreed to the above plan.      Devonte Eid, Alina  May 1, 2023

## 2023-05-01 NOTE — LETTER
5/1/2023         RE: Devonte Tucker  [address]  [place] [facility] [address]        Dear Colleague,    Thank you for referring your patient, Devonte Tucker, to the [facility]. Please see a copy of my visit note below.          [facility] Oncology- Psychotherapy (Provider Oversight- Dr. Brant Vivar)                                    Progress Note    Patient Name: Devonte Tucker  Date: 5/1/23         Service Type: Individual      Session Start Time: 8:00  Session End Time: 8:30     Session Length: 30  minutes    Session #: 14    Attendees: Client attended alone    Service Modality:  Video Visit:      Provider verified identity through the following two step process.  Patient provided:  Patient photo    Telemedicine Visit: The patient's condition can be safely assessed and treated via synchronous audio and visual telemedicine encounter.      Reason for Telemedicine Visit: Services only offered telehealth    Originating Site (Patient Location): Patient's home    Distant Site (Provider Location): Provider Remote Setting- Home Office    Consent:  The patient/guardian has verbally consented to: the potential risks and benefits of telemedicine (video visit) versus in person care; bill my insurance or make self-payment for services provided; and responsibility for payment of non-covered services.     Patient would like the video invitation sent by:  My Chart    Mode of Communication:  Video Conference via Amwell    As the provider I attest to compliance with applicable laws and regulations related to telemedicine.    DATA  Interactive Complexity: No  Crisis: No     Current / Ongoing Stressors and Concerns:   Recovery from cancer, cancer treatment long-term effects, sadness, changes in identity, depressed mood, feeling overwhelmed, feeling lonely     Treatment Objective(s) Addressed in This Session:   identify coping strategies to more effectively address  "stressors     Intervention: Motivational Interviewing: to help him connect with what is true about him post cancer treatment        ASSESSMENT: Current Emotional / Mental Status (status of significant symptoms):   Risk status (Self / Other harm or suicidal ideation)   Patient denies current fears or concerns for personal safety.   Patient denies current or recent suicidal ideation or behaviors.   Patient denies current or recent homicidal ideation or behaviors.   Patient denies current or recent self injurious behavior or ideation.   Patient denies other safety concerns.   Patient reports there has been no change in risk factors since their last session.     Patient reports there has been no change in protective factors since their last session.     Recommended that patient call 911 or go to the local ED should there be a change in any of these risk factors.     Appearance:   Appropriate    Eye Contact:   Good    Psychomotor Behavior: Normal    Attitude:   Cooperative    Orientation:   All   Speech    Rate / Production: Normal     Volume:  Normal    Mood:    Normal   Affect:    Appropriate    Thought Content:  Clear    Thought Form:  Coherent  Logical    Insight:    Good      Medication Review:   No changes to current psychiatric medication(s)     Medication Compliance:   Yes     Changes in Health Issues:   None reported     Chemical Use Review:   Substance Use: Chemical use reviewed, no active concerns identified      Tobacco Use: No current tobacco use.      Diagnosis:  No diagnosis found.    Collateral Reports Completed:   Not Applicable    PLAN: (Patient Tasks / Therapist Tasks / Other)  He is to identify what is true about him after cancer treatment. This will assist him in reconnecting with his identity as well as his strengths. After strengths are identified, he can begin to set goals for his \"new normal.\"    Devonte Eid PsyD                                                     "     ______________________________________________________________________    Individual Treatment Plan    Patient's Name: Devonte Tucker  YOB: 1965    Date of Creation: 5/17/22  Date Treatment Plan Last Reviewed/Revised: 7/25/22    DSM5 Diagnoses: 311 (F32.8) Other/unspec. Depressive Disorder  Psychosocial / Contextual Factors: cancer, long-term side-effects, loss of identity, depressed mood  Anticipated number of session for this episode of care: 12  Anticipation frequency of session: Every other week  Anticipated Duration of each session: 16-37 minutes  Treatment plan will be reviewed in 90 days or when goals have been changed.       MeasurableTreatment Goal(s) related to diagnosis / functional impairment(s)  Goal 1: Patient will identify the truths about himself    I will know I've met my goal when I feel I know who I am again.      Patient has reviewed and agreed to the above plan.      Devonte Eid, Alina  May 1, 2023

## 2023-05-07 ENCOUNTER — HEALTH MAINTENANCE LETTER (OUTPATIENT)
Age: 58
End: 2023-05-07

## 2023-05-16 ENCOUNTER — TRANSFERRED RECORDS (OUTPATIENT)
Dept: HEALTH INFORMATION MANAGEMENT | Facility: CLINIC | Age: 58
End: 2023-05-16
Payer: COMMERCIAL

## 2023-05-16 LAB — RETINOPATHY: NEGATIVE

## 2023-05-21 DIAGNOSIS — K11.7 XEROSTOMIA DUE TO RADIOTHERAPY: ICD-10-CM
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
## 2023-05-22 ENCOUNTER — VIRTUAL VISIT (OUTPATIENT)
Dept: ONCOLOGY | Facility: CLINIC | Age: 58
End: 2023-05-22
Attending: PSYCHOLOGIST
Payer: COMMERCIAL

## 2023-05-22 DIAGNOSIS — F43.21 ADJUSTMENT DISORDER WITH DEPRESSED MOOD: Primary | ICD-10-CM

## 2023-05-22 PROCEDURE — 90832 PSYTX W PT 30 MINUTES: CPT | Mod: VID | Performed by: PSYCHOLOGIST

## 2023-05-22 RX ORDER — CEVIMELINE HYDROCHLORIDE 30 MG/1
CAPSULE ORAL
Qty: 270 CAPSULE | Refills: 0 | Status: SHIPPED | OUTPATIENT
Start: 2023-05-22 | End: 2023-08-29

## 2023-05-22 NOTE — LETTER
5/22/2023         RE: Devonte Tucker  [address]  [place] [address]        Dear Colleague,    Thank you for referring your patient, Devonte Tucker, to the [facility]. Please see a copy of my visit note below.          [facility] Oncology- Psychotherapy (Provider Oversight- Dr. Brant Vivar)                                    Progress Note    Patient Name: Devonte Tucker  Date: 5/22/23         Service Type: Individual      Session Start Time: 8:00  Session End Time: 8:32     Session Length: 32  minutes    Session #: 15    Attendees: Client attended alone    Service Modality:  Video Visit:      Provider verified identity through the following two step process.  Patient provided:  Patient photo    Telemedicine Visit: The patient's condition can be safely assessed and treated via synchronous audio and visual telemedicine encounter.      Reason for Telemedicine Visit: Services only offered telehealth    Originating Site (Patient Location): Patient's home    Distant Site (Provider Location): Provider Remote Setting- Home Office    Consent:  The patient/guardian has verbally consented to: the potential risks and benefits of telemedicine (video visit) versus in person care; bill my insurance or make self-payment for services provided; and responsibility for payment of non-covered services.     Patient would like the video invitation sent by:  My Chart    Mode of Communication:  Video Conference via Amwell    As the provider I attest to compliance with applicable laws and regulations related to telemedicine.    DATA  Interactive Complexity: No  Crisis: No     Current / Ongoing Stressors and Concerns:   Recovery from cancer, cancer treatment long-term effects, sadness, changes in identity, depressed mood, feeling overwhelmed, feeling lonely     Treatment Objective(s) Addressed in This Session:   identify coping strategies to more effectively address  "stressors     Intervention: Motivational Interviewing: to help him connect with what is true about him post cancer treatment        ASSESSMENT: Current Emotional / Mental Status (status of significant symptoms):   Risk status (Self / Other harm or suicidal ideation)   Patient denies current fears or concerns for personal safety.   Patient denies current or recent suicidal ideation or behaviors.   Patient denies current or recent homicidal ideation or behaviors.   Patient denies current or recent self injurious behavior or ideation.   Patient denies other safety concerns.   Patient reports there has been no change in risk factors since their last session.     Patient reports there has been no change in protective factors since their last session.     Recommended that patient call 911 or go to the local ED should there be a change in any of these risk factors.     Appearance:   Appropriate    Eye Contact:   Good    Psychomotor Behavior: Normal    Attitude:   Cooperative    Orientation:   All   Speech    Rate / Production: Normal     Volume:  Normal    Mood:    Normal   Affect:    Appropriate    Thought Content:  Clear    Thought Form:  Coherent  Logical    Insight:    Good      Medication Review:   No changes to current psychiatric medication(s)     Medication Compliance:   Yes     Changes in Health Issues:   None reported     Chemical Use Review:   Substance Use: Chemical use reviewed, no active concerns identified      Tobacco Use: No current tobacco use.      Diagnosis:  1. Adjustment disorder with depressed mood        Collateral Reports Completed:   Not Applicable    PLAN: (Patient Tasks / Therapist Tasks / Other)  He is to identify what is true about him after cancer treatment. This will assist him in reconnecting with his identity as well as his strengths. After strengths are identified, he can begin to set goals for his \"new normal.\"    Devonte Eid PsyD                                                   "       ______________________________________________________________________    Individual Treatment Plan    Patient's Name: Devonte Tucker  YOB: 1965    Date of Creation: 5/17/22  Date Treatment Plan Last Reviewed/Revised: 7/25/22    DSM5 Diagnoses: 311 (F32.8) Other/unspec. Depressive Disorder  Psychosocial / Contextual Factors: cancer, long-term side-effects, loss of identity, depressed mood  Anticipated number of session for this episode of care: 12  Anticipation frequency of session: Every other week  Anticipated Duration of each session: 16-37 minutes  Treatment plan will be reviewed in 90 days or when goals have been changed.       MeasurableTreatment Goal(s) related to diagnosis / functional impairment(s)  Goal 1: Patient will identify the truths about himself    I will know I've met my goal when I feel I know who I am again.      Patient has reviewed and agreed to the above plan.      Devonte Eid, Alina  May 22, 2023

## 2023-05-22 NOTE — PROGRESS NOTES
[facility] Oncology- Psychotherapy (Provider Oversight- Dr. Brant Vivar)                                    Progress Note    Patient Name: Devonte Tucker  Date: 5/22/23         Service Type: Individual      Session Start Time: 8:00  Session End Time: 8:32     Session Length: 32  minutes    Session #: 15    Attendees: Client attended alone    Service Modality:  Video Visit:      Provider verified identity through the following two step process.  Patient provided:  Patient photo    Telemedicine Visit: The patient's condition can be safely assessed and treated via synchronous audio and visual telemedicine encounter.      Reason for Telemedicine Visit: Services only offered telehealth    Originating Site (Patient Location): Patient's home    Distant Site (Provider Location): Provider Remote Setting- Home Office    Consent:  The patient/guardian has verbally consented to: the potential risks and benefits of telemedicine (video visit) versus in person care; bill my insurance or make self-payment for services provided; and responsibility for payment of non-covered services.     Patient would like the video invitation sent by:  My Chart    Mode of Communication:  Video Conference via Amwell    As the provider I attest to compliance with applicable laws and regulations related to telemedicine.    DATA  Interactive Complexity: No  Crisis: No     Current / Ongoing Stressors and Concerns:   Recovery from cancer, cancer treatment long-term effects, sadness, changes in identity, depressed mood, feeling overwhelmed, feeling lonely     Treatment Objective(s) Addressed in This Session:   identify coping strategies to more effectively address stressors     Intervention: Motivational Interviewing: to help him connect with what is true about him post cancer treatment        ASSESSMENT: Current Emotional / Mental Status (status of significant symptoms):   Risk status (Self / Other harm or suicidal ideation)   Patient  "denies current fears or concerns for personal safety.   Patient denies current or recent suicidal ideation or behaviors.   Patient denies current or recent homicidal ideation or behaviors.   Patient denies current or recent self injurious behavior or ideation.   Patient denies other safety concerns.   Patient reports there has been no change in risk factors since their last session.     Patient reports there has been no change in protective factors since their last session.     Recommended that patient call 911 or go to the local ED should there be a change in any of these risk factors.     Appearance:   Appropriate    Eye Contact:   Good    Psychomotor Behavior: Normal    Attitude:   Cooperative    Orientation:   All   Speech    Rate / Production: Normal     Volume:  Normal    Mood:    Normal   Affect:    Appropriate    Thought Content:  Clear    Thought Form:  Coherent  Logical    Insight:    Good      Medication Review:   No changes to current psychiatric medication(s)     Medication Compliance:   Yes     Changes in Health Issues:   None reported     Chemical Use Review:   Substance Use: Chemical use reviewed, no active concerns identified      Tobacco Use: No current tobacco use.      Diagnosis:  1. Adjustment disorder with depressed mood        Collateral Reports Completed:   Not Applicable    PLAN: (Patient Tasks / Therapist Tasks / Other)  He is to identify what is true about him after cancer treatment. This will assist him in reconnecting with his identity as well as his strengths. After strengths are identified, he can begin to set goals for his \"new normal.\"    Devonte Eid, Alina                                                         ______________________________________________________________________    Individual Treatment Plan    Patient's Name: Devonte Tucker  YOB: 1965    Date of Creation: 5/17/22  Date Treatment Plan Last Reviewed/Revised: 7/25/22    DSM5 Diagnoses: 311 " (F32.8) Other/unspec. Depressive Disorder  Psychosocial / Contextual Factors: cancer, long-term side-effects, loss of identity, depressed mood  Anticipated number of session for this episode of care: 12  Anticipation frequency of session: Every other week  Anticipated Duration of each session: 16-37 minutes  Treatment plan will be reviewed in 90 days or when goals have been changed.       MeasurableTreatment Goal(s) related to diagnosis / functional impairment(s)  Goal 1: Patient will identify the truths about himself    I will know I've met my goal when I feel I know who I am again.      Patient has reviewed and agreed to the above plan.      Devonte Eid, Alina  May 22, 2023

## 2023-05-31 NOTE — TELEPHONE ENCOUNTER
Test strips    10/21/2021  [facility] Elsa Barkley PA-C    Endocrinology, Diabetes, and Metabolism      PCC         doxazosin (CARDURA) 1 MG tablet  Last Written Prescription Date: 5/31/19  Last Fill Quantity: 30,   # refills: 0  Lv: 2/25/20  NV: none    Scheduling has been notified to contact the pt for appointment.    Routing refill request to provider for review/approval because: gap in med rf. Has been taking from  at [facility]. She has sincee retired. asks for rf.  
- - -

## 2023-06-04 ENCOUNTER — HEALTH MAINTENANCE LETTER (OUTPATIENT)
Age: 58
End: 2023-06-04

## 2023-06-18 DIAGNOSIS — E11.9 TYPE 2 DIABETES MELLITUS WITHOUT COMPLICATION, WITHOUT LONG-TERM CURRENT USE OF INSULIN (H): ICD-10-CM

## 2023-06-20 NOTE — TELEPHONE ENCOUNTER
Ozempic (1 MG/DOSE) Subcutaneous Solution Pen-injector 4 MG/3ML    Last Written Prescription Date:  2/13/23  Last Fill Quantity: 3 ml ,   # refills: 3   Last Office Visit : 12-14-22  Future Office visit:  none> 5 months    Routing refill request to provider for review/approval because:  Lab and appt due;  Last A1c =6.4 on 9/26/22

## 2023-06-21 RX ORDER — SEMAGLUTIDE 1.34 MG/ML
INJECTION, SOLUTION SUBCUTANEOUS
Qty: 3 ML | Refills: 0 | Status: SHIPPED | OUTPATIENT
Start: 2023-06-21 | End: 2023-07-21

## 2023-06-25 DIAGNOSIS — C03.9 PRIMARY CANCER OF ALVEOLAR RIDGE MUCOSA (H): ICD-10-CM

## 2023-06-25 DIAGNOSIS — C03.0 SQUAMOUS CELL CARCINOMA OF MAXILLARY ALVEOLAR RIDGE (H): ICD-10-CM

## 2023-06-26 ENCOUNTER — TELEPHONE (OUTPATIENT)
Dept: ENDOCRINOLOGY | Facility: CLINIC | Age: 58
End: 2023-06-26
Payer: COMMERCIAL

## 2023-06-26 RX ORDER — GABAPENTIN 300 MG/1
CAPSULE ORAL
Qty: 275 CAPSULE | Refills: 0 | Status: SHIPPED | OUTPATIENT
Start: 2023-06-26 | End: 2023-09-04

## 2023-06-26 NOTE — TELEPHONE ENCOUNTER
Patient call:     Appointment type: return diabetes   Provider: Chris   Return date: first available   Speciality phone number: [phone]  Additional appointment(s) needed:   Additional notes: LVM and sent MyC to schedule f/u Johnnie Santana on 6/26/2023 at 1:10 PM

## 2023-06-26 NOTE — TELEPHONE ENCOUNTER
----- Message from Jyoti Diaz RN sent at 6/20/2023  6:16 PM CDT -----  Regarding: Perez sebt due  Please call to schedule.    Thanks    I do not need any follow up on the scheduling of this appointment unless the patient will no longer be receiving care in our clinic.

## 2023-06-30 ENCOUNTER — TELEPHONE (OUTPATIENT)
Dept: ENDOCRINOLOGY | Facility: CLINIC | Age: 58
End: 2023-06-30
Payer: COMMERCIAL

## 2023-06-30 NOTE — TELEPHONE ENCOUNTER
Patient call:     Appointment type: Scheduling   Provider: Helen Perez  Return date: Soonest available  Speciality phone number: [phone]  Additional appointment(s) needed:   Additional notes:  MELONIE and JUAN Auguste on 6/30/2023 at 12:06 PM

## 2023-07-17 DIAGNOSIS — E11.9 TYPE 2 DIABETES MELLITUS WITHOUT COMPLICATION, WITHOUT LONG-TERM CURRENT USE OF INSULIN (H): ICD-10-CM

## 2023-07-21 RX ORDER — SEMAGLUTIDE 1.34 MG/ML
1 INJECTION, SOLUTION SUBCUTANEOUS
Qty: 3 ML | Refills: 11 | Status: SHIPPED | OUTPATIENT
Start: 2023-07-21 | End: 2024-05-02

## 2023-07-21 NOTE — TELEPHONE ENCOUNTER
Ozempic (1 MG/DOSE) Subcutaneous Solution Pen-injector 4 MG/3ML  Last Written Prescription Date:  6/21/2023  Last Fill Quantity: 3,   # refills: 0  Last Office Visit :  12/14/2022  Future Office visit:  None  Routing refill request to provider for review/approval because:  Per Protocol,  Pt needed 6 month visit and updated A1C for this med.      Refer to Provider for review     Recent Labs   Lab Test 09/26/22  1540 03/25/21  1111 01/27/20  0000   A1C 6.4*   < >  --    HEMOGLOBINA1  --   --  5.6        GLP-1 Agonists Protocol Failed 07/21/2023 06:01 AM   Protocol Details  HgbA1C in past 3 or 6 months    Recent (6 mo) or future (30 days) visit within the authorizing provider's specialty          Cande Leiva RN  Central Triage Red Flags/Med Refills'

## 2023-08-15 ENCOUNTER — OFFICE VISIT (OUTPATIENT)
Dept: OTOLARYNGOLOGY | Facility: CLINIC | Age: 58
End: 2023-08-15
Attending: REGISTERED NURSE
Payer: COMMERCIAL

## 2023-08-15 ENCOUNTER — ANCILLARY PROCEDURE (OUTPATIENT)
Dept: CT IMAGING | Facility: CLINIC | Age: 58
End: 2023-08-15
Attending: REGISTERED NURSE
Payer: COMMERCIAL

## 2023-08-15 VITALS
WEIGHT: 270 LBS | SYSTOLIC BLOOD PRESSURE: 128 MMHG | OXYGEN SATURATION: 99 % | DIASTOLIC BLOOD PRESSURE: 85 MMHG | HEIGHT: 67 IN | BODY MASS INDEX: 42.38 KG/M2 | HEART RATE: 80 BPM

## 2023-08-15 DIAGNOSIS — C03.0 SQUAMOUS CELL CARCINOMA OF MAXILLARY ALVEOLAR RIDGE (H): ICD-10-CM

## 2023-08-15 DIAGNOSIS — C03.0 SQUAMOUS CELL CARCINOMA OF MAXILLARY ALVEOLAR RIDGE (H): Primary | ICD-10-CM

## 2023-08-15 LAB
CREAT BLD-MCNC: 0.9 MG/DL (ref 0.7–1.3)
GFR SERPL CREATININE-BSD FRML MDRD: >60 ML/MIN/1.73M2

## 2023-08-15 PROCEDURE — 99213 OFFICE O/P EST LOW 20 MIN: CPT | Performed by: REGISTERED NURSE

## 2023-08-15 PROCEDURE — 71260 CT THORAX DX C+: CPT | Mod: GC | Performed by: RADIOLOGY

## 2023-08-15 PROCEDURE — 82565 ASSAY OF CREATININE: CPT | Performed by: PATHOLOGY

## 2023-08-15 PROCEDURE — 70491 CT SOFT TISSUE NECK W/DYE: CPT | Mod: GC | Performed by: RADIOLOGY

## 2023-08-15 RX ORDER — IOPAMIDOL 755 MG/ML
125 INJECTION, SOLUTION INTRAVASCULAR ONCE
Status: COMPLETED | OUTPATIENT
Start: 2023-08-15 | End: 2023-08-15

## 2023-08-15 RX ADMIN — IOPAMIDOL 125 ML: 755 INJECTION, SOLUTION INTRAVASCULAR at 08:31

## 2023-08-15 ASSESSMENT — PAIN SCALES - GENERAL: PAINLEVEL: NO PAIN (0)

## 2023-08-15 NOTE — PROGRESS NOTES
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  Dr. Treviño performed a left palatectomy on 5/9/2019 with Dr. Atwood performing a left radial forearm free flap reconstruction.  He had postop radiation therapy completed on 7/22/2019, 6000 cGy. Was last seen in ENT clinic by Dr. Atwood 6/30/21. Surveillance CT scans completed today prior to appointment.    Interval History:   Patient comes in today for routine surveillance. Patient states that he is doing well. Dentist is planning for a tooth extraction but is concerned about tissue around tooth which they would like evaluated prior to extraction. Patient states that there is some irritation around tooth but there is no bleeding. Continues to have pain at hard palate at flap site behind right central incisor.    Patient is eating well. Patient denies any odynophagia, new sore throat, ear pain, bleeding from the mouth, hemoptysis, voice changes or lumps/bumps of the neck.    Past Medical History:  Past Medical History:   Diagnosis Date     Allergic rhinitis experienced it for many years     Anxiety 2019     Benign positional vertigo 05/2019     Depressive disorder 2019     Diabetes (H)      Gastroesophageal reflux disease 05/2021     Head injury 04/2018     Hypertension      Maxillary sinus cancer (H)      Obesity      Sleep apnea May 2022       Past Surgical History:  Past Surgical History:   Procedure Laterality Date     ------------OTHER-------------      Mucosa and bone biopsy     COLONOSCOPY N/A 5/24/2021    Procedure: COLONOSCOPY, WITH POLYPECTOMY;  Surgeon: Terrell De Luna MD;  Location: [facility] OR     EXPLORE NECK Left 5/9/2019    Procedure: Left Neck Exploration;  Surgeon: Jett Treviño MD;  Location: [facility] OR     EXTRACTION(S) DENTAL      x2 under general anesthesia     GRAFT FREE  VASCULARIZED (LOCATION) Right 5/9/2019    Procedure: Left Radial Forearm Free Flap (soft tissue);  Surgeon: Sumit Atwood MD;  Location: [facility] OR     GRAFT SKIN SPLIT THICKNESS FROM EXTREMITY Right 5/9/2019    Procedure: Graft skin split thickness from right thigh, nasogastric feeding tube placement;  Surgeon: Sumit Atwood MD;  Location: [facility] OR     IR LYMPH NODE BIOPSY  4/4/2019     OSTEOTOMY MAXILLA Left 5/9/2019    Procedure: Left Infrastructure Maxillectomy,;  Surgeon: Jett Treviño MD;  Location: [facility] OR       Medications:  Current Outpatient Medications   Medication Sig Dispense Refill     acetaminophen (TYLENOL) 325 MG tablet Take 2 tablets (650 mg) by mouth every 4 hours as needed for mild pain 100 tablet 0     aspirin (ASA) 81 MG EC tablet Take 1 tablet (81 mg) by mouth daily 100 tablet 3     atorvastatin (LIPITOR) 20 MG tablet TAKE 1 TABLET BY MOUTH EVERY MORNING 90 tablet 1     blood glucose (NO BRAND SPECIFIED) lancets standard Use to test blood sugar  4 times daily or as directed. ( [place] micolet lancet) 400 each 0     blood glucose (NO BRAND SPECIFIED) test strip Use to test blood sugar 3 times daily or as directed. 300 strip 3     blood glucose monitoring ([place] MICROLET) lancets Use to test blood sugar 4 times daily or as directed. 200 each 3     cevimeline (EVOXAC) 30 MG capsule TAKE 1 CAPSULE BY MOUTH THREE TIMES DAILY 270 capsule 0     cyclobenzaprine (FLEXERIL) 5 MG tablet Take 1 tablet (5 mg) by mouth 2 times daily as needed for muscle spasms 60 tablet 2     cyclobenzaprine (FLEXERIL) 5 MG tablet Take 1 tablet (5 mg) by mouth 3 times daily as needed for muscle spasms 90 tablet 1     doxazosin (CARDURA) 1 MG tablet Take 1 tablet (1 mg) by mouth daily 30 tablet 0     gabapentin (NEURONTIN) 300 MG capsule TAKE 1 CAPSULE BY MOUTH IN THE MORNING, 1 CAPSULE IN AFTERNOON AND 2 CAPSULES AT BEDTIME. 275 capsule 0     lisinopril (ZESTRIL) 10 MG tablet Take 1 tablet (10 mg) by mouth every morning 30  "tablet 3     metFORMIN (GLUCOPHAGE) 1000 MG tablet Take 1 tab po BID. 180 tablet 3     Misc Natural Product Nasal (PONARIS) SOLN With head tilted back, place 1 or 2 drops in each nostril once daily. 30 mL 3     multivitamin w/minerals (THERA-VIT-M) tablet Take 1 tablet by mouth daily 30 tablet 0     omeprazole (PRILOSEC) 20 MG DR capsule Take 1 capsule (20 mg) by mouth daily 30 capsule 3     Semaglutide, 1 MG/DOSE, (OZEMPIC, 1 MG/DOSE,) 4 MG/3ML pen Inject 1 mg Subcutaneous every 7 days 3 mL 11     sodium fluoride dental gel (DENTAGEL) 1.1 % GEL topical gel Apply to affected area At Bedtime 112 g 11       Allergies:  Allergies   Allergen Reactions     Seasonal Allergies Other (See Comments) and Shortness Of Breath        Social History:  Social History     Tobacco Use     Smoking status: Never     Smokeless tobacco: Never   Substance Use Topics     Alcohol use: No     Drug use: No       ROS: 10 point ROS neg other than the symptoms noted above in the HPI.    Physical Exam:    /85 (BP Location: Right arm, Patient Position: Sitting, Cuff Size: Adult Large)   Pulse 80   Ht 1.702 m (5' 7\")   Wt 122.5 kg (270 lb)   SpO2 99%   BMI 42.29 kg/m    Wt Readings from Last 3 Encounters:   08/15/23 122.5 kg (270 lb)   02/15/23 128.4 kg (283 lb)   09/26/22 131.5 kg (290 lb)     Constitutional:  The patient was unaccompanied, well-groomed, and in no acute distress.     Neurologic: Alert and oriented x 3.    Psychiatric: The patient's affect was calm, cooperative, and appropriate.     Communication:  Normal; communicates verbally, normal voice quality.   Respiratory: Breathing comfortably without stridor or exertion of accessory muscles.    Head/Face:  Normocephalic and atraumatic.  No lesions or scars.    Oral Cavity: Healthy appearing flap in left maxilla. Anterior portion of flap appears to be pulling away from gingiva. 1 mm fistula at anterior edge of flap without drainage or tunneling. Tenderness with palpation " around tooth #8. Normal tongue, floor of mouth, and  buccal mucosa.  No lesions or masses on inspection or palpation.    Oropharynx: Normal mucosa, palate symmetric with normal elevation. No abnormal lymph tissue in the oropharynx.     Neck: Well healed left neck incision. Tightness in the left SCM. Normal range of motion   Lymphatic: There is no palpable lymphadenopathy in the neck.        Labs and Imaging Reviewed:  Imagin/15/23   CT Neck and Chest  Pending final radiology read    Labs:  TSH   Date Value Ref Range Status   2022 3.50 0.30 - 4.20 uIU/mL Final   2022 4.01 0.30 - 5.00 uIU/mL Final   2021 3.01 0.40 - 4.00 mU/L Final   2021 2.38 0.40 - 4.00 mU/L Final       Assessment/Plan:  1. 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 (H)  Patient is 4 years out from surgical resection followed by radiation for SCCa of the left maxilla. He is doing well with no evidence of disease on today's exam. There is a small area at the anterior flap site that seems to be pulling away from gingiva. Will discuss with Dr. Atwood who may want to further evaluate site. I do not see any abnormal tissue in the right mandibular gingiva that would require a biopsy or contraindicate recommended tooth extraction.     Will contact patient with CT results once available.     Reviewed signs and symptoms that would necessitate a sooner exam including new sore throat, mouth pain, ear pain, dysphagia, bleeding from the mouth or lumps/bumps of the neck.    Otherwise, patient will follow up in 6 months for continued surveillance.    Marie Coburn DNP, APRN, CNP  Otolaryngology  Head & Neck Surgery  [phone]    20 minutes spent on the date of the encounter doing chart review, history and exam, documentation and further activities per the note.

## 2023-08-15 NOTE — LETTER
8/15/2023       RE: Devonte Tucker  [address]  [place] [address]     Dear Colleague,    Thank you for referring your patient, Devonte Tucker, to the [facility] [place] [facility]. Please see a copy of my visit note below.    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  Dr. Treviño performed a left palatectomy on 5/9/2019 with Dr. Atwood performing a left radial forearm free flap reconstruction.  He had postop radiation therapy completed on 7/22/2019, 6000 cGy. Was last seen in ENT clinic by Dr. Atwood 6/30/21. Surveillance CT scans completed today prior to appointment.    Interval History:   Patient comes in today for routine surveillance. Patient states that he is doing well. Dentist is planning for a tooth extraction but is concerned about tissue around tooth which they would like evaluated prior to extraction. Patient states that there is some irritation around tooth but there is no bleeding. Continues to have pain at hard palate at flap site behind right central incisor.    Patient is eating well. Patient denies any odynophagia, new sore throat, ear pain, bleeding from the mouth, hemoptysis, voice changes or lumps/bumps of the neck.    Past Medical History:  Past Medical History:   Diagnosis Date    Allergic rhinitis experienced it for many years    Anxiety 2019    Benign positional vertigo 05/2019    Depressive disorder 2019    Diabetes (H)     Gastroesophageal reflux disease 05/2021    Head injury 04/2018    Hypertension     Maxillary sinus cancer (H)     Obesity     Sleep apnea May 2022       Past Surgical History:  Past Surgical History:   Procedure Laterality Date    ------------OTHER-------------      Mucosa and bone biopsy     COLONOSCOPY N/A 5/24/2021    Procedure: COLONOSCOPY, WITH POLYPECTOMY;  Surgeon: Terrell De Luna MD;  Location: [facility] OR    EXPLORE NECK Left 5/9/2019    Procedure: Left Neck Exploration;  Surgeon: Jett Treviño MD;  Location: [facility] OR    EXTRACTION(S) DENTAL      x2 under general anesthesia    GRAFT FREE VASCULARIZED (LOCATION) Right 5/9/2019    Procedure: Left Radial Forearm Free Flap (soft tissue);  Surgeon: Sumit Atwood MD;  Location: [facility] OR    GRAFT SKIN SPLIT THICKNESS FROM EXTREMITY Right 5/9/2019    Procedure: Graft skin split thickness from right thigh, nasogastric feeding tube placement;  Surgeon: Sumit Atwood MD;  Location: [facility] OR    IR LYMPH NODE BIOPSY  4/4/2019    OSTEOTOMY MAXILLA Left 5/9/2019    Procedure: Left Infrastructure Maxillectomy,;  Surgeon: Jett Treviño MD;  Location: [facility] OR       Medications:  Current Outpatient Medications   Medication Sig Dispense Refill    acetaminophen (TYLENOL) 325 MG tablet Take 2 tablets (650 mg) by mouth every 4 hours as needed for mild pain 100 tablet 0    aspirin (ASA) 81 MG EC tablet Take 1 tablet (81 mg) by mouth daily 100 tablet 3    atorvastatin (LIPITOR) 20 MG tablet TAKE 1 TABLET BY MOUTH EVERY MORNING 90 tablet 1    blood glucose (NO BRAND SPECIFIED) lancets standard Use to test blood sugar  4 times daily or as directed. ( [place] micolet lancet) 400 each 0    blood glucose (NO BRAND SPECIFIED) test strip Use to test blood sugar 3 times daily or as directed. 300 strip 3    blood glucose monitoring ([place] MICROLET) lancets Use to test blood sugar 4 times daily or as directed. 200 each 3    cevimeline (EVOXAC) 30 MG capsule TAKE 1 CAPSULE BY MOUTH THREE TIMES DAILY 270 capsule 0    cyclobenzaprine (FLEXERIL) 5 MG tablet Take 1 tablet (5 mg) by mouth 2 times daily as needed for muscle spasms 60 tablet 2    cyclobenzaprine (FLEXERIL) 5 MG tablet Take 1 tablet (5 mg) by mouth 3 times daily as needed for muscle spasms 90 tablet 1    doxazosin  "(CARDURA) 1 MG tablet Take 1 tablet (1 mg) by mouth daily 30 tablet 0    gabapentin (NEURONTIN) 300 MG capsule TAKE 1 CAPSULE BY MOUTH IN THE MORNING, 1 CAPSULE IN AFTERNOON AND 2 CAPSULES AT BEDTIME. 275 capsule 0    lisinopril (ZESTRIL) 10 MG tablet Take 1 tablet (10 mg) by mouth every morning 30 tablet 3    metFORMIN (GLUCOPHAGE) 1000 MG tablet Take 1 tab po BID. 180 tablet 3    Misc Natural Product Nasal (PONARIS) SOLN With head tilted back, place 1 or 2 drops in each nostril once daily. 30 mL 3    multivitamin w/minerals (THERA-VIT-M) tablet Take 1 tablet by mouth daily 30 tablet 0    omeprazole (PRILOSEC) 20 MG DR capsule Take 1 capsule (20 mg) by mouth daily 30 capsule 3    Semaglutide, 1 MG/DOSE, (OZEMPIC, 1 MG/DOSE,) 4 MG/3ML pen Inject 1 mg Subcutaneous every 7 days 3 mL 11    sodium fluoride dental gel (DENTAGEL) 1.1 % GEL topical gel Apply to affected area At Bedtime 112 g 11       Allergies:  Allergies   Allergen Reactions    Seasonal Allergies Other (See Comments) and Shortness Of Breath        Social History:  Social History     Tobacco Use    Smoking status: Never    Smokeless tobacco: Never   Substance Use Topics    Alcohol use: No    Drug use: No       ROS: 10 point ROS neg other than the symptoms noted above in the HPI.    Physical Exam:    /85 (BP Location: Right arm, Patient Position: Sitting, Cuff Size: Adult Large)   Pulse 80   Ht 1.702 m (5' 7\")   Wt 122.5 kg (270 lb)   SpO2 99%   BMI 42.29 kg/m    Wt Readings from Last 3 Encounters:   08/15/23 122.5 kg (270 lb)   02/15/23 128.4 kg (283 lb)   09/26/22 131.5 kg (290 lb)     Constitutional:  The patient was unaccompanied, well-groomed, and in no acute distress.     Neurologic: Alert and oriented x 3.    Psychiatric: The patient's affect was calm, cooperative, and appropriate.     Communication:  Normal; communicates verbally, normal voice quality.   Respiratory: Breathing comfortably without stridor or exertion of accessory muscles.  "   Head/Face:  Normocephalic and atraumatic.  No lesions or scars.    Oral Cavity: Healthy appearing flap in left maxilla. Anterior portion of flap appears to be pulling away from gingiva. 1 mm fistula at anterior edge of flap without drainage or tunneling. Tenderness with palpation around tooth #8. Normal tongue, floor of mouth, and  buccal mucosa.  No lesions or masses on inspection or palpation.    Oropharynx: Normal mucosa, palate symmetric with normal elevation. No abnormal lymph tissue in the oropharynx.     Neck: Well healed left neck incision. Tightness in the left SCM. Normal range of motion   Lymphatic: There is no palpable lymphadenopathy in the neck.        Labs and Imaging Reviewed:  Imagin/15/23   CT Neck and Chest  Pending final radiology read    Labs:  TSH   Date Value Ref Range Status   2022 3.50 0.30 - 4.20 uIU/mL Final   2022 4.01 0.30 - 5.00 uIU/mL Final   2021 3.01 0.40 - 4.00 mU/L Final   2021 2.38 0.40 - 4.00 mU/L Final       Assessment/Plan:  1. 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 (H)  Patient is 4 years out from surgical resection followed by radiation for SCCa of the left maxilla. He is doing well with no evidence of disease on today's exam. There is a small area at the anterior flap site that seems to be pulling away from gingiva. Will discuss with Dr. Atwood who may want to further evaluate site. I do not see any abnormal tissue in the right mandibular gingiva that would require a biopsy or contraindicate recommended tooth extraction.     Will contact patient with CT results once available.     Reviewed signs and symptoms that would necessitate a sooner exam including new sore throat, mouth pain, ear pain, dysphagia, bleeding from the mouth or lumps/bumps of the neck.    Otherwise, patient will follow up in 6 months for continued surveillance.    Marie Coburn DNP, APRN, CNP  Otolaryngology  Head & Neck Surgery  [phone]    20 minutes  spent on the date of the encounter doing chart review, history and exam, documentation and further activities per the note.        Again, thank you for allowing me to participate in the care of your patient.      Sincerely,    Marietta Coburn NP

## 2023-08-15 NOTE — PATIENT INSTRUCTIONS
- You were seen in the ENT Clinic today by Marie Coburn NP.   - Will call with CT results once available.  - Follow up in 6 months for recheck.  - Please send a [employer] message or call the ENT clinic at [phone] with any questions or concerns.
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
## 2023-08-28 DIAGNOSIS — K11.7 XEROSTOMIA DUE TO RADIOTHERAPY: ICD-10-CM

## 2023-08-29 RX ORDER — CEVIMELINE HYDROCHLORIDE 30 MG/1
CAPSULE ORAL
Qty: 270 CAPSULE | Refills: 0 | Status: SHIPPED | OUTPATIENT
Start: 2023-08-29

## 2023-09-01 DIAGNOSIS — C03.9 PRIMARY CANCER OF ALVEOLAR RIDGE MUCOSA (H): ICD-10-CM

## 2023-09-01 DIAGNOSIS — C03.0 SQUAMOUS CELL CARCINOMA OF MAXILLARY ALVEOLAR RIDGE (H): ICD-10-CM

## 2023-09-02 NOTE — TELEPHONE ENCOUNTER
gabapentin (NEURONTIN) 300 MG capsule   275 capsule 0 6/26/2023 12/14/2022  [facility] [place]    Elsa Perez PA-C  Endocrinology, Diabetes, and Metabolism    Nv:  11/2/23    Routed because:  not on protocol

## 2023-09-04 RX ORDER — GABAPENTIN 300 MG/1
CAPSULE ORAL
Qty: 275 CAPSULE | Refills: 0 | Status: SHIPPED | OUTPATIENT
Start: 2023-09-04 | End: 2023-11-02

## 2023-09-06 ENCOUNTER — MYC REFILL (OUTPATIENT)
Dept: ENDOCRINOLOGY | Facility: CLINIC | Age: 58
End: 2023-09-06
Payer: COMMERCIAL

## 2023-09-06 DIAGNOSIS — C03.9 PRIMARY CANCER OF ALVEOLAR RIDGE MUCOSA (H): ICD-10-CM

## 2023-09-06 DIAGNOSIS — C03.0 SQUAMOUS CELL CARCINOMA OF MAXILLARY ALVEOLAR RIDGE (H): ICD-10-CM

## 2023-09-06 RX ORDER — GABAPENTIN 300 MG/1
CAPSULE ORAL
Qty: 275 CAPSULE | Refills: 0 | Status: CANCELLED | OUTPATIENT
Start: 2023-09-06

## 2023-10-02 NOTE — PROGRESS NOTES
CSE Block    Performed by: Uma Moody MD  Authorized by: Uma Moody MD    Patient Location:  OR  Indication: Primary Anesthetic    Pre anesthesia checklist: Patient Identified (2 criteria), Consent Verified, Necessary Block Equipment Present, Monitors applied, IV Bolus, Allergies confirmed, Block Plan Confirmed, Drugs/Solutions Labeled, IV Access functioning, Timeout Performed, Coagulation Status, Block site marked (if applicable), Resuscitation equipment available, Sedation given (if needed) and Aseptic technique  CSE:   Patient Position:  Sitting  Prep:  Chlorhexidine gluconate (CHG)  Max Sterile Barrier Technique:  Hand washing, cap/mask, sterile gloves, sterile drape and sterile dressing applied  Monitoring:  Heart rate, continuous pulse oximetry, non-invasive blood pressure and FHT's  Approach:  Midline  Injection Technique:  DESIREE air  Epidural Needle:     Needle Type:  Tuohy    Needle Gauge:  17 G    DESIREE Depth:  6 (cm)    Location:  L3-4  Spinal Needle:     Needle Type:  Pencan    Needle Gauge:  25 G    Needle Length:  5 in  Epidural Catheter:     CSF Obtained: Yes      Catheter Type:  Side hole    Catheter at Skin Depth:  12 cm   Securement Method: clear occlusive dressing    Test Dose Result:  Negative  Medications Administered   bupivacaine 0.75% in dextrose 8.25% - Intrathecal   1.4 mL - 10/2/2023 2:29:00 PM  fentaNYL (SUBLIMAZE) 50 mcg/mL - Intrathecal   25 mcg - 10/2/2023 2:29:00 PM  Assessment:    Block Outcome: pain improved  Number of Attempts: 1   Procedure Assessment: patient tolerated procedure well with no complications    Start Time: 10/2/2023 2:25 PM    Stop Time: 10/2/2023 2:32 PM  Notes:      Pt seen and examined. Sitting. Standard monitors. Back prepped and raped in standard sterile fashion. 1% Lidocaine to skin L3-4. Tuohy needle advanced. LORTA @ 6 cm. No CSF, no heme, no paresthesias. 25g spinal needle advanced through epidural needle. + CSF, no heme, no paresthesias. Fentanyl and  POD#1 s/p maxillectomy and left RFFF.  Tmax Temp (24hrs), Av.1  F (37.3  C), Min:98.6  F (37  C), Max:99.8  F (37.7  C), VSS with slightly elevated DBP.  Sats good. Still on vent but has cuff leak.  WBC 12.6 this morning.     The patient has been to sleep overnight and not yet awake. The SICU team will begin to wean him from the ventilator today and hopefully extubate him later. Wounds clean, no evid of hematoma.  Flap pale but not congested and is c/w skin color.  Fingertips healthy with good cap refill.  I will return later to give him a better understanding of how well his surgery went, which I relayed it to the family yesterday.   Bupivacaine injected. Spinal needle removed. Epidural catheter threaded easily. Taped at 12 cm @ skin. Pt tolerated procedure very well. FHR monitored and within normal limits.

## 2023-10-08 ENCOUNTER — HEALTH MAINTENANCE LETTER (OUTPATIENT)
Age: 58
End: 2023-10-08

## 2023-10-20 ENCOUNTER — VIRTUAL VISIT (OUTPATIENT)
Dept: ONCOLOGY | Facility: CLINIC | Age: 58
End: 2023-10-20
Attending: STUDENT IN AN ORGANIZED HEALTH CARE EDUCATION/TRAINING PROGRAM
Payer: COMMERCIAL

## 2023-10-20 VITALS — WEIGHT: 266 LBS | BODY MASS INDEX: 41.75 KG/M2 | HEIGHT: 67 IN

## 2023-10-20 DIAGNOSIS — C31.0 MAXILLARY SINUS CANCER (H): Primary | ICD-10-CM

## 2023-10-20 DIAGNOSIS — M54.2 NECK PAIN: ICD-10-CM
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
## 2023-10-20 DIAGNOSIS — I89.0 LYMPHEDEMA: ICD-10-CM

## 2023-10-20 DIAGNOSIS — L59.8 RADIATION FIBROSIS OF SOFT TISSUE FROM THERAPEUTIC PROCEDURE: ICD-10-CM

## 2023-10-20 PROCEDURE — 99215 OFFICE O/P EST HI 40 MIN: CPT | Mod: VID | Performed by: STUDENT IN AN ORGANIZED HEALTH CARE EDUCATION/TRAINING PROGRAM

## 2023-10-20 RX ORDER — CYCLOBENZAPRINE HCL 5 MG
5 TABLET ORAL 2 TIMES DAILY PRN
Qty: 60 TABLET | Refills: 3 | Status: SHIPPED | OUTPATIENT
Start: 2023-10-20

## 2023-10-20 ASSESSMENT — PAIN SCALES - GENERAL: PAINLEVEL: NO PAIN (0)

## 2023-10-20 NOTE — PROGRESS NOTES
[facility]   PM&R clinic note        Interval history:     Devonte Tucker presents to clinic today for follow up reg his rehab needs.   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   Was last seen in clinic on 4/14/23.  Recommendations included:  Therapy/equipment/braces:  Continue lymphatic massage and daily neck stretches twice daily.  Continue swallow techniques as previously instructed by SLP.  Medications:  Continue Flexeril at twice daily as needed.  Prescription refilled today.  Follow up: 6 months.    Oncology History:  -Diagnosed after he presented with a several year history of oral cavity pain and precancerous lesions of the left upper gingiva.   -He underwent a left infrastructure maxillectomy and left level I lymph node dissection on 5/9/2019 with pathology revealing a 1.3 cm well-differentiated squamous cell carcinoma with 13 mm depth of invasion and invasion into the underlying bone.   -He received adjuvant radiotherapy as described above for improved local disease control.  -He was last seen by ENT on 3/9/2022, at which time he reported persistent stable pain in the anterior hard palate and left maxilla.  -Seen by Dr. Verdin on 4/27/22 and complained of low mood with ongoing toxicities and frustrations after cancer treatment. Persistent xerostomia, dysphagia to dry foods, continued stiffness and pain in the left neck. Plan for follow up with rad onc MD in 3/2023 and NP in 9/2022 with CT neck and chest. Repeat TSH due in 10/22. Referral to Oncology Psych and PM&R  - Saw Marietta Coburn for follow up on 2/15/2023 for follow-up.  Doing well on surveillance with no evidence of disease on exam.  - Saw Marietta Irish on 8/15/23-was doing well.  Dentist was planning for tooth extraction on the right side, but was concerned about tissue around the tooth which they would like evaluated prior to extraction.  Patient states that  there is some irritation around the tooth but there is no bleeding.  Doing well with no evidence of disease on exam at that visit.  Small area at the anterior flap site that seems to be pulling away from the gingiva, plan to discuss with Dr. Harrell who may want to further evaluate the site.  No abnormal tissue in the right mandibular gingiva that would require a biopsy or contraindicate recommended tooth extraction.      Symptoms,  Devonte was seen for a return virtual visit today.  Overall, he has been doing okay however has had some increased left neck tightness and spasming over the last several weeks.  He has been consistently needing to take 2 Flexeril's per day to help with the symptoms.  He also feels a little bit more swelling.  He recently had 2 teeth extracted on the right side, and has had some tightness and spasming on the right side as well, but not as bad as the left.  He has continued his stretches, and for the left side does them more than twice daily, at least once daily for the right side.  He feels that more activity including more talking at work triggers spasming to start.  He has few tablets of Flexeril left, and is in need of a refill.      Therapies/HEP,  Not currently in outpatient therapy.  Continues with regular regimen including stretches and MLD.      Functionally,   No changes from last visit.      Social history is unchanged.      Medications:  Current Outpatient Medications   Medication Sig Dispense Refill    acetaminophen (TYLENOL) 325 MG tablet Take 2 tablets (650 mg) by mouth every 4 hours as needed for mild pain 100 tablet 0    aspirin (ASA) 81 MG EC tablet Take 1 tablet (81 mg) by mouth daily 100 tablet 3    atorvastatin (LIPITOR) 20 MG tablet TAKE 1 TABLET BY MOUTH EVERY MORNING 90 tablet 1    blood glucose (NO BRAND SPECIFIED) lancets standard Use to test blood sugar  4 times daily or as directed. ( Sirael micolet lancet) 400 each 0    blood glucose (NO BRAND SPECIFIED) test  "strip Use to test blood sugar 3 times daily or as directed. 300 strip 3    blood glucose monitoring (ANDREW MICROLET) lancets Use to test blood sugar 4 times daily or as directed. 200 each 3    cevimeline (EVOXAC) 30 MG capsule TAKE 1 CAPSULE BY MOUTH THREE TIMES DAILY 270 capsule 0    cyclobenzaprine (FLEXERIL) 5 MG tablet Take 1 tablet (5 mg) by mouth 2 times daily as needed for muscle spasms 60 tablet 2    cyclobenzaprine (FLEXERIL) 5 MG tablet Take 1 tablet (5 mg) by mouth 3 times daily as needed for muscle spasms 90 tablet 1    doxazosin (CARDURA) 1 MG tablet Take 1 tablet (1 mg) by mouth daily 30 tablet 0    gabapentin (NEURONTIN) 300 MG capsule TAKE 1 CAPSULE BY MOUTH IN THE MORNING, 1 CAPSULE IN AFTERNOON AND 2 CAPSULES AT BEDTIME. 275 capsule 0    lisinopril (ZESTRIL) 10 MG tablet Take 1 tablet (10 mg) by mouth every morning 30 tablet 3    metFORMIN (GLUCOPHAGE) 1000 MG tablet Take 1 tab po BID. 180 tablet 3    Misc Natural Product Nasal (PONARIS) SOLN With head tilted back, place 1 or 2 drops in each nostril once daily. 30 mL 3    multivitamin w/minerals (THERA-VIT-M) tablet Take 1 tablet by mouth daily 30 tablet 0    omeprazole (PRILOSEC) 20 MG DR capsule Take 1 capsule (20 mg) by mouth daily 30 capsule 3    Semaglutide, 1 MG/DOSE, (OZEMPIC, 1 MG/DOSE,) 4 MG/3ML pen Inject 1 mg Subcutaneous every 7 days 3 mL 11    sodium fluoride dental gel (DENTAGEL) 1.1 % GEL topical gel Apply to affected area At Bedtime 112 g 11              Physical Exam:   Ht 1.702 m (5' 7\")   Wt 120.7 kg (266 lb)   BMI 41.66 kg/m    Gen: NAD, pleasant and cooperative   HEENT: Atraumatic, normocephalic, extraocular movements appear intact  Pulm: non-labored breathing in room air  Neuro/MSK:   Orientation: Oriented to person, time, place and situation, Exhibits good insight into his condition and ongoing treatment  Motor: Observed moving upper extremities actively against gravity    Labs/Imaging:  Lab Results   Component Value Date "    WBC 6.7 07/01/2019    HGB 12.6 (L) 07/01/2019    HCT 39.8 (L) 07/01/2019    MCV 94 07/01/2019     07/01/2019     Lab Results   Component Value Date     04/11/2023    POTASSIUM 4.6 04/11/2023    CHLORIDE 98 04/11/2023    CO2 21 (L) 04/11/2023     (H) 04/11/2023     Lab Results   Component Value Date    GFRESTIMATED >60 08/15/2023    GFRESTBLACK >90 03/25/2021     Lab Results   Component Value Date    AST 16 11/03/2021    ALT 30 11/03/2021     Lab Results   Component Value Date    INR 1.10 04/04/2019     Lab Results   Component Value Date    BUN 9.7 04/11/2023    CR 0.9 08/15/2023              Assessment/Plan   Devonte Tucker presents to clinic today for follow up reg his rehab needs.   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   Was last seen in clinic on 4/14/23.  Overall, since his last visit Devonte has had increased spasming, tightness in the left side of his neck and slightly increased swelling as well.  He has continued his daily regimen including MLD and stretches, and we discussed the option of getting him in for more outpatient lymphedema therapy to help address the scar tissue with manual therapy.  He will try to continue his home regimen consistently to see if this helps to reduce his symptoms, and if not he will reach out for a therapy referral.  We will plan a 6-month return visit.  Devonte is in agreement with this plan.      Therapy/equipment/braces:  Continue daily stretches and MLD techniques to help with scar tissue and swelling.  Reach out if symptoms or not improving and will plan for lymphedema therapy referral for structured work to help reduce spasming.  Medications:  Continue Flexeril 5 mg 3 times daily as needed.  Prescription refilled today.  Follow up: 6-month virtual visit.      Marivel Hurtado MD  Physical Medicine & Rehabilitation      50 minutes spent on the date of the encounter doing chart review, history and  exam, documentation and further activities as noted above.

## 2023-10-20 NOTE — PATIENT INSTRUCTIONS
1.  Continue your daily stretches and massage techniques to help with your neck muscle tightness, spasming and slight swelling.  2.  Reach out to Dr. Hurtado as discussed if your symptoms are not improving and we will place a lymphedema therapy referral order.  3.  Your Flexeril prescription was refilled today.  4.  Follow-up with Dr. Hurtado in 6 months for a return virtual visit.

## 2023-10-20 NOTE — LETTER
10/20/2023         RE: Devonte Tucker  [address]  [place] [address]        Dear Colleague,    Thank you for referring your patient, Devonte Tucker, to the [facility]. Please see a copy of my visit note below.    Virtual Visit Details    Type of service:  Video Visit     Originating Location (pt. Location): Home    Distant Location (provider location):  Off-site  Platform used for Video Visit: [facility]   PM&R clinic note        Interval history:     Devonte Tucker presents to clinic today for follow up reg his rehab needs.   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   Was last seen in clinic on 4/14/23.  Recommendations included:  Therapy/equipment/braces:  Continue lymphatic massage and daily neck stretches twice daily.  Continue swallow techniques as previously instructed by SLP.  Medications:  Continue Flexeril at twice daily as needed.  Prescription refilled today.  Follow up: 6 months.    Oncology History:  -Diagnosed after he presented with a several year history of oral cavity pain and precancerous lesions of the left upper gingiva.   -He underwent a left infrastructure maxillectomy and left level I lymph node dissection on 5/9/2019 with pathology revealing a 1.3 cm well-differentiated squamous cell carcinoma with 13 mm depth of invasion and invasion into the underlying bone.   -He received adjuvant radiotherapy as described above for improved local disease control.  -He was last seen by ENT on 3/9/2022, at which time he reported persistent stable pain in the anterior hard palate and left maxilla.  -Seen by Dr. Verdin on 4/27/22 and complained of low mood with ongoing toxicities and frustrations after cancer treatment. Persistent xerostomia, dysphagia to dry foods, continued stiffness and pain in the left neck. Plan for follow up with rad onc MD in 3/2023  and NP in 9/2022 with CT neck and chest. Repeat TSH due in 10/22. Referral to Oncology Psych and PM&R  - Saw Marietta Coburn for follow up on 2/15/2023 for follow-up.  Doing well on surveillance with no evidence of disease on exam.  - Saw Marietta Coburn on 8/15/23-was doing well.  Dentist was planning for tooth extraction on the right side, but was concerned about tissue around the tooth which they would like evaluated prior to extraction.  Patient states that there is some irritation around the tooth but there is no bleeding.  Doing well with no evidence of disease on exam at that visit.  Small area at the anterior flap site that seems to be pulling away from the gingiva, plan to discuss with Dr. Harrell who may want to further evaluate the site.  No abnormal tissue in the right mandibular gingiva that would require a biopsy or contraindicate recommended tooth extraction.      Symptoms,  Devonte was seen for a return virtual visit today.  Overall, he has been doing okay however has had some increased left neck tightness and spasming over the last several weeks.  He has been consistently needing to take 2 Flexeril's per day to help with the symptoms.  He also feels a little bit more swelling.  He recently had 2 teeth extracted on the right side, and has had some tightness and spasming on the right side as well, but not as bad as the left.  He has continued his stretches, and for the left side does them more than twice daily, at least once daily for the right side.  He feels that more activity including more talking at work triggers spasming to start.  He has few tablets of Flexeril left, and is in need of a refill.      Therapies/HEP,  Not currently in outpatient therapy.  Continues with regular regimen including stretches and MLD.      Functionally,   No changes from last visit.      Social history is unchanged.      Medications:  Current Outpatient Medications   Medication Sig Dispense Refill    acetaminophen  (TYLENOL) 325 MG tablet Take 2 tablets (650 mg) by mouth every 4 hours as needed for mild pain 100 tablet 0    aspirin (ASA) 81 MG EC tablet Take 1 tablet (81 mg) by mouth daily 100 tablet 3    atorvastatin (LIPITOR) 20 MG tablet TAKE 1 TABLET BY MOUTH EVERY MORNING 90 tablet 1    blood glucose (NO BRAND SPECIFIED) lancets standard Use to test blood sugar  4 times daily or as directed. ( [place] micolet lancet) 400 each 0    blood glucose (NO BRAND SPECIFIED) test strip Use to test blood sugar 3 times daily or as directed. 300 strip 3    blood glucose monitoring (ISRAEL MICROLET) lancets Use to test blood sugar 4 times daily or as directed. 200 each 3    cevimeline (EVOXAC) 30 MG capsule TAKE 1 CAPSULE BY MOUTH THREE TIMES DAILY 270 capsule 0    cyclobenzaprine (FLEXERIL) 5 MG tablet Take 1 tablet (5 mg) by mouth 2 times daily as needed for muscle spasms 60 tablet 2    cyclobenzaprine (FLEXERIL) 5 MG tablet Take 1 tablet (5 mg) by mouth 3 times daily as needed for muscle spasms 90 tablet 1    doxazosin (CARDURA) 1 MG tablet Take 1 tablet (1 mg) by mouth daily 30 tablet 0    gabapentin (NEURONTIN) 300 MG capsule TAKE 1 CAPSULE BY MOUTH IN THE MORNING, 1 CAPSULE IN AFTERNOON AND 2 CAPSULES AT BEDTIME. 275 capsule 0    lisinopril (ZESTRIL) 10 MG tablet Take 1 tablet (10 mg) by mouth every morning 30 tablet 3    metFORMIN (GLUCOPHAGE) 1000 MG tablet Take 1 tab po BID. 180 tablet 3    Misc Natural Product Nasal (PONARIS) SOLN With head tilted back, place 1 or 2 drops in each nostril once daily. 30 mL 3    multivitamin w/minerals (THERA-VIT-M) tablet Take 1 tablet by mouth daily 30 tablet 0    omeprazole (PRILOSEC) 20 MG DR capsule Take 1 capsule (20 mg) by mouth daily 30 capsule 3    Semaglutide, 1 MG/DOSE, (OZEMPIC, 1 MG/DOSE,) 4 MG/3ML pen Inject 1 mg Subcutaneous every 7 days 3 mL 11    sodium fluoride dental gel (DENTAGEL) 1.1 % GEL topical gel Apply to affected area At Bedtime 112 g 11              Physical Exam:   Ht  "1.702 m (5' 7\")   Wt 120.7 kg (266 lb)   BMI 41.66 kg/m    Gen: NAD, pleasant and cooperative   HEENT: Atraumatic, normocephalic, extraocular movements appear intact  Pulm: non-labored breathing in room air  Neuro/MSK:   Orientation: Oriented to person, time, place and situation, Exhibits good insight into his condition and ongoing treatment  Motor: Observed moving upper extremities actively against gravity    Labs/Imaging:  Lab Results   Component Value Date    WBC 6.7 07/01/2019    HGB 12.6 (L) 07/01/2019    HCT 39.8 (L) 07/01/2019    MCV 94 07/01/2019     07/01/2019     Lab Results   Component Value Date     04/11/2023    POTASSIUM 4.6 04/11/2023    CHLORIDE 98 04/11/2023    CO2 21 (L) 04/11/2023     (H) 04/11/2023     Lab Results   Component Value Date    GFRESTIMATED >60 08/15/2023    GFRESTBLACK >90 03/25/2021     Lab Results   Component Value Date    AST 16 11/03/2021    ALT 30 11/03/2021     Lab Results   Component Value Date    INR 1.10 04/04/2019     Lab Results   Component Value Date    BUN 9.7 04/11/2023    CR 0.9 08/15/2023              Assessment/Plan   Devonte Tucker presents to clinic today for follow up reg his rehab needs.   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   Was last seen in clinic on 4/14/23.  Overall, since his last visit Devonte has had increased spasming, tightness in the left side of his neck and slightly increased swelling as well.  He has continued his daily regimen including MLD and stretches, and we discussed the option of getting him in for more outpatient lymphedema therapy to help address the scar tissue with manual therapy.  He will try to continue his home regimen consistently to see if this helps to reduce his symptoms, and if not he will reach out for a therapy referral.  We will plan a 6-month return visit.  Devonte is in agreement with this plan.      Therapy/equipment/braces:  Continue daily " stretches and MLD techniques to help with scar tissue and swelling.  Reach out if symptoms or not improving and will plan for lymphedema therapy referral for structured work to help reduce spasming.  Medications:  Continue Flexeril 5 mg 3 times daily as needed.  Prescription refilled today.  Follow up: 6-month virtual visit.      Marivel Hurtado MD  Physical Medicine & Rehabilitation      50 minutes spent on the date of the encounter doing chart review, history and exam, documentation and further activities as noted above.

## 2023-10-20 NOTE — NURSING NOTE
Is the patient currently in the state of [place]? YES    Visit mode:VIDEO    If the visit is dropped, the patient can be reconnected by: VIDEO VISIT: Text to cell phone:   Telephone Information:   Mobile [phone]       Will anyone else be joining the visit? NO  (If patient encounters technical issues they should call [phone]    How would you like to obtain your AVS? MyChart    Are changes needed to the allergy or medication list? Pt stated no changes to allergies and Pt stated no med changes    Reason for visit: KYM Mercer LPN

## 2023-10-20 NOTE — PROGRESS NOTES
Virtual Visit Details    Type of service:  Video Visit     Originating Location (pt. Location): Home    Distant Location (provider location):  Off-site  Platform used for Video Visit: [employer]

## 2023-10-25 NOTE — PROGRESS NOTES
Virtual Visit Details    Type of service:  Video Visit     Originating Location (pt. Location):     Distant Location (provider location):    Platform used for Video Visit:     Outcome for 10/25/23 9:07 AM: [employer] message sent  Brenda Byers MA  Outcome for 10/31/23 11:59 AM: Left Voicemail   Zulma Torres MA  Outcome for 11/01/23 8:30 AM: Per patient, will send BG readings via [employer]en LPN   Outcome for 11/01/23 11:21 AM: Glucose readings sent via [employer] LPN         Patient is showing 4/5 [place] met. BP out range - BP not on file.   Zulma Torres MA

## 2023-10-27 DIAGNOSIS — E11.9 TYPE 2 DIABETES MELLITUS WITHOUT COMPLICATION, WITHOUT LONG-TERM CURRENT USE OF INSULIN (H): ICD-10-CM

## 2023-10-30 NOTE — TELEPHONE ENCOUNTER
metFORMIN (GLUCOPHAGE) 1000 MG   Last Written Prescription Date:  10/6/22  Last Fill Quantity: 180,   # refills: 3  Last Office Visit : 12/14/22  Future Office visit:  11/2/23  Routing refill request to provider for review/approval because:  90 DAY REFILL PENDING   OVER DUE A1C    Pt outside of RTC timeframe ( RTC 5 MOS/ MAY 2023)  PENDING APPT. 11/2/23     Lab Results   Component Value Date    A1C 6.4 09/26/2022    A1C 5.8 11/03/2021    A1C 5.6 03/25/2021    A1C 7.4 04/24/2019

## 2023-10-31 ENCOUNTER — TELEPHONE (OUTPATIENT)
Dept: ENDOCRINOLOGY | Facility: CLINIC | Age: 58
End: 2023-10-31
Payer: COMMERCIAL

## 2023-10-31 NOTE — TELEPHONE ENCOUNTER
Called patient and left voicemail. Patient has an appointment on  11/2/23 . Need to collect the last 14 days worth of blood sugar readings to prepare for patient's visit.   Zulma Torres MA

## 2023-11-02 ENCOUNTER — VIRTUAL VISIT (OUTPATIENT)
Dept: ENDOCRINOLOGY | Facility: CLINIC | Age: 58
End: 2023-11-02
Payer: COMMERCIAL

## 2023-11-02 DIAGNOSIS — E11.9 TYPE 2 DIABETES MELLITUS WITHOUT COMPLICATION, WITHOUT LONG-TERM CURRENT USE OF INSULIN (H): Primary | ICD-10-CM

## 2023-11-02 DIAGNOSIS — C03.9 PRIMARY CANCER OF ALVEOLAR RIDGE MUCOSA (H): ICD-10-CM

## 2023-11-02 DIAGNOSIS — C03.0 SQUAMOUS CELL CARCINOMA OF MAXILLARY ALVEOLAR RIDGE (H): ICD-10-CM

## 2023-11-02 PROCEDURE — 99214 OFFICE O/P EST MOD 30 MIN: CPT | Mod: VID | Performed by: PHYSICIAN ASSISTANT

## 2023-11-02 RX ORDER — GABAPENTIN 300 MG/1
CAPSULE ORAL
Qty: 360 CAPSULE | Refills: 1 | Status: SHIPPED | OUTPATIENT
Start: 2023-11-02 | End: 2024-05-02

## 2023-11-02 NOTE — NURSING NOTE
Is the patient currently in the state of [place]? YES    Visit mode:VIDEO    If the visit is dropped, the patient can be reconnected by: VIDEO VISIT: Text to cell phone:   Telephone Information:   Mobile [phone]    and VIDEO VISIT: Send to e-mail at: [email or web address]    Will anyone else be joining the visit? NO  (If patient encounters technical issues they should call [phone])    How would you like to obtain your AVS? MyChart    Are changes needed to the allergy or medication list? No    Reason for visit: KYM WILDER

## 2023-11-02 NOTE — LETTER
11/2/2023       RE: Devonte Tucker  [address]  [place] [address]     Dear Colleague,    Thank you for referring your patient, Devonte Tucekr, to the [facility] [place] at [facility]. Please see a copy of my visit note below.    Virtual Visit Details    Type of service:  Video Visit     Originating Location (pt. Location):     Distant Location (provider location):    Platform used for Video Visit:     Outcome for 10/25/23 9:07 AM: Docitt message sent  Brenda Byers MA  Outcome for 10/31/23 11:59 AM: Left Voicemail   Zulma Torres MA  Outcome for 11/01/23 8:30 AM: Per patient, will send BG readings via Docitt  Ayesha Gooden LPN   Outcome for 11/01/23 11:21 AM: Glucose readings sent via Docitt  Ayesha Gooden LPN         Patient is showing 4/5 MNCM met. BP out range - BP not on file.   Zulma Torres MA      Time of start: 8:00 am   Time of end: 8:16 am   Total duration of video visit: 16 minutes.  Providers location: offsite.  Patients location: [place].    HPI  Emigdio Tucker is a 57 year old male with type 2 diabetes mellitus. Video visit today for diabetes follow up.  Pt gives a hx of type 2 diabetes dx in early 2000.  He has neuropathy.  Pt denies hx of retinopathy or nephropathy.  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   He also has hx of obesity, HTN, depression and generalized anxiety disorder.  For his diabetes, he is currently taking Metformin 1000 mg BID and Ozempic 1 mg mg subcutaneous once a week.  Most recent A1C was 6.4% in Sept 2022.  Pt provided me with blood sugar data which I scanned in his note below.  Blood sugar readings are good at this time.  On ROS today, he reports doing well.  He reports less neuropathy discomfort in both  his hands and feet since I increased his Gabapentin dose.  Denies foot ulcers.  Pt denies headaches,blurred vision, n/v, SOB at rest, cough, fever or chills.  No chest pain, abd pain, diarrhea, dysuria or hematuria.    Diabetes Care  Retinopathy: none; pt seen by Oph in May 2023.  Nephropathy:none; urine microalbuminuria negative in 11/2021. He is taking Lisinopril.  Neuropathy:yes.  Foot Exam: no exam today.  Taking aspirin: yes.  Lipids: LDL 54 in 4/2023.  Taking Lipitor.  CAD: no.  Mental health: hx of depression and generalized anxiety.  Insulin: none.    DM meds: Metformin and Ozempic.  Testing: glucose meter.            ROS  See under HPI.    Allergies  Allergies   Allergen Reactions    Seasonal Allergies Other (See Comments) and Shortness Of Breath       Medications  Current Outpatient Medications   Medication Sig Dispense Refill    acetaminophen (TYLENOL) 325 MG tablet Take 2 tablets (650 mg) by mouth every 4 hours as needed for mild pain 100 tablet 0    aspirin (ASA) 81 MG EC tablet Take 1 tablet (81 mg) by mouth daily 100 tablet 3    atorvastatin (LIPITOR) 20 MG tablet TAKE 1 TABLET BY MOUTH EVERY MORNING 90 tablet 1    blood glucose (NO BRAND SPECIFIED) lancets standard Use to test blood sugar  4 times daily or as directed. ( [place] micolet lancet) 400 each 0    blood glucose (NO BRAND SPECIFIED) test strip Use to test blood sugar 3 times daily or as directed. 300 strip 3    blood glucose monitoring (ISRAEL MICROLET) lancets Use to test blood sugar 4 times daily or as directed. 200 each 3    cevimeline (EVOXAC) 30 MG capsule TAKE 1 CAPSULE BY MOUTH THREE TIMES DAILY 270 capsule 0    cyclobenzaprine (FLEXERIL) 5 MG tablet Take 1 tablet (5 mg) by mouth 2 times daily as needed for muscle spasms 60 tablet 3    cyclobenzaprine (FLEXERIL) 5 MG tablet Take 1 tablet (5 mg) by mouth 2 times daily as needed for muscle spasms 60 tablet 2    cyclobenzaprine (FLEXERIL) 5 MG tablet Take 1 tablet (5 mg) by mouth 3 times  daily as needed for muscle spasms 90 tablet 1    doxazosin (CARDURA) 1 MG tablet Take 1 tablet (1 mg) by mouth daily 30 tablet 0    gabapentin (NEURONTIN) 300 MG capsule TAKE 1 CAPSULE BY MOUTH IN THE MORNING, 1 CAPSULE IN AFTERNOON AND 2 CAPSULES AT BEDTIME. 275 capsule 0    lisinopril (ZESTRIL) 10 MG tablet Take 1 tablet (10 mg) by mouth every morning 30 tablet 3    metFORMIN (GLUCOPHAGE) 1000 MG tablet TAKE ONE TABLET BY MOUTH TWICE DAILY 180 tablet 0    Misc Natural Product Nasal (PONARIS) SOLN With head tilted back, place 1 or 2 drops in each nostril once daily. 30 mL 3    multivitamin w/minerals (THERA-VIT-M) tablet Take 1 tablet by mouth daily 30 tablet 0    omeprazole (PRILOSEC) 20 MG DR capsule Take 1 capsule (20 mg) by mouth daily 30 capsule 3    Semaglutide, 1 MG/DOSE, (OZEMPIC, 1 MG/DOSE,) 4 MG/3ML pen Inject 1 mg Subcutaneous every 7 days 3 mL 11    sodium fluoride dental gel (DENTAGEL) 1.1 % GEL topical gel Apply to affected area At Bedtime 112 g 11       Family History  family history includes Cancer in his brother, mother, and sister; Cardiovascular in his brother; Cerebrovascular Disease in his brother; Depression in his mother, sister, and sister; Diabetes in his brother, mother, and sister; Heart Disease in his brother and mother; Hypertension in his brother, mother, sister, and sister; Kidney Cancer in his sister; Substance Abuse in his brother, father, and sister.    Social History   reports that he has never smoked. He has never used smokeless tobacco. He reports that he does not drink alcohol and does not use drugs.     Past Medical History  Past Medical History:   Diagnosis Date    Allergic rhinitis experienced it for many years    Anxiety 2019    Benign positional vertigo 05/2019    Depressive disorder 2019    Diabetes (H)     Gastroesophageal reflux disease 05/2021    Head injury 04/2018    Hypertension     Maxillary sinus cancer (H)     Obesity     Sleep apnea May 2022       Past Surgical  History:   Procedure Laterality Date    ------------OTHER-------------      Mucosa and bone biopsy    COLONOSCOPY N/A 5/24/2021    Procedure: COLONOSCOPY, WITH POLYPECTOMY;  Surgeon: Terrell De Luna MD;  Location: [facility] OR    EXPLORE NECK Left 5/9/2019    Procedure: Left Neck Exploration;  Surgeon: Jett Treviño MD;  Location: [facility] OR    EXTRACTION(S) DENTAL      x2 under general anesthesia    GRAFT FREE VASCULARIZED (LOCATION) Right 5/9/2019    Procedure: Left Radial Forearm Free Flap (soft tissue);  Surgeon: Sumit Atwood MD;  Location: [facility] OR    GRAFT SKIN SPLIT THICKNESS FROM EXTREMITY Right 5/9/2019    Procedure: Graft skin split thickness from right thigh, nasogastric feeding tube placement;  Surgeon: Sumit Atwood MD;  Location: [facility] OR    IR LYMPH NODE BIOPSY  4/4/2019    OSTEOTOMY MAXILLA Left 5/9/2019    Procedure: Left Infrastructure Maxillectomy,;  Surgeon: Jett Treviño MD;  Location: [facility] OR       Physical Exam\    No exam today.    RESULTS  Creatinine   Date Value Ref Range Status   04/11/2023 0.83 0.67 - 1.17 mg/dL Final   07/01/2019 0.68 0.66 - 1.25 mg/dL Final     Creatinine POCT   Date Value Ref Range Status   08/15/2023 0.9 0.7 - 1.3 mg/dL Final     GFR Estimate   Date Value Ref Range Status   03/25/2021 >90 >60 mL/min/[1.73_m2] Final     GFR, ESTIMATED POCT   Date Value Ref Range Status   08/15/2023 >60 >60 mL/min/1.73m2 Final     Hemoglobin A1C   Date Value Ref Range Status   09/26/2022 6.4 (H) <5.7 % Final     Comment:     Normal <5.7%   Prediabetes 5.7-6.4%    Diabetes 6.5% or higher     Note: Adopted from ADA consensus guidelines.   03/25/2021 5.6 0 - 5.6 % Final     Comment:     Normal <5.7% Prediabetes 5.7-6.4%  Diabetes 6.5% or higher - adopted from ADA   consensus guidelines.       Potassium   Date Value Ref Range Status   04/11/2023 4.6 3.4 - 5.3 mmol/L Final   04/06/2022 4.7 3.5 - 5.0 mmol/L Final   07/01/2019 4.1 3.4 - 5.3 mmol/L Final     ALT   Date Value Ref Range Status    11/03/2021 30 0 - 70 U/L Final     AST   Date Value Ref Range Status   11/03/2021 16 0 - 45 U/L Final     TSH   Date Value Ref Range Status   09/26/2022 3.50 0.30 - 4.20 uIU/mL Final   04/06/2022 4.01 0.30 - 5.00 uIU/mL Final   11/03/2021 3.01 0.40 - 4.00 mU/L Final   05/20/2021 2.38 0.40 - 4.00 mU/L Final       Cholesterol   Date Value Ref Range Status   04/11/2023 111 <200 mg/dL Final   04/06/2022 145 <=199 mg/dL Final   07/01/2019 105 <200 mg/dL Final     HDL Cholesterol   Date Value Ref Range Status   07/01/2019 38 (L) >39 mg/dL Final     Direct Measure HDL   Date Value Ref Range Status   04/11/2023 39 (L) >=40 mg/dL Final   04/06/2022 43 >=40 mg/dL Final     Comment:     HDL Cholesterol Reference Range:     0-2 years:   No reference ranges established for patients under 2 years old  at [facility] for lipid analytes.    2-8 years:  Greater than 45 mg/dL     18 years and older:   Female: Greater than or equal to 50 mg/dL   Male:   Greater than or equal to 40 mg/dL     LDL Cholesterol Calculated   Date Value Ref Range Status   04/11/2023 54 <=100 mg/dL Final   04/06/2022 81 <=129 mg/dL Final   07/01/2019 41 <100 mg/dL Final     Comment:     Desirable:       <100 mg/dl     Triglycerides   Date Value Ref Range Status   04/11/2023 91 <150 mg/dL Final   04/06/2022 105 <=149 mg/dL Final   07/01/2019 128 <150 mg/dL Final         ASSESSMENT/PLAN:    1.  TYPE 2 DIABETES MELLITUS:  Pt's blood sugar values are good at this time.  Continue Ozempic 1 mg subcutaneous once a week and Metformin 1000 mg BID.  Again, I reviewed how Ozempic works and possible side effects of the drug including n/v, GI distress, diarrhea and rare risk of pancreatitis.  Pt denies hx of pancreatitis or gastroparesis.  Pt is tolerating Ozempic well.  Pt denies hx of retinopathy and was seen by Oph in May 2023.  His urine microalbuminuria has been negative. He is taking Lisinopril.  Most recent creat was 0.9 with GFR > 60 mL/min in  8/2023.  No vitals today. /85 on 8/15/2023.  Reviewed the importance of healthy eating, portion control, weight loss and need for daily activity.    2.  NEUROPATHY: Pt reports less pain and numbness in feet since increased is Gabapentin dose last visit.  He denies foot ulcers.    3.  MENTAL HEALTH: Stable at this time per patient.    4.  HEALTH MAINTENANCE: Reminded pt to see his PCP for his annual exam and health maintenance.    5.  FOLLOW UP: with me in 6 months.  A1C, creat/GFR, urine microalbuminuria and TSH ordered today.  Gabapentin and Metformin refilled today.    Time spent reviewing chart and labs today =5  minutes.  Time for video visit today= 16 minutes.  Time for documentation today = 15 minutes.    TOTAL TIME FOR VISIT TODAY = 36 minutes.    Elsa Perez PA-C

## 2023-11-02 NOTE — PROGRESS NOTES
Time of start: 8:00 am   Time of end: 8:16 am   Total duration of video visit: 16 minutes.  Providers location: offsite.  Patients location: [place].    HPI  Emigdio Tucker is a 57 year old male with type 2 diabetes mellitus. Video visit today for diabetes follow up.  Pt gives a hx of type 2 diabetes dx in early 2000.  He has neuropathy.  Pt denies hx of retinopathy or nephropathy.  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   He also has hx of obesity, HTN, depression and generalized anxiety disorder.  For his diabetes, he is currently taking Metformin 1000 mg BID and Ozempic 1 mg mg subcutaneous once a week.  Most recent A1C was 6.4% in Sept 2022.  Pt provided me with blood sugar data which I scanned in his note below.  Blood sugar readings are good at this time.  On ROS today, he reports doing well.  He reports less neuropathy discomfort in both his hands and feet since I increased his Gabapentin dose.  Denies foot ulcers.  Pt denies headaches,blurred vision, n/v, SOB at rest, cough, fever or chills.  No chest pain, abd pain, diarrhea, dysuria or hematuria.    Diabetes Care  Retinopathy: none; pt seen by Oph in May 2023.  Nephropathy:none; urine microalbuminuria negative in 11/2021. He is taking Lisinopril.  Neuropathy:yes.  Foot Exam: no exam today.  Taking aspirin: yes.  Lipids: LDL 54 in 4/2023.  Taking Lipitor.  CAD: no.  Mental health: hx of depression and generalized anxiety.  Insulin: none.    DM meds: Metformin and Ozempic.  Testing: glucose meter.            ROS  See under HPI.    Allergies  Allergies   Allergen Reactions    Seasonal Allergies Other (See Comments) and Shortness Of Breath       Medications  Current Outpatient Medications   Medication Sig Dispense Refill    acetaminophen (TYLENOL) 325 MG tablet Take 2 tablets (650 mg) by mouth every  4 hours as needed for mild pain 100 tablet 0    aspirin (ASA) 81 MG EC tablet Take 1 tablet (81 mg) by mouth daily 100 tablet 3    atorvastatin (LIPITOR) 20 MG tablet TAKE 1 TABLET BY MOUTH EVERY MORNING 90 tablet 1    blood glucose (NO BRAND SPECIFIED) lancets standard Use to test blood sugar  4 times daily or as directed. ( [place] micolet lancet) 400 each 0    blood glucose (NO BRAND SPECIFIED) test strip Use to test blood sugar 3 times daily or as directed. 300 strip 3    blood glucose monitoring (ISRAEL MICROLET) lancets Use to test blood sugar 4 times daily or as directed. 200 each 3    cevimeline (EVOXAC) 30 MG capsule TAKE 1 CAPSULE BY MOUTH THREE TIMES DAILY 270 capsule 0    cyclobenzaprine (FLEXERIL) 5 MG tablet Take 1 tablet (5 mg) by mouth 2 times daily as needed for muscle spasms 60 tablet 3    cyclobenzaprine (FLEXERIL) 5 MG tablet Take 1 tablet (5 mg) by mouth 2 times daily as needed for muscle spasms 60 tablet 2    cyclobenzaprine (FLEXERIL) 5 MG tablet Take 1 tablet (5 mg) by mouth 3 times daily as needed for muscle spasms 90 tablet 1    doxazosin (CARDURA) 1 MG tablet Take 1 tablet (1 mg) by mouth daily 30 tablet 0    gabapentin (NEURONTIN) 300 MG capsule TAKE 1 CAPSULE BY MOUTH IN THE MORNING, 1 CAPSULE IN AFTERNOON AND 2 CAPSULES AT BEDTIME. 275 capsule 0    lisinopril (ZESTRIL) 10 MG tablet Take 1 tablet (10 mg) by mouth every morning 30 tablet 3    metFORMIN (GLUCOPHAGE) 1000 MG tablet TAKE ONE TABLET BY MOUTH TWICE DAILY 180 tablet 0    Misc Natural Product Nasal (PONARIS) SOLN With head tilted back, place 1 or 2 drops in each nostril once daily. 30 mL 3    multivitamin w/minerals (THERA-VIT-M) tablet Take 1 tablet by mouth daily 30 tablet 0    omeprazole (PRILOSEC) 20 MG DR capsule Take 1 capsule (20 mg) by mouth daily 30 capsule 3    Semaglutide, 1 MG/DOSE, (OZEMPIC, 1 MG/DOSE,) 4 MG/3ML pen Inject 1 mg Subcutaneous every 7 days 3 mL 11    sodium fluoride dental gel (DENTAGEL) 1.1 % GEL topical  gel Apply to affected area At Bedtime 112 g 11       Family History  family history includes Cancer in his brother, mother, and sister; Cardiovascular in his brother; Cerebrovascular Disease in his brother; Depression in his mother, sister, and sister; Diabetes in his brother, mother, and sister; Heart Disease in his brother and mother; Hypertension in his brother, mother, sister, and sister; Kidney Cancer in his sister; Substance Abuse in his brother, father, and sister.    Social History   reports that he has never smoked. He has never used smokeless tobacco. He reports that he does not drink alcohol and does not use drugs.     Past Medical History  Past Medical History:   Diagnosis Date    Allergic rhinitis experienced it for many years    Anxiety 2019    Benign positional vertigo 05/2019    Depressive disorder 2019    Diabetes (H)     Gastroesophageal reflux disease 05/2021    Head injury 04/2018    Hypertension     Maxillary sinus cancer (H)     Obesity     Sleep apnea May 2022       Past Surgical History:   Procedure Laterality Date    ------------OTHER-------------      Mucosa and bone biopsy    COLONOSCOPY N/A 5/24/2021    Procedure: COLONOSCOPY, WITH POLYPECTOMY;  Surgeon: Terrell De Luna MD;  Location: [facility] OR    EXPLORE NECK Left 5/9/2019    Procedure: Left Neck Exploration;  Surgeon: Jett Treviño MD;  Location: [facility] OR    EXTRACTION(S) DENTAL      x2 under general anesthesia    GRAFT FREE VASCULARIZED (LOCATION) Right 5/9/2019    Procedure: Left Radial Forearm Free Flap (soft tissue);  Surgeon: Sumit Atwood MD;  Location: [facility] OR    GRAFT SKIN SPLIT THICKNESS FROM EXTREMITY Right 5/9/2019    Procedure: Graft skin split thickness from right thigh, nasogastric feeding tube placement;  Surgeon: Sumit Atwood MD;  Location: [facility] OR    IR LYMPH NODE BIOPSY  4/4/2019    OSTEOTOMY MAXILLA Left 5/9/2019    Procedure: Left Infrastructure Maxillectomy,;  Surgeon: Jett Treviño MD;  Location: [facility] OR        Physical Exam\    No exam today.    RESULTS  Creatinine   Date Value Ref Range Status   04/11/2023 0.83 0.67 - 1.17 mg/dL Final   07/01/2019 0.68 0.66 - 1.25 mg/dL Final     Creatinine POCT   Date Value Ref Range Status   08/15/2023 0.9 0.7 - 1.3 mg/dL Final     GFR Estimate   Date Value Ref Range Status   03/25/2021 >90 >60 mL/min/[1.73_m2] Final     GFR, ESTIMATED POCT   Date Value Ref Range Status   08/15/2023 >60 >60 mL/min/1.73m2 Final     Hemoglobin A1C   Date Value Ref Range Status   09/26/2022 6.4 (H) <5.7 % Final     Comment:     Normal <5.7%   Prediabetes 5.7-6.4%    Diabetes 6.5% or higher     Note: Adopted from ADA consensus guidelines.   03/25/2021 5.6 0 - 5.6 % Final     Comment:     Normal <5.7% Prediabetes 5.7-6.4%  Diabetes 6.5% or higher - adopted from ADA   consensus guidelines.       Potassium   Date Value Ref Range Status   04/11/2023 4.6 3.4 - 5.3 mmol/L Final   04/06/2022 4.7 3.5 - 5.0 mmol/L Final   07/01/2019 4.1 3.4 - 5.3 mmol/L Final     ALT   Date Value Ref Range Status   11/03/2021 30 0 - 70 U/L Final     AST   Date Value Ref Range Status   11/03/2021 16 0 - 45 U/L Final     TSH   Date Value Ref Range Status   09/26/2022 3.50 0.30 - 4.20 uIU/mL Final   04/06/2022 4.01 0.30 - 5.00 uIU/mL Final   11/03/2021 3.01 0.40 - 4.00 mU/L Final   05/20/2021 2.38 0.40 - 4.00 mU/L Final       Cholesterol   Date Value Ref Range Status   04/11/2023 111 <200 mg/dL Final   04/06/2022 145 <=199 mg/dL Final   07/01/2019 105 <200 mg/dL Final     HDL Cholesterol   Date Value Ref Range Status   07/01/2019 38 (L) >39 mg/dL Final     Direct Measure HDL   Date Value Ref Range Status   04/11/2023 39 (L) >=40 mg/dL Final   04/06/2022 43 >=40 mg/dL Final     Comment:     HDL Cholesterol Reference Range:     0-2 years:   No reference ranges established for patients under 2 years old  at [facility] for lipid analytes.    2-8 years:  Greater than 45 mg/dL     18 years and older:   Female: Greater  than or equal to 50 mg/dL   Male:   Greater than or equal to 40 mg/dL     LDL Cholesterol Calculated   Date Value Ref Range Status   04/11/2023 54 <=100 mg/dL Final   04/06/2022 81 <=129 mg/dL Final   07/01/2019 41 <100 mg/dL Final     Comment:     Desirable:       <100 mg/dl     Triglycerides   Date Value Ref Range Status   04/11/2023 91 <150 mg/dL Final   04/06/2022 105 <=149 mg/dL Final   07/01/2019 128 <150 mg/dL Final         ASSESSMENT/PLAN:    1.  TYPE 2 DIABETES MELLITUS:  Pt's blood sugar values are good at this time.  Continue Ozempic 1 mg subcutaneous once a week and Metformin 1000 mg BID.  Again, I reviewed how Ozempic works and possible side effects of the drug including n/v, GI distress, diarrhea and rare risk of pancreatitis.  Pt denies hx of pancreatitis or gastroparesis.  Pt is tolerating Ozempic well.  Pt denies hx of retinopathy and was seen by Oph in May 2023.  His urine microalbuminuria has been negative. He is taking Lisinopril.  Most recent creat was 0.9 with GFR > 60 mL/min in 8/2023.  No vitals today. /85 on 8/15/2023.  Reviewed the importance of healthy eating, portion control, weight loss and need for daily activity.    2.  NEUROPATHY: Pt reports less pain and numbness in feet since increased is Gabapentin dose last visit.  He denies foot ulcers.    3.  MENTAL HEALTH: Stable at this time per patient.    4.  HEALTH MAINTENANCE: Reminded pt to see his PCP for his annual exam and health maintenance.    5.  FOLLOW UP: with me in 6 months.  A1C, creat/GFR, urine microalbuminuria and TSH ordered today.  Gabapentin and Metformin refilled today.    Time spent reviewing chart and labs today =5  minutes.  Time for video visit today= 16 minutes.  Time for documentation today = 15 minutes.    TOTAL TIME FOR VISIT TODAY = 36 minutes.    Elsa Perez PA-C

## 2023-11-02 NOTE — PATIENT INSTRUCTIONS
Sanjay Guo:  Good to see you today.  I am pleased with your blood sugar readings.  Keep up up the good work!!!  I placed an order for your diabetic labs. You do NOT need to fast for these labs.  Please be sure to see your primary care provider for an annual exam and for health maintenance.  See you in 6 months.  Elsa Perez PA-C

## 2023-11-08 ENCOUNTER — TELEPHONE (OUTPATIENT)
Dept: ENDOCRINOLOGY | Facility: CLINIC | Age: 58
End: 2023-11-08
Payer: COMMERCIAL

## 2023-11-08 NOTE — TELEPHONE ENCOUNTER
Patient call:     Appointment type: return diabetes   Provider: Chris   Return date:May 2024   Speciality phone number: [phone]  Additional appointment(s) needed: Labs   Additional notes: LVM and sent chelsey Santana on 11/8/2023 at 5:20 PM

## 2023-11-10 NOTE — TELEPHONE ENCOUNTER
2nd attempt- LVM and sent mychart    Schedule:  Labs  6 mo follow-up appointment with Elsa Perez (around May 2024)

## 2023-11-17 ENCOUNTER — LAB (OUTPATIENT)
Dept: LAB | Facility: CLINIC | Age: 58
End: 2023-11-17
Payer: COMMERCIAL

## 2023-11-17 DIAGNOSIS — E11.9 TYPE 2 DIABETES MELLITUS WITHOUT COMPLICATION, WITHOUT LONG-TERM CURRENT USE OF INSULIN (H): ICD-10-CM

## 2023-11-17 LAB
CREAT SERPL-MCNC: 0.82 MG/DL (ref 0.67–1.17)
CREAT UR-MCNC: 28.6 MG/DL
EGFRCR SERPLBLD CKD-EPI 2021: >90 ML/MIN/1.73M2
HBA1C MFR BLD: 5.5 % (ref 0–5.6)
MICROALBUMIN UR-MCNC: <12 MG/L
MICROALBUMIN/CREAT UR: NORMAL MG/G{CREAT}
TSH SERPL DL<=0.005 MIU/L-ACNC: 5.02 UIU/ML (ref 0.3–4.2)

## 2023-11-17 PROCEDURE — 84443 ASSAY THYROID STIM HORMONE: CPT

## 2023-11-17 PROCEDURE — 82565 ASSAY OF CREATININE: CPT

## 2023-11-17 PROCEDURE — 82570 ASSAY OF URINE CREATININE: CPT

## 2023-11-17 PROCEDURE — 82043 UR ALBUMIN QUANTITATIVE: CPT

## 2023-11-17 PROCEDURE — 83036 HEMOGLOBIN GLYCOSYLATED A1C: CPT

## 2023-11-17 PROCEDURE — 36415 COLL VENOUS BLD VENIPUNCTURE: CPT

## 2023-11-19 DIAGNOSIS — E11.9 TYPE 2 DIABETES MELLITUS WITHOUT COMPLICATION, WITHOUT LONG-TERM CURRENT USE OF INSULIN (H): Primary | ICD-10-CM

## 2024-02-25 ENCOUNTER — HEALTH MAINTENANCE LETTER (OUTPATIENT)
Age: 59
End: 2024-02-25

## 2024-02-28 ENCOUNTER — OFFICE VISIT (OUTPATIENT)
Dept: OTOLARYNGOLOGY | Facility: CLINIC | Age: 59
End: 2024-02-28
Payer: COMMERCIAL

## 2024-02-28 VITALS
WEIGHT: 279 LBS | SYSTOLIC BLOOD PRESSURE: 164 MMHG | HEART RATE: 94 BPM | DIASTOLIC BLOOD PRESSURE: 97 MMHG | OXYGEN SATURATION: 98 % | HEIGHT: 67 IN | TEMPERATURE: 97.7 F | BODY MASS INDEX: 43.79 KG/M2

## 2024-02-28 DIAGNOSIS — C03.0 SQUAMOUS CELL CARCINOMA OF MAXILLARY ALVEOLAR RIDGE (H): Primary | ICD-10-CM

## 2024-02-28 DIAGNOSIS — R04.0 EPISTAXIS: ICD-10-CM

## 2024-02-28 PROCEDURE — 99213 OFFICE O/P EST LOW 20 MIN: CPT | Performed by: REGISTERED NURSE

## 2024-02-28 RX ORDER — EUCALYPTUS/PEPPERMINT OIL
SOLUTION, NON-ORAL NASAL
Qty: 30 ML | Refills: 3 | Status: SHIPPED | OUTPATIENT
Start: 2024-02-28

## 2024-02-28 ASSESSMENT — PAIN SCALES - GENERAL: PAINLEVEL: NO PAIN (0)

## 2024-02-28 NOTE — PROGRESS NOTES
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  Dr. Treviño performed a left palatectomy on 5/9/2019 with Dr. Atwood performing a left radial forearm free flap reconstruction.  He had postop radiation therapy completed on 7/22/2019, 6000 cGy.  Most recent surveillance CT scans completed in August 2023 were negative for any recurrence or metastases.     Interval History:   Patient comes in today for routine surveillance. States that he is doing well. Right molar was extracted in September 2023 and has since healed, although it did take some time. Continues to have some oral irritation around the right central incisor which is stable. No bleeding or ulcerations. Patient sees Dr. Hurtado in PM&R for neck tightness and spasms and reports that symptoms are well controlled at this time. Uses Ponaris daily for nasal congestion and moisturization.    Patient is eating well. Patient denies any odynophagia, new sore throat, ear pain, bleeding from the mouth, hemoptysis, voice changes or lumps/bumps of the neck.     Past Medical History:  Past Medical History:   Diagnosis Date    Allergic rhinitis experienced it for many years    Anxiety 2019    Benign positional vertigo 05/2019    Depressive disorder 2019    Diabetes (H)     Gastroesophageal reflux disease 05/2021    Head injury 04/2018    Hypertension     Maxillary sinus cancer (H)     Obesity     Sleep apnea May 2022       Past Surgical History:  Past Surgical History:   Procedure Laterality Date    ------------OTHER-------------      Mucosa and bone biopsy    COLONOSCOPY N/A 5/24/2021    Procedure: COLONOSCOPY, WITH POLYPECTOMY;  Surgeon: Terrell De Luna MD;  [place] [facility] [place]    EXPLORE NECK Left 5/9/2019    Procedure: Left Neck Exploration;  Surgeon: Jett Treviño MD;  Location:  OR     EXTRACTION(S) DENTAL      x2 under general anesthesia    GRAFT FREE VASCULARIZED (LOCATION) Right 5/9/2019    Procedure: Left Radial Forearm Free Flap (soft tissue);  Surgeon: Sumit Atwood MD;  Location: [facility] OR    GRAFT SKIN SPLIT THICKNESS FROM EXTREMITY Right 5/9/2019    Procedure: Graft skin split thickness from right thigh, nasogastric feeding tube placement;  Surgeon: Sumit Atwood MD;  Location: [facility] OR    IR LYMPH NODE BIOPSY  4/4/2019    OSTEOTOMY MAXILLA Left 5/9/2019    Procedure: Left Infrastructure Maxillectomy,;  Surgeon: Jett Treviño MD;  Location: [facility] OR       Medications:  Current Outpatient Medications   Medication Sig Dispense Refill    acetaminophen (TYLENOL) 325 MG tablet Take 2 tablets (650 mg) by mouth every 4 hours as needed for mild pain 100 tablet 0    aspirin (ASA) 81 MG EC tablet Take 1 tablet (81 mg) by mouth daily 100 tablet 3    atorvastatin (LIPITOR) 20 MG tablet TAKE 1 TABLET BY MOUTH EVERY MORNING 90 tablet 1    blood glucose (NO BRAND SPECIFIED) lancets standard Use to test blood sugar  4 times daily or as directed. ( [place] micolet lancet) 400 each 0    blood glucose (NO BRAND SPECIFIED) test strip Use to test blood sugar 3 times daily or as directed. 300 strip 3    blood glucose monitoring ([place] MICROLET) lancets Use to test blood sugar 4 times daily or as directed. 200 each 3    cevimeline (EVOXAC) 30 MG capsule TAKE 1 CAPSULE BY MOUTH THREE TIMES DAILY 270 capsule 0    cyclobenzaprine (FLEXERIL) 5 MG tablet Take 1 tablet (5 mg) by mouth 2 times daily as needed for muscle spasms 60 tablet 3    cyclobenzaprine (FLEXERIL) 5 MG tablet Take 1 tablet (5 mg) by mouth 2 times daily as needed for muscle spasms 60 tablet 2    cyclobenzaprine (FLEXERIL) 5 MG tablet Take 1 tablet (5 mg) by mouth 3 times daily as needed for muscle spasms 90 tablet 1    doxazosin (CARDURA) 1 MG tablet Take 1 tablet (1 mg) by mouth daily 30 tablet 0    gabapentin (NEURONTIN) 300 MG capsule TAKE 1  "CAPSULE BY MOUTH IN THE MORNING, 1 CAPSULE IN AFTERNOON AND 2 CAPSULES AT BEDTIME. 360 capsule 1    lisinopril (ZESTRIL) 10 MG tablet Take 1 tablet (10 mg) by mouth every morning 30 tablet 3    metFORMIN (GLUCOPHAGE) 1000 MG tablet TAKE ONE TABLET BY MOUTH TWICE DAILY 180 tablet 3    Misc Natural Product Nasal (PONARIS) SOLN With head tilted back, place 1 or 2 drops in each nostril once daily. 30 mL 3    multivitamin w/minerals (THERA-VIT-M) tablet Take 1 tablet by mouth daily 30 tablet 0    omeprazole (PRILOSEC) 20 MG DR capsule Take 1 capsule (20 mg) by mouth daily 30 capsule 3    Semaglutide, 1 MG/DOSE, (OZEMPIC, 1 MG/DOSE,) 4 MG/3ML pen Inject 1 mg Subcutaneous every 7 days 3 mL 11    sodium fluoride dental gel (DENTAGEL) 1.1 % GEL topical gel Apply to affected area At Bedtime 112 g 11       Allergies:  Allergies   Allergen Reactions    Seasonal Allergies Other (See Comments) and Shortness Of Breath        Social History:  Social History     Tobacco Use    Smoking status: Never    Smokeless tobacco: Never   Substance Use Topics    Alcohol use: No    Drug use: No     ROS: 10 point ROS neg other than the symptoms noted above in the HPI.    Physical Exam:    BP (!) 164/97   Pulse 94   Temp 97.7  F (36.5  C)   Ht 1.702 m (5' 7\")   Wt 126.6 kg (279 lb)   SpO2 98%   BMI 43.70 kg/m    Wt Readings from Last 3 Encounters:   02/28/24 126.6 kg (279 lb)   10/20/23 120.7 kg (266 lb)   08/15/23 122.5 kg (270 lb)        Constitutional:  The patient was unaccompanied, well-groomed, and in no acute distress.     Skin: Normal:  warm and pink without rash    Neurologic: Alert and oriented x 3.  CN's III-XII within normal limits.  Voice normal.    Psychiatric: The patient's affect was calm, cooperative, and appropriate.     Communication:  Normal; communicates verbally, normal voice quality.    Respiratory: Breathing comfortably without stridor or exertion of accessory muscles.    Head/Face:  Normocephalic and atraumatic.  No " lesions or scars.    Ears: Pinnae and tragus non-tender.  EAC's and TM's were clear.     Oral Cavity: Healthy appearing flap in left maxilla. Stable 1 mm fistula at anterior edge of flap without drainage or tunneling. Normal tongue, floor of mouth, and  buccal mucosa.  No lesions or masses on inspection or palpation.     Oropharynx: Normal mucosa, palate symmetric with normal elevation. No abnormal lymph tissue in the oropharynx.    Neck: Well healed left neck incision. Tightness in the left SCM. Normal range of motion    Lymphatic: There is no palpable lymphadenopathy in the neck.     Labs and Imaging Reviewed:  Imagin/15/23    CT Neck  Impression:  Primary: NI-RADS 1} Expected post-treatment changes in the neck  without evidence of recurrent disease at the primary site.  Neck: NI-RADS 1}  No abnormal lymph node.     CT Chest  IMPRESSION: In this patient with history of squamous cell carcinoma of  the maxillary alveolar ridge:  1.  No evidence of disease recurrence within the chest.  2.  Ectatic ascending aorta measuring 4.2 cm.  3.  Dilated pulmonary artery consistent with pulmonary artery  hypertension.  4.  Other chronic and incidental findings as noted above.    Labs:  TSH   Date Value Ref Range Status   2023 5.02 (H) 0.30 - 4.20 uIU/mL Final   2022 4.01 0.30 - 5.00 uIU/mL Final   2021 3.01 0.40 - 4.00 mU/L Final   2021 2.38 0.40 - 4.00 mU/L Final     Assessment/Plan:  1. 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 (H)  Patient is 4.5 years out from surgical resection followed by radiation for SCCa of the left maxilla. He is doing well with no evidence of disease on today's exam. Small fistula at anterior flap site which appears stable compared to 2023. Will continue to monitor. Patient will return in July for 5 year surveillance exam.    2. Nasal Congestion  Well controlled with Ponaris and nasal saline rinses. Refill Ponaris today.     Reviewed signs and symptoms  that would necessitate a sooner exam including new sore throat, mouth pain, ear pain, dysphagia, bleeding from the mouth or lumps/bumps of the neck.    Otherwise, patient will follow up in July 2024 for 5-year surveillance visit with CT scans.    Marie Coburn DNP, APRN, CNP  Otolaryngology  Head & Neck Surgery  [phone]    20 minutes spent on the date of the encounter doing chart review, history and exam, documentation and further activities per the note.

## 2024-02-28 NOTE — LETTER
2/28/2024       RE: Devonte Tucker  [address]  [place] [address]     Dear Colleague,    Thank you for referring your patient, Devonte Tucker, to the [facility] [place] at [facility]. Please see a copy of my visit note below.    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  Dr. Treviño performed a left palatectomy on 5/9/2019 with Dr. Atwood performing a left radial forearm free flap reconstruction.  He had postop radiation therapy completed on 7/22/2019, 6000 cGy.  Most recent surveillance CT scans completed in August 2023 were negative for any recurrence or metastases.     Interval History:   Patient comes in today for routine surveillance. States that he is doing well. Right molar was extracted in September 2023 and has since healed, although it did take some time. Continues to have some oral irritation around the right central incisor which is stable. No bleeding or ulcerations. Patient sees Dr. Hurtado in PM&R for neck tightness and spasms and reports that symptoms are well controlled at this time. Uses Ponaris daily for nasal congestion and moisturization.    Patient is eating well. Patient denies any odynophagia, new sore throat, ear pain, bleeding from the mouth, hemoptysis, voice changes or lumps/bumps of the neck.     Past Medical History:  Past Medical History:   Diagnosis Date    Allergic rhinitis experienced it for many years    Anxiety 2019    Benign positional vertigo 05/2019    Depressive disorder 2019    Diabetes (H)     Gastroesophageal reflux disease 05/2021    Head injury 04/2018    Hypertension     Maxillary sinus cancer (H)     Obesity     Sleep apnea May 2022       Past Surgical History:  Past Surgical History:   Procedure  Laterality Date    ------------OTHER-------------      Mucosa and bone biopsy    COLONOSCOPY N/A 5/24/2021    Procedure: COLONOSCOPY, WITH POLYPECTOMY;  Surgeon: Terrell De Luna MD;  Location: [facility] OR    EXPLORE NECK Left 5/9/2019    Procedure: Left Neck Exploration;  Surgeon: Jett Treviño MD;  Location: [facility] OR    EXTRACTION(S) DENTAL      x2 under general anesthesia    GRAFT FREE VASCULARIZED (LOCATION) Right 5/9/2019    Procedure: Left Radial Forearm Free Flap (soft tissue);  Surgeon: Sumit Atwood MD;  Location: [facility] OR    GRAFT SKIN SPLIT THICKNESS FROM EXTREMITY Right 5/9/2019    Procedure: Graft skin split thickness from right thigh, nasogastric feeding tube placement;  Surgeon: Sumit Atwood MD;  Location: [facility] OR    IR LYMPH NODE BIOPSY  4/4/2019    OSTEOTOMY MAXILLA Left 5/9/2019    Procedure: Left Infrastructure Maxillectomy,;  Surgeon: Jett Treviño MD;  Location: [facility] OR       Medications:  Current Outpatient Medications   Medication Sig Dispense Refill    acetaminophen (TYLENOL) 325 MG tablet Take 2 tablets (650 mg) by mouth every 4 hours as needed for mild pain 100 tablet 0    aspirin (ASA) 81 MG EC tablet Take 1 tablet (81 mg) by mouth daily 100 tablet 3    atorvastatin (LIPITOR) 20 MG tablet TAKE 1 TABLET BY MOUTH EVERY MORNING 90 tablet 1    blood glucose (NO BRAND SPECIFIED) lancets standard Use to test blood sugar  4 times daily or as directed. ( [place] micolet lancet) 400 each 0    blood glucose (NO BRAND SPECIFIED) test strip Use to test blood sugar 3 times daily or as directed. 300 strip 3    blood glucose monitoring (ANDREW MICROLET) lancets Use to test blood sugar 4 times daily or as directed. 200 each 3    cevimeline (EVOXAC) 30 MG capsule TAKE 1 CAPSULE BY MOUTH THREE TIMES DAILY 270 capsule 0    cyclobenzaprine (FLEXERIL) 5 MG tablet Take 1 tablet (5 mg) by mouth 2 times daily as needed for muscle spasms 60 tablet 3    cyclobenzaprine (FLEXERIL) 5 MG tablet Take 1 tablet (5 mg)  "by mouth 2 times daily as needed for muscle spasms 60 tablet 2    cyclobenzaprine (FLEXERIL) 5 MG tablet Take 1 tablet (5 mg) by mouth 3 times daily as needed for muscle spasms 90 tablet 1    doxazosin (CARDURA) 1 MG tablet Take 1 tablet (1 mg) by mouth daily 30 tablet 0    gabapentin (NEURONTIN) 300 MG capsule TAKE 1 CAPSULE BY MOUTH IN THE MORNING, 1 CAPSULE IN AFTERNOON AND 2 CAPSULES AT BEDTIME. 360 capsule 1    lisinopril (ZESTRIL) 10 MG tablet Take 1 tablet (10 mg) by mouth every morning 30 tablet 3    metFORMIN (GLUCOPHAGE) 1000 MG tablet TAKE ONE TABLET BY MOUTH TWICE DAILY 180 tablet 3    Misc Natural Product Nasal (PONARIS) SOLN With head tilted back, place 1 or 2 drops in each nostril once daily. 30 mL 3    multivitamin w/minerals (THERA-VIT-M) tablet Take 1 tablet by mouth daily 30 tablet 0    omeprazole (PRILOSEC) 20 MG DR capsule Take 1 capsule (20 mg) by mouth daily 30 capsule 3    Semaglutide, 1 MG/DOSE, (OZEMPIC, 1 MG/DOSE,) 4 MG/3ML pen Inject 1 mg Subcutaneous every 7 days 3 mL 11    sodium fluoride dental gel (DENTAGEL) 1.1 % GEL topical gel Apply to affected area At Bedtime 112 g 11       Allergies:  Allergies   Allergen Reactions    Seasonal Allergies Other (See Comments) and Shortness Of Breath        Social History:  Social History     Tobacco Use    Smoking status: Never    Smokeless tobacco: Never   Substance Use Topics    Alcohol use: No    Drug use: No     ROS: 10 point ROS neg other than the symptoms noted above in the HPI.    Physical Exam:    BP (!) 164/97   Pulse 94   Temp 97.7  F (36.5  C)   Ht 1.702 m (5' 7\")   Wt 126.6 kg (279 lb)   SpO2 98%   BMI 43.70 kg/m    Wt Readings from Last 3 Encounters:   02/28/24 126.6 kg (279 lb)   10/20/23 120.7 kg (266 lb)   08/15/23 122.5 kg (270 lb)        Constitutional:  The patient was unaccompanied, well-groomed, and in no acute distress.     Skin: Normal:  warm and pink without rash    Neurologic: Alert and oriented x 3.  CN's III-XII " within normal limits.  Voice normal.    Psychiatric: The patient's affect was calm, cooperative, and appropriate.     Communication:  Normal; communicates verbally, normal voice quality.    Respiratory: Breathing comfortably without stridor or exertion of accessory muscles.    Head/Face:  Normocephalic and atraumatic.  No lesions or scars.    Ears: Pinnae and tragus non-tender.  EAC's and TM's were clear.     Oral Cavity: Healthy appearing flap in left maxilla. Stable 1 mm fistula at anterior edge of flap without drainage or tunneling. Normal tongue, floor of mouth, and  buccal mucosa.  No lesions or masses on inspection or palpation.     Oropharynx: Normal mucosa, palate symmetric with normal elevation. No abnormal lymph tissue in the oropharynx.    Neck: Well healed left neck incision. Tightness in the left SCM. Normal range of motion    Lymphatic: There is no palpable lymphadenopathy in the neck.     Labs and Imaging Reviewed:  Imagin/15/23    CT Neck  Impression:  Primary: NI-RADS 1} Expected post-treatment changes in the neck  without evidence of recurrent disease at the primary site.  Neck: NI-RADS 1}  No abnormal lymph node.     CT Chest  IMPRESSION: In this patient with history of squamous cell carcinoma of  the maxillary alveolar ridge:  1.  No evidence of disease recurrence within the chest.  2.  Ectatic ascending aorta measuring 4.2 cm.  3.  Dilated pulmonary artery consistent with pulmonary artery  hypertension.  4.  Other chronic and incidental findings as noted above.    Labs:  TSH   Date Value Ref Range Status   2023 5.02 (H) 0.30 - 4.20 uIU/mL Final   2022 4.01 0.30 - 5.00 uIU/mL Final   2021 3.01 0.40 - 4.00 mU/L Final   2021 2.38 0.40 - 4.00 mU/L Final     Assessment/Plan:  1. 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 (H)  Patient is 4.5 years out from surgical resection followed by radiation for SCCa of the left maxilla. He is doing well with no evidence of  disease on today's exam. Small fistula at anterior flap site which appears stable compared to August 2023. Will continue to monitor. Patient will return in July for 5 year surveillance exam.    2. Nasal Congestion  Well controlled with Ponaris and nasal saline rinses. Refill Ponaris today.     Reviewed signs and symptoms that would necessitate a sooner exam including new sore throat, mouth pain, ear pain, dysphagia, bleeding from the mouth or lumps/bumps of the neck.    Otherwise, patient will follow up in July 2024 for 5-year surveillance visit with CT scans.    Marie Coburn DNP, APRN, CNP  Otolaryngology  Head & Neck Surgery  [phone]    20 minutes spent on the date of the encounter doing chart review, history and exam, documentation and further activities per the note.

## 2024-02-28 NOTE — NURSING NOTE
"Chief Complaint   Patient presents with    RECHECK     Follow up     Blood pressure (!) 164/97, pulse 94, temperature 97.7  F (36.5  C), height 1.702 m (5' 7\"), weight 126.6 kg (279 lb), SpO2 98%.  Andres Purcell LPN    "

## 2024-04-10 ENCOUNTER — LAB REQUISITION (OUTPATIENT)
Dept: LAB | Facility: CLINIC | Age: 59
End: 2024-04-10

## 2024-04-10 DIAGNOSIS — E78.00 PURE HYPERCHOLESTEROLEMIA, UNSPECIFIED: ICD-10-CM

## 2024-04-10 DIAGNOSIS — E11.42 TYPE 2 DIABETES MELLITUS WITH DIABETIC POLYNEUROPATHY (H): ICD-10-CM

## 2024-04-10 DIAGNOSIS — I10 ESSENTIAL (PRIMARY) HYPERTENSION: ICD-10-CM

## 2024-04-10 PROCEDURE — 80048 BASIC METABOLIC PNL TOTAL CA: CPT | Performed by: NURSE PRACTITIONER

## 2024-04-10 PROCEDURE — 80061 LIPID PANEL: CPT | Performed by: NURSE PRACTITIONER

## 2024-04-10 PROCEDURE — 82570 ASSAY OF URINE CREATININE: CPT | Performed by: NURSE PRACTITIONER

## 2024-04-11 LAB
ANION GAP SERPL CALCULATED.3IONS-SCNC: 11 MMOL/L (ref 7–15)
BUN SERPL-MCNC: 10.7 MG/DL (ref 8–23)
CALCIUM SERPL-MCNC: 9.6 MG/DL (ref 8.6–10)
CHLORIDE SERPL-SCNC: 100 MMOL/L (ref 98–107)
CHOLEST SERPL-MCNC: 124 MG/DL
CREAT SERPL-MCNC: 0.85 MG/DL (ref 0.67–1.17)
CREAT UR-MCNC: 44.1 MG/DL
DEPRECATED HCO3 PLAS-SCNC: 26 MMOL/L (ref 22–29)
EGFRCR SERPLBLD CKD-EPI 2021: >90 ML/MIN/1.73M2
FASTING STATUS PATIENT QL REPORTED: NORMAL
GLUCOSE SERPL-MCNC: 117 MG/DL (ref 70–99)
HDLC SERPL-MCNC: 48 MG/DL
LDLC SERPL CALC-MCNC: 62 MG/DL
MICROALBUMIN UR-MCNC: <12 MG/L
MICROALBUMIN/CREAT UR: NORMAL MG/G{CREAT}
NONHDLC SERPL-MCNC: 76 MG/DL
POTASSIUM SERPL-SCNC: 4.6 MMOL/L (ref 3.4–5.3)
SODIUM SERPL-SCNC: 137 MMOL/L (ref 135–145)
TRIGL SERPL-MCNC: 69 MG/DL

## 2024-04-18 NOTE — PROGRESS NOTES
Internet issues this am. Pt has been rescheduled to be seen tomorrow 5/2/2024 at 7:30 am.  No charge today.  Elsa Perez PA-C    Virtual Visit Details    Type of service:  Video Visit     Originating Location (pt. Location):     Distant Location (provider location):    Platform used for Video Visit:     Outcome for 04/18/24 8:37 AM: [employer] message sent  Zulma Torres MA  Outcome for 04/29/24 6:44 AM: Glucose readings sent via [employer]  Ayesha Gooden LPN     Patient is showing 4/5 MNCM met. BP not on file  Ayesha Gooden LPN

## 2024-04-19 ENCOUNTER — VIRTUAL VISIT (OUTPATIENT)
Dept: ONCOLOGY | Facility: CLINIC | Age: 59
End: 2024-04-19
Attending: STUDENT IN AN ORGANIZED HEALTH CARE EDUCATION/TRAINING PROGRAM
Payer: COMMERCIAL

## 2024-04-19 VITALS — WEIGHT: 272 LBS | HEIGHT: 67 IN | BODY MASS INDEX: 42.69 KG/M2

## 2024-04-19 DIAGNOSIS — L59.8 RADIATION FIBROSIS OF SOFT TISSUE FROM THERAPEUTIC PROCEDURE: ICD-10-CM

## 2024-04-19 DIAGNOSIS — I89.0 LYMPHEDEMA: ICD-10-CM
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
## 2024-04-19 DIAGNOSIS — M54.2 NECK PAIN: ICD-10-CM

## 2024-04-19 DIAGNOSIS — C31.0 MAXILLARY SINUS CANCER (H): Primary | ICD-10-CM

## 2024-04-19 DIAGNOSIS — L90.5 SCAR TISSUE: ICD-10-CM

## 2024-04-19 PROCEDURE — 99215 OFFICE O/P EST HI 40 MIN: CPT | Mod: 95 | Performed by: STUDENT IN AN ORGANIZED HEALTH CARE EDUCATION/TRAINING PROGRAM

## 2024-04-19 RX ORDER — CYCLOBENZAPRINE HCL 5 MG
5 TABLET ORAL 2 TIMES DAILY PRN
Qty: 90 TABLET | Refills: 3 | Status: SHIPPED | OUTPATIENT
Start: 2024-04-19

## 2024-04-19 RX ORDER — CYCLOBENZAPRINE HCL 5 MG
5 TABLET ORAL 2 TIMES DAILY PRN
Qty: 60 TABLET | Refills: 3 | Status: CANCELLED | OUTPATIENT
Start: 2024-04-19

## 2024-04-19 ASSESSMENT — PAIN SCALES - GENERAL: PAINLEVEL: MILD PAIN (3)

## 2024-04-19 NOTE — PROGRESS NOTES
[facility]   PM&R clinic note        Interval history:     Devonte Tucker presents to clinic today for follow up reg his rehab needs.   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   Was last seen in clinic on 10/20/23.  Recommendations included:  Therapy/equipment/braces:  Continue daily stretches and MLD techniques to help with scar tissue and swelling.  Reach out if symptoms or not improving and will plan for lymphedema therapy referral for structured work to help reduce spasming.  Medications:  Continue Flexeril 5 mg 3 times daily as needed.  Prescription refilled today.  Follow up: 6-month virtual visit.    Oncology History:  -Diagnosed after he presented with a several year history of oral cavity pain and precancerous lesions of the left upper gingiva.   -He underwent a left infrastructure maxillectomy and left level I lymph node dissection on 5/9/2019 with pathology revealing a 1.3 cm well-differentiated squamous cell carcinoma with 13 mm depth of invasion and invasion into the underlying bone.   -He received adjuvant radiotherapy as described above for improved local disease control.  -He was last seen by ENT on 3/9/2022, at which time he reported persistent stable pain in the anterior hard palate and left maxilla.  -Seen by Dr. Verdin on 4/27/22 and complained of low mood with ongoing toxicities and frustrations after cancer treatment. Persistent xerostomia, dysphagia to dry foods, continued stiffness and pain in the left neck. Plan for follow up with rad onc MD in 3/2023 and NP in 9/2022 with CT neck and chest. Repeat TSH due in 10/22. Referral to Oncology Psych and PM&R  - Saw Marietta Irish for follow up on 2/15/2023 for follow-up.  Doing well on surveillance with no evidence of disease on exam.  - Saw Marietta Irish on 8/15/23-was doing well.  Dentist was planning for tooth extraction on the right side, but was concerned  about tissue around the tooth which they would like evaluated prior to extraction.  Patient states that there is some irritation around the tooth but there is no bleeding.  Doing well with no evidence of disease on exam at that visit.  Small area at the anterior flap site that seems to be pulling away from the gingiva, plan to discuss with Dr. Harrell who may want to further evaluate the site.  No abnormal tissue in the right mandibular gingiva that would require a biopsy or contraindicate recommended tooth extraction.  - Saw Marietta Coburn for follow up on 2/28/24 for routine surveillance. Doing well. Had right molar extracted in Sept 23. Has healed. Neck tightness/spasms well controlled. No evidence of disease on exam. Small fistula at anterior flap site appears stable. Return in July for 5 year surveillance exam.       Symptoms,  Devonte was seen for a return virtual visit today.  He has not had a lymphedema exacerbation since his last visit. He does his daily stretches regularly. His neck spasms go in waves and there are times when he cannot calm it down. He has not noticed a pattern of when it happens. He does notice a slight increase with eating, brushing his teeth and some other activities.  He feels that his symptoms have been overall stable.  He has continued to need Flexeril and has been using it regularly twice daily, sometimes 3 times a day if cramping is bad.  He also occasionally uses heat for relief.  He continues to have limited jaw opening, but is able to get adequate oral intake and spite of symptoms.      Therapies/HEP,  Continues with home stretches/regimen.      Functionally,   No changes.      Social history is unchanged.      Medications:  Current Outpatient Medications   Medication Sig Dispense Refill    acetaminophen (TYLENOL) 325 MG tablet Take 2 tablets (650 mg) by mouth every 4 hours as needed for mild pain 100 tablet 0    aspirin (ASA) 81 MG EC tablet Take 1 tablet (81 mg) by mouth  "daily 100 tablet 3    atorvastatin (LIPITOR) 20 MG tablet TAKE 1 TABLET BY MOUTH EVERY MORNING 90 tablet 1    blood glucose (NO BRAND SPECIFIED) lancets standard Use to test blood sugar  4 times daily or as directed. ( [place] micolet lancet) 400 each 0    blood glucose (NO BRAND SPECIFIED) test strip Use to test blood sugar 3 times daily or as directed. 300 strip 3    blood glucose monitoring ([place] MICROLET) lancets Use to test blood sugar 4 times daily or as directed. 200 each 3    cevimeline (EVOXAC) 30 MG capsule TAKE 1 CAPSULE BY MOUTH THREE TIMES DAILY 270 capsule 0    cyclobenzaprine (FLEXERIL) 5 MG tablet Take 1 tablet (5 mg) by mouth 2 times daily as needed for muscle spasms 60 tablet 3    cyclobenzaprine (FLEXERIL) 5 MG tablet Take 1 tablet (5 mg) by mouth 2 times daily as needed for muscle spasms 60 tablet 2    cyclobenzaprine (FLEXERIL) 5 MG tablet Take 1 tablet (5 mg) by mouth 3 times daily as needed for muscle spasms 90 tablet 1    doxazosin (CARDURA) 1 MG tablet Take 1 tablet (1 mg) by mouth daily 30 tablet 0    gabapentin (NEURONTIN) 300 MG capsule TAKE 1 CAPSULE BY MOUTH IN THE MORNING, 1 CAPSULE IN AFTERNOON AND 2 CAPSULES AT BEDTIME. 360 capsule 1    lisinopril (ZESTRIL) 10 MG tablet Take 1 tablet (10 mg) by mouth every morning 30 tablet 3    metFORMIN (GLUCOPHAGE) 1000 MG tablet TAKE ONE TABLET BY MOUTH TWICE DAILY 180 tablet 3    Misc Natural Product Nasal (PONARIS) SOLN With head tilted back, place 1 or 2 drops in each nostril once daily. 30 mL 3    multivitamin w/minerals (THERA-VIT-M) tablet Take 1 tablet by mouth daily 30 tablet 0    omeprazole (PRILOSEC) 20 MG DR capsule Take 1 capsule (20 mg) by mouth daily 30 capsule 3    Semaglutide, 1 MG/DOSE, (OZEMPIC, 1 MG/DOSE,) 4 MG/3ML pen Inject 1 mg Subcutaneous every 7 days 3 mL 11    sodium fluoride dental gel (DENTAGEL) 1.1 % GEL topical gel Apply to affected area At Bedtime 112 g 11              Physical Exam:   Ht 1.702 m (5' 7\")   Wt 123.4 " kg (272 lb)   BMI 42.60 kg/m    Gen: NAD, pleasant and cooperative   HEENT: Atraumatic, normocephalic, extraocular movements appear intact  Pulm: non-labored breathing in room air  Neuro/MSK:   Orientation: Oriented to person, time, place and situation, Exhibits good insight into his condition and ongoing treatment  Motor: Observed moving upper extremities actively against gravity    Labs/Imaging:  Lab Results   Component Value Date    WBC 6.7 07/01/2019    HGB 12.6 (L) 07/01/2019    HCT 39.8 (L) 07/01/2019    MCV 94 07/01/2019     07/01/2019     Lab Results   Component Value Date     04/10/2024    POTASSIUM 4.6 04/10/2024    CHLORIDE 100 04/10/2024    CO2 26 04/10/2024     (H) 04/10/2024     Lab Results   Component Value Date    GFRESTIMATED >90 04/10/2024    GFRESTBLACK >90 03/25/2021     Lab Results   Component Value Date    AST 16 11/03/2021    ALT 30 11/03/2021     Lab Results   Component Value Date    INR 1.10 04/04/2019     Lab Results   Component Value Date    BUN 10.7 04/10/2024    CR 0.85 04/10/2024              Assessment/Plan   Devonte Tucker presents to clinic today for follow up reg his rehab needs. 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   Was last seen in clinic on 10/20/23.  Multiple rehabilitation considerations were discussed with Devonte at today's visit.  Overall, he is very stable with no swelling, though continues with some spasming.  He should continue daily stretches and can use heat to help with muscle relaxation.  We will refill Flexeril prescription today.  We can reassess the need for any manual therapy with PT at our next visit.  Will plan for return virtual visit in 6 to 8 months.  He is in agreement with this plan.      Therapy/equipment/braces:  Continue daily neck stretches and MLD techniques to help with tightness and swelling.  Can use heat as needed for muscle relaxation.  Medications:  Continue Flexeril at 5  mg twice daily as needed.  Prescription refilled today.  Can increase to 3 times daily if spasms are difficult.  Continue gabapentin at 300 mg 3 times daily for neuropathy.  Follow up: 6 to 8-month return virtual visit.      Marivel Hurtado MD  Physical Medicine & Rehabilitation      50 minutes spent on the date of the encounter doing chart review, history and exam, documentation and further activities as noted above.

## 2024-04-19 NOTE — NURSING NOTE
Is the patient currently in the state of [place]? YES    Visit mode:VIDEO    If the visit is dropped, the patient can be reconnected by: VIDEO VISIT: Text to cell phone:   Telephone Information:   Mobile [phone]       Will anyone else be joining the visit? NO  (If patient encounters technical issues they should call [phone]    How would you like to obtain your AVS? MyChart    Are changes needed to the allergy or medication list? Pt stated no changes to allergies and Pt stated no med changes    Are refills needed on medications prescribed by this physician? YES    Reason for visit: KYM Mercer LPN

## 2024-04-19 NOTE — LETTER
4/19/2024         RE: Devonte Tucker  [address]  [place] [address]        Dear Colleague,    Thank you for referring your patient, Devonte Tucker, to the [facility]. Please see a copy of my visit note below.    Virtual Visit Details    Type of service:  Video Visit     Originating Location (pt. Location): Home    Distant Location (provider location):  Off-site  Platform used for Video Visit: [facility]   PM&R clinic note        Interval history:     Devonte Tucker presents to clinic today for follow up reg his rehab needs.   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   Was last seen in clinic on 10/20/23.  Recommendations included:  Therapy/equipment/braces:  Continue daily stretches and MLD techniques to help with scar tissue and swelling.  Reach out if symptoms or not improving and will plan for lymphedema therapy referral for structured work to help reduce spasming.  Medications:  Continue Flexeril 5 mg 3 times daily as needed.  Prescription refilled today.  Follow up: 6-month virtual visit.    Oncology History:  -Diagnosed after he presented with a several year history of oral cavity pain and precancerous lesions of the left upper gingiva.   -He underwent a left infrastructure maxillectomy and left level I lymph node dissection on 5/9/2019 with pathology revealing a 1.3 cm well-differentiated squamous cell carcinoma with 13 mm depth of invasion and invasion into the underlying bone.   -He received adjuvant radiotherapy as described above for improved local disease control.  -He was last seen by ENT on 3/9/2022, at which time he reported persistent stable pain in the anterior hard palate and left maxilla.  -Seen by Dr. Verdin on 4/27/22 and complained of low mood with ongoing toxicities and frustrations after cancer treatment. Persistent xerostomia, dysphagia to  dry foods, continued stiffness and pain in the left neck. Plan for follow up with rad onc MD in 3/2023 and NP in 9/2022 with CT neck and chest. Repeat TSH due in 10/22. Referral to Oncology Psych and PM&R  - Saw Marietta Coburn for follow up on 2/15/2023 for follow-up.  Doing well on surveillance with no evidence of disease on exam.  - Saw Marietta Coburn on 8/15/23-was doing well.  Dentist was planning for tooth extraction on the right side, but was concerned about tissue around the tooth which they would like evaluated prior to extraction.  Patient states that there is some irritation around the tooth but there is no bleeding.  Doing well with no evidence of disease on exam at that visit.  Small area at the anterior flap site that seems to be pulling away from the gingiva, plan to discuss with Dr. Harrell who may want to further evaluate the site.  No abnormal tissue in the right mandibular gingiva that would require a biopsy or contraindicate recommended tooth extraction.  - Saw Marietta Coburn for follow up on 2/28/24 for routine surveillance. Doing well. Had right molar extracted in Sept 23. Has healed. Neck tightness/spasms well controlled. No evidence of disease on exam. Small fistula at anterior flap site appears stable. Return in July for 5 year surveillance exam.       Symptoms,  Devonte was seen for a return virtual visit today.  He has not had a lymphedema exacerbation since his last visit. He does his daily stretches regularly. His neck spasms go in waves and there are times when he cannot calm it down. He has not noticed a pattern of when it happens. He does notice a slight increase with eating, brushing his teeth and some other activities.  He feels that his symptoms have been overall stable.  He has continued to need Flexeril and has been using it regularly twice daily, sometimes 3 times a day if cramping is bad.  He also occasionally uses heat for relief.  He continues to have limited jaw  opening, but is able to get adequate oral intake and spite of symptoms.      Therapies/HEP,  Continues with home stretches/regimen.      Functionally,   No changes.      Social history is unchanged.      Medications:  Current Outpatient Medications   Medication Sig Dispense Refill    acetaminophen (TYLENOL) 325 MG tablet Take 2 tablets (650 mg) by mouth every 4 hours as needed for mild pain 100 tablet 0    aspirin (ASA) 81 MG EC tablet Take 1 tablet (81 mg) by mouth daily 100 tablet 3    atorvastatin (LIPITOR) 20 MG tablet TAKE 1 TABLET BY MOUTH EVERY MORNING 90 tablet 1    blood glucose (NO BRAND SPECIFIED) lancets standard Use to test blood sugar  4 times daily or as directed. ( [place] micolet lancet) 400 each 0    blood glucose (NO BRAND SPECIFIED) test strip Use to test blood sugar 3 times daily or as directed. 300 strip 3    blood glucose monitoring ([place] MICROLET) lancets Use to test blood sugar 4 times daily or as directed. 200 each 3    cevimeline (EVOXAC) 30 MG capsule TAKE 1 CAPSULE BY MOUTH THREE TIMES DAILY 270 capsule 0    cyclobenzaprine (FLEXERIL) 5 MG tablet Take 1 tablet (5 mg) by mouth 2 times daily as needed for muscle spasms 60 tablet 3    cyclobenzaprine (FLEXERIL) 5 MG tablet Take 1 tablet (5 mg) by mouth 2 times daily as needed for muscle spasms 60 tablet 2    cyclobenzaprine (FLEXERIL) 5 MG tablet Take 1 tablet (5 mg) by mouth 3 times daily as needed for muscle spasms 90 tablet 1    doxazosin (CARDURA) 1 MG tablet Take 1 tablet (1 mg) by mouth daily 30 tablet 0    gabapentin (NEURONTIN) 300 MG capsule TAKE 1 CAPSULE BY MOUTH IN THE MORNING, 1 CAPSULE IN AFTERNOON AND 2 CAPSULES AT BEDTIME. 360 capsule 1    lisinopril (ZESTRIL) 10 MG tablet Take 1 tablet (10 mg) by mouth every morning 30 tablet 3    metFORMIN (GLUCOPHAGE) 1000 MG tablet TAKE ONE TABLET BY MOUTH TWICE DAILY 180 tablet 3    Misc Natural Product Nasal (PONARIS) SOLN With head tilted back, place 1 or 2 drops in each nostril once  "daily. 30 mL 3    multivitamin w/minerals (THERA-VIT-M) tablet Take 1 tablet by mouth daily 30 tablet 0    omeprazole (PRILOSEC) 20 MG DR capsule Take 1 capsule (20 mg) by mouth daily 30 capsule 3    Semaglutide, 1 MG/DOSE, (OZEMPIC, 1 MG/DOSE,) 4 MG/3ML pen Inject 1 mg Subcutaneous every 7 days 3 mL 11    sodium fluoride dental gel (DENTAGEL) 1.1 % GEL topical gel Apply to affected area At Bedtime 112 g 11              Physical Exam:   Ht 1.702 m (5' 7\")   Wt 123.4 kg (272 lb)   BMI 42.60 kg/m    Gen: NAD, pleasant and cooperative   HEENT: Atraumatic, normocephalic, extraocular movements appear intact  Pulm: non-labored breathing in room air  Neuro/MSK:   Orientation: Oriented to person, time, place and situation, Exhibits good insight into his condition and ongoing treatment  Motor: Observed moving upper extremities actively against gravity    Labs/Imaging:  Lab Results   Component Value Date    WBC 6.7 07/01/2019    HGB 12.6 (L) 07/01/2019    HCT 39.8 (L) 07/01/2019    MCV 94 07/01/2019     07/01/2019     Lab Results   Component Value Date     04/10/2024    POTASSIUM 4.6 04/10/2024    CHLORIDE 100 04/10/2024    CO2 26 04/10/2024     (H) 04/10/2024     Lab Results   Component Value Date    GFRESTIMATED >90 04/10/2024    GFRESTBLACK >90 03/25/2021     Lab Results   Component Value Date    AST 16 11/03/2021    ALT 30 11/03/2021     Lab Results   Component Value Date    INR 1.10 04/04/2019     Lab Results   Component Value Date    BUN 10.7 04/10/2024    CR 0.85 04/10/2024              Assessment/Plan   Devonte Tucker presents to clinic today for follow up reg his rehab needs. 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   Was last seen in clinic on 10/20/23.  Multiple rehabilitation considerations were discussed with Devonte at today's visit.  Overall, he is very stable with no swelling, though continues with some spasming.  He should continue daily " stretches and can use heat to help with muscle relaxation.  We will refill Flexeril prescription today.  We can reassess the need for any manual therapy with PT at our next visit.  Will plan for return virtual visit in 6 to 8 months.  He is in agreement with this plan.      Therapy/equipment/braces:  Continue daily neck stretches and MLD techniques to help with tightness and swelling.  Can use heat as needed for muscle relaxation.  Medications:  Continue Flexeril at 5 mg twice daily as needed.  Prescription refilled today.  Can increase to 3 times daily if spasms are difficult.  Continue gabapentin at 300 mg 3 times daily for neuropathy.  Follow up: 6 to 8-month return virtual visit.      Marivel Hurtado MD  Physical Medicine & Rehabilitation      50 minutes spent on the date of the encounter doing chart review, history and exam, documentation and further activities as noted above.

## 2024-04-19 NOTE — PATIENT INSTRUCTIONS
It was nice to see you again today.    1.  Continue your daily neck stretches.  2.  As discussed, you can use heat to help relax muscles.  3.  Continue Flexeril at 5 mg twice daily, and you can take it thrice daily when spasming is difficult.  Prescription was refilled today.  4.  Follow-up with Dr. Hurtado in 6 to 8 months for return virtual visit.

## 2024-05-01 ENCOUNTER — TELEPHONE (OUTPATIENT)
Dept: ENDOCRINOLOGY | Facility: CLINIC | Age: 59
End: 2024-05-01

## 2024-05-01 ENCOUNTER — VIRTUAL VISIT (OUTPATIENT)
Dept: ENDOCRINOLOGY | Facility: CLINIC | Age: 59
End: 2024-05-01
Payer: COMMERCIAL

## 2024-05-01 DIAGNOSIS — E11.9 TYPE 2 DIABETES MELLITUS WITHOUT COMPLICATION, WITHOUT LONG-TERM CURRENT USE OF INSULIN (H): Primary | ICD-10-CM

## 2024-05-01 PROCEDURE — 99207 PR NO CHARGE LOS: CPT | Mod: 95 | Performed by: PHYSICIAN ASSISTANT

## 2024-05-01 NOTE — TELEPHONE ENCOUNTER
Patient confirmed scheduled appointment:  Date: 5/2   Time: 7:30 am   Visit type: return diabetes   Provider: Deangelo   Location:    Testing/imaging: NA   Additional notes: Spoke to pt and re-george appt today w Yaquelin due to internet going out to tomorrow 5/2 w ryan at  for a virtual visit at 7:30 am okayed per haris. Will call back if Yaquelin has re-george options. Pts availability is tomorrow morning and Friday afternoon.    Tory Moran on 5/1/2024 at 8:33 AM

## 2024-05-01 NOTE — LETTER
5/1/2024       RE: Devonte Tucker  [address]  [place] [address]     Dear Colleague,    Thank you for referring your patient, Devonte Tucker, to the [facility] [place] [facility]. Please see a copy of my visit note below.    Internet issues this am. Pt has been rescheduled to be seen tomorrow 5/2/2024 at 7:30 am.  No charge today.  Elsa Perez PA-C    Virtual Visit Details    Type of service:  Video Visit     Originating Location (pt. Location):     Distant Location (provider location):    Platform used for Video Visit:     Outcome for 04/18/24 8:37 AM: [employer] message sent  Zulma Torres MA  Outcome for 04/29/24 6:44 AM: Glucose readings sent via [employer]  Ayesha Gooden LPN     Patient is showing 4/5 [place] met. BP not on file  Ayesha Gooden LPN         [Sclera] : the sclera and conjunctiva were normal [Extraocular Movements] : extraocular movements were intact [Normal Oral Mucosa] : normal oral mucosa [No Oral Pallor] : no oral pallor [No Oral Cyanosis] : no oral cyanosis [Hearing Threshold Finger Rub Not Childress] : hearing was normal [Examination Of The Oral Cavity] : the lips and gums were normal [Oropharynx] : The oropharynx was normal [Neck Appearance] : the appearance of the neck was normal [Jugular Venous Distention Increased] : there was no jugular-venous distention [Heart Sounds] : normal S1 and S2 [Full Pulse] : the pedal pulses are present [Edema] : there was no peripheral edema [Veins - Varicosity Changes] : there were no varicosital changes [Bowel Sounds] : normal bowel sounds [Abdomen Soft] : soft [Abdomen Tenderness] : non-tender [Abdomen Mass (___ Cm)] : no abdominal mass palpated [Skin Color & Pigmentation] : normal skin color and pigmentation [Skin Turgor] : normal skin turgor [] : no rash [FreeTextEntry1] : oreinted to self [Oriented To Time, Place, And Person] : oriented to person, place, and time

## 2024-05-01 NOTE — NURSING NOTE
Is the patient currently in the state of [place]? YES    Visit mode:VIDEO    If the visit is dropped, the patient can be reconnected by: VIDEO VISIT: Send to e-mail at: [email or web address]    Will anyone else be joining the visit? NO  (If patient encounters technical issues they should call [phone]    How would you like to obtain your AVS? MyChart    Are changes needed to the allergy or medication list? Pt stated no med changes    Are refills needed on medications prescribed by this physician? NO    Reason for visit: RECHECK (Follow up diabetes )    Kimberly WILDER

## 2024-05-02 ENCOUNTER — VIRTUAL VISIT (OUTPATIENT)
Dept: ENDOCRINOLOGY | Facility: CLINIC | Age: 59
End: 2024-05-02
Payer: COMMERCIAL

## 2024-05-02 ENCOUNTER — TELEPHONE (OUTPATIENT)
Dept: ENDOCRINOLOGY | Facility: CLINIC | Age: 59
End: 2024-05-02

## 2024-05-02 VITALS — WEIGHT: 270 LBS | BODY MASS INDEX: 42.29 KG/M2

## 2024-05-02 DIAGNOSIS — E11.9 TYPE 2 DIABETES MELLITUS WITHOUT COMPLICATION, WITHOUT LONG-TERM CURRENT USE OF INSULIN (H): ICD-10-CM

## 2024-05-02 DIAGNOSIS — C03.9 PRIMARY CANCER OF ALVEOLAR RIDGE MUCOSA (H): ICD-10-CM

## 2024-05-02 DIAGNOSIS — C03.0 SQUAMOUS CELL CARCINOMA OF MAXILLARY ALVEOLAR RIDGE (H): ICD-10-CM

## 2024-05-02 PROCEDURE — 99213 OFFICE O/P EST LOW 20 MIN: CPT | Mod: 95 | Performed by: PHYSICIAN ASSISTANT

## 2024-05-02 RX ORDER — GABAPENTIN 300 MG/1
CAPSULE ORAL
Qty: 360 CAPSULE | Refills: 1 | Status: SHIPPED | OUTPATIENT
Start: 2024-05-02

## 2024-05-02 RX ORDER — SEMAGLUTIDE 1.34 MG/ML
1 INJECTION, SOLUTION SUBCUTANEOUS
Qty: 3 ML | Refills: 11 | Status: SHIPPED | OUTPATIENT
Start: 2024-05-02

## 2024-05-02 NOTE — TELEPHONE ENCOUNTER
Left Voicemail (1st Attempt) for the patient to call back and schedule the following:    Appointment type: return  Provider: ryan marmolejo  Return date: 11/2/2024  Specialty phone number: [phone]   Additonal Notes: Return in about 6 months (around 11/2/2024)     [facility]   Orthopedics, Podiatry, Sports Medicine, Ent ,Eye , Audiology, Adult Endocrine & Diabetes, Nutrition & Medication Therapy Management Specialties   [facility]

## 2024-05-02 NOTE — LETTER
5/2/2024         RE: Devonte Tucker  [address]  [place] [address]        Dear Colleague,    Thank you for referring your patient, Devonte Tucker, to the [facility]. Please see a copy of my visit note below.    Outcome for 05/01/24 10:10 AM: Glucose readings sent via John Gooden LPN         Assessment/Plan :   Type 2 DM. Devonte remains stable on a combination of metformin and Ozempic. He has not had any adverse reaction to the medications and he states that his blood sugars seem to be stable. He is worried about some recent weight gain. We discussed possibly increasing the Ozempic to 2 mg weekly, but he will start with increase exercise and I recommend he download a calorie tracker. If he continues to struggle with weight gain in 6 mos, he can discuss the increase in dosage with JANNIE Bowen. I encouraged him to keep up the good work.   Neuropathy. He would like a refill of the gabapentin today. The 4 capsules daily seem to be working really well. I will send in a refill today.    Due to the COVID 19 pandemic this visit was a telephone/video visit in order to help prevent spread of infection in this patient and the general population. The patient gave verbal consent for the visit today. I have independently reviewed and interpreted labs, imaging as indicated.       Distant Location (provider location):  On-site  Mode of Communication:  Video Conference via [employer]  Chart review/prep time 5   Joined the call at 5/2/2024, 7:27:59 am.  Left the call at 5/2/2024, 7:35:35 am.  You were on the call for 7 minutes 36 seconds .  16 minutes spent on the date of the encounter doing chart review, history and exam, documentation and further activities as noted above.      Chief complaint:  Devonte is a 58 year old male who returns for follow-up of Type 2 DM.    I have reviewed Care Everywhere including [facility], [place]  [facility], [place], [facility], Critical access hospital , [facility], [place] lab reports, imaging reports and provider notes as indicated.      HISTORY OF PRESENT ILLNESS  Devonte continues to do well in his overall blood sugar management. He continues to take Ozempic 1 mg weekly along with metformin XR 2000 mg daily. He monitors his blood sugars with fingerstick testing 3x daily. His numbers are stable, however, he does have a few more elevated readings over the last month. He states that on occasion he will try something with a little more sugar than usual. He is always surprised by how much those extra carbs can really increase his blood sugars.     Emigdio denies any problems with severe hyperglycemia and/or hypoglycemia. He also has not noticed any vision changes. He does have neuropathy in his feet but this is well controlled with gabapentin. At a previous visit, JANNIE Bowen, increased his evening dose to 2 capsules. He states that this change has made a significant improvement in his sleep and really helped with the ongoing pain. He has been concerned by an increase in his weight. He was not as active over the winter and he plans on walking more, once the weather has improved.    Devonte was diagnosed with diabetes in early 2000. He has responded nicely to the combination of metformin and Ozempic. He has no history of retinopathy or microalbuminuria. As previously stated, he does have a history of neuropathy. 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    Endocrine relevant labs are as follows:   Latest Reference Range & Units 11/17/23 09:45   Hemoglobin A1C 0.0 - 5.6 % 5.5      Latest Reference Range & Units 04/10/24 08:26   Albumin Urine mg/L mg/L <12.0       REVIEW OF SYSTEMS    Endocrine: positive for diabetes  Skin: negative  Eyes: negative for, visual blurring, redness, tearing  Ears/Nose/Throat:  negative  Respiratory: No shortness of breath, dyspnea on exertion, cough, or hemoptysis  Cardiovascular: negative for, chest pain, dyspnea on exertion, lower extremity edema, and exercise intolerance  Gastrointestinal: negative for, nausea, vomiting, constipation, and diarrhea  Genitourinary: negative for, nocturia, dysuria, frequency, and urgency  Musculoskeletal: negative  Neurologic: positive for numbness or tingling of feet, gabapentin has really helped, negative for, local weakness, and numbness or tingling of hands  Psychiatric: negative  Hematologic/Lymphatic/Immunologic: history of maxillary sinus cancer    Past Medical History  Past Medical History:   Diagnosis Date     Allergic rhinitis experienced it for many years     Anxiety 2019     Benign positional vertigo 05/2019     Depressive disorder 2019     Diabetes (H)      Gastroesophageal reflux disease 05/2021     Head injury 04/2018     Hypertension      Maxillary sinus cancer (H)      Obesity      Sleep apnea May 2022       Medications  Current Outpatient Medications   Medication Sig Dispense Refill     acetaminophen (TYLENOL) 325 MG tablet Take 2 tablets (650 mg) by mouth every 4 hours as needed for mild pain 100 tablet 0     aspirin (ASA) 81 MG EC tablet Take 1 tablet (81 mg) by mouth daily 100 tablet 3     atorvastatin (LIPITOR) 20 MG tablet TAKE 1 TABLET BY MOUTH EVERY MORNING 90 tablet 1     blood glucose (NO BRAND SPECIFIED) lancets standard Use to test blood sugar  4 times daily or as directed. ( [place] micolet lancet) 400 each 0     blood glucose (NO BRAND SPECIFIED) test strip Use to test blood sugar 3 times daily or as directed. 300 strip 3     blood glucose monitoring (ISRAEL MICROLET) lancets Use to test blood sugar 4 times daily or as directed. 200 each 3     cevimeline (EVOXAC) 30 MG capsule TAKE 1 CAPSULE BY MOUTH THREE TIMES DAILY 270 capsule 0     cyclobenzaprine (FLEXERIL) 5 MG tablet Take 1 tablet (5 mg) by mouth 2 times daily as needed  for muscle spasms 90 tablet 3     cyclobenzaprine (FLEXERIL) 5 MG tablet Take 1 tablet (5 mg) by mouth 2 times daily as needed for muscle spasms 60 tablet 3     cyclobenzaprine (FLEXERIL) 5 MG tablet Take 1 tablet (5 mg) by mouth 3 times daily as needed for muscle spasms 90 tablet 1     doxazosin (CARDURA) 1 MG tablet Take 1 tablet (1 mg) by mouth daily 30 tablet 0     gabapentin (NEURONTIN) 300 MG capsule TAKE 1 CAPSULE BY MOUTH IN THE MORNING, 1 CAPSULE IN AFTERNOON AND 2 CAPSULES AT BEDTIME. 360 capsule 1     lisinopril (ZESTRIL) 10 MG tablet Take 1 tablet (10 mg) by mouth every morning 30 tablet 3     metFORMIN (GLUCOPHAGE) 1000 MG tablet TAKE ONE TABLET BY MOUTH TWICE DAILY 180 tablet 3     Misc Natural Product Nasal (PONARIS) SOLN With head tilted back, place 1 or 2 drops in each nostril once daily. 30 mL 3     multivitamin w/minerals (THERA-VIT-M) tablet Take 1 tablet by mouth daily 30 tablet 0     omeprazole (PRILOSEC) 20 MG DR capsule Take 1 capsule (20 mg) by mouth daily 30 capsule 3     Semaglutide, 1 MG/DOSE, (OZEMPIC, 1 MG/DOSE,) 4 MG/3ML pen Inject 1 mg Subcutaneous every 7 days 3 mL 11     sodium fluoride dental gel (DENTAGEL) 1.1 % GEL topical gel Apply to affected area At Bedtime 112 g 11       Allergies  Allergies   Allergen Reactions     Seasonal Allergies Other (See Comments) and Shortness Of Breath       Family History  family history includes Cancer in his brother, mother, and sister; Cardiovascular in his brother; Cerebrovascular Disease in his brother; Depression in his mother, sister, and sister; Diabetes in his brother, mother, and sister; Heart Disease in his brother and mother; Hypertension in his brother, mother, sister, and sister; Kidney Cancer in his sister; Substance Abuse in his brother, father, and sister.    Social History  Social History     Tobacco Use     Smoking status: Never     Smokeless tobacco: Never   Substance Use Topics     Alcohol use: No     Drug use: No        Physical Exam  Body mass index is 42.29 kg/m .  GENERAL: no distress  SKIN: Visible skin clear. No significant rash, abnormal pigmentation or lesions.  EYES: Eyes grossly normal to inspection.  No discharge or erythema, or obvious scleral/conjunctival abnormalities.  NECK: visible goiter is not present; no visible neck masses  RESP: No audible wheeze, cough, or visible cyanosis.  No visible retractions or increased work of breathing.    NEURO: Awake, alert, responds appropriately to questions.  Mentation and speech fluent.  PSYCH:affect normal and appearance well-groomed.      DATA REVIEW  Please see attached glucose logs  Current Ave  mg/dl        Again, thank you for allowing me to participate in the care of your patient.        Sincerely,        Maureen Bright PA-C

## 2024-05-02 NOTE — PROGRESS NOTES
Assessment/Plan :   Type 2 DM. Devonte remains stable on a combination of metformin and Ozempic. He has not had any adverse reaction to the medications and he states that his blood sugars seem to be stable. He is worried about some recent weight gain. We discussed possibly increasing the Ozempic to 2 mg weekly, but he will start with increase exercise and I recommend he download a calorie tracker. If he continues to struggle with weight gain in 6 mos, he can discuss the increase in dosage with JANNIE Bowen. I encouraged him to keep up the good work.   Neuropathy. He would like a refill of the gabapentin today. The 4 capsules daily seem to be working really well. I will send in a refill today.    Due to the COVID 19 pandemic this visit was a telephone/video visit in order to help prevent spread of infection in this patient and the general population. The patient gave verbal consent for the visit today. I have independently reviewed and interpreted labs, imaging as indicated.       Distant Location (provider location):  On-site  Mode of Communication:  Video Conference via [employer]  Chart review/prep time 5   Joined the call at 5/2/2024, 7:27:59 am.  Left the call at 5/2/2024, 7:35:35 am.  You were on the call for 7 minutes 36 seconds .  16 minutes spent on the date of the encounter doing chart review, history and exam, documentation and further activities as noted above.      Chief complaint:  Devonte is a 58 year old male who returns for follow-up of Type 2 DM.    I have reviewed Care Everywhere including [facility], [facility], [facility], [facility], [facility] , [facility], [place] lab reports, imaging reports and provider notes as indicated.      HISTORY OF PRESENT ILLNESS  Devonte continues to do well in his overall blood sugar management. He continues to take Ozempic 1 mg weekly along with metformin XR 2000 mg daily. He monitors his blood sugars with fingerstick testing 3x daily.  His numbers are stable, however, he does have a few more elevated readings over the last month. He states that on occasion he will try something with a little more sugar than usual. He is always surprised by how much those extra carbs can really increase his blood sugars.     Emigdio denies any problems with severe hyperglycemia and/or hypoglycemia. He also has not noticed any vision changes. He does have neuropathy in his feet but this is well controlled with gabapentin. At a previous visit, JANNIE Bowen, increased his evening dose to 2 capsules. He states that this change has made a significant improvement in his sleep and really helped with the ongoing pain. He has been concerned by an increase in his weight. He was not as active over the winter and he plans on walking more, once the weather has improved.    Devonte was diagnosed with diabetes in early 2000. He has responded nicely to the combination of metformin and Ozempic. He has no history of retinopathy or microalbuminuria. As previously stated, he does have a history of neuropathy. 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    Endocrine relevant labs are as follows:   Latest Reference Range & Units 11/17/23 09:45   Hemoglobin A1C 0.0 - 5.6 % 5.5      Latest Reference Range & Units 04/10/24 08:26   Albumin Urine mg/L mg/L <12.0       REVIEW OF SYSTEMS    Endocrine: positive for diabetes  Skin: negative  Eyes: negative for, visual blurring, redness, tearing  Ears/Nose/Throat: negative  Respiratory: No shortness of breath, dyspnea on exertion, cough, or hemoptysis  Cardiovascular: negative for, chest pain, dyspnea on exertion, lower extremity edema, and exercise intolerance  Gastrointestinal: negative for, nausea, vomiting, constipation, and diarrhea  Genitourinary: negative for, nocturia, dysuria, frequency, and urgency  Musculoskeletal:  negative  Neurologic: positive for numbness or tingling of feet, gabapentin has really helped, negative for, local weakness, and numbness or tingling of hands  Psychiatric: negative  Hematologic/Lymphatic/Immunologic: history of maxillary sinus cancer    Past Medical History  Past Medical History:   Diagnosis Date    Allergic rhinitis experienced it for many years    Anxiety 2019    Benign positional vertigo 05/2019    Depressive disorder 2019    Diabetes (H)     Gastroesophageal reflux disease 05/2021    Head injury 04/2018    Hypertension     Maxillary sinus cancer (H)     Obesity     Sleep apnea May 2022       Medications  Current Outpatient Medications   Medication Sig Dispense Refill    acetaminophen (TYLENOL) 325 MG tablet Take 2 tablets (650 mg) by mouth every 4 hours as needed for mild pain 100 tablet 0    aspirin (ASA) 81 MG EC tablet Take 1 tablet (81 mg) by mouth daily 100 tablet 3    atorvastatin (LIPITOR) 20 MG tablet TAKE 1 TABLET BY MOUTH EVERY MORNING 90 tablet 1    blood glucose (NO BRAND SPECIFIED) lancets standard Use to test blood sugar  4 times daily or as directed. ( [place] micolet lancet) 400 each 0    blood glucose (NO BRAND SPECIFIED) test strip Use to test blood sugar 3 times daily or as directed. 300 strip 3    blood glucose monitoring (ISRAEL MICROLET) lancets Use to test blood sugar 4 times daily or as directed. 200 each 3    cevimeline (EVOXAC) 30 MG capsule TAKE 1 CAPSULE BY MOUTH THREE TIMES DAILY 270 capsule 0    cyclobenzaprine (FLEXERIL) 5 MG tablet Take 1 tablet (5 mg) by mouth 2 times daily as needed for muscle spasms 90 tablet 3    cyclobenzaprine (FLEXERIL) 5 MG tablet Take 1 tablet (5 mg) by mouth 2 times daily as needed for muscle spasms 60 tablet 3    cyclobenzaprine (FLEXERIL) 5 MG tablet Take 1 tablet (5 mg) by mouth 3 times daily as needed for muscle spasms 90 tablet 1    doxazosin (CARDURA) 1 MG tablet Take 1 tablet (1 mg) by mouth daily 30 tablet 0    gabapentin  (NEURONTIN) 300 MG capsule TAKE 1 CAPSULE BY MOUTH IN THE MORNING, 1 CAPSULE IN AFTERNOON AND 2 CAPSULES AT BEDTIME. 360 capsule 1    lisinopril (ZESTRIL) 10 MG tablet Take 1 tablet (10 mg) by mouth every morning 30 tablet 3    metFORMIN (GLUCOPHAGE) 1000 MG tablet TAKE ONE TABLET BY MOUTH TWICE DAILY 180 tablet 3    Misc Natural Product Nasal (PONARIS) SOLN With head tilted back, place 1 or 2 drops in each nostril once daily. 30 mL 3    multivitamin w/minerals (THERA-VIT-M) tablet Take 1 tablet by mouth daily 30 tablet 0    omeprazole (PRILOSEC) 20 MG DR capsule Take 1 capsule (20 mg) by mouth daily 30 capsule 3    Semaglutide, 1 MG/DOSE, (OZEMPIC, 1 MG/DOSE,) 4 MG/3ML pen Inject 1 mg Subcutaneous every 7 days 3 mL 11    sodium fluoride dental gel (DENTAGEL) 1.1 % GEL topical gel Apply to affected area At Bedtime 112 g 11       Allergies  Allergies   Allergen Reactions    Seasonal Allergies Other (See Comments) and Shortness Of Breath       Family History  family history includes Cancer in his brother, mother, and sister; Cardiovascular in his brother; Cerebrovascular Disease in his brother; Depression in his mother, sister, and sister; Diabetes in his brother, mother, and sister; Heart Disease in his brother and mother; Hypertension in his brother, mother, sister, and sister; Kidney Cancer in his sister; Substance Abuse in his brother, father, and sister.    Social History  Social History     Tobacco Use    Smoking status: Never    Smokeless tobacco: Never   Substance Use Topics    Alcohol use: No    Drug use: No       Physical Exam  Body mass index is 42.29 kg/m .  GENERAL: no distress  SKIN: Visible skin clear. No significant rash, abnormal pigmentation or lesions.  EYES: Eyes grossly normal to inspection.  No discharge or erythema, or obvious scleral/conjunctival abnormalities.  NECK: visible goiter is not present; no visible neck masses  RESP: No audible wheeze, cough, or visible cyanosis.  No visible  retractions or increased work of breathing.    NEURO: Awake, alert, responds appropriately to questions.  Mentation and speech fluent.  PSYCH:affect normal and appearance well-groomed.      DATA REVIEW  Please see attached glucose logs  Current Ave  mg/dl

## 2024-05-02 NOTE — NURSING NOTE
Is the patient currently in the state of [place]? YES    Visit mode:VIDEO    If the visit is dropped, the patient can be reconnected by: VIDEO VISIT: Text to cell phone:   Telephone Information:   Mobile [phone]       Will anyone else be joining the visit? NO  (If patient encounters technical issues they should call [phone]    How would you like to obtain your AVS? MyChart    Are changes needed to the allergy or medication list? No    Are refills needed on medications prescribed by this physician? NO    Reason for visit: RECHECK and Video Visit    Yesy WILDER

## 2024-05-08 NOTE — TELEPHONE ENCOUNTER
Left Voicemail (2nd Attempt) for the patient to call back and schedule the following:    Appointment type: return  Provider: ryan marmolejo  Return date: 11/2/2024  Specialty phone number: [phone]   Additonal Notes: Return in about 6 months (around 11/2/2024)     [facility]   Orthopedics, Podiatry, Sports Medicine, Ent ,Eye , Audiology, Adult Endocrine & Diabetes, Nutrition & Medication Therapy Management Specialties   [facility]

## 2024-06-03 ENCOUNTER — MYC MEDICAL ADVICE (OUTPATIENT)
Dept: OTOLARYNGOLOGY | Facility: CLINIC | Age: 59
End: 2024-06-03
Payer: COMMERCIAL

## 2024-06-03 NOTE — TELEPHONE ENCOUNTER
Left Voicemail (1st Attempt) for the patient to call back and schedule the following:    Appointment type: Return ENT   Provider: Marie Coburn  Return date: next available   Specialty phone number: [phone]  Additional appointment(s) needed:   Additonal Notes:

## 2024-06-05 ENCOUNTER — TELEPHONE (OUTPATIENT)
Dept: OTOLARYNGOLOGY | Facility: CLINIC | Age: 59
End: 2024-06-05
Payer: COMMERCIAL

## 2024-06-05 NOTE — TELEPHONE ENCOUNTER
Left Voicemail (2nd Attempt) for the patient to call back and schedule the following:    Appointment type: Return ENT   Provider: Marie Coburn  Return date: next available after 7/17  Specialty phone number: [phone]  Additional appointment(s) needed:   Additonal Notes:

## 2024-07-08 ENCOUNTER — MEDICAL CORRESPONDENCE (OUTPATIENT)
Dept: HEALTH INFORMATION MANAGEMENT | Facility: CLINIC | Age: 59
End: 2024-07-08
Payer: COMMERCIAL

## 2024-07-14 ENCOUNTER — HEALTH MAINTENANCE LETTER (OUTPATIENT)
Age: 59
End: 2024-07-14

## 2024-07-17 ENCOUNTER — TELEPHONE (OUTPATIENT)
Dept: ENDOCRINOLOGY | Facility: CLINIC | Age: 59
End: 2024-07-17

## 2024-07-17 ENCOUNTER — ANCILLARY PROCEDURE (OUTPATIENT)
Dept: CT IMAGING | Facility: CLINIC | Age: 59
End: 2024-07-17
Attending: REGISTERED NURSE
Payer: COMMERCIAL

## 2024-07-17 ENCOUNTER — LAB (OUTPATIENT)
Dept: LAB | Facility: CLINIC | Age: 59
End: 2024-07-17
Payer: COMMERCIAL

## 2024-07-17 DIAGNOSIS — C03.0 SQUAMOUS CELL CARCINOMA OF MAXILLARY ALVEOLAR RIDGE (H): ICD-10-CM

## 2024-07-17 DIAGNOSIS — E11.9 DIABETES MELLITUS, TYPE 2 (H): ICD-10-CM

## 2024-07-17 DIAGNOSIS — E11.9 DIABETES MELLITUS, TYPE 2 (H): Primary | ICD-10-CM

## 2024-07-17 LAB
ANION GAP SERPL CALCULATED.3IONS-SCNC: 9 MMOL/L (ref 7–15)
BUN SERPL-MCNC: 13.4 MG/DL (ref 8–23)
CALCIUM SERPL-MCNC: 9.1 MG/DL (ref 8.8–10.4)
CHLORIDE SERPL-SCNC: 99 MMOL/L (ref 98–107)
CREAT BLD-MCNC: 0.9 MG/DL (ref 0.7–1.3)
CREAT SERPL-MCNC: 0.84 MG/DL (ref 0.67–1.17)
CREAT UR-MCNC: 22.3 MG/DL
EGFRCR SERPLBLD CKD-EPI 2021: >60 ML/MIN/1.73M2
EGFRCR SERPLBLD CKD-EPI 2021: >90 ML/MIN/1.73M2
GLUCOSE SERPL-MCNC: 95 MG/DL (ref 70–99)
HBA1C MFR BLD: 6.1 %
HCO3 SERPL-SCNC: 27 MMOL/L (ref 22–29)
MICROALBUMIN UR-MCNC: <12 MG/L
MICROALBUMIN/CREAT UR: NORMAL MG/G{CREAT}
POTASSIUM SERPL-SCNC: 4.5 MMOL/L (ref 3.4–5.3)
SODIUM SERPL-SCNC: 135 MMOL/L (ref 135–145)

## 2024-07-17 PROCEDURE — 71260 CT THORAX DX C+: CPT | Mod: GC | Performed by: RADIOLOGY

## 2024-07-17 PROCEDURE — 82043 UR ALBUMIN QUANTITATIVE: CPT | Performed by: PHYSICIAN ASSISTANT

## 2024-07-17 PROCEDURE — 80048 BASIC METABOLIC PNL TOTAL CA: CPT | Performed by: PATHOLOGY

## 2024-07-17 PROCEDURE — 82565 ASSAY OF CREATININE: CPT | Performed by: PATHOLOGY

## 2024-07-17 PROCEDURE — 70491 CT SOFT TISSUE NECK W/DYE: CPT | Mod: GC | Performed by: RADIOLOGY

## 2024-07-17 PROCEDURE — 83036 HEMOGLOBIN GLYCOSYLATED A1C: CPT | Performed by: PHYSICIAN ASSISTANT

## 2024-07-17 PROCEDURE — 99000 SPECIMEN HANDLING OFFICE-LAB: CPT | Performed by: PATHOLOGY

## 2024-07-17 PROCEDURE — 36415 COLL VENOUS BLD VENIPUNCTURE: CPT | Performed by: PATHOLOGY

## 2024-07-17 RX ORDER — IOPAMIDOL 755 MG/ML
125 INJECTION, SOLUTION INTRAVASCULAR ONCE
Status: COMPLETED | OUTPATIENT
Start: 2024-07-17 | End: 2024-07-17

## 2024-07-17 RX ADMIN — IOPAMIDOL 125 ML: 755 INJECTION, SOLUTION INTRAVASCULAR at 09:21

## 2024-07-17 NOTE — DISCHARGE INSTRUCTIONS

## 2024-10-10 NOTE — PROGRESS NOTES
Outcome for 10/10/24 2:34 PM: [employer] message sent  Zulma Torres MA  Outcome for 10/28/24 9:45 AM: Left Voicemail   Zulma Torres MA  Outcome for 10/29/24 7:14 AM: Glucose readings sent via [employer]  Bobbilynn Grossaint, VF    Patient is showing 4/5 MNCM met. BP out range - BP not file.   Zulma Torres MA

## 2024-10-18 ENCOUNTER — VIRTUAL VISIT (OUTPATIENT)
Dept: ONCOLOGY | Facility: CLINIC | Age: 59
End: 2024-10-18
Attending: STUDENT IN AN ORGANIZED HEALTH CARE EDUCATION/TRAINING PROGRAM
Payer: COMMERCIAL

## 2024-10-18 VITALS — WEIGHT: 280 LBS | HEIGHT: 67 IN | BODY MASS INDEX: 43.95 KG/M2

## 2024-10-18 DIAGNOSIS — C31.0 MAXILLARY SINUS CANCER (H): Primary | ICD-10-CM

## 2024-10-18 DIAGNOSIS — C03.0 SQUAMOUS CELL CARCINOMA OF MAXILLARY ALVEOLAR RIDGE (H): ICD-10-CM

## 2024-10-18 DIAGNOSIS — L90.5 SCAR TISSUE: ICD-10-CM
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
## 2024-10-18 DIAGNOSIS — C03.9 PRIMARY CANCER OF ALVEOLAR RIDGE MUCOSA (H): ICD-10-CM

## 2024-10-18 DIAGNOSIS — L59.8 RADIATION FIBROSIS OF SOFT TISSUE FROM THERAPEUTIC PROCEDURE: ICD-10-CM

## 2024-10-18 DIAGNOSIS — I89.0 LYMPHEDEMA: ICD-10-CM

## 2024-10-18 DIAGNOSIS — M54.2 NECK PAIN: ICD-10-CM

## 2024-10-18 PROCEDURE — 99215 OFFICE O/P EST HI 40 MIN: CPT | Mod: 95 | Performed by: STUDENT IN AN ORGANIZED HEALTH CARE EDUCATION/TRAINING PROGRAM

## 2024-10-18 RX ORDER — CYCLOBENZAPRINE HCL 5 MG
5 TABLET ORAL 3 TIMES DAILY PRN
Qty: 90 TABLET | Refills: 3 | Status: SHIPPED | OUTPATIENT
Start: 2024-10-18

## 2024-10-18 RX ORDER — GABAPENTIN 300 MG/1
CAPSULE ORAL
Qty: 360 CAPSULE | Refills: 2 | Status: SHIPPED | OUTPATIENT
Start: 2024-10-18 | End: 2024-10-30

## 2024-10-18 ASSESSMENT — PAIN SCALES - GENERAL: PAINLEVEL: NO PAIN (0)

## 2024-10-18 NOTE — NURSING NOTE
Current patient location:  [address] [place] [address]    Is the patient currently in the state of [place]? YES    Visit mode:VIDEO    If the visit is dropped, the patient can be reconnected by: VIDEO VISIT: Send to e-mail at: [email or web address]    Will anyone else be joining the visit? NO  (If patient encounters technical issues they should call [phone]    Are changes needed to the allergy or medication list? No    Are refills needed on medications prescribed by this physician? YES    Rooming Documentation:  Unable to complete questionnaire(s) due to time    Reason for visit: KYM MCF

## 2024-10-18 NOTE — LETTER
10/18/2024      Devonte Tucker  [address]  [place] [address]      Dear Colleague,    Thank you for referring your patient, Devonte Tucker, to the [facility]. Please see a copy of my visit note below.    Virtual Visit Details    Type of service:  Video Visit     Originating Location (pt. Location): Home    Distant Location (provider location):  Off-site  Platform used for Video Visit: [facility]   PM&R clinic note        Interval history:     Devonte Tucker presents to clinic today for follow up reg his rehab needs.   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   Was last seen in clinic on 4/19/24.  Recommendations included:  Therapy/equipment/braces:  Continue daily neck stretches and MLD techniques to help with tightness and swelling.  Can use heat as needed for muscle relaxation.  Medications:  Continue Flexeril at 5 mg twice daily as needed.  Prescription refilled today.  Can increase to 3 times daily if spasms are difficult.  Continue gabapentin at 300 mg 3 times daily for neuropathy.  Follow up: 6 to 8-month return virtual visit.    Oncology History:  -Diagnosed after he presented with a several year history of oral cavity pain and precancerous lesions of the left upper gingiva.   -He underwent a left infrastructure maxillectomy and left level I lymph node dissection on 5/9/2019 with pathology revealing a 1.3 cm well-differentiated squamous cell carcinoma with 13 mm depth of invasion and invasion into the underlying bone.   -He received adjuvant radiotherapy as described above for improved local disease control.  -He was last seen by ENT on 3/9/2022, at which time he reported persistent stable pain in the anterior hard palate and left maxilla.  -Seen by Dr. Verdin on 4/27/22 and complained of low mood with ongoing toxicities and frustrations after cancer treatment.  Persistent xerostomia, dysphagia to dry foods, continued stiffness and pain in the left neck. Plan for follow up with rad onc MD in 3/2023 and NP in 9/2022 with CT neck and chest. Repeat TSH due in 10/22. Referral to Oncology Psych and PM&R  - Saw Marietta Coburn for follow up on 2/15/2023 for follow-up.  Doing well on surveillance with no evidence of disease on exam.  - Saw Marietta Coburn on 8/15/23-was doing well.  Dentist was planning for tooth extraction on the right side, but was concerned about tissue around the tooth which they would like evaluated prior to extraction.  Patient states that there is some irritation around the tooth but there is no bleeding.  Doing well with no evidence of disease on exam at that visit.  Small area at the anterior flap site that seems to be pulling away from the gingiva, plan to discuss with Dr. Harrell who may want to further evaluate the site.  No abnormal tissue in the right mandibular gingiva that would require a biopsy or contraindicate recommended tooth extraction.  - Saw Marietta Mayssaw for follow up on 2/28/24 for routine surveillance. Doing well. Had right molar extracted in Sept 23. Has healed. Neck tightness/spasms well controlled. No evidence of disease on exam. Small fistula at anterior flap site appears stable. Return in July for 5 year surveillance exam.      Symptoms,  Devonte was seen for a return visit today.  His neck has been really cramping up a lot. He needs a renewal for muscle relaxant prescription. He feels cramps are more severe. Even his cheek area has been cramping. Sometimes rubbing it helps it to calm down. He is still on the flexeril twice daily.  His neck sometimes feels tighter. He has tried stretching and warm packs to settle it down.   It is bothering him more during the day that it used to. It happens when he brushes his teeth and flosses.   He is still taking gabapentin TID for neuropathy. His neuropathy goes in waves and is sometimes  more tolerable than others but has overall been manageable.      Therapies/HEP,  Doing his daily stretches and exercises.      Functionally,   No change.      Social history is unchanged.      Medications:  Current Outpatient Medications   Medication Sig Dispense Refill     acetaminophen (TYLENOL) 325 MG tablet Take 2 tablets (650 mg) by mouth every 4 hours as needed for mild pain 100 tablet 0     aspirin (ASA) 81 MG EC tablet Take 1 tablet (81 mg) by mouth daily 100 tablet 3     atorvastatin (LIPITOR) 20 MG tablet TAKE 1 TABLET BY MOUTH EVERY MORNING 90 tablet 1     blood glucose (NO BRAND SPECIFIED) lancets standard Use to test blood sugar  4 times daily or as directed. ( [place] micolet lancet) 400 each 0     blood glucose (NO BRAND SPECIFIED) test strip Use to test blood sugar 3 times daily or as directed. 300 strip 3     blood glucose monitoring ([place] MICROLET) lancets Use to test blood sugar 4 times daily or as directed. 200 each 3     cevimeline (EVOXAC) 30 MG capsule TAKE 1 CAPSULE BY MOUTH THREE TIMES DAILY 270 capsule 0     cyclobenzaprine (FLEXERIL) 5 MG tablet Take 1 tablet (5 mg) by mouth 2 times daily as needed for muscle spasms 90 tablet 3     cyclobenzaprine (FLEXERIL) 5 MG tablet Take 1 tablet (5 mg) by mouth 2 times daily as needed for muscle spasms 60 tablet 3     cyclobenzaprine (FLEXERIL) 5 MG tablet Take 1 tablet (5 mg) by mouth 3 times daily as needed for muscle spasms 90 tablet 1     doxazosin (CARDURA) 1 MG tablet Take 1 tablet (1 mg) by mouth daily 30 tablet 0     gabapentin (NEURONTIN) 300 MG capsule TAKE 1 CAPSULE BY MOUTH IN THE MORNING, 1 CAPSULE IN AFTERNOON AND 2 CAPSULES AT BEDTIME. 360 capsule 1     lisinopril (ZESTRIL) 10 MG tablet Take 1 tablet (10 mg) by mouth every morning 30 tablet 3     metFORMIN (GLUCOPHAGE) 1000 MG tablet TAKE ONE TABLET BY MOUTH TWICE DAILY 180 tablet 3     Misc Natural Product Nasal (PONARIS) SOLN With head tilted back, place 1 or 2 drops in each nostril  once daily. 30 mL 3     multivitamin w/minerals (THERA-VIT-M) tablet Take 1 tablet by mouth daily 30 tablet 0     omeprazole (PRILOSEC) 20 MG DR capsule Take 1 capsule (20 mg) by mouth daily 30 capsule 3     Semaglutide, 1 MG/DOSE, (OZEMPIC, 1 MG/DOSE,) 4 MG/3ML pen Inject 1 mg Subcutaneous every 7 days 3 mL 11     sodium fluoride dental gel (DENTAGEL) 1.1 % GEL topical gel Apply to affected area At Bedtime 112 g 11              Physical Exam:   There were no vitals taken for this visit.  Gen: NAD, pleasant and cooperative   HEENT: Atraumatic, normocephalic, extraocular movements appear intact  Pulm: non-labored breathing in room air  Neuro/MSK:   Orientation: Oriented to person, time, place and situation, Exhibits good insight into his condition and ongoing treatment  Motor: Observed moving upper extremities actively against gravity    Labs/Imaging:  Lab Results   Component Value Date    WBC 6.7 07/01/2019    HGB 12.6 (L) 07/01/2019    HCT 39.8 (L) 07/01/2019    MCV 94 07/01/2019     07/01/2019     Lab Results   Component Value Date     07/17/2024    POTASSIUM 4.5 07/17/2024    CHLORIDE 99 07/17/2024    CO2 27 07/17/2024    GLC 95 07/17/2024     Lab Results   Component Value Date    GFRESTIMATED >90 07/17/2024    GFRESTBLACK >90 03/25/2021     Lab Results   Component Value Date    AST 16 11/03/2021    ALT 30 11/03/2021     Lab Results   Component Value Date    INR 1.10 04/04/2019     Lab Results   Component Value Date    BUN 13.4 07/17/2024    CR 0.84 07/17/2024              Assessment/Plan   Devonte Tucker presents to clinic today for follow up reg his rehab needs.   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 Was last seen in clinic on 4/19/24.  Devonte was seen for a return visit today.  His neck muscle spasms have been exacerbated more recently, and he is struggling quite a bit with this in terms of severity and frequency.  A referral to lymphedema  therapy was placed for more structured work on his symptoms.  He is in need of a refill for Flexeril, so this was sent to his pharmacy today.  He is also in need of a refill for gabapentin and this was represcribed today.  We also discussed the next step above conservative management for his neck symptoms, and reviewed Botox as a possible option.  We can readdress this at a future visit as needed.  We will plan a return in 6 to 8-month virtual visit he is in agreement with this plan.    Therapy/equipment/braces:  Lymphedema therapy referral placed for worsened neck muscle spasm and tightness in the setting of radiation fibrosis and scar tissue.  Continue daily home regimen and stretches.  Medications:  Flexeril prescription refilled.  Gabapentin prescription refilled.  Interventions:  Discussed botulinum toxin injections as possible neck step in management if conservative measures fail.  We can readdress this at a future visit as needed.  Follow up: 6 to 8 months.      Marivel Hurtado MD  Physical Medicine & Rehabilitation      50 minutes spent on the date of the encounter doing chart review, history and exam, documentation and further activities as noted above.               Again, thank you for allowing me to participate in the care of your patient.        Sincerely,        Marivel Hurtado MD

## 2024-10-18 NOTE — PATIENT INSTRUCTIONS
It was nice to see you again today.    1.  A lymphedema therapy referral was placed.  2.  Flexeril prescription and gabapentin prescription were refilled.  3.  Follow-up with Dr. Hurtado in 6 to 8 months for return virtual visit.

## 2024-10-18 NOTE — PROGRESS NOTES
Virtual Visit Details    Type of service:  Video Visit     Originating Location (pt. Location): Home    Distant Location (provider location):  Off-site  Platform used for Video Visit: [facility]   PM&R clinic note        Interval history:     Devonte Tucker presents to clinic today for follow up reg his rehab needs.   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   Was last seen in clinic on 4/19/24.  Recommendations included:  Therapy/equipment/braces:  Continue daily neck stretches and MLD techniques to help with tightness and swelling.  Can use heat as needed for muscle relaxation.  Medications:  Continue Flexeril at 5 mg twice daily as needed.  Prescription refilled today.  Can increase to 3 times daily if spasms are difficult.  Continue gabapentin at 300 mg 3 times daily for neuropathy.  Follow up: 6 to 8-month return virtual visit.    Oncology History:  -Diagnosed after he presented with a several year history of oral cavity pain and precancerous lesions of the left upper gingiva.   -He underwent a left infrastructure maxillectomy and left level I lymph node dissection on 5/9/2019 with pathology revealing a 1.3 cm well-differentiated squamous cell carcinoma with 13 mm depth of invasion and invasion into the underlying bone.   -He received adjuvant radiotherapy as described above for improved local disease control.  -He was last seen by ENT on 3/9/2022, at which time he reported persistent stable pain in the anterior hard palate and left maxilla.  -Seen by Dr. Verdin on 4/27/22 and complained of low mood with ongoing toxicities and frustrations after cancer treatment. Persistent xerostomia, dysphagia to dry foods, continued stiffness and pain in the left neck. Plan for follow up with rad onc MD in 3/2023 and NP in 9/2022 with CT neck and chest. Repeat TSH due in 10/22. Referral to Oncology Psych and PM&R  - Saw Marietta  Irish for follow up on 2/15/2023 for follow-up.  Doing well on surveillance with no evidence of disease on exam.  - Saw Marietta Irish on 8/15/23-was doing well.  Dentist was planning for tooth extraction on the right side, but was concerned about tissue around the tooth which they would like evaluated prior to extraction.  Patient states that there is some irritation around the tooth but there is no bleeding.  Doing well with no evidence of disease on exam at that visit.  Small area at the anterior flap site that seems to be pulling away from the gingiva, plan to discuss with Dr. Harrell who may want to further evaluate the site.  No abnormal tissue in the right mandibular gingiva that would require a biopsy or contraindicate recommended tooth extraction.  - Saw Marietta Irish for follow up on 2/28/24 for routine surveillance. Doing well. Had right molar extracted in Sept 23. Has healed. Neck tightness/spasms well controlled. No evidence of disease on exam. Small fistula at anterior flap site appears stable. Return in July for 5 year surveillance exam.      Symptoms,  Devonte was seen for a return visit today.  His neck has been really cramping up a lot. He needs a renewal for muscle relaxant prescription. He feels cramps are more severe. Even his cheek area has been cramping. Sometimes rubbing it helps it to calm down. He is still on the flexeril twice daily.  His neck sometimes feels tighter. He has tried stretching and warm packs to settle it down.   It is bothering him more during the day that it used to. It happens when he brushes his teeth and flosses.   He is still taking gabapentin TID for neuropathy. His neuropathy goes in waves and is sometimes more tolerable than others but has overall been manageable.      Therapies/HEP,  Doing his daily stretches and exercises.      Functionally,   No change.      Social history is unchanged.      Medications:  Current Outpatient Medications   Medication Sig  Dispense Refill    acetaminophen (TYLENOL) 325 MG tablet Take 2 tablets (650 mg) by mouth every 4 hours as needed for mild pain 100 tablet 0    aspirin (ASA) 81 MG EC tablet Take 1 tablet (81 mg) by mouth daily 100 tablet 3    atorvastatin (LIPITOR) 20 MG tablet TAKE 1 TABLET BY MOUTH EVERY MORNING 90 tablet 1    blood glucose (NO BRAND SPECIFIED) lancets standard Use to test blood sugar  4 times daily or as directed. ( [place] micolet lancet) 400 each 0    blood glucose (NO BRAND SPECIFIED) test strip Use to test blood sugar 3 times daily or as directed. 300 strip 3    blood glucose monitoring ([place] MICROLET) lancets Use to test blood sugar 4 times daily or as directed. 200 each 3    cevimeline (EVOXAC) 30 MG capsule TAKE 1 CAPSULE BY MOUTH THREE TIMES DAILY 270 capsule 0    cyclobenzaprine (FLEXERIL) 5 MG tablet Take 1 tablet (5 mg) by mouth 2 times daily as needed for muscle spasms 90 tablet 3    cyclobenzaprine (FLEXERIL) 5 MG tablet Take 1 tablet (5 mg) by mouth 2 times daily as needed for muscle spasms 60 tablet 3    cyclobenzaprine (FLEXERIL) 5 MG tablet Take 1 tablet (5 mg) by mouth 3 times daily as needed for muscle spasms 90 tablet 1    doxazosin (CARDURA) 1 MG tablet Take 1 tablet (1 mg) by mouth daily 30 tablet 0    gabapentin (NEURONTIN) 300 MG capsule TAKE 1 CAPSULE BY MOUTH IN THE MORNING, 1 CAPSULE IN AFTERNOON AND 2 CAPSULES AT BEDTIME. 360 capsule 1    lisinopril (ZESTRIL) 10 MG tablet Take 1 tablet (10 mg) by mouth every morning 30 tablet 3    metFORMIN (GLUCOPHAGE) 1000 MG tablet TAKE ONE TABLET BY MOUTH TWICE DAILY 180 tablet 3    Misc Natural Product Nasal (PONARIS) SOLN With head tilted back, place 1 or 2 drops in each nostril once daily. 30 mL 3    multivitamin w/minerals (THERA-VIT-M) tablet Take 1 tablet by mouth daily 30 tablet 0    omeprazole (PRILOSEC) 20 MG DR capsule Take 1 capsule (20 mg) by mouth daily 30 capsule 3    Semaglutide, 1 MG/DOSE, (OZEMPIC, 1 MG/DOSE,) 4 MG/3ML pen Inject 1  mg Subcutaneous every 7 days 3 mL 11    sodium fluoride dental gel (DENTAGEL) 1.1 % GEL topical gel Apply to affected area At Bedtime 112 g 11              Physical Exam:   There were no vitals taken for this visit.  Gen: NAD, pleasant and cooperative   HEENT: Atraumatic, normocephalic, extraocular movements appear intact  Pulm: non-labored breathing in room air  Neuro/MSK:   Orientation: Oriented to person, time, place and situation, Exhibits good insight into his condition and ongoing treatment  Motor: Observed moving upper extremities actively against gravity    Labs/Imaging:  Lab Results   Component Value Date    WBC 6.7 07/01/2019    HGB 12.6 (L) 07/01/2019    HCT 39.8 (L) 07/01/2019    MCV 94 07/01/2019     07/01/2019     Lab Results   Component Value Date     07/17/2024    POTASSIUM 4.5 07/17/2024    CHLORIDE 99 07/17/2024    CO2 27 07/17/2024    GLC 95 07/17/2024     Lab Results   Component Value Date    GFRESTIMATED >90 07/17/2024    GFRESTBLACK >90 03/25/2021     Lab Results   Component Value Date    AST 16 11/03/2021    ALT 30 11/03/2021     Lab Results   Component Value Date    INR 1.10 04/04/2019     Lab Results   Component Value Date    BUN 13.4 07/17/2024    CR 0.84 07/17/2024              Assessment/Plan   Devonte Tucker presents to clinic today for follow up reg his rehab needs.   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 Was last seen in clinic on 4/19/24.  Devonte was seen for a return visit today.  His neck muscle spasms have been exacerbated more recently, and he is struggling quite a bit with this in terms of severity and frequency.  A referral to lymphedema therapy was placed for more structured work on his symptoms.  He is in need of a refill for Flexeril, so this was sent to his pharmacy today.  He is also in need of a refill for gabapentin and this was represcribed today.  We also discussed the next step above conservative  management for his neck symptoms, and reviewed Botox as a possible option.  We can readdress this at a future visit as needed.  We will plan a return in 6 to 8-month virtual visit he is in agreement with this plan.    Therapy/equipment/braces:  Lymphedema therapy referral placed for worsened neck muscle spasm and tightness in the setting of radiation fibrosis and scar tissue.  Continue daily home regimen and stretches.  Medications:  Flexeril prescription refilled.  Gabapentin prescription refilled.  Interventions:  Discussed botulinum toxin injections as possible neck step in management if conservative measures fail.  We can readdress this at a future visit as needed.  Follow up: 6 to 8 months.      Marivel Hurtado MD  Physical Medicine & Rehabilitation      50 minutes spent on the date of the encounter doing chart review, history and exam, documentation and further activities as noted above.

## 2024-10-28 ENCOUNTER — TELEPHONE (OUTPATIENT)
Dept: ENDOCRINOLOGY | Facility: CLINIC | Age: 59
End: 2024-10-28
Payer: COMMERCIAL

## 2024-10-28 NOTE — TELEPHONE ENCOUNTER
Called patient and left voicemail. Patient has an appointment on  10/30/24 . Need to collect the last 14 days worth of blood sugar readings to prepare for patient's visit.   Zulma Torres MA

## 2024-10-30 ENCOUNTER — VIRTUAL VISIT (OUTPATIENT)
Dept: ENDOCRINOLOGY | Facility: CLINIC | Age: 59
End: 2024-10-30
Payer: COMMERCIAL

## 2024-10-30 DIAGNOSIS — C03.0 SQUAMOUS CELL CARCINOMA OF MAXILLARY ALVEOLAR RIDGE (H): ICD-10-CM

## 2024-10-30 DIAGNOSIS — C03.9 PRIMARY CANCER OF ALVEOLAR RIDGE MUCOSA (H): ICD-10-CM

## 2024-10-30 PROCEDURE — 99214 OFFICE O/P EST MOD 30 MIN: CPT | Mod: 95 | Performed by: PHYSICIAN ASSISTANT

## 2024-10-30 RX ORDER — GABAPENTIN 300 MG/1
CAPSULE ORAL
Qty: 360 CAPSULE | Refills: 2 | Status: SHIPPED | OUTPATIENT
Start: 2024-10-30

## 2024-10-30 NOTE — PROGRESS NOTES
Time of start: 7:56 AM  Time of end: 8:11 AM  Total duration of video visit: 15 minutes.  Providers location: offsite.  Patients location: [place].    [facility]  Emigdio Tucker is a 58 year old male with type 2 diabetes mellitus. Video visit today for diabetes follow up.  I last saw Devonte in November 2023.  Pt gives a hx of type 2 diabetes dx in early 2000.  He has neuropathy.  Pt denies hx of retinopathy or nephropathy.  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   He also has hx of obesity, HTN, depression and generalized anxiety disorder.  For his diabetes, he is currently taking Metformin 1000 mg BID and Ozempic 1 mg mg subcutaneous once a week.  Most recent A1C was 6.1% on 7/17/2024.    Pt provided me with blood sugar data which I scanned in his note below.  Blood sugar readings are good at this time.  On ROS today, he reports weight gain.  He has tried taking two 1 mg Ozempic injections without weight loss success.  Less appetite suppression.  He reports less neuropathy discomfort in both his hands and feet since I increased his Gabapentin dose.  Denies foot ulcers.  Pt denies headaches,blurred vision, n/v, SOB at rest, cough, fever or chills.  No chest pain, abd pain, diarrhea, dysuria or hematuria.    Diabetes Care  Retinopathy: none; pt seen by Oph in May 2023.  Reminded patient to see ophthalmology annually for diabetic eye exam.  Nephropathy:none; urine microalbuminuria negative in July 2024.  He is taking Lisinopril.  Neuropathy:yes.  Foot Exam: no exam today.  Taking aspirin: yes.  Lipids: LDL 62 in April 2024.  Taking Lipitor.  CAD: no.  Mental health: hx of depression and generalized anxiety.  Insulin: none.    DM meds: Metformin and Ozempic.  MTM referral placed today to start/titrate Mounjaro which will replace Ozempic.  Testing: glucose  meter.      ROS  See under HPI.    Allergies  Allergies   Allergen Reactions    Seasonal Allergies Other (See Comments) and Shortness Of Breath       Medications  Current Outpatient Medications   Medication Sig Dispense Refill    acetaminophen (TYLENOL) 325 MG tablet Take 2 tablets (650 mg) by mouth every 4 hours as needed for mild pain 100 tablet 0    aspirin (ASA) 81 MG EC tablet Take 1 tablet (81 mg) by mouth daily 100 tablet 3    atorvastatin (LIPITOR) 20 MG tablet TAKE 1 TABLET BY MOUTH EVERY MORNING 90 tablet 1    blood glucose (NO BRAND SPECIFIED) lancets standard Use to test blood sugar  4 times daily or as directed. ( [place] micolet lancet) 400 each 0    blood glucose (NO BRAND SPECIFIED) test strip Use to test blood sugar 3 times daily or as directed. 300 strip 3    blood glucose monitoring (ISRAEL MICROLET) lancets Use to test blood sugar 4 times daily or as directed. 200 each 3    cevimeline (EVOXAC) 30 MG capsule TAKE 1 CAPSULE BY MOUTH THREE TIMES DAILY 270 capsule 0    cyclobenzaprine (FLEXERIL) 5 MG tablet Take 1 tablet (5 mg) by mouth 3 times daily as needed for muscle spasms. 90 tablet 3    cyclobenzaprine (FLEXERIL) 5 MG tablet Take 1 tablet (5 mg) by mouth 2 times daily as needed for muscle spasms 60 tablet 3    cyclobenzaprine (FLEXERIL) 5 MG tablet Take 1 tablet (5 mg) by mouth 3 times daily as needed for muscle spasms 90 tablet 1    doxazosin (CARDURA) 1 MG tablet Take 1 tablet (1 mg) by mouth daily 30 tablet 0    gabapentin (NEURONTIN) 300 MG capsule TAKE 1 CAPSULE BY MOUTH IN THE MORNING, 1 CAPSULE IN AFTERNOON AND 2 CAPSULES AT BEDTIME. 360 capsule 2    lisinopril (ZESTRIL) 10 MG tablet Take 1 tablet (10 mg) by mouth every morning 30 tablet 3    metFORMIN (GLUCOPHAGE) 1000 MG tablet TAKE ONE TABLET BY MOUTH TWICE DAILY 180 tablet 3    Misc Natural Product Nasal (PONARIS) SOLN With head tilted back, place 1 or 2 drops in each nostril once daily. 30 mL 3    multivitamin w/minerals (THERA-VIT-M)  tablet Take 1 tablet by mouth daily 30 tablet 0    omeprazole (PRILOSEC) 20 MG DR capsule Take 1 capsule (20 mg) by mouth daily 30 capsule 3    Semaglutide, 1 MG/DOSE, (OZEMPIC, 1 MG/DOSE,) 4 MG/3ML pen Inject 1 mg Subcutaneous every 7 days 3 mL 11    sodium fluoride dental gel (DENTAGEL) 1.1 % GEL topical gel Apply to affected area At Bedtime 112 g 11       Family History  family history includes Cancer in his brother, mother, and sister; Cardiovascular in his brother; Cerebrovascular Disease in his brother; Depression in his mother, sister, and sister; Diabetes in his brother, mother, and sister; Heart Disease in his brother and mother; Hypertension in his brother, mother, sister, and sister; Kidney Cancer in his sister; Substance Abuse in his brother, father, and sister.    Social History   reports that he has never smoked. He has never used smokeless tobacco. He reports that he does not drink alcohol and does not use drugs.     Past Medical History  Past Medical History:   Diagnosis Date    Allergic rhinitis experienced it for many years    Anxiety 2019    Benign positional vertigo 05/2019    Depressive disorder 2019    Diabetes (H)     Gastroesophageal reflux disease 05/2021    Head injury 04/2018    Hypertension     Maxillary sinus cancer (H)     Obesity     Sleep apnea May 2022       Past Surgical History:   Procedure Laterality Date    ------------OTHER-------------      Mucosa and bone biopsy    COLONOSCOPY N/A 5/24/2021    Procedure: COLONOSCOPY, WITH POLYPECTOMY;  Surgeon: Terrell De Luna MD;  Location: [facility] OR    EXPLORE NECK Left 5/9/2019    Procedure: Left Neck Exploration;  Surgeon: Jett Treviño MD;  Location: [facility] OR    EXTRACTION(S) DENTAL      x2 under general anesthesia    GRAFT FREE VASCULARIZED (LOCATION) Right 5/9/2019    Procedure: Left Radial Forearm Free Flap (soft tissue);  Surgeon: Sumit Atwood MD;  Location: [facility] OR    GRAFT SKIN SPLIT THICKNESS FROM EXTREMITY Right 5/9/2019     Procedure: Graft skin split thickness from right thigh, nasogastric feeding tube placement;  Surgeon: Sumit Atwood MD;  Location: [facility] OR    IR LYMPH NODE BIOPSY  4/4/2019    OSTEOTOMY MAXILLA Left 5/9/2019    Procedure: Left Infrastructure Maxillectomy,;  Surgeon: Jett Treviño MD;  Location: [facility] OR       Physical Exam    No exam today.    RESULTS  Creatinine   Date Value Ref Range Status   07/17/2024 0.84 0.67 - 1.17 mg/dL Final   07/01/2019 0.68 0.66 - 1.25 mg/dL Final     Creatinine POCT   Date Value Ref Range Status   07/17/2024 0.9 0.7 - 1.3 mg/dL Final     GFR Estimate   Date Value Ref Range Status   07/17/2024 >90 >60 mL/min/1.73m2 Final     Comment:     eGFR calculated using 2021 CKD-EPI equation.   03/25/2021 >90 >60 mL/min/[1.73_m2] Final     GFR, ESTIMATED POCT   Date Value Ref Range Status   07/17/2024 >60 >60 mL/min/1.73m2 Final     Hemoglobin A1C   Date Value Ref Range Status   07/17/2024 6.1 (H) <5.7 % Final     Comment:     Normal <5.7%   Prediabetes 5.7-6.4%    Diabetes 6.5% or higher     Note: Adopted from ADA consensus guidelines.   03/25/2021 5.6 0 - 5.6 % Final     Comment:     Normal <5.7% Prediabetes 5.7-6.4%  Diabetes 6.5% or higher - adopted from ADA   consensus guidelines.       Potassium   Date Value Ref Range Status   07/17/2024 4.5 3.4 - 5.3 mmol/L Final   04/06/2022 4.7 3.5 - 5.0 mmol/L Final   07/01/2019 4.1 3.4 - 5.3 mmol/L Final     ALT   Date Value Ref Range Status   11/03/2021 30 0 - 70 U/L Final     AST   Date Value Ref Range Status   11/03/2021 16 0 - 45 U/L Final     TSH   Date Value Ref Range Status   11/17/2023 5.02 (H) 0.30 - 4.20 uIU/mL Final   04/06/2022 4.01 0.30 - 5.00 uIU/mL Final   11/03/2021 3.01 0.40 - 4.00 mU/L Final   05/20/2021 2.38 0.40 - 4.00 mU/L Final       Cholesterol   Date Value Ref Range Status   04/10/2024 124 <200 mg/dL Final   04/11/2023 111 <200 mg/dL Final   07/01/2019 105 <200 mg/dL Final     HDL Cholesterol   Date Value Ref Range Status    07/01/2019 38 (L) >39 mg/dL Final     Direct Measure HDL   Date Value Ref Range Status   04/10/2024 48 >=40 mg/dL Final   04/11/2023 39 (L) >=40 mg/dL Final     LDL Cholesterol Calculated   Date Value Ref Range Status   04/10/2024 62 <=100 mg/dL Final   04/11/2023 54 <=100 mg/dL Final   07/01/2019 41 <100 mg/dL Final     Comment:     Desirable:       <100 mg/dl     Triglycerides   Date Value Ref Range Status   04/10/2024 69 <150 mg/dL Final   04/11/2023 91 <150 mg/dL Final   07/01/2019 128 <150 mg/dL Final         ASSESSMENT/PLAN:    1.  TYPE 2 DIABETES MELLITUS:  Pt's blood sugar values are good at this time.  Patient reports weight gain and less appetite suppression.  Discussed adding Mounjaro with patient today which will replace Ozempic.  Patient may have better appetite suppression and weight loss with Mounjaro.  MTM referral placed today to start/titrate Mounjaro which will replace Ozempic.  I reviewed how Mounjaro works and possible side effects of the drug including nausea, vomiting, GI distress, constipation, hypoglycemia and rare risk of pancreatitis.  Devonte denies any history of pancreatitis, gastroparesis or thyroid cancer.  Continue  Metformin 1000 mg BID.  Most recent creatinine 0.84 with GFR > 90 mL/min in July 2024.  Pt denies hx of retinopathy and was seen by Oph in May 2023.  Reminded patient to see ophthalmology annually for diabetic eye exam.  His urine microalbuminuria was negative in July 2024.  He is taking Lisinopril.  No vitals today.     2.  NEUROPATHY: Pt reports less pain and numbness in feet since increased is Gabapentin dose.   He denies foot ulcers.    3. WEIGHT: Start/titrate Mounjaro which will replace Ozempic.  Encourage patient to make healthy food choices and to remain active.    4. MENTAL HEALTH: Stable at this time per patient.    5.  HEALTH MAINTENANCE: Reminded pt to see his PCP for his annual exam and health maintenance.    6. LIPIDS: LDL 62 in April 2024.  Patient taking  Lipitor.    7.  FOLLOW UP: with me in February or March 2025.  MTM referral placed today to start/titrate Mounjaro which will replace Ozempic.  A1C and TSH ordered today.  Gabapentin refilled today.    Time spent reviewing chart and labs today =5  minutes.  Time for video visit today= 15 minutes.  Time for documentation today = 10 minutes.    TOTAL TIME FOR VISIT TODAY = 30 minutes.    Elsa Perez PA-C

## 2024-10-30 NOTE — LETTER
10/30/2024       RE: Devonte Tucker  [address]  [place] [address]     Dear Colleague,    Thank you for referring your patient, Devonte Tucker, to the [facility] [place] at [facility]. Please see a copy of my visit note below.    Outcome for 10/10/24 2:34 PM: [employer] message sent  Zulma Torres MA  Outcome for 10/28/24 9:45 AM: Left Voicemail   Zulma Torres MA  Outcome for 10/29/24 7:14 AM: Glucose readings sent via [employer]  Bobbilynn Grossaint, VF    Patient is showing 4/5 [place] met. BP out range - BP not file.   Zulma Torres MA            Time of start: 7:56 AM  Time of end: 8:11 AM  Total duration of video visit: 15 minutes.  Providers location: offsite.  Patients location: [place].    [facility]  Emigdio Tucker is a 58 year old male with type 2 diabetes mellitus. Video visit today for diabetes follow up.  I last saw Devonte in November 2023.  Pt gives a hx of type 2 diabetes dx in early 2000.  He has neuropathy.  Pt denies hx of retinopathy or nephropathy.  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   He also has hx of obesity, HTN, depression and generalized anxiety disorder.  For his diabetes, he is currently taking Metformin 1000 mg BID and Ozempic 1 mg mg subcutaneous once a week.  Most recent A1C was 6.1% on 7/17/2024.    Pt provided me with blood sugar data which I scanned in his note below.  Blood sugar readings are good at this time.  On ROS today, he reports weight gain.  He has tried taking two 1 mg Ozempic injections without weight loss success.  Less appetite suppression.  He reports less neuropathy discomfort in both his hands and feet since I increased his Gabapentin dose.  Denies foot ulcers.  Pt denies headaches,blurred vision, n/v,  SOB at rest, cough, fever or chills.  No chest pain, abd pain, diarrhea, dysuria or hematuria.    Diabetes Care  Retinopathy: none; pt seen by Oph in May 2023.  Reminded patient to see ophthalmology annually for diabetic eye exam.  Nephropathy:none; urine microalbuminuria negative in July 2024.  He is taking Lisinopril.  Neuropathy:yes.  Foot Exam: no exam today.  Taking aspirin: yes.  Lipids: LDL 62 in April 2024.  Taking Lipitor.  CAD: no.  Mental health: hx of depression and generalized anxiety.  Insulin: none.    DM meds: Metformin and Ozempic.  MTM referral placed today to start/titrate Mounjaro which will replace Ozempic.  Testing: glucose meter.      ROS  See under HPI.    Allergies  Allergies   Allergen Reactions     Seasonal Allergies Other (See Comments) and Shortness Of Breath       Medications  Current Outpatient Medications   Medication Sig Dispense Refill     acetaminophen (TYLENOL) 325 MG tablet Take 2 tablets (650 mg) by mouth every 4 hours as needed for mild pain 100 tablet 0     aspirin (ASA) 81 MG EC tablet Take 1 tablet (81 mg) by mouth daily 100 tablet 3     atorvastatin (LIPITOR) 20 MG tablet TAKE 1 TABLET BY MOUTH EVERY MORNING 90 tablet 1     blood glucose (NO BRAND SPECIFIED) lancets standard Use to test blood sugar  4 times daily or as directed. ( [place] micolet lancet) 400 each 0     blood glucose (NO BRAND SPECIFIED) test strip Use to test blood sugar 3 times daily or as directed. 300 strip 3     blood glucose monitoring (ISRAEL MICROLET) lancets Use to test blood sugar 4 times daily or as directed. 200 each 3     cevimeline (EVOXAC) 30 MG capsule TAKE 1 CAPSULE BY MOUTH THREE TIMES DAILY 270 capsule 0     cyclobenzaprine (FLEXERIL) 5 MG tablet Take 1 tablet (5 mg) by mouth 3 times daily as needed for muscle spasms. 90 tablet 3     cyclobenzaprine (FLEXERIL) 5 MG tablet Take 1 tablet (5 mg) by mouth 2 times daily as needed for muscle spasms 60 tablet 3     cyclobenzaprine (FLEXERIL) 5 MG  tablet Take 1 tablet (5 mg) by mouth 3 times daily as needed for muscle spasms 90 tablet 1     doxazosin (CARDURA) 1 MG tablet Take 1 tablet (1 mg) by mouth daily 30 tablet 0     gabapentin (NEURONTIN) 300 MG capsule TAKE 1 CAPSULE BY MOUTH IN THE MORNING, 1 CAPSULE IN AFTERNOON AND 2 CAPSULES AT BEDTIME. 360 capsule 2     lisinopril (ZESTRIL) 10 MG tablet Take 1 tablet (10 mg) by mouth every morning 30 tablet 3     metFORMIN (GLUCOPHAGE) 1000 MG tablet TAKE ONE TABLET BY MOUTH TWICE DAILY 180 tablet 3     Misc Natural Product Nasal (PONARIS) SOLN With head tilted back, place 1 or 2 drops in each nostril once daily. 30 mL 3     multivitamin w/minerals (THERA-VIT-M) tablet Take 1 tablet by mouth daily 30 tablet 0     omeprazole (PRILOSEC) 20 MG DR capsule Take 1 capsule (20 mg) by mouth daily 30 capsule 3     Semaglutide, 1 MG/DOSE, (OZEMPIC, 1 MG/DOSE,) 4 MG/3ML pen Inject 1 mg Subcutaneous every 7 days 3 mL 11     sodium fluoride dental gel (DENTAGEL) 1.1 % GEL topical gel Apply to affected area At Bedtime 112 g 11       Family History  family history includes Cancer in his brother, mother, and sister; Cardiovascular in his brother; Cerebrovascular Disease in his brother; Depression in his mother, sister, and sister; Diabetes in his brother, mother, and sister; Heart Disease in his brother and mother; Hypertension in his brother, mother, sister, and sister; Kidney Cancer in his sister; Substance Abuse in his brother, father, and sister.    Social History   reports that he has never smoked. He has never used smokeless tobacco. He reports that he does not drink alcohol and does not use drugs.     Past Medical History  Past Medical History:   Diagnosis Date     Allergic rhinitis experienced it for many years     Anxiety 2019     Benign positional vertigo 05/2019     Depressive disorder 2019     Diabetes (H)      Gastroesophageal reflux disease 05/2021     Head injury 04/2018     Hypertension      Maxillary sinus  cancer (H)      Obesity      Sleep apnea May 2022       Past Surgical History:   Procedure Laterality Date     ------------OTHER-------------      Mucosa and bone biopsy     COLONOSCOPY N/A 5/24/2021    Procedure: COLONOSCOPY, WITH POLYPECTOMY;  Surgeon: Terrell De Luna MD;  Location: [facility] OR     EXPLORE NECK Left 5/9/2019    Procedure: Left Neck Exploration;  Surgeon: Jett Treviño MD;  Location: [facility]     EXTRACTION(S) DENTAL      x2 under general anesthesia     GRAFT FREE VASCULARIZED (LOCATION) Right 5/9/2019    Procedure: Left Radial Forearm Free Flap (soft tissue);  Surgeon: Sumit Atwood MD;  Location: [facility] OR     GRAFT SKIN SPLIT THICKNESS FROM EXTREMITY Right 5/9/2019    Procedure: Graft skin split thickness from right thigh, nasogastric feeding tube placement;  Surgeon: Sumit Atwood MD;  Location: [facility] OR     IR LYMPH NODE BIOPSY  4/4/2019     OSTEOTOMY MAXILLA Left 5/9/2019    Procedure: Left Infrastructure Maxillectomy,;  Surgeon: Jett Treviño MD;  Location: [facility] OR       Physical Exam    No exam today.    RESULTS  Creatinine   Date Value Ref Range Status   07/17/2024 0.84 0.67 - 1.17 mg/dL Final   07/01/2019 0.68 0.66 - 1.25 mg/dL Final     Creatinine POCT   Date Value Ref Range Status   07/17/2024 0.9 0.7 - 1.3 mg/dL Final     GFR Estimate   Date Value Ref Range Status   07/17/2024 >90 >60 mL/min/1.73m2 Final     Comment:     eGFR calculated using 2021 CKD-EPI equation.   03/25/2021 >90 >60 mL/min/[1.73_m2] Final     GFR, ESTIMATED POCT   Date Value Ref Range Status   07/17/2024 >60 >60 mL/min/1.73m2 Final     Hemoglobin A1C   Date Value Ref Range Status   07/17/2024 6.1 (H) <5.7 % Final     Comment:     Normal <5.7%   Prediabetes 5.7-6.4%    Diabetes 6.5% or higher     Note: Adopted from ADA consensus guidelines.   03/25/2021 5.6 0 - 5.6 % Final     Comment:     Normal <5.7% Prediabetes 5.7-6.4%  Diabetes 6.5% or higher - adopted from ADA   consensus guidelines.       Potassium   Date  Value Ref Range Status   07/17/2024 4.5 3.4 - 5.3 mmol/L Final   04/06/2022 4.7 3.5 - 5.0 mmol/L Final   07/01/2019 4.1 3.4 - 5.3 mmol/L Final     ALT   Date Value Ref Range Status   11/03/2021 30 0 - 70 U/L Final     AST   Date Value Ref Range Status   11/03/2021 16 0 - 45 U/L Final     TSH   Date Value Ref Range Status   11/17/2023 5.02 (H) 0.30 - 4.20 uIU/mL Final   04/06/2022 4.01 0.30 - 5.00 uIU/mL Final   11/03/2021 3.01 0.40 - 4.00 mU/L Final   05/20/2021 2.38 0.40 - 4.00 mU/L Final       Cholesterol   Date Value Ref Range Status   04/10/2024 124 <200 mg/dL Final   04/11/2023 111 <200 mg/dL Final   07/01/2019 105 <200 mg/dL Final     HDL Cholesterol   Date Value Ref Range Status   07/01/2019 38 (L) >39 mg/dL Final     Direct Measure HDL   Date Value Ref Range Status   04/10/2024 48 >=40 mg/dL Final   04/11/2023 39 (L) >=40 mg/dL Final     LDL Cholesterol Calculated   Date Value Ref Range Status   04/10/2024 62 <=100 mg/dL Final   04/11/2023 54 <=100 mg/dL Final   07/01/2019 41 <100 mg/dL Final     Comment:     Desirable:       <100 mg/dl     Triglycerides   Date Value Ref Range Status   04/10/2024 69 <150 mg/dL Final   04/11/2023 91 <150 mg/dL Final   07/01/2019 128 <150 mg/dL Final         ASSESSMENT/PLAN:    1.  TYPE 2 DIABETES MELLITUS:  Pt's blood sugar values are good at this time.  Patient reports weight gain and less appetite suppression.  Discussed adding Mounjaro with patient today which will replace Ozempic.  Patient may have better appetite suppression and weight loss with Mounjaro.  MTM referral placed today to start/titrate Mounjaro which will replace Ozempic.  I reviewed how Mounjaro works and possible side effects of the drug including nausea, vomiting, GI distress, constipation, hypoglycemia and rare risk of pancreatitis.  Devonte denies any history of pancreatitis, gastroparesis or thyroid cancer.  Continue  Metformin 1000 mg BID.  Most recent creatinine 0.84 with GFR > 90 mL/min in July  2024.  Pt denies hx of retinopathy and was seen by Oph in May 2023.  Reminded patient to see ophthalmology annually for diabetic eye exam.  His urine microalbuminuria was negative in July 2024.  He is taking Lisinopril.  No vitals today.     2.  NEUROPATHY: Pt reports less pain and numbness in feet since increased is Gabapentin dose.   He denies foot ulcers.    3. WEIGHT: Start/titrate Mounjaro which will replace Ozempic.  Encourage patient to make healthy food choices and to remain active.    4. MENTAL HEALTH: Stable at this time per patient.    5.  HEALTH MAINTENANCE: Reminded pt to see his PCP for his annual exam and health maintenance.    6. LIPIDS: LDL 62 in April 2024.  Patient taking Lipitor.    7.  FOLLOW UP: with me in February or March 2025.  MTM referral placed today to start/titrate Mounjaro which will replace Ozempic.  A1C and TSH ordered today.  Gabapentin refilled today.    Time spent reviewing chart and labs today =5  minutes.  Time for video visit today= 15 minutes.  Time for documentation today = 10 minutes.    TOTAL TIME FOR VISIT TODAY = 30 minutes.    Elsa Perez PA-C                Again, thank you for allowing me to participate in the care of your patient.      Sincerely,    Elsa Perez PA-C

## 2024-10-30 NOTE — NURSING NOTE
Current patient location: [place]    Is the patient currently in the state of [place]? YES    Visit mode:VIDEO    If the visit is dropped, the patient can be reconnected by: VIDEO VISIT: Text to cell phone:   Telephone Information:   Mobile [phone]       Will anyone else be joining the visit? NO  (If patient encounters technical issues they should call [phone])    Are changes needed to the allergy or medication list? No  Emigdio reported no changes to e-check in information for visit. EMERY did not review e-check in information again with Emigdio due to this.       Are refills needed on medications prescribed by this physician? Discuss with provider    Rooming Documentation:  Not applicable    Reason for visit: RECHECK    Josephine WILDER

## 2024-11-04 ENCOUNTER — TELEPHONE (OUTPATIENT)
Dept: ENDOCRINOLOGY | Facility: CLINIC | Age: 59
End: 2024-11-04
Payer: COMMERCIAL

## 2024-11-04 NOTE — TELEPHONE ENCOUNTER
MTM referral from: [place] clinic visit (referral by provider)    MTM referral outreach attempt #2 on November 4, 2024 at 10:32 AM      Outcome: Patient not reachable after several attempts, routed to Pharmacist Team/Provider as an "[employer] Message Sent    [facility]

## 2024-11-06 ENCOUNTER — OFFICE VISIT (OUTPATIENT)
Dept: OTOLARYNGOLOGY | Facility: CLINIC | Age: 59
End: 2024-11-06
Payer: COMMERCIAL

## 2024-11-06 ENCOUNTER — LAB (OUTPATIENT)
Dept: LAB | Facility: CLINIC | Age: 59
End: 2024-11-06
Payer: COMMERCIAL

## 2024-11-06 VITALS
HEART RATE: 97 BPM | TEMPERATURE: 97.1 F | SYSTOLIC BLOOD PRESSURE: 136 MMHG | WEIGHT: 273 LBS | DIASTOLIC BLOOD PRESSURE: 79 MMHG | BODY MASS INDEX: 42.76 KG/M2 | OXYGEN SATURATION: 99 %

## 2024-11-06 DIAGNOSIS — C03.0 SQUAMOUS CELL CARCINOMA OF MAXILLARY ALVEOLAR RIDGE (H): Primary | ICD-10-CM

## 2024-11-06 DIAGNOSIS — C03.0 SQUAMOUS CELL CARCINOMA OF MAXILLARY ALVEOLAR RIDGE (H): ICD-10-CM

## 2024-11-06 DIAGNOSIS — R04.0 EPISTAXIS: ICD-10-CM

## 2024-11-06 LAB
EST. AVERAGE GLUCOSE BLD GHB EST-MCNC: 128 MG/DL
HBA1C MFR BLD: 6.1 %
TSH SERPL DL<=0.005 MIU/L-ACNC: 4.57 UIU/ML (ref 0.3–4.2)

## 2024-11-06 PROCEDURE — 84443 ASSAY THYROID STIM HORMONE: CPT | Performed by: PATHOLOGY

## 2024-11-06 PROCEDURE — 36415 COLL VENOUS BLD VENIPUNCTURE: CPT | Performed by: PATHOLOGY

## 2024-11-06 PROCEDURE — 99213 OFFICE O/P EST LOW 20 MIN: CPT | Performed by: REGISTERED NURSE

## 2024-11-06 PROCEDURE — 99000 SPECIMEN HANDLING OFFICE-LAB: CPT | Performed by: PATHOLOGY

## 2024-11-06 PROCEDURE — 83036 HEMOGLOBIN GLYCOSYLATED A1C: CPT | Performed by: PHYSICIAN ASSISTANT

## 2024-11-06 RX ORDER — EUCALYPTUS/PEPPERMINT OIL
SOLUTION, NON-ORAL NASAL
Qty: 30 ML | Refills: 3 | Status: SHIPPED | OUTPATIENT
Start: 2024-11-06

## 2024-11-06 ASSESSMENT — PAIN SCALES - GENERAL: PAINLEVEL_OUTOF10: NO PAIN (0)

## 2024-11-06 NOTE — NURSING NOTE
Chief Complaint   Patient presents with    RECHECK     Follow up after CT     Blood pressure 136/79, pulse 97, temperature 97.1  F (36.2  C), weight 123.8 kg (273 lb), SpO2 99%.  Andres Purcell LPN

## 2024-11-06 NOTE — LETTER
11/6/2024       RE: Devonte Tucker  [address]  [place] [address]     Dear Colleague,    Thank you for referring your patient, Devonte Tucker, to the [facility] [place] [facility]. Please see a copy of my visit note below.    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  Dr. Treviño performed a left palatectomy on 5/9/2019 with Dr. Atwood performing a left radial forearm free flap reconstruction.  He had postop radiation therapy completed on 7/22/2019, 6000 cGy.  Five-year surveillance CT scans completed in July 2024 were negative for any recurrence or metastases.     Interval History:   Patient comes in today for routine surveillance. States that he is doing well. Patient is eating well. Patient denies any odynophagia, new sore throat, ear pain, bleeding from the mouth, hemoptysis, voice changes or lumps/bumps of the neck. Continues to have some soreness around the right central incisor which is stable. No bleeding or ulcerations. Patient sees Dr. Hurtado in PM&R for neck tightness and spasms. Uses Ponaris daily for nasal congestion and moisturization.     Past Medical History:  Past Medical History:   Diagnosis Date     Allergic rhinitis experienced it for many years     Anxiety 2019     Benign positional vertigo 05/2019     Depressive disorder 2019     Diabetes (H)      Gastroesophageal reflux disease 05/2021     Head injury 04/2018     Hypertension      Maxillary sinus cancer (H)      Obesity      Sleep apnea May 2022       Past Surgical History:  Past Surgical History:   Procedure Laterality Date     ------------OTHER-------------      Mucosa and bone biopsy     COLONOSCOPY N/A 5/24/2021    Procedure: COLONOSCOPY, WITH POLYPECTOMY;   Surgeon: Terrell De Luna MD;  Location: [facility] OR     EXPLORE NECK Left 5/9/2019    Procedure: Left Neck Exploration;  Surgeon: Jett Treviño MD;  Location: [facility] OR     EXTRACTION(S) DENTAL      x2 under general anesthesia     GRAFT FREE VASCULARIZED (LOCATION) Right 5/9/2019    Procedure: Left Radial Forearm Free Flap (soft tissue);  Surgeon: Sumit Atwood MD;  Location: [facility] OR     GRAFT SKIN SPLIT THICKNESS FROM EXTREMITY Right 5/9/2019    Procedure: Graft skin split thickness from right thigh, nasogastric feeding tube placement;  Surgeon: Sumit Atwood MD;  Location: [facility] OR     IR LYMPH NODE BIOPSY  4/4/2019     OSTEOTOMY MAXILLA Left 5/9/2019    Procedure: Left Infrastructure Maxillectomy,;  Surgeon: Jett Treviño MD;  Location: [facility] OR       Medications:  Current Outpatient Medications   Medication Sig Dispense Refill     acetaminophen (TYLENOL) 325 MG tablet Take 2 tablets (650 mg) by mouth every 4 hours as needed for mild pain 100 tablet 0     aspirin (ASA) 81 MG EC tablet Take 1 tablet (81 mg) by mouth daily 100 tablet 3     atorvastatin (LIPITOR) 20 MG tablet TAKE 1 TABLET BY MOUTH EVERY MORNING 90 tablet 1     blood glucose (NO BRAND SPECIFIED) lancets standard Use to test blood sugar  4 times daily or as directed. ( Andrew micolet lancet) 400 each 0     blood glucose (NO BRAND SPECIFIED) test strip Use to test blood sugar 3 times daily or as directed. 300 strip 3     blood glucose monitoring (ANRDEW MICROLET) lancets Use to test blood sugar 4 times daily or as directed. 200 each 3     cevimeline (EVOXAC) 30 MG capsule TAKE 1 CAPSULE BY MOUTH THREE TIMES DAILY 270 capsule 0     cyclobenzaprine (FLEXERIL) 5 MG tablet Take 1 tablet (5 mg) by mouth 3 times daily as needed for muscle spasms. 90 tablet 3     cyclobenzaprine (FLEXERIL) 5 MG tablet Take 1 tablet (5 mg) by mouth 2 times daily as needed for muscle spasms 60 tablet 3     cyclobenzaprine (FLEXERIL) 5 MG tablet Take 1 tablet (5 mg) by mouth  3 times daily as needed for muscle spasms 90 tablet 1     doxazosin (CARDURA) 1 MG tablet Take 1 tablet (1 mg) by mouth daily 30 tablet 0     gabapentin (NEURONTIN) 300 MG capsule TAKE 1 CAPSULE BY MOUTH IN THE MORNING, 1 CAPSULE IN AFTERNOON AND 2 CAPSULES AT BEDTIME. 360 capsule 2     lisinopril (ZESTRIL) 10 MG tablet Take 1 tablet (10 mg) by mouth every morning 30 tablet 3     metFORMIN (GLUCOPHAGE) 1000 MG tablet TAKE ONE TABLET BY MOUTH TWICE DAILY 180 tablet 3     Misc Natural Product Nasal (PONARIS) SOLN With head tilted back, place 1 or 2 drops in each nostril once daily. 30 mL 3     multivitamin w/minerals (THERA-VIT-M) tablet Take 1 tablet by mouth daily 30 tablet 0     omeprazole (PRILOSEC) 20 MG DR capsule Take 1 capsule (20 mg) by mouth daily 30 capsule 3     Semaglutide, 1 MG/DOSE, (OZEMPIC, 1 MG/DOSE,) 4 MG/3ML pen Inject 1 mg Subcutaneous every 7 days 3 mL 11     sodium fluoride dental gel (DENTAGEL) 1.1 % GEL topical gel Apply to affected area At Bedtime 112 g 11       Allergies:  Allergies   Allergen Reactions     Seasonal Allergies Other (See Comments) and Shortness Of Breath        Social History:  Social History     Tobacco Use     Smoking status: Never     Smokeless tobacco: Never   Substance Use Topics     Alcohol use: No     Drug use: No     ROS: 10 point ROS neg other than the symptoms noted above in the HPI.    Physical Exam:    /79   Pulse 97   Temp 97.1  F (36.2  C)   Wt 123.8 kg (273 lb)   SpO2 99%   BMI 42.76 kg/m    Wt Readings from Last 3 Encounters:   11/06/24 123.8 kg (273 lb)   10/18/24 127 kg (280 lb)   05/02/24 122.5 kg (270 lb)        Constitutional:  The patient was unaccompanied, well-groomed, and in no acute distress.     Skin: Normal:  warm and pink without rash    Neurologic: Alert and oriented x 3.  CN's III-XII within normal limits.  Voice normal.    Psychiatric: The patient's affect was calm, cooperative, and appropriate.     Communication:  Normal;  communicates verbally, normal voice quality.    Respiratory: Breathing comfortably without stridor or exertion of accessory muscles.    Head/Face:  Normocephalic and atraumatic.  No lesions or scars.    Ears: Pinnae and tragus non-tender.  EAC's and TM's were clear.     Oral Cavity: Healthy appearing flap in left maxilla. Stable 1 mm fistula at anterior edge of flap without drainage or tunneling. Normal tongue, floor of mouth, and  buccal mucosa.  No lesions or masses on inspection or palpation.     Oropharynx: Normal mucosa, palate symmetric with normal elevation. No abnormal lymph tissue in the oropharynx.    Neck: Well healed left neck incision. Tightness in the left SCM without any palpable fibrosis. Normal range of motion    Lymphatic: There is no palpable lymphadenopathy in the neck.       Labs and Imaging Reviewed:  Imagin/17/24    CT neck  IMPRESSION:  Primary: NI-RADS 1} Expected post-treatment changes in the neck  without evidence of recurrent disease at the primary site.  Neck: NI-RADS 1}  No abnormal lymph node.    CT chest  IMPRESSION:   1. No evidence of metastatic disease to the chest.  2. Dilated main pulmonary artery measuring up to 3.4 cm, as can be  seen with pulmonary arterial hypertension.  3. Unchanged subcapsular fluid collection of the spleen.  4. Hepatic steatosis.  5. Cholelithiasis/sludge without findings to suggest acute  Cholecystitis    Labs:  TSH   Date Value Ref Range Status   2023 5.02 (H) 0.30 - 4.20 uIU/mL Final   2022 4.01 0.30 - 5.00 uIU/mL Final   2021 3.01 0.40 - 4.00 mU/L Final   2021 2.38 0.40 - 4.00 mU/L Final       Assessment/Plan:  1. 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 (H) (Primary)  Patient is over 5 years out from surgical resection followed by radiation for SCCa of the left maxilla. He is doing well with no evidence of disease on today's exam.  Recent surveillance imaging was negative for any recurrence/metastases.   Congratulated patient on now being 5 years out from treatment.  Patient is considered a cure and does not require any further follow-up or surveillance imaging.  Patient should continue to follow with Dr. Hurtado for management of neck stiffness and range of motion.  Patient will continue to follow with endocrinology which includes thyroid function monitoring.  Will order carotid ultrasound to evaluate for any stenosis due to radiation.    Discussed with patient that they can continue to follow annually in clinic or follow-up as needed. Reviewed signs and symptoms that would necessitate a sooner exam including new sore throat, mouth pain, ear pain, dysphagia, bleeding from the mouth or lumps/bumps of the neck.    2. Epistaxis  History of epistaxis that is well-controlled with Ponaris.  Will refill this prescription today and will continue to refill this prescription as needed.    Marie Coburn DNP, APRN, CNP  Otolaryngology  Head & Neck Surgery  [phone]    20 minutes spent on the date of the encounter doing chart review, history and exam, documentation and further activities per the note.      Again, thank you for allowing me to participate in the care of your patient.      Sincerely,    Marietta Coburn, NP

## 2024-11-06 NOTE — PROGRESS NOTES
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  Dr. Treviño performed a left palatectomy on 5/9/2019 with Dr. Atwood performing a left radial forearm free flap reconstruction.  He had postop radiation therapy completed on 7/22/2019, 6000 cGy.  Five-year surveillance CT scans completed in July 2024 were negative for any recurrence or metastases.     Interval History:   Patient comes in today for routine surveillance. States that he is doing well. Patient is eating well. Patient denies any odynophagia, new sore throat, ear pain, bleeding from the mouth, hemoptysis, voice changes or lumps/bumps of the neck. Continues to have some soreness around the right central incisor which is stable. No bleeding or ulcerations. Patient sees Dr. Hurtado in PM&R for neck tightness and spasms. Uses Ponaris daily for nasal congestion and moisturization.     Past Medical History:  Past Medical History:   Diagnosis Date    Allergic rhinitis experienced it for many years    Anxiety 2019    Benign positional vertigo 05/2019    Depressive disorder 2019    Diabetes (H)     Gastroesophageal reflux disease 05/2021    Head injury 04/2018    Hypertension     Maxillary sinus cancer (H)     Obesity     Sleep apnea May 2022       Past Surgical History:  Past Surgical History:   Procedure Laterality Date    ------------OTHER-------------      Mucosa and bone biopsy    COLONOSCOPY N/A 5/24/2021    Procedure: COLONOSCOPY, WITH POLYPECTOMY;  Surgeon: Terrell De Luna MD;  Location: [facility] OR    EXPLORE NECK Left 5/9/2019    Procedure: Left Neck Exploration;  Surgeon: Jett Treviño MD;  Location: [facility] OR    EXTRACTION(S) DENTAL      x2 under general anesthesia    GRAFT FREE VASCULARIZED (LOCATION) Right 5/9/2019    Procedure: Left Radial Forearm Free Flap (soft tissue);  Surgeon:  Sumit Atwood MD;  Location: [facility] OR    GRAFT SKIN SPLIT THICKNESS FROM EXTREMITY Right 5/9/2019    Procedure: Graft skin split thickness from right thigh, nasogastric feeding tube placement;  Surgeon: Sumit Atwood MD;  Location: [facility] OR    IR LYMPH NODE BIOPSY  4/4/2019    OSTEOTOMY MAXILLA Left 5/9/2019    Procedure: Left Infrastructure Maxillectomy,;  Surgeon: Jett Treviño MD;  Location: [facility] OR       Medications:  Current Outpatient Medications   Medication Sig Dispense Refill    acetaminophen (TYLENOL) 325 MG tablet Take 2 tablets (650 mg) by mouth every 4 hours as needed for mild pain 100 tablet 0    aspirin (ASA) 81 MG EC tablet Take 1 tablet (81 mg) by mouth daily 100 tablet 3    atorvastatin (LIPITOR) 20 MG tablet TAKE 1 TABLET BY MOUTH EVERY MORNING 90 tablet 1    blood glucose (NO BRAND SPECIFIED) lancets standard Use to test blood sugar  4 times daily or as directed. ( [place] micolet lancet) 400 each 0    blood glucose (NO BRAND SPECIFIED) test strip Use to test blood sugar 3 times daily or as directed. 300 strip 3    blood glucose monitoring (ISRAEL MICROLET) lancets Use to test blood sugar 4 times daily or as directed. 200 each 3    cevimeline (EVOXAC) 30 MG capsule TAKE 1 CAPSULE BY MOUTH THREE TIMES DAILY 270 capsule 0    cyclobenzaprine (FLEXERIL) 5 MG tablet Take 1 tablet (5 mg) by mouth 3 times daily as needed for muscle spasms. 90 tablet 3    cyclobenzaprine (FLEXERIL) 5 MG tablet Take 1 tablet (5 mg) by mouth 2 times daily as needed for muscle spasms 60 tablet 3    cyclobenzaprine (FLEXERIL) 5 MG tablet Take 1 tablet (5 mg) by mouth 3 times daily as needed for muscle spasms 90 tablet 1    doxazosin (CARDURA) 1 MG tablet Take 1 tablet (1 mg) by mouth daily 30 tablet 0    gabapentin (NEURONTIN) 300 MG capsule TAKE 1 CAPSULE BY MOUTH IN THE MORNING, 1 CAPSULE IN AFTERNOON AND 2 CAPSULES AT BEDTIME. 360 capsule 2    lisinopril (ZESTRIL) 10 MG tablet Take 1 tablet (10 mg) by mouth every morning  30 tablet 3    metFORMIN (GLUCOPHAGE) 1000 MG tablet TAKE ONE TABLET BY MOUTH TWICE DAILY 180 tablet 3    Misc Natural Product Nasal (PONARIS) SOLN With head tilted back, place 1 or 2 drops in each nostril once daily. 30 mL 3    multivitamin w/minerals (THERA-VIT-M) tablet Take 1 tablet by mouth daily 30 tablet 0    omeprazole (PRILOSEC) 20 MG DR capsule Take 1 capsule (20 mg) by mouth daily 30 capsule 3    Semaglutide, 1 MG/DOSE, (OZEMPIC, 1 MG/DOSE,) 4 MG/3ML pen Inject 1 mg Subcutaneous every 7 days 3 mL 11    sodium fluoride dental gel (DENTAGEL) 1.1 % GEL topical gel Apply to affected area At Bedtime 112 g 11       Allergies:  Allergies   Allergen Reactions    Seasonal Allergies Other (See Comments) and Shortness Of Breath        Social History:  Social History     Tobacco Use    Smoking status: Never    Smokeless tobacco: Never   Substance Use Topics    Alcohol use: No    Drug use: No     ROS: 10 point ROS neg other than the symptoms noted above in the HPI.    Physical Exam:    /79   Pulse 97   Temp 97.1  F (36.2  C)   Wt 123.8 kg (273 lb)   SpO2 99%   BMI 42.76 kg/m    Wt Readings from Last 3 Encounters:   11/06/24 123.8 kg (273 lb)   10/18/24 127 kg (280 lb)   05/02/24 122.5 kg (270 lb)        Constitutional:  The patient was unaccompanied, well-groomed, and in no acute distress.     Skin: Normal:  warm and pink without rash    Neurologic: Alert and oriented x 3.  CN's III-XII within normal limits.  Voice normal.    Psychiatric: The patient's affect was calm, cooperative, and appropriate.     Communication:  Normal; communicates verbally, normal voice quality.    Respiratory: Breathing comfortably without stridor or exertion of accessory muscles.    Head/Face:  Normocephalic and atraumatic.  No lesions or scars.    Ears: Pinnae and tragus non-tender.  EAC's and TM's were clear.     Oral Cavity: Healthy appearing flap in left maxilla. Stable 1 mm fistula at anterior edge of flap without drainage or  tunneling. Normal tongue, floor of mouth, and  buccal mucosa.  No lesions or masses on inspection or palpation.     Oropharynx: Normal mucosa, palate symmetric with normal elevation. No abnormal lymph tissue in the oropharynx.    Neck: Well healed left neck incision. Tightness in the left SCM without any palpable fibrosis. Normal range of motion    Lymphatic: There is no palpable lymphadenopathy in the neck.       Labs and Imaging Reviewed:  Imagin/17/24    CT neck  IMPRESSION:  Primary: NI-RADS 1} Expected post-treatment changes in the neck  without evidence of recurrent disease at the primary site.  Neck: NI-RADS 1}  No abnormal lymph node.    CT chest  IMPRESSION:   1. No evidence of metastatic disease to the chest.  2. Dilated main pulmonary artery measuring up to 3.4 cm, as can be  seen with pulmonary arterial hypertension.  3. Unchanged subcapsular fluid collection of the spleen.  4. Hepatic steatosis.  5. Cholelithiasis/sludge without findings to suggest acute  Cholecystitis    Labs:  TSH   Date Value Ref Range Status   2023 5.02 (H) 0.30 - 4.20 uIU/mL Final   2022 4.01 0.30 - 5.00 uIU/mL Final   2021 3.01 0.40 - 4.00 mU/L Final   2021 2.38 0.40 - 4.00 mU/L Final       Assessment/Plan:  1. 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 (H) (Primary)  Patient is over 5 years out from surgical resection followed by radiation for SCCa of the left maxilla. He is doing well with no evidence of disease on today's exam.  Recent surveillance imaging was negative for any recurrence/metastases.  Congratulated patient on now being 5 years out from treatment.  Patient is considered a cure and does not require any further follow-up or surveillance imaging.  Patient should continue to follow with Dr. Hurtado for management of neck stiffness and range of motion.  Patient will continue to follow with endocrinology which includes thyroid function monitoring.  Will order carotid ultrasound to  evaluate for any stenosis due to radiation.    Discussed with patient that they can continue to follow annually in clinic or follow-up as needed. Reviewed signs and symptoms that would necessitate a sooner exam including new sore throat, mouth pain, ear pain, dysphagia, bleeding from the mouth or lumps/bumps of the neck.    2. Epistaxis  History of epistaxis that is well-controlled with Ponaris.  Will refill this prescription today and will continue to refill this prescription as needed.    Marie Coburn DNP, APRN, CNP  Otolaryngology  Head & Neck Surgery  [phone]    20 minutes spent on the date of the encounter doing chart review, history and exam, documentation and further activities per the note.

## 2024-11-11 ENCOUNTER — ANCILLARY PROCEDURE (OUTPATIENT)
Dept: ULTRASOUND IMAGING | Facility: CLINIC | Age: 59
End: 2024-11-11
Attending: REGISTERED NURSE
Payer: COMMERCIAL

## 2024-11-11 DIAGNOSIS — C03.0 SQUAMOUS CELL CARCINOMA OF MAXILLARY ALVEOLAR RIDGE (H): ICD-10-CM

## 2024-11-11 PROCEDURE — 93880 EXTRACRANIAL BILAT STUDY: CPT | Mod: GC | Performed by: STUDENT IN AN ORGANIZED HEALTH CARE EDUCATION/TRAINING PROGRAM

## 2024-11-23 ENCOUNTER — TELEPHONE (OUTPATIENT)
Dept: ENDOCRINOLOGY | Facility: CLINIC | Age: 59
End: 2024-11-23
Payer: COMMERCIAL

## 2024-11-23 NOTE — TELEPHONE ENCOUNTER
Left Voicemail (1st Attempt) and Sent Mychart (1st Attempt) for the patient to call back and schedule the following:    Appointment type: RETURN DIABETES  Provider: Elsa Perez PA-C  Return date: Feb or March 2025  Specialty phone number: [phone]  Additional appointment(s) needed: LABS  Additonal Notes:

## 2024-11-26 NOTE — TELEPHONE ENCOUNTER
Left Voicemail (2nd  Attempt) for the patient to call back and schedule the following:     Appointment type: RETURN DIABETES  Provider: Elsa Perez PA-C  Return date: Feb or March 2025  Specialty phone number: [phone]  Additional appointment(s) needed: LABS  Additonal Notes:

## 2025-02-15 ENCOUNTER — HEALTH MAINTENANCE LETTER (OUTPATIENT)
Age: 60
End: 2025-02-15

## 2025-02-17 ENCOUNTER — VIRTUAL VISIT (OUTPATIENT)
Dept: PHARMACY | Facility: CLINIC | Age: 60
End: 2025-02-17
Attending: PHYSICIAN ASSISTANT
Payer: COMMERCIAL

## 2025-02-17 DIAGNOSIS — E11.69 TYPE 2 DIABETES MELLITUS WITH OTHER SPECIFIED COMPLICATION, WITHOUT LONG-TERM CURRENT USE OF INSULIN (H): Primary | ICD-10-CM

## 2025-02-17 RX ORDER — TIRZEPATIDE 7.5 MG/.5ML
7.5 INJECTION, SOLUTION SUBCUTANEOUS
Qty: 2 ML | Refills: 1 | Status: SHIPPED | OUTPATIENT
Start: 2025-02-17

## 2025-02-17 NOTE — PROGRESS NOTES
Medication Therapy Management (MTM) Encounter    ASSESSMENT:                            Medication Adherence/Access: No issues identified.    Diabetes: Patient has tolerated transition to Mounjaro thus recommended dose increase to help with additional benefits of overall blood sugar control in addition to weight loss.     Due to time constraints, I was only able to assess the above with the patient today. Will follow-up on other disease states in future encounters.    PLAN:                            Increase Mounajro to 7.5mg weekly starting 3/2    Follow-up: 4/14/25 at 8:30 AM    Endocrine Team & Next Follow-Up:  3/26/25 with Elsa Perez PA-C  4/14/25 with Tressa    SUBJECTIVE/OBJECTIVE:                          Devonte Tucker is a 59 year old male called for a follow-up visit.       Reason for visit: Mounjaro follow up.    Allergies/ADRs: Reviewed in chart  Past Medical History: Reviewed in chart  Tobacco: He reports that he has never smoked. He has never used smokeless tobacco.  Alcohol: Reviewed in chart     Medication Adherence/Access: No issues reported. Patient notes that Mounjaro was covered and affordable.     Diabetes   Patient diagnosed with type 2 diabetes   Current Medications:  Mounjaro 5mg weekly on Sundays (changed from Ozempic at last MTM visit) - patient notes that they have completed 3 doses thus far as of yesterday. Patient notes that since changing from Ozempic to Mounjaro, they are no longer having any problems with constipation and notes that they are able to have a bowel movement daily with no use of laxatives. Notes that they have been having a reduced appetite with Mounjaro which has been helpful for weight loss.   Metformin 1,000mg twice daily   Blood sugar monitoring: 3 time(s) daily; Ranges: (patient reported):   Patient does not have meter with him today during the visit thus could not report specific readings however patient notes that since starting Mounjaro he has noticed having  lower blood sugar readings compared to Ozempic.   AM: 103-115  During the day:    Evenin-138     Today's Vitals: There were no vitals taken for this visit.  ----------------    I spent 11 minutes with this patient today. All changes were made via collaborative practice agreement with Elsa Perez.     A summary of these recommendations was sent via TweetPhoto.    Tressa [facility]), PharmD  Endocrine & Diabetes Medication Therapy Management Pharmacist   [address] [place] [id number]     Contact information:   To schedule a MTM appointment: [phone]  For questions or concerns, please send a TweetPhoto message or call the clinic at [phone].    For more urgent concerns that do not require 911, please call [phone] after hours/weekends and ask to speak with the Endocrinologist on call.      Telemedicine Visit Details  The patient's medications can be safely assessed via a telemedicine encounter.  Type of service:  Telephone visit  Originating Location (pt. Location): Home    Distant Location (provider location):  On-site  Start Time:  10:05 AM  End Time: 10:16 AM     Medication Therapy Recommendations  Diabetes mellitus, type 2 (H)   1 Current Medication: MOUNJARO 5 MG/0.5ML SOAJ (Discontinued)   Current Medication Sig: Inject 0.5 mLs (5 mg) subcutaneously every 7 days.   Rationale: Dose too low - Dosage too low - Effectiveness   Recommendation: Increase Dose   Status: Accepted per CPA   Identified Date: 2025 Completed Date: 2025

## 2025-02-17 NOTE — PATIENT INSTRUCTIONS
"Recommendations from today's MTM visit:                                                      Increase Mounajro to 7.5mg weekly starting 3/2    Follow-up: 4/14/25 at 8:30 AM    Endocrine Team & Next Follow-Up:  3/26/25 with Elsa Perez PA-C  4/14/25 with Tressa    It was great speaking with you today.  I value your experience and would be very thankful for your time in providing feedback in our clinic survey. In the next few days, you may receive an email or text message from [employer] with a link to a survey related to your  clinical pharmacist.\"     To schedule another MTM appointment, please call the clinic directly or you may call the MTM scheduling line at [phone].    My Clinical Pharmacist's contact information:                                                      Please feel free to contact me with any questions or concerns you have.      Tressa Mulligan), PharmD  Endocrine & Diabetes Medication Therapy Management Pharmacist   [address] [place] [id number]     Contact information:   To schedule a MTM appointment: [phone]  For questions or concerns, please send a Forgame message or call the clinic at [phone].    For more urgent concerns that do not require 119, please call [phone] after hours/weekends and ask to speak with the Endocrinologist on call.     "

## 2025-03-09 DIAGNOSIS — C31.0 MAXILLARY SINUS CANCER (H): ICD-10-CM
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
## 2025-03-09 DIAGNOSIS — L59.8 RADIATION FIBROSIS OF SOFT TISSUE FROM THERAPEUTIC PROCEDURE: ICD-10-CM

## 2025-03-09 DIAGNOSIS — L90.5 SCAR TISSUE: ICD-10-CM

## 2025-03-09 DIAGNOSIS — M54.2 NECK PAIN: ICD-10-CM

## 2025-03-10 NOTE — TELEPHONE ENCOUNTER
"Cyclobenzaprine 5mg tab  Last prescribing provider: Dr. Marivel Hurtado     Last clinic visit date: 10/18/24    Recommendations for requested medication (if none, N/A): 10/18/24, \"Continue Flexeril at 5 mg twice daily as needed. Prescription refilled today. Can increase to 3 times daily if spasms are difficult.\"    Any other pertinent information (if none, N/A): fax request    Refilled: Y/N, if NO, why?    "

## 2025-03-11 RX ORDER — CYCLOBENZAPRINE HCL 5 MG
5 TABLET ORAL 3 TIMES DAILY PRN
Qty: 90 TABLET | Refills: 3 | Status: SHIPPED | OUTPATIENT
Start: 2025-03-11

## 2025-03-17 ENCOUNTER — TELEPHONE (OUTPATIENT)
Dept: ENDOCRINOLOGY | Facility: CLINIC | Age: 60
End: 2025-03-17
Payer: COMMERCIAL

## 2025-03-17 NOTE — TELEPHONE ENCOUNTER
Left Voicemail (1st Attempt) for the patient to call back and schedule the following:    Appointment type: RETURN DIABETES  Provider: Elsa Perez PA-C  Return date: Bobo 3/26/25  Specialty phone number: [phone]  Additional appointment(s) needed: N/A  Additonal Notes:  LVM, MyCx1, Due to changes in the providers schedule appt on 3/26 needs to get rescheduled to first avail slot.  JAMIL Ok'd by Syeda.    Please note that the above appointment(s) will require manual scheduling as they are marked as JAMIL and will not appear using auto search. Do not schedule the patient if another patient has already been scheduled in the requested appointment slot.       Diego Ta on 3/17/2025 at 12:00 PM

## 2025-03-19 NOTE — TELEPHONE ENCOUNTER
Patient confirmed scheduled appointment:  Date: 6/13/25  Time: 10:00 am  Visit type: RETURN DIABETES  Provider: Elsa Perez PA-C  Location: [facility]  Testing/imaging: N/A  Additional notes:  Spoke to pt and rescheduled appt on 3/26 due to changes in the providers schedule.  JAMIL Christie'otto by Syeda.  Pt will be in [place] for this visit.    Diego Ta on 3/19/2025 at 8:30 AM

## 2025-04-14 ENCOUNTER — VIRTUAL VISIT (OUTPATIENT)
Dept: PHARMACY | Facility: CLINIC | Age: 60
End: 2025-04-14
Attending: PHYSICIAN ASSISTANT
Payer: COMMERCIAL

## 2025-04-14 DIAGNOSIS — E11.69 TYPE 2 DIABETES MELLITUS WITH OTHER SPECIFIED COMPLICATION, WITHOUT LONG-TERM CURRENT USE OF INSULIN (H): Primary | ICD-10-CM

## 2025-04-14 NOTE — PROGRESS NOTES
Medication Therapy Management (MTM) Encounter    ASSESSMENT:                            Medication Adherence/Access: No issues identified.    Diabetes: Patient's blood sugars and weight have remain stable with recent Mounjaro dose increase however patient has developed a rash for unknown reason. Unable to determine if rash was a result of Mounjaro dose increase thus suggested that we could trial a Mounjaro dose decrease to see if rash improves however patient preference to continue on current Mounjaro dose and will monitor rash symptoms closely. Recommend patient make appointment to have rash evaluated if rash worsens or does not start to improve. Patient declines any signs/symptoms of a systemic allergic reaction thus appropriate for patient to continue on current Mounjaro dose with close monitoring.     PLAN:                            Continue to monitor rash, since symptoms have not been improving, I would recommend scheduling a follow up appointment with PCP or other doctor to rule out other causes of the rash, hard to determine if the rash is related to the Mounjaro. Please seek medical attention immediately if you develop any signs or symptoms of a systemic allergic reaction.     Systemic allergic reaction symptoms can include:  Skin reactions: Hives, itching, or flushed skin.   Respiratory issues: Constriction of airways, wheezing, and trouble breathing.   Cardiovascular symptoms: Low blood pressure, weak and rapid pulse.   Gastrointestinal symptoms: Nausea, vomiting, or diarrhea.   Neurological symptoms: Dizziness or fainting.     Continue on Mounjaro 7.5 mg weekly     Follow-up: 5/6/25 at 8:30 AM    Endocrine Team & Next Follow-Up:  5/6/25 with Tressa  6/13/25 with Elsa Perez PA-C    SUBJECTIVE/OBJECTIVE:                          Devonte Tucker is a 59 year old male called for a follow-up visit.       Reason for visit: Mounjaro follow up.    Allergies/ADRs: Reviewed in chart  Past Medical History:  Reviewed in chart  Tobacco: He reports that he has never smoked. He has never used smokeless tobacco.  Alcohol: Reviewed in chart     Medication Adherence/Access: no issues reported.    Diabetes   Patient diagnosed with type 2 diabetes   Current Medications:  Mounjaro 7.5mg weekly on Sundays (dose increase since last MTM visit) - patient notes that they have completed 7 doses thus far as of yesterday. Patient notes that since dose increase, they have noticed more swelling in their legs and redness around their ankles that is rash like and itching. Declines rash occurring anywhere else besides lower calf area on legs on both legs. Declines any systemic allergic reaction symptoms. Declines starting any new medications and no new exposures to anything else that they can think of. Notes that symptoms have been occurring for the last couple weeks. Declines any GI related side effects.   Metformin 1,000mg twice daily   Weight: patient has not noticed benefits of weight loss since starting Mounjaro and with dose increase, notes that current weight is at 274 lbs.   Blood sugar monitoring: 3 time(s) daily; Ranges: (patient reported):   Patient does not have meter with him today during the visit thus could not report specific readings however patient notes that blood sugars have remained stable since Mounjaro dose increase.   AM: 103-115  During the day:    Evenin-138     Today's Vitals: There were no vitals taken for this visit.  ----------------    I spent 17 minutes with this patient today. All changes were made via collaborative practice agreement with Elsa Perez.     A summary of these recommendations was sent via [employer].    Tressa Guy ([place]), PharmD  Endocrine & Diabetes Medication Therapy Management Pharmacist   [address] [place] [address]     Contact information:   To schedule a MT appointment: [phone]  For questions or concerns, please send a [employer] message or call the  clinic at [phone].    For more urgent concerns that do not require 911, please call [phone] after hours/weekends and ask to speak with the Endocrinologist on call.      Telemedicine Visit Details  The patient's medications can be safely assessed via a telemedicine encounter.  Type of service:  Telephone visit  Originating Location (pt. Location): Home    Distant Location (provider location):  On-site  Start Time:  8:36 AM  End Time: 8:53 AM     Medication Therapy Recommendations  No medication therapy recommendations to display

## 2025-04-14 NOTE — PATIENT INSTRUCTIONS
"Recommendations from today's MTM visit:                                                      Continue to monitor rash, since symptoms have not been improving, I would recommend scheduling a follow up appointment with PCP or other doctor to rule out other causes of the rash, hard to determine if the rash is related to the Mounjaro. Please seek medical attention immediately if you develop any signs or symptoms of a systemic allergic reaction.     Systemic allergic reaction symptoms can include:  Skin reactions: Hives, itching, or flushed skin.   Respiratory issues: Constriction of airways, wheezing, and trouble breathing.   Cardiovascular symptoms: Low blood pressure, weak and rapid pulse.   Gastrointestinal symptoms: Nausea, vomiting, or diarrhea.   Neurological symptoms: Dizziness or fainting.     Continue on Mounjaro 7.5 mg weekly     Follow-up: 5/6/25 at 8:30 AM    Endocrine Team & Next Follow-Up:  5/6/25 with Tressa  6/13/25 with Elsa Perez PA-C    It was great speaking with you today.  I value your experience and would be very thankful for your time in providing feedback in our clinic survey. In the next few days, you may receive an email or text message from [employer] with a link to a survey related to your  clinical pharmacist.\"     To schedule another MTM appointment, please call the clinic directly or you may call the MTM scheduling line at [phone].    My Clinical Pharmacist's contact information:                                                      Please feel free to contact me with any questions or concerns you have.      Tressa Mulligan), PharmD  Endocrine & Diabetes Medication Therapy Management Pharmacist   [address] [place] [address]     Contact information:   To schedule a MTM appointment: [phone]  For questions or concerns, please send a [employer] message or call the clinic at [phone].    For more urgent concerns that do not require 310, please call [phone] " after hours/weekends and ask to speak with the Endocrinologist on call.

## 2025-05-07 ENCOUNTER — TELEPHONE (OUTPATIENT)
Dept: ENDOCRINOLOGY | Facility: CLINIC | Age: 60
End: 2025-05-07
Payer: COMMERCIAL

## 2025-05-07 NOTE — TELEPHONE ENCOUNTER
Will reach out to patient in a couple weeks if not heard from patient sooner.     Tressa Mulligan), PharmD  Endocrine & Diabetes Medication Therapy Management Pharmacist   [address] [place] [address]     Contact information:   To schedule a MTM appointment: [phone]  For questions or concerns, please send a [employer] message or call the clinic at [phone].    For more urgent concerns that do not require 881, please call [phone] after hours/weekends and ask to speak with the Endocrinologist on call.

## 2025-05-07 NOTE — TELEPHONE ENCOUNTER
MTM appointment no showed, we made one more attempt to reschedule.     Routing back to referring provider and MTM Pharmacist Team        Rom Oliveira  MTM

## 2025-05-08 ENCOUNTER — TELEPHONE (OUTPATIENT)
Dept: ENDOCRINOLOGY | Facility: CLINIC | Age: 60
End: 2025-05-08
Payer: COMMERCIAL

## 2025-05-08 NOTE — TELEPHONE ENCOUNTER
Left Voicemail (1st Attempt) and Sent Mychart (1st Attempt) for the patient to call back and schedule the following:    Appointment type: RETURN DIABETES  Provider: any who sees T2D  Return date: next available, JAMIL RASMUSSEN since this is his second r/s since he was supposed to be seen February 2025  Specialty phone number: [phone]  Additional appointment(s) needed: N/A  Additonal Notes: Skip WILHELM    Available appts:  Alameddine 05/12 7:30/8:30 virtual MG  Ahmed 05/23 10/12:30 in person [facility]  Chris 05/29 in person [place] (would be return since he's seen Deangelo at )  Ahmed 05/30 12:30 in person [facility]    Elizabeth Morocho on 5/8/2025 at 11:08 AM         no...

## 2025-05-17 ENCOUNTER — HEALTH MAINTENANCE LETTER (OUTPATIENT)
Age: 60
End: 2025-05-17

## 2025-05-27 ENCOUNTER — MEDICAL CORRESPONDENCE (OUTPATIENT)
Dept: HEALTH INFORMATION MANAGEMENT | Facility: CLINIC | Age: 60
End: 2025-05-27
Payer: COMMERCIAL

## 2025-05-27 ENCOUNTER — LAB REQUISITION (OUTPATIENT)
Dept: LAB | Facility: CLINIC | Age: 60
End: 2025-05-27

## 2025-05-27 ENCOUNTER — TRANSCRIBE ORDERS (OUTPATIENT)
Dept: OTHER | Age: 60
End: 2025-05-27

## 2025-05-27 ENCOUNTER — TRANSFERRED RECORDS (OUTPATIENT)
Dept: MULTI SPECIALTY CLINIC | Facility: CLINIC | Age: 60
End: 2025-05-27

## 2025-05-27 DIAGNOSIS — Z12.5 ENCOUNTER FOR SCREENING FOR MALIGNANT NEOPLASM OF PROSTATE: ICD-10-CM

## 2025-05-27 DIAGNOSIS — E78.00 PURE HYPERCHOLESTEROLEMIA, UNSPECIFIED: ICD-10-CM

## 2025-05-27 DIAGNOSIS — Z12.11 COLON CANCER SCREENING: Primary | ICD-10-CM

## 2025-05-27 DIAGNOSIS — I10 ESSENTIAL (PRIMARY) HYPERTENSION: ICD-10-CM

## 2025-05-27 DIAGNOSIS — E11.42 TYPE 2 DIABETES MELLITUS WITH DIABETIC POLYNEUROPATHY (H): ICD-10-CM

## 2025-05-27 LAB — HBA1C MFR BLD: 5.6 % (ref 4.2–6.1)

## 2025-05-27 PROCEDURE — G0103 PSA SCREENING: HCPCS | Performed by: NURSE PRACTITIONER

## 2025-05-27 PROCEDURE — 80061 LIPID PANEL: CPT | Performed by: NURSE PRACTITIONER

## 2025-05-27 PROCEDURE — 82570 ASSAY OF URINE CREATININE: CPT | Performed by: NURSE PRACTITIONER

## 2025-05-27 PROCEDURE — 80048 BASIC METABOLIC PNL TOTAL CA: CPT | Performed by: NURSE PRACTITIONER

## 2025-05-28 LAB
ANION GAP SERPL CALCULATED.3IONS-SCNC: 12 MMOL/L (ref 7–15)
BUN SERPL-MCNC: 10.2 MG/DL (ref 8–23)
CALCIUM SERPL-MCNC: 9.5 MG/DL (ref 8.8–10.4)
CHLORIDE SERPL-SCNC: 100 MMOL/L (ref 98–107)
CHOLEST SERPL-MCNC: 113 MG/DL
CREAT SERPL-MCNC: 0.78 MG/DL (ref 0.67–1.17)
CREAT UR-MCNC: 31.7 MG/DL
EGFRCR SERPLBLD CKD-EPI 2021: >90 ML/MIN/1.73M2
FASTING STATUS PATIENT QL REPORTED: ABNORMAL
FASTING STATUS PATIENT QL REPORTED: NORMAL
GLUCOSE SERPL-MCNC: 113 MG/DL (ref 70–99)
HCO3 SERPL-SCNC: 24 MMOL/L (ref 22–29)
HDLC SERPL-MCNC: 44 MG/DL
LDLC SERPL CALC-MCNC: 47 MG/DL
MICROALBUMIN UR-MCNC: <12 MG/L
MICROALBUMIN/CREAT UR: NORMAL MG/G{CREAT}
NONHDLC SERPL-MCNC: 69 MG/DL
POTASSIUM SERPL-SCNC: 4.2 MMOL/L (ref 3.4–5.3)
PSA SERPL DL<=0.01 NG/ML-MCNC: 0.28 NG/ML (ref 0–4.5)
SODIUM SERPL-SCNC: 136 MMOL/L (ref 135–145)
TRIGL SERPL-MCNC: 111 MG/DL

## 2025-07-16 ENCOUNTER — VIRTUAL VISIT (OUTPATIENT)
Dept: PHARMACY | Facility: CLINIC | Age: 60
End: 2025-07-16
Attending: PHYSICIAN ASSISTANT
Payer: COMMERCIAL

## 2025-07-16 DIAGNOSIS — E11.69 TYPE 2 DIABETES MELLITUS WITH OTHER SPECIFIED COMPLICATION, WITHOUT LONG-TERM CURRENT USE OF INSULIN (H): Primary | ICD-10-CM

## 2025-07-16 RX ORDER — TIRZEPATIDE 10 MG/.5ML
10 INJECTION, SOLUTION SUBCUTANEOUS
Qty: 2 ML | Refills: 1 | Status: SHIPPED | OUTPATIENT
Start: 2025-07-16

## 2025-07-16 NOTE — PROGRESS NOTES
Medication Therapy Management (MTM) Encounter    ASSESSMENT:                            Medication Adherence/Access: No issues identified.    Diabetes: Patient has been able to tolerate Mounjaro at current dose with continued use, has noticed appetite suppression but no benefits of weight loss thus recommend dose increase for continued benefits of weight loss and blood sugar control. Patient reported blood sugars have been at goal, recommend patient send updated A1c reading from PCP visit to assess efficacy of current regimen.      PLAN:                            Send over recent A1c results from PCP office    Increase Mounjaro to 10mg weekly starting  8/10    Follow-up: 8/28/25 at 8:30 AM    SUBJECTIVE/OBJECTIVE:                          Devonte Tucker is a 59 year old male called for a follow-up visit.       Reason for visit: Mounjaro follow up.    Allergies/ADRs: Reviewed in chart  Past Medical History: Reviewed in chart  Tobacco: He reports that he has never smoked. He has never used smokeless tobacco.  Alcohol: Reviewed in chart     Medication Adherence/Access: no issues reported.    Diabetes   Patient diagnosed with type 2 diabetes   Current Medications:  Mounjaro 7.5mg weekly on Sundays - patient notes that with continued use, they have continued to have decreased appetite, often reminding themselves to eat regularly but notes that this is overall manageable and they have appreciated this to allow them to eat better and not have as much food cravings/overeating. Despite appetite suppression, they have not seen any weight loss yet. Patient notes that their previous rash that they were having has resolved and was evaluated during recent PCP visit, patient notes that PCP notes that rash was more so related to something patient was exposed to in the environment. Patient declines any other side effects.  Metformin 1,000mg twice daily   Weight: patient has not noticed benefits of weight loss since starting  Mounjaro and with dose increase, notes that current weight is at 278 lbs.   Blood sugar monitoring: 3 time(s) daily; Ranges: (patient reported):   Patient does not have meter with him today during the visit thus could not report specific readings  AM: 100-105  Before eating in the evenins  After eating in the evenin-130s  Before bed: 100s  Did have a recent A1c done at PCP office but not able to see record, patient notes it was good.      Today's Vitals: There were no vitals taken for this visit.  ----------------    I spent 17 minutes with this patient today. All changes were made via collaborative practice agreement with Elsa Perez.     A summary of these recommendations was sent via [employer].    Tressa Mulligan), PharmD  Endocrine & Diabetes Medication Therapy Management Pharmacist   [address] [place] [address]     Contact information:   To schedule a MTM appointment: [phone]  For questions or concerns, please send a [employer] message or call the clinic at [phone].    For more urgent concerns that do not require 521, please call [phone] after hours/weekends and ask to speak with the Endocrinologist on call.      Telemedicine Visit Details  The patient's medications can be safely assessed via a telemedicine encounter.  Type of service:  Telephone visit  Originating Location (pt. Location): Home    Distant Location (provider location):  Off-site  Start Time: 10:06 AM  End Time: 10:23 AM     Medication Therapy Recommendations  Diabetes mellitus, type 2 (H)   1 Current Medication: Tirzepatide (MOUNJARO) 7.5 MG/0.5ML SOAJ auto-injector pen (Discontinued)   Current Medication Sig: Inject 0.5 mLs (7.5 mg) subcutaneously every 7 days.   Rationale: Dose too low - Dosage too low - Effectiveness   Recommendation: Increase Dose - Mounjaro 10 MG/0.5ML Soaj   Status: Accepted per CPA   Identified Date: 2025 Completed Date: 2025

## 2025-07-17 NOTE — PATIENT INSTRUCTIONS
"Recommendations from today's MTM visit:                                                      Send over recent A1c results from PCP office    Increase Mounjaro to 10mg weekly starting  8/10    Follow-up: 8/28/25 at 8:30 AM    It was great speaking with you today.  I value your experience and would be very thankful for your time in providing feedback in our clinic survey. In the next few days, you may receive an email or text message from [employer] with a link to a survey related to your  clinical pharmacist.\"     To schedule another MTM appointment, please call the clinic directly or you may call the MTM scheduling line at [phone].    My Clinical Pharmacist's contact information:                                                      Please feel free to contact me with any questions or concerns you have.      Tressa PerezSaint Luke's Health Systemmelvi), PharmD  Endocrine & Diabetes Medication Therapy Management Pharmacist   [address] [place] [address]     Contact information:   To schedule a MTM appointment: [phone]  For questions or concerns, please send a [employer] message or call the clinic at [phone].    For more urgent concerns that do not require 108, please call [phone] after hours/weekends and ask to speak with the Endocrinologist on call.     "

## 2025-07-19 ENCOUNTER — HEALTH MAINTENANCE LETTER (OUTPATIENT)
Age: 60
End: 2025-07-19

## 2025-08-04 ENCOUNTER — TELEPHONE (OUTPATIENT)
Dept: GASTROENTEROLOGY | Facility: CLINIC | Age: 60
End: 2025-08-04
Payer: COMMERCIAL

## 2025-08-30 ENCOUNTER — HEALTH MAINTENANCE LETTER (OUTPATIENT)
Age: 60
End: 2025-08-30

## (undated) DEVICE — SUCTION MANIFOLD NEPTUNE 2 SYS 1 PORT 702-025-000

## (undated) DEVICE — SYR EAR BULB 3OZ 0035830

## (undated) DEVICE — SYR 03ML LL W/O NDL 309657

## (undated) DEVICE — BLADE SAW SAGITTAL STRK MICRO 9.0X25X0.38MM 2296-003-511

## (undated) DEVICE — CLIP HORIZON MED BLUE 002200

## (undated) DEVICE — SUCTION MANIFOLD DORNOCH ULTRA CART UL-CL500

## (undated) DEVICE — SYR 01ML 27GA 0.5" NDL TBC 309623

## (undated) DEVICE — SPONGE RAY-TEC 4X8" 7318

## (undated) DEVICE — LABEL MEDICATION SYSTEM 3303-P

## (undated) DEVICE — DRAIN PENROSE 1X18" LATEX FREE GR207

## (undated) DEVICE — CLIP HORIZON SM RED WIDE SLOT 001201

## (undated) DEVICE — WIPE INSTRUMENT MEROCEL 400200

## (undated) DEVICE — SOL NACL 0.9% IRRIG 1000ML BOTTLE 2F7124

## (undated) DEVICE — RX BACITRACIN OINTMENT 0.9G 1/32OZ CUR001109

## (undated) DEVICE — CAST PADDING 4" WEBRIL UNSTERILE

## (undated) DEVICE — RETR ELASTIC STAYS LONE STAR BLUNT DUAL LEAD 3550-1G

## (undated) DEVICE — DRAIN JACKSON PRATT 07MM FLAT SU130-1310

## (undated) DEVICE — ESU PENCIL SMOKE EVAC W/ROCKER SWITCH 0703-047-000

## (undated) DEVICE — DRSG KERLIX 4 1/2"X4YDS ROLL 6715

## (undated) DEVICE — SYR BULB IRRIG 50ML LATEX FREE 0035280

## (undated) DEVICE — SU SILK 2-0 SH CR 5X18" C0125

## (undated) DEVICE — TUBING SUCTION 12"X1/4" N612

## (undated) DEVICE — NDL COUNTER 40CT  31142311

## (undated) DEVICE — DRAPE STOCKINETTE IMPERVIOUS 10" 21048

## (undated) DEVICE — NDL ANGIOCATH 20GA 1.25" 4056

## (undated) DEVICE — BNDG ELASTIC 4" DBL LENGTH UNSTERILE 6611-14

## (undated) DEVICE — TUBING SUCTION 10'X3/16" N510

## (undated) DEVICE — DRAPE WARMER 66X44" ORS-300

## (undated) DEVICE — EYE INSTRUMENT WIPE MEROCEL W/ADHESIVE 223625

## (undated) DEVICE — BLADE KNIFE SURG 15 371115

## (undated) DEVICE — EYE SPONGE SPEAR WECK CEL 0008685

## (undated) DEVICE — CLIP APPLIER 09 3/8" SM LIGACLIP MCS20

## (undated) DEVICE — NDL ANGIOCATH 22GA 1" 4050

## (undated) DEVICE — SU SILK 4-0 TIE 12X30" A303H

## (undated) DEVICE — SU SILK 0 SH 30" K834H

## (undated) DEVICE — CATH TRAY FOLEY SURESTEP 16FR W/TMP PRB STLK LATEX A319416AM

## (undated) DEVICE — ENDO SNARE EXACTO COLD 9MM LOOP 2.4MMX230CM 00711115

## (undated) DEVICE — SU SILK 0 TIE 6X18" A186H

## (undated) DEVICE — SU CHROMIC 4-0 SH 27" G121H

## (undated) DEVICE — SU ETHILON 4-0 PS-2 18" CLR 1611G

## (undated) DEVICE — SOL WATER IRRIG 1000ML BOTTLE 2F7114

## (undated) DEVICE — SPONGE KITTNER 30-101

## (undated) DEVICE — SUCTION SLEEVE NEPTUNE 2 125MM 0703-005-125

## (undated) DEVICE — ESU GROUND PAD ADULT REM W/15' CORD E7507DB

## (undated) DEVICE — Device

## (undated) DEVICE — CANISTER WOUND VAC W/GEL 1000ML M8275093/5

## (undated) DEVICE — SPONGE LAP 18X18" X8435

## (undated) DEVICE — PACK NEURO MINOR UMMC SNE32MNMU4

## (undated) DEVICE — DRAIN JACKSON PRATT RESERVOIR 400ML SU130-1000

## (undated) DEVICE — ESU ELEC BLADE 2.75" COATED/INSULATED E1455

## (undated) DEVICE — SU ETHILON 3-0 PS-1 18" 1663H

## (undated) DEVICE — RETR RING LONE STAR 25X25CM 3310G

## (undated) DEVICE — DRSG TELFA 3X8" 1238

## (undated) DEVICE — CLIP APPLIER 11" MED LIGACLIP MCM30

## (undated) DEVICE — SPECIMEN CONTAINER 3OZ W/FORMALIN 59901

## (undated) DEVICE — SOL WATER IRRIG 500ML BOTTLE 2F7113

## (undated) DEVICE — RETR BLADE LONE STAR 16X20MM 4 FINGER 3334-4G

## (undated) DEVICE — DRAPE MAYO STAND 23X54 8337

## (undated) DEVICE — DRAPE SHEET REV FOLD 3/4 9349

## (undated) DEVICE — TONGUE DEPRESSOR STERILE 6023

## (undated) DEVICE — PREP SKIN SCRUB TRAY 4461A

## (undated) DEVICE — DRSG TEGADERM 4X4 3/4" 1626W

## (undated) DEVICE — KIT ENDO TURNOVER/PROCEDURE CARRY-ON 101822

## (undated) DEVICE — SPONGE SURGIFOAM 100 1974

## (undated) DEVICE — DRAPE MICRO ZEISS PENTERO 120X54" G650DL

## (undated) DEVICE — CLIP GEM MICRO GEM2431

## (undated) DEVICE — DRAIN JACKSON PRATT 10MM FLAT 4/4 PERF SU130-1311

## (undated) DEVICE — DRSG BIATAIN ALGINATE 4X4" 3710

## (undated) DEVICE — LINEN TOWEL PACK X6 WHITE 5487

## (undated) DEVICE — LINEN TOWEL PACK X5 5464

## (undated) DEVICE — LINEN TOWEL PACK X30 5481

## (undated) DEVICE — ENDO TRAP POLYP E-TRAP 00711099

## (undated) DEVICE — CUP AND LID 2PK 2OZ STERILE  SSK9006A

## (undated) DEVICE — CABLE DOPPLER FLOW EXTENSION  DP-CAB01

## (undated) DEVICE — CAST PLASTER SPLINT 3X15" 7393

## (undated) DEVICE — ESU CORD BIPOLAR AND IRR TUBING AESCULAP US355

## (undated) DEVICE — TUBE NASOGASTRIC FEEDING OVER WIRE 12FRX22" CORFLO 30-4602

## (undated) DEVICE — NDL COUNTER 20CT 31142493

## (undated) DEVICE — BLADE CLIPPER SGL USE 9680

## (undated) DEVICE — VESSEL LOOPS YELLOW MAXI 31145694

## (undated) DEVICE — ESU PENCIL W/COATED BLADE E2450H

## (undated) DEVICE — SU MONOSOF 9-0 MV135-4 N2546

## (undated) DEVICE — BLADE DERMATOME ZIMMER  00-8800-000-10

## (undated) DEVICE — PITCHER STERILE 1000ML  SSK9004A

## (undated) DEVICE — SU SILK 3-0 TIE 12X30" A304H

## (undated) DEVICE — SU ETHILON 4-0 PC-3 18" 1864G

## (undated) DEVICE — SU SILK 2-0 TIE 12X30" A305H

## (undated) DEVICE — DRSG TEGADERM 8X12" 1629

## (undated) DEVICE — DRAPE ISOLATION BAG 1003

## (undated) DEVICE — PREP POVIDONE IODINE SOLUTION 10% 4OZ

## (undated) DEVICE — ESU CORD BIPOLAR GREEN 10-4000

## (undated) DEVICE — SU VICRYL 3-0 RB-1 18" J713D

## (undated) DEVICE — TOURNIQUET CUFF 18" REPRO RED 60-7070-103

## (undated) DEVICE — GEL ULTRASOUND AQUASONIC 20GM 01-01

## (undated) DEVICE — SYR 05ML LL W/O NDL

## (undated) DEVICE — VESSEL LOOPS RED MINI 31145710

## (undated) DEVICE — GOWN IMPERVIOUS 2XL BLUE

## (undated) DEVICE — SU VICRYL 3-0 SH 8X18" UND J864D

## (undated) DEVICE — PACK GOWN 3/PK DISP XL SBA32GPFCB

## (undated) RX ORDER — ONDANSETRON 2 MG/ML
INJECTION INTRAMUSCULAR; INTRAVENOUS
Status: DISPENSED
Start: 2019-05-09

## (undated) RX ORDER — PROPOFOL 10 MG/ML
INJECTION, EMULSION INTRAVENOUS
Status: DISPENSED
Start: 2019-05-09

## (undated) RX ORDER — CHLORHEXIDINE GLUCONATE ORAL RINSE 1.2 MG/ML
SOLUTION DENTAL
Status: DISPENSED
Start: 2019-05-09

## (undated) RX ORDER — LIDOCAINE HYDROCHLORIDE AND EPINEPHRINE 10; 10 MG/ML; UG/ML
INJECTION, SOLUTION INFILTRATION; PERINEURAL
Status: DISPENSED
Start: 2019-05-09

## (undated) RX ORDER — FENTANYL CITRATE 50 UG/ML
INJECTION, SOLUTION INTRAMUSCULAR; INTRAVENOUS
Status: DISPENSED
Start: 2019-05-09

## (undated) RX ORDER — LIDOCAINE HYDROCHLORIDE 10 MG/ML
INJECTION, SOLUTION INFILTRATION; PERINEURAL
Status: DISPENSED
Start: 2019-04-04

## (undated) RX ORDER — EPHEDRINE SULFATE 50 MG/ML
INJECTION, SOLUTION INTRAMUSCULAR; INTRAVENOUS; SUBCUTANEOUS
Status: DISPENSED
Start: 2019-05-09

## (undated) RX ORDER — MINERAL OIL
OIL (ML) MISCELLANEOUS
Status: DISPENSED
Start: 2019-05-09

## (undated) RX ORDER — PAPAVERINE HYDROCHLORIDE 30 MG/ML
INJECTION INTRAMUSCULAR; INTRAVENOUS
Status: DISPENSED
Start: 2019-05-09

## (undated) RX ORDER — FENTANYL CITRATE 50 UG/ML
INJECTION, SOLUTION INTRAMUSCULAR; INTRAVENOUS
Status: DISPENSED
Start: 2019-04-04

## (undated) RX ORDER — LIDOCAINE HYDROCHLORIDE 20 MG/ML
INJECTION, SOLUTION EPIDURAL; INFILTRATION; INTRACAUDAL; PERINEURAL
Status: DISPENSED
Start: 2019-05-09

## (undated) RX ORDER — EPINEPHRINE NASAL SOLUTION 1 MG/ML
SOLUTION NASAL
Status: DISPENSED
Start: 2019-05-09

## (undated) RX ORDER — PHENYLEPHRINE HCL IN 0.9% NACL 1 MG/10 ML
SYRINGE (ML) INTRAVENOUS
Status: DISPENSED
Start: 2019-05-09

## (undated) RX ORDER — SODIUM CHLORIDE 9 MG/ML
INJECTION, SOLUTION INTRAVENOUS
Status: DISPENSED
Start: 2019-04-04

## (undated) RX ORDER — HYDROMORPHONE HYDROCHLORIDE 1 MG/ML
INJECTION, SOLUTION INTRAMUSCULAR; INTRAVENOUS; SUBCUTANEOUS
Status: DISPENSED
Start: 2019-05-09